# Patient Record
Sex: MALE | Race: BLACK OR AFRICAN AMERICAN | Employment: OTHER | URBAN - METROPOLITAN AREA
[De-identification: names, ages, dates, MRNs, and addresses within clinical notes are randomized per-mention and may not be internally consistent; named-entity substitution may affect disease eponyms.]

---

## 2017-01-01 ENCOUNTER — OFFICE VISIT (OUTPATIENT)
Dept: CARDIOLOGY | Facility: CLINIC | Age: 77
End: 2017-01-01
Attending: INTERNAL MEDICINE
Payer: COMMERCIAL

## 2017-01-01 ENCOUNTER — PRE VISIT (OUTPATIENT)
Dept: CARDIOLOGY | Facility: CLINIC | Age: 77
End: 2017-01-01

## 2017-01-01 ENCOUNTER — TRANSFERRED RECORDS (OUTPATIENT)
Dept: HEALTH INFORMATION MANAGEMENT | Facility: CLINIC | Age: 77
End: 2017-01-01

## 2017-01-01 ENCOUNTER — TELEPHONE (OUTPATIENT)
Dept: FAMILY MEDICINE | Facility: CLINIC | Age: 77
End: 2017-01-01

## 2017-01-01 ENCOUNTER — TELEPHONE (OUTPATIENT)
Dept: INTERNAL MEDICINE | Facility: CLINIC | Age: 77
End: 2017-01-01

## 2017-01-01 ENCOUNTER — OFFICE VISIT (OUTPATIENT)
Dept: NEPHROLOGY | Facility: CLINIC | Age: 77
End: 2017-01-01
Attending: INTERNAL MEDICINE
Payer: COMMERCIAL

## 2017-01-01 ENCOUNTER — OFFICE VISIT (OUTPATIENT)
Dept: PODIATRY | Facility: CLINIC | Age: 77
End: 2017-01-01
Payer: COMMERCIAL

## 2017-01-01 ENCOUNTER — CARE COORDINATION (OUTPATIENT)
Dept: CARDIOLOGY | Facility: CLINIC | Age: 77
End: 2017-01-01

## 2017-01-01 ENCOUNTER — OFFICE VISIT (OUTPATIENT)
Dept: UROLOGY | Facility: CLINIC | Age: 77
End: 2017-01-01

## 2017-01-01 ENCOUNTER — OFFICE VISIT (OUTPATIENT)
Dept: FAMILY MEDICINE | Facility: CLINIC | Age: 77
End: 2017-01-01

## 2017-01-01 ENCOUNTER — RADIANT APPOINTMENT (OUTPATIENT)
Dept: GENERAL RADIOLOGY | Facility: CLINIC | Age: 77
End: 2017-01-01
Attending: PODIATRIST
Payer: COMMERCIAL

## 2017-01-01 ENCOUNTER — HOSPITAL ENCOUNTER (OUTPATIENT)
Dept: MRI IMAGING | Facility: CLINIC | Age: 77
Discharge: HOME | End: 2017-08-23
Attending: FAMILY MEDICINE | Admitting: FAMILY MEDICINE
Payer: COMMERCIAL

## 2017-01-01 ENCOUNTER — PRE VISIT (OUTPATIENT)
Dept: UROLOGY | Facility: CLINIC | Age: 77
End: 2017-01-01

## 2017-01-01 ENCOUNTER — MEDICAL CORRESPONDENCE (OUTPATIENT)
Dept: HEALTH INFORMATION MANAGEMENT | Facility: CLINIC | Age: 77
End: 2017-01-01

## 2017-01-01 ENCOUNTER — TELEPHONE (OUTPATIENT)
Dept: OTHER | Facility: CLINIC | Age: 77
End: 2017-01-01

## 2017-01-01 ENCOUNTER — TELEPHONE (OUTPATIENT)
Dept: PODIATRY | Facility: CLINIC | Age: 77
End: 2017-01-01

## 2017-01-01 VITALS
DIASTOLIC BLOOD PRESSURE: 70 MMHG | HEART RATE: 58 BPM | SYSTOLIC BLOOD PRESSURE: 103 MMHG | RESPIRATION RATE: 20 BRPM | OXYGEN SATURATION: 99 %

## 2017-01-01 VITALS
BODY MASS INDEX: 25.25 KG/M2 | SYSTOLIC BLOOD PRESSURE: 98 MMHG | DIASTOLIC BLOOD PRESSURE: 66 MMHG | TEMPERATURE: 97.8 F | WEIGHT: 171 LBS | OXYGEN SATURATION: 99 % | RESPIRATION RATE: 18 BRPM | HEART RATE: 91 BPM

## 2017-01-01 VITALS
OXYGEN SATURATION: 98 % | WEIGHT: 167 LBS | BODY MASS INDEX: 24.66 KG/M2 | HEART RATE: 83 BPM | DIASTOLIC BLOOD PRESSURE: 72 MMHG | RESPIRATION RATE: 18 BRPM | SYSTOLIC BLOOD PRESSURE: 104 MMHG

## 2017-01-01 VITALS — HEART RATE: 102 BPM | RESPIRATION RATE: 16 BRPM | SYSTOLIC BLOOD PRESSURE: 102 MMHG | DIASTOLIC BLOOD PRESSURE: 70 MMHG

## 2017-01-01 VITALS — DIASTOLIC BLOOD PRESSURE: 68 MMHG | SYSTOLIC BLOOD PRESSURE: 100 MMHG | HEART RATE: 82 BPM | OXYGEN SATURATION: 98 %

## 2017-01-01 VITALS — OXYGEN SATURATION: 98 % | SYSTOLIC BLOOD PRESSURE: 104 MMHG | DIASTOLIC BLOOD PRESSURE: 74 MMHG | HEART RATE: 83 BPM

## 2017-01-01 VITALS — HEART RATE: 72 BPM | OXYGEN SATURATION: 99 % | SYSTOLIC BLOOD PRESSURE: 89 MMHG | DIASTOLIC BLOOD PRESSURE: 55 MMHG

## 2017-01-01 VITALS
WEIGHT: 163 LBS | HEIGHT: 69 IN | DIASTOLIC BLOOD PRESSURE: 59 MMHG | HEART RATE: 73 BPM | BODY MASS INDEX: 24.14 KG/M2 | SYSTOLIC BLOOD PRESSURE: 95 MMHG

## 2017-01-01 VITALS
BODY MASS INDEX: 24.14 KG/M2 | TEMPERATURE: 98.2 F | WEIGHT: 163 LBS | RESPIRATION RATE: 18 BRPM | SYSTOLIC BLOOD PRESSURE: 81 MMHG | HEIGHT: 69 IN | DIASTOLIC BLOOD PRESSURE: 61 MMHG | HEART RATE: 73 BPM

## 2017-01-01 VITALS — HEART RATE: 71 BPM | DIASTOLIC BLOOD PRESSURE: 74 MMHG | SYSTOLIC BLOOD PRESSURE: 109 MMHG | OXYGEN SATURATION: 99 %

## 2017-01-01 VITALS
DIASTOLIC BLOOD PRESSURE: 62 MMHG | HEIGHT: 69 IN | OXYGEN SATURATION: 98 % | HEART RATE: 58 BPM | WEIGHT: 160 LBS | BODY MASS INDEX: 23.7 KG/M2 | SYSTOLIC BLOOD PRESSURE: 94 MMHG

## 2017-01-01 VITALS
SYSTOLIC BLOOD PRESSURE: 93 MMHG | HEART RATE: 86 BPM | OXYGEN SATURATION: 100 % | RESPIRATION RATE: 16 BRPM | DIASTOLIC BLOOD PRESSURE: 63 MMHG | WEIGHT: 171 LBS | BODY MASS INDEX: 25.25 KG/M2 | TEMPERATURE: 97.5 F

## 2017-01-01 VITALS — HEART RATE: 86 BPM | DIASTOLIC BLOOD PRESSURE: 74 MMHG | SYSTOLIC BLOOD PRESSURE: 103 MMHG | RESPIRATION RATE: 16 BRPM

## 2017-01-01 DIAGNOSIS — I43 AMYLOID HEART DISEASE (H): ICD-10-CM

## 2017-01-01 DIAGNOSIS — G60.9 IDIOPATHIC PERIPHERAL NEUROPATHY: ICD-10-CM

## 2017-01-01 DIAGNOSIS — I89.0 LYMPHEDEMA: Primary | ICD-10-CM

## 2017-01-01 DIAGNOSIS — Z79.01 LONG TERM CURRENT USE OF ANTICOAGULANT THERAPY: ICD-10-CM

## 2017-01-01 DIAGNOSIS — M79.672 LEFT FOOT PAIN: ICD-10-CM

## 2017-01-01 DIAGNOSIS — B35.1 DERMATOPHYTOSIS OF NAIL: ICD-10-CM

## 2017-01-01 DIAGNOSIS — E85.4 CARDIAC AMYLOIDOSIS (H): Primary | ICD-10-CM

## 2017-01-01 DIAGNOSIS — I50.32 CHRONIC DIASTOLIC CONGESTIVE HEART FAILURE (H): ICD-10-CM

## 2017-01-01 DIAGNOSIS — I50.9 CHF (CONGESTIVE HEART FAILURE) (H): Primary | ICD-10-CM

## 2017-01-01 DIAGNOSIS — I43 CARDIAC AMYLOIDOSIS (H): ICD-10-CM

## 2017-01-01 DIAGNOSIS — I48.0 PAROXYSMAL ATRIAL FIBRILLATION (H): ICD-10-CM

## 2017-01-01 DIAGNOSIS — R60.0 LOWER EXTREMITY EDEMA: ICD-10-CM

## 2017-01-01 DIAGNOSIS — M76.72 PERONEAL TENDINITIS, LEFT: ICD-10-CM

## 2017-01-01 DIAGNOSIS — N18.30 CKD (CHRONIC KIDNEY DISEASE) STAGE 3, GFR 30-59 ML/MIN (H): Primary | ICD-10-CM

## 2017-01-01 DIAGNOSIS — N18.30 CKD (CHRONIC KIDNEY DISEASE) STAGE 3, GFR 30-59 ML/MIN (H): ICD-10-CM

## 2017-01-01 DIAGNOSIS — K59.09 OTHER CONSTIPATION: ICD-10-CM

## 2017-01-01 DIAGNOSIS — E79.0 ELEVATED BLOOD URIC ACID LEVEL: ICD-10-CM

## 2017-01-01 DIAGNOSIS — N28.9 LESION OF RIGHT NATIVE KIDNEY: Primary | ICD-10-CM

## 2017-01-01 DIAGNOSIS — M79.671 FOOT PAIN, BILATERAL: ICD-10-CM

## 2017-01-01 DIAGNOSIS — I66.01: ICD-10-CM

## 2017-01-01 DIAGNOSIS — E85.4 CARDIAC AMYLOIDOSIS (H): ICD-10-CM

## 2017-01-01 DIAGNOSIS — M79.672 FOOT PAIN, BILATERAL: ICD-10-CM

## 2017-01-01 DIAGNOSIS — I43 CARDIAC AMYLOIDOSIS (H): Primary | ICD-10-CM

## 2017-01-01 DIAGNOSIS — N18.30 CHRONIC KIDNEY DISEASE, STAGE III (MODERATE) (H): ICD-10-CM

## 2017-01-01 DIAGNOSIS — Z98.890 STATUS POST LUMBAR SURGERY: ICD-10-CM

## 2017-01-01 DIAGNOSIS — K59.01 SLOW TRANSIT CONSTIPATION: ICD-10-CM

## 2017-01-01 DIAGNOSIS — E85.89 OTHER AMYLOIDOSIS (H): ICD-10-CM

## 2017-01-01 DIAGNOSIS — I50.42 CHRONIC COMBINED SYSTOLIC AND DIASTOLIC CONGESTIVE HEART FAILURE (H): ICD-10-CM

## 2017-01-01 DIAGNOSIS — M79.2 PERIPHERAL NEUROPATHIC PAIN: ICD-10-CM

## 2017-01-01 DIAGNOSIS — R94.4 ABNORMAL RENAL FUNCTION TEST: ICD-10-CM

## 2017-01-01 DIAGNOSIS — M79.672 LEFT FOOT PAIN: Primary | ICD-10-CM

## 2017-01-01 DIAGNOSIS — M10.9 ACUTE GOUTY ARTHRITIS: ICD-10-CM

## 2017-01-01 DIAGNOSIS — C64.1 RENAL MALIGNANT NEOPLASM, RIGHT (H): ICD-10-CM

## 2017-01-01 DIAGNOSIS — I50.40 COMBINED SYSTOLIC AND DIASTOLIC CONGESTIVE HEART FAILURE, UNSPECIFIED CONGESTIVE HEART FAILURE CHRONICITY: Primary | ICD-10-CM

## 2017-01-01 DIAGNOSIS — E78.49 OTHER HYPERLIPIDEMIA: ICD-10-CM

## 2017-01-01 DIAGNOSIS — I87.2 VENOUS (PERIPHERAL) INSUFFICIENCY: ICD-10-CM

## 2017-01-01 DIAGNOSIS — F32.1 MODERATE SINGLE CURRENT EPISODE OF MAJOR DEPRESSIVE DISORDER (H): ICD-10-CM

## 2017-01-01 DIAGNOSIS — I63.311 CEREBROVASCULAR ACCIDENT (CVA) DUE TO THROMBOSIS OF RIGHT MIDDLE CEREBRAL ARTERY (H): ICD-10-CM

## 2017-01-01 DIAGNOSIS — R60.0 BILATERAL EDEMA OF LOWER EXTREMITY: Primary | ICD-10-CM

## 2017-01-01 DIAGNOSIS — N28.89 RIGHT RENAL MASS: Primary | ICD-10-CM

## 2017-01-01 DIAGNOSIS — I63.9 CEREBROVASCULAR ACCIDENT (H): Primary | ICD-10-CM

## 2017-01-01 DIAGNOSIS — R60.1 GENERALIZED EDEMA: ICD-10-CM

## 2017-01-01 DIAGNOSIS — F32.0 MAJOR DEPRESSIVE DISORDER, SINGLE EPISODE, MILD (H): Primary | ICD-10-CM

## 2017-01-01 DIAGNOSIS — L60.0 INGROWING NAIL: Primary | ICD-10-CM

## 2017-01-01 DIAGNOSIS — R60.0 BILATERAL EDEMA OF LOWER EXTREMITY: ICD-10-CM

## 2017-01-01 DIAGNOSIS — M1A.2790 CHRONIC DRUG-INDUCED GOUT INVOLVING TOE WITHOUT TOPHUS, UNSPECIFIED LATERALITY: ICD-10-CM

## 2017-01-01 DIAGNOSIS — F32.1 MODERATE SINGLE CURRENT EPISODE OF MAJOR DEPRESSIVE DISORDER (H): Primary | ICD-10-CM

## 2017-01-01 DIAGNOSIS — C64.1 MALIGNANT NEOPLASM OF KIDNEY EXCLUDING RENAL PELVIS, RIGHT (H): ICD-10-CM

## 2017-01-01 DIAGNOSIS — L60.8 NAIL DEFORMITY: ICD-10-CM

## 2017-01-01 DIAGNOSIS — Z23 NEED FOR PROPHYLACTIC VACCINATION AND INOCULATION AGAINST INFLUENZA: ICD-10-CM

## 2017-01-01 DIAGNOSIS — B35.1 ONYCHOMYCOSIS: ICD-10-CM

## 2017-01-01 DIAGNOSIS — Z86.73 HISTORY OF STROKE: ICD-10-CM

## 2017-01-01 DIAGNOSIS — E85.4 AMYLOID HEART DISEASE (H): ICD-10-CM

## 2017-01-01 DIAGNOSIS — I89.0 LYMPHEDEMA, NOT ELSEWHERE CLASSIFIED: Primary | ICD-10-CM

## 2017-01-01 DIAGNOSIS — R55 VASOVAGAL SYNCOPE: ICD-10-CM

## 2017-01-01 DIAGNOSIS — B35.1 DERMATOPHYTOSIS OF NAIL: Primary | ICD-10-CM

## 2017-01-01 DIAGNOSIS — I63.311 CEREBRAL INFARCTION DUE TO THROMBOSIS OF RIGHT MIDDLE CEREBRAL ARTERY (H): ICD-10-CM

## 2017-01-01 DIAGNOSIS — R73.9 HYPERGLYCEMIA: ICD-10-CM

## 2017-01-01 DIAGNOSIS — I48.91 ATRIAL FIBRILLATION (H): ICD-10-CM

## 2017-01-01 DIAGNOSIS — R59.1 LYMPHADENOPATHY: Primary | ICD-10-CM

## 2017-01-01 DIAGNOSIS — R53.1 WEAKNESS: ICD-10-CM

## 2017-01-01 DIAGNOSIS — I21.4 MYOCARDIAL INFARCTION, NONTRANSMURAL (H): ICD-10-CM

## 2017-01-01 DIAGNOSIS — R60.0 PERIPHERAL EDEMA: Primary | ICD-10-CM

## 2017-01-01 DIAGNOSIS — M67.88 PERONEAL TENDINOSIS, LEFT: ICD-10-CM

## 2017-01-01 DIAGNOSIS — B35.3 TINEA PEDIS OF LEFT FOOT: ICD-10-CM

## 2017-01-01 DIAGNOSIS — I63.311 CEREBROVASCULAR ACCIDENT (CVA) DUE TO THROMBOSIS OF RIGHT MIDDLE CEREBRAL ARTERY (H): Primary | ICD-10-CM

## 2017-01-01 LAB
ALBUMIN MFR UR ELPH: 33.9 %
ALBUMIN SERPL ELPH-MCNC: 3.9 G/DL (ref 3.7–5.1)
ALBUMIN SERPL-MCNC: 3.4 G/DL (ref 3.4–5)
ALBUMIN UR-MCNC: NEGATIVE MG/DL
ALPHA1 GLOB MFR UR ELPH: 9.5 %
ALPHA1 GLOB SERPL ELPH-MCNC: 0.4 G/DL (ref 0.2–0.4)
ALPHA2 GLOB MFR UR ELPH: 11.2 %
ALPHA2 GLOB SERPL ELPH-MCNC: 0.6 G/DL (ref 0.5–0.9)
ANION GAP SERPL CALCULATED.3IONS-SCNC: 10 MMOL/L (ref 3–14)
ANION GAP SERPL CALCULATED.3IONS-SCNC: 7 MMOL/L (ref 3–14)
ANION GAP SERPL CALCULATED.3IONS-SCNC: 8 MMOL/L (ref 3–14)
APPEARANCE UR: CLEAR
B-GLOBULIN MFR UR ELPH: 16.5 %
B-GLOBULIN SERPL ELPH-MCNC: 0.7 G/DL (ref 0.6–1)
BILIRUB UR QL STRIP: NEGATIVE
BUN SERPL-MCNC: 50 MG/DL (ref 7–30)
BUN SERPL-MCNC: 51 MG/DL (ref 7–30)
BUN SERPL-MCNC: 53 MG/DL (ref 7–30)
CALCIUM SERPL-MCNC: 8.9 MG/DL (ref 8.5–10.1)
CALCIUM SERPL-MCNC: 9.2 MG/DL (ref 8.5–10.1)
CALCIUM SERPL-MCNC: 9.3 MG/DL (ref 8.5–10.1)
CHLORIDE SERPL-SCNC: 100 MMOL/L (ref 94–109)
CHLORIDE SERPL-SCNC: 97 MMOL/L (ref 94–109)
CHLORIDE SERPL-SCNC: 98 MMOL/L (ref 94–109)
CO2 SERPL-SCNC: 27 MMOL/L (ref 20–32)
CO2 SERPL-SCNC: 29 MMOL/L (ref 20–32)
CO2 SERPL-SCNC: 29 MMOL/L (ref 20–32)
COLOR UR AUTO: YELLOW
COPATH REPORT: NORMAL
CREAT BLD-MCNC: 2.3 MG/DL (ref 0.66–1.25)
CREAT SERPL-MCNC: 2.14 MG/DL (ref 0.66–1.25)
CREAT SERPL-MCNC: 2.19 MG/DL (ref 0.66–1.25)
CREAT SERPL-MCNC: 2.45 MG/DL (ref 0.66–1.25)
CREAT UR-MCNC: 37 MG/DL
EJECTION FRACTION: 23
ERYTHROCYTE [DISTWIDTH] IN BLOOD BY AUTOMATED COUNT: 14.7 % (ref 10–15)
GAMMA GLOB MFR UR ELPH: 28.9 %
GAMMA GLOB SERPL ELPH-MCNC: 1.3 G/DL (ref 0.7–1.6)
GFR SERPL CREATININE-BSD FRML MDRD: 26 ML/MIN/1.7M2
GFR SERPL CREATININE-BSD FRML MDRD: 28 ML/MIN/1.7M2
GFR SERPL CREATININE-BSD FRML MDRD: 30 ML/MIN/1.7M2
GFR SERPL CREATININE-BSD FRML MDRD: 30 ML/MIN/1.7M2
GLUCOSE SERPL-MCNC: 114 MG/DL (ref 70–99)
GLUCOSE SERPL-MCNC: 120 MG/DL (ref 70–99)
GLUCOSE SERPL-MCNC: 87 MG/DL (ref 70–99)
GLUCOSE UR STRIP-MCNC: NEGATIVE MG/DL
HBA1C MFR BLD: 6.4 % (ref 4.3–6)
HCT VFR BLD AUTO: 38.8 % (ref 40–53)
HGB BLD-MCNC: 13.1 G/DL (ref 13.3–17.7)
HGB UR QL STRIP: ABNORMAL
HYALINE CASTS #/AREA URNS LPF: 1 /LPF (ref 0–2)
KAPPA LC UR-MCNC: 4.63 MG/DL (ref 0.33–1.94)
KAPPA LC/LAMBDA SER: 1.58 {RATIO} (ref 0.26–1.65)
KETONES UR STRIP-MCNC: NEGATIVE MG/DL
LAMBDA LC SERPL-MCNC: 2.93 MG/DL (ref 0.57–2.63)
LEUKOCYTE ESTERASE UR QL STRIP: NEGATIVE
M PROTEIN MFR UR ELPH: 0 %
M PROTEIN SERPL ELPH-MCNC: 0 G/DL
MCH RBC QN AUTO: 31.8 PG (ref 26.5–33)
MCHC RBC AUTO-ENTMCNC: 33.8 G/DL (ref 31.5–36.5)
MCV RBC AUTO: 94 FL (ref 78–100)
MICROALBUMIN UR-MCNC: 76 MG/L
MICROALBUMIN/CREAT UR: 205.14 MG/G CR (ref 0–17)
NITRATE UR QL: NEGATIVE
PH UR STRIP: 6 PH (ref 5–7)
PHOSPHATE SERPL-MCNC: 3.6 MG/DL (ref 2.5–4.5)
PLATELET # BLD AUTO: 100 10E9/L (ref 150–450)
POTASSIUM SERPL-SCNC: 3.9 MMOL/L (ref 3.4–5.3)
POTASSIUM SERPL-SCNC: 3.9 MMOL/L (ref 3.4–5.3)
POTASSIUM SERPL-SCNC: 4.3 MMOL/L (ref 3.4–5.3)
PROT PATTERN SERPL ELPH-IMP: NORMAL
PROT PATTERN UR ELPH-IMP: ABNORMAL
PROT UR-MCNC: 0.28 G/L
PROT/CREAT 24H UR: 0.76 G/G CR (ref 0–0.2)
RBC # BLD AUTO: 4.12 10E12/L (ref 4.4–5.9)
RBC #/AREA URNS AUTO: 1 /HPF (ref 0–2)
SODIUM SERPL-SCNC: 135 MMOL/L (ref 133–144)
SODIUM SERPL-SCNC: 135 MMOL/L (ref 133–144)
SODIUM SERPL-SCNC: 137 MMOL/L (ref 133–144)
SOURCE: ABNORMAL
SP GR UR STRIP: 1.01 (ref 1–1.03)
SQUAMOUS #/AREA URNS AUTO: <1 /HPF (ref 0–1)
TSH SERPL DL<=0.005 MIU/L-ACNC: 1.95 MU/L (ref 0.4–4)
URATE SERPL-MCNC: 10.1 MG/DL (ref 3.5–7.2)
UROBILINOGEN UR STRIP-MCNC: 2 MG/DL (ref 0–2)
WBC # BLD AUTO: 3.9 10E9/L (ref 4–11)
WBC #/AREA URNS AUTO: <1 /HPF (ref 0–2)

## 2017-01-01 PROCEDURE — 36415 COLL VENOUS BLD VENIPUNCTURE: CPT | Performed by: INTERNAL MEDICINE

## 2017-01-01 PROCEDURE — 82565 ASSAY OF CREATININE: CPT | Mod: 91

## 2017-01-01 PROCEDURE — 99212 OFFICE O/P EST SF 10 MIN: CPT | Performed by: PODIATRIST

## 2017-01-01 PROCEDURE — 99204 OFFICE O/P NEW MOD 45 MIN: CPT | Mod: ZP | Performed by: INTERNAL MEDICINE

## 2017-01-01 PROCEDURE — 85027 COMPLETE CBC AUTOMATED: CPT | Performed by: INTERNAL MEDICINE

## 2017-01-01 PROCEDURE — 99202 OFFICE O/P NEW SF 15 MIN: CPT | Mod: 25 | Performed by: PODIATRIST

## 2017-01-01 PROCEDURE — 11721 DEBRIDE NAIL 6 OR MORE: CPT | Performed by: PODIATRIST

## 2017-01-01 PROCEDURE — 99213 OFFICE O/P EST LOW 20 MIN: CPT | Performed by: PODIATRIST

## 2017-01-01 PROCEDURE — 25000128 H RX IP 250 OP 636: Performed by: FAMILY MEDICINE

## 2017-01-01 PROCEDURE — 80069 RENAL FUNCTION PANEL: CPT | Performed by: INTERNAL MEDICINE

## 2017-01-01 PROCEDURE — 84156 ASSAY OF PROTEIN URINE: CPT | Performed by: INTERNAL MEDICINE

## 2017-01-01 PROCEDURE — 74183 MRI ABD W/O CNTR FLWD CNTR: CPT

## 2017-01-01 PROCEDURE — A9585 GADOBUTROL INJECTION: HCPCS | Performed by: FAMILY MEDICINE

## 2017-01-01 PROCEDURE — 73630 X-RAY EXAM OF FOOT: CPT | Mod: LT | Performed by: RADIOLOGY

## 2017-01-01 PROCEDURE — 27210995 ZZH RX 272: Performed by: FAMILY MEDICINE

## 2017-01-01 PROCEDURE — 99213 OFFICE O/P EST LOW 20 MIN: CPT | Mod: ZF

## 2017-01-01 PROCEDURE — 73610 X-RAY EXAM OF ANKLE: CPT | Mod: LT | Performed by: RADIOLOGY

## 2017-01-01 PROCEDURE — 80048 BASIC METABOLIC PNL TOTAL CA: CPT | Performed by: INTERNAL MEDICINE

## 2017-01-01 PROCEDURE — 82043 UR ALBUMIN QUANTITATIVE: CPT | Performed by: INTERNAL MEDICINE

## 2017-01-01 PROCEDURE — 81001 URINALYSIS AUTO W/SCOPE: CPT | Performed by: INTERNAL MEDICINE

## 2017-01-01 RX ORDER — GADOBUTROL 604.72 MG/ML
8 INJECTION INTRAVENOUS ONCE
Status: COMPLETED | OUTPATIENT
Start: 2017-01-01 | End: 2017-01-01

## 2017-01-01 RX ORDER — POTASSIUM CHLORIDE 1500 MG/1
20 TABLET, EXTENDED RELEASE ORAL DAILY
Qty: 90 TABLET | Refills: 1 | Status: SHIPPED | OUTPATIENT
Start: 2017-01-01

## 2017-01-01 RX ORDER — POLYETHYLENE GLYCOL 3350 17 G/17G
1 POWDER, FOR SOLUTION ORAL DAILY PRN
Qty: 119 G | Refills: 3 | Status: SHIPPED | OUTPATIENT
Start: 2017-01-01 | End: 2018-01-01

## 2017-01-01 RX ORDER — ACYCLOVIR 200 MG/1
30 CAPSULE ORAL ONCE
Status: COMPLETED | OUTPATIENT
Start: 2017-01-01 | End: 2017-01-01

## 2017-01-01 RX ORDER — FEBUXOSTAT 40 MG/1
40 TABLET, FILM COATED ORAL DAILY
Qty: 90 TABLET | Refills: 3 | Status: SHIPPED | OUTPATIENT
Start: 2017-01-01 | End: 2017-01-01

## 2017-01-01 RX ORDER — BUMETANIDE 1 MG/1
TABLET ORAL
COMMUNITY
Start: 2017-01-01

## 2017-01-01 RX ORDER — ATORVASTATIN CALCIUM 80 MG/1
80 TABLET, FILM COATED ORAL DAILY
Qty: 90 TABLET | Refills: 3 | Status: SHIPPED | OUTPATIENT
Start: 2017-01-01

## 2017-01-01 RX ORDER — SOTALOL HYDROCHLORIDE 80 MG/1
TABLET ORAL
Qty: 45 TABLET | Refills: 0 | Status: SHIPPED | OUTPATIENT
Start: 2017-01-01 | End: 2017-01-01

## 2017-01-01 RX ORDER — ESCITALOPRAM OXALATE 5 MG/1
5 TABLET ORAL DAILY
Qty: 90 TABLET | Refills: 3 | Status: SHIPPED | OUTPATIENT
Start: 2017-01-01 | End: 2018-01-01

## 2017-01-01 RX ORDER — ESCITALOPRAM OXALATE 10 MG/1
5 TABLET ORAL DAILY
Qty: 30 TABLET | Refills: 1 | Status: SHIPPED | OUTPATIENT
Start: 2017-01-01 | End: 2017-01-01

## 2017-01-01 RX ORDER — BUMETANIDE 1 MG/1
1 TABLET ORAL 2 TIMES DAILY
Qty: 180 TABLET | Refills: 1 | Status: SHIPPED | OUTPATIENT
Start: 2017-01-01 | End: 2017-01-01

## 2017-01-01 RX ORDER — LIDOCAINE HYDROCHLORIDE 30 MG/G
CREAM TOPICAL
Qty: 85 G | Refills: 3 | Status: SHIPPED | OUTPATIENT
Start: 2017-01-01 | End: 2018-01-01

## 2017-01-01 RX ORDER — ESCITALOPRAM OXALATE 10 MG/1
10 TABLET ORAL DAILY
Qty: 90 TABLET | Refills: 3 | Status: SHIPPED | OUTPATIENT
Start: 2017-01-01 | End: 2017-01-01 | Stop reason: DRUGHIGH

## 2017-01-01 RX ORDER — BUMETANIDE 1 MG/1
TABLET ORAL
COMMUNITY
Start: 2017-01-01 | End: 2017-01-01

## 2017-01-01 RX ORDER — CLOTRIMAZOLE 1 %
CREAM (GRAM) TOPICAL 2 TIMES DAILY
Qty: 60 G | Refills: 1 | Status: SHIPPED | OUTPATIENT
Start: 2017-01-01 | End: 2018-01-01

## 2017-01-01 RX ORDER — DIAZEPAM 2 MG
TABLET ORAL
Qty: 1 TABLET | Refills: 0 | Status: SHIPPED | OUTPATIENT
Start: 2017-01-01 | End: 2017-01-01

## 2017-01-01 RX ORDER — BUMETANIDE 1 MG/1
TABLET ORAL
Qty: 180 TABLET | Refills: 1 | Status: SHIPPED | OUTPATIENT
Start: 2017-01-01 | End: 2018-01-01

## 2017-01-01 RX ORDER — WARFARIN SODIUM 5 MG/1
5-7.5 TABLET ORAL DAILY
Qty: 110 TABLET | Refills: 3 | Status: SHIPPED | OUTPATIENT
Start: 2017-01-01 | End: 2018-01-01

## 2017-01-01 RX ADMIN — GADOBUTROL 8 ML: 604.72 INJECTION INTRAVENOUS at 09:27

## 2017-01-01 RX ADMIN — SODIUM CHLORIDE 30 ML: 9 INJECTION, SOLUTION INTRAMUSCULAR; INTRAVENOUS; SUBCUTANEOUS at 09:27

## 2017-01-01 ASSESSMENT — PAIN SCALES - GENERAL
PAINLEVEL: NO PAIN (0)
PAINLEVEL: SEVERE PAIN (6)
PAINLEVEL: MODERATE PAIN (5)
PAINLEVEL: MILD PAIN (3)
PAINLEVEL: MODERATE PAIN (4)
PAINLEVEL: MILD PAIN (3)
PAINLEVEL: NO PAIN (0)
PAINLEVEL: MILD PAIN (3)
PAINLEVEL: SEVERE PAIN (7)

## 2017-01-01 ASSESSMENT — PATIENT HEALTH QUESTIONNAIRE - PHQ9
SUM OF ALL RESPONSES TO PHQ QUESTIONS 1-9: 6
SUM OF ALL RESPONSES TO PHQ QUESTIONS 1-9: 4
SUM OF ALL RESPONSES TO PHQ QUESTIONS 1-9: 5

## 2017-02-17 ENCOUNTER — CARE COORDINATION (OUTPATIENT)
Dept: CARDIOLOGY | Facility: CLINIC | Age: 77
End: 2017-02-17

## 2017-02-17 NOTE — PROGRESS NOTES
Received call on triage line from Frannie at [facility]. She is asking if Dr. Wise will take over coumadin management for this patient, as the patient has not been seen by a cardiologist at [facility] since September 2015 and he has seen Dr. Wise since then. He is on warfarin for AF. It appears Dr. Wise has been refilling his warfarin although he is being managed through [facility].       He was recently hospitalized at [facility] for a stroke, but has no follow up with their cardiology and none here either (he was asked to follow up ~9/2016).    Will route to Dr. Wise's RNCC to follow up next week with Frannie (ph. [phone]) and transition to [facility] coumadin clinic if appropriate.

## 2017-03-01 ENCOUNTER — CARE COORDINATION (OUTPATIENT)
Dept: CARDIOLOGY | Facility: CLINIC | Age: 77
End: 2017-03-01

## 2017-03-01 NOTE — PROGRESS NOTES
Rec'd following message from triage:     [facility] calling about this pt. They are asking that our INR clinic follow Josr and that he have his INR's drawn with them. I know that are INR clinic doesn't like to do this cause they get no revenue from this kind of a deal.   Now. Josr doesn't seem to follow up with any cardiologist. He saw Frandy last in March 2016, no show in September.   Do you even think we should take this on?      No phone number obtained from [facility] to call back.   Pt see's Dr Wise every 1-2 years. Last visit was approx 1 year ago.     Our INR clinic will not follow pts AC unless labs are drawn through a [facility] clinic.     Pt has been set up with our coumadin clinic multiple times and he tends to re-establish at [facility] due to location and conv.     I left  for pt and send a my chart message - asking pt how he would like to proceed.

## 2017-03-22 ENCOUNTER — MEDICAL CORRESPONDENCE (OUTPATIENT)
Dept: HEALTH INFORMATION MANAGEMENT | Facility: CLINIC | Age: 77
End: 2017-03-22

## 2017-03-23 ENCOUNTER — TELEPHONE (OUTPATIENT)
Dept: INTERNAL MEDICINE | Facility: CLINIC | Age: 77
End: 2017-03-23

## 2017-03-23 NOTE — TELEPHONE ENCOUNTER
PT 2x week for 8 week the 1x week for 1 week for strength, balance, transfer, gait. Olu therapy for left foot as needed. Pain management and fall preventation.  Verbal ok given and documented.  Liliana Beverly RN 12:11 PM on 3/23/2017.

## 2017-03-23 NOTE — TELEPHONE ENCOUNTER
Skilled nurse visit 2x week for 2 weeks  Then 1x week for 2 weeks.  For CVA monitoring and anticoagulation education and monitoring, medication education, fall preventation, hydration and  nutrition management.  Verbal orders and Documentation.  Liliana Beverly RN 8:58 AM on 3/23/2017.

## 2017-04-04 DIAGNOSIS — E85.4 AMYLOID HEART DISEASE (H): ICD-10-CM

## 2017-04-04 DIAGNOSIS — I43 AMYLOID HEART DISEASE (H): ICD-10-CM

## 2017-04-04 DIAGNOSIS — I48.91 ATRIAL FIBRILLATION (H): ICD-10-CM

## 2017-04-04 RX ORDER — SOTALOL HYDROCHLORIDE 80 MG/1
TABLET ORAL
Qty: 45 TABLET | Refills: 0 | Status: SHIPPED | OUTPATIENT
Start: 2017-04-04 | End: 2017-01-01

## 2017-04-11 DIAGNOSIS — E78.5 HYPERLIPIDEMIA: ICD-10-CM

## 2017-04-11 DIAGNOSIS — I48.0 PAROXYSMAL ATRIAL FIBRILLATION (H): Primary | ICD-10-CM

## 2017-04-11 RX ORDER — POTASSIUM CHLORIDE 1500 MG/1
20 TABLET, EXTENDED RELEASE ORAL DAILY
Qty: 90 TABLET | Refills: 1 | Status: SHIPPED | OUTPATIENT
Start: 2017-04-11 | End: 2017-01-01

## 2017-04-11 RX ORDER — ATORVASTATIN CALCIUM 80 MG/1
80 TABLET, FILM COATED ORAL DAILY
Qty: 90 TABLET | Refills: 1 | Status: SHIPPED | OUTPATIENT
Start: 2017-04-11 | End: 2017-01-01

## 2017-04-11 NOTE — TELEPHONE ENCOUNTER
"Spoke with pt, stated that he had a small stroke recently and some of the medications are changed.  Pt said he is taking Potassium 20 meq 1 daily and Atorvastatin 80 mg at bedtime, requesting refill on both medications.  Rx refilled, pt notified.  Tatum Bender RN  ------------------------------        From: <[email or web address]>  Date: Mon, Apr 10, 2017 at 7:19 PM  Subject: Medical Report  To: \"Dr. Crispin Cruz\" <[email or web address]>      Hello. I am sorry to report that I experienced a mild stroke on February 12. I received medical treatment at [facility]. I received transitional care through Dr Yvonne Das at the [place]. DR DAS PRESCRIBED POTASSIUM CL AND ATORVASTATIN AS PART OF MY MEDICAL PROGRAM. THE POTASSIUM WAS PRESCRIBED BY HER TO SUPPLEMENT Lasix while she was treating me. The other ATORVASTATIN was something she prescribed to supplement the existing drugs.    Please help. My subscriptions are with [employer] at 101 and 7. We want to renew them.    Sent from my iPhone    Josr Landers II     [employer]  [address] [place] [id number]  Phone      [phone]  Fax.          [phone]  Cell.          [phone]  "

## 2017-04-21 ENCOUNTER — TELEPHONE (OUTPATIENT)
Dept: INTERNAL MEDICINE | Facility: CLINIC | Age: 77
End: 2017-04-21

## 2017-04-21 NOTE — TELEPHONE ENCOUNTER
----- Message from Devi Suh sent at 4/20/2017 12:54 PM CDT -----  Regarding: Home care orders  Maryjane from [facility] [phone] calling OT orders to continue to treat 2 x times a week for 4 weeks to increase activity tolerance and Adls  -----------------  Above home care orders authorized, message given to Maryjane.  Tatum Bender RN

## 2017-04-24 ENCOUNTER — OFFICE VISIT (OUTPATIENT)
Dept: FAMILY MEDICINE | Facility: CLINIC | Age: 77
End: 2017-04-24

## 2017-04-24 VITALS — RESPIRATION RATE: 20 BRPM | HEART RATE: 68 BPM | DIASTOLIC BLOOD PRESSURE: 64 MMHG | SYSTOLIC BLOOD PRESSURE: 95 MMHG

## 2017-04-24 DIAGNOSIS — M79.671 RIGHT FOOT PAIN: ICD-10-CM

## 2017-04-24 DIAGNOSIS — I87.2 VENOUS (PERIPHERAL) INSUFFICIENCY: ICD-10-CM

## 2017-04-24 DIAGNOSIS — E85.4 CARDIAC AMYLOIDOSIS (H): Primary | ICD-10-CM

## 2017-04-24 DIAGNOSIS — I95.2 HYPOTENSION DUE TO DRUGS: ICD-10-CM

## 2017-04-24 DIAGNOSIS — M10.071 ACUTE IDIOPATHIC GOUT INVOLVING TOE OF RIGHT FOOT: ICD-10-CM

## 2017-04-24 DIAGNOSIS — I43 CARDIAC AMYLOIDOSIS (H): Primary | ICD-10-CM

## 2017-04-24 DIAGNOSIS — Z79.01 LONG TERM CURRENT USE OF ANTICOAGULANT THERAPY: ICD-10-CM

## 2017-04-24 DIAGNOSIS — E78.49 OTHER HYPERLIPIDEMIA: ICD-10-CM

## 2017-04-24 DIAGNOSIS — M1A.2790 CHRONIC DRUG-INDUCED GOUT INVOLVING TOE WITHOUT TOPHUS, UNSPECIFIED LATERALITY: ICD-10-CM

## 2017-04-24 DIAGNOSIS — I43 CARDIAC AMYLOIDOSIS (H): ICD-10-CM

## 2017-04-24 DIAGNOSIS — R60.1 GENERALIZED EDEMA: ICD-10-CM

## 2017-04-24 DIAGNOSIS — I25.10 CORONARY ARTERIOSCLEROSIS IN NATIVE ARTERY: ICD-10-CM

## 2017-04-24 DIAGNOSIS — G60.9 IDIOPATHIC PERIPHERAL NEUROPATHY: ICD-10-CM

## 2017-04-24 DIAGNOSIS — I50.32 CHRONIC DIASTOLIC CONGESTIVE HEART FAILURE (H): ICD-10-CM

## 2017-04-24 DIAGNOSIS — E85.4 CARDIAC AMYLOIDOSIS (H): ICD-10-CM

## 2017-04-24 DIAGNOSIS — I95.9 HYPOTENSION, UNSPECIFIED HYPOTENSION TYPE: ICD-10-CM

## 2017-04-24 DIAGNOSIS — I63.311 CEREBROVASCULAR ACCIDENT (CVA) DUE TO THROMBOSIS OF RIGHT MIDDLE CEREBRAL ARTERY (H): ICD-10-CM

## 2017-04-24 DIAGNOSIS — I48.20 CHRONIC ATRIAL FIBRILLATION (H): ICD-10-CM

## 2017-04-24 DIAGNOSIS — M10.071 ACUTE IDIOPATHIC GOUT OF RIGHT FOOT: ICD-10-CM

## 2017-04-24 DIAGNOSIS — R06.09 DYSPNEA ON EXERTION: ICD-10-CM

## 2017-04-24 DIAGNOSIS — R60.0 BILATERAL EDEMA OF LOWER EXTREMITY: ICD-10-CM

## 2017-04-24 DIAGNOSIS — I48.0 PAROXYSMAL ATRIAL FIBRILLATION (H): ICD-10-CM

## 2017-04-24 DIAGNOSIS — N18.30 CKD (CHRONIC KIDNEY DISEASE) STAGE 3, GFR 30-59 ML/MIN (H): ICD-10-CM

## 2017-04-24 DIAGNOSIS — I48.19 PERSISTENT ATRIAL FIBRILLATION (H): Primary | ICD-10-CM

## 2017-04-24 DIAGNOSIS — M79.2 PERIPHERAL NEUROPATHIC PAIN: ICD-10-CM

## 2017-04-24 PROBLEM — I47.29 NONSUSTAINED VENTRICULAR TACHYCARDIA (H): Status: ACTIVE | Noted: 2017-02-13

## 2017-04-24 PROBLEM — R53.1 WEAKNESS: Status: ACTIVE | Noted: 2017-04-17

## 2017-04-24 PROBLEM — I63.9 CEREBROVASCULAR ACCIDENT (CVA) (H): Status: ACTIVE | Noted: 2017-02-12

## 2017-04-24 PROBLEM — M79.673 PAIN OF FOOT: Status: ACTIVE | Noted: 2017-03-10

## 2017-04-24 RX ORDER — FUROSEMIDE 20 MG
40 TABLET ORAL DAILY
Qty: 1 TABLET | Refills: 0 | COMMUNITY
Start: 2017-04-24 | End: 2017-01-01

## 2017-04-24 ASSESSMENT — ANXIETY QUESTIONNAIRES
6. BECOMING EASILY ANNOYED OR IRRITABLE: NOT AT ALL
3. WORRYING TOO MUCH ABOUT DIFFERENT THINGS: NOT AT ALL
1. FEELING NERVOUS, ANXIOUS, OR ON EDGE: SEVERAL DAYS
2. NOT BEING ABLE TO STOP OR CONTROL WORRYING: SEVERAL DAYS
GAD7 TOTAL SCORE: 3
7. FEELING AFRAID AS IF SOMETHING AWFUL MIGHT HAPPEN: NOT AT ALL
5. BEING SO RESTLESS THAT IT IS HARD TO SIT STILL: NOT AT ALL

## 2017-04-24 ASSESSMENT — PAIN SCALES - GENERAL: PAINLEVEL: MILD PAIN (2)

## 2017-04-24 ASSESSMENT — PATIENT HEALTH QUESTIONNAIRE - PHQ9: 5. POOR APPETITE OR OVEREATING: SEVERAL DAYS

## 2017-04-24 NOTE — PATIENT INSTRUCTIONS
Primary Care Center Phone Number [phone]  Primary Care Center Medication Refill Request Information:  * Please contact your pharmacy regarding ANY request for medication refills.  ** [facility] Pres[facility] Fax = [phone]  * Please allow 3 business days for routine medication refills.  * Please allow 5 business days for controlled substance medication refills.     [facility] Test Result notification information:  *You will be notified with in 7-10 days of your appointment day regarding the results of your test.  If you are on MyChart you will be notified as soon as the provider has reviewed the results and signed off on them.    Please schedule the following appointments:  Cardiology [phone] ([facility]

## 2017-04-24 NOTE — NURSING NOTE
Chief Complaint   Patient presents with     Hospital F/U     pt is here for a hospital follow up        Lashawn Bautista CMA at 7:53 AM on 4/24/2017

## 2017-04-24 NOTE — PROGRESS NOTES
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 He normally sees Dr. Cruz but it has been difficult to get in to see him.  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    He indicates that in February, 02/12/17, he was hospitalized at [facility] because his wife heard a noise at 2:00 in the morning, a garbled sound, he hit his hand on his chest and could not move the left side of his body; 911 was called and he was taken to the emergency room where they learned that he had a thrombus in the right middle cerebral artery, acute stroke.  Interventions assisted with removal of the thrombus he has left-sided residual.    He has chronic atrial fibrillation, rate controlled with sotalol, but his blood pressure remains on the lower side and there is some question of if he had taken a higher dose of sotalol prior to the hospital event.  He remains on sotalol now at 40 mg twice daily with a slightly low heart rate as well as blood pressure.  He does not feel symptomatic and indeed his orthostatic blood pressures are stable today, please see below.  We are wondering if this is the best dose for him or if an adjust of the medication is in order.    He does follow with Dr. Levin at [facility] as well as he  had stenting of his coronary arteries per his report in 06/2015, discontinued Plavix in 12/2016.    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  He has chronic venous  "insufficiency and is bothered by significant swelling in bilateral legs, left greater than right.  He does have incompetent veins.  He wears compression stockings but needs new ones.    He also has hyperlipidemia and was started on 80 mg of atorvastatin most recently in February because of the embolic stroke for risk factor modification.  He indicates that he sees many specialists but would like to come back to see his primary to try to put all of the puzzles together.      REVIEW OF SYSTEMS:  At this time his wife notes that he seems to breathe \"funny\" at night and he also is having some dyspnea with exertion.  They currently have him on Lasix  60 mg Monday, Wednesday, Friday and 40 mg Tuesday, Thursday, Saturday, Sunday.    He also is on Topamax to assist with peripheral neuropathy.  He does not really think this has helped at all and the dose is 25 mg, he takes 2 at bedtime.    He has gout in right great toe at times.  He follows with Dr. Arnold at [facility] who monitors his INR and they said his goal INR should be 2-3.5.  Most recently he has had a high INR.  I discussed most likely it would be good to have his INR closer to around 2.5-3 if at all possible but goal per cardiology.    He has a history of chronic kidney disease, coronary artery disease involving the native coronary artery disease and had an acute CVA in 02/12/2017 due to occlusion of the distal right M1 segment of middle cerebral artery, status post recanalization with Penumbra ACE 68 device.       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     Pain in limb     Status post lumbar surgery     Adjustment disorder     Idiopathic peripheral neuropathy     SOB (shortness of breath)     CHF (congestive heart failure) (H)     Depression     Malignant neoplasm of kidney excluding renal pelvis " (H)     Cerebrovascular accident (CVA) (H)     Other amyloidosis (H)     Anemia     Atrial flutter (H)     Cardiomyopathy (H)     Chronic kidney disease, stage III (moderate)     Coronary arteriosclerosis in native artery     Weakness     Hypotension     Pain of foot     Long term current use of anticoagulant therapy     Peripheral neuropathic pain (H)     Myocardial infarction, nontransmural (H)     Nonsustained ventricular tachycardia (H)     Past Medical History:   Diagnosis Date     Atrial fibrillation (H) 6/25/2012     Atrial fibrillation: ablation Dr Jonathan Arvizu/[place] 6/25/2012     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      to his wife Nu ( Vikash), two children Deandra 1969 (Grandchildren Trinh 2003, Nish 2007) and Son [id number] YISEL Dockeryan III   SURGICAL HISTORY:  Includes back surgery, knee surgery and shoulder surgery.  He has a history of cardiomegaly as mentioned.    Family Hx not reviewed due to time limitation.  SOCIAL HISTORY:  He lives with his wife.  Please see social history.  He has 2 children, a son and daughter, and 2  grandchildren, a grandson and granddaughter.      OBJECTIVE: BP 95/64  Pulse 68  Resp 20  GENERAL:  Examination reveals a delightful gentleman who is very intelligent.  His speech is clear.  He has decreased movement on the left side of his body, but is able to move.  He is sitting in a wheelchair.   HEENT:  Mucous membranes are moist.  Posterior pharynx nonerythematous.   NECK:  Supple.  Thyroid is normal.   HEART:  S1, S2, with irregular rhythm.  Rate is controlled.   VITAL SIGNS:  Orthostatics are 90/60 lying, 100/66 sitting with a pulse of 67, standing 100/67 with a pulse of 67.   ABDOMEN:  Not examined as he was sitting in a chair.   EXTREMITIES:  He does have compression stockings, left leg has slightly more swelling than the right, both have venous incompetence.   PSYCHOLOGICAL:  Interactive, very intelligent, wanting to understand his health condition.      Information reviewed from [facility]:    Patient is a 76 y.o. male with a history of warfarin anticoagulation secondary to history of atrial fibrillation and embolic stroke, CKD, CAD, amyloid heart disease and baseline mild hypotension with systolic pressures between 90 and low 100s who is admitted from the ED for generalized weakness and hypotension. Patient was hospitalized here in February for acute CVA with thrombectomy. INR goal was increased from 2-3, to 2.5-3.5 but no other new medications were added. He continues to undergo physical and occupational therapy but has generally been doing well. During the day on 4/16 he was feeling weaker than normal, also had some fever and possible cough so came into the ED for evaluation at his family's insistence. Here systolic pressures were in the high 80s and low 90 range. However, he had significant orthostasis and an episode where his systolic blood pressure dropped to 62 after trying to walk to the bathroom. He is thus admitted for further evaluation. Temperature at home was apparently as high as  100.8. Denies nasal congestion, sore throat, shortness of breath, chest pain or change in stools. Of note, he is somewhat resistant to the idea of being admitted and is irritated by it. Wife is more concerned and seems to think he is minimizing symptoms.     Importantly, there is some question with regards to his dosing of sotalol. He has seen Dr. Levin here, but mainly sees a Cardiologist at the [facility]. They report that he was on sotalol 40 mg daily for some time that that during his TCU stay recently, it was increased to 40 mg b.i.d. Both admission medication and discharge medication appears to me to have 40 mg b.i.d. Recent cardiology note actually says 80 mg b.i.d. He took his morning dose late on Sunday, not until after 1:00, and took his evening dose early.      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 Critical findings called to PETER A BAGGENSTOS on 02/12/2017 at 4:42 AM.    Result Narrative   INDICATION: stroke     TECHNIQUE: Time-of-flight MRA of the [place] of [place].     COMPARISON: None.      FINDINGS:     Distal internal carotid arteries: Patent and nonstenotic.     Anterior cerebral arteries: Patent and nonstenotic.     Middle cerebral arteries: Occlusion of the mid right M1 segment with nonvisualization of the remainder of the right middle cerebral  artery. The left middle cerebral artery is patent and nonstenotic. Posterior cerebral arteries: Patent and nonstenotic.     Vertebrobasilar system: Patent and nonstenotic.          Above reviewed from [facility].    Results for ERIKA ROSE (MRN [id number]) as of 4/25/2017 21:13   Ref. Range 4/25/2017 13:44 4/25/2017 13:48   Sodium Latest Ref Range: 133 - 144 mmol/L 140    Potassium Latest Ref Range: 3.4 - 5.3 mmol/L 4.0    Chloride Latest Ref Range: 94 - 109 mmol/L 108    Carbon Dioxide Latest Ref Range: 20 - 32 mmol/L 25    Urea Nitrogen Latest Ref Range: 7 - 30 mg/dL 33 (H)    Creatinine Latest Ref Range: 0.66 - 1.25 mg/dL 1.97 (H)    GFR Estimate Latest Ref Range: >60 mL/min/1.7m2 33 (L)    GFR Estimate If Black Latest Ref Range: >60 mL/min/1.7m2 40 (L)    Calcium Latest Ref Range: 8.5 - 10.1 mg/dL 9.1    Anion Gap Latest Ref Range: 3 - 14 mmol/L 7    Albumin Latest Ref Range: 3.4 - 5.0 g/dL 3.6    Protein Total Latest Ref Range: 6.8 - 8.8 g/dL 6.9    Bilirubin Total Latest Ref Range: 0.2 - 1.3 mg/dL 2.0 (H)    Alkaline Phosphatase Latest Ref Range: 40 - 150 U/L 71    ALT Latest Ref Range: 0 - 70 U/L 34    AST Latest Ref Range: 0 - 45 U/L 23    Cholesterol Latest Ref Range: <200 mg/dL 111    HDL Cholesterol Latest Ref Range: >39 mg/dL 53    LDL Cholesterol Calculated Latest Ref Range: <100 mg/dL 36    Lipase Latest Ref Range: 73 - 393 U/L 108    Non HDL Cholesterol Latest Ref Range: <130 mg/dL 58    N-Terminal Pro Bnp Latest Ref Range: 0 - 125 pg/mL 6854 (H)    Triglycerides Latest Ref Range: <150 mg/dL 110    TSH Latest Ref Range: 0.40 - 4.00 mU/L 1.45    Uric Acid Latest Ref Range: 3.5 - 7.2 mg/dL 4.3    Glucose Latest Ref Range: 70 - 99 mg/dL 112 (H)    WBC Latest Ref Range: 4.0 - 11.0 10e9/L 4.5    Hemoglobin Latest Ref Range: 13.3 - 17.7 g/dL 13.3    Hematocrit Latest Ref Range: 40.0 - 53.0 % 40.9    Platelet Count Latest Ref Range: 150 - 450 10e9/L 116 (L)    RBC Count Latest Ref Range: 4.4 - 5.9  10e12/L 4.24 (L)    MCV Latest Ref Range: 78 - 100 fl 97    MCH Latest Ref Range: 26.5 - 33.0 pg 31.4    MCHC Latest Ref Range: 31.5 - 36.5 g/dL 32.5    RDW Latest Ref Range: 10.0 - 15.0 % 14.9    Diff Method Unknown Automated Method    % Neutrophils Latest Units: % 61.9    % Lymphocytes Latest Units: % 28.8    % Monocytes Latest Units: % 7.5    % Eosinophils Latest Units: % 0.7    % Basophils Latest Units: % 1.1    % Immature Granulocytes Latest Units: % 0.0    Nucleated RBCs Latest Ref Range: 0 /100 0    Absolute Neutrophil Latest Ref Range: 1.6 - 8.3 10e9/L 2.8    Absolute Lymphocytes Latest Ref Range: 0.8 - 5.3 10e9/L 1.3    Absolute Monocytes Latest Ref Range: 0.0 - 1.3 10e9/L 0.3    Absolute Eosinophils Latest Ref Range: 0.0 - 0.7 10e9/L 0.0    Absolute Basophils Latest Ref Range: 0.0 - 0.2 10e9/L 0.1    Abs Immature Granulocytes Latest Ref Range: 0 - 0.4 10e9/L 0.0    Absolute Nucleated RBC Unknown 0.0    Urobilinogen mg/dL Latest Ref Range: 0.0 - 2.0 mg/dL  2.0       ASSESSMENT AND PLAN:   1.  Professor Josr Landers, a very intelligent gentleman with impressive career, who presents with his wife Nu of 51 years to follow up for primary care.  He had acute cerebrovascular accident 02/12/2017 due to occlusion of distal right M1 segment of middle cerebral artery, status post recanalization Penumbra ACE 68 device on 02/12.  He has coronary artery disease including  coronary artery disease with  Stenting 6/2015.   2.He is concerned is that he has chronic bilateral swelling in both legs which I suspect is venous incompetence.  I [facility].  I do not think excessive doses of Lasix will help with this.  I provided him with knee-high compression stockings that are 30 mmHg.  They have a donning device at home.   3.  Hyperlipidemia, on 80 mg atorvastatin and tolerating, needs lipids.   4.  CVA as mentioned, goal INR to be addressed by Dr. Arnold or Dr. Wise.  I would recommend keeping  it closer to 2.5-3 to help decrease the risk of bleeding complications, but not below 2.5 due to embolic stroke.  He needs to follow up with Dr. Wise for assessment of his current dose of sotalol, which is 40 mg twice daily and I am wondering if he could benefit from a decreased dose because of his blood pressure.  I have recommended lowering his dose of Lasix to 40 mg daily with an additional 20 mg at supper to assist if he is short of breath or having more edema.   5. Advanced directive provided. Code status full per patient wishes.  Josr was seen today for hospital f/u.    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    Bilateral edema of lower extremity    Idiopathic peripheral neuropathy    Chronic atrial fibrillation (H)  -     CARDIOLOGY EVAL ADULT REFERRAL    Venous (peripheral) insufficiency  -     order for DME; Equipment being ordered: DME compression stockings 30 mm hg  -     LYMPHEDEMA THERAPY REFERRAL    Dyspnea on exertion  -     Comprehensive metabolic panel; Future  -     CBC with platelets differential; Future  -     TSH with free T4 reflex; Future  -     UA with Micro reflex to Culture; Future  -     N terminal pro BNP; Future  -     Lipid panel reflex to direct LDL; Future  -     Lipase; Future  -     CARDIOLOGY EVAL ADULT REFERRAL    Hypotension due to drugs  -     Comprehensive metabolic panel; Future  -     N terminal pro BNP; Future  -     CARDIOLOGY EVAL ADULT REFERRAL    Acute idiopathic gout involving toe of right foot  -     Uric acid; Future    Generalized edema    Paroxysmal atrial fibrillation (H)    Other hyperlipidemia    Chronic diastolic congestive heart failure (H)    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  They voiced understanding and agreement with the above.      Sixty minutes was spent, of  which 50 was counseling and coordination of cares.     Ángel Oates MD

## 2017-04-24 NOTE — MR AVS SNAPSHOT
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  ** [facility] Prescription Fax = [phone]  * Please allow 3 business days for routine medication refills.  * Please allow 5 business days for controlled substance medication refills.     Primary Care Center Test Result notification information:  *You will be notified with in 7-10 days of your appointment day regarding the results of your test.  If you are on MyChart you will be notified as soon as the provider has reviewed the results and signed off on them.    Please schedule the following appointments:  Cardiology [phone] [place] [place]                  Follow-ups after your visit        Additional Services     CARDIOLOGY EVAL ADULT REFERRAL       Your provider has referred you to:  [facility]: [facility] [phone]   [email or web address]    Please be aware that coverage of these services is subject to the terms and limitations of your health insurance plan.  Call member services at your health plan with any benefit or coverage questions.      Type of Referral:   Cardiology Follow Up    Timeframe requested:  Less than 1 week    Please bring the following to your appointment:  >>   Any x-rays, CTs or MRIs which have been performed.  Contact the facility where they were done to arrange for  prior to your scheduled appointment.    >>   List of current medications  >>   This referral request   >>   Any documents/labs given to you for this referral            LYMPHEDEMA THERAPY REFERRAL       This is for a referral to lymphedema PT and/or OT who will evaluate and treat as necessary.                  Follow-up notes from your care team     Write patient with results Return in about 1 month (around 5/24/2017).      Your next 10 appointments already scheduled     Apr 24, 2017 10:00 AM CDT   LAB with  LAB   [facility] ([facility])    [address] [place] [address]  [address] [facility] [address] [phone]           Patient must bring picture ID.  Patient should be prepared to give a urine specimen  Please do not eat 10-12 hours before your appointment if you are coming in fasting for labs on lipids, cholesterol, or glucose (sugar).  Pregnant women should follow their Care Team instructions. Water with medications is okay. Do not drink coffee or other fluids.   If you have concerns about taking  your medications, please ask at office or if scheduling via [employer], send a message by clicking on Secure Messaging, Message Your Care Team.            Jun 05, 2017  2:50 PM CDT   (Arrive by 2:35 PM)   Return Visit with Ángel Oates MD   [facility] ([facility])    [address] [place] [address]  [address] [facility] [address] [phone]              Future tests that were ordered for you today     Open Future Orders        Priority Expected Expires Ordered    Uric acid Routine 4/24/2017 4/24/2018 4/24/2017    Comprehensive metabolic panel Routine 4/24/2017 4/24/2018 4/24/2017    CBC with platelets  differential Routine 4/24/2017 5/8/2017 4/24/2017    TSH with free T4 reflex Routine 4/24/2017 5/8/2017 4/24/2017    UA with Micro reflex to Culture Routine 4/24/2017 4/24/2018 4/24/2017    N terminal pro BNP Routine 4/24/2017 4/24/2018 4/24/2017    Lipid panel reflex to direct LDL Routine 4/24/2017 5/8/2017 4/24/2017    Lipase Routine 4/24/2017 5/8/2017 4/24/2017            Who to contact     Please call your clinic at [phone] to:    Ask questions about your health    Make or cancel appointments    Discuss your medicines    Learn about your test results    Speak to your doctor   If you have compliments or concerns about an experience at your clinic, or if you wish to file a complaint, please contact [facility] Patient Relations at [phone] or email us at [email or web address]         Additional Information About Your Visit        [employer] Information     [employer] gives you secure access to your electronic health record. If you see a primary care provider, you can also send messages to your care team and make appointments. If you have questions, please call your primary care clinic.  If you do not have a primary care provider, please call [phone] and they will assist you.      [employer] is an electronic gateway that provides easy, online access to your medical records. With [employer], you can request a clinic appointment, read your test results, renew a prescription or communicate with your care team.     To access your existing account, please contact your [facility] or call [phone] for assistance.        Care EveryWhere ID     This is your Care EveryWhere ID. This could be used by other organizations to access your [place] medical records  [id number]        Your Vitals Were     Pulse Respirations                68 20           Blood Pressure from Last 3 Encounters:   04/24/17 95/64   06/29/16 96/74   06/07/16 119/82    Weight from  Last 3 Encounters:   06/29/16 76.3 kg (168 lb 4.8 oz)   06/07/16 78.7 kg (173 lb 8 oz)   05/04/16 78.9 kg (174 lb)              We Performed the Following     CARDIOLOGY EVAL ADULT REFERRAL     LYMPHEDEMA THERAPY REFERRAL          Today's Medication Changes          These changes are accurate as of: 4/24/17  9:34 AM.  If you have any questions, ask your nurse or doctor.               Start taking these medicines.        Dose/Directions    order for DME   Used for:  Venous (peripheral) insufficiency   Started by:  Ángel Oates MD        Equipment being ordered: DME compression stockings 30 mm hg   Quantity:  5 each   Refills:  3         These medicines have changed or have updated prescriptions.        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   Used for:  Atrial fibrillation (H), Amyloid heart disease (H)        TAKE 1/2 TABLET BY MOUTH DAILY   Quantity:  45 tablet   Refills:  0       topiramate 25 MG tablet   Commonly known as:  TOPAMAX   This may have changed:    - how much to take  - when to take this  - additional instructions   Used for:  Idiopathic peripheral neuropathy, Cardiac amyloidosis (H)        Dose:   mg   Take 3-4 tablets ( mg) by mouth 2 times daily Maximum dose is 100 mg BID.   Quantity:  360 tablet   Refills:  3       warfarin 5 MG tablet   Commonly known as:  COUMADIN   This may have changed:    - how much to take  -  additional instructions   Used for:  Paroxysmal atrial fibrillation (H)        Take 5mg on Sun,Tue,Fri, and 7.5mg on Mon,Wed,Thu,Sat, or as directed by the Anticoagulation Clinic.   Quantity:  120 tablet   Refills:  0            Where to get your medicines      Some of these will need a paper prescription and others can be bought over the counter.  Ask your nurse if you have questions.     Bring a paper prescription for each of these medications     order for DME                Primary Care Provider Office Phone # Fax #    Gregorio Rober Cruz [phone]        [facility] [address]  [facility] [address]        Thank you!     Thank you for choosing [facility]  for your care. Our goal is always to provide you with excellent care. Hearing back from our patients is one way we can continue to improve our services. Please take a few minutes to complete the written survey that you may receive in the mail after your visit with us. Thank you!             Your Updated Medication List - Protect others around you: Learn how to safely use, store and throw away your medicines at www.disposemymeds.org.          This list is accurate as of: 4/24/17  9:34 AM.  Always use your most recent med list.                   Brand Name Dispense Instructions for use    atorvastatin 80 MG tablet    LIPITOR    90 tablet    Take 1 tablet (80 mg) by mouth daily       diclofenac 1 % Gel topical gel    VOLTAREN    100 g    Apply to right great toe.       febuxostat 40 MG Tabs tablet    ULORIC    90 tablet    Take 1 tablet (40 mg) by mouth daily       furosemide 20 MG tablet    LASIX    270 tablet    Take 3 tablets (60 mg) by mouth daily       order for DME     5 each    Equipment being ordered: DME compression stockings 30 mm hg       other medical supplies     2 packet    JENIFER stocking bilateral lower extremities       Potassium Chloride ER 20 MEQ Tbcr     90 tablet    Take 1 tablet (20 mEq) by  mouth daily       sotalol 80 MG tablet    BETAPACE    45 tablet    TAKE 1/2 TABLET BY MOUTH DAILY       topiramate 25 MG tablet    TOPAMAX    360 tablet    Take 3-4 tablets ( mg) by mouth 2 times daily Maximum dose is 100 mg BID.       warfarin 5 MG tablet    COUMADIN    120 tablet    Take 5mg on Sun,Tue,Fri, and 7.5mg on Mon,Wed,Thu,Sat, or as directed by the Anticoagulation Clinic.

## 2017-04-25 DIAGNOSIS — I95.2 HYPOTENSION DUE TO DRUGS: ICD-10-CM

## 2017-04-25 DIAGNOSIS — M10.071 ACUTE IDIOPATHIC GOUT INVOLVING TOE OF RIGHT FOOT: ICD-10-CM

## 2017-04-25 DIAGNOSIS — R06.09 DYSPNEA ON EXERTION: ICD-10-CM

## 2017-04-25 PROBLEM — N18.30 CKD (CHRONIC KIDNEY DISEASE) STAGE 3, GFR 30-59 ML/MIN (H): Status: ACTIVE | Noted: 2017-04-25

## 2017-04-25 LAB
ALBUMIN SERPL-MCNC: 3.6 G/DL (ref 3.4–5)
ALP SERPL-CCNC: 71 U/L (ref 40–150)
ALT SERPL W P-5'-P-CCNC: 34 U/L (ref 0–70)
ANION GAP SERPL CALCULATED.3IONS-SCNC: 7 MMOL/L (ref 3–14)
AST SERPL W P-5'-P-CCNC: 23 U/L (ref 0–45)
BASOPHILS # BLD AUTO: 0.1 10E9/L (ref 0–0.2)
BASOPHILS NFR BLD AUTO: 1.1 %
BILIRUB SERPL-MCNC: 2 MG/DL (ref 0.2–1.3)
BUN SERPL-MCNC: 33 MG/DL (ref 7–30)
CALCIUM SERPL-MCNC: 9.1 MG/DL (ref 8.5–10.1)
CHLORIDE SERPL-SCNC: 108 MMOL/L (ref 94–109)
CHOLEST SERPL-MCNC: 111 MG/DL
CO2 SERPL-SCNC: 25 MMOL/L (ref 20–32)
CREAT SERPL-MCNC: 1.97 MG/DL (ref 0.66–1.25)
DIFFERENTIAL METHOD BLD: ABNORMAL
EOSINOPHIL # BLD AUTO: 0 10E9/L (ref 0–0.7)
EOSINOPHIL NFR BLD AUTO: 0.7 %
ERYTHROCYTE [DISTWIDTH] IN BLOOD BY AUTOMATED COUNT: 14.9 % (ref 10–15)
GFR SERPL CREATININE-BSD FRML MDRD: 33 ML/MIN/1.7M2
GLUCOSE SERPL-MCNC: 112 MG/DL (ref 70–99)
HCT VFR BLD AUTO: 40.9 % (ref 40–53)
HDLC SERPL-MCNC: 53 MG/DL
HGB BLD-MCNC: 13.3 G/DL (ref 13.3–17.7)
IMM GRANULOCYTES # BLD: 0 10E9/L (ref 0–0.4)
IMM GRANULOCYTES NFR BLD: 0 %
LDLC SERPL CALC-MCNC: 36 MG/DL
LIPASE SERPL-CCNC: 108 U/L (ref 73–393)
LYMPHOCYTES # BLD AUTO: 1.3 10E9/L (ref 0.8–5.3)
LYMPHOCYTES NFR BLD AUTO: 28.8 %
MCH RBC QN AUTO: 31.4 PG (ref 26.5–33)
MCHC RBC AUTO-ENTMCNC: 32.5 G/DL (ref 31.5–36.5)
MCV RBC AUTO: 97 FL (ref 78–100)
MONOCYTES # BLD AUTO: 0.3 10E9/L (ref 0–1.3)
MONOCYTES NFR BLD AUTO: 7.5 %
NEUTROPHILS # BLD AUTO: 2.8 10E9/L (ref 1.6–8.3)
NEUTROPHILS NFR BLD AUTO: 61.9 %
NONHDLC SERPL-MCNC: 58 MG/DL
NRBC # BLD AUTO: 0 10*3/UL
NRBC BLD AUTO-RTO: 0 /100
NT-PROBNP SERPL-MCNC: 6854 PG/ML (ref 0–125)
PLATELET # BLD AUTO: 116 10E9/L (ref 150–450)
POTASSIUM SERPL-SCNC: 4 MMOL/L (ref 3.4–5.3)
PROT SERPL-MCNC: 6.9 G/DL (ref 6.8–8.8)
RBC # BLD AUTO: 4.24 10E12/L (ref 4.4–5.9)
SODIUM SERPL-SCNC: 140 MMOL/L (ref 133–144)
TRIGL SERPL-MCNC: 110 MG/DL
TSH SERPL DL<=0.005 MIU/L-ACNC: 1.45 MU/L (ref 0.4–4)
URATE SERPL-MCNC: 4.3 MG/DL (ref 3.5–7.2)
UROBILINOGEN UR STRIP-MCNC: 2 MG/DL (ref 0–2)
WBC # BLD AUTO: 4.5 10E9/L (ref 4–11)

## 2017-04-25 ASSESSMENT — ANXIETY QUESTIONNAIRES: GAD7 TOTAL SCORE: 3

## 2017-04-25 ASSESSMENT — PATIENT HEALTH QUESTIONNAIRE - PHQ9: SUM OF ALL RESPONSES TO PHQ QUESTIONS 1-9: 1

## 2017-04-25 NOTE — PROGRESS NOTES
IMPRESSION:  2015    1. No renal or ureteral calculi and no hydronephrosis.    2. 2.7 cm lesion in the lateral right mid kidney which has thick peripheral calcification and indeterminant heterogeneous internal density measurements. Findings raise concern for solid renal mass. Recommend nonemergent contrast enhanced renal MRI for   further evaluation.    3. 1.0 cm hyperdense lesion at the lower pole the left kidney may represent a proteinaceous or hemorrhagic cyst although should be more definitively characterized on contrast enhanced renal MRI as well.     Result Narrative   [facility]  [address] [place] [address]  [phone]    ERIKA ROSE   [id number]    Cardiovascular Catheterization Comprehensive Report    : 1940  Age: 74 years  Gender: Male  Study date: 2015  Test time: 13:42 - 14:25  Fluoro time: 5.1 min    Diagnoses: 410.70 - SUBENDO INFARCT, UNSPEC    Diagnostic Cardiologist:  BINA FUCHS MD  Circulator:  Gerda Talley RN  Scrub:  Leona John RN  Monitor:  Chiara Wagoner CVT  X-ray Tech:  River Gibson RT  Ordering Physician:  Brea Moran M.D.  Interventional Cardiologist:  BINA FUCHS MD    History and indications  INDICATIONS   --  pain, ABN EKG and troponin elevation    Procedures  PROCEDURES PERFORMED:    --  Right coronary angiography.  --  Left coronary angiography.  --  Ultrasound.  --  Cor. Stent, Single Vessel.  --  Intervention on mid LAD: drug-eluting stent.  NARRATIVE: The risks and alternatives of the procedures and  conscious sedation were explained to the patient and  informed consent was obtained. The patient was brought to  the cath lab and placed on the table. The planned puncture  sites were prepped and draped in the usual sterile fashion.    --  Right radial artery access.    --  Right coronary artery angiography. The vessel was  selectively cannulated and angiography was performed.    --  Left coronary artery  angiography. The vessel was  selectively cannulated and angiography was performed.    --  Ultrasound.    Coronary angiography    CORONARY CIRCULATION: The coronary circulation is right  dominant.  Left main coronary artery  Left main: Normal.  Left anterior descending artery and branches  LAD: Angiography showed mild atherosclerosis. Mid LAD: There  was a tubular 90 % stenosis. This is a likely culprit for  the patient's clinical presentation.  Left circumflex artery and branches  Circumflex: Angiography showed mild atherosclerosis.  Right coronary artery and branches  RCA: Angiography showed mild atherosclerosis.    Interventions    LESION INTERVENTION  A drug-eluting stent was performed on the lesion in the mid  LAD. Mid LAD- after PTCA and stenting 90% to 0%, JOSEFINA 3   flow.    --  Vessel setup was performed. A CLS3.5 guiding catheter  was used to intubate the vessel.    --  Vessel setup was performed. A BMW wire was used to cross  the lesion.    --  Balloon angioplasty was performed, using a 3.0 x 15 mm  Emerge MR balloon, with 2 inflations and a maximum  inflation pressure of 8 rayshawn.    --  A 3.5 x 20mm Promus Premier everolimus-eluting stent was  placed across the lesion and deployed at a maximum  inflation pressure of 11 rayshawn.    CARDIAC INTERVENTIONS  Cor. Stent, Single Vessel.    PROCEDURE COMPLETION    TIMING: Test started at 13:42. Test concluded at 14:25.  RADIATION EXPOSURE: Fluoroscopy time: 5.1 min.    CONTRAST GIVEN: Isovue 110 ml.    MEDICATIONS GIVEN: Fentanyl, 50 mcg, IV, at 13:29. Fentanyl,  25 mcg, IV, at 13:42. Fentanyl, 25 mcg, IV, at 14:23.  Nicardipine (Cardene), 200 mcg, IA, at 13:51. Versed (1  mg), 1 mg, IV, at 13:29. Versed (1 mg), 0.5 mg, IV, at  13:42. NTG (INTRA-ARTERIAL), 200 mcg, at 13:51. Heparin  Bolus, 1,000 units, IV, at 13:53. Angiomax Bolus  (Bivalirudin), 11 ml, IV, at 14:05. Angiomax Infusion 1.75  mg/kg/hr, infusion rate of 26.2 ml/hr, IV, at 14:26.  Brilinta, 180 mg, PO, at  14:05. Versed (1 mg), 0.5 mg, IV,  at 14:23.    Study conclusions  IMPRESSIONS: Successful treatment of culprit lesion.    Prepared and signed by    BINA FUCHS MD  Signed 2015 15:01:33    HEMODYNAMIC TABLES    Pressures:  Baseline  Pressures:  - HR: 59  Pressures:  - Rhythm:  Pressures:  -- Aortic Pressure (S/D/M): 78/48/66    Outputs:  Baseline  Outputs:  -- CALCULATIONS[age]  Outputs:  -- CALCULATIONS: Body Surface Area: 1.86  Outputs:  -- CALCULATIONS: Height in cm: 170.00  Outputs:  -- CALCULATIONS: Sex: Male  Outputs:  -- CALCULATIONS: Weight in k.00                   Result Narrative 2015       COMPARISON:  None.    TECHNIQUE:  Images were obtained through the abdomen and pelvis without contrast using a renal stone protocol.    FINDINGS: No renal or ureteral calculi and no hydronephrosis. There is a 2.7 cm lesion in the lateral right mid kidney which has areas of thick peripheral wall calcification and has indeterminant density measurements within (48 Hounsfield units). There   is a 1 cm lesion at the lower pole of the left kidney which has density measurements suspicious for a proteinaceous or hemorrhagic cyst (68 Hounsfield units) on axial image 58. There is a simple appearing cyst adjacent to this on slice 54. There is a   subcentimeter hypodense lesion in the upper pole of the right kidney which is too small to further characterize. There are numerous hypodense lesions in the liver, the larger of these have density measurements compatible with simple cysts, the   subcentimeter lesions are too small to characterize. Limited noncontrast evaluation of the solid abdominal organs is otherwise unremarkable. No lymphadenopathy. No dilated bowel loops. Extensive vascular calcifications. Mild dilatation of the proximal   right common iliac artery measuring 2.2 cm. Moderate stool in the colon. No free fluid or inflammation. Normal appendix in the right lower quadrant. Mild  "cardiomegaly. No acute findings otherwise.     SURGICAL PANEL SURGICAL PATHOLOGY REPORT  LAB:  Phone:    Case#:[id number]             Final Report    DIAGNOSIS  COLON, CECUM, POLYP, POLYPECTOMY:  1. Tubular adenoma  2. No evidence of high grade dysplasia  3. Per the colonoscopy report:     a. Polyp size: Diminutive     b. Resection: Complete     c. Retrieval: Complete    Cc: CLEM Cruz    Attending Pathologist:   PACO Esquivel M.D.    [facility], [email or web address]    CLINICAL INFORMATION  Screening, five year follow up, patient history of polyps    SPECIMEN/GROSS DESCRIPTION  A) SOURCE: Cecal polyp  Labeled \"cecal polyp\" are two pink-tan soft tissues averaging 0.3 cm which  are submitted in toto in one cassette.    Gross dictation by: HIRA:gael 10/28/10    MICROSCOPIC  The microscopic appearance substantiates the diagnosis. Dictation by: SHE;  transcribed by:carlotta 10/29/10    Interpreted at [facility]    COLLECTED: 10/28/10  ACCESSIONED: 10/28/10  SIGNED: 10/29/10 11:21         IMPRESSION: Motion limited exam.  1. Cardiac enlargement. No acute airspace opacity.  2. Stable peripherally calcified right renal lesion. Stable benign renal cysts.  3. Gastroesophageal reflux.   4. Soft tissue anasarca.  5. Additional findings, as above.   Result Narrative   2/6/2016    COMPARISON:  CT of the abdomen and pelvis 06/28/2015   TECHNIQUE:  Noncontrast images were obtained through the chest, abdomen and pelvis.    FINDINGS: Imaging of the chest was obtained during expiration. No acute airspace opacity or pleural effusion. Central airways are clear. Cardiomegaly. Coronary artery atherosclerotic calcifications. No lymphadenopathy in the chest on this unenhanced   study. Soft tissue anasarca. Mild symmetric gynecomastia. Oral contrast seen within the distal esophagus, consistent with gastroesophageal reflux.     Motion artifact limits evaluation of the abdomen. Multiple hypodense liver " lesions are stable, the largest of which are consistent with simple cysts while the smaller lesions are too small to further characterize. Gallbladder, pancreas and spleen are   within normal limits.    Adrenal glands are unremarkable. Stable thick-walled 2.7 x 2.5 cm lesion off the right kidney. Stable cyst off the inferior pole of the left kidney. Stable benign 1 cm hemorrhagic cyst (measuring greater than 70 Hounsfield units) off the lower pole of   the left kidney. No hydronephrosis. Aorta is normal in caliber. Ectasia of the common iliac arteries bilaterally, stable. Atherosclerotic calcifications.    Bladder is unremarkable. Prostate is enlarged. Motion limits evaluation of bowel wall; no definite bowel wall thickening. Appendix is not identified with certainty. No pericecal inflammatory change. Moderate to large amount of stool throughout the colon.    Advanced degenerative change throughout the skeleton. No aggressive bony lesions.     ECHOCARDIOGRAM.  Date: 02/12/2017 Start: 10:01 AM [facility]    CONCLUSIONS  1. Echocardiographic appearance consistent with infiltrative  cardiomyopathy (amyloidosis).  2. Moderate left and mild right atrial enlargement.  3. Left ventricular chamber size is normal. Severe concentric wall  thickening consistent with left ventricular hypertrophy is present.  Global and regional left ventricular function is normal. Left  ventricular ejection fraction is visually estimated at 52%.  4. Normal right ventricle size and grossly normal global function.  Right ventricular wall thickness is increased.  5. Moderate mitral regurgitation.  6. There is mild to moderate aortic sclerosis without evidence of  aortic stenosis.  7. Compared to the previous study done on 2/7/16, there has been no  significant change.      FINDINGS    MITRAL VALVE  Mitral leaflet thickness is increased. Moderate mitral regurgitation.  The effective regurgitant orifice area is 0.20 cm^2.  Moderate primary  mitral regurgitation ERO is 0.2 - 0.4 cm^2 .    AORTIC VALVE  The aortic valve is tricuspid. There is mild to moderate aortic  sclerosis without evidence of aortic stenosis.    TRICUSPID VALVE  Normal tricuspid valve structure and function. Mild (physiologic)  tricuspid regurgitation. Pulmonary artery systolic pressure estimate  is 21 mmHg above RAP.    PULMONIC VALVE  Normal pulmonic valve structure and function. Mild (physiologic)  pulmonic regurgitation.    LEFT ATRIUM  Left atrial volume index is 46.6 mL/m^2. (Moderately abnormal  42-48mL/m^2). Patent foramen ovale was not present despite color flow  Doppler interrogation of the interatrial septum.    LEFT VENTRICLE  Left ventricular chamber size is normal. Severe concentric wall  thickening consistent with left ventricular hypertrophy is present.  Global and regional left ventricular function is normal. Left  ventricular ejection fraction is visually estimated at 52%. Left  ventricular diastolic function is indeterminate.    RIGHT ATRIUM  Mild right atrial enlargement.    RIGHT VENTRICLE  Normal right ventricle size and grossly normal global function. Right  ventricular wall thickness is increased.    PERICARDIAL EFFUSION  There is no pericardial effusion.      MISCELLANEOUS  The visualized segments of the thoracic aorta are normal in diameter.  The ascending aorta is at the upper limits of normal measuring 3.6 cm.    M-MODE/2D MEASUREMENTS & CALCULATIONS   LV Diastolic Dimension: 3.66 cm   LV PW Diastolic: 2.16 cm   Septum Diastolic: 2.59 cm                                               LA Dimension: 4.3 cm                                               Aortic Annulus: 2.6 cm                                               LA Area: 23.3 cm^2   LV Systolic Dimension: 2.74 cm              LA/Aorta: 1.65   LV Volume Diastolic: 49 ml                  Ascending Aorta: 3.6 cm   LV Volume Systolic: 20.6 ml                 LA volume index: 46.6   LV  EDV/LV EDV Index: 49 ml/26 m^2           ml/m^2   LV ESV/LV ESV Index: 20.6 ml/11 m^2EF   Estimated: 52 %   LVOT: 2.1 cm    DOPPLER MEASUREMENTS & CALCULATIONS   MV Peak E-Wave: 0.7 m/s   MV Peak Gradient: 1.98   mmHg                      LVOT Peak Velocity: 0.7 m/sLVOT Mean   MV Mean Gradient: 0 mmHg  Velocity: 0.5 m/s   MV Mean Velocity: 0.2 m/s LVOT Peak Gradient: 2 mmHg   MV Deceleration Time: 190 LVOT Mean Gradient: 1 mmHg   msec   MV Area (continuity):   3.65 cm^2   E' Velocity: 0.04 m/s     TR Velocity:2.3 m/s   MR Velocity: 4 m/s        TR Gradient:21.2 mmHg   MV VTI: 9.16 cm   MV SUKI Volumetric: 0.18   cm^2                      SC ED Velocity: 1.3 m/s   LVOT VTI: 9.65 cm    PROCEDURE    Doppler Quality: Adequate quality pulse, continuous wave, and color  Doppler was performed and interpreted.  2-D Quality: Adequate quality 2-dimensional echo was performed and  interpreted.    Indications: CVA.    Contrast Medium: Not Applicable.  Height: 67 inches Weight: 170 pounds BSA: 1.89 m^2 BMI: 26.63 kg/m^2  Rhythm: SinusBP: 115/86 mmHg     Gender:                             Male  *Patient educated on test, all questions answered by: ANNE-MARIE .    SIGNATURE  ----------------------------------------------------------------------   Electronically signed by Adri Lynn MD(Interpreting   Physician) on 02/12/2017 12:14 PM  Addendum by Heath Osorio MD on 2/12/2017  5:06 AM  Addendum: Patient's prior examination dated 01/20/2012 was reloaded to   PACS for comparison. The occlusion of the mid right M1 segment is new   since the prior examination.             Result Impression   IMPRESSION:  Acute infarct involving the right putamen, right globus pallidus, and right caudate nucleus. Critical findings called to PETER A BAGGENSTOS on 02/12/2017 at 4:42 AM    Result Narrative   INDICATION: stroke      TECHNIQUE:  MRI of the head without contrast    COMPARISON: None.    FINDINGS: Subtle increased diffusion signal within  the right putamen, right globus pallidus, and the right caudate nucleus consistent with acute infarct.                 Result Impression   IMPRESSION: Occlusion of the mid right M1 segment with nonvisualization of the remainder of the right middle cerebral artery. Critical findings called to PETER A BAGGENSTOS on 02/12/2017 at 4:42 AM.    Result Narrative   INDICATION: stroke     TECHNIQUE: Time-of-flight MRA of the [place].     COMPARISON: None.      FINDINGS:     Distal internal carotid arteries: Patent and nonstenotic.     Anterior cerebral arteries: Patent and nonstenotic.     Middle cerebral arteries: Occlusion of the mid right M1 segment with nonvisualization of the remainder of the right middle cerebral artery. The left middle cerebral artery is patent and nonstenotic. Posterior cerebral arteries: Patent and nonstenotic.     Vertebrobasilar system: Patent and nonstenotic     Result Impression 4/16/2017   IMPRESSION: Cardiomegaly without evidence of CHF or other change.   Result Narrative   COMPARISON:  02/06/2016    FINDINGS:  There is stable cardiomegaly without CHF or pleural fluid. No focal lung consolidation or pneumothorax. A coronary artery stent is noted. There is slight wedging of multiple mid and lower thoracic vertebrae, stable.         I reviewed the above through care-everywhere as Review of labs showed mild elevation of bilirubin and Cr 1.9.   Will discuss at follow-up.  I called to clarify he is not currently on Plavix took for over one year stopped  12/2016 after coronary stent 6/2015.    Ángel Oates MD

## 2017-04-26 ENCOUNTER — TELEPHONE (OUTPATIENT)
Dept: FAMILY MEDICINE | Facility: CLINIC | Age: 77
End: 2017-04-26

## 2017-04-26 NOTE — TELEPHONE ENCOUNTER
Results for orders placed or performed in visit on 04/25/17   Comprehensive metabolic panel   Result Value Ref Range    Sodium 140 133 - 144 mmol/L    Potassium 4.0 3.4 - 5.3 mmol/L    Chloride 108 94 - 109 mmol/L    Carbon Dioxide 25 20 - 32 mmol/L    Anion Gap 7 3 - 14 mmol/L    Glucose 112 (H) 70 - 99 mg/dL    Urea Nitrogen 33 (H) 7 - 30 mg/dL    Creatinine 1.97 (H) 0.66 - 1.25 mg/dL    GFR Estimate 33 (L) >60 mL/min/1.7m2    GFR Estimate If Black 40 (L) >60 mL/min/1.7m2    Calcium 9.1 8.5 - 10.1 mg/dL    Bilirubin Total 2.0 (H) 0.2 - 1.3 mg/dL    Albumin 3.6 3.4 - 5.0 g/dL    Protein Total 6.9 6.8 - 8.8 g/dL    Alkaline Phosphatase 71 40 - 150 U/L    ALT 34 0 - 70 U/L    AST 23 0 - 45 U/L   CBC with platelets differential   Result Value Ref Range    WBC 4.5 4.0 - 11.0 10e9/L    RBC Count 4.24 (L) 4.4 - 5.9 10e12/L    Hemoglobin 13.3 13.3 - 17.7 g/dL    Hematocrit 40.9 40.0 - 53.0 %    MCV 97 78 - 100 fl    MCH 31.4 26.5 - 33.0 pg    MCHC 32.5 31.5 - 36.5 g/dL    RDW 14.9 10.0 - 15.0 %    Platelet Count 116 (L) 150 - 450 10e9/L    Diff Method Automated Method     % Neutrophils 61.9 %    % Lymphocytes 28.8 %    % Monocytes 7.5 %    % Eosinophils 0.7 %    % Basophils 1.1 %    % Immature Granulocytes 0.0 %    Nucleated RBCs 0 0 /100    Absolute Neutrophil 2.8 1.6 - 8.3 10e9/L    Absolute Lymphocytes 1.3 0.8 - 5.3 10e9/L    Absolute Monocytes 0.3 0.0 - 1.3 10e9/L    Absolute Eosinophils 0.0 0.0 - 0.7 10e9/L    Absolute Basophils 0.1 0.0 - 0.2 10e9/L    Abs Immature Granulocytes 0.0 0 - 0.4 10e9/L    Absolute Nucleated RBC 0.0    TSH with free T4 reflex   Result Value Ref Range    TSH 1.45 0.40 - 4.00 mU/L   N terminal pro BNP   Result Value Ref Range    N-Terminal Pro Bnp 6854 (H) 0 - 125 pg/mL   Lipid panel reflex to direct LDL   Result Value Ref Range    Cholesterol 111 <200 mg/dL    Triglycerides 110 <150 mg/dL    HDL Cholesterol 53 >39 mg/dL    LDL Cholesterol Calculated 36 <100 mg/dL    Non HDL Cholesterol 58  <130 mg/dL   Lipase   Result Value Ref Range    Lipase 108 73 - 393 U/L   Uric acid   Result Value Ref Range    Uric Acid 4.3 3.5 - 7.2 mg/dL   Urobilinogen   Result Value Ref Range    Urobilinogen mg/dL 2.0 0.0 - 2.0 mg/dL     [phone] (home)   I discussed the above with Ayo  and his wife. He feels better with reduction in lasix to 40 mg daily since Monday and stopping Topamax.  Schedule with cardiologist Dr Ollie JUNE, they will call today.    1. Lipids: Chol, LDL very low at 36. Ask cardiology if able to cut  atorvastatin to 40 mg.  2. CKD stage 3, at his baseline. Ask cardiology if able to reduce Sotalol to 40 once daily, also BNP over 8000 I suspect adjust of this would assist with heart pumping function and edema.  3. Labs were not fasting glucose ok.  4. Mild increase in bili otherwise all liver tests are normal. He stopped Topamax as it was not helping. If able to decrease Atorvastatin this may assist as well.  All questions were addressed and voiced understanding and agreement with the above.  Ángel Oates MD

## 2017-05-01 ENCOUNTER — TELEPHONE (OUTPATIENT)
Dept: INTERNAL MEDICINE | Facility: CLINIC | Age: 77
End: 2017-05-01

## 2017-05-01 DIAGNOSIS — I87.2 VENOUS (PERIPHERAL) INSUFFICIENCY: ICD-10-CM

## 2017-05-01 NOTE — TELEPHONE ENCOUNTER
----- Message from Devi Suh sent at 4/27/2017  1:58 PM CDT -----  Regarding: Prescription for Compression Stocking  Qamar from [facility] is call [phone] regarding a prescription for patient.  Which pressure does it need 20-30 or 30-40 and what height?  Please call or fax new prescription to [phone]

## 2017-05-01 NOTE — TELEPHONE ENCOUNTER
Diagnoses and all orders for this visit:    Venous (peripheral) insufficiency  -     order for DME; Equipment being ordered: DME compression stockings knee high 30 mm hg     5 each, 3 RF  Order placed on your desk.  Best wishes,  Ángel Oates MD

## 2017-05-15 NOTE — TELEPHONE ENCOUNTER
----- Message from Kei Arambula RN sent at 5/12/2017  1:13 PM CDT -----  Regarding: Outpatient referrals  Caller: [facility]    Message: Requesting an outpatient referral for PT and OT for [facility] starting after 05/19/17. Patient will be transitioning from home to outpatient PT and OT - but needs order.       5/15/17 Referrals placed and faxed to [phone]. Radha ZAPATA

## 2017-05-17 NOTE — TELEPHONE ENCOUNTER
[facility]  Last Written Prescription Date:  10/12/16  Last Fill Quantity: 90,   # refills: 1  Last Office Visit : 4/24/17  Future Office visit:  6/5/17  Lab Results   Component Value Date    WBC 4.5 04/25/2017     Lab Results   Component Value Date    RBC 4.24 04/25/2017     Lab Results   Component Value Date    HGB 13.3 04/25/2017     Lab Results   Component Value Date    HCT 40.9 04/25/2017     No components found for: MCT  Lab Results   Component Value Date    MCV 97 04/25/2017     Lab Results   Component Value Date    MCH 31.4 04/25/2017     Lab Results   Component Value Date    MCHC 32.5 04/25/2017     Lab Results   Component Value Date    RDW 14.9 04/25/2017     Lab Results   Component Value Date     04/25/2017     Creatinine   Date Value Ref Range Status   04/25/2017 1.97 (H) 0.66 - 1.25 mg/dL Final   ]  Routing refill request to provider for review/approval because:  Route to MD due to elevated CR

## 2017-05-18 NOTE — TELEPHONE ENCOUNTER
----- Message from Chad Son sent at 5/18/2017 12:13 PM CDT -----  Regarding: Order  Contact: [phone]  Jade from [facility]     Patient is going to be discontinuing home occupational therapy.    Requesting order for outpatient therapy (Physical therapy and speech therapy for cognitive work) at [facility] so he can transition to outpatient therapy     Please fax order to [facility] Fax # [phone]  ------------------  Referral done and faxed to the above fax#.  Tatum Bender RN

## 2017-06-06 NOTE — TELEPHONE ENCOUNTER
Pt states his INRs are being checked through [facility], so we will have his warfarin filled through them.

## 2017-06-14 NOTE — NURSING NOTE
Chief Complaint   Patient presents with     Recheck Medication     pt is here for a medication follow up        Lashawn Bautista CMA at 6:39 PM on 6/14/2017

## 2017-06-14 NOTE — PATIENT INSTRUCTIONS
[facility] Phone Number [phone]  [facility] Medication Refill Request Information:  * Please contact your pharmacy regarding ANY request for medication refills.  ** [facility] Prescription Fax = [phone]  * Please allow 3 business days for routine medication refills.  * Please allow 5 business days for controlled substance medication refills.     [facility] Test Result notification information:  *You will be notified with in 7-10 days of your appointment day regarding the results of your test.  If you are on MyChart you will be notified as soon as the provider has reviewed the results and signed off on them.

## 2017-06-14 NOTE — PROGRESS NOTES
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 He is with his wife. They arrived after his appointment, they elected to  wait for extended period as I had other scheduled patients.He normally sees Dr. Cruz but it has been difficult to get in to see him.  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 They were contemplating cardioversion but were concerned for possible clot in atrium and creatinine increased therefore cardiology did not want to have dye exposure therefore did not do all cardiac interventions.  He is wondering about further recommendations for his edema of legs. He feels the compression stockings are too tight and challenging to wear.  He had generalized edema worse  In lower extremities left leg worse than right. He is on  a very  Low sodium diet and has lost weight. Now on Bumex instead of lasix currently assisting with diureses.He started with lymphedema clinic. He indicates the compression stockings are too tight to wear and lymph edema clinic has been using lighter weight compression to assist at this time. He has a history of venous incompetence. I am wondering if he would benefit from Flexitouch system.       February, 02/12/17, he was hospitalized at [facility] diagnosed with  a thrombus in the right middle cerebral artery, acute stroke.  Interventions assisted with removal of the thrombus he has left-sided residual. This has caused major change in his life.   He has chronic atrial fibrillation, rate controlled with sotalol, but his blood pressure remains on the lower side. He does follow with Dr. Levin at [facility] as well as he  had stenting of his coronary arteries per his report in 06/2015, discontinued Plavix in 12/2016.    He has a history of cardiac amyloid diagnosed at [place].  He has chronic  venous insufficiency and is bothered by significant swelling in bilateral legs, left greater than right.  He does have incompetent veins  Noted on previous study and has normal ABIs.     He also has hyperlipidemia and was started on 80 mg of atorvastatin most recently in February because of the embolic stroke for risk factor modification.  He indicates that he sees many specialists but would like to come back to see his primary to try to put all of the puzzles together.      REVIEW OF SYSTEMS:  He follows with Dr. Arnold at [facility] who monitors his INR and they said his goal INR should be 2-3.5.  He had atrial flutter as reason to consider  cardioversion   He has a history of chronic kidney disease, coronary artery disease involving the native coronary artery disease and had an acute CVA in 02/12/2017 due to occlusion of the distal right M1 segment of middle cerebral artery, status post recanalization with Penumbra ACE 68 device.     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    He lives with his wife.   He has 2 children, a son and daughter, and 2 grandchildren, a grandson and granddaughter.     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[facility] 2009     Pain in limb     Status post lumbar surgery     Adjustment disorder     Idiopathic peripheral neuropathy     SOB (shortness of breath)     CHF (congestive heart failure) (H)      Depression     Malignant neoplasm of kidney excluding renal pelvis (H)     Cerebrovascular accident (CVA) (H)     Other amyloidosis (H)     Anemia     Atrial flutter (H)     Cardiomyopathy (H)     Chronic kidney disease, stage III (moderate)     Coronary arteriosclerosis in native artery     Weakness     Hypotension     Pain of foot     Long term current use of anticoagulant therapy     Peripheral neuropathic pain (H)     Myocardial infarction, nontransmural (H)     Nonsustained ventricular tachycardia (H)     CKD (chronic kidney disease) stage 3, GFR 30-59 ml/min     Past Medical History:   Diagnosis Date     Atrial fibrillation (H) 6/25/2012     Atrial fibrillation: ablation Dr Jonathan Arvizu/[place] 6/25/2012     Cardiac amyloid: ATTR per cardiac biopsy [facility] 2009 6/27/2012     Coronary artery disease      Other and unspecified hyperlipidemia 6/27/2012     Tubular adenoma of colon 2010     Venous insufficiency      Social History     Social History     Marital status:      Spouse name: N/A     Number of children: N/A     Years of education: N/A     Occupational History     Not on file.     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 2003 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      to his wife Nu ( Vikash), two children Deandra 1969 (Grandchildren Trinh 2003, Nish 2007) and Son [id number] YISEL Landers III   SURGICAL HISTORY:  Includes back surgery, knee surgery and shoulder surgery.  He has a history of cardiomegaly as mentioned.    Family Hx not reviewed due to time  limitation.    /70  Pulse 58  Resp 20  SpO2 99%  Weight 158  GENERAL:  Examination reveals a delightful gentleman who is very intelligent.  His speech is clear.  He has decreased movement on the left side of his body, but is able to move.  He is sitting in a wheelchair.   HEENT:  Mucous membranes are moist.  Posterior pharynx nonerythematous.   NECK:  Supple.  Thyroid is normal.   HEART:  S1, S2, with irregular rhythm.  Rate is controlled.    ABDOMEN:  Not examined as he was sitting in a chair.   EXTREMITIES:  He does have compression stockings but not wearing today, left leg has  more swelling than the right, both have venous incompetence. Nouocerations  PSYCHOLOGICAL:  Interactive, very intelligent, wanting to understand his health condition. Very patient with the extended time waiting today and understanding of situation.       ASSESSMENT AND PLAN:   1.  Professor Josr Landers, a very intelligent, pleasant gentleman with impressive career presents with his wife Nu of 51 years to follow up for primary care. He has history of acute cerebrovascular accident 02/12/2017 due to occlusion of distal right M1 segment of middle cerebral artery, status post recanalization Penumbra ACE 68 device on 02/12.  He has coronary artery disease including  coronary artery disease with  Stenting 6/2015, chronic rate controlled trial fibrillation, chronic venous insufficiency and edema.  He was recently hospitalized at [facility] for Atrial fibrillation/ Atrial flutter but no cardioversion due to concern for possible atrial clot. He had symptoms of heart failure and is diuresing  nicely with switch to Bumex off Lasix. He is following with cardiology at [facility] currently as this is where he was hospitalized.    2. He is concerned about chronic bilateral swelling in both legs which I suspect is venous incompetence in addition to heart failure.  I referred him to the Lymphedema Clinic.  Now on Bumex instead of lasix  currently assisting with diureses.He started with lymphedema clinic. He indicates the compression stockings are too tight to wear and lymph edema clinic has been using lighter weight compression to assist at this time. He has a history of venous incompetence. I am wondering if he would benefit from Flexitouch system.   I previously provided him with knee-high compression stockings that are 30 mmHg.  They have a donning device at home. They are currently too tight.  I also would like him to see vascular medicine to optimize venous health of lower extremities for further recommendations if any.  3.  Hyperlipidemia, on 80 mg atorvastatin and tolerating.  4.  CVA as mentioned, goal INR to be addressed by Dr. Arnold.  5. Advanced directive provided previously. Code status full per patient wishes.  6. Chronic Kidney disease  Stage 3 GFR 37 baseline   Josr was seen today for recheck medication.    Diagnoses and all orders for this visit:    Bilateral edema of lower extremity  -     US Lower Extremity Venous Duplex Bilateral; Future  -     VASCULAR MEDICINE REFERRAL ([facility])    Other amyloidosis (H)    Chronic kidney disease, stage III (moderate)    Long term current use of anticoagulant therapy    Peripheral neuropathic pain (H)    Myocardial infarction, nontransmural (H)    History of stroke    Status post lumbar surgery    Paroxysmal atrial fibrillation (H)      All questions were addressed.  They voiced understanding and agreement with the above.      Ángel Oates MD

## 2017-06-14 NOTE — MR AVS SNAPSHOT
"              After Visit Summary   6/14/2017    Josr Landers    MRN: [id number]           Patient Information     Date Of Birth          1940        Visit Information        Provider Department      6/14/2017 4:00 PM Ángel Oates MD [facility]        Today's Diagnoses     Bilateral edema of lower extremity    -  1    Other amyloidosis (H)        Chronic kidney disease, stage III (moderate)        Long term current use of anticoagulant therapy        Peripheral neuropathic pain (H)        Myocardial infarction, nontransmural (H)        History of stroke        Status post lumbar surgery        Paroxysmal atrial fibrillation (H)          Care Instructions    Primary Care Center Phone Number [phone]  Primary Care Center Medication Refill Request Information:  * Please contact your pharmacy regarding ANY request for medication refills.  ** [facility] Prescription Fax = [phone]  * Please allow 3 business days for routine medication refills.  * Please allow 5 business days for controlled substance medication refills.     Primary Care Center Test Result notification information:  *You will be notified with in 7-10 days of your appointment day regarding the results of your test.  If you are on MyChart you will be notified as soon as the provider has reviewed the results and signed off on them.                  Follow-ups after your visit        Additional Services     VASCULAR MEDICINE REFERRAL ([facility])       PAD without symptoms - order lipid panel and \"US ADAMS Doppler no exercise\" ([id number])  PAD with claudication - order \"US ADAMS Doppler with exercise\" ([id number]), \"US aorta/ivc/iliac duplex complete\" ([id number]) and \"US lower extremity arterial duplex bilateral\" ([id number])  PAD with critical limb ischemia - resting ADAMS and toe-brachial index (refer to vascular medicine for triage of testing)  Varicose veins/edema - \"US lower extremity venous duplex bilateral\" ([id number])  [Number to call to " schedule: [phone]. Vascular medicine providers are: Flakito Flores Godishala]                  Follow-up notes from your care team     Discussed this visit Return in about 1 month (around 7/14/2017).      Your next 10 appointments already scheduled     Jun 19, 2017  2:00 PM CDT   US LOWER EXTREMITY VENOUS DUPLEX BILATERAL with UCUSV2   [facility] ([facility] [place])    [address] [address] [phone]           Please bring a list of your medicines (including vitamins, minerals and over-the-counter drugs). Also, tell your doctor about any allergies you may have. Wear comfortable clothes and leave your valuables at home.  You do not need to do anything special to prepare for your exam.  Please call the Imaging Department at your exam site with any questions.              Future tests that were ordered for you today     Open Future Orders        Priority Expected Expires Ordered    US Lower Extremity Venous Duplex Bilateral Routine  6/14/2018 6/14/2017            Who to contact     Please call your clinic at [phone] to:    Ask questions about your health    Make or cancel appointments    Discuss your medicines    Learn about your test results    Speak to your doctor   If you have compliments or concerns about an experience at your clinic, or if you wish to file a complaint, please contact [facility] Patient Relations at [phone] or email us at [email or web address]         Additional Information About Your Visit        MyChart Information     [employer] gives you secure access to your electronic health record. If you see a primary care provider, you can also send messages to your care team and make appointments. If you have questions, please call your primary care clinic.  If you do not have a primary care provider, please call [phone] and they will assist you.      [employer] is an electronic  gateway that provides easy, online access to your medical records. With [employer], you can request a clinic appointment, read your test results, renew a prescription or communicate with your care team.     To access your existing account, please contact your [facility] or call [phone] for assistance.        Care EveryWhere ID     This is your Care EveryWhere ID. This could be used by other organizations to access your [place] medical records  [id number]        Your Vitals Were     Pulse Respirations Pulse Oximetry             58 20 99%          Blood Pressure from Last 3 Encounters:   06/14/17 103/70   04/24/17 95/64   06/29/16 96/74    Weight from Last 3 Encounters:   06/29/16 76.3 kg (168 lb 4.8 oz)   06/07/16 78.7 kg (173 lb 8 oz)   05/04/16 78.9 kg (174 lb)              We Performed the Following     VASCULAR MEDICINE REFERRAL ([facility])          Today's Medication Changes          These changes are accurate as of: 6/14/17  7:39 PM.  If you have any questions, ask your nurse or doctor.               These medicines have changed or have updated prescriptions.        Dose/Directions    sotalol 80 MG tablet   Commonly known as:  BETAPACE   This may have changed:  See the new instructions.   Used for:  Atrial fibrillation (H), Amyloid heart disease (H)        TAKE 1/2 TABLET BY MOUTH DAILY   Quantity:  45 tablet   Refills:  0                Primary Care Provider Office Phone # Fax #    Gregorio Rober Cruz [phone]        [facility] [address]  [facility] [address]        Thank you!     Thank you for choosing [facility]  for your care. Our goal is always to provide you with excellent care. Hearing back from our patients is one way we can continue to improve our services. Please take a few minutes to complete the written survey that you may receive in the mail after your visit with us. Thank you!             Your Updated Medication  List - Protect others around you: Learn how to safely use, store and throw away your medicines at www.disposemymeds.org.          This list is accurate as of: 6/14/17  7:39 PM.  Always use your most recent med list.                   Brand Name Dispense Instructions for use    atorvastatin 80 MG tablet    LIPITOR    90 tablet    Take 1 tablet (80 mg) by mouth daily       bumetanide 1 MG tablet    BUMEX     If his weight is 159 lbs or less take Bumex 1 mg am/ 0.5 mg pm. If it is >159 take Bumex 1 mg BID.       diclofenac 1 % Gel topical gel    VOLTAREN    100 g    Apply to right great toe.       febuxostat 40 MG Tabs tablet    ULORIC    90 tablet    Take 1 tablet (40 mg) by mouth daily       order for DME     5 each    Equipment being ordered: DME compression stockings knee high 30 mm hg       other medical supplies     2 packet    JENIFER stocking bilateral lower extremities       Potassium Chloride ER 20 MEQ Tbcr     90 tablet    Take 1 tablet (20 mEq) by mouth daily       sotalol 80 MG tablet    BETAPACE    45 tablet    TAKE 1/2 TABLET BY MOUTH DAILY       warfarin 5 MG tablet    COUMADIN    110 tablet    Take 1-1.5 tablets (5-7.5 mg) by mouth daily Adjust per INR clinic

## 2017-06-22 NOTE — TELEPHONE ENCOUNTER
Bilateral LE venous competency duplex : 6/19/17  1. Right leg: No superficial or deep venous thrombus. No incompetent  perforators or varicosities. The great saphenous vein is incompetent  below the knee.     2. Left leg: No superficial or deep venous thrombus. No incompetent  perforators or varicosities. The left common femoral vein is  incompetent. The remainder of the deep and superficial veins of the  left lower extremity are competent.     3. Moderate to severe subcutaneous edema left greater than right.

## 2017-06-23 NOTE — MR AVS SNAPSHOT
After Visit Summary   6/23/2017    Josr Landers    MRN: [id number]           Patient Information     Date Of Birth          1940        Visit Information        Provider Department      6/23/2017 3:30 PM Erich Fraga MD [facility]        Today's Diagnoses     Lymphedema    -  1    Venous (peripheral) insufficiency        CKD (chronic kidney disease) stage 3, GFR 30-59 ml/min          Care Instructions    You were seen today in the Cardiovascular Clinic at the [facility].      Cardiology Providers you saw during your visit:  Dr. Fraga    Diagnosis:  Bilateral lower extremity edema    Results:  None today    Recommendations:   We will work on getting Pneumatic Compression Device ordered from .       Labs today before you leave.       Will order imaging depending on lab results.  I will be in touch once we have lab results.    Follow-up:  3 months with Dr. Fraga      For emergencies call 911.    For any scheduling needs, please call [phone]. Option 1 then option 3    Thank you for your visit today!     Please call if you have any questions or concerns.  Moe Waters RN              Follow-ups after your visit        Follow-up notes from your care team     See patient instructions section of the AVS Return in about 3 months (around 9/23/2017) for Dr. Fraga, lymphedema, venous insufficiency.      Your next 10 appointments already scheduled     Jun 23, 2017  5:00 PM CDT   Lab with  LAB   [facility] ([facility])    [address] [place] [id number] [phone]              Future tests that were ordered for you today     Open Future Orders        Priority Expected Expires Ordered    Basic metabolic panel Routine 6/23/2017 6/23/2017 6/23/2017            Who to contact     If you have questions or need follow up information about today's clinic visit or your schedule please contact [facility] directly at [phone].  Normal or non-critical lab and imaging results will be communicated to you by [employer]Serve[employer], letter or phone within 4 business days after the clinic has received the results. If you do not hear from us within 7 days, please contact the clinic through [employer]Serve[employer] or phone. If you have a critical or abnormal lab result, we will notify you by phone as soon as possible.  Submit refill requests through [employer] or call your pharmacy and they will forward the refill request to us. Please allow 3 business days for your refill to be completed.          Additional Information About Your Visit        [employer] Information     [employer] gives you secure access to your electronic health record. If you see a primary care provider, you can also send messages to your care team and make appointments. If you have questions, please call your primary care clinic.  If you do not have a primary care provider, please call [phone] and they will assist you.        Care EveryWhere ID     This is your Care EveryWhere ID. This could be used by other organizations to access your [place] medical records  [id number]        Your Vitals Were     Pulse Height Pulse Oximetry BMI (Body Mass Index)          58 1.753 m (5' 9\") 98% 23.63 kg/m2         Blood Pressure from Last 3 Encounters:   06/23/17 94/62   06/14/17 103/70   04/24/17 95/64    Weight from Last 3 Encounters:   06/23/17 72.6 kg (160 lb)   06/29/16 76.3 kg (168 lb 4.8 oz)   06/07/16 78.7 kg (173 lb 8 oz)                 Today's Medication Changes          These changes are accurate as of: 6/23/17  4:56 PM.  If you have any questions, ask your nurse or doctor.               These medicines have changed or have updated prescriptions.        Dose/Directions    sotalol 80 MG tablet   Commonly known as:  BETAPACE   This may have changed:  See the new instructions.   Used for:  Atrial fibrillation (H), Amyloid heart disease (H)        TAKE 1/2 TABLET BY MOUTH DAILY "   Quantity:  45 tablet   Refills:  0                Primary Care Provider Office Phone # Fax #    Gregorio Rober Cruz [phone]        [facility] [address] [facility] [address]  [facility] [address]        Equal Access to Services     NATALIE GOMEZ : Hadii aad ku haddionisioo Soomaali, waaxda luqadaha, qaybta kaalmada adeegyada, mikey hatchn hardeep guillen laBrannonlyndon hale. So [facility] [phone].    ATENCIÓN: Si habla español, tiene a mayorga disposición servicios gratuitos de asistencia lingüística. Llame al [phone].    We comply with applicable federal civil rights laws and [place] laws. We do not discriminate on the basis of race, color, national origin, age, disability sex, sexual orientation or gender identity.            Thank you!     Thank you for choosing [facility]  for your care. Our goal is always to provide you with excellent care. Hearing back from our patients is one way we can continue to improve our services. Please take a few minutes to complete the written survey that you may receive in the mail after your visit with us. Thank you!             Your Updated Medication List - Protect others around you: Learn how to safely use, store and throw away your medicines at www.disposemymeds.org.          This list is accurate as of: 6/23/17  4:56 PM.  Always use your most recent med list.                   Brand Name Dispense Instructions for use Diagnosis    atorvastatin 80 MG tablet    LIPITOR    90 tablet    Take 1 tablet (80 mg) by mouth daily    Paroxysmal atrial fibrillation (H), Hyperlipidemia       bumetanide 1 MG tablet    BUMEX     If his weight is 159 lbs or less take Bumex 1 mg am/ 0.5 mg pm. If it is >159 take Bumex 1 mg BID.        diclofenac 1 % Gel topical gel    VOLTAREN    100 g    Apply to right great toe.    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, unspecified laterality, Paroxysmal atrial fibrillation (H), Bilateral edema of lower extremity, Cardiac amyloidosis (H), Other hyperlipidemia, Chronic diastolic congestive heart failure (H), Generalized edema       order for DME     5 each    Equipment being ordered: DME compression stockings knee high 30 mm hg    Venous (peripheral) insufficiency       other medical supplies     2 packet    JENIFER stocking bilateral lower extremities    Edema of left lower extremity, Chronic diastolic congestive heart failure (H), Idiopathic peripheral neuropathy, Cardiac amyloidosis (H), Paroxysmal atrial fibrillation (H), 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

## 2017-06-23 NOTE — NURSING NOTE
Vascular Testing: Patient given instructions regarding advanced imaging.  Will be determined by creatinine.  CT abdomen pelvis w or w/o contrast. Discussed purpose, preparation, procedure and when to expect results reported back to the patient. Patient demonstrated understanding of this information and agreed to call with further questions or concerns.  Med Reconcile: Reviewed and verified all current medications with the patient. The updated medication list was printed and given to the patient.  New Medication: Pneumatic compression pump.  Patient was educated regarding newly prescribed medication, including discussion of  the indication, administration, side effects, and when to report to MD or RN. Patient demonstrated understanding of this information and agreed to call with further questions or concerns.  Return Appointment: 3 months with Dr. Fraga.  Patient given instructions regarding scheduling next clinic visit. Patient demonstrated understanding of this information and agreed to call with further questions or concerns.  Patient stated he understood all health information given and agreed to call with further questions or concerns.

## 2017-06-23 NOTE — PATIENT INSTRUCTIONS
You were seen today in the Cardiovascular Clinic at the [facility].      Cardiology Providers you saw during your visit:  Dr. Fraga    Diagnosis:  Bilateral lower extremity edema    Results:  None today    Recommendations:   We will work on getting Pneumatic Compression Device ordered from .       Labs today before you leave.       Will order imaging depending on lab results.  I will be in touch once we have lab results.    Follow-up:  3 months with Dr. Fraga      For emergencies call 249.    For any scheduling needs, please call [phone]. Option 1 then option 3    Thank you for your visit today!     Please call if you have any questions or concerns.  Moe Waters RN

## 2017-06-23 NOTE — LETTER
6/23/2017      RE: Josr Landers  [id number]  [facility] [address]       Dear Colleague,    Thank you for the opportunity to participate in the care of your patient, Josr Landers, at the [facility]. Please see a copy of my visit note below.    HPI: Josr Landers is a 76 year old year old patient who receives primary care from Gregorio Cruz.  The patient is seen in consultation at the request of Dr. Cruz to assess for possible venous insufficiency and / or lymphedema.    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 If it is >159 take Bumex 1 mg BID.       warfarin (COUMADIN) 5 MG tablet Take 1-1.5 tablets (5-7.5 mg) by mouth daily Adjust per INR clinic 110 tablet 3     febuxostat (ULORIC) 40 MG TABS tablet Take 1 tablet (40 mg) by mouth daily 90 tablet 3     order for DME Equipment being ordered: DME compression stockings knee high 30 mm hg 5 each 3     Potassium Chloride ER 20 MEQ TBCR Take 1 tablet (20 mEq) by mouth daily 90 tablet 1     atorvastatin (LIPITOR) 80 MG tablet Take 1 tablet (80 mg) by mouth daily 90 tablet 1     sotalol (BETAPACE) 80 MG tablet TAKE 1/2 TABLET BY MOUTH DAILY (Patient taking differently: TAKE 1/2 TABLET twice daily) 45 tablet 0     other medical supplies JENIFER stocking bilateral lower extremities 2 packet 3     diclofenac (VOLTAREN) 1 % GEL Apply to right great toe. 100 g 6       PAST  SURGICAL HISTORY:  Past Surgical History:   Procedure Laterality Date     BACK SURGERY       H PENUMBRA SEPARATOR 3D       KNEE SURGERY       OH PATIENT HAS A CORONARY ARTERY STENT       SHOULDER SURGERY         ALLERGIES     Allergies   Allergen Reactions     Gabapentin Other (See Comments)     Altered mental status     Lyrica      Severe swelling of body and fluid around heart.        FAMILY HISTORY  No family history on file.    VASCULAR FAMILY HISTORY  1st order relative with atherosclerotic PAD: No  1st order relative with AAA: No    SOCIAL HISTORY  Social History     Social History     Marital status:      Spouse name: N/A     Number of children: N/A     Years of education: N/A     Occupational History     Not on file.     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      to his wife Nu ( Piano), two children Deandra 1969 (Grandchildren Trinh 2003, Nish 2007) and Son [id number] E Anshul III       ROS:   Constitutional: No fever, chills, or sweats. No weight gain/loss   ENT: No visual disturbance, ear ache, epistaxis, sore throat  Allergies/Immunologic: Negative  Respiratory: No cough, hemoptysia  Cardiovascular: As per HPI  GI: No nausea, vomiting, hematemesis, melena, or hematochezia  : No urinary frequency, dysuria, or hematuria  Integument: Negative  Psychiatric: Negative  Neuro: Negative  Endocrinology: Negative   Musculoskeletal: Negative  Vascular: No walking impairment, claudication, ischemic rest pain or nonhealing  "wounds    EXAM:  BP 98/61 (BP Location: Right arm, Patient Position: Chair, Cuff Size: Adult Regular)  Pulse 60  Ht 1.753 m (5' 9\")  Wt 72.6 kg (160 lb)  SpO2 98%  BMI 23.63 kg/m2  In general, the patient is a pleasant male in no apparent distress.    HEENT: NC/AT.  PERRLA.  EOMI.  Sclerae white, not injected.  Nares clear.  Pharynx without erythema or exudate.  Dentition intact.    Neck: No adenopathy.  No thyromegaly. Carotids +2/2 bilaterally without bruits.  No jugular venous distension.   Heart: RRR. Normal S1, S2 splits physiologically. No murmur, rub, click, or gallop. The PMI is in the 5th ICS in the midclavicular line. There is no heave.    Lungs: CTA.  No ronchi, wheezes, rales.  No dullness to percussion.   Abdomen: Soft, nontender, nondistended. No organomegaly. No AAA.  No bruits.   Extremities: No clubbing, cyanosis.  No wounds.  CEAP C3 varcisoities, positive Stemmer sign bilaterally left > right  Vascular: No bruits are noted.    Labs:  LIPID RESULTS:  Lab Results   Component Value Date    CHOL 111 04/25/2017    HDL 53 04/25/2017    LDL 36 04/25/2017    TRIG 110 04/25/2017    CHOLHDLRATIO 3.1 07/02/2014    NHDL 58 04/25/2017       LIVER ENZYME RESULTS:  Lab Results   Component Value Date    AST 23 04/25/2017    ALT 34 04/25/2017       CBC RESULTS:  Lab Results   Component Value Date    WBC 4.5 04/25/2017    RBC 4.24 (L) 04/25/2017    HGB 13.3 04/25/2017    HCT 40.9 04/25/2017    MCV 97 04/25/2017    MCH 31.4 04/25/2017    MCHC 32.5 04/25/2017    RDW 14.9 04/25/2017     (L) 04/25/2017       BMP RESULTS:  Lab Results   Component Value Date     04/25/2017    POTASSIUM 4.0 04/25/2017    CHLORIDE 108 04/25/2017    CO2 25 04/25/2017    ANIONGAP 7 04/25/2017     (H) 04/25/2017    BUN 33 (H) 04/25/2017    CR 1.97 (H) 04/25/2017    GFRESTIMATED 33 (L) 04/25/2017    GFRESTBLACK 40 (L) 04/25/2017    RIGOBERTO 9.1 04/25/2017        A1C RESULTS:  No results found for: " A1C    Procedures:        Study Result   US VENOUS COMPENTENCY BILATERAL 6/19/2017 4:06 PM     Comparison study: None available     Clinical History: Bilateral lower extremity edema. Evaluate for valve  competent. Additional history includes right renal cell carcinoma  status post cryoablation.     Technique: B-mode (grayscale) and Duplex Doppler ultrasound of the  lower extremity veins (superficial and deep), including incompetency  reflux time and compression for thrombus. Additional Duplex doppler  assessment of the PTA and KATHRYN at the ankles. Superficial incompetency  exam performed upright, non-weight bearing with distal compression  using rapid inflation/deflation cuffs.     Ordering provider: Ángel Oates MD      Venous Competency Diagnostic Criteria Adopted 11/29/11.     Venous competency criteria defining abnormal reflux duration:  Femoral - popliteal reflux > 1.0 sec.  Deep femoral,deep calf veins, and superficial vein reflux > 0.5 sec.   vein reflux > 0.35 sec.     Supporting document: J Vasc Surg 2003; 38:793-8. Definition of reflux  in lower extremity veins.     Findings:      Right ankle:   Posterior Tibial artery waveform: tri/biphasic  Anterior tibial artery waveform: tri/biphasic     Left ankle:  Posterior Tibial artery waveform: tri/biphasic  Anterior tibial artery waveform: tri/biphasic     Right Lower Extremity:      Duplex Evaluation for Deep Vein Thrombus (which includes CFV, FV,  popliteal vein, and posterior tibial veins): No deep venous thrombus  in the right lower extremity..     Common femoral vein: Competent     Deep femoral vein: Competent     Femoral vein:      proximal thigh: Competent      mid thigh: Competent      distal thigh: Competent     Popliteal vein: Competent     Posterior tibial veins at the ankle: Competent     Left lower extremity:     Duplex Evaluation for Deep Vein Thrombus (which includes CFV, FV,  popliteal vein, and posterior tibial veins): No deep venous  thrombus  in the left lower extremity..      Common femoral vein: Incompetent     Deep femoral vein: Competent     Femoral vein:      proximal thigh: Competent      mid thigh: Competent      distal thigh: Competent     Popliteal vein: Competent     Posterior tibial veins at the ankle: Competent     Superficial Venous Incompetency Study:        Right Lower Extremity:      Duplex Evaluation for Superficial Vein Thrombus (which includes GSV,  SSV, AASV, and PASV): No superficial thrombus in the right lower  extremity..      Anterior accessory saphenous (AASV): Competent     Posterior accessory saphenous (PASV): Competent     Great saphenous vein (GSV)      sapheno-femoral junction: Competent, 5.5 mm      prox thigh: Competent, 2.9 mm      mid thigh: Competent      dist thigh: Competent       knee: Competent, 2.8 mm      prox calf: Incompetent, 2.9 mm      mid calf: Incompetent, 2.9 mm      ankle: Incompetent     Vein of Giacomini (V of G):      distal thigh: Competent      mid thigh: Competent      prox thigh: Not identified     Small saphenous vein:      sapheno-popliteal junction: Competent      prox calf: Competent      mid calf: Competent     No varicosities identified.     No incompetent perforators identified.     Left lower extremity:     Duplex Evaluation for Superficial Vein Thrombus (which includes GSV,  SSV, AASV, and PASV): No superficial thrombus identified in the left  lower extremity..     Anterior accessory saphenous (AASV): Competent     Posterior accessory saphenous (PASV): Competent     Great saphenous vein (GSV)      sapheno-femoral junction: Competent, 4.3 mm      prox thigh:  Competent , 2.6 mm      mid thigh: Competent      dist thigh: Competent       knee:   Competent, 1.5 mm      prox calf: Competent      mid calf: Competent      ankle: Competent     Vein of Giacomini (V of G):      distal thigh: Competent      mid thigh: Competent      prox thigh: Not identified     Small saphenous vein:       "sapheno-popliteal junction: Competent, 6.2 mm.      prox calf: Competent      mid calf: Competent     No varicosities identified.     No incompetent perforators identified.         Impression:  Exam performed in reverse Trendelenburg position.     1. Right leg: No superficial or deep venous thrombus. No incompetent  perforators or varicosities. The great saphenous vein is incompetent  below the knee.     2. Left leg: No superficial or deep venous thrombus. No incompetent  perforators or varicosities. The left common femoral vein is  incompetent. The remainder of the deep and superficial veins of the  left lower extremity are competent.     3. Moderate to severe subcutaneous edema left greater than right.      [Consider Follow Up: Incidentally, during chart review it was  discovered that follow-up imaging was recommended on the MRI from  7/20/2015 and has yet to be obtained. Consider follow-up MRI or CT of  the previously treated renal mass.]     This report will be copied to the [facility] to ensure a  provider acknowledges the finding.      Reference: \"Duplex Ultrasound in the Diagnosis of Lower-Extremity Deep  Venous Thrombosis\"- Luma Garcia MD, S; Jatin Watts MD  (Circulation. 2014;129:917-921. http://circ.ahajournals.org )     I have personally reviewed the examination and initial interpretation  and I agree with the findings.     RONALDO MOJICA       Assessment and Plan:     (I89.0) Lymphedema  (primary encounter diagnosis)  Comment: the patient has evidence of both lymphedema as well as venous insufficiency on exam, he has failed to tolerate compressive 20-30 mm HG compression stockings even with a donning device as he has difficulty getting them on and has difficulty tolerating the degree of compression.   Plan: FlexiTouch pump system, RTC three months, add MLD if not responding to FlexiTouch; If fails compression hosiery, FlexiTouch, and MLD, consider EVLA of incompetent left FV " to asist at least with treatment of that problem    (I87.2) Venous (peripheral) insufficiency  Comment: See above  Plan: given h/o renal cell carcinoma S/P cryoablation, also obtian abdominal imaging to ascertain presence or absence of pelvic lymphoadenopathy contributing to leg outflow difficulties    (N18.3) CKD (chronic kidney disease) stage 3, GFR 30-59 ml/min  Comment: consider MR abdomen vs. CT with contrast vs. CT without contrast based upon Cr  Plan: Basic metabolic panel          Greater than one half the fortyfive minutes total spent on the pt's visit were spent providing education and counselling to the patient regarding the above matters.     Erich Fraga MD

## 2017-06-23 NOTE — PROGRESS NOTES
HPI: Josr Landers is a 76 year old year old patient who receives primary care from Gregorio Cruz.  The patient is seen in consultation at the request of Dr. Cruz to assess for possible venous insufficiency and / or lymphedema.    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 If it is >159 take Bumex 1 mg BID.       warfarin (COUMADIN) 5 MG tablet Take 1-1.5 tablets (5-7.5 mg) by mouth daily Adjust per INR clinic 110 tablet 3     febuxostat (ULORIC) 40 MG TABS tablet Take 1 tablet (40 mg) by mouth daily 90 tablet 3     order for DME Equipment being ordered: DME compression stockings knee high 30 mm hg 5 each 3     Potassium Chloride ER 20 MEQ TBCR Take 1 tablet (20 mEq) by mouth daily 90 tablet 1     atorvastatin (LIPITOR) 80 MG tablet Take 1 tablet (80 mg) by mouth daily 90 tablet 1     sotalol (BETAPACE) 80 MG tablet TAKE 1/2 TABLET BY MOUTH DAILY (Patient taking differently: TAKE 1/2 TABLET twice daily) 45 tablet 0     other medical supplies JENIFER stocking bilateral lower extremities 2 packet 3     diclofenac (VOLTAREN) 1 % GEL Apply to right great toe. 100 g 6       PAST SURGICAL HISTORY:  Past Surgical History:   Procedure Laterality Date     BACK SURGERY       H PENUMBRA SEPARATOR 3D       KNEE SURGERY       WA PATIENT HAS A CORONARY ARTERY STENT       SHOULDER SURGERY         ALLERGIES     Allergies   Allergen Reactions     Gabapentin Other (See Comments)     Altered mental status      "Lyrica      Severe swelling of body and fluid around heart.        FAMILY HISTORY  No family history on file.    VASCULAR FAMILY HISTORY  1st order relative with atherosclerotic PAD: No  1st order relative with AAA: No    SOCIAL HISTORY  Social History     Social History     Marital status:      Spouse name: N/A     Number of children: N/A     Years of education: N/A     Occupational History     Not on file.     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      to his wife Nu ( Vikash), two children Deandra Solo (Grandchildren Trinh 2003, Nish 2007) and Son [id number] E Anshul III       ROS:   Constitutional: No fever, chills, or sweats. No weight gain/loss   ENT: No visual disturbance, ear ache, epistaxis, sore throat  Allergies/Immunologic: Negative  Respiratory: No cough, hemoptysia  Cardiovascular: As per HPI  GI: No nausea, vomiting, hematemesis, melena, or hematochezia  : No urinary frequency, dysuria, or hematuria  Integument: Negative  Psychiatric: Negative  Neuro: Negative  Endocrinology: Negative   Musculoskeletal: Negative  Vascular: No walking impairment, claudication, ischemic rest pain or nonhealing wounds    EXAM:  BP 98/61 (BP Location: Right arm, Patient Position: Chair, Cuff Size: Adult Regular)  Pulse 60  Ht 1.753 m (5' 9\")  Wt 72.6 kg (160 lb)  SpO2 98%  BMI 23.63 kg/m2  In general, the patient is a pleasant male in no apparent distress.    HEENT: NC/AT.  PERRLA.  EOMI.  Sclerae white, not injected.  Nares " clear.  Pharynx without erythema or exudate.  Dentition intact.    Neck: No adenopathy.  No thyromegaly. Carotids +2/2 bilaterally without bruits.  No jugular venous distension.   Heart: RRR. Normal S1, S2 splits physiologically. No murmur, rub, click, or gallop. The PMI is in the 5th ICS in the midclavicular line. There is no heave.    Lungs: CTA.  No ronchi, wheezes, rales.  No dullness to percussion.   Abdomen: Soft, nontender, nondistended. No organomegaly. No AAA.  No bruits.   Extremities: No clubbing, cyanosis.  No wounds.  CEAP C3 varcisoities, positive Stemmer sign bilaterally left > right  Vascular: No bruits are noted.    Labs:  LIPID RESULTS:  Lab Results   Component Value Date    CHOL 111 04/25/2017    HDL 53 04/25/2017    LDL 36 04/25/2017    TRIG 110 04/25/2017    CHOLHDLRATIO 3.1 07/02/2014    NHDL 58 04/25/2017       LIVER ENZYME RESULTS:  Lab Results   Component Value Date    AST 23 04/25/2017    ALT 34 04/25/2017       CBC RESULTS:  Lab Results   Component Value Date    WBC 4.5 04/25/2017    RBC 4.24 (L) 04/25/2017    HGB 13.3 04/25/2017    HCT 40.9 04/25/2017    MCV 97 04/25/2017    MCH 31.4 04/25/2017    MCHC 32.5 04/25/2017    RDW 14.9 04/25/2017     (L) 04/25/2017       BMP RESULTS:  Lab Results   Component Value Date     04/25/2017    POTASSIUM 4.0 04/25/2017    CHLORIDE 108 04/25/2017    CO2 25 04/25/2017    ANIONGAP 7 04/25/2017     (H) 04/25/2017    BUN 33 (H) 04/25/2017    CR 1.97 (H) 04/25/2017    GFRESTIMATED 33 (L) 04/25/2017    GFRESTBLACK 40 (L) 04/25/2017    RIGOBERTO 9.1 04/25/2017        A1C RESULTS:  No results found for: A1C    Procedures:        Study Result   US VENOUS COMPENTENCY BILATERAL 6/19/2017 4:06 PM     Comparison study: None available     Clinical History: Bilateral lower extremity edema. Evaluate for valve  competent. Additional history includes right renal cell carcinoma  status post cryoablation.     Technique: B-mode (grayscale) and Duplex Doppler  ultrasound of the  lower extremity veins (superficial and deep), including incompetency  reflux time and compression for thrombus. Additional Duplex doppler  assessment of the PTA and KATHRYN at the ankles. Superficial incompetency  exam performed upright, non-weight bearing with distal compression  using rapid inflation/deflation cuffs.     Ordering provider: Ángel Oates MD      Venous Competency Diagnostic Criteria Adopted 11/29/11.     Venous competency criteria defining abnormal reflux duration:  Femoral - popliteal reflux > 1.0 sec.  Deep femoral,deep calf veins, and superficial vein reflux > 0.5 sec.   vein reflux > 0.35 sec.     Supporting document: J Vasc Surg 2003; 38:793-8. Definition of reflux  in lower extremity veins.     Findings:      Right ankle:   Posterior Tibial artery waveform: tri/biphasic  Anterior tibial artery waveform: tri/biphasic     Left ankle:  Posterior Tibial artery waveform: tri/biphasic  Anterior tibial artery waveform: tri/biphasic     Right Lower Extremity:      Duplex Evaluation for Deep Vein Thrombus (which includes CFV, FV,  popliteal vein, and posterior tibial veins): No deep venous thrombus  in the right lower extremity..     Common femoral vein: Competent     Deep femoral vein: Competent     Femoral vein:      proximal thigh: Competent      mid thigh: Competent      distal thigh: Competent     Popliteal vein: Competent     Posterior tibial veins at the ankle: Competent     Left lower extremity:     Duplex Evaluation for Deep Vein Thrombus (which includes CFV, FV,  popliteal vein, and posterior tibial veins): No deep venous thrombus  in the left lower extremity..      Common femoral vein: Incompetent     Deep femoral vein: Competent     Femoral vein:      proximal thigh: Competent      mid thigh: Competent      distal thigh: Competent     Popliteal vein: Competent     Posterior tibial veins at the ankle: Competent     Superficial Venous Incompetency Study:         Right Lower Extremity:      Duplex Evaluation for Superficial Vein Thrombus (which includes GSV,  SSV, AASV, and PASV): No superficial thrombus in the right lower  extremity..      Anterior accessory saphenous (AASV): Competent     Posterior accessory saphenous (PASV): Competent     Great saphenous vein (GSV)      sapheno-femoral junction: Competent, 5.5 mm      prox thigh: Competent, 2.9 mm      mid thigh: Competent      dist thigh: Competent       knee: Competent, 2.8 mm      prox calf: Incompetent, 2.9 mm      mid calf: Incompetent, 2.9 mm      ankle: Incompetent     Vein of Giacomini (V of G):      distal thigh: Competent      mid thigh: Competent      prox thigh: Not identified     Small saphenous vein:      sapheno-popliteal junction: Competent      prox calf: Competent      mid calf: Competent     No varicosities identified.     No incompetent perforators identified.     Left lower extremity:     Duplex Evaluation for Superficial Vein Thrombus (which includes GSV,  SSV, AASV, and PASV): No superficial thrombus identified in the left  lower extremity..     Anterior accessory saphenous (AASV): Competent     Posterior accessory saphenous (PASV): Competent     Great saphenous vein (GSV)      sapheno-femoral junction: Competent, 4.3 mm      prox thigh:  Competent , 2.6 mm      mid thigh: Competent      dist thigh: Competent       knee:   Competent, 1.5 mm      prox calf: Competent      mid calf: Competent      ankle: Competent     Vein of Giacomini (V of G):      distal thigh: Competent      mid thigh: Competent      prox thigh: Not identified     Small saphenous vein:      sapheno-popliteal junction: Competent, 6.2 mm.      prox calf: Competent      mid calf: Competent     No varicosities identified.     No incompetent perforators identified.         Impression:  Exam performed in reverse Trendelenburg position.     1. Right leg: No superficial or deep venous thrombus. No incompetent  perforators or  "varicosities. The great saphenous vein is incompetent  below the knee.     2. Left leg: No superficial or deep venous thrombus. No incompetent  perforators or varicosities. The left common femoral vein is  incompetent. The remainder of the deep and superficial veins of the  left lower extremity are competent.     3. Moderate to severe subcutaneous edema left greater than right.      [Consider Follow Up: Incidentally, during chart review it was  discovered that follow-up imaging was recommended on the MRI from  7/20/2015 and has yet to be obtained. Consider follow-up MRI or CT of  the previously treated renal mass.]     This report will be copied to the [facility] to ensure a  provider acknowledges the finding.      Reference: \"Duplex Ultrasound in the Diagnosis of Lower-Extremity Deep  Venous Thrombosis\"- Luma Garcia MD, S; Jatin Watts MD  (Circulation. 2014;129:917-921. http://circ.ahajournals.org )     I have personally reviewed the examination and initial interpretation  and I agree with the findings.     RONALDO MOJICA       Assessment and Plan:     (I89.0) Lymphedema  (primary encounter diagnosis)  Comment: the patient has evidence of both lymphedema as well as venous insufficiency on exam, he has failed to tolerate compressive 20-30 mm HG compression stockings even with a donning device as he has difficulty getting them on and has difficulty tolerating the degree of compression.   Plan: FlexiTouch pump system, RTC three months, add MLD if not responding to FlexiTouch; If fails compression hosiery, FlexiTouch, and MLD, consider EVLA of incompetent left FV to asist at least with treatment of that problem    (I87.2) Venous (peripheral) insufficiency  Comment: See above  Plan: given h/o renal cell carcinoma S/P cryoablation, also obtian abdominal imaging to ascertain presence or absence of pelvic lymphoadenopathy contributing to leg outflow difficulties    (N18.3) CKD (chronic kidney " disease) stage 3, GFR 30-59 ml/min  Comment: consider MR abdomen vs. CT with contrast vs. CT without contrast based upon Cr  Plan: Basic metabolic panel              Greater than one half the fortyfive minutes total spent on the pt's visit were spent providing education and counselling to the patient regarding the above matters.

## 2017-06-23 NOTE — NURSING NOTE
Chief Complaint   Patient presents with     New Patient     77 y/o male for initial evaluation of new onset bilateral lower extremity edema     Vitals were taken and medications were reconciled.     MICHAEL Gutiérrez  3:56 PM

## 2017-07-06 NOTE — TELEPHONE ENCOUNTER
Please order MRI abdomen with Gd contrast unless renal function prohibits, in which case it should be done without Gd. To further characterize renal mass.

## 2017-07-06 NOTE — TELEPHONE ENCOUNTER
MRI ordered, sent for co sign.  Routed to covering Nurse for Dr. Gutiérrez at [facility] to schedule appointment with patient.  Edwige Mueller, RN, BSN

## 2017-07-06 NOTE — TELEPHONE ENCOUNTER
Anum from [facility] called for Dr. Fraga to review an incidental finding on CT of abdomen pelvis    Finding is as follows:   Indeterminate 2.4 x 2.2 cm heterogeneously hypoattenuated partially  exophytic right upper pole renal lesion with discontinuous peripheral  calcification. Recommend for dedicated renal MR for further  Characterization.    They need a notation of the finding in the chart and that ordering provider is aware of the finding.  Routed to Dr. Flakito Mueller, RN, BSN

## 2017-07-17 NOTE — PROGRESS NOTES
Patient's wife, Nu, called to inform that patient's legs continue to swell and patient has had weight gain of 8 lbs in the last 2 weeks.  Patient follows with outside cardiologist for amyloid.  It has been noted that the patient did have some heart failure symptoms in the past with this.  I have asked them to follow up with their cardiologist to monitor this.  As we don't want something cardiac to be missed as we treat patient's venous insufficiency and lymphedema.  I will follow up with [facility] medical about the pneumatic pumps that were ordered for the patient on 6/27/17.  I have not received notification of approval by the patient's insurance.  We will have them delivered as soon as possible.  These will be important for the treatment of the swelling related to the venous insufficiency and lymphedema.  Patient has participated in lymphedema therapy at [facility] in the past.  He will re enroll their for Manual Lymphatic Drainage as recommended by Dr. Fraga at his last visit.  Discussed with patient's wife that we can make a referral to the Lymphedema Therapy group with in [place] if they would like to try a different group.  Patient's wife will notify me if they would like to do this.  I will continue to follow up on the pneumatic pumps and keep them informed of progress.  Nu states understanding and agrees to call with any questions or concerns.

## 2017-07-17 NOTE — PROGRESS NOTES
Patient's wife called to state that Josr has not received the pump his legs yet and that his legs are becoming quite edematous.  His weight has also increased from 159 to 167lb.  Message routed to Dr. Fraga's RN Moe for call back to patient's wife Nu.    Lakisha Murry, RN, BSN  Cardiology Care Coordinator  [facility]  [email or web address]  [phone]

## 2017-08-21 NOTE — MR AVS SNAPSHOT
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  ** [facility] Prescription Fax = [phone]  * Please allow 3 business days for routine medication refills.  * Please allow 5 business days for controlled substance medication refills.     Primary Care Center Test Result notification information:  *You will be notified with in 7-10 days of your appointment day regarding the results of your test.  If you are on MyChart you will be notified as soon as the provider has reviewed the results and signed off on them.        Josr was seen today for hospital f/u.    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    CKD (chronic kidney disease) stage 3, GFR 30-59 ml/min  -     NEPHROLOGY ADULT REFERRAL  -     UROLOGY ADULT REFERRAL Dr Sapp on Wednesday 8/23 at 12:05 arrive 15 minute earlier    Abnormal renal function test  -      UROLOGY ADULT REFERRAL    Renal malignant neoplasm, right (H)  Comments:  [place]   Orders:  -     MRI Abdomen w/o & w contrast; Future [place] on Wednesday at 7:30 am arrive nothing by mouth prior and may take valium when at the hospital   -     UROLOGY ADULT REFERRAL  -     Cytology non gyn; Future  -     diazepam (VALIUM) 2 MG tablet; Take a dose 30 minute prior to MRI on Wednesday    Moderate single current episode of major depressive disorder (H)  -     sertraline (ZOLOFT) 50 MG tablet; Take one half tablet daily at bedtime for 4 nights then one daily              Follow-ups after your visit        Additional Services     NEPHROLOGY ADULT REFERRAL       Your provider has referred you to: [facility]: [facility] (Nephrology) [facility] - [place] [phone]   [email or web address] if possible    Please be aware that coverage of these services is subject to the terms and limitations of your health insurance plan.  Call member services at your health plan with any benefit or coverage questions.      Reason for referral:  Single eGFR in past 12 months <30 ml/min.   Please bring the following to your appointment:    >>   Any x-rays, CTs or MRIs which have been performed.  Contact the facility where they were done to arrange for  prior to your scheduled appointment.   >>   List of current medications   >>   This referral request   >>   Any documents/labs given to you for this referral            UROLOGY ADULT REFERRAL       Your provider has referred you to: [facility]: [place] for Prostate and Urologic Cancers - [place] [phone]   [email or web address]    Please be aware that coverage of these services is subject to the terms and limitations of your health insurance plan.  Call member services at your health plan with any benefit or coverage questions.      Please bring the following with you to your  appointment:    (1) Any X-Rays, CTs or MRIs which have been performed.  Contact the facility where they were done to arrange for  prior to your scheduled appointment.    (2) List of current medications  (3) This referral request   (4) Any documents/labs given to you for this referral                  Follow-up notes from your care team     Write patient with results Return in about 3 weeks (around 9/11/2017).      Your next 10 appointments already scheduled     Aug 21, 2017  1:45 PM CDT   LAB with  LAB   [facility] ([facility])    [address]  1st Floor  [facility] [address] [phone]           Patient must bring picture ID. Patient should be prepared to give a urine specimen  Please do not eat 10-12 hours before your appointment if you are coming in fasting for labs on lipids, cholesterol, or glucose (sugar). Pregnant women should follow their Care Team instructions. Water with medications is okay. Do not drink coffee or other fluids. If you have concerns about taking  your medications, please ask at office or if scheduling via [employer], send a message by clicking on Secure Messaging, Message Your Care Team.            Aug 23, 2017  8:00 AM CDT   MR ABDOMEN W/O & W CONTRAST with SHMRP2   [facility] ([facility])    [address] [place] [address] [phone]           Take your medicines as usual, unless your doctor tells you not to. Bring a list of your current medicines to your exam (including vitamins, minerals and over-the-counter drugs). Also bring the results of similar scans you may have had.    The day before your exam, drink extra fluids at least six 8-ounce glasses (unless your doctor tells you to restrict your fluids).   Have a blood test (creatinine test) within 30 days of your exam. Go to your clinic or Diagnostic Imaging Department for this test.   Do not eat or drink for 6 hours prior to exam.  The MRI  machine uses a strong magnet. Please wear clothes without metal (snaps, zippers). A sweatsuit works well, or we may give you a hospital gown.  Please remove any body piercings and hair extensions before you arrive. You will also remove watches, jewelry, hairpins, wallets, dentures, partial dental plates and hearing aids. You may wear contact lenses, and you may be able to wear your rings. We have a safe place to keep your personal items, but it is safer to leave them at home.   **IMPORTANT** THE INSTRUCTIONS BELOW ARE ONLY FOR THOSE PATIENTS WHO HAVE BEEN TOLD THEY WILL RECEIVE SEDATION OR GENERAL ANESTHESIA DURING THEIR MRI PROCEDURE:  IF YOU WILL RECEIVE SEDATION (take medicine to help you relax during your exam):   You must get the medicine from your doctor before you arrive. Bring the medicine to the exam. Do not take it at home.   Arrive one hour early. Bring someone who can take you home after the test. Your medicine will make you sleepy. After the exam, you may not drive, take a bus or take a taxi by yourself.   No eating 8 hours before your exam. You may have clear liquids up until 4 hours before your exam. (Clear liquids include water, clear tea, black coffee and fruit juice without pulp.)  IF YOU WILL RECEIVE ANESTHESIA (be asleep for your exam):   Arrive 1 1/2 hours early. Bring someone who can take you home after the test. You may not drive, take a bus or take a taxi by yourself.   No eating 8 hours before your exam. You may have clear liquids up until 4 hours before your exam. (Clear liquids include water, clear tea, black coffee and fruit juice without pulp.)  If you have any questions, please contact your Imaging Department exam site.            Aug 23, 2017 12:05 PM CDT   (Arrive by 11:50 AM)   Return Visit with Aleisha Sapp MD   [facility] and [facility]state and Urologic C[facility] ([facility])    [address]  4th [facility] [address]    [phone]            Sep 11, 2017  2:20 PM CDT   (Arrive by 2:05 PM)   Return Visit with Ángel Oates MD   [facility] ([facility])    [address] [place] [address]  4th [facility] [address] [phone]            Sep 15, 2017 11:45 AM CDT   (Arrive by 11:30 AM)   Return Visit with Erich Fraga MD   [facility] Heart Care ([facility])    [address] [place] [address]  3rd [facility] [address]   [phone]            Nov 22, 2017 12:30 PM CST   Lab with  LAB   [facility])    [address] [place] [address]  1st [facility] [address]   [phone]            Nov 22, 2017  1:30 PM CST   (Arrive by 1:00 PM)   New Patient Visit with Jonathan Turner MD   [facility] Nephrology ([facility])    [address] [place] [id number] [phone]              Future tests that were ordered for you today     Open Future Orders        Priority Expected Expires Ordered    Cytology non gyn Routine 8/21/2017 8/22/2018 8/21/2017    MRI Abdomen w/o & w contrast Routine 8/21/2017 9/21/2017 8/21/2017            Who to contact     Please call your clinic at [phone] to:    Ask questions about your health    Make or cancel appointments    Discuss your medicines    Learn about your test results    Speak to your doctor   If you have compliments or concerns about an experience at your clinic, or if you wish to file a complaint, please contact [facility] Patient Relations at [phone] or email us at [email or web address]         Additional Information About Your Visit        MyChart Information     MyChart gives you secure access to your electronic health record. If you see a primary care provider, you can also send messages to your care team and make appointments. If you have questions, please call your  primary care clinic.  If you do not have a primary care provider, please call [phone] and they will assist you.      [employer] is an electronic gateway that provides easy, online access to your medical records. With [employer], you can request a clinic appointment, read your test results, renew a prescription or communicate with your care team.     To access your existing account, please contact your [facility] or call [phone] for assistance.        Care EveryWhere ID     This is your Care EveryWhere ID. This could be used by other organizations to access your [place] medical records  [id number]        Your Vitals Were     Pulse Respirations Pulse Oximetry BMI (Body Mass Index)          83 18 98% 24.66 kg/m2         Blood Pressure from Last 3 Encounters:   08/21/17 104/72   06/23/17 94/62   06/14/17 103/70    Weight from Last 3 Encounters:   08/21/17 75.8 kg (167 lb)   06/23/17 72.6 kg (160 lb)   06/29/16 76.3 kg (168 lb 4.8 oz)              We Performed the Following     NEPHROLOGY ADULT REFERRAL     UROLOGY ADULT REFERRAL          Today's Medication Changes          These changes are accurate as of: 8/21/17  1:44 PM.  If you have any questions, ask your nurse or doctor.               Start taking these medicines.        Dose/Directions    diazepam 2 MG tablet   Commonly known as:  VALIUM   Used for:  Renal malignant neoplasm, right (H)   Started by:  Ángel Oates MD        Take a dose 30 minute prior to MRI on Wednesday   Quantity:  1 tablet   Refills:  0       sertraline 50 MG tablet   Commonly known as:  ZOLOFT   Used for:  Moderate single current episode of major depressive disorder (H)   Started by:  Ángel Oates MD        Take one half tablet daily at bedtime for 4 nights then one daily   Quantity:  30 tablet   Refills:  1         These medicines have changed or have updated prescriptions.        Dose/Directions    bumetanide 1 MG tablet   Commonly known  as:  BUMEX   This may have changed:  Another medication with the same name was removed. Continue taking this medication, and follow the directions you see here.   Changed by:  Ángel Oates MD        Take 2mg in am and 1mg in pm   Refills:  0            Where to get your medicines      These medications were sent to [facility] [address] [place] [address] - [address] [place] [address] AT [facility] [address]  [address]  [address] [facility] [address], [facility] [address]     Phone:  [phone]     sertraline 50 MG tablet         Some of these will need a paper prescription and others can be bought over the counter.  Ask your nurse if you have questions.     Bring a paper prescription for each of these medications     diazepam 2 MG tablet                Primary Care Provider Office Phone # Fax #    Gregorio Rober Cruz [phone]       [address] [place] [address]  [facility] [address]        Equal Access to Services     NATALIE GOMEZ : Hadii philip townsend hadasho Soomaali, waaxda luqadaha, qaybta kaalmada adeegyada, waxay yusefin haychikisn hardeep han . So [facility] [phone].    ATENCIÓN: Si habla español, tiene a mayorga disposición servicios gratuitos de asistencia lingüística. Llame al [phone].    We comply with applicable federal civil rights laws and [place] laws. We do not discriminate on the basis of race, color, national origin, age, disability sex, sexual orientation or gender identity.            Thank you!     Thank you for choosing [facility]  for your care. Our goal is always to provide you with excellent care. Hearing back from our patients is one way we can continue to improve our services. Please take a few minutes to complete the written survey that you may receive in the mail after your visit with us. Thank you!             Your Updated Medication List - Protect others around you: Learn how to safely use, store and throw away your medicines at www.disposemymeds.org.           This list is accurate as of: 8/21/17  1:44 PM.  Always use your most recent med list.                   Brand Name Dispense Instructions for use Diagnosis    atorvastatin 80 MG tablet    LIPITOR    90 tablet    Take 1 tablet (80 mg) by mouth daily    Paroxysmal atrial fibrillation (H), Hyperlipidemia       bumetanide 1 MG tablet    BUMEX     Take 2mg in am and 1mg in pm        diazepam 2 MG tablet    VALIUM    1 tablet    Take a dose 30 minute prior to MRI on Wednesday    Renal malignant neoplasm, right (H)       diclofenac 1 % Gel topical gel    VOLTAREN    100 g    Apply to right great toe.    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 Measure and fit.  Compression strength per protocol.    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, Abnormality of gait, At risk for fall due to comorbid condition       Potassium Chloride ER 20 MEQ Tbcr     90 tablet    Take 1 tablet (20 mEq) by mouth daily    Paroxysmal atrial fibrillation (H), Hyperlipidemia       sertraline 50 MG tablet    ZOLOFT    30 tablet    Take one half tablet  daily at bedtime for 4 nights then one daily    Moderate single current episode of major depressive disorder (H)       sotalol 80 MG tablet    BETAPACE    45 tablet    TAKE 1/2 TABLET BY MOUTH DAILY    Atrial fibrillation (H), Amyloid heart disease (H)       warfarin 5 MG tablet    COUMADIN    110 tablet    Take 1-1.5 tablets (5-7.5 mg) by mouth daily Adjust per INR clinic    Paroxysmal atrial fibrillation (H)       * Notice:  This list has 2 medication(s) that are the same as other medications prescribed for you. Read the directions carefully, and ask your doctor or other care provider to review them with you.

## 2017-08-21 NOTE — PROGRESS NOTES
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 He normally sees Dr. Cruz but it has been difficult to get in to see him.  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 They attempted electrical cardioversion of atrial flutter/fibrillation but was not able to restore sinus rhythm and he is continuing in atrial fibrillation. He was started on Bumex 2 mg in the morning and 1 in the evening.  He has significant edema of lower extremities I referred him to vascular to an attempt to get  flextouch but this was not covered by his insurance. CT scan was ordered by Dr Fraga done on 7/1/2017 and results are reviewed today but they were not contacted with the results and I was not copied with the results ( Dr Cruz is his primary MD). There is an indeterminate area on the right kidney. He has a past history of renal mass treated over 10 years ago at [place] was told that he may have renal cancer at that time but that ablation of the area was successful. They are concerned they were not notified of the results.  He does not currently have a urologist or nephrologist. His creatinine has been elevated August 8, 2.23 GFR 32 sodium 135 potassium 4.2 calcium 9.5 glucose 115.  Hemoglobin 13.3 July 27 with platelet count 105,000   He went to a picnic yesterday at [employer] and therefore did not take his dose of Bumex in the morning and also skipped a dose in the evening because he came home late he is up about 2 to 3 pounds today and is frustrated that if he skips a dose of his Bumex that he becomes edematous. I discussed the importance of taking his Bumex as prescribed daily.  February, 02/12/17, he was hospitalized at [facility] diagnosed with  a thrombus in the right middle cerebral artery, acute stroke.  Interventions assisted with removal of the  thrombus he has left-sided residual. This has caused major change in his life.   He has chronic atrial fibrillation, rate controlled with sotalol, but his blood pressure remains on the lower side. He does follow with Dr. Levin at [facility] as well as he  had stenting of his coronary arteries per his report in 06/2015, discontinued Plavix in 12/2016.    He is feeling very depressed with all of the health conditions that do not seem to be responding quickly.  He has a history of cardiac amyloid diagnosed at [facility].  He has chronic venous insufficiency and is bothered by significant swelling in bilateral legs, left greater than right.  He does have incompetent veins  Noted on previous study and has normal ABIs.     He also has hyperlipidemia and was started on 80 mg of atorvastatin most recently in February because of the embolic stroke for risk factor modification.      REVIEW OF SYSTEMS:  He follows with Dr. Arnold at [facility] who monitors his INR and they said his goal INR should be 2-3.5.  He had atrial flutter as reason to consider  Cardioversion. Depressed mood after stroke.   He has a history of chronic kidney disease, coronary artery disease involving the native coronary artery disease and had an acute CVA in 02/12/2017 due to occlusion of the distal right M1 segment of middle cerebral artery, status post recanalization with Penumbra ACE 68 device.     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    He lives with his wife.   He has 2 children, a son and daughter, and 2  grandchildren, a grandson and granddaughter.     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     Pain in limb     Status post lumbar surgery     Adjustment disorder     Idiopathic peripheral neuropathy     SOB (shortness of breath)     CHF (congestive heart failure) (H)     Depression     Malignant neoplasm of kidney excluding renal pelvis (H)     Cerebrovascular accident (CVA) (H)     Other amyloidosis (H)     Anemia     Atrial flutter (H)     Cardiomyopathy (H)     Chronic kidney disease, stage III (moderate)     Coronary arteriosclerosis in native artery     Weakness     Hypotension     Pain of foot     Long term current use of anticoagulant therapy     Peripheral neuropathic pain     Myocardial infarction, nontransmural (H)     Nonsustained ventricular tachycardia (H)     CKD (chronic kidney disease) stage 3, GFR 30-59 ml/min     Past Medical History:   Diagnosis Date     Atrial fibrillation (H) 6/25/2012     Atrial fibrillation: ablation Dr Jonathan Arvizu/[facility] 6/25/2012     Cardiac amyloid: ATTR per cardiac biopsy [facility] 2009 6/27/2012     Coronary artery disease      Other and unspecified hyperlipidemia 6/27/2012     Tubular adenoma of colon 2010     Venous insufficiency      Social History     Social History     Marital status:      Spouse name: N/A     Number of children: N/A     Years of education: N/A     Occupational History     Not on file.     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      to his wife Nu ( Vikash), two children Deandra Solo (Grandchildren Trinh 2003, Nish 2007) and Son Joe Landers III   SURGICAL HISTORY:  Includes back surgery, knee surgery and shoulder surgery.  He has a history of cardiomegaly as mentioned.    Family Hx not reviewed due to time limitation.    /72  Pulse 83  Resp 18  Wt 75.8 kg (167 lb)  SpO2 98%  BMI 24.66 kg/m2  Weight 158  GENERAL:  Examination reveals a delightful gentleman who is very intelligent.  His speech is clear.  He has decreased movement on the left side of his body, but is able to move.  He is sitting in a wheelchair.   HEENT:  Mucous membranes are moist.  Posterior pharynx nonerythematous.   NECK:  Supple.  Thyroid is normal.   HEART:  S1, S2, with irregular rhythm.  Rate is controlled.    ABDOMEN:  Examined  in a chair. No significant distension. Non tender.  EXTREMITIES:  He does have compression stockings but legs are wrapped,  left leg has more swelling than the right, both have venous incompetence.  PSYCHOLOGICAL:  Interactive, very intelligent, wanting to understand his health condition.  Depressed mood and indicates frustration with his multiple health issues.  PHQ-9 SCORE 5/4/2016 4/24/2017 8/21/2017   Total Score - - -   Total Score 0 1 6     [facility] him. I noted the CT scan  and discussed with Josr Landers and his wife today.  EXAMINATION: CT ABDOMEN PELVIS W/O CONTRAST, 7/1/2017 12:11 PM     TECHNIQUE:  Helical CT images from the lung bases through the  symphysis pubis were obtained without IV contrast.      COMPARISON: MR abdomen 7/20/2015     HISTORY: f/u kidney mass previously images at [place], Chronic kidney  disease, stage 3 (moderate), Generalized enlarged lymph nodes,  Localized edema     FINDINGS:     Abdomen  and pelvis:      2.4 x 2.2 cm heterogeneously hyperattenuated partially exophytic right  upper pole renal lesion with discontinuous peripheral calcification.  There is a 1.5 cm left lower renal pole fluid attenuated lesion,  likely simple renal cyst. 2 mm nonobstructive right renal calculus  (series 3, image 98). There is subtle 1 cm hypoattenuated right  inferior pole renal lesion, likely simple renal cyst.     Redemonstration of multiple hypoattenuated hepatic lesions, better  visualized on prior abdominal MR, consistent with hepatic cysts.  Noncontrast study of spleen, adrenals, pancreas are unremarkable. No  bowel obstruction. Atherosclerotic calcification of abdominal aorta  and iliac vessels. No free air or fluid in the abdomen pelvis.  Prostatic gland is enlarged.     Lung bases: No pleural effusion or lower lung suspicious lung nodules.  Partially visualized calcified 20 arteries.     Bones and soft tissues: Severe degenerative changes of the lumbar  spine with low-grade retrolisthesis of L1 on L2, L2 on L3. No acute or  suspicious osseous lesion.         IMPRESSION:   1. Indeterminate 2.4 x 2.2 cm heterogeneously hypoattenuated partially  exophytic right upper pole renal lesion with discontinuous peripheral  calcification. Recommend for dedicated renal MR for further  characterization.  2. 1 mm nonobstructing right renal calculus.  3. Multiple hypoattenuating hepatic lesions, stable since 7/20/2015,  statistically benign.  4. Prostatomegaly.        [Consider Follow Up: Right renal lesion]     This report will be copied to the [facility] to ensure a  provider acknowledges the finding.      I have personally reviewed the examination and initial interpretation  and I agree with the findings.     MD Heriberto DUNCAN Jay S, MD     7/28/2017  2:16 PM  PROCEDURE REPORT  ELECTIVE EXTERNAL ELECTRICAL CARDIOVERSION  DATE OF PROCEDURE: 7/28/2017    CONCLUSIONS:  1.  Electrical cardioversion  of atrial flutter/tachycardia did   briefly restore sinus rhythm, but sinus rhythm was not maintained   for more than a minute despite multiple cardioversions.  2.  Brief sinus rhythm heart rates in the 70s following   cardioversion before AF recurred.    Comment: Cardioversion today was successful in restoring, but not   maintaining sinus rhythm.  Atrial tachycardia/flutter was   promptly recurrent.    INDICATION FOR PROCEDURE: Persistent atypical atrial   flutter/tachycardia     PROCEDURE PERFORMED:  Elective external electrical cardioversion    PERFORMED BY: Diogenes Louis MD    DESCRIPTION OF PROCEDURE:  The patient was assessed by me immediately prior to the procedure   and found to be an appropriate candidate for deep sedation.    Deep sedation was provided with intravenous propofol.  When the   patient was asleep, synchronized electrical cardioversion was   done via anterior-posterior patches.    Four biphasic shocks, one of 50 Joules, two of 200 Joules, and   one of 360 Joules were administered.    Each time sinus rhythm was restored for a 10 to 60 seconds, but   then atrial flutter/tachycardia recurred.     Following brief restoration of sinus rhythm the heart rates were   in the 70s.    Given the lack of persistence of sinus rhythm, the decision was   made to accept AF for now.  There were no complications.          ASSESSMENT AND PLAN:   1.  Professor Josr Landers, a very intelligent, pleasant gentleman with impressive career presents with his wife Nu of 51 years to follow up for primary care after recent attempt at cardiac ablation at [facility].. He has history of acute cerebrovascular accident 02/12/2017 due to occlusion of distal right M1 segment of middle cerebral artery, status post recanalization Penumbra ACE 68 device on 02/12.  He has coronary artery disease including  coronary artery disease with  Stenting 6/2015, chronic rate controlled atrial fibrillation, chronic venous insufficiency and  edema.  He had symptoms of heart failure and is diuresing with switch to Bumex off Lasix. He is following with cardiology at [facility] currently as this is where he was hospitalized. I recommended not skipping any doses of Bumex. He does not have a  Nephrologist. We tried to arrange and first available is not until 11/22. I will contact nephrology.  2. CT scan from 7/1/17 results were not communicated to the patient via ordering team, Dr Fraga. There is an indeterminate are on the right kidney ( previous renal mass treated at [facility]) that requires further assessment with MRI. This will be arranged on Wednesday ( first available ) at [facility] with Urology follow-up with Dr Sapp on 8/23/17. He indicates need for valium for MRI scanner.  3. Chronic bilateral swelling in both legs which I suspect is venous incompetence in addition to heart failure.  I referred him to the [facility] but he is not following with Lymphedema clinic at this time. Flexitouch was not covered by his insurance,appeal pending. He will continue to elevate and wrap. He limits fluids to 2 liters daily and follows low sodium diet.  4.  Hyperlipidemia, on 80 mg atorvastatin and tolerating.  5.  CVA as mentioned, goal INR to be addressed by Dr. Arnold.  6. Advanced directive provided previously. Code status full per patient wishes.  7. Chronic Kidney disease  Stage 3 GFR 32   8. Depression: Will start low dose sertraline, reviewed side effects.     Josr was seen today for hospital f/u.    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    CKD (chronic kidney disease) stage 3, GFR 30-59 ml/min  -     NEPHROLOGY ADULT REFERRAL  -     UROLOGY ADULT REFERRAL    Abnormal renal function test  -     UROLOGY ADULT REFERRAL    Renal malignant neoplasm, right  (H)  Comments:  [place]   Orders:  -     MRI Abdomen w/o & w contrast; Future  -     UROLOGY ADULT REFERRAL  -     Cytology non gyn; Future  -     diazepam (VALIUM) 2 MG tablet; Take a dose 30 minute prior to MRI on Wednesday  -     Cytology non gyn    Moderate single current episode of major depressive disorder (H)  -     sertraline (ZOLOFT) 50 MG tablet; Take one half tablet daily at bedtime for 4 nights then one daily    Close follow-up with me in 3 weeks.  All questions were addressed.  They voiced understanding and agreement with the above.      Ángel Oates MD

## 2017-08-21 NOTE — NURSING NOTE
Chief Complaint   Patient presents with     Hospital F/U     pt is here for a hospital follow up        Lashawn Bautista CMA at 11:30 AM on 8/21/2017

## 2017-08-21 NOTE — PATIENT INSTRUCTIONS
Primary Care Center Phone Number [phone]  Primary Care Center Medication Refill Request Information:  * Please contact your pharmacy regarding ANY request for medication refills.  ** [facility] Prescription Fax = [phone]  * Please allow 3 business days for routine medication refills.  * Please allow 5 business days for controlled substance medication refills.     Primary Care Center Test Result notification information:  *You will be notified with in 7-10 days of your appointment day regarding the results of your test.  If you are on MyChart you will be notified as soon as the provider has reviewed the results and signed off on them.        Josr was seen today for hospital f/u.    Diagnoses and all orders for this visit:    Combined systolic and diastolic congestive heart failure, unspecified congestive heart failure chronicity (H)    Lower extremity edema  Continue elevating your legs  Cardiac amyloid: ATTR per cardiac biopsy [facility] 2009    CKD (chronic kidney disease) stage 3, GFR 30-59 ml/min  -     NEPHROLOGY ADULT REFERRAL  -     UROLOGY ADULT REFERRAL Dr Sapp on Wednesday 8/23 at 12:05 arrive 15 minute earlier    Abnormal renal function test  -     UROLOGY ADULT REFERRAL    Renal malignant neoplasm, right (H)  Comments:  [place]   Orders:  -     MRI Abdomen w/o & w contrast; Future [place] on Wednesday at 7:30 am arrive nothing by mouth prior and may take valium when at the hospital   -     UROLOGY ADULT REFERRAL  -     Cytology non gyn; Future  -     diazepam (VALIUM) 2 MG tablet; Take a dose 30 minute prior to MRI on Wednesday    Moderate single current episode of major depressive disorder (H)  -     sertraline (ZOLOFT) 50 MG tablet; Take one half tablet daily at bedtime for 4 nights then one daily

## 2017-08-22 NOTE — TELEPHONE ENCOUNTER
Patient with a history of RCC coming in to review imaging. Chart reviewed and MRI is scheduled. No need for a call.

## 2017-08-22 NOTE — TELEPHONE ENCOUNTER
Spoke with patient regarding getting medical records from [facility] regarding his treatment for renal carcinoma several years ago. Patient reports that this was 10-12 years ago, and he did not think there was much to get in terms of records. He states that he had ablation of a small lesion, and then he was just observed, and no further treatment was ever needed.     Dr. Oates was thinking about trying to coordinate care with his main cardiologist at [facility], and setting up appointment with nephrology at [facility] to keep that care in one system. Patient would prefer to see nephrology at [facility], and wondering about getting appointment sooner (currently scheduled for 11/22/17).

## 2017-08-23 NOTE — NURSING NOTE
"Chief Complaint   Patient presents with     Consult     Ckd consult       Initial BP (!) 81/61  Pulse 73  Temp 98.2  F (36.8  C) (Oral)  Resp 18  Ht 1.753 m (5' 9\")  Wt 73.9 kg (163 lb)  BMI 24.07 kg/m2 Estimated body mass index is 24.07 kg/(m^2) as calculated from the following:    Height as of this encounter: 1.753 m (5' 9\").    Weight as of this encounter: 73.9 kg (163 lb).  Medication Reconciliation: complete   LOKESH GIRON CMA    "

## 2017-08-23 NOTE — LETTER
8/23/2017      RE: Josr Landers  [address] [place] [place] [address]       Nephrology consult  Reason for consult: CKD  Referring provider: Lashon    75yo aaM with a PMHx of CKD, CHF (amyloid), stroke (6mo ago), and R renal mass here for evaluation of this CKD. He had an acute decline in renal function in Feb 2013 around the time of a hospitalization for volume overload and possible HF exacerbation. At that time, his GFR declined to ~50 and has steadily declined to 35-40 over the last 4 years.He was taking NSAIDs until June 2015 and has chronic L foot pain. He also reports GARCIA and L leg swelling but denies nausea, vomiting, fevers, chills, joint pain, rash, CP, SOB. Comprehensive ROS negative.    PMHx  CKD - with LOIS in Feb 2013  CAD s/p stent  R RCC s/p ablation in 2005  HF - cardiac amyloid  Afib  Gout  Chronic L foot pain  No DM, hepatitis, Joshua    Soc Hx  Currently running a company  Former  [facility] [employer]  Lives with wife  2 kids  2 grandkids  Denies tob - quit 40yrs ago    Fam Hx - no CKD    All - gabapentin/lyrica  Medications reviewed, of note  bumex 2mg AM, 1mg PM  Sotalol 40mg daily  Coumadin  KCl 20mEq daily  Atorvastatin 80mg daily  febuxostat 40mg daily  No NSAIDs    Exam   92/61   93/61   81/61   P73  Gen - nad  Head/neck - NC/AT, neck supple, op clear, no bruit  Eyes - perrl  Ears - nl external exam  Nose - nl external exam  Heme - no LAD  CV - rrr, no rub, +S4  Resp - cta bilat  Abd - BS+, NT/ND, no bruit  Ext - 2-3+ edema  Skin - no rash  MS - no joint inflammation  Neuro - strength 5/5 throughout  Psych - pleasant, appropriate      Labs   2005 2013 2014 2015 2016 2017 7/8 2/14 2/20 7/20 6/7 6/23 8/23  Cr 1.2 1.3 1.6 1.9 2 2.2 2.1 (eGFR 37)    8/23/17 - UACR 205   UPCR 0.76   UA neg alb, small blood, 1RBC    4/25/17 - uric acid 4.3    7/20/15 MRI - mass R kidney, multiple simple cysts  8/23/17 MRI - complex upper pole R renal lesion, multiple simple cysts    A/Rec:  77yo aaM with CKD  CKD - stage 3b with proteinuria. Possible etiologies include cardiorenal syndrome with LOIS in Feb 2013 during decompensated HF with steady decline since; amyloid given known cardiac amyloid; renovascular disease given history of CAD and stroke; and NSAID associated as he was taking NSAIDs up until June 2015 (Cr somewhat stable 2015 until now). At this point, will monitor and evaluate with SPEP, UPEP, kappa/lambda ratio. At this point, unlikely that a biopsy would be revealing given slow decline, minimal to no hematuria and low level or proteinuria.   - continue to avoid NSAIDs    Electrolytes - acceptable    Acid-base - bicarb nl    Renal mass - follow up with urology (recommended follow up MRI in ~March 2018)    RTC in 4 months for renal panel, CBC, UPEP, SPEP, kappa/lambda ratio, ultrasound      Jonathan Turner MD

## 2017-08-23 NOTE — LETTER
"8/23/2017       RE: Josr Landers  [address]  [facility] [address]     Dear Colleague,    Thank you for referring your patient, Josr Landers, to the [facility] UROLOGY AND [facility] FOR PROSTATE AND UROLOGIC CANCERS at [facility]. Please see a copy of my visit note below.    Urology Clinic Note      Date: 8/23/2017  Time: 3:44 PM  Patient: Josr Landers  MRN: [id number]    HPI/Subjective: Josr Landers is a 76 year old male with hx of right sided RCC s/p ablation at [facility] approximately 7 years ago. In 2015 he had an incidentally discovered left renal mas. At that time based on the size and character of the mass he was advised to observe the mass however he had a desire to pursue treatment of this mass. He was advised to follow up with Dr. Goetz however per records this follow up appears to not have been completed. Today he presents to review surveillance MRI. He presently denies flank pain, hematuria, involuntary weight loss or fatigue.     Objective:  BP 95/59  Pulse 73  Ht 1.753 m (5' 9\")  Wt 73.9 kg (163 lb)  BMI 24.07 kg/m2  Gen: In NAD, conversant.  Resp: Breathing non-labored  CV: Extremities warm  Abd: Soft, non-distended, non-tender.    Assessment & Plan: Josr Landers is a 76 year old male with history of right renal mass s/p cryoablation and incidentally discovered left renal mass much smaller in size. There is little concern of malignancy in regards to left renal mass and right renal mass has remained stable      - Follow up in 6-9 months with MRI      This patient was seen & discussed with Dr. Sapp.    Gideon Doherty MD  Urology Resident    Patient seen and examined with the resident.  Visit time 15 minutes and >50% spent in counseling.  I agree with the resident's note and plan of care.       CC  Patient Care Team:  Gregorio Cruz MD as PCP - General (Internal Medicine)  Vick Wise MD as Referring Physician " (Cardiology)  Nancy Henderson RN as Nurse Coordinator  Heath Rudd MD as MD (Family Practice)  Jonathan Turner MD as MD (Nephrology)  ESTABLISHED PATIENT          Again, thank you for allowing me to participate in the care of your patient.      Sincerely,    Aleihsa Sapp MD

## 2017-08-23 NOTE — PROGRESS NOTES
"Urology Clinic Note      Date: 8/23/2017  Time: 3:44 PM  Patient: Josr Landers  MRN: [id number]    HPI/Subjective: Josr Landers is a 76 year old male with hx of right sided RCC s/p ablation at [facility] approximately 7 years ago. In 2015 he had an incidentally discovered left renal mas. At that time based on the size and character of the mass he was advised to observe the mass however he had a desire to pursue treatment of this mass. He was advised to follow up with Dr. Goetz however per records this follow up appears to not have been completed. Today he presents to review surveillance MRI. He presently denies flank pain, hematuria, involuntary weight loss or fatigue.     Objective:  BP 95/59  Pulse 73  Ht 1.753 m (5' 9\")  Wt 73.9 kg (163 lb)  BMI 24.07 kg/m2  Gen: In NAD, conversant.  Resp: Breathing non-labored  CV: Extremities warm  Abd: Soft, non-distended, non-tender.    Assessment & Plan: Josr Landers is a 76 year old male with history of right renal mass s/p cryoablation and incidentally discovered left renal mass much smaller in size. There is little concern of malignancy in regards to left renal mass and right renal mass has remained stable      - Follow up in 6-9 months with MRI      This patient was seen & discussed with Dr. Sapp.    Gideon Doherty MD  Urology Resident    Patient seen and examined with the resident.  Visit time 15 minutes and >50% spent in counseling.  I agree with the resident's note and plan of care.       Aleisha Sapp MD  Urology Staff      CC  Patient Care Team:  Gregorio Cruz MD as PCP - General (Internal Medicine)  Vick Wise MD as Referring Physician (Cardiology)  Aleisha Sapp MD as MD (Urology)  Nancy Henderson RN as Nurse Coordinator  Heath Rudd MD as MD (Family Practice)  Jonathan Turner MD as MD (Nephrology)  ESTABLISHED PATIENT      "

## 2017-08-23 NOTE — MR AVS SNAPSHOT
After Visit Summary   8/23/2017    Josr Landers    MRN: [id number]           Patient Information     Date Of Birth          1940        Visit Information        Provider Department      8/23/2017 2:30 PM Jonathan Turner MD 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      Your next 10 appointments already scheduled     Sep 11, 2017  2:20 PM CDT   (Arrive by 2:05 PM)   Return Visit with Ángel Oates MD   [facility] ([facility])    [address] [place] [address]   [phone]            Sep 15, 2017 11:45 AM CDT   (Arrive by 11:30 AM)   Return Visit with Erich Fraga MD   [facility] ([facility])    [address] [place] [address]   [phone]            Dec 20, 2017  1:30 PM CST   Lab with  LAB   [facility] ([facility])    [address] [place] [address]   [phone]            Dec 20, 2017  2:30 PM CST   (Arrive by 2:00 PM)   Return Visit with Jonathan Turner MD   [facility] ([facility])    [address] [place] [address]   [phone]            Feb 21, 2018  9:15 AM CST   (Arrive by 9:00 AM)   MR ABDOMEN & PELVIS W/O & W CONTRAST with [id number]   [facility] ([facility])    [address] [place] [id number] [phone]           Take your medicines as usual, unless your doctor tells you not to. Bring a list of your current  medicines to your exam (including vitamins, minerals and over-the-counter drugs). Also bring the results of similar scans you may have had.    The day before your exam, drink extra fluids at least six 8-ounce glasses (unless your doctor tells you to restrict your fluids).   Have a blood test (creatinine test) within 30 days of your exam. Go to your clinic or Diagnostic Imaging Department for this test.   Do not eat or drink for 6 hours prior to exam.  The MRI machine uses a strong magnet. Please wear clothes without metal (snaps, zippers). A sweatsuit works well, or we may give you a hospital gown.  Please remove any body piercings and hair extensions before you arrive. You will also remove watches, jewelry, hairpins, wallets, dentures, partial dental plates and hearing aids. You may wear contact lenses, and you may be able to wear your rings. We have a safe place to keep your personal items, but it is safer to leave them at home.   **IMPORTANT** THE INSTRUCTIONS BELOW ARE ONLY FOR THOSE PATIENTS WHO HAVE BEEN TOLD THEY WILL RECEIVE SEDATION OR GENERAL ANESTHESIA DURING THEIR MRI PROCEDURE:  IF YOU WILL RECEIVE SEDATION (take medicine to help you relax during your exam):   You must get the medicine from your doctor before you arrive. Bring the medicine to the exam. Do not take it at home.   Arrive one hour early. Bring someone who can take you home after the test. Your medicine will make you sleepy. After the exam, you may not drive, take a bus or take a taxi by yourself.   No eating 8 hours before your exam. You may have clear liquids up until 4 hours before your exam. (Clear liquids include water, clear tea, black coffee and fruit juice without pulp.)  IF YOU WILL RECEIVE ANESTHESIA (be asleep for your exam):   Arrive 1 1/2 hours early. Bring someone who can take you home after the test. You may not drive, take a bus or take a taxi by yourself.   No eating 8 hours before your exam. You may have clear liquids up  "until 4 hours before your exam. (Clear liquids include water, clear tea, black coffee and fruit juice without pulp.)  If you have any questions, please contact your Imaging Department exam site.            Feb 21, 2018 11:40 AM CST   (Arrive by 11:25 AM)   Return Visit with Aleisha Sapp MD   [facility] Urology and [facility] for Prostate and Urologic Cancers ([facility] [place])    [address]  [address] [facility] [address] [phone]              Who to contact     If you have questions or need follow up information about today's clinic visit or your schedule please contact [facility] NEPHROLOGY directly at [phone].  Normal or non-critical lab and imaging results will be communicated to you by MyChart, letter or phone within 4 business days after the clinic has received the results. If you do not hear from us within 7 days, please contact the clinic through [employer] or phone. If you have a critical or abnormal lab result, we will notify you by phone as soon as possible.  Submit refill requests through [employer] or call your pharmacy and they will forward the refill request to us. Please allow 3 business days for your refill to be completed.          Additional Information About Your Visit        [employer] Information     [employer] gives you secure access to your electronic health record. If you see a primary care provider, you can also send messages to your care team and make appointments. If you have questions, please call your primary care clinic.  If you do not have a primary care provider, please call [phone] and they will assist you.        Care EveryWhere ID     This is your Care EveryWhere ID. This could be used by other organizations to access your [place] medical records  [id number]        Your Vitals Were     Pulse Temperature Respirations Height BMI (Body Mass Index)       73 98.2  F (36.8  C) (Oral) 18 1.753 m (5' 9\") 24.07 kg/m2        Blood Pressure from Last 3 " Encounters:   08/23/17 (!) 81/61   08/23/17 95/59   08/21/17 104/72    Weight from Last 3 Encounters:   08/23/17 73.9 kg (163 lb)   08/23/17 73.9 kg (163 lb)   08/21/17 75.8 kg (167 lb)               Primary Care Provider Office Phone # Fax #    Gregorio Rober Cruz [phone]       [address] [facility] [place] [facility] [address]        Equal Access to Services     NATALIE GOMEZ : Hadii aad ku hadasho Soomaali, waaxda luqadaha, qaybta kaalmada adeegyada, waxay idiin hayaan adekye han . So [facility] [phone].    ATENCIÓN: Si habla español, tiene a mayorga disposición servicios gratuitos de asistencia lingüística. Llame al [phone].    We comply with applicable federal civil rights laws and [place] laws. We do not discriminate on the basis of race, color, national origin, age, disability sex, sexual orientation or gender identity.            Thank you!     Thank you for choosing [facility] NEPHROLOGY  for your care. Our goal is always to provide you with excellent care. Hearing back from our patients is one way we can continue to improve our services. Please take a few minutes to complete the written survey that you may receive in the mail after your visit with us. Thank you!             Your Updated Medication List - Protect others around you: Learn how to safely use, store and throw away your medicines at www.disposemymeds.org.          This list is accurate as of: 8/23/17 11:59 PM.  Always use your most recent med list.                   Brand Name Dispense Instructions for use Diagnosis    atorvastatin 80 MG tablet    LIPITOR    90 tablet    Take 1 tablet (80 mg) by mouth daily    Paroxysmal atrial fibrillation (H), Hyperlipidemia       bumetanide 1 MG tablet    BUMEX     Take 2mg in am and 1mg in pm        diazepam 2 MG tablet    VALIUM    1 tablet    Take a dose 30 minute prior to MRI on Wednesday    Renal malignant neoplasm, right (H)       diclofenac 1 % Gel topical gel    VOLTAREN    100 g     Apply to right great toe.    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 Measure and fit.  Compression strength per protocol.    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, Abnormality of gait, At risk for fall due to comorbid condition       Potassium Chloride ER 20 MEQ Tbcr     90 tablet    Take 1 tablet (20 mEq) by mouth daily    Paroxysmal atrial fibrillation (H), Hyperlipidemia       sertraline 50 MG tablet    ZOLOFT    30 tablet    Take one half tablet daily at bedtime for 4 nights then one daily    Moderate single current episode of major depressive disorder (H)       sotalol 80 MG tablet    BETAPACE    45 tablet    TAKE 1/2 TABLET BY MOUTH DAILY    Atrial fibrillation (H), Amyloid heart disease (H)       warfarin 5 MG tablet    COUMADIN    110 tablet    Take 1-1.5 tablets (5-7.5 mg) by mouth daily Adjust per INR clinic    Paroxysmal atrial fibrillation (H)       * Notice:  This list has 2 medication(s) that are the same as other medications prescribed for you. Read the directions carefully, and ask your doctor or  other care provider to review them with you.

## 2017-08-23 NOTE — PATIENT INSTRUCTIONS
Return in 6 - 8 months with imaging.    It was a pleasure meeting with you today.  Thank you for allowing me and my team the privilege of caring for you today.  YOU are the reason we are here, and I truly hope we provided you with the excellent service you deserve.  Please let us know if there is anything else we can do for you so that we can be sure you are leaving completely satisfied with your care experience.

## 2017-08-23 NOTE — MR AVS SNAPSHOT
After Visit Summary   8/23/2017    Josr Landers    MRN: [id number]           Patient Information     Date Of Birth          1940        Visit Information        Provider Department      8/23/2017 12:05 PM Aleisha Sapp MD [facility] for Prostate and Urologic Cancers        Today's Diagnoses     Right renal mass    -  1      Care Instructions    Return in 6 - 8 months with imaging.    It was a pleasure meeting with you today.  Thank you for allowing me and my team the privilege of caring for you today.  YOU are the reason we are here, and I truly hope we provided you with the excellent service you deserve.  Please let us know if there is anything else we can do for you so that we can be sure you are leaving completely satisfied with your care experience.                  Follow-ups after your visit        Follow-up notes from your care team     Return in about 6 months (around 2/23/2018).      Your next 10 appointments already scheduled     Sep 11, 2017  2:20 PM CDT   (Arrive by 2:05 PM)   Return Visit with Ángel Oates MD   [facility] ([facility])    [address] [place] [address]  4th [facility] [address]   [phone]            Sep 15, 2017 11:45 AM CDT   (Arrive by 11:30 AM)   Return Visit with Erich Fraga MD   [facility] ([facility])    [address] [place] [address]   [phone]            Dec 20, 2017  1:30 PM CST   Lab with  LAB   [facility] ([facility])    [address] [place] [address]   [phone]            Dec 20, 2017  2:30 PM CST   (Arrive by 2:00 PM)   Return Visit with Jonathan Turner MD   [facility] Neph[facility] ([facility])    [address] [place] [address]   [phone]            Feb 21, 2018  9:15 AM CST    (Arrive by 9:00 AM)   MR ABDOMEN & PELVIS W/O & W CONTRAST with [id number]   [facility] ([facility] [place])    [address] [facility]  1st Floor  [facility] [address] [phone]           Take your medicines as usual, unless your doctor tells you not to. Bring a list of your current medicines to your exam (including vitamins, minerals and over-the-counter drugs). Also bring the results of similar scans you may have had.    The day before your exam, drink extra fluids at least six 8-ounce glasses (unless your doctor tells you to restrict your fluids).   Have a blood test (creatinine test) within 30 days of your exam. Go to your clinic or Diagnostic Imaging Department for this test.   Do not eat or drink for 6 hours prior to exam.  The MRI machine uses a strong magnet. Please wear clothes without metal (snaps, zippers). A sweatsuit works well, or we may give you a hospital gown.  Please remove any body piercings and hair extensions before you arrive. You will also remove watches, jewelry, hairpins, wallets, dentures, partial dental plates and hearing aids. You may wear contact lenses, and you may be able to wear your rings. We have a safe place to keep your personal items, but it is safer to leave them at home.   **IMPORTANT** THE INSTRUCTIONS BELOW ARE ONLY FOR THOSE PATIENTS WHO HAVE BEEN TOLD THEY WILL RECEIVE SEDATION OR GENERAL ANESTHESIA DURING THEIR MRI PROCEDURE:  IF YOU WILL RECEIVE SEDATION (take medicine to help you relax during your exam):   You must get the medicine from your doctor before you arrive. Bring the medicine to the exam. Do not take it at home.   Arrive one hour early. Bring someone who can take you home after the test. Your medicine will make you sleepy. After the exam, you may not drive, take a bus or take a taxi by yourself.   No eating 8 hours before your exam. You may have clear liquids up until 4 hours before your exam. (Clear liquids include  water, clear tea, black coffee and fruit juice without pulp.)  IF YOU WILL RECEIVE ANESTHESIA (be asleep for your exam):   Arrive 1 1/2 hours early. Bring someone who can take you home after the test. You may not drive, take a bus or take a taxi by yourself.   No eating 8 hours before your exam. You may have clear liquids up until 4 hours before your exam. (Clear liquids include water, clear tea, black coffee and fruit juice without pulp.)  If you have any questions, please contact your Imaging Department exam site.            Feb 21, 2018 11:40 AM CST   (Arrive by 11:25 AM)   Return Visit with Aleisha Sapp MD   [facility] Urology and [facility] for Prostate and Urologic Cancers ([facility] [place])    [address] [place] [address] [phone]              Future tests that were ordered for you today     Open Future Orders        Priority Expected Expires Ordered    MR Abdomen & Pelvis w/o & w Contrast Routine  8/23/2018 8/23/2017            Who to contact     Please call your clinic at [phone] to:    Ask questions about your health    Make or cancel appointments    Discuss your medicines    Learn about your test results    Speak to your doctor   If you have compliments or concerns about an experience at your clinic, or if you wish to file a complaint, please contact [facility] Patient Relations at [phone] or email us at [email or web address]         Additional Information About Your Visit        [employer] Information     [employer] gives you secure access to your electronic health record. If you see a primary care provider, you can also send messages to your care team and make appointments. If you have questions, please call your primary care clinic.  If you do not have a primary care provider, please call [phone] and they will assist you.      [employer] is an electronic gateway that provides easy, online access to your  "medical records. With [employer], you can request a clinic appointment, read your test results, renew a prescription or communicate with your care team.     To access your existing account, please contact your [facility] or call [phone] for assistance.        Care EveryWhere ID     This is your Care EveryWhere ID. This could be used by other organizations to access your [place] medical records  [id number]        Your Vitals Were     Pulse Height BMI (Body Mass Index)             73 1.753 m (5' 9\") 24.07 kg/m2          Blood Pressure from Last 3 Encounters:   08/23/17 (!) 81/61   08/23/17 95/59   08/21/17 104/72    Weight from Last 3 Encounters:   08/23/17 73.9 kg (163 lb)   08/23/17 73.9 kg (163 lb)   08/21/17 75.8 kg (167 lb)               Primary Care Provider Office Phone # Fax #    Gregorio Rober Cruz [phone]       [address] [place] [address]        Equal Access to Services     [facility]: Hadii aad ku hadasho Soomaali, waaxda luqadaha, qaybta kaalmada adeegyada, mikey han . So [facility] [phone].    ATENCIÓN: Si habla español, tiene a mayorga disposición servicios gratuitos de asistencia lingüística. Llame al [phone].    We comply with applicable federal civil rights laws and [place] laws. We do not discriminate on the basis of race, color, national origin, age, disability sex, sexual orientation or gender identity.            Thank you!     Thank you for choosing [facility] UROLOGY AND [facility] FOR PROSTATE AND UROLOGIC CANCERS  for your care. Our goal is always to provide you with excellent care. Hearing back from our patients is one way we can continue to improve our services. Please take a few minutes to complete the written survey that you may receive in the mail after your visit with us. Thank you!             Your Updated Medication List - Protect others around you: Learn how to safely use, store and " throw away your medicines at www.disposemymeds.org.          This list is accurate as of: 8/23/17  4:16 PM.  Always use your most recent med list.                   Brand Name Dispense Instructions for use Diagnosis    atorvastatin 80 MG tablet    LIPITOR    90 tablet    Take 1 tablet (80 mg) by mouth daily    Paroxysmal atrial fibrillation (H), Hyperlipidemia       bumetanide 1 MG tablet    BUMEX     Take 2mg in am and 1mg in pm        diazepam 2 MG tablet    VALIUM    1 tablet    Take a dose 30 minute prior to MRI on Wednesday    Renal malignant neoplasm, right (H)       diclofenac 1 % Gel topical gel    VOLTAREN    100 g    Apply to right great toe.    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 Measure and fit.  Compression strength per protocol.    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, Abnormality of gait, At risk for fall due to comorbid condition       Potassium Chloride ER 20 MEQ Tbcr     90 tablet    Take 1 tablet (20 mEq) by mouth daily    Paroxysmal atrial fibrillation (H), Hyperlipidemia       sertraline  50 MG tablet    ZOLOFT    30 tablet    Take one half tablet daily at bedtime for 4 nights then one daily    Moderate single current episode of major depressive disorder (H)       sotalol 80 MG tablet    BETAPACE    45 tablet    TAKE 1/2 TABLET BY MOUTH DAILY    Atrial fibrillation (H), Amyloid heart disease (H)       warfarin 5 MG tablet    COUMADIN    110 tablet    Take 1-1.5 tablets (5-7.5 mg) by mouth daily Adjust per INR clinic    Paroxysmal atrial fibrillation (H)       * Notice:  This list has 2 medication(s) that are the same as other medications prescribed for you. Read the directions carefully, and ask your doctor or other care provider to review them with you.

## 2017-08-23 NOTE — NURSING NOTE
Chief Complaint   Patient presents with     RECHECK      RCC coming in to review imaging. Chart reviewed and MRI is scheduled       Umm Harris MA

## 2017-08-24 NOTE — TELEPHONE ENCOUNTER
I was able to discuss with Dr Turner who contacted Josr Landers and he was seen on 8/23/17.  Ángel Oates MD

## 2017-08-25 NOTE — TELEPHONE ENCOUNTER
I discussed with Mr and Mrs Landers via phone follow-up from recent MRI stable since  scar right kidney from previous procedure. I discussed I had contacted vascular medicine department regarding follow-up on not being informed of result from 2017. Dr SPENCER Fraga was covering for MD who  acutely and I will follow-up when he returns from out of office to review notification of results issue. I discussed with his nurse.  They plan to follow-up with me due to access therefore will change PCP so I may be notified of results.   I reviewed insurance is not covering Flexitouch and offered referral for lymphedema clinic but they declined at this time as his legs are better with elevation and wrapping. They also will be seen at [place] in September.  They expressed appreciation for cares this week in follow-up.  Ángel Oates MD    MR ABDOMEN WITHOUT AND WITH CONTRAST 2017 9:25 AM      HISTORY: Indeterminate area right kidney. Malignant neoplasm of right  kidney, except renal pelvis.      COMPARISON: CT abdomen and pelvis without contrast 2017.     TECHNIQUE: Multisequence, multiplanar imaging of the abdomen is  performed without contrast. A total of 8 mL Gadavist is then given  intravenously. Additional dynamic axial T1 fat-sat sequences are  performed.     FINDINGS:      Abdomen/pelvis: Multiple T2 hyperintense, T1 hypointense liver lesions  are noted. Additional T2 hyperintense, T1 hypointense renal lesions  are evident. Findings are most consistent with cysts. A lesion with  peripheral T1 signal hyperintensity and central hypointensity along  with T2 hypointensity throughout suggest a more complex lesion. T1  hyperintensity peripherally could also be related to underlying fat  signal or blood products. Some of this abnormal peripheral signal may  be due to the peripheral calcification seen on prior CT. Adrenal  glands are unremarkable. The pancreas, spleen and gallbladder are  within normal limits.  Respiratory motion artifact is present. A T1  hyperintense rim is noted about the complex upper pole right renal  lesion as described on prior CT. Some of this may be reflective as  artifact due to the peripheral calcification seen in this lesion on  prior CT. T2-weighted images demonstrate significantly low signal  probably related to the calcification. No enlarged lymph nodes.     Following contrast administration, possible enhancing septae and wall  is noted within this renal lesion. This is unchanged on coronal  reformatted images following contrast administration. Central  enhancement is not present. Findings could be due to central necrosis.  The remaining liver and renal lesions show no evidence of enhancement  and are consistent with multiple simple cysts. Abdominal organs  otherwise enhance normally.         IMPRESSION: Complex upper pole right renal lesion with decreased T2  signal possibly due to artifact from the peripheral calcification.  Areas of questionable enhancement versus background T1 hyperintensity  is possible. Short-term interval follow-up MRI recommended in 6 months  to assess for stability.     DEMAR RUBIN MD  MRI Stable since 2015  Results for orders placed or performed in visit on 08/23/17   Renal panel   Result Value Ref Range    Sodium 135 133 - 144 mmol/L    Potassium 3.9 3.4 - 5.3 mmol/L    Chloride 97 94 - 109 mmol/L    Carbon Dioxide 27 20 - 32 mmol/L    Anion Gap 10 3 - 14 mmol/L    Glucose 120 (H) 70 - 99 mg/dL    Urea Nitrogen 53 (H) 7 - 30 mg/dL    Creatinine 2.14 (H) 0.66 - 1.25 mg/dL    GFR Estimate 30 (L) >60 mL/min/1.7m2    GFR Estimate If Black 37 (L) >60 mL/min/1.7m2    Calcium 8.9 8.5 - 10.1 mg/dL    Phosphorus 3.6 2.5 - 4.5 mg/dL    Albumin 3.4 3.4 - 5.0 g/dL   CBC with platelets   Result Value Ref Range    WBC 3.9 (L) 4.0 - 11.0 10e9/L    RBC Count 4.12 (L) 4.4 - 5.9 10e12/L    Hemoglobin 13.1 (L) 13.3 - 17.7 g/dL    Hematocrit 38.8 (L) 40.0 - 53.0 %    MCV 94 78 - 100  fl    MCH 31.8 26.5 - 33.0 pg    MCHC 33.8 31.5 - 36.5 g/dL    RDW 14.7 10.0 - 15.0 %    Platelet Count 100 (L) 150 - 450 10e9/L   Protein  random urine with Creat Ratio   Result Value Ref Range    Protein Random Urine 0.28 g/L    Protein Total Urine g/gr Creatinine 0.76 (H) 0 - 0.2 g/g Cr   Albumin Random Urine Quantitative with Creat Ratio   Result Value Ref Range    Creatinine Urine 37 mg/dL    Albumin Urine mg/L 76 mg/L    Albumin Urine mg/g Cr 205.14 (H) 0 - 17 mg/g Cr   Routine UA with microscopic - No culture (for [facility])   Result Value Ref Range    Color Urine Yellow     Appearance Urine Clear     Glucose Urine Negative NEG^Negative mg/dL    Bilirubin Urine Negative NEG^Negative    Ketones Urine Negative NEG^Negative mg/dL    Specific Gravity Urine 1.006 1.003 - 1.035    Blood Urine Small (A) NEG^Negative    pH Urine 6.0 5.0 - 7.0 pH    Protein Albumin Urine Negative NEG^Negative mg/dL    Urobilinogen mg/dL 2.0 0.0 - 2.0 mg/dL    Nitrite Urine Negative NEG^Negative    Leukocyte Esterase Urine Negative NEG^Negative    Source Midstream Urine     WBC Urine <1 0 - 2 /HPF    RBC Urine 1 0 - 2 /HPF    Squamous Epithelial /HPF Urine <1 0 - 1 /HPF    Hyaline Casts 1 0 - 2 /LPF   Ángel Oates MD

## 2017-08-28 NOTE — PROGRESS NOTES
Spoke with patient and his wife by phone.  Informed them that his pneumatic compression pumps from [facility] were denied upon appeal.  Patient's swelling is better with elevation and they have not pursued lymphedema therapy at [facility] as was recommended, based on their preference, at his last office visit.  Patient will be going to [place] on 9/6-7 for evaluation of multiple medical issues.  At that time they will see vascular medicine there.  Release of Information mailed out to them to return to [facility] to release records to [place].  If patient has any further questions he is encouraged to call or MyChart.

## 2017-08-31 NOTE — PROGRESS NOTES
Nephrology consult  Reason for consult: CKD  Referring provider: Lashon    75yo aaM with a PMHx of CKD, CHF (amyloid), stroke (6mo ago), and R renal mass here for evaluation of this CKD. He had an acute decline in renal function in Feb 2013 around the time of a hospitalization for volume overload and possible HF exacerbation. At that time, his GFR declined to ~50 and has steadily declined to 35-40 over the last 4 years.He was taking NSAIDs until June 2015 and has chronic L foot pain. He also reports GARCIA and L leg swelling but denies nausea, vomiting, fevers, chills, joint pain, rash, CP, SOB. Comprehensive ROS negative.    PMHx  CKD - with LOIS in Feb 2013  CAD s/p stent  R RCC s/p ablation in 2005  HF - cardiac amyloid  Afib  Gout  Chronic L foot pain  No DM, hepatitis, Joshua    Soc Hx  Currently running a company  Former  [employer]  Lives with wife  2 kids  2 grandkids  Denies tob - quit 40yrs ago    Fam Hx - no CKD    All - gabapentin/lyrica  Medications reviewed, of note  bumex 2mg AM, 1mg PM  Sotalol 40mg daily  Coumadin  KCl 20mEq daily  Atorvastatin 80mg daily  febuxostat 40mg daily  No NSAIDs    Exam   92/61   93/61   81/61   P73  Gen - nad  Head/neck - NC/AT, neck supple, op clear, no bruit  Eyes - perrl  Ears - nl external exam  Nose - nl external exam  Heme - no LAD  CV - rrr, no rub, +S4  Resp - cta bilat  Abd - BS+, NT/ND, no bruit  Ext - 2-3+ edema  Skin - no rash  MS - no joint inflammation  Neuro - strength 5/5 throughout  Psych - pleasant, appropriate      Labs   2005 2013 2014 2015 2016 2017 7/8 2/14 2/20 7/20 6/7 6/23 8/23  Cr 1.2 1.3 1.6 1.9 2 2.2 2.1 (eGFR 37)    8/23/17 - UACR 205   UPCR 0.76   UA neg alb, small blood, 1RBC    4/25/17 - uric acid 4.3    7/20/15 MRI - mass R kidney, multiple simple cysts  8/23/17 MRI - complex upper pole R renal lesion, multiple simple cysts    A/Rec: 75yo aaM with CKD  CKD - stage 3b with proteinuria. Possible etiologies include cardiorenal  syndrome with LOIS in Feb 2013 during decompensated HF with steady decline since; amyloid given known cardiac amyloid; renovascular disease given history of CAD and stroke; and NSAID associated as he was taking NSAIDs up until June 2015 (Cr somewhat stable 2015 until now). At this point, will monitor and evaluate with SPEP, UPEP, kappa/lambda ratio. At this point, unlikely that a biopsy would be revealing given slow decline, minimal to no hematuria and low level or proteinuria.   - continue to avoid NSAIDs    Electrolytes - acceptable    Acid-base - bicarb nl    Renal mass - follow up with urology (recommended follow up MRI in ~March 2018)    RTC in 4 months for renal panel, CBC, UPEP, SPEP, kappa/lambda ratio, ultrasound

## 2017-09-11 PROBLEM — B35.3 TINEA PEDIS OF LEFT FOOT: Status: ACTIVE | Noted: 2017-01-01

## 2017-09-11 PROBLEM — B35.1 ONYCHOMYCOSIS: Status: ACTIVE | Noted: 2017-01-01

## 2017-09-11 PROBLEM — L60.8 NAIL DEFORMITY: Status: ACTIVE | Noted: 2017-01-01

## 2017-09-11 NOTE — PATIENT INSTRUCTIONS
Primary Care Center Phone Number [phone]  Primary Care Center Medication Refill Request Information:  * Please contact your pharmacy regarding ANY request for medication refills.  ** [facility] Prescription Fax = [phone]  * Please allow 3 business days for routine medication refills.  * Please allow 5 business days for controlled substance medication refills.     Primary Care Center Test Result notification information:  *You will be notified with in 7-10 days of your appointment day regarding the results of your test.  If you are on MyChart you will be notified as soon as the provider has reviewed the results and signed off on them.        Podiatry [phone] ([facility] 4th floor)

## 2017-09-11 NOTE — PROGRESS NOTES
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 He presents with his wife of 52 years recent anniversary Nu, who assists with his medical cares at home.  He wanted another opinion related to his heart failure as he was previously diagnosed there with amyloid and went to [place] saw Dr. Bryant ( records not available today). They suspect amyloid worsening. During the visit he noted increasing GI upset but forgot to mention he was recently started on Sertraline for depression. Over the weekend the GI bloating and rumbling was worse and after consultation with their pharmacist they determined needed to hold the low dose sertraline. His GI symptoms are now better. He did note some improvement in mood on the sertraline and would like to try another antidepressant. February, 02/12/17, he was hospitalized at [facility] diagnosed with  a thrombus in the right middle cerebral artery, acute stroke.  Interventions assisted with removal of the thrombus he has left-sided residual. This has caused major change in his life contributing to depressed mood. His goal is to try to have improvement in his quality of life.  He also mentions need for podiatry cares edwards to long nails especially on left foot.  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 His diuretics were adjusted and he is feeling better. Bumex 1 mg twice daily currently and following low sodium diet. Edema in legs is better. Renal function was improved.  Last visit we discussed are noted on CT scan and this was evaluated with MRI. This appears stable but will need another follow-up in 6 months for stability.  He has chronic atrial fibrillation, rate controlled with sotalol 40 mg daily currently pressure remains stable.  REVIEW OF SYSTEMS:  He follows with Dr. Arnold at [facility] who monitors his INR. No dyspnea, edema of legs better  with elevation. Depressed mood after stroke.Left foot pain from long nails. He has a history of chronic kidney disease, coronary artery disease involving the native coronary artery disease and had an acute CVA in 02/12/2017 due to occlusion of the distal right M1 segment of middle cerebral artery, status post recanalization with Penumbra ACE 68 device.     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 He lives with his wife.  He has 2 children, a son and daughter, and 2 grandchildren, a grandson and granddaughter.     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     Pain in limb     Status post lumbar surgery     Adjustment disorder     Idiopathic peripheral neuropathy     SOB (shortness of breath)     CHF (congestive heart failure) (H)     Depression     Malignant neoplasm of kidney excluding renal pelvis (H)     Cerebrovascular accident (CVA) (H)     Other amyloidosis (H)     Anemia     Atrial flutter (H)     Cardiomyopathy (H)     Chronic kidney disease, stage III (moderate)     Coronary arteriosclerosis in native artery     Weakness     Hypotension     Pain of foot     Long term current use of anticoagulant therapy     Peripheral neuropathic pain     Myocardial infarction, nontransmural (H)     Nonsustained ventricular tachycardia (H)     CKD (chronic kidney disease) stage 3, GFR 30-59 ml/min     Tinea pedis of left foot     Onychomycosis     Nail deformity     Past Medical History:   Diagnosis Date     Atrial fibrillation (H) 6/25/2012     Atrial fibrillation: ablation Dr Jonathan Arvizu/[place] 6/25/2012     Cardiac amyloid: ATTR per cardiac biopsy [facility] 2009 6/27/2012     Coronary artery disease      Other and unspecified hyperlipidemia 6/27/2012     Tubular adenoma of colon 2010     Venous insufficiency      Social History      Social History     Marital status:      Spouse name: N/A     Number of children: N/A     Years of education: N/A     Occupational History     Not on file.     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      to his wife Nu ( Piano), two children Deandra 1969 (Grandchildren Trinh 2003, Nish 2007) and Son [id number] YISEL Landers III     /74  Pulse 86  Resp 16  Weight 158  GENERAL:  Examination reveals a delightful gentleman who is very intelligent.  His speech is clear.  He has decreased movement on the left side of his body, but is able to move.  He is sitting in a wheelchair.   HEENT:  Mucous membranes are moist.  He appears adequately hydrated today.  NECK:  Supple.  Thyroid is normal.   HEART:  S1, S2, with irregular rhythm.  Rate is controlled.    ABDOMEN:  Examined  in a chair. No significant distension. Non tender.  EXTREMITIES: Less leg edema,  left leg has more swelling than the right, both have venous incompetence. Left foot swelling and hypertrophy of nails, mild intertriginous moisture suggesting fungal infection. No open sores.  PSYCHOLOGICAL:  Interactive, very intelligent, wanting to understand his health condition.  Depressed mood and indicates frustration with his multiple health issues.  PHQ-9 SCORE 5/4/2016 4/24/2017 8/21/2017   Total Score - - -   Total Score 0 1 6   I reviewed records from recent [facility] visit.  COMPARISON:  06/05/2017 XRAY left foot    FINDINGS:  Patchy demineralization of  bones is again noted. There is marked pes planus. Mild degenerative changes in the midfoot. Ankle mortise appears intact. There is moderate soft tissue swelling of the midfoot and forefoot.  Positive for first tarsal /metatarsal osteoarthritis change and severe pes   planus without fracture.   ASSESSMENT AND PLAN:     Professor Josr Landers, a very intelligent, pleasant gentleman with impressive career presents with his wife Nu of 52 years to follow up for primary care after  Visit to [place] confirming concerns of amyloid and hospitalization at [facility] for dehydration with adjust of Bumex to 1 mg twice daily with improvement in his Blood pressure. He has less edema of legs.  He is following with cardiology at [facility] currently as this is where he was hospitalized.     Depression: Last visit low dose sertraline was started but he had GI side effects and stopped on Friday last dose. His GI side effects have improved but he noted improved mood on the medication therefore we spent most of the visit discussing options for management. I reviewed  May start low dose Lexapro 10 mg one half tab ( 5 mg dose) next Monday after making sure his cardiologist is OK with dose as there is a minor interaction with Sotalol in increased dose of sotalol. I also reviewed rare possible low sodium.      Josr was seen today for recheck medication.    Diagnoses and all orders for this visit:    Moderate single current episode of major depressive disorder (H)  -     escitalopram (LEXAPRO) 10 MG tablet; Take 0.5 tablets (5 mg) by mouth daily if side effects may stop.    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  Ángel Oates MD

## 2017-09-11 NOTE — MR AVS SNAPSHOT
After Visit Summary   9/11/2017    Josr Landers    MRN: [id number]           Patient Information     Date Of Birth          1940        Visit Information        Provider Department      9/11/2017 2:20 PM Ángel Oates MD [facility]        Today's Diagnoses     Left foot pain    -  1    Moderate single current episode of major depressive disorder (H)          Care Instructions    Primary Care Center Phone Number [phone]  Primary Care Center Medication Refill Request Information:  * Please contact your pharmacy regarding ANY request for medication refills.  ** [facility] Pres[facility] Fax = [phone]  * Please allow 3 business days for routine medication refills.  * Please allow 5 business days for controlled substance medication refills.     Primary Care Center Test Result notification information:  *You will be notified with in 7-10 days of your appointment day regarding the results of your test.  If you are on MyChart you will be notified as soon as the provider has reviewed the results and signed off on them.        Podiatry [phone] ([facility] 4th floor)            Follow-ups after your visit        Additional Services     PODIATRY/FOOT & ANKLE SURGERY REFERRAL       Your provider has referred you to: SIMEONP: Jesús [facility] Clinic: [address] [place] [address] Patient Scheduling Line: [phone] option 1 (scheduling and directions)    Please be aware that coverage of these services is subject to the terms and limitations of your health insurance plan.  Call member services at your health plan with any benefit or coverage questions.      Please bring the following to your appointment:  >>   Any x-rays, CTs or MRIs which have been performed.  Contact the facility where they were done to arrange for  prior to your scheduled appointment.    >>   List of current medications   >>   This referral request   >>   Any documents/labs given to you for this referral                   Follow-up notes from your care team     Discussed this visit Return in about 4 weeks (around 10/9/2017).      Your next 10 appointments already scheduled     Sep 12, 2017  7:30 AM CDT   New Visit with Gideon Grimm DPM   [facility]    [address] [place] [address]   [phone]            Sep 15, 2017 11:45 AM CDT   (Arrive by 11:30 AM)   Return Visit with Erich Fraga MD   [facility] ([facility])    [address] [place] [address]  3rd [facility] [address]   [phone]            Oct 09, 2017 12:50 PM CDT   (Arrive by 12:35 PM)   Return Visit with Ángel Oates MD   [facility] ([facility])    [address] [place] [address]  4th [facility] [address]   [phone]            Dec 20, 2017  1:30 PM CST   Lab with  LAB   [facility] Lab ([facility])    [address] [place] [address]   [phone]            Dec 20, 2017  2:30 PM CST   (Arrive by 2:00 PM)   Return Visit with Jonathan Turner MD   [facility] Nephrology ([facility])    [address] [place] [address]   [phone]            Feb 21, 2018  9:15 AM CST   (Arrive by 9:00 AM)   MR ABDOMEN & PELVIS W/O & W CONTRAST with ENGE0D5   [facility] ([facility])    [address] [place] [id number] [phone]           Take your medicines as usual, unless your doctor tells you not to. Bring a list of your current medicines to your exam (including vitamins, minerals and over-the-counter drugs). Also bring the results of similar scans you may have had.    The day before your exam, drink extra fluids at least six 8-ounce glasses (unless your doctor tells you to restrict your fluids).   Have a blood test (creatinine  test) within 30 days of your exam. Go to your clinic or Diagnostic Imaging Department for this test.   Do not eat or drink for 6 hours prior to exam.  The MRI machine uses a strong magnet. Please wear clothes without metal (snaps, zippers). A sweatsuit works well, or we may give you a hospital gown.  Please remove any body piercings and hair extensions before you arrive. You will also remove watches, jewelry, hairpins, wallets, dentures, partial dental plates and hearing aids. You may wear contact lenses, and you may be able to wear your rings. We have a safe place to keep your personal items, but it is safer to leave them at home.   **IMPORTANT** THE INSTRUCTIONS BELOW ARE ONLY FOR THOSE PATIENTS WHO HAVE BEEN TOLD THEY WILL RECEIVE SEDATION OR GENERAL ANESTHESIA DURING THEIR MRI PROCEDURE:  IF YOU WILL RECEIVE SEDATION (take medicine to help you relax during your exam):   You must get the medicine from your doctor before you arrive. Bring the medicine to the exam. Do not take it at home.   Arrive one hour early. Bring someone who can take you home after the test. Your medicine will make you sleepy. After the exam, you may not drive, take a bus or take a taxi by yourself.   No eating 8 hours before your exam. You may have clear liquids up until 4 hours before your exam. (Clear liquids include water, clear tea, black coffee and fruit juice without pulp.)  IF YOU WILL RECEIVE ANESTHESIA (be asleep for your exam):   Arrive 1 1/2 hours early. Bring someone who can take you home after the test. You may not drive, take a bus or take a taxi by yourself.   No eating 8 hours before your exam. You may have clear liquids up until 4 hours before your exam. (Clear liquids include water, clear tea, black coffee and fruit juice without pulp.)  If you have any questions, please contact your Imaging Department exam site.            Feb 21, 2018 11:40 AM CST   (Arrive by 11:25 AM)   Return Visit with MD ZORAN Fitzgerald  [facility] ([facility])    [address]  4th [facility] [address] [phone]              Who to contact     Please call your clinic at [phone] to:    Ask questions about your health    Make or cancel appointments    Discuss your medicines    Learn about your test results    Speak to your doctor   If you have compliments or concerns about an experience at your clinic, or if you wish to file a complaint, please contact [facility] Patient Relations at [phone] or email us at [email or web address]         Additional Information About Your Visit        Editas Medicine[employer]Streak Information     Odin[employer] gives you secure access to your electronic health record. If you see a primary care provider, you can also send messages to your care team and make appointments. If you have questions, please call your primary care clinic.  If you do not have a primary care provider, please call [phone] and they will assist you.      [employer]din[employer] is an electronic gateway that provides easy, online access to your medical records. With [employer], you can request a clinic appointment, read your test results, renew a prescription or communicate with your care team.     To access your existing account, please contact your [facility] or call [phone] for assistance.        Care EveryWhere ID     This is your Care EveryWhere ID. This could be used by other organizations to access your [facility] medical records  [id number]        Your Vitals Were     Pulse Respirations                86 16           Blood Pressure from Last 3 Encounters:   09/11/17 103/74   08/23/17 (!) 81/61   08/23/17 95/59    Weight from Last 3 Encounters:   08/23/17 73.9 kg (163 lb)   08/23/17 73.9 kg (163 lb)   08/21/17 75.8 kg (167 lb)              We Performed the Following     PODIATRY/FOOT & ANKLE SURGERY REFERRAL           Today's Medication Changes          These changes are accurate as of: 9/11/17  4:26 PM.  If you have any questions, ask your nurse or doctor.               Start taking these medicines.        Dose/Directions    escitalopram 10 MG tablet   Commonly known as:  LEXAPRO   Used for:  Moderate single current episode of major depressive disorder (H)   Started by:  Ángel Oates MD        Dose:  5 mg   Start taking on:  9/18/2017   Take 0.5 tablets (5 mg) by mouth daily   Quantity:  30 tablet   Refills:  1            Where to get your medicines      These medications were sent to [facility] [address] [place] [address] - [facility] [facility] [address] [facility] [address] [facility]  [address] [facility] [address], [facility] [address]     Phone:  [phone]     escitalopram 10 MG tablet                Primary Care Provider Office Phone # Fax #    Ángel Oates [phone]       [address] [facility] [address]        Equal Access to Services     MANUELA GOMEZ AH: Hadii aad ku hadasho Soomaali, waaxda luqadaha, qaybta kaalmada adeegyada, waxay idiin hayaan hardeep han . So [facility] [phone].    ATENCIÓN: Si habla español, tiene a mayorga disposición servicios gratuitos de asistencia lingüística. [facility] [phone].    We comply with applicable federal civil rights laws and [place] laws. We do not discriminate on the basis of race, color, national origin, age, disability sex, sexual orientation or gender identity.            Thank you!     Thank you for choosing [facility]  for your care. Our goal is always to provide you with excellent care. Hearing back from our patients is one way we can continue to improve our services. Please take a few minutes to complete the written survey that you may receive in the mail after your visit with us. Thank you!             Your Updated Medication List - Protect others around you: Learn how to safely use, store and throw away your  medicines at www.disposemymeds.org.          This list is accurate as of: 9/11/17  4:26 PM.  Always use your most recent med list.                   Brand Name Dispense Instructions for use Diagnosis    atorvastatin 80 MG tablet    LIPITOR    90 tablet    Take 1 tablet (80 mg) by mouth daily    Paroxysmal atrial fibrillation (H), Hyperlipidemia       bumetanide 1 MG tablet    BUMEX     Take mg in am and 1mg in pm        diclofenac 1 % Gel topical gel    VOLTAREN    100 g    Apply to right great toe.    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 Measure and fit.  Compression strength per protocol.    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, Abnormality of gait, At risk for fall due to comorbid condition       Potassium Chloride ER 20 MEQ Tbcr     90 tablet    Take 1 tablet (20 mEq) by mouth daily    Paroxysmal atrial  fibrillation (H), Hyperlipidemia       sotalol 80 MG tablet    BETAPACE    45 tablet    TAKE 1/2 TABLET BY MOUTH DAILY    Atrial fibrillation (H), Amyloid heart disease (H)       warfarin 5 MG tablet    COUMADIN    110 tablet    Take 1-1.5 tablets (5-7.5 mg) by mouth daily Adjust per INR clinic    Paroxysmal atrial fibrillation (H)       * Notice:  This list has 2 medication(s) that are the same as other medications prescribed for you. Read the directions carefully, and ask your doctor or other care provider to review them with you.

## 2017-09-11 NOTE — NURSING NOTE
Chief Complaint   Patient presents with     Recheck Medication     pt is here for a medication follwo up       Lashawn Bautista CMA at 2:50 PM on 9/11/2017

## 2017-09-12 NOTE — PROGRESS NOTES
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     Pain in limb     Status post lumbar surgery     Adjustment disorder     Idiopathic peripheral neuropathy     SOB (shortness of breath)     CHF (congestive heart failure) (H)     Depression     Malignant neoplasm of kidney excluding renal pelvis (H)     Cerebrovascular accident (CVA) (H)     Other amyloidosis (H)     Anemia     Atrial flutter (H)     Cardiomyopathy (H)     Chronic kidney disease, stage III (moderate)     Coronary arteriosclerosis in native artery     Weakness     Hypotension     Pain of foot     Long term current use of anticoagulant therapy     Peripheral neuropathic pain     Myocardial infarction, nontransmural (H)     Nonsustained ventricular tachycardia (H)     CKD (chronic kidney disease) stage 3, GFR 30-59 ml/min     Tinea pedis of left foot     Onychomycosis     Nail deformity     Past Surgical History:   Procedure Laterality Date     BACK SURGERY       H PENUMBRA SEPARATOR 3D       KNEE SURGERY       SD PATIENT HAS A CORONARY ARTERY STENT       SHOULDER SURGERY       Social History     Social History     Marital status:      Spouse name: N/A     Number of children: N/A     Years of education: N/A     Occupational History     Not on file.     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      to his wife Nu ( Vikash), two children Deandra 1969 (Grandchildren Trinh 2003, Nish 2007) and Son [id number] E Anshul III     No family history on file.  Lab Results   Component Value Date    WBC 3.9 08/23/2017     Lab Results   Component Value Date    RBC 4.12 08/23/2017     Lab Results   Component Value Date    HGB 13.1 08/23/2017     Lab Results   Component Value Date    HCT 38.8 08/23/2017     No components found for: MCT  Lab Results   Component Value Date    MCV 94 08/23/2017     Lab Results   Component Value Date    MCH 31.8 08/23/2017     Lab Results   Component Value Date    MCHC 33.8 08/23/2017     Lab Results   Component Value Date    RDW 14.7 08/23/2017     Lab Results   Component Value Date     08/23/2017     Last Basic Metabolic Panel:  Lab Results   Component Value Date     08/23/2017      Lab Results   Component Value Date    POTASSIUM 3.9 08/23/2017     Lab Results   Component Value Date    CHLORIDE 97 08/23/2017     Lab Results   Component Value Date    RIGOBERTO 8.9 08/23/2017     Lab Results   Component Value Date    CO2 27 08/23/2017     Lab Results   Component Value Date    BUN 53 08/23/2017     Lab Results   Component Value Date    CR 2.14 08/23/2017     Lab Results   Component Value Date     08/23/2017     Uric Acid   Date Value Ref Range Status   04/25/2017 4.3 3.5 - 7.2 mg/dL Final   ]  SUBJECTIVE FINDINGS:  A 76-year-old male presents from Ángel Oates MD, for left foot pain.  He relates about a year or so ago he had a fracture.  He just came down on his foot and cracked.  He was put in a boot across the middle of the foot and it  still hurts.  He is not sure of any specific relieving or aggravating factors.  He relates he did have a stroke.  He has some weakness on the left side.  Also, his toenails are painful and are digging in.  He needs help trimming those.  He does have a history of peripheral neuropathy and kidney disease.  Previous notes and imaging reviewed as noted in electronic medical record.      OBJECTIVE FINDINGS:  DP and PT are 1/4 bilaterally.  He has peripheral edema bilaterally.  He relates he does wear compression socks.  There is no erythema, no drainage, no odor, no calor bilaterally.  He has a flat foot type bilaterally.  He has pain on palpation of the peroneal tendon on the left in the dorsal lateral foot and anterolateral ankle area.  He does have pain at end range of motion of eversion and dorsiflexion of the foot in these areas secondary to impingement.  There are no gross tendon voids.  He has decreased muscle strength on the left versus the right.  He has dystrophic, thickened, incurvated, brittle nails with subungual debris, discoloration and dystrophy 1 through 5 bilaterally, differing degrees.        ASSESSMENT AND PLAN:  Left foot pain, peroneal tendinopathy, left, onychomycosis. All the nails bilaterally were debrided upon consent.  Removable taping and strapping left foot done upon consent and use discussed with him.  Prescription for lidocaine given and use discussed with him.  X-rays ordered and use discussed with him.  He did have a previous MRI, CT scan with some joint space narrowing noted and osteoarthritic changes.  Diagnosis and treatment options discussed with him.  He will return to clinic in 1 week.     X-rays reviewed.  He has decreased calcaneal inclination, increased talar inclination, plantar calcaneal spurring, mid-foot talonavicular cuneiform and cuneiform metatarsal joint space narrowing and subchondral sclerosis and spurring, dorsally contracted digits, decreased first metatarsal  inclination noted.

## 2017-09-12 NOTE — PATIENT INSTRUCTIONS
Thanks for coming today.  Ortho/Sports Medicine Clinic  [address] [place] [address]    To schedule future appointments in Ortho Clinic, you may call [phone].    To schedule ordered imaging by your Provider: Call [facility] at [phone]    Cognea available online at:   [email or web address]    Please call if any further questions or concerns [phone] and ask for the Orthopedic Department. Clinic hours 8 am to 5 pm.    Return to clinic if symptoms worsen.

## 2017-09-12 NOTE — MR AVS SNAPSHOT
After Visit Summary   9/12/2017    Josr Landers    MRN: [id number]           Patient Information     Date Of Birth          1940        Visit Information        Provider Department      9/12/2017 7:30 AM Gideon Grimm DPM [facility]        Today's Diagnoses     Left foot pain    -  1    Peroneal tendinitis, left        Dermatophytosis of nail        Chronic kidney disease, stage III (moderate)        Long term current use of anticoagulant therapy        Idiopathic peripheral neuropathy          Care Instructions    Thanks for coming today.  Ortho/Sports Medicine Clinic  [address] [place] [address]    To schedule future appointments in Ortho Clinic, you may call [phone].    To schedule ordered imaging by your Provider: Call [facility] at [phone]    DVS [employer] available online at:   [employer]    Please call if any further questions or concerns [phone] and ask for the Orthopedic Department. Clinic hours 8 am to 5 pm.    Return to clinic if symptoms worsen.            Follow-ups after your visit        Your next 10 appointments already scheduled     Sep 19, 2017 12:30 PM CDT   Return Visit with Gideon Grimm DPM   [facility] ([facility])    [address] [place] [id number] [phone]            Oct 09, 2017 12:50 PM CDT   (Arrive by 12:35 PM)   Return Visit with Ángel Oates MD   [facility] ([facility])    [address] [place] [id number] [phone]            Dec 20, 2017  1:30 PM CST   Lab with  LAB   [facility] Lab ([facility])    [address] [place] [id number] [phone]            Dec 20, 2017  2:30 PM CST   (Arrive by 2:00 PM)   Return Visit with Jonathan Turner MD   [facility] Nephrology ([facility])     [address] [facility]  [address] [facility] [address]   [phone]            Feb 21, 2018  9:15 AM CST   (Arrive by 9:00 AM)   MR ABDOMEN & PELVIS W/O & W CONTRAST with [id number]   [facility] ([facility])    [address] [address]   [phone]           Take your medicines as usual, unless your doctor tells you not to. Bring a list of your current medicines to your exam (including vitamins, minerals and over-the-counter drugs). Also bring the results of similar scans you may have had.    The day before your exam, drink extra fluids at least six 8-ounce glasses (unless your doctor tells you to restrict your fluids).   Have a blood test (creatinine test) within 30 days of your exam. Go to your clinic or Diagnostic Imaging Department for this test.   Do not eat or drink for 6 hours prior to exam.  The MRI machine uses a strong magnet. Please wear clothes without metal (snaps, zippers). A sweatsuit works well, or we may give you a hospital gown.  Please remove any body piercings and hair extensions before you arrive. You will also remove watches, jewelry, hairpins, wallets, dentures, partial dental plates and hearing aids. You may wear contact lenses, and you may be able to wear your rings. We have a safe place to keep your personal items, but it is safer to leave them at home.   **IMPORTANT** THE INSTRUCTIONS BELOW ARE ONLY FOR THOSE PATIENTS WHO HAVE BEEN TOLD THEY WILL RECEIVE SEDATION OR GENERAL ANESTHESIA DURING THEIR MRI PROCEDURE:  IF YOU WILL RECEIVE SEDATION (take medicine to help you relax during your exam):   You must get the medicine from your doctor before you arrive. Bring the medicine to the exam. Do not take it at home.   Arrive one hour early. Bring someone who can take you home after the test. Your medicine will make you sleepy. After the exam, you may not drive, take a bus or take a taxi by yourself.   No eating 8  hours before your exam. You may have clear liquids up until 4 hours before your exam. (Clear liquids include water, clear tea, black coffee and fruit juice without pulp.)  IF YOU WILL RECEIVE ANESTHESIA (be asleep for your exam):   Arrive 1 1/2 hours early. Bring someone who can take you home after the test. You may not drive, take a bus or take a taxi by yourself.   No eating 8 hours before your exam. You may have clear liquids up until 4 hours before your exam. (Clear liquids include water, clear tea, black coffee and fruit juice without pulp.)  If you have any questions, please contact your Imaging Department exam site.            Feb 21, 2018 11:40 AM CST   (Arrive by 11:25 AM)   Return Visit with Aleisha Sapp MD   [facility] ([facility])    [address] [address] [phone]              Who to contact     If you have questions or need follow up information about today's clinic visit or your schedule please contact [facility] directly at [phone].  Normal or non-critical lab and imaging results will be communicated to you by MyChart, letter or phone within 4 business days after the clinic has received the results. If you do not hear from us within 7 days, please contact the clinic through [employer] or phone. If you have a critical or abnormal lab result, we will notify you by phone as soon as possible.  Submit refill requests through [employer] or call your pharmacy and they will forward the refill request to us. Please allow 3 business days for your refill to be completed.          Additional Information About Your Visit        [employer] Information     [employer] gives you secure access to your electronic health record. If you see a primary care provider, you can also send messages to your care team and make appointments. If you have questions, please call your primary care  clinic.  If you do not have a primary care provider, please call [phone] and they will assist you.      ReadWave is an electronic gateway that provides easy, online access to your medical records. With Read[employer], you can request a clinic appointment, read your test results, renew a prescription or communicate with your care team.     To access your existing account, please contact your [facility] or call [phone] for assistance.        Care EveryWhere ID     This is your Care EveryWhere ID. This could be used by other organizations to access your [place] medical records  [id number]        Your Vitals Were     Pulse Pulse Oximetry                71 99%           Blood Pressure from Last 3 Encounters:   09/12/17 109/74   09/11/17 103/74   08/23/17 (!) 81/61    Weight from Last 3 Encounters:   08/23/17 73.9 kg (163 lb)   08/23/17 73.9 kg (163 lb)   08/21/17 75.8 kg (167 lb)              We Performed the Following     DEBRIDEMENT OF NAILS, 6 OR MORE     STRAPPING ANKLE/FOOT          Today's Medication Changes          These changes are accurate as of: 9/12/17 11:59 PM.  If you have any questions, ask your nurse or doctor.               Start taking these medicines.        Dose/Directions    lidocaine HCl 3 % cream   Used for:  Left foot pain, Peroneal tendinitis, left   Started by:  Gideon Grimm DPM        To painful areas on left foot twice daily as needed.   Quantity:  85 g   Refills:  3            Where to get your medicines      These medications were sent to [facility] [address] [place] [address] AT [facility]  [address]  [address] [place] [id number]     Phone:  [phone]     lidocaine HCl 3 % cream                Primary Care Provider Office Phone # Fax #    Ángel Oates [phone]       [address] [facility] [address]        Equal Access to Services     NATALIE GOMEZ AH: En hurtado  Soheenaali, waaxda luqadaha, qaybta kaalmada jaclyn, mikey tangjulien geoffrey. So [facility] [phone].    ATENCIÓN: Si lvainia rose, tiene a mayorga disposición servicios gratuitos de asistencia lingüística. Rya al [phone].    We comply with applicable federal civil rights laws and [place] laws. We do not discriminate on the basis of race, color, national origin, age, disability sex, sexual orientation or gender identity.            Thank you!     Thank you for choosing [facility]  for your care. Our goal is always to provide you with excellent care. Hearing back from our patients is one way we can continue to improve our services. Please take a few minutes to complete the written survey that you may receive in the mail after your visit with us. Thank you!             Your Updated Medication List - Protect others around you: Learn how to safely use, store and throw away your medicines at www.disposemymeds.org.          This list is accurate as of: 9/12/17 11:59 PM.  Always use your most recent med list.                   Brand Name Dispense Instructions for use Diagnosis    atorvastatin 80 MG tablet    LIPITOR    90 tablet    Take 1 tablet (80 mg) by mouth daily    Paroxysmal atrial fibrillation (H), Hyperlipidemia       bumetanide 1 MG tablet    BUMEX     Take mg in am and 1mg in pm        clotrimazole 1 % cream    LOTRIMIN    60 g    Apply topically 2 times daily In between toes    Tinea pedis of left foot       diclofenac 1 % Gel topical gel    VOLTAREN    100 g    Apply to right great toe.    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    Left foot pain, Peroneal tendinitis, left       * order for DME     5 each    Equipment being ordered: DME compression stockings knee high 30 mm hg    Venous (peripheral) insufficiency       * order for DME     1 Units    Sequential pneumatic compression pumps 2-3 times per day as needed for lymphedema. Measure and fit.  Compression strength per protocol.    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       * Notice:  This list has 2 medication(s) that are the same as other medications prescribed for you. Read the directions carefully, and ask your doctor or other care provider to review them with you.

## 2017-09-12 NOTE — NURSING NOTE
"Josr Landers's goals for this visit include: Left foot pain consult  He requests these members of his care team be copied on today's visit information: yes    PCP: Ángel Oates    Referring Provider:  Ángel Oates MD  [address] [place] [address]    Chief Complaint   Patient presents with     Consult     Musculoskeletal Problem     Left foot pain x6 months       Initial /74  Pulse 71  SpO2 99% Estimated body mass index is 24.07 kg/(m^2) as calculated from the following:    Height as of 8/23/17: 1.753 m (5' 9\").    Weight as of 8/23/17: 73.9 kg (163 lb).  Medication Reconciliation: complete    "

## 2017-09-27 NOTE — PROGRESS NOTES
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     Pain in limb     Status post lumbar surgery     Adjustment disorder     Idiopathic peripheral neuropathy     SOB (shortness of breath)     CHF (congestive heart failure) (H)     Depression     Malignant neoplasm of kidney excluding renal pelvis (H)     Cerebrovascular accident (CVA) (H)     Other amyloidosis (H)     Anemia     Atrial flutter (H)     Cardiomyopathy (H)     Chronic kidney disease, stage III (moderate)     Coronary arteriosclerosis in native artery     Weakness     Hypotension     Pain of foot     Long term current use of anticoagulant therapy     Peripheral neuropathic pain     Myocardial infarction, nontransmural (H)     Nonsustained ventricular tachycardia (H)     CKD (chronic kidney disease) stage 3, GFR 30-59 ml/min     Tinea pedis of left foot     Onychomycosis     Nail deformity     Past Surgical History:   Procedure Laterality Date     BACK SURGERY       H PENUMBRA SEPARATOR 3D       KNEE SURGERY       VA PATIENT HAS A CORONARY ARTERY STENT       SHOULDER SURGERY       Social History     Social History     Marital status:      Spouse name: N/A     Number of children: N/A     Years of education: N/A     Occupational History     Not on file.     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      to his wife Nu ( Vikash), two children Deandra Solo (Grandchildren Trinh 2003, Nish 2007) and Son Joe Landers III     No family history on file.  SUBJECTIVE:  A 76-year-old male returns to clinic for left foot pain, peroneal tendinopathy.  He relates it is doing better with the taping and strapping.  He also relates he has peripheral edema.  He has had difficulty with compression socks.  He has used Tubigrip but they were light.  He has used the Ace wraps before.  It sounds like he has had some therapy.  He relates they looked at Flexitouch pumps, but they were not covered by insurance.  Otherwise, he relates he is doing well.         OBJECTIVE:  Skin is dry and intact on the left.  He has peripheral edema bilaterally.        ASSESSMENT/PLAN:  Left foot pain, peroneal tendinopathy on the left.  Diagnosis and treatment options discussed with the patient.  Removable taping and strapping done on the left foot and use discussed with him.  The patient is casted for custom foot orthotics.  He was given the phone number and address for the orthotics and prosthetics lab to pick those up.  Previous notes and imaging reviewed and is in the EMR.  I gave him a referral to physical therapy for the peripheral edema and the peroneal tendinopathy.  Tubigrip dispensed and discussed with him.  He will return to clinic and see me again in about 2 months for nail care.  He does have osteoarthritic changes and flat foot present as well.

## 2017-09-27 NOTE — MR AVS SNAPSHOT
After Visit Summary   9/27/2017    Josr Landers    MRN: [id number]           Patient Information     Date Of Birth          1940        Visit Information        Provider Department      9/27/2017 1:30 PM Gideon Grimm DPM [facility]        Today's Diagnoses     Peripheral edema    -  1    Peroneal tendinosis, left          Care Instructions    Thanks for coming today.  Ortho/Sports Medicine Clinic  [address] [place] [address]    To schedule future appointments in Ortho Clinic, you may call [phone].    To schedule ordered imaging by your Provider: Call [facility] at [phone]    [employer] available online at:   [email or web address]    Please call if any further questions or concerns [phone] and ask for the Orthopedic Department. Clinic hours 8 am to 5 pm.    Return to clinic if symptoms worsen.            Follow-ups after your visit        Additional Services     ORTHOTICS REFERRAL       *This referral order prints off in the [facility]  (Orthotics & Prosthetics) Central Scheduling Office**    The [facility] Scheduling Staff will contact the patient to schedule appointments.     Central Scheduling Contact Information: [phone] ([place])    Custom foot orthotics.      Please be aware that coverage of these services is subject to the terms and limitations of your health insurance plan.  Call member services at your health plan with any benefit or coverage questions.      Please bring the following to your appointment:    >>   Any x-rays, CTs or MRIs which have been performed.  Contact the facility where they were done to arrange for  prior to your scheduled appointment.    >>   List of current medications   >>   This referral request   >>   Any documents/labs given to you for this referral            PHYSICAL THERAPY REFERRAL (Internal)       Physical Therapy Referral please evaluate and  manage for peripheral edema manual therapies and for peroneal tendinopathy.                  Follow-up notes from your care team     Return in about 2 months (around 11/27/2017).      Your next 10 appointments already scheduled     Oct 09, 2017 12:50 PM CDT   (Arrive by 12:35 PM)   Return Visit with Ángel Oates MD   [facility] ([facility])    [address] [place] [id number]   [phone]            Nov 28, 2017  1:00 PM CST   [facility] Gideon [facility]imm DPM   [facility]    [address] [place] [id number] [phone]            Dec 20, 2017  1:30 PM CST   Lab with  LAB   [facility] ([facility])    [address] [place] [address]   [phone]            Dec 20, 2017  2:30 PM CST   (Arrive by 2:00 PM)   Return Visit with Jonathan Turner MD   [facility] ([facility])    [address] [place] [address]   [phone]            Feb 21, 2018  9:15 AM CST   (Arrive by 9:00 AM)   MR ABDOMEN & PELVIS W/O & W CONTRAST with [id number]   [facility] ([facility])    [address] [place] [address]   [phone]           Take your medicines as usual, unless your doctor tells you not to. Bring a list of your current medicines to your exam (including vitamins, minerals and over-the-counter drugs). Also bring the results of similar scans you may have had.    The day before your exam, drink extra fluids at least six 8-ounce glasses (unless your doctor tells you to restrict your fluids).   Have a blood test (creatinine test) within 30 days of your exam. Go to your clinic or Diagnostic Imaging Department for this test.   Do not eat or drink for 6 hours prior to exam.  The MRI machine uses a strong  magnet. Please wear clothes without metal (snaps, zippers). A sweatsuit works well, or we may give you a hospital gown.  Please remove any body piercings and hair extensions before you arrive. You will also remove watches, jewelry, hairpins, wallets, dentures, partial dental plates and hearing aids. You may wear contact lenses, and you may be able to wear your rings. We have a safe place to keep your personal items, but it is safer to leave them at home.   **IMPORTANT** THE INSTRUCTIONS BELOW ARE ONLY FOR THOSE PATIENTS WHO HAVE BEEN TOLD THEY WILL RECEIVE SEDATION OR GENERAL ANESTHESIA DURING THEIR MRI PROCEDURE:  IF YOU WILL RECEIVE SEDATION (take medicine to help you relax during your exam):   You must get the medicine from your doctor before you arrive. Bring the medicine to the exam. Do not take it at home.   Arrive one hour early. Bring someone who can take you home after the test. Your medicine will make you sleepy. After the exam, you may not drive, take a bus or take a taxi by yourself.   No eating 8 hours before your exam. You may have clear liquids up until 4 hours before your exam. (Clear liquids include water, clear tea, black coffee and fruit juice without pulp.)  IF YOU WILL RECEIVE ANESTHESIA (be asleep for your exam):   Arrive 1 1/2 hours early. Bring someone who can take you home after the test. You may not drive, take a bus or take a taxi by yourself.   No eating 8 hours before your exam. You may have clear liquids up until 4 hours before your exam. (Clear liquids include water, clear tea, black coffee and fruit juice without pulp.)  If you have any questions, please contact your Imaging Department exam site.            Feb 21, 2018 11:40 AM CST   (Arrive by 11:25 AM)   Return Visit with Aleisha Sapp MD   [facility] ([facility])    [address] [address] [phone]               Who to contact     If you have questions or need follow up information about today's clinic visit or your schedule please contact [facility] directly at [phone].  Normal or non-critical lab and imaging results will be communicated to you by [employer], letter or phone within 4 business days after the clinic has received the results. If you do not hear from us within 7 days, please contact the clinic through [employer] or phone. If you have a critical or abnormal lab result, we will notify you by phone as soon as possible.  Submit refill requests through [employer] or call your pharmacy and they will forward the refill request to us. Please allow 3 business days for your refill to be completed.          Additional Information About Your Visit        [employer] Information     [employer] gives you secure access to your electronic health record. If you see a primary care provider, you can also send messages to your care team and make appointments. If you have questions, please call your primary care clinic.  If you do not have a primary care provider, please call [phone] and they will assist you.      [employer] is an electronic gateway that provides easy, online access to your medical records. With [employer], you can request a clinic appointment, read your test results, renew a prescription or communicate with your care team.     To access your existing account, please contact your [facility] or call [phone] for assistance.        Care EveryWhere ID     This is your Care EveryWhere ID. This could be used by other organizations to access your [facility] medical records  [id number]        Your Vitals Were     Pulse Pulse Oximetry                72 99%           Blood Pressure from Last 3 Encounters:   09/27/17 (!) 89/55   09/12/17 109/74   09/11/17 103/74    Weight from Last 3 Encounters:   08/23/17 73.9 kg (163 lb)   08/23/17 73.9 kg (163 lb)   08/21/17 75.8 kg (167 lb)               We Performed the Following     ORTHOTICS REFERRAL     PHYSICAL THERAPY REFERRAL (Internal)     STRAPPING ANKLE/FOOT        Primary Care Provider Office Phone # Fax #    Ángel Oates [phone]       [address] [facility] [address]        Equal Access to Services     OLUMANUELA PATRICIA : Hadii aad ku hadasho Soomaali, waaxda luqadaha, qaybta kaalmada adeegyada, waxay yusefin holdenn adekye guillen laalysonjulien . So [facility] [phone].    ATENCIÓN: Si habla español, tiene a mayorga disposición servicios gratuitos de asistencia lingüística. Llame al [phone].    We comply with applicable federal civil rights laws and [place] laws. We do not discriminate on the basis of race, color, national origin, age, disability sex, sexual orientation or gender identity.            Thank you!     Thank you for choosing [facility]  for your care. Our goal is always to provide you with excellent care. Hearing back from our patients is one way we can continue to improve our services. Please take a few minutes to complete the written survey that you may receive in the mail after your visit with us. Thank you!             Your Updated Medication List - Protect others around you: Learn how to safely use, store and throw away your medicines at www.disposemymeds.org.          This list is accurate as of: 9/27/17  2:50 PM.  Always use your most recent med list.                   Brand Name Dispense Instructions for use Diagnosis    atorvastatin 80 MG tablet    LIPITOR    90 tablet    Take 1 tablet (80 mg) by mouth daily    Paroxysmal atrial fibrillation (H), Hyperlipidemia       bumetanide 1 MG tablet    BUMEX     Take mg in am and 1mg in pm        clotrimazole 1 % cream    LOTRIMIN    60 g    Apply topically 2 times daily In between toes    Tinea pedis of left foot       diclofenac 1 % Gel topical gel    VOLTAREN    100 g    Apply to right great toe.    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    Left foot pain, Peroneal tendinitis, left       * order for DME     5 each    Equipment being ordered: DME compression stockings knee high 30 mm hg    Venous (peripheral) insufficiency       * order for DME     1 Units    Sequential pneumatic compression pumps 2-3 times per day as needed for lymphedema. Measure and fit.  Compression strength per protocol.    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       * Notice:  This list has 2 medication(s) that are the same as other medications prescribed for you.  Read the directions carefully, and ask your doctor or other care provider to review them with you.

## 2017-09-27 NOTE — NURSING NOTE
"Josr Landers's goals for this visit include: Foot pain recheck   He requests these members of his care team be copied on today's visit information: yes    PCP: Ángel Oates    Referring Provider:  No referring provider defined for this encounter.    Chief Complaint   Patient presents with     RECHECK     Recheck foot pain       Initial BP (!) 89/55  Pulse 72  SpO2 99% Estimated body mass index is 24.07 kg/(m^2) as calculated from the following:    Height as of 8/23/17: 1.753 m (5' 9\").    Weight as of 8/23/17: 73.9 kg (163 lb).  Medication Reconciliation: complete    "

## 2017-09-27 NOTE — PATIENT INSTRUCTIONS
Thanks for coming today.  Ortho/Sports Medicine Clinic  [address] [place] [address]    To schedule future appointments in Ortho Clinic, you may call [phone].    To schedule ordered imaging by your Provider: Call [facility] at [phone]    [employer] available online at:   [email or web address]    Please call if any further questions or concerns [phone] and ask for the Orthopedic Department. Clinic hours 8 am to 5 pm.    Return to clinic if symptoms worsen.

## 2017-10-09 NOTE — MR AVS SNAPSHOT
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  ** [facility] Prescription Fax = [phone]  * Please allow 3 business days for routine medication refills.  * Please allow 5 business days for controlled substance medication refills.     Primary Care Center Test Result notification information:  *You will be notified with in 7-10 days of your appointment day regarding the results of your test.  If you are on MyChart you will be notified as soon as the provider has reviewed the results and signed off on them.                  Follow-ups after your visit        Follow-up notes from your care team     Write patient with results Return in about 1 month (around 11/9/2017).      Your next 10 appointments already scheduled     Nov 13, 2017 11:30 AM CST   LAB with  LAB    Health Lab ([facility])    [address] [place] [id number] [phone]           Patient must bring picture ID. Patient should be prepared to give a urine specimen  Please do not eat 10-12 hours before your appointment if you are  coming in fasting for labs on lipids, cholesterol, or glucose (sugar). Pregnant women should follow their Care Team instructions. Water with medications is okay. Do not drink coffee or other fluids. If you have concerns about taking  your medications, please ask at office or if scheduling via [employer], send a message by clicking on Secure Messaging, Message Your Care Team.            Nov 13, 2017 12:30 PM CST   (Arrive by 12:15 PM)   Return Visit with Ángel Oates MD   [facility] ([facility])    [address] [place] [address]  4th [facility] [id number] [phone]            Nov 28, 2017  1:00 PM CST   Return Visit with Gideon Grimm DPM   [facility] ([facility])    [address] [place] [id number] [phone]            Dec 20, 2017  1:30 PM CST   Lab with  LAB   [facility] Lab ([facility])    [address] [place] [id number] [phone]            Dec 20, 2017  2:30 PM CST   (Arrive by 2:00 PM)   Return Visit with Jonathan Turner MD   [facility] Nephrology ([facility])    [address] [place] [id number] [phone]            Feb 21, 2018  9:15 AM CST   (Arrive by 9:00 AM)   MR ABDOMEN & PELVIS W/O & W CONTRAST with [id number]   [facility] [place] [facility] ([facility])    [address] [place] [id number] [phone]           Take your medicines as usual, unless your doctor tells you not to. Bring a list of your current medicines to your exam (including vitamins, minerals and over-the-counter drugs). Also bring the results of similar scans you may have had.    The day before your exam, drink extra fluids at least six 8-ounce glasses (unless your doctor tells you to restrict your fluids).   Have a blood test (creatinine test) within 30 days of your  exam. Go to your clinic or Diagnostic Imaging Department for this test.   Do not eat or drink for 6 hours prior to exam.  The MRI machine uses a strong magnet. Please wear clothes without metal (snaps, zippers). A sweatsuit works well, or we may give you a hospital gown.  Please remove any body piercings and hair extensions before you arrive. You will also remove watches, jewelry, hairpins, wallets, dentures, partial dental plates and hearing aids. You may wear contact lenses, and you may be able to wear your rings. We have a safe place to keep your personal items, but it is safer to leave them at home.   **IMPORTANT** THE INSTRUCTIONS BELOW ARE ONLY FOR THOSE PATIENTS WHO HAVE BEEN TOLD THEY WILL RECEIVE SEDATION OR GENERAL ANESTHESIA DURING THEIR MRI PROCEDURE:  IF YOU WILL RECEIVE SEDATION (take medicine to help you relax during your exam):   You must get the medicine from your doctor before you arrive. Bring the medicine to the exam. Do not take it at home.   Arrive one hour early. Bring someone who can take you home after the test. Your medicine will make you sleepy. After the exam, you may not drive, take a bus or take a taxi by yourself.   No eating 8 hours before your exam. You may have clear liquids up until 4 hours before your exam. (Clear liquids include water, clear tea, black coffee and fruit juice without pulp.)  IF YOU WILL RECEIVE ANESTHESIA (be asleep for your exam):   Arrive 1 1/2 hours early. Bring someone who can take you home after the test. You may not drive, take a bus or take a taxi by yourself.   No eating 8 hours before your exam. You may have clear liquids up until 4 hours before your exam. (Clear liquids include water, clear tea, black coffee and fruit juice without pulp.)  If you have any questions, please contact your Imaging Department exam site.            Feb 21, 2018 11:40 AM CST   (Arrive by 11:25 AM)   Return Visit with Aleisha Sapp MD   [facility] ([facility] [place])    [address]  4th Floor  [facility] [address] [phone]              Future tests that were ordered for you today     Open Future Orders        Priority Expected Expires Ordered    TSH with free T4 reflex Routine 11/1/2017 10/9/2018 10/9/2017    Basic metabolic panel Routine 11/1/2017 10/9/2018 10/9/2017    Uric acid Routine 11/1/2017 10/9/2018 10/9/2017            Who to contact     Please call your clinic at [phone] to:    Ask questions about your health    Make or cancel appointments    Discuss your medicines    Learn about your test results    Speak to your doctor   If you have compliments or concerns about an experience at your clinic, or if you wish to file a complaint, please contact [facility] Patient Relations at [phone] or email us at [email or web address]         Additional Information About Your Visit        [employer] Information     [employer] gives you secure access to your electronic health record. If you see a primary care provider, you can also send messages to your care team and make appointments. If you have questions, please call your primary care clinic.  If you do not have a primary care provider, please call [phone] and they will assist you.      [employer] is an electronic gateway that provides easy, online access to your medical records. With [employer], you can request a clinic appointment, read your test results, renew a prescription or communicate with your care team.     To access your existing account, please contact your [facility] or call [phone] for assistance.        Care EveryWhere ID     This is your Care EveryWhere ID. This could be used by other organizations to access your [facility] medical records  [id number]        Your Vitals Were     Pulse Respirations                102 16           Blood Pressure from Last 3  Encounters:   10/09/17 102/70   09/27/17 (!) 89/55   09/12/17 109/74    Weight from Last 3 Encounters:   08/23/17 73.9 kg (163 lb)   08/23/17 73.9 kg (163 lb)   08/21/17 75.8 kg (167 lb)              We Performed the Following     FLU VACCINE, INCREASED ANTIGEN, PRESV FREE, AGE 65+ [80589]          Today's Medication Changes          These changes are accurate as of: 10/9/17  2:02 PM.  If you have any questions, ask your nurse or doctor.               Start taking these medicines.        Dose/Directions    polyethylene glycol powder   Commonly known as:  MIRALAX   Used for:  Other constipation   Started by:  Ángel Oates MD        Dose:  1 capful   Take 17 g (1 capful) by mouth daily as needed for constipation   Quantity:  119 g   Refills:  3         These medicines have changed or have updated prescriptions.        Dose/Directions    escitalopram 10 MG tablet   Commonly known as:  LEXAPRO   This may have changed:  how much to take   Used for:  Moderate single current episode of major depressive disorder (H)   Changed by:  Ángel Oates MD        Dose:  10 mg   Take 1 tablet (10 mg) by mouth daily   Quantity:  90 tablet   Refills:  3            Where to get your medicines      [facility] [address] [place] [address] AT [facility] OF  & [address] [address]     Phone:  [phone]     atorvastatin 80 MG tablet    escitalopram 10 MG tablet    polyethylene glycol powder    Potassium Chloride ER 20 MEQ cr                Primary Care Provider Office Phone # Fax #    Ángel Oates [phone]       [address] [facility] [address]        Equal Access to Services     MANUELA GOMEZ AH: En Valdivia, mikie flores, chandana anaya, mikey hale. So [facility] [phone].    ATENCIÓN: Si habla español, tiene a mayorga disposición servicios  melyssa de asistencia lingüística. Ray cedillo [phone].    We comply with applicable federal civil rights laws and [place] laws. We do not discriminate on the basis of race, color, national origin, age, disability, sex, sexual orientation, or gender identity.            Thank you!     Thank you for choosing [facility]  for your care. Our goal is always to provide you with excellent care. Hearing back from our patients is one way we can continue to improve our services. Please take a few minutes to complete the written survey that you may receive in the mail after your visit with us. Thank you!             Your Updated Medication List - Protect others around you: Learn how to safely use, store and throw away your medicines at www.disposemymeds.org.          This list is accurate as of: 10/9/17  2:02 PM.  Always use your most recent med list.                   Brand Name Dispense Instructions for use Diagnosis    atorvastatin 80 MG tablet    LIPITOR    90 tablet    Take 1 tablet (80 mg) by mouth daily    Paroxysmal atrial fibrillation (H), Other hyperlipidemia       bumetanide 1 MG tablet    BUMEX     Take mg in am and 1mg in pm        clotrimazole 1 % cream    LOTRIMIN    60 g    Apply topically 2 times daily In between toes    Tinea pedis of left foot       diclofenac 1 % Gel topical gel    VOLTAREN    100 g    Apply to right great toe.    Pain of toe of right foot       escitalopram 10 MG tablet    LEXAPRO    90 tablet    Take 1 tablet (10 mg) by mouth daily    Moderate single current episode of major depressive disorder (H)       lidocaine HCl 3 % cream     85 g    To painful areas on left foot twice daily as needed.    Left foot pain, Peroneal tendinitis, left       * order for DME     5 each    Equipment being ordered: DME compression stockings knee high 30 mm hg    Venous (peripheral) insufficiency       * order for DME     1 Units    Sequential pneumatic compression pumps 2-3 times per  day as needed for lymphedema. Measure and fit.  Compression strength per protocol.    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, Abnormality of gait, At risk for fall due to comorbid condition       polyethylene glycol powder    MIRALAX    119 g    Take 17 g (1 capful) by mouth daily as needed for constipation    Other constipation       Potassium Chloride ER 20 MEQ Tbcr     90 tablet    Take 1 tablet (20 mEq) by mouth daily    Paroxysmal atrial fibrillation (H), Chronic diastolic congestive heart failure (H)       sotalol 80 MG tablet    BETAPACE    45 tablet    TAKE 1/2 TABLET BY MOUTH DAILY    Atrial fibrillation (H), Amyloid heart disease (H)       warfarin 5 MG tablet    COUMADIN    110 tablet    Take 1-1.5 tablets (5-7.5 mg) by mouth daily Adjust per INR clinic    Paroxysmal atrial fibrillation (H)       * Notice:  This list has 2 medication(s) that are the same as other medications prescribed for you. Read the directions carefully, and ask your doctor or other care provider to review them with you.

## 2017-10-09 NOTE — PROGRESS NOTES
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 He presents with his wife of 52 years recent anniversary Nu, who assists with his medical cares at home.  He wanted another opinion related to his heart failure as he was previously diagnosed there with amyloid and went to [place] saw Dr. Bryant ( records not available today). He is off sotolol per cardiology recommendation. He is tolerating this well and feels better. He denies palpitations.   His wife noticed an event one evening when he was trying to get his legs into bed where she was concerned he might of had a seizure. She noted a change of his breathing and then he did not seem to be aware of his surroundings. In the morning they found that he had stool incontinence. As she was discussing this today he became very upset with his wife for mentioning this and felt as though she was being critical of him although she was indicating her worry related to his health condition. He only has these events when getting into bed about the same time every day as he is lifting his heavy legs into the bed. He attributes this to difficulty breathing with exertion upon getting into bed because of the edema he has in his lower extremities. He then equilibrates to supine position. He does have to get up 2-3 times at night to urinate as he continues on Bumex. He denies palpitations.  February, 02/12/17, he was hospitalized at [facility] diagnosed with  a thrombus in the right middle cerebral artery, acute stroke.  Interventions assisted with removal of the thrombus he has left-sided residual. This has caused major change in his life contributing to depressed mood. His goal is to try to have improvement in his quality of life.He did not have assessment for seizure during that hospitalization and he denies seizure activity during the day and does not feel that the above concerns mentioned by his wife for seizure  related. He is not interested in further assessment he saw neurology at [facility] Dr. Monterroso on September 22 without any new recommendations. They did not discuss possible seizure. 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  Depression he is not certain if the low dose of Lexapro 5 mg has assisted with his mood but his pH Q9 is slightly improved. He still is trying to accept his new normal after his stroke. He is frustrated as he is not able to do his work to full capacity and understands the need to ask other members of his family and coworkers to take on more responsibility which as been hard for him as well.  Diarrhea has resolved off of sertraline. He is tending toward more constipation and is wondering what he should do about this. His wife has been giving him Senokot several times per week.    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 His diuretics were adjusted and he is feeling better. Bumex 1 mg twice daily currently and following low sodium diet. Edema in legs is better. Renal function was improved.  He has chronic atrial fibrillation, rate slightly increased off sotalol 40 mg daily currently pressure remains stable.  He has left foot pain with degenerative process in his foot and is seeing podiatry getting inserts. He has a HX of gout years ago presenting as swelling in the great toe has been on Uloric with resolution of toe pain, last uric acid 4.3.in April 2017. No further gout.   They need refills of several medications.  REVIEW OF SYSTEMS:  He follows with Dr. Arnold at [facility] who monitors his INR. No dyspnea other than getting into bed, edema of legs better with elevation. Depressed mood after stroke.  He has a history of chronic kidney disease, coronary artery disease involving the native coronary artery disease and had an acute CVA in  02/12/2017 due to occlusion of the distal right M1 segment of middle cerebral artery, status post recanalization with Penumbra ACE 68 device.     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 He lives with his wife.  He has 2 children, a son and daughter, and 2 grandchildren, a grandson and granddaughter.     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     Pain in limb     Status post lumbar surgery     Adjustment disorder     Idiopathic peripheral neuropathy     SOB (shortness of breath)     CHF (congestive heart failure) (H)     Depression     Malignant neoplasm of kidney excluding renal pelvis (H)     Cerebrovascular accident (CVA) (H)     Other amyloidosis     Anemia     Atrial flutter (H)     Cardiomyopathy (H)     Chronic kidney disease, stage III (moderate)     Coronary arteriosclerosis in native artery     Weakness     Hypotension     Pain of foot     Long term current use of anticoagulant therapy     Peripheral neuropathic pain     Myocardial infarction, nontransmural (H)     Nonsustained ventricular tachycardia (H)     CKD (chronic kidney disease) stage 3, GFR 30-59 ml/min     Tinea pedis of left foot     Onychomycosis     Nail deformity     Past Medical History:   Diagnosis Date     Atrial fibrillation (H) 6/25/2012     Atrial fibrillation: ablation Dr Jonathan Arvizu/Stehekin 6/25/2012     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      to his wife Nu ( Piano), two children Deandra Solo (Grandchildren Trinh 2003, Nish 2007) and Son [id number] E Anshul III     /70  Pulse 102  Resp 16  Weight 158  GENERAL:  Examination reveals a delightful gentleman who is very intelligent.  His speech is clear.  He has decreased movement on the left side of his body, but is able to move.  He is sitting in a wheelchair. His mood was initially happy and smiling and then he became frustrated with his wife. He was able to settle his mood immediately and they appear to have a loving relationship. His wife is very detail oriented, attuned to his medications,caring, worried for her 's well being.  HEENT:  Mucous membranes are moist.  He appears adequately hydrated today.  NECK:  Supple.  Thyroid is normal.   HEART:  S1, S2, with irregular rhythm.  Rate is controlled but slightly higher today than baseline.  LUNGS: Clear no rales    ABDOMEN:  Examined  in a chair. No significant distension. Non tender.  EXTREMITIES: Less leg edema,  left ankle has more swelling than the right, both have venous incompetence. Left foot swelling and hypertrophy of nails, mild intertriginous moisture suggesting fungal infection. No open sores.  PSYCHOLOGICAL:  Interactive, very intelligent, wanting to understand his health condition.  Depressed mood and indicates frustration with his multiple health issues.  PHQ-9 SCORE 4/24/2017 8/21/2017 10/9/2017   Total Score - - -   Total Score 1 6 4  "    ASSESSMENT AND PLAN:     Professor Josr Landers, a very intelligent, pleasant gentleman presents with his wife Nu of 52 years to follow up for primary care after visit to [place] confirming concerns of amyloid. He had Sotolol discontinued, tolerating without palpitations and feels better.  His wife is concerned about nightly episodes of \"seizure\" and one episode of stool incontinence. I discussed order EEG, he declined and I reviewed call 911 if seizure activity. Reviewed Seizures are possible after CVA. He is adamant this is related to work of getting into bed at night. I reviewed recent report from his neurologist 9/22  record found by staff after his visit.  His depression is slightly better therefore will increase dose of Lexapro to 10 mg daily with close follow-up in one month.  He was on Uloric for gout prevention. I reviewed micromedex with them an CV/Stroke risk is a concern therefore will stop and check uric acid level in one month.  CHF: Bumex  1 mg twice daily with improvement in his Blood pressure. He has less edema of legs.  Josr was seen today for recheck medication. Medication refills.    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    Moderate single current episode of major depressive disorder (H)  -     escitalopram (LEXAPRO) 10 MG tablet; Take 1 tablet (10 mg) by mouth daily    Thrombosis of middle cerebral artery, right  Left hemiparesis stable  Paroxysmal atrial fibrillation (H)  -     Potassium Chloride ER 20 MEQ TBCR; Take 1 tablet (20 mEq) by mouth daily  -     atorvastatin (LIPITOR) 80 MG tablet; Take 1 tablet (80 mg) by mouth daily    Chronic diastolic congestive heart failure (H)  -     Potassium Chloride ER 20 MEQ TBCR; Take 1 tablet (20 mEq) by mouth daily  -     Basic metabolic panel; Future    Need for prophylactic vaccination and inoculation against influenza  -     FLU VACCINE, INCREASED ANTIGEN, PRESV FREE, AGE 65+ " [37622]    Lower extremity edema  Stable  Other hyperlipidemia  -     atorvastatin (LIPITOR) 80 MG tablet; Take 1 tablet (80 mg) by mouth daily    Acute gouty arthritis  -     Uric acid; Future in November prior to visit  Discontinue Uloric  Other constipation  -     polyethylene glycol (MIRALAX) powder; Take 17 g (1 capful) by mouth daily as needed for constipation  -     TSH with free T4 reflex; Future  Previous slight glucose elevation, check A1C with next labs      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  Ángel Oates MD

## 2017-10-09 NOTE — PATIENT INSTRUCTIONS
[facility] Phone Number [phone]  [facility] Medication Refill Request Information:  * Please contact your pharmacy regarding ANY request for medication refills.  ** [facility] Prescription Fax = [phone]  * Please allow 3 business days for routine medication refills.  * Please allow 5 business days for controlled substance medication refills.     [facility] Test Result notification information:  *You will be notified with in 7-10 days of your appointment day regarding the results of your test.  If you are on MyChart you will be notified as soon as the provider has reviewed the results and signed off on them.

## 2017-10-09 NOTE — NURSING NOTE
"Injectable Influenza Immunization Documentation    1.  Has the patient received the information for the injectable influenza vaccine? YES     2. Is the patient 6 months of age or older? YES     3. Does the patient have any of the following contraindications?         Severe allergy to eggs? No     Severe allergic reaction to previous influenza vaccines? No   Severe allergy to latex? No       History of Guillain-Elloree syndrome? No     Currently have a temperature greater than 100.4F? No          4.  Severely egg allergic patients should have flu vaccine eligibility assessed by an MD, RN, or pharmacist, and those who received flu vaccine should be observed for 15 min by an MD, RN, Pharmacist, Medical Technician, or member of clinic staff.\": NO    5. Latex-allergic patients should be given latex-free influenza vaccine NO. Please reference the Vaccine latex table to determine if your clinic s product is latex-containing.     Vaccination given by: Lashawn Bautista CMA at 1:52 PM on 10/9/2017        "

## 2017-10-09 NOTE — NURSING NOTE
Chief Complaint   Patient presents with     Recheck Medication     pt is here for a medication follow up        Lashawn Bautista CMA at 12:46 PM on 10/9/2017

## 2017-11-01 NOTE — TELEPHONE ENCOUNTER
[facility] Call Center    Phone Message    Name of Caller: Nu    Phone Number: Home number on file [phone] (home)    Best time to return call: Any    May a detailed message be left on voicemail: yes    Relation to patient: Self    Reason for Call: Other: Patient has an infected toe and Nu wants him to be seen asap. The next available appt is 11/17. Please advise.      Action Taken: Message routed to:  Adult Clinics: Podiatry p [phone]

## 2017-11-02 NOTE — TELEPHONE ENCOUNTER
Called and talked with [facility], We have a hold spot tomorrow at 8:30am, they would like to take it.  Appointment made.  Janay Huang RN

## 2017-11-03 NOTE — PATIENT INSTRUCTIONS
Thanks for coming today.  [facility]  [address] [place] [address]    To schedule future appointments in Ortho Clinic, you may call [phone].    To schedule ordered imaging by your provider:   Call [facility] [phone]    To schedule an injection ordered by your provider:  Call [facility] Injection scheduling line: [phone]  [email or web address] available online at:  [email or web address]    Please call if any further questions or concerns ([phone]).  Clinic hours 8 am to 5 pm.    Return to clinic (call) if symptoms worsen or fail to improve.

## 2017-11-03 NOTE — NURSING NOTE
"Josr Landers's goals for this visit include: Recheck left foot, possible toe infection  He requests these members of his care team be copied on today's visit information: yes    PCP: Ángel Oates    Referring Provider:  No referring provider defined for this encounter.    Chief Complaint   Patient presents with     RECHECK     Left 3rd toe, possible infection x 2 days        Initial /74  Pulse 83  SpO2 98% Estimated body mass index is 24.07 kg/(m^2) as calculated from the following:    Height as of 8/23/17: 1.753 m (5' 9\").    Weight as of 8/23/17: 73.9 kg (163 lb).  Medication Reconciliation: complete    "

## 2017-11-03 NOTE — PROGRESS NOTES
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     Pain in limb     Status post lumbar surgery     Adjustment disorder     Idiopathic peripheral neuropathy     SOB (shortness of breath)     CHF (congestive heart failure) (H)     Depression     Malignant neoplasm of kidney excluding renal pelvis (H)     Cerebrovascular accident (CVA) (H)     Other amyloidosis     Anemia     Atrial flutter (H)     Cardiomyopathy (H)     Chronic kidney disease, stage III (moderate)     Coronary arteriosclerosis in native artery     Weakness     Hypotension     Pain of foot     Long term current use of anticoagulant therapy     Peripheral neuropathic pain     Myocardial infarction, nontransmural (H)     Nonsustained ventricular tachycardia (H)     CKD (chronic kidney disease) stage 3, GFR 30-59 ml/min     Tinea pedis of left foot     Onychomycosis     Nail deformity     Past Surgical History:   Procedure Laterality Date     BACK SURGERY       H PENUMBRA SEPARATOR 3D       KNEE SURGERY       [facility] PATIENT HAS A CORONARY ARTERY STENT       SHOULDER SURGERY       Social History     Social History     Marital status:      Spouse name: N/A     Number of children: N/A     Years of education: N/A     Occupational History     Not on file.     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      to his wife Nu ( Vikash), two children Deandra Solo (Grandchildren Trinh 2003, Nish 2007) and Son Joe Landers III     No family history on file.  SUBJECTIVE FINDINGS: A 77-year-old male who returns to clinic for left 3rd toe.  The last 2-3 days it has been hurting and concerned about infection.  He relates he has been wearing his compression socks.  He got his shoes with inserts.  Those feel good.  He presents in his wheelchair.      OBJECTIVE FINDINGS: DP and PT are 1/4 bilaterally.  He has decreased hair growth bilaterally.  He has peripheral edema bilaterally.  He has dystrophic, thickened, brittle nails with subungual debris, dystrophy, discoloration and thickening 1 through 5 bilaterally to differing degrees.  He has pressure changes with some minimal edema in the left 3rd nail borders.  There is pain on palpation of the left 3rd toenail.      ASSESSMENT AND PLAN: Early ingrown toenail, left 3rd toe, onychomycosis bilaterally, causing pain left 3rd toe.  Diagnosis and treatment options are discussed with the patient.  All the nails were debrided or reduced bilaterally upon consent; 6+ nails were debrided.  The left 3rd and 4th toes bled a bit upon debridement.  Local wound care done and use discussed with him.  He is otherwise doing well.  He will return to clinic and see me as scheduled.

## 2017-11-07 NOTE — TELEPHONE ENCOUNTER
----- Message from Mikel Shaffer RN sent at 11/7/2017 12:45 PM CST -----  Pt's wife calling, states that pt had diarrhea accident this morning. Just one time diarrhea, but he has several trip to the bathroom at night due to feeling like bowl movement. Wife thinks pt is getting weaker, he had one time vomiting last week aafter he took Lexapro, so she has been giving him the half dose of lexapro. She also wants to discuss about leg edema and wrapped leg from [facility].  Wife is wondering he could wait till the appt next week.  She is just frustrated.    November 7, 2017 2:47 PM  LM at Nu's home and cell numbers requesting call back to discuss.   Antonella Parada RN, Care Coordinator    November 8, 2017 10:59 AM  Received another message from patient's wife. Left another message for her to return call.   Antonella Parada RN, Care Coordinator    November 9, 2017 9:13 AM  Spoke with Nu. They are fine with waiting for patient's appointment with Dr. Oates on 11/13/17 to further discuss issues. Patient scheduled for lab work before appointment, and orders are in EPIC.   Antonella Parada RN, Care Coordinator

## 2017-11-13 NOTE — PATIENT INSTRUCTIONS
[facility] Medication Refill Request Information:  * Please contact your pharmacy regarding ANY request for medication refills.  ** [facility] Prescription Fax = [phone]  * Please allow 3 business days for routine medication refills.  * Please allow 5 business days for controlled substance medication refills.     [facility] Test Result notification information:  *You will be notified with in 7-10 days of your appointment day regarding the results of your test.  If you are on MyChart you will be notified as soon as the provider has reviewed the results and signed off on them.    [facility] [phone]               Gout   What is gout?   Gout is a disease usually caused by having too much uric acid in your body. Too much uric acid may not cause symptoms for years, but after a time it usually causes painful joint inflammation (arthritis). The most common site of inflammation is the joint between the foot and the big toe. Later attacks often affect other joints of the foot and leg. Less often, the arms and hands are affected.   In addition to the arthritis, gout causes the formation of tophi. Tophi are lumpy deposits of uric acid crystals just under the skin. Common places for tophi to develop are in the outer edge of the ear, on or near the elbow, over the fingers and toes, and around the Achilles tendon in the ankle.   Gout can also cause kidney stones made of uric acid.   Most people who have gout are middle-aged men, but it can occur at any age. Only 5 to 10% of cases of gout occur in women, most often after menopause.   How does it occur?   Gout usually occurs because too much uric acid is in your joints. Uric acid comes from the breakdown of substances called purines. Purines are found in all of your body's tissues. They are also in many foods. Normally, uric acid dissolves in the blood and passes through the kidneys and out of the body in urine. If the levels of uric acid build up in  the blood, sharp uric acid crystals may form in the joints. The crystals cause pain and swelling. You may have too much uric acid in your joints when your kidney does not get rid of enough uric acid or when your body makes too much uric acid.   Most cases of gout are caused by poor elimination of uric acid by the kidneys, but it can be hard to know why this is happening. The specific problem with the kidney is usually never found.   Some people inherit a tendency to make too much uric acid. Others may make too much uric acid because they have a disease such as cancer or certain types of red blood cell disorders. A diet high in alcoholic drinks and purine-rich foods (such as seafood and meat, especially red meat and organ meats) can also cause your body to make too much uric acid.   Uric acid levels in men start to go up after puberty. Women's uric acid levels usually do not go up until after menopause. For this reason women are protected from gout until several years after menopause. The uric acid levels have to be high for many years before gout develops. Men with gout usually have their first attack when they are middle-aged.   Certain conditions, such as dehydration, can cause excess levels of uric acid. Diuretic medicine (also called water pills), which is often used to treat high blood pressure, can increase levels of uric acid. Other medicines can also affect the level of uric acid in the blood. It is important to make sure your healthcare provider knows all the medicines you are using, both prescription and nonprescription.   People who have recently had a serious illness or surgery have an increased chance of having an attack of gout. Some people have gouty arthritis even though they have normal uric acid levels.   What are the symptoms?   Some people have high uric acid blood levels for years and never have any symptoms. Only 10 to 20% of people with high levels develop symptoms in their joints, such as:      sudden, severe pain, especially of just one joint at a time     redness     swelling.   The sudden attacks are sometimes related to physical illness, trauma, or excessive alcohol use. The symptoms may last for days to weeks. The arthritis usually occurs before tophi or kidney stones develop.   The tophi do not cause any symptoms unless they open and drain. They are often not painful. Depending on their location, they may limit the movement of joints.   The symptoms of uric acid stones are like those of other kidney stones. They can cause severe abdominal pain and sometimes nausea, vomiting, fever, or blood in the urine.   How is it diagnosed?   Your healthcare provider will suspect that you have gout if:     Your first toe joint is inflamed.     You have a blood test that shows a high level of uric acid in your blood.     You are developing tophi.     You start taking the drug colchicine and your symptoms of arthritis improve. (Colchicine, an anti-inflammatory drug, is effective only in gouty-type arthritis.)   To confirm the diagnosis, your provider may take a sample of fluid from the affected joint or joints and send it to the lab for tests. If you have uric acid crystals in the fluid, you have gout.   How is it treated?   Usually, if you have high uric acid levels but no symptoms, you will not need treatment. In special cases (for example, if you have a strong family history of gouty arthritis or kidney stones), you may be treated for gout even though you do not have any symptoms.   If you have symptoms of gout, the goals of treatment are:     Stop the pain of gouty arthritis or kidney stones.     Try to prevent the recurrence of these problems by controlling the uric acid levels.     Prevent serious complications such as kidney damage.     Treatment of the arthritis first involves the use of anti-inflammatory medicines, such as:     corticosteroid drugs, such as prednisone     colchicine.   Aspirin is not  usually recommended because it may keep the urine from taking the uric acid out of the body.    If the gouty arthritis becomes a frequent problem, allopurinol and probenecid may also be prescribed to prevent damaging deposits of uric acid in the joints.   How long will the effects last?   The sooner treatment is started, the sooner the symptoms stop, which may be within 24 to 48 hours. If gout is not treated, it could last a few days to several weeks. A second attack may occur, but usually not for 6 months to 2 years. In other cases another attack may not occur until many years later, or never.   How can I help prevent gout?   There is no sure way to prevent gout. However, you can take these steps to lessen the chance that you will have high uric acid levels:     Eat a diet low in purines. Purine-containing foods that you should avoid include meat--especially red meat and organ meats (such as sweetbreads, liver, and kidney)--shrimp, anchovies, sardines, and dried legumes (beans). Asparagus.    Do not overindulge in alcohol. Do not drink more than 2 ounces a day.     Drink lots of fluids.           ASK kidney MD if you should be on allopurinol for prevention.

## 2017-11-13 NOTE — PROGRESS NOTES
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 He presents with his wife of 52 years recent anniversary Nu, who assists with his medical cares at home.    He wanted another opinion related to his heart failure as he was previously diagnosed at [place] with amyloid. He saw Dr. Alberto ( records are available today). He is off sotolol per cardiology recommendation. He is tolerating this well and feels better. He denies palpitations.  Constipation: Miralax and senokot are working after taking but he had constipation and had to strain causing a subconjunctival hemorrage.  Subconjunctival hemorrhage  Left eye after straining with constipation. He saw opthalmology no vision loss concerns.  Up several times at night due to urinary frequency from Bumex but using a urinal.  Cramps at night. Recent labs are great with normal potassium.  He is off Uloric ( due to possible concerns for increased risk of stroke) and recent uric acid is elevated to 10.2. He is following a low purine diet. His renal function is impaired with creatinine 2.45 GFR 26. He has not had another gout attack recently.   Depression: he feels the low dose of Lexapro 5 mg has assisted with his mood. He was not able to tolerate a higher dose due to stomach upset. They are currently splitting the tablet and wonder if he could have a 5 mg tab. He is frustrated as he is not able to do his work to full capacity and understands the need to ask other members of his family and coworkers to take on more responsibility which as been hard for him as well.    PMH:  February, 02/12/17, he was hospitalized at [facility] diagnosed with  a thrombus in the right middle cerebral artery, acute stroke.  Hospitalized for heart failure and dehydration 8/30-31 due to hypotension and weakness. His diuretics were adjusted and he is feeling better. Bumex 1 mg twice daily currently with an additional dose if weight  greater than 165 and following low sodium diet. Edema in legs is better and receiving  Lymphedema therapy through [facility] with wrapping. His weight went up a few pounds after the wrapping but leg edema was much better. He is using compression stockings.  He has chronic atrial fibrillation, rate controlled off sotalol 40 mg daily currently pressure remains stable.  He has left foot pain with degenerative process in his foot and is saw podiatry has inserts and new shoes.   REVIEW OF SYSTEMS:  He follows with Dr. Arnold at [facility] who monitors his INR. No dyspnea other than getting into bed, edema of legs better with elevation, compression and wrapping. Slight cough non productive.   Depressed mood after stroke.  He has a history of chronic kidney disease, coronary artery disease involving the native coronary artery disease and had an acute CVA in 02/12/2017 due to occlusion of the distal right M1 segment of middle cerebral artery, status post recanalization with Penumbra ACE 68 device.     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 He lives with his wife.  He has 2 children, a son and daughter, and 2 grandchildren, a grandson and granddaughter.     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     Pain in limb     Status post lumbar surgery     Adjustment disorder     Idiopathic peripheral neuropathy     SOB (shortness of breath)     CHF (congestive heart failure) (H)     Depression     Malignant neoplasm of kidney excluding renal pelvis (H)     Cerebrovascular accident (CVA) (H)     Other amyloidosis     Anemia     Atrial flutter (H)     Cardiomyopathy (H)     Chronic kidney disease, stage III (moderate)     Coronary arteriosclerosis in native artery     Weakness     Hypotension     Pain of foot     Long term current  use of anticoagulant therapy     Peripheral neuropathic pain     Myocardial infarction, nontransmural (H)     Nonsustained ventricular tachycardia (H)     CKD (chronic kidney disease) stage 3, GFR 30-59 ml/min     Tinea pedis of left foot     Onychomycosis     Nail deformity     Past Medical History:   Diagnosis Date     Atrial fibrillation (H) 6/25/2012     Atrial fibrillation: ablation Dr Jonathan Arvizu/[place] 6/25/2012     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      to his wife Nu ( Piano), two children Deandra 1969 (Grandchildren Trinh 2003, Nish 2007) and Son [id number] E Anshul III     BP 98/66 (BP Location: Right arm, Patient Position: Chair, Cuff Size: Adult Regular)  Pulse 91  Temp 97.8  F (36.6  C) (Oral)  Resp 18  Wt 77.6 kg (171 lb)  SpO2 99%  BMI 25.25 kg/m2  Weight with his clothing and shoes.  GENERAL:  Examination reveals a delightful gentleman who is very intelligent.  His speech is clear.  He has decreased movement on the left side of his body, but is able to move.  He is sitting in a wheelchair. His mood interactive,  intelligent and smiling.  His wife is very detail oriented, attuned to his medications,caring, worried for her 's well being.They have a good relationship  HEENT:  Mucous membranes are moist.  He appears adequately hydrated today.  NECK:  Supple.  Thyroid is normal.   HEART:  S1, S2, with slightly irregular rhythm.  Rate is controlled.  LUNGS: Clear no rales    ABDOMEN:  Examined  in a chair. No significant distension. Non tender.  EXTREMITIES: Less leg edema,  left ankle has more swelling than the right, both have venous incompetence. Wearing compression stockings  PSYCHOLOGICAL:  Interactive, very intelligent, wanting to understand his health condition.  Depressed mood and indicates frustration with his multiple health issues.  PHQ-9 SCORE 8/21/2017 10/9/2017 11/13/2017   Total Score - - -   Total Score 6 4 5     ASSESSMENT AND PLAN:     Professor Josr Landers, a very intelligent, pleasant gentleman presents with his wife Nu of 52 years to follow up for primary care after visit to [place] confirming concerns of amyloid. He had Sotolol discontinued, tolerating without palpitations and feels better.  His depression is slightly better  But was not able to tolerate increased dose of Lexapro 10 mg but able to tolerate 5 mg daily daily.  He was on Uloric for gout prevention. I reviewed micromedex with them an CV/Stroke risk is a concern therefore we stopped and check uric acid level now high at 10. We reviewed options to start Allopurinol but there may be renal failure and rash concerns or treat gout with prednisone for episodes. They decided less medication is better and will contact me if gout. He is following with a low purine diet and hydrating but no more than 64 oz daily.  CHF: Bumex  1 mg twice daily with improvement in his Blood pressure. He has less edema of legs with wrapping and lymphedema therapy.  Josr was seen today for recheck medication.    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  Ángel Oates MD

## 2017-11-13 NOTE — NURSING NOTE
Chief Complaint   Patient presents with     Recheck Medication     Here to follow up on Lázaro Clark CMA (SARAH) at 12:31 PM on 11/13/2017

## 2017-11-13 NOTE — MR AVS SNAPSHOT
After Visit Summary   11/13/2017    Josr Landers    MRN: [id number]           Patient Information     Date Of Birth          1940        Visit Information        Provider Department      11/13/2017 12:30 PM Ángel Oates MD [facility]        Today's Diagnoses     Major depressive disorder, single episode, mild (H)    -  1      Care Instructions    Primary Care Center Medication Refill Request Information:  * Please contact your pharmacy regarding ANY request for medication refills.  ** [facility] Prescription Fax = [phone]  * Please allow 3 business days for routine medication refills.  * Please allow 5 business days for controlled substance medication refills.     Primary Care Center Test Result notification information:  *You will be notified with in 7-10 days of your appointment day regarding the results of your test.  If you are on MyChart you will be notified as soon as the provider has reviewed the results and signed off on them.    Primary Care Center [phone]               Gout   What is gout?   Gout is a disease usually caused by having too much uric acid in your body. Too much uric acid may not cause symptoms for years, but after a time it usually causes painful joint inflammation (arthritis). The most common site of inflammation is the joint between the foot and the big toe. Later attacks often affect other joints of the foot and leg. Less often, the arms and hands are affected.   In addition to the arthritis, gout causes the formation of tophi. Tophi are lumpy deposits of uric acid crystals just under the skin. Common places for tophi to develop are in the outer edge of the ear, on or near the elbow, over the fingers and toes, and around the Achilles tendon in the ankle.   Gout can also cause kidney stones made of uric acid.   Most people who have gout are middle-aged men, but it can occur at any age. Only 5 to 10% of cases of gout occur in women, most  often after menopause.   How does it occur?   Gout usually occurs because too much uric acid is in your joints. Uric acid comes from the breakdown of substances called purines. Purines are found in all of your body's tissues. They are also in many foods. Normally, uric acid dissolves in the blood and passes through the kidneys and out of the body in urine. If the levels of uric acid build up in the blood, sharp uric acid crystals may form in the joints. The crystals cause pain and swelling. You may have too much uric acid in your joints when your kidney does not get rid of enough uric acid or when your body makes too much uric acid.   Most cases of gout are caused by poor elimination of uric acid by the kidneys, but it can be hard to know why this is happening. The specific problem with the kidney is usually never found.   Some people inherit a tendency to make too much uric acid. Others may make too much uric acid because they have a disease such as cancer or certain types of red blood cell disorders. A diet high in alcoholic drinks and purine-rich foods (such as seafood and meat, especially red meat and organ meats) can also cause your body to make too much uric acid.   Uric acid levels in men start to go up after puberty. Women's uric acid levels usually do not go up until after menopause. For this reason women are protected from gout until several years after menopause. The uric acid levels have to be high for many years before gout develops. Men with gout usually have their first attack when they are middle-aged.   Certain conditions, such as dehydration, can cause excess levels of uric acid. Diuretic medicine (also called water pills), which is often used to treat high blood pressure, can increase levels of uric acid. Other medicines can also affect the level of uric acid in the blood. It is important to make sure your healthcare provider knows all the medicines you are using, both prescription and  nonprescription.   People who have recently had a serious illness or surgery have an increased chance of having an attack of gout. Some people have gouty arthritis even though they have normal uric acid levels.   What are the symptoms?   Some people have high uric acid blood levels for years and never have any symptoms. Only 10 to 20% of people with high levels develop symptoms in their joints, such as:     sudden, severe pain, especially of just one joint at a time     redness     swelling.   The sudden attacks are sometimes related to physical illness, trauma, or excessive alcohol use. The symptoms may last for days to weeks. The arthritis usually occurs before tophi or kidney stones develop.   The tophi do not cause any symptoms unless they open and drain. They are often not painful. Depending on their location, they may limit the movement of joints.   The symptoms of uric acid stones are like those of other kidney stones. They can cause severe abdominal pain and sometimes nausea, vomiting, fever, or blood in the urine.   How is it diagnosed?   Your healthcare provider will suspect that you have gout if:     Your first toe joint is inflamed.     You have a blood test that shows a high level of uric acid in your blood.     You are developing tophi.     You start taking the drug colchicine and your symptoms of arthritis improve. (Colchicine, an anti-inflammatory drug, is effective only in gouty-type arthritis.)   To confirm the diagnosis, your provider may take a sample of fluid from the affected joint or joints and send it to the lab for tests. If you have uric acid crystals in the fluid, you have gout.   How is it treated?   Usually, if you have high uric acid levels but no symptoms, you will not need treatment. In special cases (for example, if you have a strong family history of gouty arthritis or kidney stones), you may be treated for gout even though you do not have any symptoms.   If you have symptoms of  gout, the goals of treatment are:     Stop the pain of gouty arthritis or kidney stones.     Try to prevent the recurrence of these problems by controlling the uric acid levels.     Prevent serious complications such as kidney damage.     Treatment of the arthritis first involves the use of anti-inflammatory medicines, such as:     corticosteroid drugs, such as prednisone     colchicine.   Aspirin is not usually recommended because it may keep the urine from taking the uric acid out of the body.    If the gouty arthritis becomes a frequent problem, allopurinol and probenecid may also be prescribed to prevent damaging deposits of uric acid in the joints.   How long will the effects last?   The sooner treatment is started, the sooner the symptoms stop, which may be within 24 to 48 hours. If gout is not treated, it could last a few days to several weeks. A second attack may occur, but usually not for 6 months to 2 years. In other cases another attack may not occur until many years later, or never.   How can I help prevent gout?   There is no sure way to prevent gout. However, you can take these steps to lessen the chance that you will have high uric acid levels:     Eat a diet low in purines. Purine-containing foods that you should avoid include meat--especially red meat and organ meats (such as sweetbreads, liver, and kidney)--shrimp, anchovies, sardines, and dried legumes (beans). Asparagus.    Do not overindulge in alcohol. Do not drink more than 2 ounces a day.     Drink lots of fluids.           ASK kidney MD if you should be on allopurinol for prevention.              Follow-ups after your visit        Follow-up notes from your care team     Write patient with results Return in about 7 weeks (around 1/3/2018).      Your next 10 appointments already scheduled     Nov 28, 2017  1:00 PM CST   [facility] Gideon Grimm DPM   [facility]    [address] [place] [address]  N  [facility] [address]   [phone]            Dec 20, 2017  1:30 PM CST   Lab with  LAB   [facility] ([facility])    [address] [place] [address]  [address] [facility] [address]   [phone]            Dec 20, 2017  2:30 PM CST   (Arrive by 2:00 PM)   Return Visit with Jonathan Turner MD   [facility] ([facility])    [address] [place] [address]  [address] [facility] [address]   [phone]            Jan 08, 2018  2:30 PM CST   (Arrive by 2:15 PM)   Return Visit with Ángel Oates MD   [facility] ([facility])    [address] [place] [address]  [address] [facility] [address]   [phone]            Feb 21, 2018  9:15 AM CST   (Arrive by 9:00 AM)   MR ABDOMEN & PELVIS W/O & W CONTRAST with [id number]   [facility] ([facility])    [address] [place] [address]   [phone]           Take your medicines as usual, unless your doctor tells you not to. Bring a list of your current medicines to your exam (including vitamins, minerals and over-the-counter drugs). Also bring the results of similar scans you may have had.    The day before your exam, drink extra fluids at least six 8-ounce glasses (unless your doctor tells you to restrict your fluids).   Have a blood test (creatinine test) within 30 days of your exam. Go to your clinic or Diagnostic Imaging Department for this test.   Do not eat or drink for 6 hours prior to exam.  The MRI machine uses a strong magnet. Please wear clothes without metal (snaps, zippers). A sweatsuit works well, or we may give you a hospital gown.  Please remove any body piercings and hair extensions before you arrive. You will also remove watches, jewelry, hairpins, wallets, dentures, partial dental plates and hearing aids. You may wear contact lenses, and you may be able to wear your rings. We have a safe  place to keep your personal items, but it is safer to leave them at home.   **IMPORTANT** THE INSTRUCTIONS BELOW ARE ONLY FOR THOSE PATIENTS WHO HAVE BEEN TOLD THEY WILL RECEIVE SEDATION OR GENERAL ANESTHESIA DURING THEIR MRI PROCEDURE:  IF YOU WILL RECEIVE SEDATION (take medicine to help you relax during your exam):   You must get the medicine from your doctor before you arrive. Bring the medicine to the exam. Do not take it at home.   Arrive one hour early. Bring someone who can take you home after the test. Your medicine will make you sleepy. After the exam, you may not drive, take a bus or take a taxi by yourself.   No eating 8 hours before your exam. You may have clear liquids up until 4 hours before your exam. (Clear liquids include water, clear tea, black coffee and fruit juice without pulp.)  IF YOU WILL RECEIVE ANESTHESIA (be asleep for your exam):   Arrive 1 1/2 hours early. Bring someone who can take you home after the test. You may not drive, take a bus or take a taxi by yourself.   No eating 8 hours before your exam. You may have clear liquids up until 4 hours before your exam. (Clear liquids include water, clear tea, black coffee and fruit juice without pulp.)  If you have any questions, please contact your Imaging Department exam site.            Feb 21, 2018 11:40 AM CST   (Arrive by 11:25 AM)   Return Visit with Aleisha Sapp MD   [facility] ([facility])    [address] [address] [phone]              Who to contact     Please call your clinic at [phone] to:    Ask questions about your health    Make or cancel appointments    Discuss your medicines    Learn about your test results    Speak to your doctor   If you have compliments or concerns about an experience at your clinic, or if you wish to file a complaint, please contact [facility] Patient  Relations at [phone] or email us at [email or web address]         Additional Information About Your Visit        [employer] Information     Trot gives you secure access to your electronic health record. If you see a primary care provider, you can also send messages to your care team and make appointments. If you have questions, please call your primary care clinic.  If you do not have a primary care provider, please call [phone] and they will assist you.      Trot is an electronic gateway that provides easy, online access to your medical records. With Trot, you can request a clinic appointment, read your test results, renew a prescription or communicate with your care team.     To access your existing account, please contact your [facility] or call [phone] for assistance.        Care EveryWhere ID     This is your Care EveryWhere ID. This could be used by other organizations to access your [facility] medical records  [id number]        Your Vitals Were     Pulse Temperature Respirations Pulse Oximetry BMI (Body Mass Index)       91 97.8  F (36.6  C) (Oral) 18 99% 25.25 kg/m2        Blood Pressure from Last 3 Encounters:   11/13/17 98/66   11/03/17 104/74   10/09/17 102/70    Weight from Last 3 Encounters:   11/13/17 77.6 kg (171 lb)   08/23/17 73.9 kg (163 lb)   08/23/17 73.9 kg (163 lb)              Today, you had the following     No orders found for display         Today's Medication Changes          These changes are accurate as of: 11/13/17  1:28 PM.  If you have any questions, ask your nurse or doctor.               These medicines have changed or have updated prescriptions.        Dose/Directions    escitalopram 5 MG tablet   Commonly known as:  LEXAPRO   This may have changed:    - medication strength  - how much to take   Used for:  Major depressive disorder, single episode, mild (H)   Changed by:  Ángel Oates MD        Dose:  5 mg    Take 1 tablet (5 mg) by mouth daily   Quantity:  90 tablet   Refills:  3         Stop taking these medicines if you haven't already. Please contact your care team if you have questions.     sotalol 80 MG tablet   Commonly known as:  BETAPACE   Stopped by:  Ángel Oates MD                Where to get your medicines      These medications were sent to [facility] [address] [place] [address] AT [facility] OF  [address]  [address] [address], [facility] [address]     Phone:  [phone]     escitalopram 5 MG tablet                Primary Care Provider Office Phone # Fax #    Ángel Oates [phone]       [address] [facility] [address]        Equal Access to Services     NATALIE GOMEZ AH: Hadii philip townsend hadasho Soomaali, waaxda luqadaha, qaybta kaalmada adeegyada, mikey han . So [facility] [phone].    ATENCIÓN: Si habla español, tiene a mayorga disposición servicios gratuitos de asistencia lingüística. [facility] [phone].    We comply with applicable federal civil rights laws and [place] laws. We do not discriminate on the basis of race, color, national origin, age, disability, sex, sexual orientation, or gender identity.            Thank you!     Thank you for choosing [facility]  for your care. Our goal is always to provide you with excellent care. Hearing back from our patients is one way we can continue to improve our services. Please take a few minutes to complete the written survey that you may receive in the mail after your visit with us. Thank you!             Your Updated Medication List - Protect others around you: Learn how to safely use, store and throw away your medicines at www.disposemymeds.org.          This list is accurate as of: 11/13/17  1:28 PM.  Always use your most recent med list.                   Brand Name Dispense Instructions for use Diagnosis    atorvastatin 80 MG tablet     LIPITOR    90 tablet    Take 1 tablet (80 mg) by mouth daily    Paroxysmal atrial fibrillation (H), Other hyperlipidemia       * bumetanide 1 MG tablet    BUMEX     Take mg in am and 1mg in pm        * bumetanide 1 MG tablet    BUMEX    180 tablet    Take 1 tablet (1 mg) by mouth 2 times daily Take 2 tabs in the afternoon if daily weight > 165 lbs, for total dose of 3mg on those days.    CHF (congestive heart failure) (H)       clotrimazole 1 % cream    LOTRIMIN    60 g    Apply topically 2 times daily In between toes    Tinea pedis of left foot       diclofenac 1 % Gel topical gel    VOLTAREN    100 g    Apply to right great toe.    Pain of toe of right foot       escitalopram 5 MG tablet    LEXAPRO    90 tablet    Take 1 tablet (5 mg) by mouth daily    Major depressive disorder, single episode, mild (H)       lidocaine HCl 3 % cream     85 g    To painful areas on left foot twice daily as needed.    Left foot pain, Peroneal tendinitis, left       * order for DME     5 each    Equipment being ordered: DME compression stockings knee high 30 mm hg    Venous (peripheral) insufficiency       * order for DME     1 Units    Sequential pneumatic compression pumps 2-3 times per day as needed for lymphedema. Measure and fit.  Compression strength per protocol.    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, Abnormality of gait, At risk for fall due to comorbid condition       polyethylene glycol powder    MIRALAX    119 g    Take 17 g (1 capful) by mouth daily as needed for constipation    Other constipation       Potassium Chloride ER 20 MEQ Tbcr     90 tablet    Take 1 tablet (20 mEq) by mouth daily    Paroxysmal atrial  fibrillation (H), Chronic diastolic congestive heart failure (H)       warfarin 5 MG tablet    COUMADIN    110 tablet    Take 1-1.5 tablets (5-7.5 mg) by mouth daily Adjust per INR clinic    Paroxysmal atrial fibrillation (H)       * Notice:  This list has 4 medication(s) that are the same as other medications prescribed for you. Read the directions carefully, and ask your doctor or other care provider to review them with you.

## 2017-11-14 PROBLEM — I48.0 PAROXYSMAL ATRIAL FIBRILLATION (H): Status: ACTIVE | Noted: 2017-01-01

## 2017-11-14 PROBLEM — E79.0 ELEVATED BLOOD URIC ACID LEVEL: Status: ACTIVE | Noted: 2017-01-01

## 2017-11-28 NOTE — PATIENT INSTRUCTIONS
Thanks for coming today.  [facility]  [address] [place] [address]    To schedule future appointments in Ortho Clinic, you may call [phone].    To schedule ordered imaging by your provider:   Call Central Imaging [phone]    To schedule an injection ordered by your provider:  Call Central Imaging Injection scheduling line: [phone]  [facility] available online at:  [email or web address]    Please call if any further questions or concerns ([phone]).  Clinic hours 8 am to 5 pm.    Return to clinic (call) if symptoms worsen or fail to improve.

## 2017-11-28 NOTE — NURSING NOTE
"Josr Landers's goals for this visit include: Recheck left foot  He requests these members of his care team be copied on today's visit information: yes    PCP: Ángel Oates    Referring Provider:  Referred Self, MD  No address on file    Chief Complaint   Patient presents with     RECHECK     Recheck dermatophytosis       Initial /68  Pulse 82  SpO2 98% Estimated body mass index is 25.25 kg/(m^2) as calculated from the following:    Height as of 8/23/17: 1.753 m (5' 9\").    Weight as of 11/13/17: 77.6 kg (171 lb).  Medication Reconciliation: complete    "

## 2017-11-28 NOTE — MR AVS SNAPSHOT
After Visit Summary   2017    Josr Landers    MRN: [id number]           Patient Information     Date Of Birth          1940        Visit Information        Provider Department      2017 1:00 PM Gideon Grimm DPM [facility]        Today's Diagnoses     Dermatophytosis of nail    -  1    Idiopathic peripheral neuropathy        Chronic kidney disease, stage III (moderate)        Long term current use of anticoagulant therapy        Foot pain, bilateral          Care Instructions    Thanks for coming today.  [facility]  [address] [place] [address]    To schedule future appointments in Ortho Clinic, you may call [phone].    To schedule ordered imaging by your provider:   Call Central Imaging [phone]    To schedule an injection ordered by your provider:  Call Central Imaging Injection scheduling line: [phone]  [facility] [employer] available online at:  [email or web address]    Please call if any further questions or concerns ([phone]).  Clinic hours 8 am to 5 pm.    Return to clinic (call) if symptoms worsen or fail to improve.            Follow-ups after your visit        Follow-up notes from your care team     Return in about 3 months (around 2018).      Your next 10 appointments already scheduled     Dec 04, 2017  2:00 PM CST   (Arrive by 1:45 PM)   Return Visit with Ángel Oates MD   [facility] ([facility])    [address] [place] [address]  [address] [facility] [id number] [phone]            Dec 20, 2017  1:30 PM CST   Lab with  LAB   [facility] Lab ([facility])    [address] [place] [id number] [phone]            Dec 20, 2017  2:30 PM CST   (Arrive by 2:00 PM)   Return Visit with Jonathan Turner MD   [facility] Nephrology ([facility])    [address] [place] [address]  Floor  [facility] [address]   [phone]            Jan 08, 2018  2:30 PM CST   (Arrive by 2:15 PM)   Return Visit with Ángel Oates MD   [facility] ([facility])    [address]  4th Floor  [facility] [address]   [phone]            Jan 30, 2018  1:30 PM CST   Return Visit with Gideon Grimm DPM   [facility] ([facility])    [address] [place] [address]   [phone]            Feb 21, 2018  9:15 AM CST   (Arrive by 9:00 AM)   MR ABDOMEN & PELVIS W/O & W CONTRAST with [id number]   [facility] MRI ([facility])    [address] [facility]  1st [facility] [address]   [phone]           Take your medicines as usual, unless your doctor tells you not to. Bring a list of your current medicines to your exam (including vitamins, minerals and over-the-counter drugs). Also bring the results of similar scans you may have had.    The day before your exam, drink extra fluids at least six 8-ounce glasses (unless your doctor tells you to restrict your fluids).   Have a blood test (creatinine test) within 30 days of your exam. Go to your clinic or Diagnostic Imaging Department for this test.   Do not eat or drink for 6 hours prior to exam.  The MRI machine uses a strong magnet. Please wear clothes without metal (snaps, zippers). A sweatsuit works well, or we may give you a hospital gown.  Please remove any body piercings and hair extensions before you arrive. You will also remove watches, jewelry, hairpins, wallets, dentures, partial dental plates and hearing aids. You may wear contact lenses, and you may be able to wear your rings. We have a safe place to keep your personal items, but it is safer to leave them at home.   **IMPORTANT** THE INSTRUCTIONS BELOW ARE ONLY FOR THOSE PATIENTS WHO HAVE BEEN TOLD THEY WILL RECEIVE SEDATION OR GENERAL ANESTHESIA DURING  THEIR MRI PROCEDURE:  IF YOU WILL RECEIVE SEDATION (take medicine to help you relax during your exam):   You must get the medicine from your doctor before you arrive. Bring the medicine to the exam. Do not take it at home.   Arrive one hour early. Bring someone who can take you home after the test. Your medicine will make you sleepy. After the exam, you may not drive, take a bus or take a taxi by yourself.   No eating 8 hours before your exam. You may have clear liquids up until 4 hours before your exam. (Clear liquids include water, clear tea, black coffee and fruit juice without pulp.)  IF YOU WILL RECEIVE ANESTHESIA (be asleep for your exam):   Arrive 1 1/2 hours early. Bring someone who can take you home after the test. You may not drive, take a bus or take a taxi by yourself.   No eating 8 hours before your exam. You may have clear liquids up until 4 hours before your exam. (Clear liquids include water, clear tea, black coffee and fruit juice without pulp.)  If you have any questions, please contact your Imaging Department exam site.            Feb 21, 2018 11:40 AM CST   (Arrive by 11:25 AM)   Return Visit with Aleisha Sapp MD   [facility] ([facility])    [address] [place] [id number] [phone]              Who to contact     If you have questions or need follow up information about today's clinic visit or your schedule please contact [facility] directly at [phone].  Normal or non-critical lab and imaging results will be communicated to you by MyChart, letter or phone within 4 business days after the clinic has received the results. If you do not hear from us within 7 days, please contact the clinic through MyChart or phone. If you have a critical or abnormal lab result, we will notify you by phone as soon as possible.  Submit refill requests through [email or web address] or call your  pharmacy and they will forward the refill request to us. Please allow 3 business days for your refill to be completed.          Additional Information About Your Visit        [facility] Information     [facility] gives you secure access to your electronic health record. If you see a primary care provider, you can also send messages to your care team and make appointments. If you have questions, please call your primary care clinic.  If you do not have a primary care provider, please call [phone] and they will assist you.      [facility] is an electronic gateway that provides easy, online access to your medical records. With [facility], you can request a clinic appointment, read your test results, renew a prescription or communicate with your care team.     To access your existing account, please contact your [facility] or call [phone] for assistance.        Care EveryWhere ID     This is your Care EveryWhere ID. This could be used by other organizations to access your [place] medical records  [id number]        Your Vitals Were     Pulse Pulse Oximetry                82 98%           Blood Pressure from Last 3 Encounters:   11/28/17 100/68   11/13/17 98/66   11/03/17 104/74    Weight from Last 3 Encounters:   11/13/17 77.6 kg (171 lb)   08/23/17 73.9 kg (163 lb)   08/23/17 73.9 kg (163 lb)              We Performed the Following     DEBRIDEMENT OF NAILS, 6 OR MORE        Primary Care Provider Office Phone # Fax #    Ángel Oates [phone]       [address] [facility] [address]        Equal Access to Services     NATALIE GOMEZ : Hadii aad ku hadasho Soomaali, waaxda luqadaha, qaybta kaalmada adeegyada, mikey han . So [facility] [phone].    ATENCIÓN: Si habla español, tiene a mayorga disposición servicios gratuitos de asistencia lingüística. Llame al [phone].    We comply with applicable federal civil rights laws and [place]  laws. We do not discriminate on the basis of race, color, national origin, age, disability, sex, sexual orientation, or gender identity.            Thank you!     Thank you for choosing [facility]  for your care. Our goal is always to provide you with excellent care. Hearing back from our patients is one way we can continue to improve our services. Please take a few minutes to complete the written survey that you may receive in the mail after your visit with us. Thank you!             Your Updated Medication List - Protect others around you: Learn how to safely use, store and throw away your medicines at www.disposemymeds.org.          This list is accurate as of: 11/28/17  3:27 PM.  Always use your most recent med list.                   Brand Name Dispense Instructions for use Diagnosis    atorvastatin 80 MG tablet    LIPITOR    90 tablet    Take 1 tablet (80 mg) by mouth daily    Paroxysmal atrial fibrillation (H), Other hyperlipidemia       * bumetanide 1 MG tablet    BUMEX     Take mg in am and 1mg in pm        * bumetanide 1 MG tablet    BUMEX    180 tablet    Take 1 tablet (1 mg) by mouth 2 times daily Take 2 tabs in the afternoon if daily weight > 165 lbs, for total dose of 3mg on those days.    CHF (congestive heart failure) (H)       clotrimazole 1 % cream    LOTRIMIN    60 g    Apply topically 2 times daily In between toes    Tinea pedis of left foot       diclofenac 1 % Gel topical gel    VOLTAREN    100 g    Apply to right great toe.    Pain of toe of right foot       escitalopram 5 MG tablet    LEXAPRO    90 tablet    Take 1 tablet (5 mg) by mouth daily    Major depressive disorder, single episode, mild (H)       lidocaine HCl 3 % cream     85 g    To painful areas on left foot twice daily as needed.    Left foot pain, Peroneal tendinitis, left       * order for DME     5 each    Equipment being ordered: DME compression stockings knee high 30 mm hg    Venous (peripheral) insufficiency        * order for DME     1 Units    Sequential pneumatic compression pumps 2-3 times per day as needed for lymphedema. Measure and fit.  Compression strength per protocol.    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, Abnormality of gait, At risk for fall due to comorbid condition       polyethylene glycol powder    MIRALAX    119 g    Take 17 g (1 capful) by mouth daily as needed for constipation    Other constipation       Potassium Chloride ER 20 MEQ Tbcr     90 tablet    Take 1 tablet (20 mEq) by mouth daily    Paroxysmal atrial fibrillation (H), Chronic diastolic congestive heart failure (H)       warfarin 5 MG tablet    COUMADIN    110 tablet    Take 1-1.5 tablets (5-7.5 mg) by mouth daily Adjust per INR clinic    Paroxysmal atrial fibrillation (H)       * Notice:  This list has 4 medication(s) that are the same as other medications prescribed for you. Read the directions carefully, and ask your doctor or other care provider to review them with you.

## 2017-11-28 NOTE — PROGRESS NOTES
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     Pain in limb     Status post lumbar surgery     Adjustment disorder     Idiopathic peripheral neuropathy     SOB (shortness of breath)     CHF (congestive heart failure) (H)     Depression     Malignant neoplasm of kidney excluding renal pelvis (H)     Cerebrovascular accident (CVA) (H)     Other amyloidosis     Anemia     Atrial flutter (H)     Cardiomyopathy (H)     Chronic kidney disease, stage III (moderate)     Coronary arteriosclerosis in native artery     Weakness     Hypotension     Pain of foot     Long term current use of anticoagulant therapy     Peripheral neuropathic pain     Myocardial infarction, nontransmural (H)     Nonsustained ventricular tachycardia (H)     CKD (chronic kidney disease) stage 3, GFR 30-59 ml/min     Tinea pedis of left foot     Onychomycosis     Nail deformity     Elevated blood uric acid level     Paroxysmal atrial fibrillation (H)     Past Surgical History:   Procedure Laterality Date     BACK SURGERY       H PENUMBRA SEPARATOR 3D       KNEE SURGERY       AR PATIENT HAS A CORONARY ARTERY STENT       SHOULDER SURGERY       Social History     Social History     Marital status:      Spouse name: N/A     Number of children: N/A     Years of education: N/A     Occupational History     Not on file.     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      to his wife Nu ( Vikash), two children Deandra 1969 (Grandchildren Trinh 2003, Nish 2007) and Son [id number] E Anshul III     No family history on file.  Lab Results   Component Value Date    A1C 6.4 11/13/2017      Uric Acid   Date Value Ref Range Status   11/13/2017 10.1 (H) 3.5 - 7.2 mg/dL Final   ]  Last Basic Metabolic Panel:  Lab Results   Component Value Date     11/13/2017      Lab Results   Component Value Date    POTASSIUM 3.9 11/13/2017     Lab Results   Component Value Date    CHLORIDE 98 11/13/2017     Lab Results   Component Value Date    RIGOBERTO 9.2 11/13/2017     Lab Results   Component Value Date    CO2 29 11/13/2017     Lab Results   Component Value Date    BUN 50 11/13/2017     Lab Results   Component Value Date    CR 2.45 11/13/2017     Lab Results   Component Value Date     11/13/2017     SUBJECTIVE:  A 77-year-old male returns to clinic for onychomycosis.  He relates he had seen the [facility].  They wrapped his legs and the swelling went down, but now it has come back since he has not been wrapping it.  Relates his orthotics feel well.  He exercises with them.  His feet hurt a little bit.  Otherwise, he is doing well.  He needs his toenails cut and that will feel better.       OBJECTIVE:  DP and PT 1/4 bilaterally.  He has decreased hair growth bilaterally.  He has peripheral edema bilaterally.  He has dystrophic, incurvated, thickened nails with subungual debris, dystrophy, discoloration and thickening 1-5 bilaterally to differing degrees.  There is no erythema, no  drainage, no odor, no calor bilaterally.       ASSESSMENT/PLAN:  Onychomycosis bilaterally.  Diagnosis and treatment options discussed with him.  His foot pain is doing better.  He relates his orthotics are comfortable and they fit well, so exercises and gets a little bit of pain in the feet still.  We will give this more time.  He will continue those.  I advised him on muscle stretching.  He will return to clinic and see me in about 2-3 months.

## 2017-12-01 NOTE — TELEPHONE ENCOUNTER
Was he recently hospitalized? Please get records for appointment on Monday.  Best wishes,  Ángel Oates MD

## 2017-12-01 NOTE — TELEPHONE ENCOUNTER
Regarding: Insurance form  Contact: [phone]  uN, pts wife, called requesting a call back from Dr Thornton nurse to discuss some insurance paperwork she needs filled out in order to get help in her home. Please call back Nu at [phone]. Thanks    December 1, 2017 11:08 AM  Spoke with Nu. She reports that she is requesting some home services, as it has become exhausting being the care provider for patient. She said that she has contacted her insurance company, and they will pay for someone to come into the home for up to 35 hours per week to provide assistance, but they will need documentation from patient's PCP. Patient has appointment scheduled to see Dr. Oates on Mon, 12/4, and will discuss it with her then.   Antonella Parada RN, Care Coordinator

## 2017-12-04 NOTE — MR AVS SNAPSHOT
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  ** [facility] Prescription Fax = [phone]  * Please allow 3 business days for routine medication refills.  * Please allow 5 business days for controlled substance medication refills.     Primary Care Center Test Result notification information:  *You will be notified with in 7-10 days of your appointment day regarding the results of your test.  If you are on MyChart you will be notified as soon as the provider has reviewed the results and signed off on them.    Primary Care Center [phone]       [facility] [phone]          Follow-ups after your visit        Additional Services     HOME CARE NURSING REFERRAL       **Order classes of: [place] [facility], [facility] and [place] [facility]care will route to the Home Care and Hospice Referral Pool.  Home Care or Hospice will then contact the patient to schedule their appointment.**    If you do not hear from Home Care and Hospice, or you would like to call to schedule, please call the referring place of service that your provider has listed  below.  ______________________________________________________________________    Your provider has referred you to: [facility]: [facility] [phone]   [email or web address]    Extended Emergency Contact Information  Primary Emergency Contact: Nu Landers  Address: [address] [place] [address] [facility]  Home Phone: [phone]  Mobile Phone: [phone]  Relation: Spouse    Patient Anticipated Discharge Date: December 4, 2017   , RN,  HHA to begin 24 - 48 hours after discharge. [facility]  PLEASE EVALUATE AND TREAT (Evaluation timeline is 24 - 48 hrs. Please call if there is need for a variance to this timeline).    REASON FOR REFERRAL: Assessment & Treatment:  RN    ADDITIONAL SERVICES NEEDED: HHA and Private Duty: nursing servicies     OTHER PERTINENT INFORMATION: Patient was last seen by provider on December 4, 2017 for follow-up of hospital cares [facility] 11/21    Current Outpatient Prescriptions:  escitalopram (LEXAPRO) 5 MG tablet, Take 1 tablet (5 mg) by mouth daily, Disp: 90 tablet, Rfl: 3  bumetanide (BUMEX) 1 MG tablet, Take 1 tablet (1 mg) by mouth 2 times daily Take 2 tabs in the afternoon if daily weight > 165 lbs, for total dose of 3mg on those days., Disp: 180 tablet, Rfl: 1  Potassium Chloride ER 20 MEQ TBCR, Take 1 tablet (20 mEq) by mouth daily, Disp: 90 tablet, Rfl: 1  atorvastatin (LIPITOR) 80 MG tablet, Take 1 tablet (80 mg) by mouth daily, Disp: 90 tablet, Rfl: 3  polyethylene glycol (MIRALAX) powder, Take 17 g (1 capful) by mouth daily as needed for constipation, Disp: 119 g, Rfl: 3  lidocaine HCl 3 % cream, To painful areas on left foot twice daily as needed., Disp: 85 g, Rfl: 3  bumetanide (BUMEX) 1 MG tablet, Take 0.5 mg in am if under weight 165 and 1mg in pm, Disp: , Rfl:   order for DME, Sequential pneumatic compression pumps 2-3 times per day as needed for lymphedema. Measure and  fit.  Compression strength per protocol., Disp: 1 Units, Rfl: 0  order for DME, Equipment being ordered: DME compression stockings knee high 30 mm hg, Disp: 5 each, Rfl: 3  other medical supplies, JENIFER stocking bilateral lower extremities, Disp: 2 packet, Rfl: 3  clotrimazole (LOTRIMIN) 1 % cream, Apply topically 2 times daily In between toes (Patient not taking: Reported on 12/4/2017), Disp: 60 g, Rfl: 1  warfarin (COUMADIN) 5 MG tablet, Take 1-1.5 tablets (5-7.5 mg) by mouth daily Adjust per INR clinic, Disp: 110 tablet, Rfl: 3  diclofenac (VOLTAREN) 1 % GEL, Apply to right great toe. 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     Pain in limb     Status post lumbar surgery     Adjustment disorder     Idiopathic peripheral neuropathy     SOB (shortness of breath)     CHF (congestive heart failure) (H)     Depression     Malignant neoplasm of kidney excluding renal pelvis (H)     Cerebrovascular accident (CVA) (H)     Other amyloidosis     Anemia     Atrial flutter (H)     Cardiomyopathy (H)     Chronic kidney disease, stage III (moderate)     Coronary arteriosclerosis in native artery     Weakness     Hypotension     Pain of foot     Long term current use of anticoagulant therapy     Peripheral neuropathic pain     Myocardial infarction, nontransmural (H)     Nonsustained ventricular tachycardia (H)     CKD (chronic kidney disease) stage 3, GFR 30-59 ml/min     Tinea pedis of left foot     Onychomycosis     Nail deformity     Elevated blood uric acid level     Paroxysmal atrial fibrillation (H)      Documentation of Face to Face and Certification for Home Health Services    I certify that patient, Josr Landers is under my care and that I, or a Nurse Practitioner or Physician's Assistant working  with me, had a face-to-face encounter that meets the physician face-to-face encounter requirements with this patient on: 12/4/2017.    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     Pain in limb     Status post lumbar surgery     Adjustment disorder     Idiopathic peripheral neuropathy     SOB (shortness of breath)     CHF (congestive heart failure) (H)     Depression     Malignant neoplasm of kidney excluding renal pelvis (H)     Cerebrovascular accident (CVA) (H)     Other amyloidosis     Anemia     Atrial flutter (H)     Cardiomyopathy (H)     Chronic kidney disease, stage III (moderate)     Coronary arteriosclerosis in native artery     Weakness     Hypotension     Pain of foot     Long term current use of anticoagulant therapy     Peripheral neuropathic pain     Myocardial infarction, nontransmural (H)     Nonsustained ventricular tachycardia (H)     CKD (chronic kidney disease) stage 3, GFR 30-59 ml/min     Tinea pedis of left foot     Onychomycosis     Nail deformity     Elevated blood uric acid level     Paroxysmal atrial fibrillation (H)  .    I certify that, based on my findings, the following services are medically necessary Home Health Services: Nursing and Health Aid,     My clinical findings support the need for the above services because: Nurse is needed: To provide assessment and oversight required in the home to assure adherence to the medical plan due to: fragility. and To provide caregiver training to assist with: fragility and recurrent hospitalizations due to syncope and heart failure    Further, I certify that my clinical findings support that this patient is homebound (i.e. absences from  home require considerable and taxing effort and are for medical reasons or Muslim services or infrequently or of short duration when for other reasons) because: Leaving home is medically contraindicated for the following reason(s): Dyspnea on exertion that makes it so they cannot leave their home for needed services without clinical deterioration.. and Requires assistance of another person or specialized equipment to access medical services because patient: Is unable to exit home safely on own due to: Chronic heart failure due to amyloid, Syncope, previous stroke and weakness    Based on the above findings, I certify that this patient is confined to the home and needs intermittent skilled nursing care, physical therapy and/or speech therapy.  The patient is under my care, and I have initiated the establishment of the plan of care.  This patient will be followed by a physician who will periodically review the plan of care.    Physician/Provider to provide follow up care: Ángel Oates    Responsible [address] certified Physician at time of discharge:Ángel Oates MD    Please be aware that coverage of these services is subject to the terms and limitations of your health insurance plan.  Call member services at your health plan with any benefit or coverage questions.                  Follow-up notes from your care team     Discussed this visit Return in about 1 month (around 1/4/2018).      Your next 10 appointments already scheduled     Dec 20, 2017  1:30 PM CST   Lab with  LAB   [facility] Lab ([facility])    [address] [place] [id number] [phone]            Dec 20, 2017  2:30 PM CST   (Arrive by 2:00 PM)   Return Visit with Jonathan Turner MD   [facility] Nephrology ([facility])    [address] [place] [address]   [phone]            Jan 08, 2018 12:00 PM CST   (Arrive by 11:45 AM)    Return Visit with Ángel Oates MD   [facility] ([facility] [place])    [address]  [employer] [address]   [phone]            Jan 08, 2018  2:30 PM CST   (Arrive by 2:15 PM)   Return Visit with Ángel Oates MD   [facility] ([facility] [place])    [address] [place] [address]  [employer] [address]   [phone]            Jan 30, 2018  1:30 PM CST   Return Visit with Gideon Grimm DPM   [facility] ([facility])    [address] [place] [id number] [phone]            Feb 21, 2018  9:15 AM CST   (Arrive by 9:00 AM)   MR ABDOMEN & PELVIS W/O & W CONTRAST with [id number]   [facility] MRI ([facility])    [address] [place] [address]   [phone]           Take your medicines as usual, unless your doctor tells you not to. Bring a list of your current medicines to your exam (including vitamins, minerals and over-the-counter drugs). Also bring the results of similar scans you may have had.    The day before your exam, drink extra fluids at least six 8-ounce glasses (unless your doctor tells you to restrict your fluids).   Have a blood test (creatinine test) within 30 days of your exam. Go to your clinic or Diagnostic Imaging Department for this test.   Do not eat or drink for 6 hours prior to exam.  The MRI machine uses a strong magnet. Please wear clothes without metal (snaps, zippers). A sweatsuit works well, or we may give you a hospital gown.  Please remove any body piercings and hair extensions before you arrive. You will also remove watches, jewelry, hairpins, wallets, dentures, partial dental plates and hearing aids. You may wear contact lenses, and you may be able to wear your rings. We have a safe place to keep your personal items, but it is safer to leave them at home.    **IMPORTANT** THE INSTRUCTIONS BELOW ARE ONLY FOR THOSE PATIENTS WHO HAVE BEEN TOLD THEY WILL RECEIVE SEDATION OR GENERAL ANESTHESIA DURING THEIR MRI PROCEDURE:  IF YOU WILL RECEIVE SEDATION (take medicine to help you relax during your exam):   You must get the medicine from your doctor before you arrive. Bring the medicine to the exam. Do not take it at home.   Arrive one hour early. Bring someone who can take you home after the test. Your medicine will make you sleepy. After the exam, you may not drive, take a bus or take a taxi by yourself.   No eating 8 hours before your exam. You may have clear liquids up until 4 hours before your exam. (Clear liquids include water, clear tea, black coffee and fruit juice without pulp.)  IF YOU WILL RECEIVE ANESTHESIA (be asleep for your exam):   Arrive 1 1/2 hours early. Bring someone who can take you home after the test. You may not drive, take a bus or take a taxi by yourself.   No eating 8 hours before your exam. You may have clear liquids up until 4 hours before your exam. (Clear liquids include water, clear tea, black coffee and fruit juice without pulp.)  If you have any questions, please contact your Imaging Department exam site.            Feb 21, 2018 11:40 AM CST   (Arrive by 11:25 AM)   Return Visit with Aleisha Sapp MD   [facility] ([facility])    [address] [place] [address] [phone]              Who to contact     Please call your clinic at [phone] to:    Ask questions about your health    Make or cancel appointments    Discuss your medicines    Learn about your test results    Speak to your doctor   If you have compliments or concerns about an experience at your clinic, or if you wish to file a complaint, please contact [facility] Patient Relations at [phone] or email us at [email or web address]          Additional Information About Your Visit        [employer] Information     [employer] gives you secure access to your electronic health record. If you see a primary care provider, you can also send messages to your care team and make appointments. If you have questions, please call your primary care clinic.  If you do not have a primary care provider, please call [phone] and they will assist you.      [employer] is an electronic gateway that provides easy, online access to your medical records. With [employer], you can request a clinic appointment, read your test results, renew a prescription or communicate with your care team.     To access your existing account, please contact your [facility] or call [phone] for assistance.        Care EveryWhere ID     This is your Care EveryWhere ID. This could be used by other organizations to access your [facility] medical records  [id number]        Your Vitals Were     Pulse Temperature Respirations Pulse Oximetry BMI (Body Mass Index)       86 97.5  F (36.4  C) (Oral) 16 100% 25.25 kg/m2        Blood Pressure from Last 3 Encounters:   12/04/17 93/63   11/28/17 100/68   11/13/17 98/66    Weight from Last 3 Encounters:   12/04/17 77.6 kg (171 lb)   11/13/17 77.6 kg (171 lb)   08/23/17 73.9 kg (163 lb)              We Performed the Following     HOME CARE NURSING REFERRAL          Today's Medication Changes          These changes are accurate as of: 12/4/17  3:19 PM.  If you have any questions, ask your nurse or doctor.               These medicines have changed or have updated prescriptions.        Dose/Directions    * bumetanide 1 MG tablet   Commonly known as:  BUMEX   This may have changed:  Another medication with the same name was changed. Make sure you understand how and when to take each.   Changed by:  Ángel Oates MD        Take mg in am and 1mg in pm   Refills:  0       * bumetanide 1 MG tablet   Commonly known as:  BUMEX   This  may have changed:    - how much to take  - how to take this  - when to take this  - additional instructions   Used for:  Cardiac amyloidosis (H), Chronic combined systolic and diastolic congestive heart failure (H)   Changed by:  Ángel Oates MD        1 mg in am then 0.5 mg at supper but add additional 1 mg if weight over 165   Quantity:  180 tablet   Refills:  1       * order for DME   This may have changed:  Another medication with the same name was added. Make sure you understand how and when to take each.   Used for:  Venous (peripheral) insufficiency   Changed by:  Antonella Parada RN        Equipment being ordered: DME compression stockings knee high 30 mm hg   Quantity:  5 each   Refills:  3       * order for DME   This may have changed:  Another medication with the same name was added. Make sure you understand how and when to take each.   Used for:  Lymphedema   Changed by:  Erich Fraga MD        Sequential pneumatic compression pumps 2-3 times per day as needed for lymphedema. Measure and fit.  Compression strength per protocol.   Quantity:  1 Units   Refills:  0       * order for DME   This may have changed:  You were already taking a medication with the same name, and this prescription was added. Make sure you understand how and when to take each.   Used for:  Weakness, Slow transit constipation   Changed by:  Ángel Oates MD        Equipment being ordered: Bedside commode   Quantity:  1 Device   Refills:  0       * Notice:  This list has 5 medication(s) that are the same as other medications prescribed for you. Read the directions carefully, and ask your doctor or other care provider to review them with you.         Where to get your medicines      These medications were sent to [facility] [place] [address] AT [facility] [address]  [address] [place] [address]     Phone:  [phone]     bumetanide 1 MG tablet          Some of these will need a paper prescription and others can be bought over the counter.  Ask your nurse if you have questions.     Bring a paper prescription for each of these medications     order for DME                Primary Care Provider Office Phone # Fax #    Ángel Oates [phone] [address] [facility] [address]        Equal Access to Services     : Hadii aad ku hadasho Soomaali, waaxda luqadaha, qaybta kaalmada adeegyada, waxay idiin hayaan adeeg kharash la'aan . So [facility] [phone].    ATENCIÓN: Si habla español, tiene a mayorga disposición servicios gratuitos de asistencia lingüística. Ray al [phone].    We comply with applicable federal civil rights laws and [place] laws. We do not discriminate on the basis of race, color, national origin, age, disability, sex, sexual orientation, or gender identity.            Thank you!     Thank you for choosing [facility]  for your care. Our goal is always to provide you with excellent care. Hearing back from our patients is one way we can continue to improve our services. Please take a few minutes to complete the written survey that you may receive in the mail after your visit with us. Thank you!             Your Updated Medication List - Protect others around you: Learn how to safely use, store and throw away your medicines at www.disposemymeds.org.          This list is accurate as of: 12/4/17  3:19 PM.  Always use your most recent med list.                   Brand Name Dispense Instructions for use Diagnosis    atorvastatin 80 MG tablet    LIPITOR    90 tablet    Take 1 tablet (80 mg) by mouth daily    Paroxysmal atrial fibrillation (H), Other hyperlipidemia       * bumetanide 1 MG tablet    BUMEX     Take mg in am and 1mg in pm        * bumetanide 1 MG tablet    BUMEX    180 tablet    1 mg in am then 0.5 mg at supper but add additional 1 mg if weight over 165    Cardiac amyloidosis  (H), Chronic combined systolic and diastolic congestive heart failure (H)       clotrimazole 1 % cream    LOTRIMIN    60 g    Apply topically 2 times daily In between toes    Tinea pedis of left foot       diclofenac 1 % Gel topical gel    VOLTAREN    100 g    Apply to right great toe.    Pain of toe of right foot       escitalopram 5 MG tablet    LEXAPRO    90 tablet    Take 1 tablet (5 mg) by mouth daily    Major depressive disorder, single episode, mild (H)       lidocaine HCl 3 % cream     85 g    To painful areas on left foot twice daily as needed.    Left foot pain, Peroneal tendinitis, left       * order for DME     5 each    Equipment being ordered: DME compression stockings knee high 30 mm hg    Venous (peripheral) insufficiency       * order for DME     1 Units    Sequential pneumatic compression pumps 2-3 times per day as needed for lymphedema. Measure and fit.  Compression strength per protocol.    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, Abnormality of gait, At risk for fall due to comorbid condition       polyethylene glycol powder    MIRALAX    119 g    Take 17 g (1 capful) by mouth daily as needed for constipation    Other constipation       Potassium Chloride ER 20 MEQ Tbcr     90 tablet    Take 1 tablet (20 mEq) by mouth daily    Paroxysmal atrial fibrillation (H), Chronic diastolic congestive heart failure (H)       warfarin 5 MG tablet    COUMADIN    110 tablet    Take 1-1.5 tablets (5-7.5 mg) by mouth daily Adjust per INR clinic    Paroxysmal atrial fibrillation (H)       * Notice:   This list has 5 medication(s) that are the same as other medications prescribed for you. Read the directions carefully, and ask your doctor or other care provider to review them with you.

## 2017-12-04 NOTE — NURSING NOTE
Chief Complaint   Patient presents with     Hospital F/U     Patient is here for hospital follow up from [facility] ([facility]); Heart disease     Naresh Clark CMA ([facility]) at 2:22 PM on 12/4/2017

## 2017-12-04 NOTE — PATIENT INSTRUCTIONS
Primary Care Center Medication Refill Request Information:  * Please contact your pharmacy regarding ANY request for medication refills.  ** [facility] Prescription Fax = [phone]  * Please allow 3 business days for routine medication refills.  * Please allow 5 business days for controlled substance medication refills.     Primary Care Center Test Result notification information:  *You will be notified with in 7-10 days of your appointment day regarding the results of your test.  If you are on MyChart you will be notified as soon as the provider has reviewed the results and signed off on them.    Primary Care Center [phone]       [facility] [phone]

## 2017-12-04 NOTE — PROGRESS NOTES
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 He presents with his wife of 52 years  Nu, who assists with his medical cares at home.  Hospitalized for syncope 11/21 after getting up and fainted. See below for  Excerpt of details  Assessment:   1. Syncope. Suspect this was due to low blood pressure with slight degree of dehydration as he is sensitive to even small degree of hypovolemic or hypervolemia. Has ambulated today without event. Plan to reduce Bumex to 1 mg in the morning and 0.5 mg in the afternoon with additional 1 mg for weight at or over 165 pounds. Understands that may require increasing dose again if oral intake increases.  2. Troponin elevation. Not due to ACS.   3. 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 Not starting Toprol after discussion with his cardiologist at [place] due to low cardiac output.   4. CKD stage IV.   5. Atrial arrhythmias. Rate controlled.   6. History of CVA. Anticoagulated with coumadin.    I spoke with his cardiologist at [place], Dr. Chris Alberto, this morning. At last evaluation at [facility], LVEF was declined to 25% with low cardiac output. He attempted discussions with Dr. Landers regarding progression of amyloid heart disease and poor prognosis. He recommends not starting beta-blocker therapy given his severely reduced cardiac output. This was discussed with Dr. Landers and his wife. He was not willing to have discussion regarding prognosis.     Plan:  --Okay to discharge today. Would discharge on Bumex to 1 mg in the morning and 0.5 mg in the afternoon with additional 1 mg for weight at or over 165 pounds.  --No beta-blocker for systolic dysfunction due to low cardiac output.  --Follow-up with Dr. Alberto at [facility].    Morena Phillips MD, MD  Department of Cardiology    He was able to return home but shortly after had a visit to the ER for pain un upper  left abdomen determined to be constipation related requiring an enema.  He is now taking miralax daily if needed with at times explosive loose stool then several days of no stool when he does not receive Miralax.  Left eye has almost healed from subconjunctival hemorrhage from straining to have bowel movement.  They are very fatigued from being up at night with trips to the bathroom.  Up several times at night due to urinary frequency from Bumex as well but using a urinal, has to go to the bathroom to have a stool.   Recent labs are stable with normal potassium and creatinine 2.6  He is off Uloric ( due to possible concerns for increased risk of stroke) and recent uric acid is elevated to 10.2. He is following a low purine diet.  He has not had another gout attack recently.   Depression: he feels the low dose of Lexapro 5 mg has assisted with his mood.  He continues with therapy through [facility] including lymphedema treatment. He is homebound due to severe chronic heart failure from Amyloid, dyspnea, stroke with left sided weakness.  PMH:  February, 02/12/17, he was hospitalized at [facility] diagnosed with  a thrombus in the right middle cerebral artery, acute stroke.  He has chronic atrial fibrillation, rate controlled off sotalol 40 mg daily currently pressure remains stable.  He has left foot pain with degenerative process in his foot and is saw podiatry has inserts and new shoes assisting the pain.  REVIEW OF SYSTEMS:  He follows with Dr. Arnold at [facility] who monitors his INR. No dyspnea other than getting into bed, edema of legs better with elevation, compression and wrapping.  Depressed mood after stroke.  He has a history of chronic kidney disease, coronary artery disease involving the native coronary artery disease and had an acute CVA in 02/12/2017 due to occlusion of the distal right M1 segment of middle cerebral artery, status post recanalization with Penumbra ACE 68 device.     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 He lives with his wife.  He has 2 children, a son and daughter, and 2 grandchildren, a grandson and granddaughter.     Patient Active Problem List   Diagnosis     Atrial fibrillation: ablation Dr Jonathan Arvizu/[place]     Lower extremity edema     Hyperlipidemia     Cardiac amyloid: ATTR per cardiac biopsy [facility] 2009     Pain in limb     Status post lumbar surgery     Adjustment disorder     Idiopathic peripheral neuropathy     SOB (shortness of breath)     CHF (congestive heart failure) (H)     Depression     Malignant neoplasm of kidney excluding renal pelvis (H)     Cerebrovascular accident (CVA) (H)     Other amyloidosis     Anemia     Atrial flutter (H)     Cardiomyopathy (H)     Chronic kidney disease, stage III (moderate)     Coronary arteriosclerosis in native artery     Weakness     Hypotension     Pain of foot     Long term current use of anticoagulant therapy     Peripheral neuropathic pain     Myocardial infarction, nontransmural (H)     Nonsustained ventricular tachycardia (H)     CKD (chronic kidney disease) stage 3, GFR 30-59 ml/min     Tinea pedis of left foot     Onychomycosis     Nail deformity     Elevated blood uric acid level     Paroxysmal atrial fibrillation (H)     Past Medical History:   Diagnosis Date     Atrial fibrillation (H) 6/25/2012     Atrial fibrillation: ablation Dr Jonathan Arvizu/[place] 6/25/2012     Cardiac amyloid: ATTR per cardiac biopsy [facility] 2009 6/27/2012     Coronary artery disease      Other and unspecified hyperlipidemia 6/27/2012     Tubular adenoma of colon 2010     Venous insufficiency      Social History     Social History     Marital status:      Spouse name: N/A     Number of children: N/A     Years of education: N/A     Occupational History     Not on file.     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      to his wife Nu ( Vikash), two children Deandra 1969 (Grandchildren Trinh 2003, Nish 2007) and Son Joe Landers III     BP 93/63 (BP Location: Right arm, Patient Position: Chair, Cuff Size: Adult Regular)  Pulse 86  Temp 97.5  F (36.4  C) (Oral)  Resp 16  Wt 77.6 kg (171 lb)  SpO2 100%  BMI 25.25 kg/m2  Weight with his clothing and shoes.  GENERAL:  Examination reveals a delightful gentleman who is very intelligent.  Both he and his wife appear very fatigued today. His speech is clear.  He has decreased movement on the left side of his body, but is able to move.  He is sitting in a wheelchair. His mood interactive, intelligent and smiling.  His wife is very detail oriented, attuned to his medications,caring, worried for her 's well being.They have a good relationship  HEENT:  Mucous membranes are moist.  He appears adequately hydrated today.  NECK:  Supple.  Thyroid is normal.   HEART:  S1, S2, with slightly irregular rhythm.  Rate is controlled.  LUNGS: Clear no rales    ABDOMEN:  Examined  in a chair. No significant distension. Non tender.  EXTREMITIES: Less leg edema,  left ankle has more swelling than the right, both have venous incompetence. Wearing compression stockings  PSYCHOLOGICAL:  Interactive, very intelligent, wanting to understand his health condition.  Depressed mood and indicates frustration with his multiple health issues.  PHQ-9 SCORE 8/21/2017 10/9/2017 11/13/2017   Total Score - - -   Total Score 6 4 5     ASSESSMENT AND PLAN:     Professor Josr Landers, a very  intelligent, pleasant gentleman presents with his wife Nu to follow up for primary care after hospitalization at [facility] for syncope was dehydrated. Dose of  Bumex adjusted to 1 mg in am and 0.5 mg at supper but 1.5 mg if weight over 165. He has advancing heart failure related to amyloid. He had Sotolol discontinued, tolerating without palpitations and feels better.  His depression is slightly better  Lexapro 5 mg daily daily.  Stroke, thrombus of right middle cerebral artery with eft sided weakness: therapy at [facility]. He would benefit from home care to decrease frequent ER visits, assist with medication adherence and caregiver fatigue. Need assessment of other needs in the home. He would benefit from [facility]. Home care orders placed.   Constipation: We discussed his heart condition impacts all bodily functions.Take daily Miralax hol only if three loose stools in one day. Bedside commode.  He was on Uloric for gout prevention but we discontinued due to CV/Stroke risk concern no recent gout.  He is following with a low purine diet and hydrating but no more than 64 oz daily.  He has less edema of legs with wrapping and lymphedema therapy.  Josr was seen today for hospital f/u.    Diagnoses and all orders for this visit:    Cardiac amyloid: ATTR per cardiac biopsy [facility] 2009  -     HOME CARE NURSING REFERRAL  -     bumetanide (BUMEX) 1 MG tablet; 1 mg in am then 0.5 mg at supper but add additional 1 mg if weight over 165    Cerebrovascular accident (CVA) due to thrombosis of right middle cerebral artery (H)  -     HOME CARE NURSING REFERRAL    Chronic combined systolic and diastolic congestive heart failure (H)  -     HOME CARE NURSING REFERRAL  -     bumetanide (BUMEX) 1 MG tablet; 1 mg in am then 0.5 mg at supper but add additional 1 mg if weight over 165    Weakness  -     HOME CARE NURSING REFERRAL  -     order for DME; Equipment being ordered: Bedside commode    Slow transit constipation  -      HOME CARE NURSING REFERRAL  -     order for DME; Equipment being ordered: Bedside commode    Lower extremity edema  -     HOME CARE NURSING REFERRAL    Idiopathic peripheral neuropathy  -     HOME CARE NURSING REFERRAL    Chronic kidney disease, stage III (moderate)  -     HOME CARE NURSING REFERRAL    Vasovagal syncope  -     HOME CARE NURSING REFERRAL      Follow-up in  January  All questions were addressed and voiced understanding and agreement with the above.  Ángel Oates MD

## 2017-12-11 NOTE — TELEPHONE ENCOUNTER
Regarding: FW: Nursing Orders Needed - Dr Oates  Contact: [phone]  Brynn from  Home Care called for nursing orders: Skilled nursing, 1 day 1x. The pt was not admitted to home care, pt and wife prefer to go to outpt PT at [facility].  Please call Brynn at [phone].    December 11, 2017 12:35 PM   for Brynn approving order. Will update Dr. Oates.   Antonella Parada RN, Care Coordinator

## 2018-01-01 ENCOUNTER — APPOINTMENT (OUTPATIENT)
Dept: GENERAL RADIOLOGY | Facility: CLINIC | Age: 78
DRG: 871 | End: 2018-01-01
Attending: PSYCHIATRY & NEUROLOGY
Payer: COMMERCIAL

## 2018-01-01 ENCOUNTER — NURSING HOME VISIT (OUTPATIENT)
Dept: GERIATRICS | Facility: CLINIC | Age: 78
End: 2018-01-01
Payer: COMMERCIAL

## 2018-01-01 ENCOUNTER — TRANSFERRED RECORDS (OUTPATIENT)
Dept: HEALTH INFORMATION MANAGEMENT | Facility: CLINIC | Age: 78
End: 2018-01-01

## 2018-01-01 ENCOUNTER — APPOINTMENT (OUTPATIENT)
Dept: OCCUPATIONAL THERAPY | Facility: CLINIC | Age: 78
DRG: 871 | End: 2018-01-01
Attending: INTERNAL MEDICINE
Payer: COMMERCIAL

## 2018-01-01 ENCOUNTER — APPOINTMENT (OUTPATIENT)
Dept: PHYSICAL THERAPY | Facility: CLINIC | Age: 78
DRG: 871 | End: 2018-01-01
Attending: HOSPITALIST
Payer: COMMERCIAL

## 2018-01-01 ENCOUNTER — APPOINTMENT (OUTPATIENT)
Dept: CARDIOLOGY | Facility: CLINIC | Age: 78
DRG: 871 | End: 2018-01-01
Attending: PSYCHIATRY & NEUROLOGY
Payer: COMMERCIAL

## 2018-01-01 ENCOUNTER — APPOINTMENT (OUTPATIENT)
Dept: CT IMAGING | Facility: CLINIC | Age: 78
DRG: 871 | End: 2018-01-01
Attending: EMERGENCY MEDICINE
Payer: COMMERCIAL

## 2018-01-01 ENCOUNTER — PRE VISIT (OUTPATIENT)
Dept: UROLOGY | Facility: CLINIC | Age: 78
End: 2018-01-01

## 2018-01-01 ENCOUNTER — APPOINTMENT (OUTPATIENT)
Dept: OCCUPATIONAL THERAPY | Facility: CLINIC | Age: 78
DRG: 871 | End: 2018-01-01
Attending: PSYCHIATRY & NEUROLOGY
Payer: COMMERCIAL

## 2018-01-01 ENCOUNTER — APPOINTMENT (OUTPATIENT)
Dept: PHYSICAL THERAPY | Facility: CLINIC | Age: 78
DRG: 871 | End: 2018-01-01
Attending: PSYCHIATRY & NEUROLOGY
Payer: COMMERCIAL

## 2018-01-01 ENCOUNTER — APPOINTMENT (OUTPATIENT)
Dept: OCCUPATIONAL THERAPY | Facility: CLINIC | Age: 78
DRG: 871 | End: 2018-01-01
Attending: HOSPITALIST
Payer: COMMERCIAL

## 2018-01-01 ENCOUNTER — OFFICE VISIT (OUTPATIENT)
Dept: PODIATRY | Facility: CLINIC | Age: 78
End: 2018-01-01
Payer: COMMERCIAL

## 2018-01-01 ENCOUNTER — ALLIED HEALTH/NURSE VISIT (OUTPATIENT)
Dept: NEUROLOGY | Facility: CLINIC | Age: 78
DRG: 871 | End: 2018-01-01
Attending: PSYCHIATRY & NEUROLOGY
Payer: COMMERCIAL

## 2018-01-01 ENCOUNTER — RADIANT APPOINTMENT (OUTPATIENT)
Dept: ULTRASOUND IMAGING | Facility: CLINIC | Age: 78
End: 2018-01-01
Attending: INTERNAL MEDICINE
Payer: COMMERCIAL

## 2018-01-01 ENCOUNTER — APPOINTMENT (OUTPATIENT)
Dept: GENERAL RADIOLOGY | Facility: CLINIC | Age: 78
DRG: 871 | End: 2018-01-01
Attending: INTERNAL MEDICINE
Payer: COMMERCIAL

## 2018-01-01 ENCOUNTER — TELEPHONE (OUTPATIENT)
Dept: INTERNAL MEDICINE | Facility: CLINIC | Age: 78
End: 2018-01-01

## 2018-01-01 ENCOUNTER — APPOINTMENT (OUTPATIENT)
Dept: OCCUPATIONAL THERAPY | Facility: CLINIC | Age: 78
DRG: 871 | End: 2018-01-01
Attending: PHYSICIAN ASSISTANT
Payer: COMMERCIAL

## 2018-01-01 ENCOUNTER — RADIANT APPOINTMENT (OUTPATIENT)
Dept: MRI IMAGING | Facility: CLINIC | Age: 78
End: 2018-01-01
Attending: UROLOGY
Payer: COMMERCIAL

## 2018-01-01 ENCOUNTER — APPOINTMENT (OUTPATIENT)
Dept: INTERVENTIONAL RADIOLOGY/VASCULAR | Facility: CLINIC | Age: 78
DRG: 871 | End: 2018-01-01
Attending: RADIOLOGY PRACTITIONER ASSISTANT
Payer: COMMERCIAL

## 2018-01-01 ENCOUNTER — OFFICE VISIT (OUTPATIENT)
Dept: FAMILY MEDICINE | Facility: CLINIC | Age: 78
End: 2018-01-01
Payer: COMMERCIAL

## 2018-01-01 ENCOUNTER — TELEPHONE (OUTPATIENT)
Dept: GERIATRICS | Facility: CLINIC | Age: 78
End: 2018-01-01

## 2018-01-01 ENCOUNTER — HOSPITAL ENCOUNTER (INPATIENT)
Facility: CLINIC | Age: 78
LOS: 49 days | DRG: 871 | End: 2018-05-15
Attending: EMERGENCY MEDICINE | Admitting: INTERNAL MEDICINE
Payer: COMMERCIAL

## 2018-01-01 ENCOUNTER — APPOINTMENT (OUTPATIENT)
Dept: GENERAL RADIOLOGY | Facility: CLINIC | Age: 78
DRG: 871 | End: 2018-01-01
Attending: PHYSICIAN ASSISTANT
Payer: COMMERCIAL

## 2018-01-01 ENCOUNTER — APPOINTMENT (OUTPATIENT)
Dept: ULTRASOUND IMAGING | Facility: CLINIC | Age: 78
DRG: 871 | End: 2018-01-01
Attending: HOSPITALIST
Payer: COMMERCIAL

## 2018-01-01 ENCOUNTER — TELEPHONE (OUTPATIENT)
Dept: NEPHROLOGY | Facility: CLINIC | Age: 78
End: 2018-01-01

## 2018-01-01 ENCOUNTER — APPOINTMENT (OUTPATIENT)
Dept: PHYSICAL THERAPY | Facility: CLINIC | Age: 78
DRG: 871 | End: 2018-01-01
Attending: PHYSICIAN ASSISTANT
Payer: COMMERCIAL

## 2018-01-01 ENCOUNTER — OFFICE VISIT (OUTPATIENT)
Dept: NEPHROLOGY | Facility: CLINIC | Age: 78
End: 2018-01-01
Attending: INTERNAL MEDICINE
Payer: COMMERCIAL

## 2018-01-01 ENCOUNTER — APPOINTMENT (OUTPATIENT)
Dept: INTERVENTIONAL RADIOLOGY/VASCULAR | Facility: CLINIC | Age: 78
DRG: 871 | End: 2018-01-01
Attending: PHYSICIAN ASSISTANT
Payer: COMMERCIAL

## 2018-01-01 VITALS
SYSTOLIC BLOOD PRESSURE: 99 MMHG | BODY MASS INDEX: 24.37 KG/M2 | HEART RATE: 79 BPM | TEMPERATURE: 97.4 F | OXYGEN SATURATION: 99 % | RESPIRATION RATE: 16 BRPM | WEIGHT: 165 LBS | DIASTOLIC BLOOD PRESSURE: 67 MMHG

## 2018-01-01 VITALS
HEIGHT: 69 IN | BODY MASS INDEX: 24.84 KG/M2 | OXYGEN SATURATION: 99 % | SYSTOLIC BLOOD PRESSURE: 124 MMHG | WEIGHT: 167.7 LBS | DIASTOLIC BLOOD PRESSURE: 80 MMHG | TEMPERATURE: 97.8 F | HEART RATE: 78 BPM

## 2018-01-01 VITALS
WEIGHT: 167.8 LBS | OXYGEN SATURATION: 95 % | DIASTOLIC BLOOD PRESSURE: 74 MMHG | BODY MASS INDEX: 24.78 KG/M2 | HEART RATE: 80 BPM | TEMPERATURE: 97 F | SYSTOLIC BLOOD PRESSURE: 111 MMHG | RESPIRATION RATE: 18 BRPM

## 2018-01-01 VITALS — DIASTOLIC BLOOD PRESSURE: 61 MMHG | HEART RATE: 78 BPM | OXYGEN SATURATION: 98 % | SYSTOLIC BLOOD PRESSURE: 92 MMHG

## 2018-01-01 VITALS
WEIGHT: 141.31 LBS | HEART RATE: 79 BPM | SYSTOLIC BLOOD PRESSURE: 97 MMHG | BODY MASS INDEX: 20.87 KG/M2 | RESPIRATION RATE: 16 BRPM | DIASTOLIC BLOOD PRESSURE: 65 MMHG | TEMPERATURE: 97.8 F | OXYGEN SATURATION: 100 %

## 2018-01-01 VITALS
RESPIRATION RATE: 20 BRPM | TEMPERATURE: 96.8 F | HEART RATE: 74 BPM | DIASTOLIC BLOOD PRESSURE: 74 MMHG | OXYGEN SATURATION: 98 % | BODY MASS INDEX: 25.1 KG/M2 | WEIGHT: 170 LBS | SYSTOLIC BLOOD PRESSURE: 109 MMHG

## 2018-01-01 DIAGNOSIS — I43 CARDIAC AMYLOIDOSIS (H): Primary | ICD-10-CM

## 2018-01-01 DIAGNOSIS — I48.0 PAROXYSMAL ATRIAL FIBRILLATION (H): ICD-10-CM

## 2018-01-01 DIAGNOSIS — M79.2 PERIPHERAL NEUROPATHIC PAIN: ICD-10-CM

## 2018-01-01 DIAGNOSIS — N18.30 CKD (CHRONIC KIDNEY DISEASE) STAGE 3, GFR 30-59 ML/MIN (H): ICD-10-CM

## 2018-01-01 DIAGNOSIS — N18.30 CHRONIC KIDNEY DISEASE, STAGE III (MODERATE) (H): ICD-10-CM

## 2018-01-01 DIAGNOSIS — M79.672 LEFT FOOT PAIN: ICD-10-CM

## 2018-01-01 DIAGNOSIS — N18.4 CKD (CHRONIC KIDNEY DISEASE) STAGE 4, GFR 15-29 ML/MIN (H): ICD-10-CM

## 2018-01-01 DIAGNOSIS — M79.671 PAIN IN BOTH FEET: ICD-10-CM

## 2018-01-01 DIAGNOSIS — N18.30 CKD (CHRONIC KIDNEY DISEASE) STAGE 3, GFR 30-59 ML/MIN (H): Primary | ICD-10-CM

## 2018-01-01 DIAGNOSIS — L60.2 ONYCHAUXIS: ICD-10-CM

## 2018-01-01 DIAGNOSIS — C64.1 MALIGNANT NEOPLASM OF KIDNEY EXCLUDING RENAL PELVIS, RIGHT (H): Primary | ICD-10-CM

## 2018-01-01 DIAGNOSIS — N28.89 RIGHT RENAL MASS: ICD-10-CM

## 2018-01-01 DIAGNOSIS — I63.311 CEREBROVASCULAR ACCIDENT (CVA) DUE TO THROMBOSIS OF RIGHT MIDDLE CEREBRAL ARTERY (H): Primary | ICD-10-CM

## 2018-01-01 DIAGNOSIS — I43 CARDIAC AMYLOIDOSIS (H): ICD-10-CM

## 2018-01-01 DIAGNOSIS — R56.9 SEIZURE-LIKE ACTIVITY (H): Primary | ICD-10-CM

## 2018-01-01 DIAGNOSIS — E85.4 CARDIAC AMYLOIDOSIS (H): ICD-10-CM

## 2018-01-01 DIAGNOSIS — E85.4 CARDIAC AMYLOIDOSIS (H): Primary | ICD-10-CM

## 2018-01-01 DIAGNOSIS — N28.89 RIGHT RENAL MASS: Primary | ICD-10-CM

## 2018-01-01 DIAGNOSIS — R53.1 WEAKNESS: ICD-10-CM

## 2018-01-01 DIAGNOSIS — I89.0 LYMPHEDEMA: ICD-10-CM

## 2018-01-01 DIAGNOSIS — I50.40 COMBINED SYSTOLIC AND DIASTOLIC CONGESTIVE HEART FAILURE, UNSPECIFIED CONGESTIVE HEART FAILURE CHRONICITY: ICD-10-CM

## 2018-01-01 DIAGNOSIS — R41.0 DISORIENTATION: Primary | ICD-10-CM

## 2018-01-01 DIAGNOSIS — R60.0 LOWER EXTREMITY EDEMA: ICD-10-CM

## 2018-01-01 DIAGNOSIS — K59.01 SLOW TRANSIT CONSTIPATION: ICD-10-CM

## 2018-01-01 DIAGNOSIS — I50.43 ACUTE ON CHRONIC COMBINED SYSTOLIC AND DIASTOLIC CONGESTIVE HEART FAILURE (H): ICD-10-CM

## 2018-01-01 DIAGNOSIS — N17.9 ACUTE KIDNEY INJURY (H): ICD-10-CM

## 2018-01-01 DIAGNOSIS — R53.81 PHYSICAL DECONDITIONING: ICD-10-CM

## 2018-01-01 DIAGNOSIS — F32.0 MILD SINGLE CURRENT EPISODE OF MAJOR DEPRESSIVE DISORDER (H): ICD-10-CM

## 2018-01-01 DIAGNOSIS — Z79.01 LONG TERM CURRENT USE OF ANTICOAGULANT THERAPY: ICD-10-CM

## 2018-01-01 DIAGNOSIS — E79.0 ELEVATED BLOOD URIC ACID LEVEL: ICD-10-CM

## 2018-01-01 DIAGNOSIS — R60.0 PERIPHERAL EDEMA: Primary | ICD-10-CM

## 2018-01-01 DIAGNOSIS — G60.9 IDIOPATHIC PERIPHERAL NEUROPATHY: ICD-10-CM

## 2018-01-01 DIAGNOSIS — M79.672 PAIN IN BOTH FEET: ICD-10-CM

## 2018-01-01 DIAGNOSIS — R41.82 ALTERED MENTAL STATUS, UNSPECIFIED ALTERED MENTAL STATUS TYPE: Primary | ICD-10-CM

## 2018-01-01 DIAGNOSIS — R41.82 ALTERED MENTAL STATUS: Primary | ICD-10-CM

## 2018-01-01 DIAGNOSIS — I45.10 RBBB: ICD-10-CM

## 2018-01-01 LAB
ABO + RH BLD: NORMAL
ABO + RH BLD: NORMAL
ALBUMIN SERPL ELPH-MCNC: 4 G/DL (ref 3.7–5.1)
ALBUMIN SERPL-MCNC: 2.5 G/DL (ref 3.4–5)
ALBUMIN SERPL-MCNC: 2.6 G/DL (ref 3.4–5)
ALBUMIN SERPL-MCNC: 2.7 G/DL (ref 3.4–5)
ALBUMIN SERPL-MCNC: 2.7 G/DL (ref 3.4–5)
ALBUMIN SERPL-MCNC: 3 G/DL (ref 3.4–5)
ALBUMIN SERPL-MCNC: 3.1 G/DL (ref 3.4–5)
ALBUMIN SERPL-MCNC: 3.1 G/DL (ref 3.4–5)
ALBUMIN SERPL-MCNC: 3.4 G/DL (ref 3.4–5)
ALBUMIN SERPL-MCNC: 3.5 G/DL (ref 3.4–5)
ALBUMIN UR-MCNC: 100 MG/DL
ALBUMIN UR-MCNC: 30 MG/DL
ALBUMIN UR-MCNC: 30 MG/DL
ALP SERPL-CCNC: 100 U/L (ref 40–150)
ALP SERPL-CCNC: 101 U/L (ref 40–150)
ALP SERPL-CCNC: 106 U/L (ref 40–150)
ALP SERPL-CCNC: 106 U/L (ref 40–150)
ALP SERPL-CCNC: 107 U/L (ref 40–150)
ALP SERPL-CCNC: 110 U/L (ref 40–150)
ALP SERPL-CCNC: 114 U/L (ref 40–150)
ALP SERPL-CCNC: 97 U/L (ref 40–150)
ALP SERPL-CCNC: 99 U/L (ref 40–150)
ALPHA1 GLOB SERPL ELPH-MCNC: 0.3 G/DL (ref 0.2–0.4)
ALPHA2 GLOB SERPL ELPH-MCNC: 0.6 G/DL (ref 0.5–0.9)
ALT SERPL W P-5'-P-CCNC: 48 U/L (ref 0–70)
ALT SERPL W P-5'-P-CCNC: 49 U/L (ref 0–70)
ALT SERPL W P-5'-P-CCNC: 49 U/L (ref 0–70)
ALT SERPL W P-5'-P-CCNC: 50 U/L (ref 0–70)
ALT SERPL W P-5'-P-CCNC: 51 U/L (ref 0–70)
ALT SERPL W P-5'-P-CCNC: 52 U/L (ref 0–70)
ALT SERPL W P-5'-P-CCNC: 56 U/L (ref 0–70)
ALT SERPL W P-5'-P-CCNC: 59 U/L (ref 0–70)
ALT SERPL W P-5'-P-CCNC: 60 U/L (ref 0–70)
AMMONIA PLAS-SCNC: <10 UMOL/L (ref 10–50)
ANION GAP SERPL CALCULATED.3IONS-SCNC: 10 MMOL/L (ref 3–14)
ANION GAP SERPL CALCULATED.3IONS-SCNC: 11 MMOL/L (ref 3–14)
ANION GAP SERPL CALCULATED.3IONS-SCNC: 12 MMOL/L (ref 3–14)
ANION GAP SERPL CALCULATED.3IONS-SCNC: 13 MMOL/L (ref 3–14)
ANION GAP SERPL CALCULATED.3IONS-SCNC: 13 MMOL/L (ref 3–14)
ANION GAP SERPL CALCULATED.3IONS-SCNC: 14 MMOL/L (ref 3–14)
ANION GAP SERPL CALCULATED.3IONS-SCNC: 15 MMOL/L (ref 3–14)
ANION GAP SERPL CALCULATED.3IONS-SCNC: 16 MMOL/L (ref 3–14)
ANION GAP SERPL CALCULATED.3IONS-SCNC: 5 MMOL/L (ref 3–14)
ANION GAP SERPL CALCULATED.3IONS-SCNC: 7 MMOL/L (ref 3–14)
ANION GAP SERPL CALCULATED.3IONS-SCNC: 7 MMOL/L (ref 3–14)
ANION GAP SERPL CALCULATED.3IONS-SCNC: 8 MMOL/L (ref 3–14)
ANION GAP SERPL CALCULATED.3IONS-SCNC: 8 MMOL/L (ref 3–14)
ANION GAP SERPL CALCULATED.3IONS-SCNC: 9 MMOL/L (ref 3–14)
APPEARANCE CSF: CLEAR
APPEARANCE UR: ABNORMAL
AST SERPL W P-5'-P-CCNC: 45 U/L (ref 0–45)
AST SERPL W P-5'-P-CCNC: 50 U/L (ref 0–45)
AST SERPL W P-5'-P-CCNC: 54 U/L (ref 0–45)
AST SERPL W P-5'-P-CCNC: 56 U/L (ref 0–45)
AST SERPL W P-5'-P-CCNC: 62 U/L (ref 0–45)
AST SERPL W P-5'-P-CCNC: 76 U/L (ref 0–45)
AST SERPL W P-5'-P-CCNC: 85 U/L (ref 0–45)
AST SERPL W P-5'-P-CCNC: 94 U/L (ref 0–45)
AST SERPL W P-5'-P-CCNC: ABNORMAL U/L (ref 0–45)
B-GLOBULIN SERPL ELPH-MCNC: 0.7 G/DL (ref 0.6–1)
BACTERIA #/AREA URNS HPF: ABNORMAL /HPF
BACTERIA #/AREA URNS HPF: ABNORMAL /HPF
BACTERIA SPEC CULT: ABNORMAL
BACTERIA SPEC CULT: NO GROWTH
BACTERIA SPEC CULT: NORMAL
BASE DEFICIT BLDV-SCNC: NORMAL MMOL/L
BASE EXCESS BLDV CALC-SCNC: 2 MMOL/L
BASE EXCESS BLDV CALC-SCNC: 6.5 MMOL/L
BASE EXCESS BLDV CALC-SCNC: 7.2 MMOL/L
BASE EXCESS BLDV CALC-SCNC: 8 MMOL/L
BASE EXCESS BLDV CALC-SCNC: NORMAL MMOL/L
BASOPHILS # BLD AUTO: 0 10E9/L (ref 0–0.2)
BASOPHILS # BLD AUTO: 0 10E9/L (ref 0–0.2)
BASOPHILS NFR BLD AUTO: 0.2 %
BASOPHILS NFR BLD AUTO: 0.3 %
BILIRUB DIRECT SERPL-MCNC: 1.2 MG/DL (ref 0–0.2)
BILIRUB SERPL-MCNC: 1.8 MG/DL (ref 0.2–1.3)
BILIRUB SERPL-MCNC: 1.9 MG/DL (ref 0.2–1.3)
BILIRUB SERPL-MCNC: 2.1 MG/DL (ref 0.2–1.3)
BILIRUB SERPL-MCNC: 2.3 MG/DL (ref 0.2–1.3)
BILIRUB SERPL-MCNC: 2.4 MG/DL (ref 0.2–1.3)
BILIRUB SERPL-MCNC: 2.6 MG/DL (ref 0.2–1.3)
BILIRUB SERPL-MCNC: 2.6 MG/DL (ref 0.2–1.3)
BILIRUB UR QL STRIP: NEGATIVE
BLD PROD TYP BPU: NORMAL
BLD UNIT ID BPU: 0
BLOOD PRODUCT CODE: NORMAL
BPU ID: NORMAL
BUN SERPL-MCNC: 102 MG/DL (ref 7–30)
BUN SERPL-MCNC: 104 MG/DL (ref 7–30)
BUN SERPL-MCNC: 114 MG/DL (ref 7–30)
BUN SERPL-MCNC: 116 MG/DL (ref 7–30)
BUN SERPL-MCNC: 120 MG/DL (ref 7–30)
BUN SERPL-MCNC: 124 MG/DL (ref 7–30)
BUN SERPL-MCNC: 130 MG/DL (ref 7–30)
BUN SERPL-MCNC: 131 MG/DL (ref 7–30)
BUN SERPL-MCNC: 134 MG/DL (ref 7–30)
BUN SERPL-MCNC: 137 MG/DL (ref 7–30)
BUN SERPL-MCNC: 39 MG/DL (ref 7–30)
BUN SERPL-MCNC: 48 MG/DL (ref 7–30)
BUN SERPL-MCNC: 51 MG/DL (ref 7–30)
BUN SERPL-MCNC: 51 MG/DL (ref 7–30)
BUN SERPL-MCNC: 52 MG/DL (ref 7–30)
BUN SERPL-MCNC: 53 MG/DL (ref 7–30)
BUN SERPL-MCNC: 53 MG/DL (ref 7–30)
BUN SERPL-MCNC: 54 MG/DL (ref 7–30)
BUN SERPL-MCNC: 55 MG/DL (ref 7–30)
BUN SERPL-MCNC: 56 MG/DL (ref 7–30)
BUN SERPL-MCNC: 60 MG/DL (ref 7–30)
BUN SERPL-MCNC: 66 MG/DL (ref 7–30)
BUN SERPL-MCNC: 67 MG/DL (ref 7–30)
BUN SERPL-MCNC: 68 MG/DL (ref 7–30)
BUN SERPL-MCNC: 68 MG/DL (ref 7–30)
BUN SERPL-MCNC: 69 MG/DL (ref 7–30)
BUN SERPL-MCNC: 70 MG/DL (ref 7–30)
BUN SERPL-MCNC: 75 MG/DL (ref 7–30)
BUN SERPL-MCNC: 75 MG/DL (ref 7–30)
BUN SERPL-MCNC: 77 MG/DL (ref 9–26)
BUN SERPL-MCNC: 79 MG/DL (ref 7–30)
BUN SERPL-MCNC: 79 MG/DL (ref 9–26)
BUN SERPL-MCNC: 79 MG/DL (ref 9–26)
BUN SERPL-MCNC: 82 MG/DL (ref 7–30)
BUN SERPL-MCNC: 83 MG/DL (ref 7–30)
BUN SERPL-MCNC: 87 MG/DL (ref 9–26)
BUN SERPL-MCNC: 88 MG/DL (ref 7–30)
BUN SERPL-MCNC: 88 MG/DL (ref 7–30)
BUN SERPL-MCNC: 97 MG/DL (ref 7–30)
BUN SERPL-MCNC: 98 MG/DL (ref 7–30)
BUN SERPL-MCNC: 99 MG/DL (ref 7–30)
C DIFF TOX B STL QL: NEGATIVE
CALCIUM SERPL-MCNC: 7.9 MG/DL (ref 8.5–10.1)
CALCIUM SERPL-MCNC: 8.3 MG/DL (ref 8.5–10.1)
CALCIUM SERPL-MCNC: 8.3 MG/DL (ref 8.5–10.1)
CALCIUM SERPL-MCNC: 8.4 MG/DL (ref 8.5–10.1)
CALCIUM SERPL-MCNC: 8.5 MG/DL (ref 8.5–10.1)
CALCIUM SERPL-MCNC: 8.6 MG/DL (ref 8.5–10.1)
CALCIUM SERPL-MCNC: 8.6 MG/DL (ref 8.5–10.1)
CALCIUM SERPL-MCNC: 8.7 MG/DL (ref 8.5–10.1)
CALCIUM SERPL-MCNC: 8.8 MG/DL (ref 8.5–10.1)
CALCIUM SERPL-MCNC: 8.8 MG/DL (ref 8.5–10.1)
CALCIUM SERPL-MCNC: 8.9 MG/DL (ref 8.5–10.1)
CALCIUM SERPL-MCNC: 9 MG/DL (ref 8.5–10.1)
CALCIUM SERPL-MCNC: 9 MG/DL (ref 8.5–10.1)
CALCIUM SERPL-MCNC: 9.1 MG/DL (ref 8.5–10.1)
CALCIUM SERPL-MCNC: 9.2 MG/DL (ref 8.5–10.1)
CALCIUM SERPL-MCNC: 9.3 MG/DL (ref 8.4–10.2)
CALCIUM SERPL-MCNC: 9.3 MG/DL (ref 8.4–10.2)
CALCIUM SERPL-MCNC: 9.3 MG/DL (ref 8.5–10.1)
CALCIUM SERPL-MCNC: 9.5 MG/DL (ref 8.5–10.1)
CALCIUM SERPL-MCNC: 9.5 MG/DL (ref 8.5–10.1)
CALCIUM SERPL-MCNC: 9.6 MG/DL (ref 8.4–10.2)
CALCIUM SERPL-MCNC: 9.7 MG/DL (ref 8.4–10.2)
CHLORIDE SERPL-SCNC: 100 MMOL/L (ref 94–109)
CHLORIDE SERPL-SCNC: 101 MMOL/L (ref 94–109)
CHLORIDE SERPL-SCNC: 102 MMOL/L (ref 94–109)
CHLORIDE SERPL-SCNC: 104 MMOL/L (ref 94–109)
CHLORIDE SERPL-SCNC: 87 MMOL/L (ref 94–109)
CHLORIDE SERPL-SCNC: 87 MMOL/L (ref 94–109)
CHLORIDE SERPL-SCNC: 88 MMOL/L (ref 94–109)
CHLORIDE SERPL-SCNC: 89 MMOL/L (ref 94–109)
CHLORIDE SERPL-SCNC: 90 MMOL/L (ref 94–109)
CHLORIDE SERPL-SCNC: 90 MMOL/L (ref 94–109)
CHLORIDE SERPL-SCNC: 91 MMOL/L (ref 94–109)
CHLORIDE SERPL-SCNC: 92 MMOL/L (ref 94–109)
CHLORIDE SERPL-SCNC: 93 MMOL/L (ref 94–109)
CHLORIDE SERPL-SCNC: 96 MMOL/L (ref 94–109)
CHLORIDE SERPL-SCNC: 97 MMOL/L (ref 94–109)
CHLORIDE SERPL-SCNC: 97 MMOL/L (ref 94–109)
CHLORIDE SERPL-SCNC: 98 MMOL/L (ref 94–109)
CHLORIDE SERPL-SCNC: 99 MMOL/L (ref 94–109)
CHLORIDE SERPLBLD-SCNC: 100 MMOL/L (ref 98–109)
CHLORIDE SERPLBLD-SCNC: 100 MMOL/L (ref 98–109)
CHLORIDE SERPLBLD-SCNC: 97 MMOL/L (ref 98–109)
CHLORIDE SERPLBLD-SCNC: 98 MMOL/L (ref 98–109)
CO2 BLDCOV-SCNC: 29 MMOL/L (ref 21–28)
CO2 SERPL-SCNC: 20 MMOL/L (ref 20–32)
CO2 SERPL-SCNC: 20 MMOL/L (ref 20–32)
CO2 SERPL-SCNC: 21 MMOL/L (ref 20–32)
CO2 SERPL-SCNC: 22 MMOL/L (ref 20–32)
CO2 SERPL-SCNC: 22 MMOL/L (ref 22–31)
CO2 SERPL-SCNC: 23 MMOL/L (ref 20–32)
CO2 SERPL-SCNC: 24 MMOL/L (ref 20–32)
CO2 SERPL-SCNC: 25 MMOL/L (ref 20–32)
CO2 SERPL-SCNC: 26 MMOL/L (ref 20–32)
CO2 SERPL-SCNC: 26 MMOL/L (ref 22–31)
CO2 SERPL-SCNC: 26 MMOL/L (ref 22–31)
CO2 SERPL-SCNC: 27 MMOL/L (ref 20–32)
CO2 SERPL-SCNC: 28 MMOL/L (ref 20–32)
CO2 SERPL-SCNC: 28 MMOL/L (ref 20–32)
CO2 SERPL-SCNC: 29 MMOL/L (ref 20–32)
CO2 SERPL-SCNC: 29 MMOL/L (ref 22–31)
CO2 SERPL-SCNC: 30 MMOL/L (ref 20–32)
CO2 SERPL-SCNC: 31 MMOL/L (ref 20–32)
CO2 SERPL-SCNC: 32 MMOL/L (ref 20–32)
CO2 SERPL-SCNC: 33 MMOL/L (ref 20–32)
CO2 SERPL-SCNC: 33 MMOL/L (ref 20–32)
COLOR CSF: COLORLESS
COLOR UR AUTO: ABNORMAL
COLOR UR AUTO: ABNORMAL
COLOR UR AUTO: YELLOW
CREAT BLD-MCNC: 2.6 MG/DL (ref 0.66–1.25)
CREAT SERPL-MCNC: 1.59 MG/DL (ref 0.66–1.25)
CREAT SERPL-MCNC: 1.81 MG/DL (ref 0.66–1.25)
CREAT SERPL-MCNC: 2.25 MG/DL (ref 0.66–1.25)
CREAT SERPL-MCNC: 2.25 MG/DL (ref 0.66–1.25)
CREAT SERPL-MCNC: 2.29 MG/DL (ref 0.73–1.18)
CREAT SERPL-MCNC: 2.34 MG/DL (ref 0.66–1.25)
CREAT SERPL-MCNC: 2.35 MG/DL (ref 0.66–1.25)
CREAT SERPL-MCNC: 2.35 MG/DL (ref 0.66–1.25)
CREAT SERPL-MCNC: 2.41 MG/DL (ref 0.66–1.25)
CREAT SERPL-MCNC: 2.42 MG/DL (ref 0.66–1.25)
CREAT SERPL-MCNC: 2.43 MG/DL (ref 0.73–1.18)
CREAT SERPL-MCNC: 2.46 MG/DL (ref 0.66–1.25)
CREAT SERPL-MCNC: 2.56 MG/DL (ref 0.73–1.18)
CREAT SERPL-MCNC: 2.64 MG/DL (ref 0.66–1.25)
CREAT SERPL-MCNC: 2.67 MG/DL (ref 0.66–1.25)
CREAT SERPL-MCNC: 2.68 MG/DL (ref 0.66–1.25)
CREAT SERPL-MCNC: 2.73 MG/DL (ref 0.66–1.25)
CREAT SERPL-MCNC: 2.76 MG/DL (ref 0.66–1.25)
CREAT SERPL-MCNC: 2.77 MG/DL (ref 0.66–1.25)
CREAT SERPL-MCNC: 2.93 MG/DL (ref 0.66–1.25)
CREAT SERPL-MCNC: 2.93 MG/DL (ref 0.66–1.25)
CREAT SERPL-MCNC: 2.94 MG/DL (ref 0.66–1.25)
CREAT SERPL-MCNC: 2.94 MG/DL (ref 0.66–1.25)
CREAT SERPL-MCNC: 3.07 MG/DL (ref 0.66–1.25)
CREAT SERPL-MCNC: 3.22 MG/DL (ref 0.66–1.25)
CREAT SERPL-MCNC: 3.23 MG/DL (ref 0.66–1.25)
CREAT SERPL-MCNC: 3.27 MG/DL (ref 0.66–1.25)
CREAT SERPL-MCNC: 3.39 MG/DL (ref 0.73–1.18)
CREAT SERPL-MCNC: 3.42 MG/DL (ref 0.66–1.25)
CREAT SERPL-MCNC: 3.56 MG/DL (ref 0.66–1.25)
CREAT SERPL-MCNC: 3.58 MG/DL (ref 0.66–1.25)
CREAT SERPL-MCNC: 3.67 MG/DL (ref 0.66–1.25)
CREAT SERPL-MCNC: 3.82 MG/DL (ref 0.66–1.25)
CREAT SERPL-MCNC: 4.25 MG/DL (ref 0.66–1.25)
CREAT SERPL-MCNC: 4.41 MG/DL (ref 0.66–1.25)
CREAT SERPL-MCNC: 4.56 MG/DL (ref 0.66–1.25)
CREAT SERPL-MCNC: 4.62 MG/DL (ref 0.66–1.25)
CREAT SERPL-MCNC: 4.99 MG/DL (ref 0.66–1.25)
CREAT SERPL-MCNC: 5.12 MG/DL (ref 0.66–1.25)
CREAT SERPL-MCNC: 5.2 MG/DL (ref 0.66–1.25)
CREAT SERPL-MCNC: 5.49 MG/DL (ref 0.66–1.25)
CREAT SERPL-MCNC: 5.78 MG/DL (ref 0.66–1.25)
CREAT SERPL-MCNC: 5.79 MG/DL (ref 0.66–1.25)
CREAT SERPL-MCNC: ABNORMAL MG/DL (ref 0.66–1.25)
CREAT UR-MCNC: 109 MG/DL
CREAT UR-MCNC: 65 MG/DL
CRP SERPL-MCNC: 54 MG/L (ref 0–8)
DIFFERENTIAL METHOD BLD: ABNORMAL
DIFFERENTIAL METHOD BLD: ABNORMAL
EOSINOPHIL # BLD AUTO: 0 10E9/L (ref 0–0.7)
EOSINOPHIL # BLD AUTO: 0 10E9/L (ref 0–0.7)
EOSINOPHIL NFR BLD AUTO: 0.3 %
EOSINOPHIL NFR BLD AUTO: 0.7 %
ERYTHROCYTE [DISTWIDTH] IN BLOOD BY AUTOMATED COUNT: 15.6 % (ref 10–15)
ERYTHROCYTE [DISTWIDTH] IN BLOOD BY AUTOMATED COUNT: 15.7 % (ref 10–15)
ERYTHROCYTE [DISTWIDTH] IN BLOOD BY AUTOMATED COUNT: 16 % (ref 10–15)
ERYTHROCYTE [DISTWIDTH] IN BLOOD BY AUTOMATED COUNT: 16 % (ref 10–15)
ERYTHROCYTE [DISTWIDTH] IN BLOOD BY AUTOMATED COUNT: 16.1 % (ref 10–15)
ERYTHROCYTE [DISTWIDTH] IN BLOOD BY AUTOMATED COUNT: 16.2 % (ref 10–15)
ERYTHROCYTE [DISTWIDTH] IN BLOOD BY AUTOMATED COUNT: 16.3 % (ref 10–15)
ERYTHROCYTE [DISTWIDTH] IN BLOOD BY AUTOMATED COUNT: 16.4 % (ref 10–15)
ERYTHROCYTE [DISTWIDTH] IN BLOOD BY AUTOMATED COUNT: 16.5 % (ref 10–15)
ERYTHROCYTE [DISTWIDTH] IN BLOOD BY AUTOMATED COUNT: 16.5 % (ref 10–15)
ERYTHROCYTE [DISTWIDTH] IN BLOOD BY AUTOMATED COUNT: 16.6 % (ref 10–15)
ERYTHROCYTE [DISTWIDTH] IN BLOOD BY AUTOMATED COUNT: 16.7 % (ref 10–15)
ERYTHROCYTE [DISTWIDTH] IN BLOOD BY AUTOMATED COUNT: 16.8 % (ref 10–15)
ERYTHROCYTE [DISTWIDTH] IN BLOOD BY AUTOMATED COUNT: 16.8 % (ref 10–15)
ERYTHROCYTE [SEDIMENTATION RATE] IN BLOOD BY WESTERGREN METHOD: 24 MM/H (ref 0–20)
FRACT EXCRET NA UR+SERPL-RTO: 0.6 %
GAMMA GLOB SERPL ELPH-MCNC: 1.3 G/DL (ref 0.7–1.6)
GFR SERPL CREATININE-BSD FRML MDRD: 10 ML/MIN/1.7M2
GFR SERPL CREATININE-BSD FRML MDRD: 11 ML/MIN/1.7M2
GFR SERPL CREATININE-BSD FRML MDRD: 12 ML/MIN/1.7M2
GFR SERPL CREATININE-BSD FRML MDRD: 13 ML/MIN/1.7M2
GFR SERPL CREATININE-BSD FRML MDRD: 13 ML/MIN/1.7M2
GFR SERPL CREATININE-BSD FRML MDRD: 14 ML/MIN/1.7M2
GFR SERPL CREATININE-BSD FRML MDRD: 15 ML/MIN/1.7M2
GFR SERPL CREATININE-BSD FRML MDRD: 16 ML/MIN/1.7M2
GFR SERPL CREATININE-BSD FRML MDRD: 17 ML/MIN/1.73M2
GFR SERPL CREATININE-BSD FRML MDRD: 17 ML/MIN/1.7M2
GFR SERPL CREATININE-BSD FRML MDRD: 17 ML/MIN/1.7M2
GFR SERPL CREATININE-BSD FRML MDRD: 18 ML/MIN/1.7M2
GFR SERPL CREATININE-BSD FRML MDRD: 19 ML/MIN/1.7M2
GFR SERPL CREATININE-BSD FRML MDRD: 20 ML/MIN/1.7M2
GFR SERPL CREATININE-BSD FRML MDRD: 21 ML/MIN/1.7M2
GFR SERPL CREATININE-BSD FRML MDRD: 22 ML/MIN/1.7M2
GFR SERPL CREATININE-BSD FRML MDRD: 23 ML/MIN/1.7M2
GFR SERPL CREATININE-BSD FRML MDRD: 24 ML/MIN/1.73M2
GFR SERPL CREATININE-BSD FRML MDRD: 24 ML/MIN/1.7M2
GFR SERPL CREATININE-BSD FRML MDRD: 24 ML/MIN/1.7M2
GFR SERPL CREATININE-BSD FRML MDRD: 25 ML/MIN/1.73M2
GFR SERPL CREATININE-BSD FRML MDRD: 26 ML/MIN/1.7M2
GFR SERPL CREATININE-BSD FRML MDRD: 27 ML/MIN/1.73M2
GFR SERPL CREATININE-BSD FRML MDRD: 27 ML/MIN/1.7M2
GFR SERPL CREATININE-BSD FRML MDRD: 29 ML/MIN/1.7M2
GFR SERPL CREATININE-BSD FRML MDRD: 29 ML/MIN/1.7M2
GFR SERPL CREATININE-BSD FRML MDRD: 37 ML/MIN/1.7M2
GFR SERPL CREATININE-BSD FRML MDRD: 43 ML/MIN/1.7M2
GFR SERPL CREATININE-BSD FRML MDRD: ABNORMAL ML/MIN/1.7M2
GLUCOSE BLDC GLUCOMTR-MCNC: 122 MG/DL (ref 70–99)
GLUCOSE BLDC GLUCOMTR-MCNC: 144 MG/DL (ref 70–99)
GLUCOSE BLDC GLUCOMTR-MCNC: 76 MG/DL (ref 70–99)
GLUCOSE BLDC GLUCOMTR-MCNC: 81 MG/DL (ref 70–99)
GLUCOSE BLDC GLUCOMTR-MCNC: 83 MG/DL (ref 70–99)
GLUCOSE BLDC GLUCOMTR-MCNC: 87 MG/DL (ref 70–99)
GLUCOSE BLDC GLUCOMTR-MCNC: 93 MG/DL (ref 70–99)
GLUCOSE CSF-MCNC: 54 MG/DL (ref 40–70)
GLUCOSE SERPL-MCNC: 103 MG/DL (ref 70–100)
GLUCOSE SERPL-MCNC: 106 MG/DL (ref 70–99)
GLUCOSE SERPL-MCNC: 109 MG/DL (ref 70–99)
GLUCOSE SERPL-MCNC: 111 MG/DL (ref 70–99)
GLUCOSE SERPL-MCNC: 117 MG/DL (ref 70–99)
GLUCOSE SERPL-MCNC: 124 MG/DL (ref 70–99)
GLUCOSE SERPL-MCNC: 139 MG/DL (ref 70–99)
GLUCOSE SERPL-MCNC: 142 MG/DL (ref 70–99)
GLUCOSE SERPL-MCNC: 67 MG/DL (ref 70–99)
GLUCOSE SERPL-MCNC: 70 MG/DL (ref 70–99)
GLUCOSE SERPL-MCNC: 70 MG/DL (ref 70–99)
GLUCOSE SERPL-MCNC: 76 MG/DL (ref 70–99)
GLUCOSE SERPL-MCNC: 77 MG/DL (ref 70–99)
GLUCOSE SERPL-MCNC: 82 MG/DL (ref 70–99)
GLUCOSE SERPL-MCNC: 82 MG/DL (ref 70–99)
GLUCOSE SERPL-MCNC: 83 MG/DL (ref 70–99)
GLUCOSE SERPL-MCNC: 83 MG/DL (ref 70–99)
GLUCOSE SERPL-MCNC: 84 MG/DL (ref 70–100)
GLUCOSE SERPL-MCNC: 84 MG/DL (ref 70–99)
GLUCOSE SERPL-MCNC: 85 MG/DL (ref 70–99)
GLUCOSE SERPL-MCNC: 86 MG/DL (ref 70–99)
GLUCOSE SERPL-MCNC: 86 MG/DL (ref 70–99)
GLUCOSE SERPL-MCNC: 87 MG/DL (ref 70–99)
GLUCOSE SERPL-MCNC: 87 MG/DL (ref 70–99)
GLUCOSE SERPL-MCNC: 88 MG/DL (ref 70–99)
GLUCOSE SERPL-MCNC: 89 MG/DL (ref 70–99)
GLUCOSE SERPL-MCNC: 91 MG/DL (ref 70–100)
GLUCOSE SERPL-MCNC: 91 MG/DL (ref 70–99)
GLUCOSE SERPL-MCNC: 93 MG/DL (ref 70–99)
GLUCOSE SERPL-MCNC: 95 MG/DL (ref 70–99)
GLUCOSE SERPL-MCNC: 96 MG/DL (ref 70–99)
GLUCOSE SERPL-MCNC: 98 MG/DL (ref 70–99)
GLUCOSE SERPL-MCNC: 99 MG/DL (ref 70–100)
GLUCOSE SERPL-MCNC: 99 MG/DL (ref 70–99)
GLUCOSE SERPL-MCNC: 99 MG/DL (ref 70–99)
GLUCOSE UR STRIP-MCNC: NEGATIVE MG/DL
GRAM STN SPEC: ABNORMAL
GRAM STN SPEC: NORMAL
HBV SURFACE AB SERPL IA-ACNC: 0.89 M[IU]/ML
HBV SURFACE AG SERPL QL IA: NONREACTIVE
HCO3 BLDV-SCNC: 26 MMOL/L (ref 21–28)
HCO3 BLDV-SCNC: 31 MMOL/L (ref 21–28)
HCO3 BLDV-SCNC: 33 MMOL/L (ref 21–28)
HCO3 BLDV-SCNC: 33 MMOL/L (ref 21–28)
HCO3 BLDV-SCNC: NORMAL MMOL/L (ref 21–28)
HCT VFR BLD AUTO: 29.3 % (ref 40–53)
HCT VFR BLD AUTO: 32.3 % (ref 40–53)
HCT VFR BLD AUTO: 32.8 % (ref 40–53)
HCT VFR BLD AUTO: 33.7 % (ref 40–53)
HCT VFR BLD AUTO: 34 % (ref 40–53)
HCT VFR BLD AUTO: 34 % (ref 40–53)
HCT VFR BLD AUTO: 35.1 % (ref 40–53)
HCT VFR BLD AUTO: 38.2 % (ref 40–53)
HCT VFR BLD AUTO: 39.3 % (ref 40–53)
HCT VFR BLD AUTO: 39.6 % (ref 40–53)
HCT VFR BLD AUTO: 39.8 % (ref 40–53)
HCT VFR BLD AUTO: 40.3 % (ref 40–53)
HCT VFR BLD AUTO: 40.9 % (ref 40–53)
HCT VFR BLD AUTO: 41.3 % (ref 40–53)
HCT VFR BLD AUTO: 42.8 % (ref 40–53)
HCT VFR BLD AUTO: 44.7 % (ref 40–53)
HEMOGLOBIN: 13.2 G/DL (ref 13.4–17.5)
HEMOGLOBIN: 13.3 G/DL (ref 13.4–17.5)
HGB BLD-MCNC: 10.7 G/DL (ref 13.3–17.7)
HGB BLD-MCNC: 10.8 G/DL (ref 13.3–17.7)
HGB BLD-MCNC: 10.9 G/DL (ref 13.3–17.7)
HGB BLD-MCNC: 11.3 G/DL (ref 13.3–17.7)
HGB BLD-MCNC: 11.4 G/DL (ref 13.3–17.7)
HGB BLD-MCNC: 11.6 G/DL (ref 13.3–17.7)
HGB BLD-MCNC: 12.9 G/DL (ref 13.3–17.7)
HGB BLD-MCNC: 13 G/DL (ref 13.3–17.7)
HGB BLD-MCNC: 13.4 G/DL (ref 13.3–17.7)
HGB BLD-MCNC: 13.4 G/DL (ref 13.3–17.7)
HGB BLD-MCNC: 13.6 G/DL (ref 13.3–17.7)
HGB BLD-MCNC: 13.7 G/DL (ref 13.3–17.7)
HGB BLD-MCNC: 14 G/DL (ref 13.3–17.7)
HGB BLD-MCNC: 14.7 G/DL (ref 13.3–17.7)
HGB BLD-MCNC: 15.1 G/DL (ref 13.3–17.7)
HGB BLD-MCNC: 9.9 G/DL (ref 13.3–17.7)
HGB UR QL STRIP: ABNORMAL
HGB UR QL STRIP: NEGATIVE
HYALINE CASTS #/AREA URNS LPF: 15 /LPF (ref 0–2)
HYALINE CASTS #/AREA URNS LPF: 4 /LPF (ref 0–2)
IMM GRANULOCYTES # BLD: 0 10E9/L (ref 0–0.4)
IMM GRANULOCYTES # BLD: 0 10E9/L (ref 0–0.4)
IMM GRANULOCYTES NFR BLD: 0.2 %
IMM GRANULOCYTES NFR BLD: 0.3 %
INR PPP: 1.41 (ref 0.86–1.14)
INR PPP: 1.43 (ref 0.86–1.14)
INR PPP: 1.59 (ref 0.86–1.14)
INR PPP: 1.63 (ref 0.86–1.14)
INR PPP: 1.66 (ref 0.86–1.14)
INR PPP: 1.75 (ref 0.86–1.14)
INR PPP: 1.92 (ref 0.86–1.14)
INR PPP: 2.16 (ref 0.86–1.14)
INR PPP: 2.67 (ref 0.86–1.14)
INR PPP: 3.88 (ref 0.86–1.14)
INR PPP: 4.22 (ref 0.86–1.14)
INTERPRETATION ECG - MUSE: NORMAL
KAPPA LC UR-MCNC: 4.92 MG/DL (ref 0.33–1.94)
KAPPA LC/LAMBDA SER: 1.77 {RATIO} (ref 0.26–1.65)
KETONES UR STRIP-MCNC: NEGATIVE MG/DL
LACTATE BLD-SCNC: 1.1 MMOL/L (ref 0.4–1.9)
LACTATE BLD-SCNC: 1.1 MMOL/L (ref 0.4–1.9)
LACTATE BLD-SCNC: 1.3 MMOL/L (ref 0.4–1.9)
LACTATE BLD-SCNC: 1.5 MMOL/L (ref 0.7–2)
LACTATE BLD-SCNC: 2 MMOL/L (ref 0.7–2)
LACTATE BLD-SCNC: 2.3 MMOL/L (ref 0.7–2.1)
LACTATE BLD-SCNC: 3 MMOL/L (ref 0.7–2)
LACTATE BLD-SCNC: 3.6 MMOL/L (ref 0.4–1.9)
LAMBDA LC SERPL-MCNC: 2.78 MG/DL (ref 0.57–2.63)
LEUKOCYTE ESTERASE UR QL STRIP: ABNORMAL
LIPASE SERPL-CCNC: 120 U/L (ref 73–393)
LMWH PPP CHRO-ACNC: 0.12 IU/ML
LMWH PPP CHRO-ACNC: 0.29 IU/ML
LMWH PPP CHRO-ACNC: 0.29 IU/ML
LMWH PPP CHRO-ACNC: 0.3 IU/ML
LMWH PPP CHRO-ACNC: 0.43 IU/ML
LMWH PPP CHRO-ACNC: 0.97 IU/ML
LYMPHOCYTES # BLD AUTO: 0.7 10E9/L (ref 0.8–5.3)
LYMPHOCYTES # BLD AUTO: 1 10E9/L (ref 0.8–5.3)
LYMPHOCYTES NFR BLD AUTO: 12.1 %
LYMPHOCYTES NFR BLD AUTO: 12.5 %
Lab: ABNORMAL
Lab: NORMAL
M PROTEIN SERPL ELPH-MCNC: 0 G/DL
MAGNESIUM SERPL-MCNC: 2 MG/DL (ref 1.6–2.3)
MAGNESIUM SERPL-MCNC: 2 MG/DL (ref 1.6–2.3)
MAGNESIUM SERPL-MCNC: 2.2 MG/DL (ref 1.6–2.3)
MAGNESIUM SERPL-MCNC: 2.5 MG/DL (ref 1.6–2.3)
MAGNESIUM SERPL-MCNC: 2.6 MG/DL (ref 1.6–2.3)
MAGNESIUM SERPL-MCNC: 2.6 MG/DL (ref 1.6–2.3)
MAGNESIUM SERPL-MCNC: 2.9 MG/DL (ref 1.6–2.3)
MAGNESIUM SERPL-MCNC: 3 MG/DL (ref 1.6–2.3)
MCH RBC QN AUTO: 32.2 PG (ref 26.5–33)
MCH RBC QN AUTO: 32.2 PG (ref 26.5–33)
MCH RBC QN AUTO: 32.3 PG (ref 26.5–33)
MCH RBC QN AUTO: 32.4 PG (ref 26.5–33)
MCH RBC QN AUTO: 32.5 PG (ref 26.5–33)
MCH RBC QN AUTO: 32.5 PG (ref 26.5–33)
MCH RBC QN AUTO: 32.6 PG (ref 26.5–33)
MCH RBC QN AUTO: 32.7 PG (ref 26.5–33)
MCH RBC QN AUTO: 32.9 PG (ref 26.5–33)
MCH RBC QN AUTO: 32.9 PG (ref 26.5–33)
MCH RBC QN AUTO: 33 PG (ref 26.5–33)
MCH RBC QN AUTO: 33.6 PG (ref 26.5–33)
MCHC RBC AUTO-ENTMCNC: 32.2 G/DL (ref 31.5–36.5)
MCHC RBC AUTO-ENTMCNC: 32.3 G/DL (ref 31.5–36.5)
MCHC RBC AUTO-ENTMCNC: 32.9 G/DL (ref 31.5–36.5)
MCHC RBC AUTO-ENTMCNC: 32.9 G/DL (ref 31.5–36.5)
MCHC RBC AUTO-ENTMCNC: 33.1 G/DL (ref 31.5–36.5)
MCHC RBC AUTO-ENTMCNC: 33.1 G/DL (ref 31.5–36.5)
MCHC RBC AUTO-ENTMCNC: 33.5 G/DL (ref 31.5–36.5)
MCHC RBC AUTO-ENTMCNC: 33.7 G/DL (ref 31.5–36.5)
MCHC RBC AUTO-ENTMCNC: 33.8 G/DL (ref 31.5–36.5)
MCHC RBC AUTO-ENTMCNC: 34 G/DL (ref 31.5–36.5)
MCHC RBC AUTO-ENTMCNC: 34.1 G/DL (ref 31.5–36.5)
MCHC RBC AUTO-ENTMCNC: 34.2 G/DL (ref 31.5–36.5)
MCHC RBC AUTO-ENTMCNC: 34.3 G/DL (ref 31.5–36.5)
MCV RBC AUTO: 100 FL (ref 78–100)
MCV RBC AUTO: 100 FL (ref 78–100)
MCV RBC AUTO: 96 FL (ref 78–100)
MCV RBC AUTO: 97 FL (ref 78–100)
MCV RBC AUTO: 98 FL (ref 78–100)
MCV RBC AUTO: 99 FL (ref 78–100)
METHYLMALONATE SERPL-SCNC: 0.53 UMOL/L (ref 0–0.4)
MONOCYTES # BLD AUTO: 0.3 10E9/L (ref 0–1.3)
MONOCYTES # BLD AUTO: 0.4 10E9/L (ref 0–1.3)
MONOCYTES NFR BLD AUTO: 4.4 %
MONOCYTES NFR BLD AUTO: 4.7 %
MUCOUS THREADS #/AREA URNS LPF: PRESENT /LPF
MUCOUS THREADS #/AREA URNS LPF: PRESENT /LPF
NEUTROPHILS # BLD AUTO: 4.9 10E9/L (ref 1.6–8.3)
NEUTROPHILS # BLD AUTO: 6.5 10E9/L (ref 1.6–8.3)
NEUTROPHILS NFR BLD AUTO: 81.9 %
NEUTROPHILS NFR BLD AUTO: 82.4 %
NITRATE UR QL: NEGATIVE
NRBC # BLD AUTO: 0 10*3/UL
NRBC # BLD AUTO: 0 10*3/UL
NRBC BLD AUTO-RTO: 0 /100
NRBC BLD AUTO-RTO: 0 /100
NUM BPU REQUESTED: 1
NUM BPU REQUESTED: 2
O2/TOTAL GAS SETTING VFR VENT: 21 %
O2/TOTAL GAS SETTING VFR VENT: NORMAL %
OXYHGB MFR BLDV: 45 %
OXYHGB MFR BLDV: 47 %
OXYHGB MFR BLDV: 49 %
OXYHGB MFR BLDV: 66 %
OXYHGB MFR BLDV: NORMAL %
PCO2 BLDV: 39 MM HG (ref 40–50)
PCO2 BLDV: 41 MM HG (ref 40–50)
PCO2 BLDV: 46 MM HG (ref 40–50)
PCO2 BLDV: 46 MM HG (ref 40–50)
PCO2 BLDV: 48 MM HG (ref 40–50)
PCO2 BLDV: NORMAL MM HG (ref 40–50)
PH BLDV: 7.41 PH (ref 7.32–7.43)
PH BLDV: 7.44 PH (ref 7.32–7.43)
PH BLDV: 7.44 PH (ref 7.32–7.43)
PH BLDV: 7.46 PH (ref 7.32–7.43)
PH BLDV: 7.48 PH (ref 7.32–7.43)
PH BLDV: NORMAL PH (ref 7.32–7.43)
PH UR STRIP: 5.5 PH (ref 5–7)
PH UR STRIP: 6 PH (ref 5–7)
PH UR STRIP: 6.5 PH (ref 5–7)
PHOSPHATE SERPL-MCNC: 3.8 MG/DL (ref 2.5–4.5)
PHOSPHATE SERPL-MCNC: 4.4 MG/DL (ref 2.5–4.5)
PLATELET # BLD AUTO: 100 10E9/L (ref 150–450)
PLATELET # BLD AUTO: 112 10E9/L (ref 150–450)
PLATELET # BLD AUTO: 119 10E9/L (ref 150–450)
PLATELET # BLD AUTO: 126 10E9/L (ref 150–450)
PLATELET # BLD AUTO: 135 10E9/L (ref 150–450)
PLATELET # BLD AUTO: 150 10E9/L (ref 150–450)
PLATELET # BLD AUTO: 152 10E9/L (ref 150–450)
PLATELET # BLD AUTO: 76 10E9/L (ref 150–450)
PLATELET # BLD AUTO: 79 10E9/L (ref 150–450)
PLATELET # BLD AUTO: 81 10E9/L (ref 150–450)
PLATELET # BLD AUTO: 82 10E9/L (ref 150–450)
PLATELET # BLD AUTO: 85 10E9/L (ref 150–450)
PLATELET # BLD AUTO: 85 10E9/L (ref 150–450)
PLATELET # BLD AUTO: 86 10E9/L (ref 150–450)
PLATELET # BLD AUTO: 89 10E9/L (ref 150–450)
PLATELET # BLD AUTO: 90 10E9/L (ref 150–450)
PLATELET # BLD AUTO: 91 10E9/L (ref 150–450)
PLATELET # BLD AUTO: 92 10E9/L (ref 150–450)
PLATELET # BLD AUTO: 92 10E9/L (ref 150–450)
PLATELET # BLD AUTO: 95 10E9/L (ref 150–450)
PLATELET # BLD EST: ABNORMAL 10*3/UL
PO2 BLDV: 19 MM HG (ref 25–47)
PO2 BLDV: 28 MM HG (ref 25–47)
PO2 BLDV: 28 MM HG (ref 25–47)
PO2 BLDV: 29 MM HG (ref 25–47)
PO2 BLDV: 37 MM HG (ref 25–47)
PO2 BLDV: NORMAL MM HG (ref 25–47)
POTASSIUM SERPL-SCNC: 2.9 MMOL/L (ref 3.4–5.3)
POTASSIUM SERPL-SCNC: 3.3 MMOL/L (ref 3.4–5.3)
POTASSIUM SERPL-SCNC: 3.5 MMOL/L (ref 3.4–5.3)
POTASSIUM SERPL-SCNC: 3.5 MMOL/L (ref 3.4–5.3)
POTASSIUM SERPL-SCNC: 3.6 MMOL/L (ref 3.4–5.3)
POTASSIUM SERPL-SCNC: 3.7 MMOL/L (ref 3.4–5.3)
POTASSIUM SERPL-SCNC: 3.8 MMOL/L (ref 3.4–5.3)
POTASSIUM SERPL-SCNC: 3.8 MMOL/L (ref 3.4–5.3)
POTASSIUM SERPL-SCNC: 3.9 MMOL/L (ref 3.4–5.3)
POTASSIUM SERPL-SCNC: 4 MMOL/L (ref 3.4–5.3)
POTASSIUM SERPL-SCNC: 4.1 MMOL/L (ref 3.4–5.3)
POTASSIUM SERPL-SCNC: 4.1 MMOL/L (ref 3.5–5.2)
POTASSIUM SERPL-SCNC: 4.1 MMOL/L (ref 3.5–5.2)
POTASSIUM SERPL-SCNC: 4.2 MMOL/L (ref 3.4–5.3)
POTASSIUM SERPL-SCNC: 4.3 MMOL/L (ref 3.4–5.3)
POTASSIUM SERPL-SCNC: 4.3 MMOL/L (ref 3.5–5.2)
POTASSIUM SERPL-SCNC: 4.4 MMOL/L (ref 3.4–5.3)
POTASSIUM SERPL-SCNC: 4.4 MMOL/L (ref 3.4–5.3)
POTASSIUM SERPL-SCNC: 4.4 MMOL/L (ref 3.5–5.2)
POTASSIUM SERPL-SCNC: 4.5 MMOL/L (ref 3.4–5.3)
POTASSIUM SERPL-SCNC: 4.6 MMOL/L (ref 3.4–5.3)
POTASSIUM SERPL-SCNC: 4.6 MMOL/L (ref 3.4–5.3)
POTASSIUM SERPL-SCNC: 4.8 MMOL/L (ref 3.4–5.3)
POTASSIUM SERPL-SCNC: 4.9 MMOL/L (ref 3.4–5.3)
POTASSIUM SERPL-SCNC: 5 MMOL/L (ref 3.4–5.3)
POTASSIUM SERPL-SCNC: 5 MMOL/L (ref 3.4–5.3)
POTASSIUM SERPL-SCNC: 5.1 MMOL/L (ref 3.4–5.3)
POTASSIUM SERPL-SCNC: 5.2 MMOL/L (ref 3.4–5.3)
POTASSIUM SERPL-SCNC: 5.2 MMOL/L (ref 3.4–5.3)
POTASSIUM SERPL-SCNC: 5.5 MMOL/L (ref 3.4–5.3)
POTASSIUM SERPL-SCNC: 5.6 MMOL/L (ref 3.4–5.3)
POTASSIUM SERPL-SCNC: 5.8 MMOL/L (ref 3.4–5.3)
POTASSIUM SERPL-SCNC: 6.1 MMOL/L (ref 3.4–5.3)
POTASSIUM SERPL-SCNC: ABNORMAL MMOL/L (ref 3.4–5.3)
POTASSIUM SERPL-SCNC: ABNORMAL MMOL/L (ref 3.4–5.3)
PROCALCITONIN SERPL-MCNC: 1.31 NG/ML
PROCALCITONIN SERPL-MCNC: 2.7 NG/ML
PROCALCITONIN SERPL-MCNC: 4.66 NG/ML
PROCALCITONIN SERPL-MCNC: 4.9 NG/ML
PROT CSF-MCNC: 49 MG/DL (ref 15–60)
PROT PATTERN SERPL ELPH-IMP: NORMAL
PROT PATTERN UR ELPH-IMP: NORMAL
PROT SERPL-MCNC: 6 G/DL (ref 6.8–8.8)
PROT SERPL-MCNC: 6.2 G/DL (ref 6.8–8.8)
PROT SERPL-MCNC: 6.3 G/DL (ref 6.8–8.8)
PROT SERPL-MCNC: 6.3 G/DL (ref 6.8–8.8)
PROT SERPL-MCNC: 6.4 G/DL (ref 6.8–8.8)
PROT SERPL-MCNC: 6.5 G/DL (ref 6.8–8.8)
PROT SERPL-MCNC: 6.6 G/DL (ref 6.8–8.8)
PROT SERPL-MCNC: 6.8 G/DL (ref 6.8–8.8)
PROT SERPL-MCNC: 7.8 G/DL (ref 6.8–8.8)
PROT UR-MCNC: 2.89 G/L
PROT/CREAT 24H UR: 2.65 G/G CR (ref 0–0.2)
RADIOLOGIST FLAGS: ABNORMAL
RADIOLOGIST FLAGS: ABNORMAL
RBC # BLD AUTO: 3 10E12/L (ref 4.4–5.9)
RBC # BLD AUTO: 3.3 10E12/L (ref 4.4–5.9)
RBC # BLD AUTO: 3.32 10E12/L (ref 4.4–5.9)
RBC # BLD AUTO: 3.38 10E12/L (ref 4.4–5.9)
RBC # BLD AUTO: 3.5 10E12/L (ref 4.4–5.9)
RBC # BLD AUTO: 3.52 10E12/L (ref 4.4–5.9)
RBC # BLD AUTO: 3.52 10E12/L (ref 4.4–5.9)
RBC # BLD AUTO: 3.97 10E12/L (ref 4.4–5.9)
RBC # BLD AUTO: 3.99 10E12/L (ref 4.4–5.9)
RBC # BLD AUTO: 4.11 10E12/L (ref 4.4–5.9)
RBC # BLD AUTO: 4.11 10E12/L (ref 4.4–5.9)
RBC # BLD AUTO: 4.16 10E12/L (ref 4.4–5.9)
RBC # BLD AUTO: 4.18 10E12/L (ref 4.4–5.9)
RBC # BLD AUTO: 4.25 10E12/L (ref 4.4–5.9)
RBC # BLD AUTO: 4.45 10E12/L (ref 4.4–5.9)
RBC # BLD AUTO: 4.5 10E12/L (ref 4.4–5.9)
RBC # CSF MANUAL: 1306 /UL (ref 0–2)
RBC #/AREA URNS AUTO: 181 /HPF (ref 0–2)
RBC #/AREA URNS AUTO: 4 /HPF (ref 0–2)
RBC #/AREA URNS AUTO: >182 /HPF (ref 0–2)
RBC #/AREA URNS AUTO: >182 /HPF (ref 0–2)
SAO2 % BLDV FROM PO2: 29 %
SODIUM SERPL-SCNC: 128 MMOL/L (ref 133–144)
SODIUM SERPL-SCNC: 129 MMOL/L (ref 133–144)
SODIUM SERPL-SCNC: 129 MMOL/L (ref 133–144)
SODIUM SERPL-SCNC: 130 MMOL/L (ref 133–144)
SODIUM SERPL-SCNC: 130 MMOL/L (ref 133–144)
SODIUM SERPL-SCNC: 131 MMOL/L (ref 133–144)
SODIUM SERPL-SCNC: 131 MMOL/L (ref 133–144)
SODIUM SERPL-SCNC: 132 MMOL/L (ref 133–144)
SODIUM SERPL-SCNC: 133 MMOL/L (ref 133–144)
SODIUM SERPL-SCNC: 134 MMOL/L (ref 133–144)
SODIUM SERPL-SCNC: 134 MMOL/L (ref 136–145)
SODIUM SERPL-SCNC: 135 MMOL/L (ref 133–144)
SODIUM SERPL-SCNC: 136 MMOL/L (ref 133–144)
SODIUM SERPL-SCNC: 136 MMOL/L (ref 136–145)
SODIUM SERPL-SCNC: 137 MMOL/L (ref 133–144)
SODIUM SERPL-SCNC: 137 MMOL/L (ref 133–144)
SODIUM SERPL-SCNC: 137 MMOL/L (ref 136–145)
SODIUM SERPL-SCNC: 138 MMOL/L (ref 133–144)
SODIUM SERPL-SCNC: 138 MMOL/L (ref 136–145)
SODIUM SERPL-SCNC: 139 MMOL/L (ref 133–144)
SODIUM SERPL-SCNC: 141 MMOL/L (ref 133–144)
SODIUM UR-SCNC: 20 MMOL/L
SODIUM UR-SCNC: 9 MMOL/L
SOURCE: ABNORMAL
SP GR UR STRIP: 1.01 (ref 1–1.03)
SP GR UR STRIP: 1.02 (ref 1–1.03)
SP GR UR STRIP: >1.05 (ref 1–1.03)
SPECIMEN EXP DATE BLD: NORMAL
SPECIMEN SOURCE: ABNORMAL
SPECIMEN SOURCE: NORMAL
SQUAMOUS #/AREA URNS AUTO: <1 /HPF (ref 0–1)
TRANSFUSION STATUS PATIENT QL: NORMAL
TROPONIN I SERPL-MCNC: 0.28 UG/L (ref 0–0.04)
TROPONIN I SERPL-MCNC: 0.3 UG/L (ref 0–0.04)
TROPONIN I SERPL-MCNC: 0.31 UG/L (ref 0–0.04)
TROPONIN I SERPL-MCNC: 0.36 UG/L (ref 0–0.04)
TROPONIN I SERPL-MCNC: 0.4 UG/L (ref 0–0.04)
TROPONIN I SERPL-MCNC: 0.41 UG/L (ref 0–0.04)
TSH SERPL DL<=0.005 MIU/L-ACNC: 1.97 MU/L (ref 0.4–4)
TUBE # CSF: 1 #
URATE SERPL-MCNC: 10.6 MG/DL (ref 3.5–7.2)
UROBILINOGEN UR STRIP-MCNC: 2 MG/DL (ref 0–2)
UROBILINOGEN UR STRIP-MCNC: 2 MG/DL (ref 0–2)
UROBILINOGEN UR STRIP-MCNC: 4 MG/DL (ref 0–2)
UROBILINOGEN UR STRIP-MCNC: 8 MG/DL (ref 0–2)
UROBILINOGEN UR STRIP-MCNC: NORMAL MG/DL (ref 0–2)
UUN UR-MCNC: 380 MG/DL
UUN UR-MCNC: 541 MG/DL
UUN/CREAT 24H UR: 3 G/G CR
UUN/CREAT 24H UR: 8 G/G CR
VIT B12 SERPL-MCNC: 1414 PG/ML (ref 193–986)
WBC # BLD AUTO: 3.9 10E9/L (ref 4–11)
WBC # BLD AUTO: 5.3 10E9/L (ref 4–11)
WBC # BLD AUTO: 5.9 10E9/L (ref 4–11)
WBC # BLD AUTO: 6 10E9/L (ref 4–11)
WBC # BLD AUTO: 6.3 10E9/L (ref 4–11)
WBC # BLD AUTO: 6.5 10E9/L (ref 4–11)
WBC # BLD AUTO: 6.6 10E9/L (ref 4–11)
WBC # BLD AUTO: 6.7 10E9/L (ref 4–11)
WBC # BLD AUTO: 6.9 10E9/L (ref 4–11)
WBC # BLD AUTO: 7.2 10E9/L (ref 4–11)
WBC # BLD AUTO: 7.4 10E9/L (ref 4–11)
WBC # BLD AUTO: 7.9 10E9/L (ref 4–11)
WBC # CSF MANUAL: 5 /UL (ref 0–5)
WBC #/AREA URNS AUTO: 2 /HPF (ref 0–5)
WBC #/AREA URNS AUTO: 2478 /HPF (ref 0–5)
WBC #/AREA URNS AUTO: 5 /HPF (ref 0–5)
WBC #/AREA URNS AUTO: >182 /HPF (ref 0–5)
WBC CLUMPS #/AREA URNS HPF: PRESENT /HPF

## 2018-01-01 PROCEDURE — 25000128 H RX IP 250 OP 636: Performed by: STUDENT IN AN ORGANIZED HEALTH CARE EDUCATION/TRAINING PROGRAM

## 2018-01-01 PROCEDURE — 87088 URINE BACTERIA CULTURE: CPT | Performed by: EMERGENCY MEDICINE

## 2018-01-01 PROCEDURE — 25000132 ZZH RX MED GY IP 250 OP 250 PS 637: Performed by: INTERNAL MEDICINE

## 2018-01-01 PROCEDURE — 25000132 ZZH RX MED GY IP 250 OP 250 PS 637

## 2018-01-01 PROCEDURE — 40000133 ZZH STATISTIC OT WARD VISIT

## 2018-01-01 PROCEDURE — P9059 PLASMA, FRZ BETWEEN 8-24HOUR: HCPCS | Performed by: HOSPITALIST

## 2018-01-01 PROCEDURE — 87070 CULTURE OTHR SPECIMN AEROBIC: CPT | Performed by: HOSPITALIST

## 2018-01-01 PROCEDURE — 87040 BLOOD CULTURE FOR BACTERIA: CPT | Performed by: STUDENT IN AN ORGANIZED HEALTH CARE EDUCATION/TRAINING PROGRAM

## 2018-01-01 PROCEDURE — 40000802 ZZH SITE CHECK

## 2018-01-01 PROCEDURE — 85610 PROTHROMBIN TIME: CPT | Performed by: STUDENT IN AN ORGANIZED HEALTH CARE EDUCATION/TRAINING PROGRAM

## 2018-01-01 PROCEDURE — 21400006 ZZH R&B CCU INTERMEDIATE UMMC

## 2018-01-01 PROCEDURE — 99233 SBSQ HOSP IP/OBS HIGH 50: CPT | Mod: GC | Performed by: INTERNAL MEDICINE

## 2018-01-01 PROCEDURE — 40000133 ZZH STATISTIC OT WARD VISIT: Performed by: OCCUPATIONAL THERAPIST

## 2018-01-01 PROCEDURE — 25000128 H RX IP 250 OP 636: Performed by: INTERNAL MEDICINE

## 2018-01-01 PROCEDURE — 80048 BASIC METABOLIC PNL TOTAL CA: CPT

## 2018-01-01 PROCEDURE — 85027 COMPLETE CBC AUTOMATED: CPT | Performed by: STUDENT IN AN ORGANIZED HEALTH CARE EDUCATION/TRAINING PROGRAM

## 2018-01-01 PROCEDURE — 84145 PROCALCITONIN (PCT): CPT | Performed by: HOSPITALIST

## 2018-01-01 PROCEDURE — 85027 COMPLETE CBC AUTOMATED: CPT | Performed by: HOSPITALIST

## 2018-01-01 PROCEDURE — 36415 COLL VENOUS BLD VENIPUNCTURE: CPT | Performed by: INTERNAL MEDICINE

## 2018-01-01 PROCEDURE — 99233 SBSQ HOSP IP/OBS HIGH 50: CPT | Performed by: NURSE PRACTITIONER

## 2018-01-01 PROCEDURE — 93010 ELECTROCARDIOGRAM REPORT: CPT | Performed by: INTERNAL MEDICINE

## 2018-01-01 PROCEDURE — 93005 ELECTROCARDIOGRAM TRACING: CPT

## 2018-01-01 PROCEDURE — 25000125 ZZHC RX 250: Performed by: HOSPITALIST

## 2018-01-01 PROCEDURE — 36415 COLL VENOUS BLD VENIPUNCTURE: CPT

## 2018-01-01 PROCEDURE — 25000132 ZZH RX MED GY IP 250 OP 250 PS 637: Performed by: STUDENT IN AN ORGANIZED HEALTH CARE EDUCATION/TRAINING PROGRAM

## 2018-01-01 PROCEDURE — 25000125 ZZHC RX 250: Performed by: STUDENT IN AN ORGANIZED HEALTH CARE EDUCATION/TRAINING PROGRAM

## 2018-01-01 PROCEDURE — 25000128 H RX IP 250 OP 636: Performed by: HOSPITALIST

## 2018-01-01 PROCEDURE — 97530 THERAPEUTIC ACTIVITIES: CPT | Mod: GO | Performed by: OCCUPATIONAL THERAPIST

## 2018-01-01 PROCEDURE — 40000193 ZZH STATISTIC PT WARD VISIT: Performed by: PHYSICAL THERAPIST

## 2018-01-01 PROCEDURE — G0463 HOSPITAL OUTPT CLINIC VISIT: HCPCS

## 2018-01-01 PROCEDURE — 85027 COMPLETE CBC AUTOMATED: CPT

## 2018-01-01 PROCEDURE — 27210208 ZZH KIT VALVED DOUBLE LUMEN

## 2018-01-01 PROCEDURE — 36415 COLL VENOUS BLD VENIPUNCTURE: CPT | Performed by: FAMILY MEDICINE

## 2018-01-01 PROCEDURE — 25000128 H RX IP 250 OP 636

## 2018-01-01 PROCEDURE — 93010 ELECTROCARDIOGRAM REPORT: CPT | Mod: Z6 | Performed by: EMERGENCY MEDICINE

## 2018-01-01 PROCEDURE — 80048 BASIC METABOLIC PNL TOTAL CA: CPT | Performed by: STUDENT IN AN ORGANIZED HEALTH CARE EDUCATION/TRAINING PROGRAM

## 2018-01-01 PROCEDURE — 25000125 ZZHC RX 250: Performed by: RADIOLOGY

## 2018-01-01 PROCEDURE — 36415 COLL VENOUS BLD VENIPUNCTURE: CPT | Performed by: STUDENT IN AN ORGANIZED HEALTH CARE EDUCATION/TRAINING PROGRAM

## 2018-01-01 PROCEDURE — 85520 HEPARIN ASSAY: CPT | Performed by: HOSPITALIST

## 2018-01-01 PROCEDURE — 40000901 ZZH STATISTIC WOC PT EDUCATION, 0-15 MIN

## 2018-01-01 PROCEDURE — 97530 THERAPEUTIC ACTIVITIES: CPT | Mod: GO

## 2018-01-01 PROCEDURE — 25000132 ZZH RX MED GY IP 250 OP 250 PS 637: Performed by: HOSPITALIST

## 2018-01-01 PROCEDURE — 83735 ASSAY OF MAGNESIUM: CPT | Performed by: INTERNAL MEDICINE

## 2018-01-01 PROCEDURE — 27210982 ZZH KIT RT HC TOTES DISP CR7

## 2018-01-01 PROCEDURE — 81003 URINALYSIS AUTO W/O SCOPE: CPT

## 2018-01-01 PROCEDURE — 85520 HEPARIN ASSAY: CPT

## 2018-01-01 PROCEDURE — 99232 SBSQ HOSP IP/OBS MODERATE 35: CPT | Mod: GC | Performed by: INTERNAL MEDICINE

## 2018-01-01 PROCEDURE — 97535 SELF CARE MNGMENT TRAINING: CPT | Mod: GO | Performed by: OCCUPATIONAL THERAPIST

## 2018-01-01 PROCEDURE — 80053 COMPREHEN METABOLIC PANEL: CPT | Performed by: HOSPITALIST

## 2018-01-01 PROCEDURE — 93451 RIGHT HEART CATH: CPT

## 2018-01-01 PROCEDURE — 97530 THERAPEUTIC ACTIVITIES: CPT | Mod: GP | Performed by: PHYSICAL THERAPIST

## 2018-01-01 PROCEDURE — 86140 C-REACTIVE PROTEIN: CPT | Performed by: EMERGENCY MEDICINE

## 2018-01-01 PROCEDURE — 97535 SELF CARE MNGMENT TRAINING: CPT | Mod: GO

## 2018-01-01 PROCEDURE — 71045 X-RAY EXAM CHEST 1 VIEW: CPT

## 2018-01-01 PROCEDURE — 87340 HEPATITIS B SURFACE AG IA: CPT | Performed by: INTERNAL MEDICINE

## 2018-01-01 PROCEDURE — 71250 CT THORAX DX C-: CPT

## 2018-01-01 PROCEDURE — 40000275 ZZH STATISTIC RCP TIME EA 10 MIN

## 2018-01-01 PROCEDURE — 84132 ASSAY OF SERUM POTASSIUM: CPT | Performed by: HOSPITALIST

## 2018-01-01 PROCEDURE — 80048 BASIC METABOLIC PNL TOTAL CA: CPT | Performed by: INTERNAL MEDICINE

## 2018-01-01 PROCEDURE — 83605 ASSAY OF LACTIC ACID: CPT

## 2018-01-01 PROCEDURE — C1750 CATH, HEMODIALYSIS,LONG-TERM: HCPCS

## 2018-01-01 PROCEDURE — 84132 ASSAY OF SERUM POTASSIUM: CPT | Performed by: INTERNAL MEDICINE

## 2018-01-01 PROCEDURE — 87088 URINE BACTERIA CULTURE: CPT

## 2018-01-01 PROCEDURE — 87493 C DIFF AMPLIFIED PROBE: CPT | Performed by: INTERNAL MEDICINE

## 2018-01-01 PROCEDURE — 93306 TTE W/DOPPLER COMPLETE: CPT | Mod: 26 | Performed by: INTERNAL MEDICINE

## 2018-01-01 PROCEDURE — 82565 ASSAY OF CREATININE: CPT

## 2018-01-01 PROCEDURE — 99356 ZZC PROLONGED SERV,INPATIENT,1ST HR: CPT | Mod: GC | Performed by: INTERNAL MEDICINE

## 2018-01-01 PROCEDURE — 4A023N6 MEASUREMENT OF CARDIAC SAMPLING AND PRESSURE, RIGHT HEART, PERCUTANEOUS APPROACH: ICD-10-PCS | Performed by: INTERNAL MEDICINE

## 2018-01-01 PROCEDURE — 36415 COLL VENOUS BLD VENIPUNCTURE: CPT | Performed by: HOSPITALIST

## 2018-01-01 PROCEDURE — 97535 SELF CARE MNGMENT TRAINING: CPT | Mod: GP

## 2018-01-01 PROCEDURE — 80069 RENAL FUNCTION PANEL: CPT | Performed by: FAMILY MEDICINE

## 2018-01-01 PROCEDURE — 99232 SBSQ HOSP IP/OBS MODERATE 35: CPT | Performed by: NURSE PRACTITIONER

## 2018-01-01 PROCEDURE — 40000193 ZZH STATISTIC PT WARD VISIT

## 2018-01-01 PROCEDURE — 97110 THERAPEUTIC EXERCISES: CPT | Mod: GO | Performed by: OCCUPATIONAL THERAPIST

## 2018-01-01 PROCEDURE — 80048 BASIC METABOLIC PNL TOTAL CA: CPT | Performed by: HOSPITALIST

## 2018-01-01 PROCEDURE — 25000128 H RX IP 250 OP 636: Performed by: RADIOLOGY

## 2018-01-01 PROCEDURE — 36569 INSJ PICC 5 YR+ W/O IMAGING: CPT

## 2018-01-01 PROCEDURE — 82248 BILIRUBIN DIRECT: CPT | Performed by: HOSPITALIST

## 2018-01-01 PROCEDURE — 009U3ZX DRAINAGE OF SPINAL CANAL, PERCUTANEOUS APPROACH, DIAGNOSTIC: ICD-10-PCS | Performed by: RADIOLOGY

## 2018-01-01 PROCEDURE — 84156 ASSAY OF PROTEIN URINE: CPT | Performed by: EMERGENCY MEDICINE

## 2018-01-01 PROCEDURE — 85049 AUTOMATED PLATELET COUNT: CPT | Performed by: INTERNAL MEDICINE

## 2018-01-01 PROCEDURE — 27210908 ZZH NEEDLE CR4

## 2018-01-01 PROCEDURE — 83605 ASSAY OF LACTIC ACID: CPT | Performed by: INTERNAL MEDICINE

## 2018-01-01 PROCEDURE — 82947 ASSAY GLUCOSE BLOOD QUANT: CPT

## 2018-01-01 PROCEDURE — 02H633Z INSERTION OF INFUSION DEVICE INTO RIGHT ATRIUM, PERCUTANEOUS APPROACH: ICD-10-PCS | Performed by: RADIOLOGY

## 2018-01-01 PROCEDURE — 74181 MRI ABDOMEN W/O CONTRAST: CPT | Performed by: RADIOLOGY

## 2018-01-01 PROCEDURE — 99223 1ST HOSP IP/OBS HIGH 75: CPT | Mod: 25 | Performed by: INTERNAL MEDICINE

## 2018-01-01 PROCEDURE — 99305 1ST NF CARE MODERATE MDM 35: CPT | Performed by: INTERNAL MEDICINE

## 2018-01-01 PROCEDURE — 40000986 XR CHEST PORT 1 VW

## 2018-01-01 PROCEDURE — 87186 SC STD MICRODIL/AGAR DIL: CPT | Performed by: EMERGENCY MEDICINE

## 2018-01-01 PROCEDURE — 40000558 ZZH STATISTIC CVC DRESSING CHANGE

## 2018-01-01 PROCEDURE — 93971 EXTREMITY STUDY: CPT | Mod: LT

## 2018-01-01 PROCEDURE — 97116 GAIT TRAINING THERAPY: CPT | Mod: GP

## 2018-01-01 PROCEDURE — 86901 BLOOD TYPING SEROLOGIC RH(D): CPT | Performed by: INTERNAL MEDICINE

## 2018-01-01 PROCEDURE — 5A1D70Z PERFORMANCE OF URINARY FILTRATION, INTERMITTENT, LESS THAN 6 HOURS PER DAY: ICD-10-PCS | Performed by: EMERGENCY MEDICINE

## 2018-01-01 PROCEDURE — 83605 ASSAY OF LACTIC ACID: CPT | Performed by: HOSPITALIST

## 2018-01-01 PROCEDURE — 83735 ASSAY OF MAGNESIUM: CPT | Performed by: STUDENT IN AN ORGANIZED HEALTH CARE EDUCATION/TRAINING PROGRAM

## 2018-01-01 PROCEDURE — 84540 ASSAY OF URINE/UREA-N: CPT | Performed by: EMERGENCY MEDICINE

## 2018-01-01 PROCEDURE — 97535 SELF CARE MNGMENT TRAINING: CPT | Mod: GP | Performed by: PHYSICAL THERAPIST

## 2018-01-01 PROCEDURE — 27211181 ZZH BALLOON TIP PRESSURE CR5

## 2018-01-01 PROCEDURE — 85520 HEPARIN ASSAY: CPT | Performed by: INTERNAL MEDICINE

## 2018-01-01 PROCEDURE — 85027 COMPLETE CBC AUTOMATED: CPT | Performed by: INTERNAL MEDICINE

## 2018-01-01 PROCEDURE — 84145 PROCALCITONIN (PCT): CPT | Performed by: INTERNAL MEDICINE

## 2018-01-01 PROCEDURE — 85610 PROTHROMBIN TIME: CPT | Performed by: HOSPITALIST

## 2018-01-01 PROCEDURE — 84484 ASSAY OF TROPONIN QUANT: CPT | Performed by: STUDENT IN AN ORGANIZED HEALTH CARE EDUCATION/TRAINING PROGRAM

## 2018-01-01 PROCEDURE — 40000344 ZZHCL STATISTIC THAWING COMPONENT: Performed by: HOSPITALIST

## 2018-01-01 PROCEDURE — 99238 HOSP IP/OBS DSCHRG MGMT 30/<: CPT | Mod: GC | Performed by: INTERNAL MEDICINE

## 2018-01-01 PROCEDURE — 85652 RBC SED RATE AUTOMATED: CPT | Performed by: EMERGENCY MEDICINE

## 2018-01-01 PROCEDURE — 25000128 H RX IP 250 OP 636: Performed by: PHYSICIAN ASSISTANT

## 2018-01-01 PROCEDURE — 82248 BILIRUBIN DIRECT: CPT | Performed by: STUDENT IN AN ORGANIZED HEALTH CARE EDUCATION/TRAINING PROGRAM

## 2018-01-01 PROCEDURE — 87340 HEPATITIS B SURFACE AG IA: CPT | Performed by: RADIOLOGY PRACTITIONER ASSISTANT

## 2018-01-01 PROCEDURE — 87186 SC STD MICRODIL/AGAR DIL: CPT

## 2018-01-01 PROCEDURE — 90937 HEMODIALYSIS REPEATED EVAL: CPT

## 2018-01-01 PROCEDURE — 85610 PROTHROMBIN TIME: CPT | Performed by: INTERNAL MEDICINE

## 2018-01-01 PROCEDURE — 84443 ASSAY THYROID STIM HORMONE: CPT | Performed by: HOSPITALIST

## 2018-01-01 PROCEDURE — 25000125 ZZHC RX 250: Performed by: INTERNAL MEDICINE

## 2018-01-01 PROCEDURE — 82435 ASSAY OF BLOOD CHLORIDE: CPT

## 2018-01-01 PROCEDURE — 40000141 ZZH STATISTIC PERIPHERAL IV START W/O US GUIDANCE

## 2018-01-01 PROCEDURE — 74018 RADEX ABDOMEN 1 VIEW: CPT

## 2018-01-01 PROCEDURE — 27210732 ZZH ACCESSORY CR1

## 2018-01-01 PROCEDURE — 87040 BLOOD CULTURE FOR BACTERIA: CPT | Performed by: INTERNAL MEDICINE

## 2018-01-01 PROCEDURE — 99285 EMERGENCY DEPT VISIT HI MDM: CPT | Mod: 25 | Performed by: EMERGENCY MEDICINE

## 2018-01-01 PROCEDURE — 95951 ZZHC EEG VIDEO < 12 HR: CPT | Mod: 52,ZF

## 2018-01-01 PROCEDURE — 87205 SMEAR GRAM STAIN: CPT | Performed by: HOSPITALIST

## 2018-01-01 PROCEDURE — 00000146 ZZHCL STATISTIC GLUCOSE BY METER IP

## 2018-01-01 PROCEDURE — 71046 X-RAY EXAM CHEST 2 VIEWS: CPT

## 2018-01-01 PROCEDURE — 27210787 ZZH MANIFOLD CR2

## 2018-01-01 PROCEDURE — 99291 CRITICAL CARE FIRST HOUR: CPT | Mod: GC | Performed by: INTERNAL MEDICINE

## 2018-01-01 PROCEDURE — 84295 ASSAY OF SERUM SODIUM: CPT

## 2018-01-01 PROCEDURE — 82805 BLOOD GASES W/O2 SATURATION: CPT | Performed by: INTERNAL MEDICINE

## 2018-01-01 PROCEDURE — 40000344 ZZHCL STATISTIC THAWING COMPONENT: Performed by: STUDENT IN AN ORGANIZED HEALTH CARE EDUCATION/TRAINING PROGRAM

## 2018-01-01 PROCEDURE — 84484 ASSAY OF TROPONIN QUANT: CPT | Performed by: EMERGENCY MEDICINE

## 2018-01-01 PROCEDURE — 84166 PROTEIN E-PHORESIS/URINE/CSF: CPT | Performed by: INTERNAL MEDICINE

## 2018-01-01 PROCEDURE — P9059 PLASMA, FRZ BETWEEN 8-24HOUR: HCPCS | Performed by: STUDENT IN AN ORGANIZED HEALTH CARE EDUCATION/TRAINING PROGRAM

## 2018-01-01 PROCEDURE — 80053 COMPREHEN METABOLIC PANEL: CPT | Performed by: STUDENT IN AN ORGANIZED HEALTH CARE EDUCATION/TRAINING PROGRAM

## 2018-01-01 PROCEDURE — 83735 ASSAY OF MAGNESIUM: CPT | Performed by: HOSPITALIST

## 2018-01-01 PROCEDURE — 99221 1ST HOSP IP/OBS SF/LOW 40: CPT | Performed by: PSYCHIATRY & NEUROLOGY

## 2018-01-01 PROCEDURE — 85610 PROTHROMBIN TIME: CPT

## 2018-01-01 PROCEDURE — 36592 COLLECT BLOOD FROM PICC: CPT | Performed by: HOSPITALIST

## 2018-01-01 PROCEDURE — 86900 BLOOD TYPING SEROLOGIC ABO: CPT | Performed by: INTERNAL MEDICINE

## 2018-01-01 PROCEDURE — 82805 BLOOD GASES W/O2 SATURATION: CPT

## 2018-01-01 PROCEDURE — 27210738 ZZH ACCESSORY CR2

## 2018-01-01 PROCEDURE — 25000125 ZZHC RX 250

## 2018-01-01 PROCEDURE — 82040 ASSAY OF SERUM ALBUMIN: CPT

## 2018-01-01 PROCEDURE — 80053 COMPREHEN METABOLIC PANEL: CPT | Performed by: EMERGENCY MEDICINE

## 2018-01-01 PROCEDURE — 81001 URINALYSIS AUTO W/SCOPE: CPT

## 2018-01-01 PROCEDURE — 99310 SBSQ NF CARE HIGH MDM 45: CPT | Performed by: NURSE PRACTITIONER

## 2018-01-01 PROCEDURE — 99233 SBSQ HOSP IP/OBS HIGH 50: CPT | Performed by: PSYCHIATRY & NEUROLOGY

## 2018-01-01 PROCEDURE — 99213 OFFICE O/P EST LOW 20 MIN: CPT | Performed by: PODIATRIST

## 2018-01-01 PROCEDURE — 70450 CT HEAD/BRAIN W/O DYE: CPT

## 2018-01-01 PROCEDURE — 83921 ORGANIC ACID SINGLE QUANT: CPT | Performed by: HOSPITALIST

## 2018-01-01 PROCEDURE — 85610 PROTHROMBIN TIME: CPT | Performed by: RADIOLOGY PRACTITIONER ASSISTANT

## 2018-01-01 PROCEDURE — 84520 ASSAY OF UREA NITROGEN: CPT

## 2018-01-01 PROCEDURE — 36558 INSERT TUNNELED CV CATH: CPT | Mod: LT

## 2018-01-01 PROCEDURE — 36589 REMOVAL TUNNELED CV CATH: CPT | Mod: RT

## 2018-01-01 PROCEDURE — 82945 GLUCOSE OTHER FLUID: CPT | Performed by: HOSPITALIST

## 2018-01-01 PROCEDURE — 81001 URINALYSIS AUTO W/SCOPE: CPT | Performed by: HOSPITALIST

## 2018-01-01 PROCEDURE — 80053 COMPREHEN METABOLIC PANEL: CPT | Performed by: INTERNAL MEDICINE

## 2018-01-01 PROCEDURE — 97110 THERAPEUTIC EXERCISES: CPT | Mod: GP | Performed by: PHYSICAL THERAPIST

## 2018-01-01 PROCEDURE — 99222 1ST HOSP IP/OBS MODERATE 55: CPT | Performed by: NURSE PRACTITIONER

## 2018-01-01 PROCEDURE — 83735 ASSAY OF MAGNESIUM: CPT

## 2018-01-01 PROCEDURE — 84300 ASSAY OF URINE SODIUM: CPT

## 2018-01-01 PROCEDURE — 85027 COMPLETE CBC AUTOMATED: CPT | Performed by: FAMILY MEDICINE

## 2018-01-01 PROCEDURE — 85025 COMPLETE CBC W/AUTO DIFF WBC: CPT | Performed by: HOSPITALIST

## 2018-01-01 PROCEDURE — 84165 PROTEIN E-PHORESIS SERUM: CPT | Performed by: FAMILY MEDICINE

## 2018-01-01 PROCEDURE — 85027 COMPLETE CBC AUTOMATED: CPT | Performed by: RADIOLOGY PRACTITIONER ASSISTANT

## 2018-01-01 PROCEDURE — 02PAX3Z REMOVAL OF INFUSION DEVICE FROM HEART, EXTERNAL APPROACH: ICD-10-PCS | Performed by: PHYSICIAN ASSISTANT

## 2018-01-01 PROCEDURE — 85025 COMPLETE CBC W/AUTO DIFF WBC: CPT | Performed by: EMERGENCY MEDICINE

## 2018-01-01 PROCEDURE — 95951 ZZHC EEG VIDEO EACH 24 HR: CPT | Mod: ZF

## 2018-01-01 PROCEDURE — 87086 URINE CULTURE/COLONY COUNT: CPT | Performed by: EMERGENCY MEDICINE

## 2018-01-01 PROCEDURE — 97530 THERAPEUTIC ACTIVITIES: CPT | Mod: GP

## 2018-01-01 PROCEDURE — 84132 ASSAY OF SERUM POTASSIUM: CPT | Performed by: STUDENT IN AN ORGANIZED HEALTH CARE EDUCATION/TRAINING PROGRAM

## 2018-01-01 PROCEDURE — 82140 ASSAY OF AMMONIA: CPT | Performed by: EMERGENCY MEDICINE

## 2018-01-01 PROCEDURE — 86706 HEP B SURFACE ANTIBODY: CPT | Performed by: RADIOLOGY PRACTITIONER ASSISTANT

## 2018-01-01 PROCEDURE — 97165 OT EVAL LOW COMPLEX 30 MIN: CPT | Mod: GO

## 2018-01-01 PROCEDURE — 84157 ASSAY OF PROTEIN OTHER: CPT | Performed by: HOSPITALIST

## 2018-01-01 PROCEDURE — 83605 ASSAY OF LACTIC ACID: CPT | Performed by: STUDENT IN AN ORGANIZED HEALTH CARE EDUCATION/TRAINING PROGRAM

## 2018-01-01 PROCEDURE — 85610 PROTHROMBIN TIME: CPT | Performed by: EMERGENCY MEDICINE

## 2018-01-01 PROCEDURE — 81001 URINALYSIS AUTO W/SCOPE: CPT | Performed by: INTERNAL MEDICINE

## 2018-01-01 PROCEDURE — C1769 GUIDE WIRE: HCPCS

## 2018-01-01 PROCEDURE — 82805 BLOOD GASES W/O2 SATURATION: CPT | Performed by: STUDENT IN AN ORGANIZED HEALTH CARE EDUCATION/TRAINING PROGRAM

## 2018-01-01 PROCEDURE — 00000402 ZZHCL STATISTIC TOTAL PROTEIN: Performed by: FAMILY MEDICINE

## 2018-01-01 PROCEDURE — 99153 MOD SED SAME PHYS/QHP EA: CPT

## 2018-01-01 PROCEDURE — 84300 ASSAY OF URINE SODIUM: CPT | Performed by: EMERGENCY MEDICINE

## 2018-01-01 PROCEDURE — C1751 CATH, INF, PER/CENT/MIDLINE: HCPCS

## 2018-01-01 PROCEDURE — 83690 ASSAY OF LIPASE: CPT | Performed by: STUDENT IN AN ORGANIZED HEALTH CARE EDUCATION/TRAINING PROGRAM

## 2018-01-01 PROCEDURE — 77003 FLUOROGUIDE FOR SPINE INJECT: CPT

## 2018-01-01 PROCEDURE — G0463 HOSPITAL OUTPT CLINIC VISIT: HCPCS | Mod: ZF

## 2018-01-01 PROCEDURE — 83883 ASSAY NEPHELOMETRY NOT SPEC: CPT | Performed by: FAMILY MEDICINE

## 2018-01-01 PROCEDURE — 87070 CULTURE OTHR SPECIMN AEROBIC: CPT

## 2018-01-01 PROCEDURE — 87086 URINE CULTURE/COLONY COUNT: CPT

## 2018-01-01 PROCEDURE — 97163 PT EVAL HIGH COMPLEX 45 MIN: CPT | Mod: GP | Performed by: PHYSICAL THERAPIST

## 2018-01-01 PROCEDURE — G0463 HOSPITAL OUTPT CLINIC VISIT: HCPCS | Mod: 25

## 2018-01-01 PROCEDURE — 81001 URINALYSIS AUTO W/SCOPE: CPT | Performed by: EMERGENCY MEDICINE

## 2018-01-01 PROCEDURE — 82374 ASSAY BLOOD CARBON DIOXIDE: CPT

## 2018-01-01 PROCEDURE — 84484 ASSAY OF TROPONIN QUANT: CPT | Performed by: HOSPITALIST

## 2018-01-01 PROCEDURE — 99232 SBSQ HOSP IP/OBS MODERATE 35: CPT | Performed by: PSYCHIATRY & NEUROLOGY

## 2018-01-01 PROCEDURE — 82310 ASSAY OF CALCIUM: CPT

## 2018-01-01 PROCEDURE — 84100 ASSAY OF PHOSPHORUS: CPT | Performed by: STUDENT IN AN ORGANIZED HEALTH CARE EDUCATION/TRAINING PROGRAM

## 2018-01-01 PROCEDURE — 87205 SMEAR GRAM STAIN: CPT

## 2018-01-01 PROCEDURE — 93451 RIGHT HEART CATH: CPT | Mod: 26 | Performed by: INTERNAL MEDICINE

## 2018-01-01 PROCEDURE — 99152 MOD SED SAME PHYS/QHP 5/>YRS: CPT

## 2018-01-01 PROCEDURE — 89050 BODY FLUID CELL COUNT: CPT | Performed by: HOSPITALIST

## 2018-01-01 PROCEDURE — 27210995 ZZH RX 272: Performed by: RADIOLOGY

## 2018-01-01 PROCEDURE — 36592 COLLECT BLOOD FROM PICC: CPT

## 2018-01-01 PROCEDURE — 27210904 ZZH KIT CR6

## 2018-01-01 PROCEDURE — 99233 SBSQ HOSP IP/OBS HIGH 50: CPT | Mod: 25 | Performed by: INTERNAL MEDICINE

## 2018-01-01 PROCEDURE — 82565 ASSAY OF CREATININE: CPT | Performed by: INTERNAL MEDICINE

## 2018-01-01 PROCEDURE — 82607 VITAMIN B-12: CPT | Performed by: HOSPITALIST

## 2018-01-01 PROCEDURE — 82803 BLOOD GASES ANY COMBINATION: CPT

## 2018-01-01 PROCEDURE — 84540 ASSAY OF URINE/UREA-N: CPT

## 2018-01-01 PROCEDURE — 87086 URINE CULTURE/COLONY COUNT: CPT | Performed by: INTERNAL MEDICINE

## 2018-01-01 PROCEDURE — 99285 EMERGENCY DEPT VISIT HI MDM: CPT | Mod: 25

## 2018-01-01 PROCEDURE — 93306 TTE W/DOPPLER COMPLETE: CPT

## 2018-01-01 RX ORDER — LIDOCAINE 40 MG/G
CREAM TOPICAL
Status: COMPLETED | OUTPATIENT
Start: 2018-01-01 | End: 2018-01-01

## 2018-01-01 RX ORDER — HEPARIN SODIUM,PORCINE 10 UNIT/ML
2-5 VIAL (ML) INTRAVENOUS
Status: COMPLETED | OUTPATIENT
Start: 2018-01-01 | End: 2018-01-01

## 2018-01-01 RX ORDER — BUMETANIDE 2 MG/1
2 TABLET ORAL
Status: DISCONTINUED | OUTPATIENT
Start: 2018-01-01 | End: 2018-01-01

## 2018-01-01 RX ORDER — WARFARIN SODIUM 2 MG/1
2 TABLET ORAL
Status: DISCONTINUED | OUTPATIENT
Start: 2018-01-01 | End: 2018-01-01

## 2018-01-01 RX ORDER — SODIUM CHLORIDE 9 MG/ML
INJECTION, SOLUTION INTRAVENOUS CONTINUOUS
Status: DISCONTINUED | OUTPATIENT
Start: 2018-01-01 | End: 2018-01-01

## 2018-01-01 RX ORDER — HYDROMORPHONE HYDROCHLORIDE 1 MG/ML
0.2 INJECTION, SOLUTION INTRAMUSCULAR; INTRAVENOUS; SUBCUTANEOUS ONCE
Status: COMPLETED | OUTPATIENT
Start: 2018-01-01 | End: 2018-01-01

## 2018-01-01 RX ORDER — LANOLIN ALCOHOL/MO/W.PET/CERES
3 CREAM (GRAM) TOPICAL ONCE
Status: DISCONTINUED | OUTPATIENT
Start: 2018-01-01 | End: 2018-01-01

## 2018-01-01 RX ORDER — METOLAZONE 2.5 MG/1
5 TABLET ORAL ONCE
Status: COMPLETED | OUTPATIENT
Start: 2018-01-01 | End: 2018-01-01

## 2018-01-01 RX ORDER — WARFARIN SODIUM 1 MG/1
1 TABLET ORAL
Status: COMPLETED | OUTPATIENT
Start: 2018-01-01 | End: 2018-01-01

## 2018-01-01 RX ORDER — BISACODYL 10 MG
10 SUPPOSITORY, RECTAL RECTAL DAILY PRN
Status: DISCONTINUED | OUTPATIENT
Start: 2018-01-01 | End: 2018-01-01 | Stop reason: HOSPADM

## 2018-01-01 RX ORDER — SODIUM CHLORIDE 9 MG/ML
INJECTION, SOLUTION INTRAVENOUS CONTINUOUS
Status: ACTIVE | OUTPATIENT
Start: 2018-01-01 | End: 2018-01-01

## 2018-01-01 RX ORDER — BUMETANIDE 0.25 MG/ML
4 INJECTION INTRAMUSCULAR; INTRAVENOUS ONCE
Status: COMPLETED | OUTPATIENT
Start: 2018-01-01 | End: 2018-01-01

## 2018-01-01 RX ORDER — NYSTATIN 100000 U/G
CREAM TOPICAL 2 TIMES DAILY
Status: DISCONTINUED | OUTPATIENT
Start: 2018-01-01 | End: 2018-01-01 | Stop reason: HOSPADM

## 2018-01-01 RX ORDER — CEFTRIAXONE 1 G/1
1 INJECTION, POWDER, FOR SOLUTION INTRAMUSCULAR; INTRAVENOUS EVERY 24 HOURS
Status: COMPLETED | OUTPATIENT
Start: 2018-01-01 | End: 2018-01-01

## 2018-01-01 RX ORDER — TAMSULOSIN HYDROCHLORIDE 0.4 MG/1
0.4 CAPSULE ORAL DAILY
Status: DISCONTINUED | OUTPATIENT
Start: 2018-01-01 | End: 2018-01-01

## 2018-01-01 RX ORDER — LORAZEPAM 2 MG/ML
0.5 INJECTION INTRAMUSCULAR EVERY 6 HOURS PRN
Status: DISCONTINUED | OUTPATIENT
Start: 2018-01-01 | End: 2018-01-01

## 2018-01-01 RX ORDER — OXYCODONE HYDROCHLORIDE 5 MG/1
5 TABLET ORAL ONCE
Status: COMPLETED | OUTPATIENT
Start: 2018-01-01 | End: 2018-01-01

## 2018-01-01 RX ORDER — ATROPINE SULFATE 10 MG/ML
1-2 SOLUTION/ DROPS OPHTHALMIC
Status: DISCONTINUED | OUTPATIENT
Start: 2018-01-01 | End: 2018-01-01 | Stop reason: HOSPADM

## 2018-01-01 RX ORDER — BUMETANIDE 0.25 MG/ML
2 INJECTION INTRAMUSCULAR; INTRAVENOUS 2 TIMES DAILY
Status: DISCONTINUED | OUTPATIENT
Start: 2018-01-01 | End: 2018-01-01

## 2018-01-01 RX ORDER — LIDOCAINE HYDROCHLORIDE 10 MG/ML
30 INJECTION, SOLUTION EPIDURAL; INFILTRATION; INTRACAUDAL; PERINEURAL ONCE
Status: COMPLETED | OUTPATIENT
Start: 2018-01-01 | End: 2018-01-01

## 2018-01-01 RX ORDER — POLYETHYLENE GLYCOL 3350 17 G/17G
17 POWDER, FOR SOLUTION ORAL DAILY PRN
Status: DISCONTINUED | OUTPATIENT
Start: 2018-01-01 | End: 2018-01-01 | Stop reason: HOSPADM

## 2018-01-01 RX ORDER — NALOXONE HYDROCHLORIDE 0.4 MG/ML
.1-.4 INJECTION, SOLUTION INTRAMUSCULAR; INTRAVENOUS; SUBCUTANEOUS
Status: DISCONTINUED | OUTPATIENT
Start: 2018-01-01 | End: 2018-01-01 | Stop reason: CLARIF

## 2018-01-01 RX ORDER — HEPARIN SODIUM 5000 [USP'U]/.5ML
5000 INJECTION, SOLUTION INTRAVENOUS; SUBCUTANEOUS EVERY 12 HOURS
Status: DISCONTINUED | OUTPATIENT
Start: 2018-01-01 | End: 2018-01-01 | Stop reason: HOSPADM

## 2018-01-01 RX ORDER — LANOLIN ALCOHOL/MO/W.PET/CERES
6 CREAM (GRAM) TOPICAL AT BEDTIME
Status: DISCONTINUED | OUTPATIENT
Start: 2018-01-01 | End: 2018-01-01

## 2018-01-01 RX ORDER — LIDOCAINE 40 MG/G
CREAM TOPICAL
Status: DISCONTINUED | OUTPATIENT
Start: 2018-01-01 | End: 2018-01-01

## 2018-01-01 RX ORDER — HALOPERIDOL 5 MG/ML
INJECTION INTRAMUSCULAR
Status: DISCONTINUED
Start: 2018-01-01 | End: 2018-01-01 | Stop reason: WASHOUT

## 2018-01-01 RX ORDER — POTASSIUM CHLORIDE 750 MG/1
20 TABLET, EXTENDED RELEASE ORAL ONCE
Status: COMPLETED | OUTPATIENT
Start: 2018-01-01 | End: 2018-01-01

## 2018-01-01 RX ORDER — LORAZEPAM 2 MG/ML
1-2 INJECTION INTRAMUSCULAR EVERY 10 MIN PRN
Status: DISCONTINUED | OUTPATIENT
Start: 2018-01-01 | End: 2018-01-01 | Stop reason: HOSPADM

## 2018-01-01 RX ORDER — ATORVASTATIN CALCIUM 80 MG/1
80 TABLET, FILM COATED ORAL DAILY
Status: DISCONTINUED | OUTPATIENT
Start: 2018-01-01 | End: 2018-01-01

## 2018-01-01 RX ORDER — BUMETANIDE 0.25 MG/ML
4 INJECTION INTRAMUSCULAR; INTRAVENOUS EVERY 12 HOURS
Status: DISCONTINUED | OUTPATIENT
Start: 2018-01-01 | End: 2018-01-01

## 2018-01-01 RX ORDER — FLUMAZENIL 0.1 MG/ML
0.2 INJECTION, SOLUTION INTRAVENOUS
Status: DISCONTINUED | OUTPATIENT
Start: 2018-01-01 | End: 2018-01-01 | Stop reason: CLARIF

## 2018-01-01 RX ORDER — AMMONIUM LACTATE 12 G/100G
LOTION TOPICAL
Status: DISCONTINUED | OUTPATIENT
Start: 2018-01-01 | End: 2018-01-01 | Stop reason: HOSPADM

## 2018-01-01 RX ORDER — SIMETHICONE 80 MG
80 TABLET,CHEWABLE ORAL EVERY 6 HOURS PRN
Status: DISCONTINUED | OUTPATIENT
Start: 2018-01-01 | End: 2018-01-01

## 2018-01-01 RX ORDER — ONDANSETRON 2 MG/ML
4 INJECTION INTRAMUSCULAR; INTRAVENOUS ONCE
Status: DISCONTINUED | OUTPATIENT
Start: 2018-01-01 | End: 2018-01-01

## 2018-01-01 RX ORDER — LORAZEPAM 2 MG/ML
1-2 INJECTION INTRAMUSCULAR EVERY 10 MIN PRN
Status: DISCONTINUED | OUTPATIENT
Start: 2018-01-01 | End: 2018-01-01

## 2018-01-01 RX ORDER — LIDOCAINE 40 MG/G
CREAM TOPICAL
Status: CANCELLED | OUTPATIENT
Start: 2018-01-01

## 2018-01-01 RX ORDER — ACETAMINOPHEN 500 MG
650 TABLET ORAL EVERY 4 HOURS PRN
COMMUNITY

## 2018-01-01 RX ORDER — PIPERACILLIN SODIUM, TAZOBACTAM SODIUM 2; .25 G/10ML; G/10ML
2.25 INJECTION, POWDER, LYOPHILIZED, FOR SOLUTION INTRAVENOUS EVERY 8 HOURS SCHEDULED
Status: DISCONTINUED | OUTPATIENT
Start: 2018-01-01 | End: 2018-01-01

## 2018-01-01 RX ORDER — ASPIRIN 81 MG/1
324 TABLET, CHEWABLE ORAL ONCE
Status: COMPLETED | OUTPATIENT
Start: 2018-01-01 | End: 2018-01-01

## 2018-01-01 RX ORDER — POTASSIUM CHLORIDE 750 MG/1
20 TABLET, EXTENDED RELEASE ORAL DAILY
Status: DISCONTINUED | OUTPATIENT
Start: 2018-01-01 | End: 2018-01-01

## 2018-01-01 RX ORDER — SIMETHICONE 40MG/0.6ML
40 SUSPENSION, DROPS(FINAL DOSAGE FORM)(ML) ORAL EVERY 6 HOURS PRN
Status: DISCONTINUED | OUTPATIENT
Start: 2018-01-01 | End: 2018-01-01 | Stop reason: HOSPADM

## 2018-01-01 RX ORDER — HEPARIN SODIUM 1000 [USP'U]/ML
3 INJECTION, SOLUTION INTRAVENOUS; SUBCUTANEOUS ONCE
Status: DISCONTINUED | OUTPATIENT
Start: 2018-01-01 | End: 2018-01-01

## 2018-01-01 RX ORDER — ACETAMINOPHEN 650 MG/1
650 SUPPOSITORY RECTAL EVERY 4 HOURS PRN
Status: DISCONTINUED | OUTPATIENT
Start: 2018-01-01 | End: 2018-01-01

## 2018-01-01 RX ORDER — ACETAMINOPHEN 325 MG/1
650 TABLET ORAL EVERY 4 HOURS PRN
Status: DISCONTINUED | OUTPATIENT
Start: 2018-01-01 | End: 2018-01-01

## 2018-01-01 RX ORDER — METOLAZONE 2.5 MG/1
2.5 TABLET ORAL ONCE
Status: DISCONTINUED | OUTPATIENT
Start: 2018-01-01 | End: 2018-01-01

## 2018-01-01 RX ORDER — FINASTERIDE 5 MG/1
5 TABLET, FILM COATED ORAL DAILY
Status: DISCONTINUED | OUTPATIENT
Start: 2018-01-01 | End: 2018-01-01

## 2018-01-01 RX ORDER — POTASSIUM CHLORIDE 1500 MG/1
20 TABLET, EXTENDED RELEASE ORAL DAILY
Status: DISCONTINUED | OUTPATIENT
Start: 2018-01-01 | End: 2018-01-01

## 2018-01-01 RX ORDER — HALOPERIDOL 5 MG/ML
2 INJECTION INTRAMUSCULAR EVERY 6 HOURS PRN
Status: DISCONTINUED | OUTPATIENT
Start: 2018-01-01 | End: 2018-01-01

## 2018-01-01 RX ORDER — BUMETANIDE 0.25 MG/ML
1 INJECTION INTRAMUSCULAR; INTRAVENOUS ONCE
Status: COMPLETED | OUTPATIENT
Start: 2018-01-01 | End: 2018-01-01

## 2018-01-01 RX ORDER — SODIUM CHLORIDE 9 MG/ML
INJECTION, SOLUTION INTRAVENOUS CONTINUOUS
Status: CANCELLED | OUTPATIENT
Start: 2018-01-01

## 2018-01-01 RX ORDER — POLYETHYLENE GLYCOL 3350 17 G/17G
17 POWDER, FOR SOLUTION ORAL DAILY PRN
Status: DISCONTINUED | OUTPATIENT
Start: 2018-01-01 | End: 2018-01-01

## 2018-01-01 RX ORDER — LURASIDONE HYDROCHLORIDE 20 MG/1
20 TABLET, FILM COATED ORAL
Status: DISCONTINUED | OUTPATIENT
Start: 2018-01-01 | End: 2018-01-01

## 2018-01-01 RX ORDER — BENZOCAINE/MENTHOL 6 MG-10 MG
LOZENGE MUCOUS MEMBRANE 2 TIMES DAILY
Status: DISCONTINUED | OUTPATIENT
Start: 2018-01-01 | End: 2018-01-01 | Stop reason: HOSPADM

## 2018-01-01 RX ORDER — HEPARIN SODIUM,PORCINE 10 UNIT/ML
5-10 VIAL (ML) INTRAVENOUS EVERY 24 HOURS
Status: DISCONTINUED | OUTPATIENT
Start: 2018-01-01 | End: 2018-01-01

## 2018-01-01 RX ORDER — LIDOCAINE HYDROCHLORIDE 10 MG/ML
4 INJECTION, SOLUTION EPIDURAL; INFILTRATION; INTRACAUDAL; PERINEURAL ONCE
Status: DISCONTINUED | OUTPATIENT
Start: 2018-01-01 | End: 2018-01-01 | Stop reason: CLARIF

## 2018-01-01 RX ORDER — CEFAZOLIN SODIUM 1 G
500 VIAL (EA) INJECTION
Status: COMPLETED | OUTPATIENT
Start: 2018-01-01 | End: 2018-01-01

## 2018-01-01 RX ORDER — AMOXICILLIN 250 MG
2 CAPSULE ORAL 2 TIMES DAILY PRN
Status: DISCONTINUED | OUTPATIENT
Start: 2018-01-01 | End: 2018-01-01

## 2018-01-01 RX ORDER — POTASSIUM CHLORIDE 1.5 G/1.58G
20-40 POWDER, FOR SOLUTION ORAL
Status: DISCONTINUED | OUTPATIENT
Start: 2018-01-01 | End: 2018-01-01

## 2018-01-01 RX ORDER — POTASSIUM CL/LIDO/0.9 % NACL 10MEQ/0.1L
10 INTRAVENOUS SOLUTION, PIGGYBACK (ML) INTRAVENOUS
Status: DISCONTINUED | OUTPATIENT
Start: 2018-01-01 | End: 2018-01-01

## 2018-01-01 RX ORDER — AMPICILLIN AND SULBACTAM 1; .5 G/1; G/1
1.5 INJECTION, POWDER, FOR SOLUTION INTRAMUSCULAR; INTRAVENOUS EVERY 12 HOURS
Status: COMPLETED | OUTPATIENT
Start: 2018-01-01 | End: 2018-01-01

## 2018-01-01 RX ORDER — THIAMINE HYDROCHLORIDE 100 MG/ML
100 INJECTION, SOLUTION INTRAMUSCULAR; INTRAVENOUS DAILY
Status: DISCONTINUED | OUTPATIENT
Start: 2018-01-01 | End: 2018-01-01 | Stop reason: HOSPADM

## 2018-01-01 RX ORDER — CALCIUM CARBONATE 500(1250)
1250 TABLET ORAL 2 TIMES DAILY WITH MEALS
Status: DISPENSED | OUTPATIENT
Start: 2018-01-01 | End: 2018-01-01

## 2018-01-01 RX ORDER — POTASSIUM CHLORIDE 29.8 MG/ML
20 INJECTION INTRAVENOUS
Status: DISCONTINUED | OUTPATIENT
Start: 2018-01-01 | End: 2018-01-01

## 2018-01-01 RX ORDER — FUROSEMIDE 10 MG/ML
80 INJECTION INTRAMUSCULAR; INTRAVENOUS ONCE
Status: COMPLETED | OUTPATIENT
Start: 2018-01-01 | End: 2018-01-01

## 2018-01-01 RX ORDER — BUMETANIDE 0.25 MG/ML
5 INJECTION INTRAMUSCULAR; INTRAVENOUS ONCE
Status: COMPLETED | OUTPATIENT
Start: 2018-01-01 | End: 2018-01-01

## 2018-01-01 RX ORDER — LIDOCAINE 40 MG/G
CREAM TOPICAL EVERY 8 HOURS PRN
Status: DISCONTINUED | OUTPATIENT
Start: 2018-01-01 | End: 2018-01-01 | Stop reason: HOSPADM

## 2018-01-01 RX ORDER — OLANZAPINE 5 MG/1
5 TABLET ORAL AT BEDTIME
Status: DISCONTINUED | OUTPATIENT
Start: 2018-01-01 | End: 2018-01-01

## 2018-01-01 RX ORDER — OLANZAPINE 2.5 MG/1
2.5 TABLET, FILM COATED ORAL AT BEDTIME
Status: DISCONTINUED | OUTPATIENT
Start: 2018-01-01 | End: 2018-01-01

## 2018-01-01 RX ORDER — BISACODYL 5 MG
5 TABLET, DELAYED RELEASE (ENTERIC COATED) ORAL DAILY PRN
Status: DISCONTINUED | OUTPATIENT
Start: 2018-01-01 | End: 2018-01-01 | Stop reason: HOSPADM

## 2018-01-01 RX ORDER — FENTANYL CITRATE 50 UG/ML
25-50 INJECTION, SOLUTION INTRAMUSCULAR; INTRAVENOUS EVERY 5 MIN PRN
Status: DISCONTINUED | OUTPATIENT
Start: 2018-01-01 | End: 2018-01-01

## 2018-01-01 RX ORDER — LANOLIN ALCOHOL/MO/W.PET/CERES
3 CREAM (GRAM) TOPICAL ONCE
Status: COMPLETED | OUTPATIENT
Start: 2018-01-01 | End: 2018-01-01

## 2018-01-01 RX ORDER — ACETAMINOPHEN 325 MG/1
650 TABLET ORAL EVERY 6 HOURS PRN
Status: DISCONTINUED | OUTPATIENT
Start: 2018-01-01 | End: 2018-01-01

## 2018-01-01 RX ORDER — NITROGLYCERIN 0.4 MG/1
0.4 TABLET SUBLINGUAL EVERY 5 MIN PRN
Status: DISCONTINUED | OUTPATIENT
Start: 2018-01-01 | End: 2018-01-01

## 2018-01-01 RX ORDER — SENNOSIDES 8.6 MG
2 TABLET ORAL DAILY PRN
Status: DISCONTINUED | OUTPATIENT
Start: 2018-01-01 | End: 2018-01-01 | Stop reason: HOSPADM

## 2018-01-01 RX ORDER — ONDANSETRON 2 MG/ML
4 INJECTION INTRAMUSCULAR; INTRAVENOUS EVERY 6 HOURS PRN
Status: DISCONTINUED | OUTPATIENT
Start: 2018-01-01 | End: 2018-01-01

## 2018-01-01 RX ORDER — HEPARIN SODIUM 1000 [USP'U]/ML
3 INJECTION, SOLUTION INTRAVENOUS; SUBCUTANEOUS ONCE
Status: DISCONTINUED | OUTPATIENT
Start: 2018-01-01 | End: 2018-01-01 | Stop reason: CLARIF

## 2018-01-01 RX ORDER — HEPARIN SODIUM,PORCINE 10 UNIT/ML
5-10 VIAL (ML) INTRAVENOUS
Status: DISCONTINUED | OUTPATIENT
Start: 2018-01-01 | End: 2018-01-01

## 2018-01-01 RX ORDER — POTASSIUM CHLORIDE 750 MG/1
20-40 TABLET, EXTENDED RELEASE ORAL
Status: DISCONTINUED | OUTPATIENT
Start: 2018-01-01 | End: 2018-01-01

## 2018-01-01 RX ORDER — LIDOCAINE 50 MG/G
OINTMENT TOPICAL EVERY 4 HOURS PRN
Status: DISCONTINUED | OUTPATIENT
Start: 2018-01-01 | End: 2018-01-01 | Stop reason: HOSPADM

## 2018-01-01 RX ORDER — HEPARIN SODIUM 10000 [USP'U]/100ML
0-3500 INJECTION, SOLUTION INTRAVENOUS CONTINUOUS
Status: DISCONTINUED | OUTPATIENT
Start: 2018-01-01 | End: 2018-01-01

## 2018-01-01 RX ORDER — POTASSIUM CHLORIDE 20MEQ/15ML
20 LIQUID (ML) ORAL ONCE
Status: COMPLETED | OUTPATIENT
Start: 2018-01-01 | End: 2018-01-01

## 2018-01-01 RX ORDER — HEPARIN SODIUM,PORCINE 10 UNIT/ML
2-5 VIAL (ML) INTRAVENOUS
Status: DISCONTINUED | OUTPATIENT
Start: 2018-01-01 | End: 2018-01-01 | Stop reason: HOSPADM

## 2018-01-01 RX ORDER — HYDROMORPHONE HCL/0.9% NACL/PF 0.2MG/0.2
0.2 SYRINGE (ML) INTRAVENOUS EVERY 4 HOURS PRN
Status: DISCONTINUED | OUTPATIENT
Start: 2018-01-01 | End: 2018-01-01

## 2018-01-01 RX ORDER — POTASSIUM CHLORIDE 750 MG/1
20 TABLET, EXTENDED RELEASE ORAL 3 TIMES DAILY
Status: DISCONTINUED | OUTPATIENT
Start: 2018-01-01 | End: 2018-01-01

## 2018-01-01 RX ORDER — BISACODYL 5 MG
10 TABLET, DELAYED RELEASE (ENTERIC COATED) ORAL DAILY PRN
Status: DISCONTINUED | OUTPATIENT
Start: 2018-01-01 | End: 2018-01-01 | Stop reason: HOSPADM

## 2018-01-01 RX ORDER — SENNOSIDES 8.6 MG
2 TABLET ORAL DAILY PRN
COMMUNITY

## 2018-01-01 RX ORDER — FUROSEMIDE 10 MG/ML
80 INJECTION INTRAMUSCULAR; INTRAVENOUS
Status: DISCONTINUED | OUTPATIENT
Start: 2018-01-01 | End: 2018-01-01

## 2018-01-01 RX ORDER — DOCUSATE SODIUM 100 MG/1
100 CAPSULE, LIQUID FILLED ORAL 2 TIMES DAILY
Status: DISCONTINUED | OUTPATIENT
Start: 2018-01-01 | End: 2018-01-01

## 2018-01-01 RX ORDER — DOBUTAMINE HYDROCHLORIDE 200 MG/100ML
5 INJECTION INTRAVENOUS CONTINUOUS
Status: DISCONTINUED | OUTPATIENT
Start: 2018-01-01 | End: 2018-01-01

## 2018-01-01 RX ORDER — DEXTROSE MONOHYDRATE 25 G/50ML
INJECTION, SOLUTION INTRAVENOUS
Status: DISPENSED
Start: 2018-01-01 | End: 2018-01-01

## 2018-01-01 RX ORDER — NALOXONE HYDROCHLORIDE 0.4 MG/ML
.1-.4 INJECTION, SOLUTION INTRAMUSCULAR; INTRAVENOUS; SUBCUTANEOUS
Status: DISCONTINUED | OUTPATIENT
Start: 2018-01-01 | End: 2018-01-01 | Stop reason: HOSPADM

## 2018-01-01 RX ORDER — AMOXICILLIN 250 MG
1 CAPSULE ORAL 2 TIMES DAILY PRN
Status: DISCONTINUED | OUTPATIENT
Start: 2018-01-01 | End: 2018-01-01

## 2018-01-01 RX ORDER — BUMETANIDE 0.25 MG/ML
2 INJECTION INTRAMUSCULAR; INTRAVENOUS ONCE
Status: COMPLETED | OUTPATIENT
Start: 2018-01-01 | End: 2018-01-01

## 2018-01-01 RX ORDER — SENNOSIDES 8.6 MG
2 TABLET ORAL DAILY
Status: DISCONTINUED | OUTPATIENT
Start: 2018-01-01 | End: 2018-01-01

## 2018-01-01 RX ORDER — BISACODYL 10 MG
10 SUPPOSITORY, RECTAL RECTAL ONCE
Status: DISCONTINUED | OUTPATIENT
Start: 2018-01-01 | End: 2018-01-01

## 2018-01-01 RX ORDER — POTASSIUM CHLORIDE 7.45 MG/ML
10 INJECTION INTRAVENOUS
Status: DISCONTINUED | OUTPATIENT
Start: 2018-01-01 | End: 2018-01-01

## 2018-01-01 RX ORDER — PHYTONADIONE 1 MG/.5ML
5 INJECTION, EMULSION INTRAMUSCULAR; INTRAVENOUS; SUBCUTANEOUS ONCE
Status: DISCONTINUED | OUTPATIENT
Start: 2018-01-01 | End: 2018-01-01 | Stop reason: CLARIF

## 2018-01-01 RX ORDER — POLYETHYLENE GLYCOL 3350 17 G/17G
17 POWDER, FOR SOLUTION ORAL DAILY
Status: DISCONTINUED | OUTPATIENT
Start: 2018-01-01 | End: 2018-01-01

## 2018-01-01 RX ORDER — FENTANYL CITRATE 50 UG/ML
25-50 INJECTION, SOLUTION INTRAMUSCULAR; INTRAVENOUS EVERY 5 MIN PRN
Status: DISCONTINUED | OUTPATIENT
Start: 2018-01-01 | End: 2018-01-01 | Stop reason: CLARIF

## 2018-01-01 RX ORDER — BISACODYL 5 MG
15 TABLET, DELAYED RELEASE (ENTERIC COATED) ORAL DAILY PRN
Status: DISCONTINUED | OUTPATIENT
Start: 2018-01-01 | End: 2018-01-01 | Stop reason: HOSPADM

## 2018-01-01 RX ORDER — DIAZEPAM 10 MG
10 TABLET ORAL EVERY 6 HOURS PRN
Qty: 1 TABLET | Refills: 0
Start: 2018-01-01 | End: 2018-01-01

## 2018-01-01 RX ORDER — AMPICILLIN AND SULBACTAM 1; .5 G/1; G/1
1.5 INJECTION, POWDER, FOR SOLUTION INTRAMUSCULAR; INTRAVENOUS EVERY 12 HOURS
Status: DISCONTINUED | OUTPATIENT
Start: 2018-01-01 | End: 2018-01-01

## 2018-01-01 RX ORDER — LANOLIN ALCOHOL/MO/W.PET/CERES
3 CREAM (GRAM) TOPICAL
Status: DISCONTINUED | OUTPATIENT
Start: 2018-01-01 | End: 2018-01-01

## 2018-01-01 RX ORDER — LURASIDONE HYDROCHLORIDE 40 MG/1
40 TABLET, FILM COATED ORAL
Status: DISCONTINUED | OUTPATIENT
Start: 2018-01-01 | End: 2018-01-01

## 2018-01-01 RX ORDER — SENNOSIDES 8.6 MG
2 TABLET ORAL DAILY PRN
Status: DISCONTINUED | OUTPATIENT
Start: 2018-01-01 | End: 2018-01-01

## 2018-01-01 RX ORDER — HYDROMORPHONE HYDROCHLORIDE 1 MG/ML
.3-.5 INJECTION, SOLUTION INTRAMUSCULAR; INTRAVENOUS; SUBCUTANEOUS EVERY 10 MIN PRN
Status: DISCONTINUED | OUTPATIENT
Start: 2018-01-01 | End: 2018-01-01 | Stop reason: HOSPADM

## 2018-01-01 RX ORDER — LANOLIN ALCOHOL/MO/W.PET/CERES
3 CREAM (GRAM) TOPICAL AT BEDTIME
Status: DISCONTINUED | OUTPATIENT
Start: 2018-01-01 | End: 2018-01-01

## 2018-01-01 RX ORDER — SIMETHICONE 40MG/0.6ML
40 SUSPENSION, DROPS(FINAL DOSAGE FORM)(ML) ORAL EVERY 6 HOURS PRN
Status: DISCONTINUED | OUTPATIENT
Start: 2018-01-01 | End: 2018-01-01

## 2018-01-01 RX ORDER — BUMETANIDE 1 MG/1
1 TABLET ORAL
Status: DISCONTINUED | OUTPATIENT
Start: 2018-01-01 | End: 2018-01-01

## 2018-01-01 RX ORDER — FUROSEMIDE 10 MG/ML
80 INJECTION INTRAMUSCULAR; INTRAVENOUS 3 TIMES DAILY
Status: DISCONTINUED | OUTPATIENT
Start: 2018-01-01 | End: 2018-01-01

## 2018-01-01 RX ORDER — ASPIRIN 81 MG/1
81 TABLET, CHEWABLE ORAL DAILY
Status: DISCONTINUED | OUTPATIENT
Start: 2018-01-01 | End: 2018-01-01

## 2018-01-01 RX ORDER — WARFARIN SODIUM 1 MG/1
2 TABLET ORAL
Status: COMPLETED | OUTPATIENT
Start: 2018-01-01 | End: 2018-01-01

## 2018-01-01 RX ORDER — GLYCOPYRROLATE 0.2 MG/ML
.1-.2 INJECTION, SOLUTION INTRAMUSCULAR; INTRAVENOUS EVERY 4 HOURS PRN
Status: DISCONTINUED | OUTPATIENT
Start: 2018-01-01 | End: 2018-01-01 | Stop reason: HOSPADM

## 2018-01-01 RX ORDER — FLUMAZENIL 0.1 MG/ML
0.2 INJECTION, SOLUTION INTRAVENOUS
Status: DISCONTINUED | OUTPATIENT
Start: 2018-01-01 | End: 2018-01-01

## 2018-01-01 RX ORDER — MULTIPLE VITAMINS W/ MINERALS TAB 9MG-400MCG
1 TAB ORAL DAILY
Status: DISCONTINUED | OUTPATIENT
Start: 2018-01-01 | End: 2018-01-01 | Stop reason: ALTCHOICE

## 2018-01-01 RX ORDER — FUROSEMIDE 10 MG/ML
40 INJECTION INTRAMUSCULAR; INTRAVENOUS ONCE
Status: COMPLETED | OUTPATIENT
Start: 2018-01-01 | End: 2018-01-01

## 2018-01-01 RX ORDER — HYDRALAZINE HYDROCHLORIDE 10 MG/1
10 TABLET, FILM COATED ORAL EVERY 8 HOURS
Status: DISCONTINUED | OUTPATIENT
Start: 2018-01-01 | End: 2018-01-01

## 2018-01-01 RX ORDER — WARFARIN SODIUM 2.5 MG/1
2.5 TABLET ORAL ONCE
Status: COMPLETED | OUTPATIENT
Start: 2018-01-01 | End: 2018-01-01

## 2018-01-01 RX ORDER — HYDROMORPHONE HYDROCHLORIDE 1 MG/ML
.3-.5 INJECTION, SOLUTION INTRAMUSCULAR; INTRAVENOUS; SUBCUTANEOUS EVERY 30 MIN PRN
Status: DISCONTINUED | OUTPATIENT
Start: 2018-01-01 | End: 2018-01-01 | Stop reason: HOSPADM

## 2018-01-01 RX ORDER — CEFTRIAXONE 1 G/1
1 INJECTION, POWDER, FOR SOLUTION INTRAMUSCULAR; INTRAVENOUS EVERY 24 HOURS
Status: DISCONTINUED | OUTPATIENT
Start: 2018-01-01 | End: 2018-01-01

## 2018-01-01 RX ADMIN — MULTIPLE VITAMINS W/ MINERALS TAB 1 TABLET: TAB at 09:42

## 2018-01-01 RX ADMIN — CEFTRIAXONE 1 G: 1 INJECTION, POWDER, FOR SOLUTION INTRAMUSCULAR; INTRAVENOUS at 01:09

## 2018-01-01 RX ADMIN — Medication 6 MG: at 21:19

## 2018-01-01 RX ADMIN — POTASSIUM CHLORIDE 40 MEQ: 1.5 POWDER, FOR SOLUTION ORAL at 15:49

## 2018-01-01 RX ADMIN — BUMETANIDE 2 MG: 2 TABLET ORAL at 09:37

## 2018-01-01 RX ADMIN — THIAMINE HYDROCHLORIDE 100 MG: 100 INJECTION, SOLUTION INTRAMUSCULAR; INTRAVENOUS at 08:19

## 2018-01-01 RX ADMIN — WARFARIN SODIUM 1 MG: 1 TABLET ORAL at 20:52

## 2018-01-01 RX ADMIN — HEPARIN, PORCINE (PF) 10 UNIT/ML INTRAVENOUS SYRINGE 5 ML: at 04:33

## 2018-01-01 RX ADMIN — DOBUTAMINE IN DEXTROSE 5 MCG/KG/MIN: 200 INJECTION, SOLUTION INTRAVENOUS at 11:30

## 2018-01-01 RX ADMIN — ACETAMINOPHEN 650 MG: 325 TABLET, FILM COATED ORAL at 11:11

## 2018-01-01 RX ADMIN — COCOA BUTTER, PHENYLEPHRINE HYDROCHLORIDE 1 SUPPOSITORY: 2211; 6.25 SUPPOSITORY RECTAL at 06:58

## 2018-01-01 RX ADMIN — AMPICILLIN SODIUM AND SULBACTAM SODIUM 1.5 G: 1; .5 INJECTION, POWDER, FOR SOLUTION INTRAMUSCULAR; INTRAVENOUS at 21:18

## 2018-01-01 RX ADMIN — HEPARIN SODIUM 5000 UNITS: 5000 INJECTION, SOLUTION INTRAVENOUS; SUBCUTANEOUS at 08:36

## 2018-01-01 RX ADMIN — POLYETHYLENE GLYCOL 3350 17 G: 17 POWDER, FOR SOLUTION ORAL at 07:57

## 2018-01-01 RX ADMIN — Medication 6 MG: at 20:44

## 2018-01-01 RX ADMIN — ASPIRIN 81 MG CHEWABLE TABLET 81 MG: 81 TABLET CHEWABLE at 20:28

## 2018-01-01 RX ADMIN — Medication 12.5 MG: at 18:41

## 2018-01-01 RX ADMIN — ACETAMINOPHEN 650 MG: 650 SUPPOSITORY RECTAL at 11:46

## 2018-01-01 RX ADMIN — LURASIDONE HYDROCHLORIDE 20 MG: 20 TABLET, FILM COATED ORAL at 20:27

## 2018-01-01 RX ADMIN — BUMETANIDE 2 MG: 0.25 INJECTION INTRAMUSCULAR; INTRAVENOUS at 15:03

## 2018-01-01 RX ADMIN — DOBUTAMINE IN DEXTROSE 5 MCG/KG/MIN: 200 INJECTION, SOLUTION INTRAVENOUS at 16:38

## 2018-01-01 RX ADMIN — DOBUTAMINE IN DEXTROSE 5 MCG/KG/MIN: 200 INJECTION, SOLUTION INTRAVENOUS at 20:53

## 2018-01-01 RX ADMIN — ASPIRIN 81 MG CHEWABLE TABLET 81 MG: 81 TABLET CHEWABLE at 07:58

## 2018-01-01 RX ADMIN — MELATONIN TAB 3 MG 3 MG: 3 TAB at 02:51

## 2018-01-01 RX ADMIN — ONDANSETRON 4 MG: 2 INJECTION INTRAMUSCULAR; INTRAVENOUS at 09:27

## 2018-01-01 RX ADMIN — ATORVASTATIN CALCIUM 80 MG: 80 TABLET, FILM COATED ORAL at 20:44

## 2018-01-01 RX ADMIN — DOBUTAMINE IN DEXTROSE 5 MCG/KG/MIN: 200 INJECTION, SOLUTION INTRAVENOUS at 10:08

## 2018-01-01 RX ADMIN — BUMETANIDE 2 MG: 2 TABLET ORAL at 08:42

## 2018-01-01 RX ADMIN — ACETAMINOPHEN 650 MG: 325 TABLET, FILM COATED ORAL at 01:55

## 2018-01-01 RX ADMIN — COCOA BUTTER, PHENYLEPHRINE HYDROCHLORIDE 1 SUPPOSITORY: 2211; 6.25 SUPPOSITORY RECTAL at 14:07

## 2018-01-01 RX ADMIN — Medication 1 MG: at 01:36

## 2018-01-01 RX ADMIN — PIPERACILLIN AND TAZOBACTAM 2.25 G: 2; .25 INJECTION, POWDER, LYOPHILIZED, FOR SOLUTION INTRAVENOUS; PARENTERAL at 21:30

## 2018-01-01 RX ADMIN — ACETAMINOPHEN 650 MG: 325 TABLET, FILM COATED ORAL at 02:51

## 2018-01-01 RX ADMIN — POLYETHYLENE GLYCOL 3350 17 G: 17 POWDER, FOR SOLUTION ORAL at 09:09

## 2018-01-01 RX ADMIN — MULTIPLE VITAMINS W/ MINERALS TAB 1 TABLET: TAB at 08:43

## 2018-01-01 RX ADMIN — CALCIUM 1250 MG: 500 TABLET ORAL at 09:26

## 2018-01-01 RX ADMIN — ALTEPLASE 2 MG: 2.2 INJECTION, POWDER, LYOPHILIZED, FOR SOLUTION INTRAVENOUS at 09:42

## 2018-01-01 RX ADMIN — AMPICILLIN SODIUM AND SULBACTAM SODIUM 1.5 G: 1; .5 INJECTION, POWDER, FOR SOLUTION INTRAMUSCULAR; INTRAVENOUS at 20:21

## 2018-01-01 RX ADMIN — POTASSIUM CHLORIDE 20 MEQ: 750 TABLET, EXTENDED RELEASE ORAL at 17:42

## 2018-01-01 RX ADMIN — LURASIDONE HYDROCHLORIDE 20 MG: 20 TABLET, FILM COATED ORAL at 20:44

## 2018-01-01 RX ADMIN — APIXABAN 2.5 MG: 2.5 TABLET, FILM COATED ORAL at 20:52

## 2018-01-01 RX ADMIN — POTASSIUM CHLORIDE 40 MEQ: 750 TABLET, EXTENDED RELEASE ORAL at 13:43

## 2018-01-01 RX ADMIN — APIXABAN 2.5 MG: 2.5 TABLET, FILM COATED ORAL at 20:35

## 2018-01-01 RX ADMIN — ATORVASTATIN CALCIUM 80 MG: 80 TABLET, FILM COATED ORAL at 20:11

## 2018-01-01 RX ADMIN — OLANZAPINE 2.5 MG: 2.5 TABLET, FILM COATED ORAL at 21:39

## 2018-01-01 RX ADMIN — OLANZAPINE 5 MG: 5 TABLET, FILM COATED ORAL at 21:00

## 2018-01-01 RX ADMIN — Medication 6 MG: at 20:54

## 2018-01-01 RX ADMIN — STANDARDIZED SENNA CONCENTRATE 2 TABLET: 8.6 TABLET ORAL at 08:14

## 2018-01-01 RX ADMIN — POTASSIUM CHLORIDE 20 MEQ: 29.8 INJECTION, SOLUTION INTRAVENOUS at 17:30

## 2018-01-01 RX ADMIN — ACETAMINOPHEN 650 MG: 325 TABLET, FILM COATED ORAL at 01:47

## 2018-01-01 RX ADMIN — LIDOCAINE HYDROCHLORIDE 30 ML: 10 INJECTION, SOLUTION EPIDURAL; INFILTRATION; INTRACAUDAL; PERINEURAL at 16:30

## 2018-01-01 RX ADMIN — AMPICILLIN SODIUM AND SULBACTAM SODIUM 1.5 G: 1; .5 INJECTION, POWDER, FOR SOLUTION INTRAMUSCULAR; INTRAVENOUS at 09:13

## 2018-01-01 RX ADMIN — SODIUM CHLORIDE, PRESERVATIVE FREE 5 ML: 5 INJECTION INTRAVENOUS at 09:05

## 2018-01-01 RX ADMIN — ACETAMINOPHEN 650 MG: 325 TABLET, FILM COATED ORAL at 21:16

## 2018-01-01 RX ADMIN — HYDROCORTISONE: 1 CREAM TOPICAL at 09:33

## 2018-01-01 RX ADMIN — HYDROCORTISONE: 1 CREAM TOPICAL at 08:46

## 2018-01-01 RX ADMIN — BISACODYL 15 MG: 5 TABLET, COATED ORAL at 16:43

## 2018-01-01 RX ADMIN — POTASSIUM CHLORIDE 20 MEQ: 750 TABLET, EXTENDED RELEASE ORAL at 09:28

## 2018-01-01 RX ADMIN — SIMETHICONE 40 MG: 20 SUSPENSION/ DROPS ORAL at 12:14

## 2018-01-01 RX ADMIN — PSYLLIUM HUSK 1 PACKET: 3.4 POWDER ORAL at 08:20

## 2018-01-01 RX ADMIN — AMPICILLIN SODIUM AND SULBACTAM SODIUM 1.5 G: 1; .5 INJECTION, POWDER, FOR SOLUTION INTRAMUSCULAR; INTRAVENOUS at 20:31

## 2018-01-01 RX ADMIN — ACETAMINOPHEN 650 MG: 325 TABLET, FILM COATED ORAL at 09:04

## 2018-01-01 RX ADMIN — ASPIRIN 81 MG CHEWABLE TABLET 81 MG: 81 TABLET CHEWABLE at 08:49

## 2018-01-01 RX ADMIN — MELATONIN TAB 3 MG 3 MG: 3 TAB at 20:21

## 2018-01-01 RX ADMIN — ALTEPLASE 2 MG: 2.2 INJECTION, POWDER, LYOPHILIZED, FOR SOLUTION INTRAVENOUS at 17:46

## 2018-01-01 RX ADMIN — SODIUM CHLORIDE 300 ML: 9 INJECTION, SOLUTION INTRAVENOUS at 15:22

## 2018-01-01 RX ADMIN — NYSTATIN: 100000 CREAM TOPICAL at 18:22

## 2018-01-01 RX ADMIN — BISACODYL 5 MG: 5 TABLET, COATED ORAL at 19:41

## 2018-01-01 RX ADMIN — SIMETHICONE 40 MG: 20 SUSPENSION/ DROPS ORAL at 12:40

## 2018-01-01 RX ADMIN — THIAMINE HYDROCHLORIDE 100 MG: 100 INJECTION, SOLUTION INTRAMUSCULAR; INTRAVENOUS at 22:21

## 2018-01-01 RX ADMIN — SODIUM CHLORIDE, PRESERVATIVE FREE 5 ML: 5 INJECTION INTRAVENOUS at 06:05

## 2018-01-01 RX ADMIN — Medication 6 MG: at 20:57

## 2018-01-01 RX ADMIN — PSYLLIUM HUSK 1 PACKET: 3.4 POWDER ORAL at 09:06

## 2018-01-01 RX ADMIN — HYDROCORTISONE: 1 CREAM TOPICAL at 08:33

## 2018-01-01 RX ADMIN — CEPHALEXIN 500 MG: 250 CAPSULE ORAL at 20:34

## 2018-01-01 RX ADMIN — MULTIPLE VITAMINS W/ MINERALS TAB 1 TABLET: TAB at 08:48

## 2018-01-01 RX ADMIN — Medication 6 MG: at 20:37

## 2018-01-01 RX ADMIN — THIAMINE HCL (VITAMIN B1) 50 MG TABLET 50 MG: at 07:38

## 2018-01-01 RX ADMIN — ACETAMINOPHEN 650 MG: 325 TABLET, FILM COATED ORAL at 00:03

## 2018-01-01 RX ADMIN — COCOA BUTTER, PHENYLEPHRINE HYDROCHLORIDE 1 SUPPOSITORY: 2211; 6.25 SUPPOSITORY RECTAL at 17:12

## 2018-01-01 RX ADMIN — DOBUTAMINE IN DEXTROSE 5 MCG/KG/MIN: 200 INJECTION, SOLUTION INTRAVENOUS at 06:51

## 2018-01-01 RX ADMIN — Medication: at 08:18

## 2018-01-01 RX ADMIN — LIDOCAINE: 50 OINTMENT TOPICAL at 08:50

## 2018-01-01 RX ADMIN — PHYTONADIONE 2 MG: 10 INJECTION, EMULSION INTRAMUSCULAR; INTRAVENOUS; SUBCUTANEOUS at 09:01

## 2018-01-01 RX ADMIN — MELATONIN TAB 3 MG 3 MG: 3 TAB at 20:24

## 2018-01-01 RX ADMIN — MULTIPLE VITAMINS W/ MINERALS TAB 1 TABLET: TAB at 08:25

## 2018-01-01 RX ADMIN — Medication 6 MG: at 20:11

## 2018-01-01 RX ADMIN — OLANZAPINE 2.5 MG: 2.5 TABLET, FILM COATED ORAL at 23:04

## 2018-01-01 RX ADMIN — BISACODYL 10 MG: 5 TABLET, COATED ORAL at 08:49

## 2018-01-01 RX ADMIN — ACETAMINOPHEN 650 MG: 325 TABLET, FILM COATED ORAL at 13:12

## 2018-01-01 RX ADMIN — MELATONIN TAB 3 MG 3 MG: 3 TAB at 22:09

## 2018-01-01 RX ADMIN — STANDARDIZED SENNA CONCENTRATE 2 TABLET: 8.6 TABLET ORAL at 08:49

## 2018-01-01 RX ADMIN — ATORVASTATIN CALCIUM 80 MG: 80 TABLET, FILM COATED ORAL at 21:16

## 2018-01-01 RX ADMIN — LIDOCAINE: 50 OINTMENT TOPICAL at 09:35

## 2018-01-01 RX ADMIN — HYDROCORTISONE: 1 CREAM TOPICAL at 10:11

## 2018-01-01 RX ADMIN — ATORVASTATIN CALCIUM 80 MG: 80 TABLET, FILM COATED ORAL at 19:37

## 2018-01-01 RX ADMIN — LURASIDONE HYDROCHLORIDE 40 MG: 40 TABLET, FILM COATED ORAL at 22:15

## 2018-01-01 RX ADMIN — Medication 0.52 G: at 08:26

## 2018-01-01 RX ADMIN — Medication 0.52 G: at 08:32

## 2018-01-01 RX ADMIN — Medication 6 MG: at 20:09

## 2018-01-01 RX ADMIN — TAMSULOSIN HYDROCHLORIDE 0.4 MG: 0.4 CAPSULE ORAL at 18:40

## 2018-01-01 RX ADMIN — ACETAMINOPHEN 650 MG: 325 TABLET, FILM COATED ORAL at 20:48

## 2018-01-01 RX ADMIN — Medication 6 MG: at 20:52

## 2018-01-01 RX ADMIN — SIMETHICONE CHEW TAB 80 MG 80 MG: 80 TABLET ORAL at 13:59

## 2018-01-01 RX ADMIN — ATORVASTATIN CALCIUM 80 MG: 80 TABLET, FILM COATED ORAL at 20:35

## 2018-01-01 RX ADMIN — SODIUM CHLORIDE 250 ML: 9 INJECTION, SOLUTION INTRAVENOUS at 20:27

## 2018-01-01 RX ADMIN — CEPHALEXIN 500 MG: 250 CAPSULE ORAL at 20:28

## 2018-01-01 RX ADMIN — DOBUTAMINE IN DEXTROSE 5 MCG/KG/MIN: 200 INJECTION, SOLUTION INTRAVENOUS at 08:09

## 2018-01-01 RX ADMIN — STANDARDIZED SENNA CONCENTRATE 2 TABLET: 8.6 TABLET ORAL at 08:43

## 2018-01-01 RX ADMIN — SODIUM CHLORIDE, PRESERVATIVE FREE 5 ML: 5 INJECTION INTRAVENOUS at 08:24

## 2018-01-01 RX ADMIN — ACETAMINOPHEN 650 MG: 325 TABLET, FILM COATED ORAL at 18:15

## 2018-01-01 RX ADMIN — HEPARIN SODIUM 5000 UNITS: 5000 INJECTION, SOLUTION INTRAVENOUS; SUBCUTANEOUS at 08:23

## 2018-01-01 RX ADMIN — ACETAMINOPHEN 650 MG: 325 TABLET, FILM COATED ORAL at 10:22

## 2018-01-01 RX ADMIN — PSYLLIUM HUSK 1 PACKET: 3.4 POWDER ORAL at 09:04

## 2018-01-01 RX ADMIN — APIXABAN 2.5 MG: 2.5 TABLET, FILM COATED ORAL at 09:32

## 2018-01-01 RX ADMIN — ATORVASTATIN CALCIUM 80 MG: 80 TABLET, FILM COATED ORAL at 20:03

## 2018-01-01 RX ADMIN — HYDROCORTISONE: 1 CREAM TOPICAL at 08:47

## 2018-01-01 RX ADMIN — APIXABAN 2.5 MG: 2.5 TABLET, FILM COATED ORAL at 08:41

## 2018-01-01 RX ADMIN — HEPARIN SODIUM 5000 UNITS: 5000 INJECTION, SOLUTION INTRAVENOUS; SUBCUTANEOUS at 08:58

## 2018-01-01 RX ADMIN — SODIUM CHLORIDE: 9 INJECTION, SOLUTION INTRAVENOUS at 21:42

## 2018-01-01 RX ADMIN — LIDOCAINE HYDROCHLORIDE 10 ML: 20 JELLY TOPICAL at 11:07

## 2018-01-01 RX ADMIN — PEDIATRIC MULTIPLE VITAMIN W/ MINERALS & C CHEW TAB 60 MG 1 TABLET: CHEW TAB at 08:36

## 2018-01-01 RX ADMIN — APIXABAN 2.5 MG: 2.5 TABLET, FILM COATED ORAL at 19:39

## 2018-01-01 RX ADMIN — NYSTATIN: 100000 CREAM TOPICAL at 09:52

## 2018-01-01 RX ADMIN — MULTIPLE VITAMINS W/ MINERALS TAB 1 TABLET: TAB at 08:41

## 2018-01-01 RX ADMIN — APIXABAN 2.5 MG: 2.5 TABLET, FILM COATED ORAL at 20:33

## 2018-01-01 RX ADMIN — ACETAMINOPHEN 650 MG: 325 TABLET, FILM COATED ORAL at 03:24

## 2018-01-01 RX ADMIN — TAMSULOSIN HYDROCHLORIDE 0.4 MG: 0.4 CAPSULE ORAL at 18:30

## 2018-01-01 RX ADMIN — DOBUTAMINE IN DEXTROSE 5 MCG/KG/MIN: 200 INJECTION, SOLUTION INTRAVENOUS at 13:27

## 2018-01-01 RX ADMIN — Medication 6 MG: at 20:20

## 2018-01-01 RX ADMIN — Medication 6 MG: at 20:10

## 2018-01-01 RX ADMIN — LORAZEPAM 0.5 MG: 2 INJECTION INTRAMUSCULAR; INTRAVENOUS at 02:49

## 2018-01-01 RX ADMIN — Medication 6 MG: at 20:28

## 2018-01-01 RX ADMIN — THIAMINE HCL (VITAMIN B1) 50 MG TABLET 50 MG: at 09:27

## 2018-01-01 RX ADMIN — OLANZAPINE 2.5 MG: 2.5 TABLET, FILM COATED ORAL at 20:20

## 2018-01-01 RX ADMIN — DOBUTAMINE IN DEXTROSE 5 MCG/KG/MIN: 200 INJECTION, SOLUTION INTRAVENOUS at 17:33

## 2018-01-01 RX ADMIN — SODIUM CHLORIDE: 9 INJECTION, SOLUTION INTRAVENOUS at 13:39

## 2018-01-01 RX ADMIN — Medication 0.52 G: at 09:04

## 2018-01-01 RX ADMIN — COCOA BUTTER, PHENYLEPHRINE HYDROCHLORIDE 1 SUPPOSITORY: 2211; 6.25 SUPPOSITORY RECTAL at 20:50

## 2018-01-01 RX ADMIN — TAMSULOSIN HYDROCHLORIDE 0.4 MG: 0.4 CAPSULE ORAL at 18:35

## 2018-01-01 RX ADMIN — APIXABAN 2.5 MG: 2.5 TABLET, FILM COATED ORAL at 21:26

## 2018-01-01 RX ADMIN — NYSTATIN: 100000 CREAM TOPICAL at 20:18

## 2018-01-01 RX ADMIN — STANDARDIZED SENNA CONCENTRATE 2 TABLET: 8.6 TABLET ORAL at 08:32

## 2018-01-01 RX ADMIN — AMPICILLIN SODIUM AND SULBACTAM SODIUM 1.5 G: 1; .5 INJECTION, POWDER, FOR SOLUTION INTRAMUSCULAR; INTRAVENOUS at 10:37

## 2018-01-01 RX ADMIN — ASPIRIN 81 MG CHEWABLE TABLET 81 MG: 81 TABLET CHEWABLE at 09:37

## 2018-01-01 RX ADMIN — NYSTATIN: 100000 CREAM TOPICAL at 21:12

## 2018-01-01 RX ADMIN — STANDARDIZED SENNA CONCENTRATE 2 TABLET: 8.6 TABLET ORAL at 09:03

## 2018-01-01 RX ADMIN — PSYLLIUM HUSK 1 PACKET: 3.4 POWDER ORAL at 09:53

## 2018-01-01 RX ADMIN — THIAMINE HCL (VITAMIN B1) 50 MG TABLET 50 MG: at 09:09

## 2018-01-01 RX ADMIN — THIAMINE HYDROCHLORIDE 100 MG: 100 INJECTION, SOLUTION INTRAMUSCULAR; INTRAVENOUS at 08:36

## 2018-01-01 RX ADMIN — MULTIPLE VITAMINS W/ MINERALS TAB 1 TABLET: TAB at 09:00

## 2018-01-01 RX ADMIN — ACETAMINOPHEN 650 MG: 325 TABLET, FILM COATED ORAL at 05:51

## 2018-01-01 RX ADMIN — LIDOCAINE: 50 OINTMENT TOPICAL at 16:05

## 2018-01-01 RX ADMIN — FINASTERIDE 5 MG: 5 TABLET, FILM COATED ORAL at 08:50

## 2018-01-01 RX ADMIN — APIXABAN 2.5 MG: 2.5 TABLET, FILM COATED ORAL at 20:07

## 2018-01-01 RX ADMIN — ACETAMINOPHEN 650 MG: 325 TABLET, FILM COATED ORAL at 20:21

## 2018-01-01 RX ADMIN — DOBUTAMINE IN DEXTROSE 5 MCG/KG/MIN: 200 INJECTION, SOLUTION INTRAVENOUS at 18:42

## 2018-01-01 RX ADMIN — THIAMINE HYDROCHLORIDE 100 MG: 100 INJECTION, SOLUTION INTRAMUSCULAR; INTRAVENOUS at 09:05

## 2018-01-01 RX ADMIN — STANDARDIZED SENNA CONCENTRATE 2 TABLET: 8.6 TABLET ORAL at 07:38

## 2018-01-01 RX ADMIN — SODIUM CHLORIDE, PRESERVATIVE FREE 5 ML: 5 INJECTION INTRAVENOUS at 08:27

## 2018-01-01 RX ADMIN — OXYCODONE HYDROCHLORIDE 5 MG: 5 TABLET ORAL at 04:49

## 2018-01-01 RX ADMIN — POLYETHYLENE GLYCOL 3350 17 G: 17 POWDER, FOR SOLUTION ORAL at 08:19

## 2018-01-01 RX ADMIN — MULTIPLE VITAMINS W/ MINERALS TAB 1 TABLET: TAB at 08:40

## 2018-01-01 RX ADMIN — CALCIUM 1250 MG: 500 TABLET ORAL at 07:58

## 2018-01-01 RX ADMIN — DOCUSATE SODIUM 286 ML: 50 LIQUID ORAL at 20:01

## 2018-01-01 RX ADMIN — BUMETANIDE 2 MG: 2 TABLET ORAL at 16:43

## 2018-01-01 RX ADMIN — DOBUTAMINE IN DEXTROSE 5 MCG/KG/MIN: 200 INJECTION, SOLUTION INTRAVENOUS at 03:32

## 2018-01-01 RX ADMIN — PEDIATRIC MULTIPLE VITAMIN W/ MINERALS & C CHEW TAB 60 MG 1 TABLET: CHEW TAB at 09:06

## 2018-01-01 RX ADMIN — APIXABAN 2.5 MG: 2.5 TABLET, FILM COATED ORAL at 22:15

## 2018-01-01 RX ADMIN — MULTIPLE VITAMINS W/ MINERALS TAB 1 TABLET: TAB at 09:26

## 2018-01-01 RX ADMIN — LIDOCAINE: 40 CREAM TOPICAL at 22:14

## 2018-01-01 RX ADMIN — ASPIRIN 81 MG CHEWABLE TABLET 81 MG: 81 TABLET CHEWABLE at 08:29

## 2018-01-01 RX ADMIN — ATORVASTATIN CALCIUM 80 MG: 80 TABLET, FILM COATED ORAL at 20:41

## 2018-01-01 RX ADMIN — SIMETHICONE 40 MG: 20 SUSPENSION/ DROPS ORAL at 12:39

## 2018-01-01 RX ADMIN — HYDROMORPHONE HYDROCHLORIDE 0.3 MG: 1 INJECTION, SOLUTION INTRAMUSCULAR; INTRAVENOUS; SUBCUTANEOUS at 22:03

## 2018-01-01 RX ADMIN — DOBUTAMINE IN DEXTROSE 5 MCG/KG/MIN: 200 INJECTION, SOLUTION INTRAVENOUS at 13:51

## 2018-01-01 RX ADMIN — SODIUM CHLORIDE 250 ML: 9 INJECTION, SOLUTION INTRAVENOUS at 15:22

## 2018-01-01 RX ADMIN — DOBUTAMINE IN DEXTROSE 5 MCG/KG/MIN: 200 INJECTION, SOLUTION INTRAVENOUS at 10:04

## 2018-01-01 RX ADMIN — POTASSIUM CHLORIDE 20 MEQ: 20 SOLUTION ORAL at 17:19

## 2018-01-01 RX ADMIN — ACETAMINOPHEN 650 MG: 325 TABLET, FILM COATED ORAL at 00:00

## 2018-01-01 RX ADMIN — Medication 0.52 G: at 09:09

## 2018-01-01 RX ADMIN — Medication: at 08:50

## 2018-01-01 RX ADMIN — ASPIRIN 81 MG CHEWABLE TABLET 81 MG: 81 TABLET CHEWABLE at 09:27

## 2018-01-01 RX ADMIN — SIMETHICONE 40 MG: 20 SUSPENSION/ DROPS ORAL at 12:21

## 2018-01-01 RX ADMIN — HEPARIN SODIUM 5000 UNITS: 5000 INJECTION, SOLUTION INTRAVENOUS; SUBCUTANEOUS at 20:19

## 2018-01-01 RX ADMIN — MELATONIN TAB 3 MG 3 MG: 3 TAB at 00:33

## 2018-01-01 RX ADMIN — HYDROCORTISONE: 1 CREAM TOPICAL at 08:28

## 2018-01-01 RX ADMIN — STANDARDIZED SENNA CONCENTRATE 2 TABLET: 8.6 TABLET ORAL at 08:54

## 2018-01-01 RX ADMIN — FUROSEMIDE 80 MG: 10 INJECTION, SOLUTION INTRAVENOUS at 21:07

## 2018-01-01 RX ADMIN — SIMETHICONE CHEW TAB 80 MG 80 MG: 80 TABLET ORAL at 14:53

## 2018-01-01 RX ADMIN — APIXABAN 2.5 MG: 2.5 TABLET, FILM COATED ORAL at 19:40

## 2018-01-01 RX ADMIN — BUMETANIDE 2 MG: 2 TABLET ORAL at 16:11

## 2018-01-01 RX ADMIN — STANDARDIZED SENNA CONCENTRATE 2 TABLET: 8.6 TABLET ORAL at 08:59

## 2018-01-01 RX ADMIN — Medication 12.5 MG: at 10:26

## 2018-01-01 RX ADMIN — Medication 1 MG: at 02:16

## 2018-01-01 RX ADMIN — POLYETHYLENE GLYCOL 3350 17 G: 17 POWDER, FOR SOLUTION ORAL at 08:17

## 2018-01-01 RX ADMIN — ACETAMINOPHEN 650 MG: 325 TABLET, FILM COATED ORAL at 22:52

## 2018-01-01 RX ADMIN — SIMETHICONE CHEW TAB 80 MG 80 MG: 80 TABLET ORAL at 20:44

## 2018-01-01 RX ADMIN — NYSTATIN: 100000 CREAM TOPICAL at 20:14

## 2018-01-01 RX ADMIN — NYSTATIN: 100000 CREAM TOPICAL at 09:07

## 2018-01-01 RX ADMIN — ACETAMINOPHEN 650 MG: 325 TABLET, FILM COATED ORAL at 04:23

## 2018-01-01 RX ADMIN — ACETAMINOPHEN 650 MG: 325 TABLET, FILM COATED ORAL at 06:05

## 2018-01-01 RX ADMIN — BUMETANIDE 2 MG: 2 TABLET ORAL at 18:10

## 2018-01-01 RX ADMIN — BUMETANIDE 4 MG: 0.25 INJECTION INTRAMUSCULAR; INTRAVENOUS at 20:46

## 2018-01-01 RX ADMIN — NYSTATIN: 100000 CREAM TOPICAL at 11:20

## 2018-01-01 RX ADMIN — SIMETHICONE 40 MG: 20 SUSPENSION/ DROPS ORAL at 13:54

## 2018-01-01 RX ADMIN — ACETAMINOPHEN 650 MG: 325 TABLET, FILM COATED ORAL at 10:01

## 2018-01-01 RX ADMIN — HEPARIN SODIUM 5000 UNITS: 5000 INJECTION, SOLUTION INTRAVENOUS; SUBCUTANEOUS at 09:03

## 2018-01-01 RX ADMIN — Medication 6 MG: at 20:07

## 2018-01-01 RX ADMIN — HYDROCORTISONE: 1 CREAM TOPICAL at 19:50

## 2018-01-01 RX ADMIN — MULTIPLE VITAMINS W/ MINERALS TAB 1 TABLET: TAB at 10:34

## 2018-01-01 RX ADMIN — SODIUM CHLORIDE, PRESERVATIVE FREE 5 ML: 5 INJECTION INTRAVENOUS at 08:31

## 2018-01-01 RX ADMIN — ACETAMINOPHEN 650 MG: 325 TABLET, FILM COATED ORAL at 04:50

## 2018-01-01 RX ADMIN — SODIUM CHLORIDE 300 ML: 9 INJECTION, SOLUTION INTRAVENOUS at 17:52

## 2018-01-01 RX ADMIN — MULTIPLE VITAMINS W/ MINERALS TAB 1 TABLET: TAB at 08:44

## 2018-01-01 RX ADMIN — HEPARIN SODIUM 5000 UNITS: 5000 INJECTION, SOLUTION INTRAVENOUS; SUBCUTANEOUS at 20:14

## 2018-01-01 RX ADMIN — STANDARDIZED SENNA CONCENTRATE 2 TABLET: 8.6 TABLET ORAL at 09:09

## 2018-01-01 RX ADMIN — ACETAMINOPHEN 650 MG: 325 TABLET, FILM COATED ORAL at 05:56

## 2018-01-01 RX ADMIN — MULTIPLE VITAMINS W/ MINERALS TAB 1 TABLET: TAB at 07:37

## 2018-01-01 RX ADMIN — COCOA BUTTER, PHENYLEPHRINE HYDROCHLORIDE 1 SUPPOSITORY: 2211; 6.25 SUPPOSITORY RECTAL at 11:46

## 2018-01-01 RX ADMIN — DOBUTAMINE IN DEXTROSE 5 MCG/KG/MIN: 200 INJECTION, SOLUTION INTRAVENOUS at 23:22

## 2018-01-01 RX ADMIN — DOBUTAMINE IN DEXTROSE 5 MCG/KG/MIN: 200 INJECTION, SOLUTION INTRAVENOUS at 11:18

## 2018-01-01 RX ADMIN — ALTEPLASE 2 MG: 2.2 INJECTION, POWDER, LYOPHILIZED, FOR SOLUTION INTRAVENOUS at 15:28

## 2018-01-01 RX ADMIN — POTASSIUM CHLORIDE 20 MEQ: 750 TABLET, EXTENDED RELEASE ORAL at 09:27

## 2018-01-01 RX ADMIN — DOBUTAMINE IN DEXTROSE 5 MCG/KG/MIN: 200 INJECTION, SOLUTION INTRAVENOUS at 18:53

## 2018-01-01 RX ADMIN — DOBUTAMINE IN DEXTROSE 5 MCG/KG/MIN: 200 INJECTION, SOLUTION INTRAVENOUS at 21:42

## 2018-01-01 RX ADMIN — CEPHALEXIN 500 MG: 250 CAPSULE ORAL at 08:43

## 2018-01-01 RX ADMIN — DOBUTAMINE IN DEXTROSE 5 MCG/KG/MIN: 200 INJECTION, SOLUTION INTRAVENOUS at 05:47

## 2018-01-01 RX ADMIN — ATORVASTATIN CALCIUM 80 MG: 80 TABLET, FILM COATED ORAL at 20:08

## 2018-01-01 RX ADMIN — AMPICILLIN SODIUM AND SULBACTAM SODIUM 1.5 G: 1; .5 INJECTION, POWDER, FOR SOLUTION INTRAMUSCULAR; INTRAVENOUS at 09:01

## 2018-01-01 RX ADMIN — HYDROCORTISONE: 1 CREAM TOPICAL at 09:06

## 2018-01-01 RX ADMIN — FINASTERIDE 5 MG: 5 TABLET, FILM COATED ORAL at 09:00

## 2018-01-01 RX ADMIN — CEFTRIAXONE 1 G: 1 INJECTION, POWDER, FOR SOLUTION INTRAMUSCULAR; INTRAVENOUS at 01:41

## 2018-01-01 RX ADMIN — THIAMINE HYDROCHLORIDE 100 MG: 100 INJECTION, SOLUTION INTRAMUSCULAR; INTRAVENOUS at 08:12

## 2018-01-01 RX ADMIN — STANDARDIZED SENNA CONCENTRATE 2 TABLET: 8.6 TABLET ORAL at 08:42

## 2018-01-01 RX ADMIN — ACETAMINOPHEN 650 MG: 325 TABLET, FILM COATED ORAL at 22:09

## 2018-01-01 RX ADMIN — MULTIPLE VITAMINS W/ MINERALS TAB 1 TABLET: TAB at 17:51

## 2018-01-01 RX ADMIN — HYDROMORPHONE HYDROCHLORIDE 0.5 MG: 1 INJECTION, SOLUTION INTRAMUSCULAR; INTRAVENOUS; SUBCUTANEOUS at 23:12

## 2018-01-01 RX ADMIN — HEPARIN SODIUM 800 UNITS/HR: 10000 INJECTION, SOLUTION INTRAVENOUS at 02:42

## 2018-01-01 RX ADMIN — HYDROCORTISONE: 1 CREAM TOPICAL at 20:29

## 2018-01-01 RX ADMIN — ACETAMINOPHEN 650 MG: 650 SUPPOSITORY RECTAL at 20:49

## 2018-01-01 RX ADMIN — POTASSIUM CHLORIDE 20 MEQ: 29.8 INJECTION, SOLUTION INTRAVENOUS at 19:44

## 2018-01-01 RX ADMIN — ACETAMINOPHEN 650 MG: 325 TABLET, FILM COATED ORAL at 09:18

## 2018-01-01 RX ADMIN — ATORVASTATIN CALCIUM 80 MG: 80 TABLET, FILM COATED ORAL at 20:33

## 2018-01-01 RX ADMIN — CEPHALEXIN 500 MG: 250 CAPSULE ORAL at 08:50

## 2018-01-01 RX ADMIN — PEDIATRIC MULTIPLE VITAMIN W/ MINERALS & C CHEW TAB 60 MG 1 TABLET: CHEW TAB at 08:14

## 2018-01-01 RX ADMIN — BISACODYL 10 MG: 5 TABLET, COATED ORAL at 20:11

## 2018-01-01 RX ADMIN — ATORVASTATIN CALCIUM 80 MG: 80 TABLET, FILM COATED ORAL at 09:27

## 2018-01-01 RX ADMIN — ATORVASTATIN CALCIUM 80 MG: 80 TABLET, FILM COATED ORAL at 20:31

## 2018-01-01 RX ADMIN — WARFARIN SODIUM 2.5 MG: 2.5 TABLET ORAL at 01:01

## 2018-01-01 RX ADMIN — Medication 0.52 G: at 12:32

## 2018-01-01 RX ADMIN — COCOA BUTTER, PHENYLEPHRINE HYDROCHLORIDE 1 SUPPOSITORY: 2211; 6.25 SUPPOSITORY RECTAL at 13:47

## 2018-01-01 RX ADMIN — ACETAMINOPHEN 650 MG: 325 TABLET, FILM COATED ORAL at 12:27

## 2018-01-01 RX ADMIN — ALTEPLASE 2 MG: 2.2 INJECTION, POWDER, LYOPHILIZED, FOR SOLUTION INTRAVENOUS at 10:51

## 2018-01-01 RX ADMIN — DOBUTAMINE IN DEXTROSE 5 MCG/KG/MIN: 200 INJECTION, SOLUTION INTRAVENOUS at 10:30

## 2018-01-01 RX ADMIN — ACETAMINOPHEN 650 MG: 325 TABLET, FILM COATED ORAL at 20:28

## 2018-01-01 RX ADMIN — ATORVASTATIN CALCIUM 80 MG: 80 TABLET, FILM COATED ORAL at 09:12

## 2018-01-01 RX ADMIN — BUMETANIDE 2 MG: 2 TABLET ORAL at 09:09

## 2018-01-01 RX ADMIN — ACETAMINOPHEN 650 MG: 325 TABLET, FILM COATED ORAL at 00:16

## 2018-01-01 RX ADMIN — SIMETHICONE 40 MG: 20 SUSPENSION/ DROPS ORAL at 09:15

## 2018-01-01 RX ADMIN — THIAMINE HCL (VITAMIN B1) 50 MG TABLET 50 MG: at 08:18

## 2018-01-01 RX ADMIN — SODIUM CHLORIDE, PRESERVATIVE FREE 5 ML: 5 INJECTION INTRAVENOUS at 08:46

## 2018-01-01 RX ADMIN — ASPIRIN 81 MG CHEWABLE TABLET 81 MG: 81 TABLET CHEWABLE at 09:32

## 2018-01-01 RX ADMIN — OLANZAPINE 2.5 MG: 2.5 TABLET, FILM COATED ORAL at 21:18

## 2018-01-01 RX ADMIN — SODIUM PHOSPHATE 1 ENEMA: 7; 19 ENEMA RECTAL at 16:44

## 2018-01-01 RX ADMIN — HEPARIN SODIUM 5000 UNITS: 5000 INJECTION, SOLUTION INTRAVENOUS; SUBCUTANEOUS at 09:32

## 2018-01-01 RX ADMIN — CEFTRIAXONE 1 G: 1 INJECTION, POWDER, FOR SOLUTION INTRAMUSCULAR; INTRAVENOUS at 18:28

## 2018-01-01 RX ADMIN — ACETAMINOPHEN 650 MG: 325 TABLET, FILM COATED ORAL at 12:05

## 2018-01-01 RX ADMIN — Medication: at 10:20

## 2018-01-01 RX ADMIN — LIDOCAINE HYDROCHLORIDE 20 ML: 10 INJECTION, SOLUTION EPIDURAL; INFILTRATION; INTRACAUDAL; PERINEURAL at 15:55

## 2018-01-01 RX ADMIN — HYDROMORPHONE HYDROCHLORIDE 0.2 MG: 1 INJECTION, SOLUTION INTRAMUSCULAR; INTRAVENOUS; SUBCUTANEOUS at 11:23

## 2018-01-01 RX ADMIN — SODIUM CHLORIDE, PRESERVATIVE FREE 5 ML: 5 INJECTION INTRAVENOUS at 08:44

## 2018-01-01 RX ADMIN — DOCUSATE SODIUM 5 ML: 283 LIQUID RECTAL at 09:33

## 2018-01-01 RX ADMIN — BUMETANIDE 4 MG: 0.25 INJECTION INTRAMUSCULAR; INTRAVENOUS at 12:20

## 2018-01-01 RX ADMIN — TAMSULOSIN HYDROCHLORIDE 0.4 MG: 0.4 CAPSULE ORAL at 20:31

## 2018-01-01 RX ADMIN — MELATONIN TAB 3 MG 3 MG: 3 TAB at 21:16

## 2018-01-01 RX ADMIN — STANDARDIZED SENNA CONCENTRATE 2 TABLET: 8.6 TABLET ORAL at 09:42

## 2018-01-01 RX ADMIN — AMPICILLIN SODIUM AND SULBACTAM SODIUM 1.5 G: 1; .5 INJECTION, POWDER, FOR SOLUTION INTRAMUSCULAR; INTRAVENOUS at 09:15

## 2018-01-01 RX ADMIN — ACETAMINOPHEN 650 MG: 325 TABLET, FILM COATED ORAL at 12:39

## 2018-01-01 RX ADMIN — DOBUTAMINE IN DEXTROSE 5 MCG/KG/MIN: 200 INJECTION, SOLUTION INTRAVENOUS at 15:24

## 2018-01-01 RX ADMIN — DOBUTAMINE IN DEXTROSE 5 MCG/KG/MIN: 200 INJECTION, SOLUTION INTRAVENOUS at 00:20

## 2018-01-01 RX ADMIN — Medication: at 18:21

## 2018-01-01 RX ADMIN — ACETAMINOPHEN 650 MG: 325 TABLET, FILM COATED ORAL at 07:50

## 2018-01-01 RX ADMIN — MELATONIN TAB 3 MG 3 MG: 3 TAB at 19:06

## 2018-01-01 RX ADMIN — SIMETHICONE 40 MG: 20 SUSPENSION/ DROPS ORAL at 13:01

## 2018-01-01 RX ADMIN — ACETAMINOPHEN 650 MG: 325 TABLET, FILM COATED ORAL at 01:59

## 2018-01-01 RX ADMIN — SODIUM CHLORIDE, PRESERVATIVE FREE 5 ML: 5 INJECTION INTRAVENOUS at 05:30

## 2018-01-01 RX ADMIN — HEPARIN SODIUM 5000 UNITS: 5000 INJECTION, SOLUTION INTRAVENOUS; SUBCUTANEOUS at 20:11

## 2018-01-01 RX ADMIN — DOBUTAMINE IN DEXTROSE 5 MCG/KG/MIN: 200 INJECTION, SOLUTION INTRAVENOUS at 00:40

## 2018-01-01 RX ADMIN — BUMETANIDE 1 MG: 0.25 INJECTION INTRAMUSCULAR; INTRAVENOUS at 00:10

## 2018-01-01 RX ADMIN — PIPERACILLIN AND TAZOBACTAM 2.25 G: 2; .25 INJECTION, POWDER, LYOPHILIZED, FOR SOLUTION INTRAVENOUS; PARENTERAL at 15:56

## 2018-01-01 RX ADMIN — THIAMINE HYDROCHLORIDE 100 MG: 100 INJECTION, SOLUTION INTRAMUSCULAR; INTRAVENOUS at 08:59

## 2018-01-01 RX ADMIN — STANDARDIZED SENNA CONCENTRATE 2 TABLET: 8.6 TABLET ORAL at 09:10

## 2018-01-01 RX ADMIN — THIAMINE HYDROCHLORIDE 100 MG: 100 INJECTION, SOLUTION INTRAMUSCULAR; INTRAVENOUS at 08:14

## 2018-01-01 RX ADMIN — HYDROCORTISONE: 1 CREAM TOPICAL at 08:49

## 2018-01-01 RX ADMIN — ONDANSETRON 4 MG: 2 INJECTION INTRAMUSCULAR; INTRAVENOUS at 05:57

## 2018-01-01 RX ADMIN — DOBUTAMINE IN DEXTROSE 5 MCG/KG/MIN: 200 INJECTION, SOLUTION INTRAVENOUS at 09:50

## 2018-01-01 RX ADMIN — HYDROCORTISONE: 1 CREAM TOPICAL at 08:40

## 2018-01-01 RX ADMIN — HYDROCORTISONE: 1 CREAM TOPICAL at 08:50

## 2018-01-01 RX ADMIN — ASPIRIN 81 MG CHEWABLE TABLET 81 MG: 81 TABLET CHEWABLE at 08:17

## 2018-01-01 RX ADMIN — ACETAMINOPHEN 650 MG: 325 TABLET, FILM COATED ORAL at 16:43

## 2018-01-01 RX ADMIN — POLYETHYLENE GLYCOL 3350 17 G: 17 POWDER, FOR SOLUTION ORAL at 09:26

## 2018-01-01 RX ADMIN — OLANZAPINE 2.5 MG: 2.5 TABLET, FILM COATED ORAL at 23:07

## 2018-01-01 RX ADMIN — STANDARDIZED SENNA CONCENTRATE 2 TABLET: 8.6 TABLET ORAL at 09:27

## 2018-01-01 RX ADMIN — MULTIPLE VITAMINS W/ MINERALS TAB 1 TABLET: TAB at 08:32

## 2018-01-01 RX ADMIN — THIAMINE HYDROCHLORIDE 100 MG: 100 INJECTION, SOLUTION INTRAMUSCULAR; INTRAVENOUS at 08:43

## 2018-01-01 RX ADMIN — APIXABAN 2.5 MG: 2.5 TABLET, FILM COATED ORAL at 20:56

## 2018-01-01 RX ADMIN — AMPICILLIN SODIUM AND SULBACTAM SODIUM 1.5 G: 1; .5 INJECTION, POWDER, FOR SOLUTION INTRAMUSCULAR; INTRAVENOUS at 21:32

## 2018-01-01 RX ADMIN — THIAMINE HYDROCHLORIDE 100 MG: 100 INJECTION, SOLUTION INTRAMUSCULAR; INTRAVENOUS at 08:31

## 2018-01-01 RX ADMIN — DOBUTAMINE IN DEXTROSE 5 MCG/KG/MIN: 200 INJECTION, SOLUTION INTRAVENOUS at 20:27

## 2018-01-01 RX ADMIN — HYDROCORTISONE: 1 CREAM TOPICAL at 20:15

## 2018-01-01 RX ADMIN — THIAMINE HCL (VITAMIN B1) 50 MG TABLET 50 MG: at 08:40

## 2018-01-01 RX ADMIN — PEDIATRIC MULTIPLE VITAMIN W/ MINERALS & C CHEW TAB 60 MG 1 TABLET: CHEW TAB at 09:52

## 2018-01-01 RX ADMIN — HYDROCORTISONE: 1 CREAM TOPICAL at 08:36

## 2018-01-01 RX ADMIN — Medication: at 15:22

## 2018-01-01 RX ADMIN — ACETAMINOPHEN 650 MG: 325 TABLET, FILM COATED ORAL at 23:58

## 2018-01-01 RX ADMIN — POLYETHYLENE GLYCOL 3350 17 G: 17 POWDER, FOR SOLUTION ORAL at 08:48

## 2018-01-01 RX ADMIN — HEPARIN SODIUM 5000 UNITS: 5000 INJECTION, SOLUTION INTRAVENOUS; SUBCUTANEOUS at 20:54

## 2018-01-01 RX ADMIN — FUROSEMIDE 80 MG: 10 INJECTION, SOLUTION INTRAVENOUS at 16:43

## 2018-01-01 RX ADMIN — SIMETHICONE 40 MG: 20 SUSPENSION/ DROPS ORAL at 20:28

## 2018-01-01 RX ADMIN — LIDOCAINE: 50 OINTMENT TOPICAL at 01:37

## 2018-01-01 RX ADMIN — BUMETANIDE 2 MG: 2 TABLET ORAL at 08:41

## 2018-01-01 RX ADMIN — ACETAMINOPHEN 650 MG: 325 TABLET, FILM COATED ORAL at 16:24

## 2018-01-01 RX ADMIN — LIDOCAINE: 50 OINTMENT TOPICAL at 10:51

## 2018-01-01 RX ADMIN — PEDIATRIC MULTIPLE VITAMIN W/ MINERALS & C CHEW TAB 60 MG 1 TABLET: CHEW TAB at 07:51

## 2018-01-01 RX ADMIN — POLYETHYLENE GLYCOL 3350 17 G: 17 POWDER, FOR SOLUTION ORAL at 07:36

## 2018-01-01 RX ADMIN — THIAMINE HYDROCHLORIDE 100 MG: 100 INJECTION, SOLUTION INTRAMUSCULAR; INTRAVENOUS at 08:42

## 2018-01-01 RX ADMIN — SIMETHICONE 40 MG: 20 SUSPENSION/ DROPS ORAL at 13:30

## 2018-01-01 RX ADMIN — STANDARDIZED SENNA CONCENTRATE 2 TABLET: 8.6 TABLET ORAL at 08:41

## 2018-01-01 RX ADMIN — Medication 0.52 G: at 10:35

## 2018-01-01 RX ADMIN — APIXABAN 2.5 MG: 2.5 TABLET, FILM COATED ORAL at 21:16

## 2018-01-01 RX ADMIN — DOBUTAMINE IN DEXTROSE 5 MCG/KG/MIN: 200 INJECTION, SOLUTION INTRAVENOUS at 02:54

## 2018-01-01 RX ADMIN — PEDIATRIC MULTIPLE VITAMIN W/ MINERALS & C CHEW TAB 60 MG 1 TABLET: CHEW TAB at 09:03

## 2018-01-01 RX ADMIN — CEPHALEXIN 500 MG: 250 CAPSULE ORAL at 08:59

## 2018-01-01 RX ADMIN — THIAMINE HYDROCHLORIDE 100 MG: 100 INJECTION, SOLUTION INTRAMUSCULAR; INTRAVENOUS at 08:15

## 2018-01-01 RX ADMIN — OLANZAPINE 2.5 MG: 2.5 TABLET, FILM COATED ORAL at 00:00

## 2018-01-01 RX ADMIN — DOBUTAMINE IN DEXTROSE 5 MCG/KG/MIN: 200 INJECTION, SOLUTION INTRAVENOUS at 00:43

## 2018-01-01 RX ADMIN — ASPIRIN 81 MG CHEWABLE TABLET 81 MG: 81 TABLET CHEWABLE at 09:00

## 2018-01-01 RX ADMIN — SODIUM CHLORIDE 250 ML: 9 INJECTION, SOLUTION INTRAVENOUS at 17:53

## 2018-01-01 RX ADMIN — PEDIATRIC MULTIPLE VITAMIN W/ MINERALS & C CHEW TAB 60 MG 1 TABLET: CHEW TAB at 08:20

## 2018-01-01 RX ADMIN — HYDROCORTISONE: 1 CREAM TOPICAL at 09:23

## 2018-01-01 RX ADMIN — DOBUTAMINE IN DEXTROSE 5 MCG/KG/MIN: 200 INJECTION, SOLUTION INTRAVENOUS at 15:11

## 2018-01-01 RX ADMIN — Medication 1 MG: at 00:03

## 2018-01-01 RX ADMIN — Medication 0.52 G: at 08:52

## 2018-01-01 RX ADMIN — APIXABAN 2.5 MG: 2.5 TABLET, FILM COATED ORAL at 08:44

## 2018-01-01 RX ADMIN — HYDROCORTISONE: 1 CREAM TOPICAL at 17:12

## 2018-01-01 RX ADMIN — THIAMINE HYDROCHLORIDE 100 MG: 100 INJECTION, SOLUTION INTRAMUSCULAR; INTRAVENOUS at 09:04

## 2018-01-01 RX ADMIN — PEDIATRIC MULTIPLE VITAMIN W/ MINERALS & C CHEW TAB 60 MG 1 TABLET: CHEW TAB at 08:15

## 2018-01-01 RX ADMIN — ACETAMINOPHEN 650 MG: 325 TABLET, FILM COATED ORAL at 17:19

## 2018-01-01 RX ADMIN — LIDOCAINE: 40 CREAM TOPICAL at 17:21

## 2018-01-01 RX ADMIN — SODIUM CHLORIDE: 9 INJECTION, SOLUTION INTRAVENOUS at 02:10

## 2018-01-01 RX ADMIN — Medication 0.52 G: at 08:43

## 2018-01-01 RX ADMIN — DOBUTAMINE IN DEXTROSE 5 MCG/KG/MIN: 200 INJECTION, SOLUTION INTRAVENOUS at 01:49

## 2018-01-01 RX ADMIN — HEPARIN SODIUM 950 UNITS/HR: 10000 INJECTION, SOLUTION INTRAVENOUS at 15:07

## 2018-01-01 RX ADMIN — SIMETHICONE CHEW TAB 80 MG 80 MG: 80 TABLET ORAL at 19:17

## 2018-01-01 RX ADMIN — ACETAMINOPHEN 650 MG: 325 TABLET, FILM COATED ORAL at 06:02

## 2018-01-01 RX ADMIN — ATORVASTATIN CALCIUM 80 MG: 80 TABLET, FILM COATED ORAL at 20:21

## 2018-01-01 RX ADMIN — MULTIPLE VITAMINS W/ MINERALS TAB 1 TABLET: TAB at 08:19

## 2018-01-01 RX ADMIN — DOBUTAMINE IN DEXTROSE 5 MCG/KG/MIN: 200 INJECTION, SOLUTION INTRAVENOUS at 22:41

## 2018-01-01 RX ADMIN — HEPARIN SODIUM 5000 UNITS: 5000 INJECTION, SOLUTION INTRAVENOUS; SUBCUTANEOUS at 08:15

## 2018-01-01 RX ADMIN — ATORVASTATIN CALCIUM 80 MG: 80 TABLET, FILM COATED ORAL at 20:52

## 2018-01-01 RX ADMIN — NYSTATIN: 100000 CREAM TOPICAL at 20:33

## 2018-01-01 RX ADMIN — MELATONIN TAB 3 MG 3 MG: 3 TAB at 21:39

## 2018-01-01 RX ADMIN — STANDARDIZED SENNA CONCENTRATE 2 TABLET: 8.6 TABLET ORAL at 07:51

## 2018-01-01 RX ADMIN — PIPERACILLIN AND TAZOBACTAM 2.25 G: 2; .25 INJECTION, POWDER, LYOPHILIZED, FOR SOLUTION INTRAVENOUS; PARENTERAL at 03:00

## 2018-01-01 RX ADMIN — LURASIDONE HYDROCHLORIDE 40 MG: 40 TABLET, FILM COATED ORAL at 20:52

## 2018-01-01 RX ADMIN — ATORVASTATIN CALCIUM 80 MG: 80 TABLET, FILM COATED ORAL at 19:23

## 2018-01-01 RX ADMIN — NYSTATIN: 100000 CREAM TOPICAL at 08:33

## 2018-01-01 RX ADMIN — CEFTRIAXONE 1 G: 1 INJECTION, POWDER, FOR SOLUTION INTRAMUSCULAR; INTRAVENOUS at 18:34

## 2018-01-01 RX ADMIN — ACETAMINOPHEN 650 MG: 325 TABLET, FILM COATED ORAL at 12:42

## 2018-01-01 RX ADMIN — Medication 6 MG: at 00:25

## 2018-01-01 RX ADMIN — STANDARDIZED SENNA CONCENTRATE 2 TABLET: 8.6 TABLET ORAL at 09:32

## 2018-01-01 RX ADMIN — BUMETANIDE 4 MG: 0.25 INJECTION INTRAMUSCULAR; INTRAVENOUS at 21:30

## 2018-01-01 RX ADMIN — STANDARDIZED SENNA CONCENTRATE 2 TABLET: 8.6 TABLET ORAL at 10:35

## 2018-01-01 RX ADMIN — DOBUTAMINE IN DEXTROSE 5 MCG/KG/MIN: 200 INJECTION, SOLUTION INTRAVENOUS at 05:23

## 2018-01-01 RX ADMIN — ACETAMINOPHEN 650 MG: 325 TABLET, FILM COATED ORAL at 21:59

## 2018-01-01 RX ADMIN — HYDROCORTISONE: 1 CREAM TOPICAL at 11:20

## 2018-01-01 RX ADMIN — HYDROCORTISONE: 1 CREAM TOPICAL at 10:44

## 2018-01-01 RX ADMIN — BUMETANIDE 2 MG: 2 TABLET ORAL at 09:54

## 2018-01-01 RX ADMIN — HYDROCORTISONE: 1 CREAM TOPICAL at 09:31

## 2018-01-01 RX ADMIN — ASPIRIN 81 MG CHEWABLE TABLET 81 MG: 81 TABLET CHEWABLE at 09:09

## 2018-01-01 RX ADMIN — LIDOCAINE HYDROCHLORIDE 10 ML: 20 JELLY TOPICAL at 17:09

## 2018-01-01 RX ADMIN — POLYETHYLENE GLYCOL 3350 17 G: 17 POWDER, FOR SOLUTION ORAL at 18:17

## 2018-01-01 RX ADMIN — APIXABAN 2.5 MG: 2.5 TABLET, FILM COATED ORAL at 09:27

## 2018-01-01 RX ADMIN — MIDAZOLAM 0.5 MG: 1 INJECTION INTRAMUSCULAR; INTRAVENOUS at 15:54

## 2018-01-01 RX ADMIN — ALTEPLASE 2 MG: 2.2 INJECTION, POWDER, LYOPHILIZED, FOR SOLUTION INTRAVENOUS at 14:45

## 2018-01-01 RX ADMIN — ASPIRIN 81 MG CHEWABLE TABLET 81 MG: 81 TABLET CHEWABLE at 17:20

## 2018-01-01 RX ADMIN — FUROSEMIDE 80 MG: 10 INJECTION, SOLUTION INTRAVENOUS at 09:28

## 2018-01-01 RX ADMIN — STANDARDIZED SENNA CONCENTRATE 2 TABLET: 8.6 TABLET ORAL at 08:50

## 2018-01-01 RX ADMIN — ATORVASTATIN CALCIUM 80 MG: 80 TABLET, FILM COATED ORAL at 19:05

## 2018-01-01 RX ADMIN — POLYETHYLENE GLYCOL 3350 17 G: 17 POWDER, FOR SOLUTION ORAL at 08:20

## 2018-01-01 RX ADMIN — ASPIRIN 81 MG CHEWABLE TABLET 81 MG: 81 TABLET CHEWABLE at 09:07

## 2018-01-01 RX ADMIN — Medication 6 MG: at 21:26

## 2018-01-01 RX ADMIN — SIMETHICONE 40 MG: 20 SUSPENSION/ DROPS ORAL at 12:12

## 2018-01-01 RX ADMIN — APIXABAN 2.5 MG: 2.5 TABLET, FILM COATED ORAL at 20:41

## 2018-01-01 RX ADMIN — Medication: at 09:35

## 2018-01-01 RX ADMIN — BUMETANIDE 2 MG: 2 TABLET ORAL at 08:29

## 2018-01-01 RX ADMIN — POLYETHYLENE GLYCOL 3350 17 G: 17 POWDER, FOR SOLUTION ORAL at 08:26

## 2018-01-01 RX ADMIN — APIXABAN 2.5 MG: 2.5 TABLET, FILM COATED ORAL at 20:03

## 2018-01-01 RX ADMIN — HYDROCORTISONE: 1 CREAM TOPICAL at 20:07

## 2018-01-01 RX ADMIN — THIAMINE HYDROCHLORIDE 100 MG: 100 INJECTION, SOLUTION INTRAMUSCULAR; INTRAVENOUS at 09:11

## 2018-01-01 RX ADMIN — ASPIRIN 81 MG CHEWABLE TABLET 81 MG: 81 TABLET CHEWABLE at 10:01

## 2018-01-01 RX ADMIN — APIXABAN 2.5 MG: 2.5 TABLET, FILM COATED ORAL at 09:37

## 2018-01-01 RX ADMIN — ATORVASTATIN CALCIUM 80 MG: 80 TABLET, FILM COATED ORAL at 19:50

## 2018-01-01 RX ADMIN — PSYLLIUM HUSK 1 PACKET: 3.4 POWDER ORAL at 07:51

## 2018-01-01 RX ADMIN — AMPICILLIN SODIUM AND SULBACTAM SODIUM 1.5 G: 1; .5 INJECTION, POWDER, FOR SOLUTION INTRAMUSCULAR; INTRAVENOUS at 20:41

## 2018-01-01 RX ADMIN — LURASIDONE HYDROCHLORIDE 20 MG: 20 TABLET, FILM COATED ORAL at 20:52

## 2018-01-01 RX ADMIN — LIDOCAINE HYDROCHLORIDE 10 ML: 20 JELLY TOPICAL at 20:48

## 2018-01-01 RX ADMIN — MULTIPLE VITAMINS W/ MINERALS TAB 1 TABLET: TAB at 09:32

## 2018-01-01 RX ADMIN — METOLAZONE 5 MG: 2.5 TABLET ORAL at 09:38

## 2018-01-01 RX ADMIN — Medication: at 01:36

## 2018-01-01 RX ADMIN — ACETAMINOPHEN 650 MG: 325 TABLET, FILM COATED ORAL at 09:14

## 2018-01-01 RX ADMIN — OLANZAPINE 2.5 MG: 2.5 TABLET, FILM COATED ORAL at 20:07

## 2018-01-01 RX ADMIN — HYDROCORTISONE: 1 CREAM TOPICAL at 20:14

## 2018-01-01 RX ADMIN — CALCIUM 1250 MG: 500 TABLET ORAL at 17:35

## 2018-01-01 RX ADMIN — APIXABAN 2.5 MG: 2.5 TABLET, FILM COATED ORAL at 08:18

## 2018-01-01 RX ADMIN — ALTEPLASE 2 MG: 2.2 INJECTION, POWDER, LYOPHILIZED, FOR SOLUTION INTRAVENOUS at 18:44

## 2018-01-01 RX ADMIN — LIDOCAINE HYDROCHLORIDE 10 ML: 20 JELLY TOPICAL at 16:03

## 2018-01-01 RX ADMIN — THIAMINE HYDROCHLORIDE 100 MG: 100 INJECTION, SOLUTION INTRAMUSCULAR; INTRAVENOUS at 09:23

## 2018-01-01 RX ADMIN — CEFTRIAXONE 1 G: 1 INJECTION, POWDER, FOR SOLUTION INTRAMUSCULAR; INTRAVENOUS at 21:08

## 2018-01-01 RX ADMIN — MELATONIN TAB 3 MG 3 MG: 3 TAB at 00:00

## 2018-01-01 RX ADMIN — BUMETANIDE 1 MG: 1 TABLET ORAL at 16:41

## 2018-01-01 RX ADMIN — APIXABAN 2.5 MG: 2.5 TABLET, FILM COATED ORAL at 09:09

## 2018-01-01 RX ADMIN — THIAMINE HYDROCHLORIDE 100 MG: 100 INJECTION, SOLUTION INTRAMUSCULAR; INTRAVENOUS at 09:52

## 2018-01-01 RX ADMIN — LURASIDONE HYDROCHLORIDE 40 MG: 40 TABLET, FILM COATED ORAL at 19:49

## 2018-01-01 RX ADMIN — HYDROCORTISONE: 1 CREAM TOPICAL at 13:59

## 2018-01-01 RX ADMIN — HYDROCORTISONE: 1 CREAM TOPICAL at 20:18

## 2018-01-01 RX ADMIN — BUMETANIDE 2 MG: 2 TABLET ORAL at 08:48

## 2018-01-01 RX ADMIN — Medication 0.52 G: at 08:50

## 2018-01-01 RX ADMIN — LIDOCAINE: 50 OINTMENT TOPICAL at 12:15

## 2018-01-01 RX ADMIN — ASPIRIN 81 MG CHEWABLE TABLET 81 MG: 81 TABLET CHEWABLE at 07:37

## 2018-01-01 RX ADMIN — STANDARDIZED SENNA CONCENTRATE 2 TABLET: 8.6 TABLET ORAL at 08:19

## 2018-01-01 RX ADMIN — APIXABAN 2.5 MG: 2.5 TABLET, FILM COATED ORAL at 08:29

## 2018-01-01 RX ADMIN — Medication 6 MG: at 21:32

## 2018-01-01 RX ADMIN — DOBUTAMINE IN DEXTROSE 5 MCG/KG/MIN: 200 INJECTION, SOLUTION INTRAVENOUS at 01:25

## 2018-01-01 RX ADMIN — BUMETANIDE 4 MG: 0.25 INJECTION INTRAMUSCULAR; INTRAVENOUS at 23:30

## 2018-01-01 RX ADMIN — MELATONIN TAB 3 MG 3 MG: 3 TAB at 23:07

## 2018-01-01 RX ADMIN — ACETAMINOPHEN 650 MG: 325 TABLET, FILM COATED ORAL at 20:19

## 2018-01-01 RX ADMIN — STANDARDIZED SENNA CONCENTRATE 2 TABLET: 8.6 TABLET ORAL at 09:06

## 2018-01-01 RX ADMIN — THIAMINE HYDROCHLORIDE 100 MG: 100 INJECTION, SOLUTION INTRAMUSCULAR; INTRAVENOUS at 08:33

## 2018-01-01 RX ADMIN — POLYETHYLENE GLYCOL 3350 17 G: 17 POWDER, FOR SOLUTION ORAL at 08:50

## 2018-01-01 RX ADMIN — ASPIRIN 81 MG CHEWABLE TABLET 81 MG: 81 TABLET CHEWABLE at 08:41

## 2018-01-01 RX ADMIN — THIAMINE HCL (VITAMIN B1) 50 MG TABLET 50 MG: at 17:51

## 2018-01-01 RX ADMIN — DOBUTAMINE IN DEXTROSE 2.5 MCG/KG/MIN: 200 INJECTION, SOLUTION INTRAVENOUS at 21:30

## 2018-01-01 RX ADMIN — HYDROCORTISONE: 1 CREAM TOPICAL at 20:16

## 2018-01-01 RX ADMIN — ACETAMINOPHEN 650 MG: 325 TABLET, FILM COATED ORAL at 21:42

## 2018-01-01 RX ADMIN — ASPIRIN 81 MG CHEWABLE TABLET 81 MG: 81 TABLET CHEWABLE at 07:43

## 2018-01-01 RX ADMIN — DOBUTAMINE IN DEXTROSE 5 MCG/KG/MIN: 200 INJECTION, SOLUTION INTRAVENOUS at 01:24

## 2018-01-01 RX ADMIN — BUMETANIDE 2 MG: 2 TABLET ORAL at 15:50

## 2018-01-01 RX ADMIN — HYDROCORTISONE: 1 CREAM TOPICAL at 20:28

## 2018-01-01 RX ADMIN — DOBUTAMINE IN DEXTROSE 5 MCG/KG/MIN: 200 INJECTION, SOLUTION INTRAVENOUS at 03:42

## 2018-01-01 RX ADMIN — APIXABAN 2.5 MG: 2.5 TABLET, FILM COATED ORAL at 09:43

## 2018-01-01 RX ADMIN — Medication 6 MG: at 21:12

## 2018-01-01 RX ADMIN — MELATONIN TAB 3 MG 3 MG: 3 TAB at 21:54

## 2018-01-01 RX ADMIN — HYDROCORTISONE: 1 CREAM TOPICAL at 20:49

## 2018-01-01 RX ADMIN — BUMETANIDE 2 MG: 2 TABLET ORAL at 15:12

## 2018-01-01 RX ADMIN — SODIUM CHLORIDE, PRESERVATIVE FREE 5 ML: 5 INJECTION INTRAVENOUS at 09:20

## 2018-01-01 RX ADMIN — POLYETHYLENE GLYCOL 3350 17 G: 17 POWDER, FOR SOLUTION ORAL at 08:41

## 2018-01-01 RX ADMIN — POLYETHYLENE GLYCOL 3350 17 G: 17 POWDER, FOR SOLUTION ORAL at 10:33

## 2018-01-01 RX ADMIN — OLANZAPINE 2.5 MG: 2.5 TABLET, FILM COATED ORAL at 20:33

## 2018-01-01 RX ADMIN — MELATONIN TAB 3 MG 3 MG: 3 TAB at 01:40

## 2018-01-01 RX ADMIN — FINASTERIDE 5 MG: 5 TABLET, FILM COATED ORAL at 08:43

## 2018-01-01 RX ADMIN — PSYLLIUM HUSK 1 PACKET: 3.4 POWDER ORAL at 12:43

## 2018-01-01 RX ADMIN — POLYETHYLENE GLYCOL 3350 17 G: 17 POWDER, FOR SOLUTION ORAL at 08:32

## 2018-01-01 RX ADMIN — SIMETHICONE 40 MG: 20 SUSPENSION/ DROPS ORAL at 13:02

## 2018-01-01 RX ADMIN — FUROSEMIDE 40 MG: 10 INJECTION, SOLUTION INTRAVENOUS at 17:52

## 2018-01-01 RX ADMIN — HEPARIN SODIUM 5000 UNITS: 5000 INJECTION, SOLUTION INTRAVENOUS; SUBCUTANEOUS at 08:19

## 2018-01-01 RX ADMIN — DOBUTAMINE IN DEXTROSE 5 MCG/KG/MIN: 200 INJECTION, SOLUTION INTRAVENOUS at 14:14

## 2018-01-01 RX ADMIN — Medication 6 MG: at 21:25

## 2018-01-01 RX ADMIN — DOBUTAMINE IN DEXTROSE 5 MCG/KG/MIN: 200 INJECTION, SOLUTION INTRAVENOUS at 11:49

## 2018-01-01 RX ADMIN — MELATONIN TAB 3 MG 3 MG: 3 TAB at 20:48

## 2018-01-01 RX ADMIN — POLYETHYLENE GLYCOL 3350 17 G: 17 POWDER, FOR SOLUTION ORAL at 09:33

## 2018-01-01 RX ADMIN — LURASIDONE HYDROCHLORIDE 20 MG: 20 TABLET, FILM COATED ORAL at 20:08

## 2018-01-01 RX ADMIN — ACETAMINOPHEN 650 MG: 325 TABLET, FILM COATED ORAL at 19:23

## 2018-01-01 RX ADMIN — ACETAMINOPHEN 650 MG: 325 TABLET, FILM COATED ORAL at 16:05

## 2018-01-01 RX ADMIN — SODIUM CHLORIDE 500 ML: 9 INJECTION, SOLUTION INTRAVENOUS at 14:24

## 2018-01-01 RX ADMIN — HEPARIN SODIUM 5000 UNITS: 5000 INJECTION, SOLUTION INTRAVENOUS; SUBCUTANEOUS at 09:52

## 2018-01-01 RX ADMIN — DOCUSATE SODIUM 5 ML: 283 LIQUID RECTAL at 22:02

## 2018-01-01 RX ADMIN — DOBUTAMINE IN DEXTROSE 5 MCG/KG/MIN: 200 INJECTION, SOLUTION INTRAVENOUS at 01:51

## 2018-01-01 RX ADMIN — AMPICILLIN SODIUM AND SULBACTAM SODIUM 1.5 G: 1; .5 INJECTION, POWDER, FOR SOLUTION INTRAMUSCULAR; INTRAVENOUS at 19:08

## 2018-01-01 RX ADMIN — TAMSULOSIN HYDROCHLORIDE 0.4 MG: 0.4 CAPSULE ORAL at 20:52

## 2018-01-01 RX ADMIN — THIAMINE HYDROCHLORIDE 100 MG: 100 INJECTION, SOLUTION INTRAMUSCULAR; INTRAVENOUS at 08:46

## 2018-01-01 RX ADMIN — APIXABAN 2.5 MG: 2.5 TABLET, FILM COATED ORAL at 19:49

## 2018-01-01 RX ADMIN — POLYETHYLENE GLYCOL 3350 17 G: 17 POWDER, FOR SOLUTION ORAL at 08:55

## 2018-01-01 RX ADMIN — ACETAMINOPHEN 650 MG: 325 TABLET, FILM COATED ORAL at 01:41

## 2018-01-01 RX ADMIN — THIAMINE HCL (VITAMIN B1) 50 MG TABLET 50 MG: at 08:43

## 2018-01-01 RX ADMIN — THIAMINE HCL (VITAMIN B1) 50 MG TABLET 50 MG: at 09:31

## 2018-01-01 RX ADMIN — APIXABAN 2.5 MG: 2.5 TABLET, FILM COATED ORAL at 08:48

## 2018-01-01 RX ADMIN — ACETAMINOPHEN 650 MG: 325 TABLET, FILM COATED ORAL at 12:28

## 2018-01-01 RX ADMIN — PIPERACILLIN AND TAZOBACTAM 2.25 G: 2; .25 INJECTION, POWDER, LYOPHILIZED, FOR SOLUTION INTRAVENOUS; PARENTERAL at 23:29

## 2018-01-01 RX ADMIN — DOBUTAMINE IN DEXTROSE 5 MCG/KG/MIN: 200 INJECTION, SOLUTION INTRAVENOUS at 08:33

## 2018-01-01 RX ADMIN — DOBUTAMINE IN DEXTROSE 5 MCG/KG/MIN: 200 INJECTION, SOLUTION INTRAVENOUS at 20:59

## 2018-01-01 RX ADMIN — SENNOSIDES 2 TABLET: 8.6 TABLET, FILM COATED ORAL at 18:08

## 2018-01-01 RX ADMIN — SODIUM CHLORIDE 300 ML: 9 INJECTION, SOLUTION INTRAVENOUS at 15:34

## 2018-01-01 RX ADMIN — DOCUSATE SODIUM 5 ML: 283 LIQUID RECTAL at 09:43

## 2018-01-01 RX ADMIN — FUROSEMIDE 80 MG: 10 INJECTION, SOLUTION INTRAVENOUS at 09:52

## 2018-01-01 RX ADMIN — HEPARIN SODIUM 5000 UNITS: 5000 INJECTION, SOLUTION INTRAVENOUS; SUBCUTANEOUS at 20:32

## 2018-01-01 RX ADMIN — POLYETHYLENE GLYCOL 3350 17 G: 17 POWDER, FOR SOLUTION ORAL at 08:45

## 2018-01-01 RX ADMIN — ASPIRIN 81 MG CHEWABLE TABLET 81 MG: 81 TABLET CHEWABLE at 08:48

## 2018-01-01 RX ADMIN — Medication 6 MG: at 21:18

## 2018-01-01 RX ADMIN — LURASIDONE HYDROCHLORIDE 20 MG: 20 TABLET, FILM COATED ORAL at 21:26

## 2018-01-01 RX ADMIN — HEPARIN SODIUM 5000 UNITS: 5000 INJECTION, SOLUTION INTRAVENOUS; SUBCUTANEOUS at 20:20

## 2018-01-01 RX ADMIN — THIAMINE HCL (VITAMIN B1) 50 MG TABLET 50 MG: at 08:42

## 2018-01-01 RX ADMIN — SODIUM CHLORIDE, PRESERVATIVE FREE 5 ML: 5 INJECTION INTRAVENOUS at 09:40

## 2018-01-01 RX ADMIN — WATER 500 MG: 1 INJECTION INTRAMUSCULAR; INTRAVENOUS; SUBCUTANEOUS at 14:21

## 2018-01-01 RX ADMIN — PEDIATRIC MULTIPLE VITAMIN W/ MINERALS & C CHEW TAB 60 MG 1 TABLET: CHEW TAB at 12:43

## 2018-01-01 RX ADMIN — ATORVASTATIN CALCIUM 80 MG: 80 TABLET, FILM COATED ORAL at 19:40

## 2018-01-01 RX ADMIN — ACETAMINOPHEN 650 MG: 325 TABLET, FILM COATED ORAL at 05:08

## 2018-01-01 RX ADMIN — PIPERACILLIN AND TAZOBACTAM 2.25 G: 2; .25 INJECTION, POWDER, LYOPHILIZED, FOR SOLUTION INTRAVENOUS; PARENTERAL at 06:14

## 2018-01-01 RX ADMIN — DOBUTAMINE IN DEXTROSE 5 MCG/KG/MIN: 200 INJECTION, SOLUTION INTRAVENOUS at 03:21

## 2018-01-01 RX ADMIN — DOBUTAMINE IN DEXTROSE 5 MCG/KG/MIN: 200 INJECTION, SOLUTION INTRAVENOUS at 11:24

## 2018-01-01 RX ADMIN — ATORVASTATIN CALCIUM 80 MG: 80 TABLET, FILM COATED ORAL at 21:26

## 2018-01-01 RX ADMIN — STANDARDIZED SENNA CONCENTRATE 2 TABLET: 8.6 TABLET ORAL at 08:17

## 2018-01-01 RX ADMIN — HYDROCORTISONE: 1 CREAM TOPICAL at 21:19

## 2018-01-01 RX ADMIN — LIDOCAINE: 50 OINTMENT TOPICAL at 02:17

## 2018-01-01 RX ADMIN — SIMETHICONE CHEW TAB 80 MG 80 MG: 80 TABLET ORAL at 01:12

## 2018-01-01 RX ADMIN — THIAMINE HCL (VITAMIN B1) 50 MG TABLET 50 MG: at 08:48

## 2018-01-01 RX ADMIN — Medication 12.5 MG: at 02:28

## 2018-01-01 RX ADMIN — POTASSIUM CHLORIDE 20 MEQ: 750 TABLET, EXTENDED RELEASE ORAL at 15:39

## 2018-01-01 RX ADMIN — ASPIRIN 81 MG CHEWABLE TABLET 81 MG: 81 TABLET CHEWABLE at 08:43

## 2018-01-01 RX ADMIN — HYDROCORTISONE: 1 CREAM TOPICAL at 20:51

## 2018-01-01 RX ADMIN — AMPICILLIN SODIUM AND SULBACTAM SODIUM 1.5 G: 1; .5 INJECTION, POWDER, FOR SOLUTION INTRAMUSCULAR; INTRAVENOUS at 10:39

## 2018-01-01 RX ADMIN — HYDROCORTISONE: 1 CREAM TOPICAL at 21:23

## 2018-01-01 RX ADMIN — POLYETHYLENE GLYCOL 3350 17 G: 17 POWDER, FOR SOLUTION ORAL at 08:59

## 2018-01-01 RX ADMIN — COCOA BUTTER, PHENYLEPHRINE HYDROCHLORIDE 1 SUPPOSITORY: 2211; 6.25 SUPPOSITORY RECTAL at 12:15

## 2018-01-01 RX ADMIN — APIXABAN 2.5 MG: 2.5 TABLET, FILM COATED ORAL at 09:07

## 2018-01-01 RX ADMIN — PEDIATRIC MULTIPLE VITAMIN W/ MINERALS & C CHEW TAB 60 MG 1 TABLET: CHEW TAB at 08:58

## 2018-01-01 RX ADMIN — ASPIRIN 81 MG CHEWABLE TABLET 81 MG: 81 TABLET CHEWABLE at 19:37

## 2018-01-01 RX ADMIN — MULTIPLE VITAMINS W/ MINERALS TAB 1 TABLET: TAB at 09:09

## 2018-01-01 RX ADMIN — HYDROCORTISONE: 1 CREAM TOPICAL at 14:27

## 2018-01-01 RX ADMIN — SODIUM CHLORIDE, PRESERVATIVE FREE 5 ML: 5 INJECTION INTRAVENOUS at 08:57

## 2018-01-01 RX ADMIN — DOBUTAMINE IN DEXTROSE 5 MCG/KG/MIN: 200 INJECTION, SOLUTION INTRAVENOUS at 15:25

## 2018-01-01 RX ADMIN — Medication 5000 UNITS: at 15:57

## 2018-01-01 RX ADMIN — HYDROCORTISONE: 1 CREAM TOPICAL at 18:08

## 2018-01-01 RX ADMIN — LIDOCAINE: 50 OINTMENT TOPICAL at 08:18

## 2018-01-01 RX ADMIN — APIXABAN 2.5 MG: 2.5 TABLET, FILM COATED ORAL at 08:50

## 2018-01-01 RX ADMIN — HYDROCORTISONE: 1 CREAM TOPICAL at 09:15

## 2018-01-01 RX ADMIN — PSYLLIUM HUSK 1 PACKET: 3.4 POWDER ORAL at 08:14

## 2018-01-01 RX ADMIN — OLANZAPINE 2.5 MG: 2.5 TABLET, FILM COATED ORAL at 21:54

## 2018-01-01 RX ADMIN — THIAMINE HCL (VITAMIN B1) 50 MG TABLET 50 MG: at 09:00

## 2018-01-01 RX ADMIN — MELATONIN TAB 3 MG 3 MG: 3 TAB at 20:23

## 2018-01-01 RX ADMIN — Medication 6 MG: at 20:00

## 2018-01-01 RX ADMIN — LIDOCAINE: 50 OINTMENT TOPICAL at 08:59

## 2018-01-01 RX ADMIN — BUMETANIDE 2 MG: 2 TABLET ORAL at 15:23

## 2018-01-01 RX ADMIN — PHYTONADIONE 5 MG: 10 INJECTION, EMULSION INTRAMUSCULAR; INTRAVENOUS; SUBCUTANEOUS at 20:49

## 2018-01-01 RX ADMIN — LIDOCAINE HYDROCHLORIDE 10 ML: 20 JELLY TOPICAL at 22:26

## 2018-01-01 RX ADMIN — APIXABAN 2.5 MG: 2.5 TABLET, FILM COATED ORAL at 20:27

## 2018-01-01 RX ADMIN — BUMETANIDE 2 MG: 2 TABLET ORAL at 18:03

## 2018-01-01 RX ADMIN — SIMETHICONE 40 MG: 20 SUSPENSION/ DROPS ORAL at 13:58

## 2018-01-01 RX ADMIN — OLANZAPINE 2.5 MG: 2.5 TABLET, FILM COATED ORAL at 02:52

## 2018-01-01 RX ADMIN — OLANZAPINE 2.5 MG: 2.5 TABLET, FILM COATED ORAL at 20:24

## 2018-01-01 RX ADMIN — FENTANYL CITRATE 25 MCG: 50 INJECTION INTRAMUSCULAR; INTRAVENOUS at 15:55

## 2018-01-01 RX ADMIN — HYDROCORTISONE: 1 CREAM TOPICAL at 20:53

## 2018-01-01 RX ADMIN — ASPIRIN 81 MG CHEWABLE TABLET 81 MG: 81 TABLET CHEWABLE at 09:14

## 2018-01-01 RX ADMIN — BUMETANIDE 5 MG: 0.25 INJECTION INTRAMUSCULAR; INTRAVENOUS at 18:09

## 2018-01-01 RX ADMIN — LIDOCAINE HYDROCHLORIDE 3 ML: 10 INJECTION, SOLUTION EPIDURAL; INFILTRATION; INTRACAUDAL; PERINEURAL at 18:53

## 2018-01-01 RX ADMIN — THIAMINE HYDROCHLORIDE 100 MG: 100 INJECTION, SOLUTION INTRAMUSCULAR; INTRAVENOUS at 08:24

## 2018-01-01 RX ADMIN — ATORVASTATIN CALCIUM 80 MG: 80 TABLET, FILM COATED ORAL at 19:39

## 2018-01-01 RX ADMIN — HEPARIN SODIUM 2400 UNITS: 1000 INJECTION, SOLUTION INTRAVENOUS; SUBCUTANEOUS at 15:55

## 2018-01-01 RX ADMIN — ACETAMINOPHEN 650 MG: 325 TABLET, FILM COATED ORAL at 12:00

## 2018-01-01 RX ADMIN — AMPICILLIN SODIUM AND SULBACTAM SODIUM 1.5 G: 1; .5 INJECTION, POWDER, FOR SOLUTION INTRAMUSCULAR; INTRAVENOUS at 09:36

## 2018-01-01 RX ADMIN — WARFARIN SODIUM 2 MG: 1 TABLET ORAL at 19:05

## 2018-01-01 RX ADMIN — DOCUSATE SODIUM 5 ML: 283 LIQUID RECTAL at 10:14

## 2018-01-01 RX ADMIN — SODIUM CHLORIDE 250 ML: 9 INJECTION, SOLUTION INTRAVENOUS at 15:34

## 2018-01-01 RX ADMIN — ATORVASTATIN CALCIUM 80 MG: 80 TABLET, FILM COATED ORAL at 20:06

## 2018-01-01 RX ADMIN — HEPARIN SODIUM 5000 UNITS: 5000 INJECTION, SOLUTION INTRAVENOUS; SUBCUTANEOUS at 20:09

## 2018-01-01 RX ADMIN — ASPIRIN 81 MG CHEWABLE TABLET 81 MG: 81 TABLET CHEWABLE at 08:19

## 2018-01-01 RX ADMIN — ACETAMINOPHEN 650 MG: 325 TABLET, FILM COATED ORAL at 01:11

## 2018-01-01 RX ADMIN — NYSTATIN: 100000 CREAM TOPICAL at 09:23

## 2018-01-01 RX ADMIN — ASPIRIN 81 MG CHEWABLE TABLET 81 MG: 81 TABLET CHEWABLE at 09:42

## 2018-01-01 RX ADMIN — AMPICILLIN SODIUM AND SULBACTAM SODIUM 1.5 G: 1; .5 INJECTION, POWDER, FOR SOLUTION INTRAMUSCULAR; INTRAVENOUS at 20:03

## 2018-01-01 RX ADMIN — MELATONIN TAB 3 MG 3 MG: 3 TAB at 23:04

## 2018-01-01 RX ADMIN — BUMETANIDE 2 MG: 2 TABLET ORAL at 20:09

## 2018-01-01 RX ADMIN — THIAMINE HCL (VITAMIN B1) 50 MG TABLET 50 MG: at 09:41

## 2018-01-01 RX ADMIN — AMPICILLIN SODIUM AND SULBACTAM SODIUM 1.5 G: 1; .5 INJECTION, POWDER, FOR SOLUTION INTRAMUSCULAR; INTRAVENOUS at 07:43

## 2018-01-01 RX ADMIN — THIAMINE HYDROCHLORIDE 100 MG: 100 INJECTION, SOLUTION INTRAMUSCULAR; INTRAVENOUS at 07:51

## 2018-01-01 RX ADMIN — APIXABAN 2.5 MG: 2.5 TABLET, FILM COATED ORAL at 20:44

## 2018-01-01 RX ADMIN — HEPARIN SODIUM 5000 UNITS: 5000 INJECTION, SOLUTION INTRAVENOUS; SUBCUTANEOUS at 19:52

## 2018-01-01 RX ADMIN — ACETAMINOPHEN 650 MG: 325 TABLET, FILM COATED ORAL at 02:12

## 2018-01-01 RX ADMIN — APIXABAN 2.5 MG: 2.5 TABLET, FILM COATED ORAL at 09:14

## 2018-01-01 RX ADMIN — ACETAMINOPHEN 650 MG: 325 TABLET, FILM COATED ORAL at 01:03

## 2018-01-01 RX ADMIN — ASPIRIN 81 MG CHEWABLE TABLET 324 MG: 81 TABLET CHEWABLE at 01:01

## 2018-01-01 RX ADMIN — MULTIPLE VITAMINS W/ MINERALS TAB 1 TABLET: TAB at 08:53

## 2018-01-01 RX ADMIN — BUMETANIDE 2 MG: 2 TABLET ORAL at 07:38

## 2018-01-01 RX ADMIN — APIXABAN 2.5 MG: 2.5 TABLET, FILM COATED ORAL at 09:00

## 2018-01-01 RX ADMIN — NYSTATIN: 100000 CREAM TOPICAL at 08:21

## 2018-01-01 RX ADMIN — ACETAMINOPHEN 650 MG: 325 TABLET, FILM COATED ORAL at 20:58

## 2018-01-01 RX ADMIN — MELATONIN TAB 3 MG 3 MG: 3 TAB at 20:06

## 2018-01-01 RX ADMIN — THIAMINE HCL (VITAMIN B1) 50 MG TABLET 50 MG: at 08:49

## 2018-01-01 RX ADMIN — POLYETHYLENE GLYCOL 3350 17 G: 17 POWDER, FOR SOLUTION ORAL at 09:29

## 2018-01-01 RX ADMIN — APIXABAN 2.5 MG: 2.5 TABLET, FILM COATED ORAL at 07:38

## 2018-01-01 RX ADMIN — POTASSIUM CHLORIDE 20 MEQ: 1.5 POWDER, FOR SOLUTION ORAL at 21:57

## 2018-01-01 RX ADMIN — ALTEPLASE 2 MG: 2.2 INJECTION, POWDER, LYOPHILIZED, FOR SOLUTION INTRAVENOUS at 07:00

## 2018-01-01 RX ADMIN — HEPARIN SODIUM 5000 UNITS: 5000 INJECTION, SOLUTION INTRAVENOUS; SUBCUTANEOUS at 07:51

## 2018-01-01 RX ADMIN — ATORVASTATIN CALCIUM 80 MG: 80 TABLET, FILM COATED ORAL at 20:56

## 2018-01-01 RX ADMIN — Medication 6 MG: at 20:48

## 2018-01-01 RX ADMIN — Medication: at 11:00

## 2018-01-01 RX ADMIN — HYDROCORTISONE: 1 CREAM TOPICAL at 09:43

## 2018-01-01 RX ADMIN — HEPARIN SODIUM 5000 UNITS: 5000 INJECTION, SOLUTION INTRAVENOUS; SUBCUTANEOUS at 09:05

## 2018-01-01 RX ADMIN — APIXABAN 2.5 MG: 2.5 TABLET, FILM COATED ORAL at 20:34

## 2018-01-01 RX ADMIN — STANDARDIZED SENNA CONCENTRATE 2 TABLET: 8.6 TABLET ORAL at 07:57

## 2018-01-01 RX ADMIN — ONDANSETRON 4 MG: 2 INJECTION INTRAMUSCULAR; INTRAVENOUS at 10:55

## 2018-01-01 RX ADMIN — HEPARIN SODIUM 5000 UNITS: 5000 INJECTION, SOLUTION INTRAVENOUS; SUBCUTANEOUS at 21:12

## 2018-01-01 RX ADMIN — MELATONIN TAB 3 MG 3 MG: 3 TAB at 20:33

## 2018-01-01 RX ADMIN — APIXABAN 2.5 MG: 2.5 TABLET, FILM COATED ORAL at 20:06

## 2018-01-01 RX ADMIN — DOBUTAMINE IN DEXTROSE 5 MCG/KG/MIN: 200 INJECTION, SOLUTION INTRAVENOUS at 10:54

## 2018-01-01 RX ADMIN — ACETAMINOPHEN 650 MG: 325 TABLET, FILM COATED ORAL at 11:29

## 2018-01-01 RX ADMIN — HEPARIN SODIUM 2400 UNITS: 1000 INJECTION, SOLUTION INTRAVENOUS; SUBCUTANEOUS at 15:56

## 2018-01-01 RX ADMIN — HEPARIN SODIUM 5000 UNITS: 5000 INJECTION, SOLUTION INTRAVENOUS; SUBCUTANEOUS at 20:10

## 2018-01-01 RX ADMIN — TAMSULOSIN HYDROCHLORIDE 0.4 MG: 0.4 CAPSULE ORAL at 18:37

## 2018-01-01 RX ADMIN — BUMETANIDE 4 MG: 0.25 INJECTION INTRAMUSCULAR; INTRAVENOUS at 09:20

## 2018-01-01 RX ADMIN — Medication 1 MG: at 23:58

## 2018-01-01 RX ADMIN — THIAMINE HYDROCHLORIDE 100 MG: 100 INJECTION, SOLUTION INTRAMUSCULAR; INTRAVENOUS at 08:56

## 2018-01-01 RX ADMIN — NYSTATIN: 100000 CREAM TOPICAL at 20:15

## 2018-01-01 RX ADMIN — POLYETHYLENE GLYCOL 3350 17 G: 17 POWDER, FOR SOLUTION ORAL at 09:43

## 2018-01-01 RX ADMIN — HYDROCORTISONE: 1 CREAM TOPICAL at 08:51

## 2018-01-01 RX ADMIN — STANDARDIZED SENNA CONCENTRATE 2 TABLET: 8.6 TABLET ORAL at 08:25

## 2018-01-01 ASSESSMENT — PAIN SCALES - GENERAL
PAINLEVEL: NO PAIN (0)
PAINLEVEL: MODERATE PAIN (5)
PAINLEVEL: MODERATE PAIN (4)

## 2018-01-01 ASSESSMENT — ENCOUNTER SYMPTOMS
VOICE CHANGE: 0
LIGHT-HEADEDNESS: 0
SLEEP DISTURBANCE: 1
COUGH: 1
TROUBLE SWALLOWING: 0
ADENOPATHY: 0
CONSTIPATION: 0
NUMBNESS: 0
DIZZINESS: 0
NECK PAIN: 0
VOMITING: 1
PALPITATIONS: 0
DYSPHORIC MOOD: 0
FEVER: 0
DYSURIA: 0
CONFUSION: 1
HEADACHES: 0
BRUISES/BLEEDS EASILY: 0
BACK PAIN: 1
CHILLS: 0
SHORTNESS OF BREATH: 0
NAUSEA: 0
FREQUENCY: 0
DIARRHEA: 0
ABDOMINAL DISTENTION: 1
SORE THROAT: 0
EYE PAIN: 0
WEAKNESS: 1
ABDOMINAL PAIN: 1
HEMATURIA: 0
DIAPHORESIS: 0
POLYDIPSIA: 0
NERVOUS/ANXIOUS: 0
COLOR CHANGE: 0
BLOOD IN STOOL: 0

## 2018-01-01 ASSESSMENT — PAIN DESCRIPTION - DESCRIPTORS
DESCRIPTORS: SORE
DESCRIPTORS: PATIENT UNABLE TO DESCRIBE
DESCRIPTORS: ACHING
DESCRIPTORS: ACHING
DESCRIPTORS: PATIENT UNABLE TO DESCRIBE
DESCRIPTORS: ACHING
DESCRIPTORS: DISCOMFORT
DESCRIPTORS: THROBBING
DESCRIPTORS: CRAMPING
DESCRIPTORS: CRAMPING
DESCRIPTORS: ACHING
DESCRIPTORS: CRAMPING
DESCRIPTORS: DISCOMFORT
DESCRIPTORS: CONSTANT
DESCRIPTORS: CRAMPING
DESCRIPTORS: ACHING
DESCRIPTORS: PATIENT UNABLE TO DESCRIBE
DESCRIPTORS: ACHING
DESCRIPTORS: ACHING
DESCRIPTORS: PATIENT UNABLE TO DESCRIBE
DESCRIPTORS: CRAMPING
DESCRIPTORS: CRAMPING
DESCRIPTORS: BURNING;SORE
DESCRIPTORS: ACHING
DESCRIPTORS: CRAMPING
DESCRIPTORS: ACHING
DESCRIPTORS: BURNING;SORE
DESCRIPTORS: DULL;DISCOMFORT
DESCRIPTORS: CRAMPING
DESCRIPTORS: ACHING

## 2018-01-01 ASSESSMENT — PATIENT HEALTH QUESTIONNAIRE - PHQ9: SUM OF ALL RESPONSES TO PHQ QUESTIONS 1-9: 4

## 2018-01-08 PROBLEM — K59.01 SLOW TRANSIT CONSTIPATION: Status: ACTIVE | Noted: 2018-01-01

## 2018-01-08 NOTE — PROGRESS NOTES
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  He presents with his wife of 52 years  Nu, who assists with his medical cares at home.  Bowel movement not regular. Miralax daily.   Pain in left foot from previous break. He is taking acetaminophen at bedtime occasionally.  He is following a low purine low-sodium diet his wife assists with his nutrition and his weight has been very stable.   No additional hospitalizations since 11/21 Syncope. He went out to dinner with  His family for [place].  Left eye has  healed from subconjunctival hemorrhage from straining to have bowel movement.  He has an appointment 1/17 with renal.  Up several times at night due to urinary frequency from Bumex as well but using a urinal, has to go to the bathroom to have a stool.  He is off Uloric ( due to possible concerns for increased risk of stroke) and recent uric acid is elevated to 10.2. He is following a low purine diet.  He has not had another gout attack recently.   Depression: he feels the low dose of Lexapro 5 mg has assisted with his mood.  He continues with therapy through [facility] once weekly. He is homebound due to severe chronic heart failure from Amyloid, dyspnea, stroke with left sided weakness but was able to get out to dinner for [place].  PMH:  February, 02/12/17, he was hospitalized at [facility] diagnosed with  a thrombus in the right middle cerebral artery, acute stroke.  He has chronic atrial fibrillation, rate controlled off sotalol 40 mg daily currently pressure remains stable.  He has left foot pain with degenerative process in his foot and is saw podiatry has inserts and new shoes assisting the pain.    REVIEW OF SYSTEMS:  He follows with Dr. Arnold at [facility] who monitors his INR. No dyspnea other than getting into bed, edema of legs better with elevation, compression and wrapping.  Depressed mood after stroke.  He has a history of chronic kidney  disease, coronary artery disease involving the native coronary artery disease and had an acute CVA in 02/12/2017 due to occlusion of the distal right M1 segment of middle cerebral artery, status post recanalization with Penumbra ACE 68 device.   He is at risk of falls due to stroke and deconditioning. He has been working with therapists. He uses wheelchair and assistive devices.  His home has stairs. He would benefit from a stair chair lift as he has difficulty climbing stairs, required in his current living situation.   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 He lives with his wife.  He has 2 children, a son and daughter, and 2 grandchildren, a grandson and granddaughter.     Patient Active Problem List   Diagnosis     Atrial fibrillation: ablation Dr Jonathan Arvizu/[place]     Lower extremity edema     Hyperlipidemia     Cardiac amyloid: ATTR per cardiac biopsy [facility] 2009     Pain in limb     Status post lumbar surgery     Adjustment disorder     Idiopathic peripheral neuropathy     SOB (shortness of breath)     CHF (congestive heart failure) (H)     Depression     Malignant neoplasm of kidney excluding renal pelvis (H)     Cerebrovascular accident (CVA) (H)     Other amyloidosis     Anemia     Atrial flutter (H)     Cardiomyopathy (H)     Chronic kidney disease, stage III (moderate)     Coronary arteriosclerosis in native artery     Weakness     Hypotension     Pain of foot     Long term current use of anticoagulant therapy     Peripheral neuropathic pain     Myocardial infarction, nontransmural (H)     Nonsustained ventricular tachycardia (H)     CKD (chronic kidney disease) stage 3, GFR 30-59 ml/min     Tinea pedis of left foot     Onychomycosis     Nail deformity     Elevated blood uric acid level     Paroxysmal atrial fibrillation (H)     Past Medical History:   Diagnosis Date     Atrial fibrillation (H) 6/25/2012     Atrial fibrillation: ablation Dr Jonathan Arvizu/[place]  6/25/2012     Cardiac amyloid: ATTR per cardiac biopsy [facility] 2009 6/27/2012     Coronary artery disease      Other and unspecified hyperlipidemia 6/27/2012     Tubular adenoma of colon 2010     Venous insufficiency      Social History     Social History     Marital status:      Spouse name: N/A     Number of children: N/A     Years of education: N/A     Occupational History     Not on file.     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      to his wife Nu ( Piano), two children Deandra Solo (Grandchildren Trinh 2003, Nish 2007) and Son [id number] YISEL Landers III     BP 99/67 (BP Location: Right arm, Patient Position: Chair, Cuff Size: Adult Regular)  Pulse 79  Temp 97.4  F (36.3  C) (Oral)  Resp 16  Wt 74.8 kg (165 lb)  SpO2 99%  BMI 24.37 kg/m2  Weight with his clothing and shoes.  GENERAL:  Examination reveals a delightful gentleman who is very intelligent.  Both he and his wife appear rested today.  His speech is clear.  He has decreased movement on the left side of his body, but is able to move.  He is sitting in a wheelchair. His mood interactive, intelligent and smiling.  His wife is very detail oriented, attuned to his medications,caring, worried for her 's well being.They have a good relationship  HEENT:  Mucous membranes are moist.  He appears adequately hydrated today.  NECK:  Supple.  Thyroid is normal.   HEART:  S1, S2, with slightly irregular rhythm.  Rate is controlled.  LUNGS: Clear no rales     ABDOMEN:  Examined  in a chair. No significant distension. Non tender.  EXTREMITIES: Less leg edema,  left ankle has more swelling than the right, both have venous incompetence. Wearing compression stockings  PSYCHOLOGICAL:  Interactive, very intelligent, wanting to understand his health condition.  Depressed mood improved  PHQ-9 SCORE 10/9/2017 11/13/2017 1/8/2018   Total Score - - -   Total Score 4 5 4     ASSESSMENT AND PLAN:     Professor Josr Landers, a very intelligent, pleasant gentleman presents with his wife Nu to follow up for primary care.  He has not had further hospitalizations since November and maintains on  Bumex adjusted to 1 mg in am and 0.5 mg at supper but 1.5 mg if weight over 165. He has advancing heart failure related to amyloid. He had Sotolol discontinued, tolerating without palpitations and feels better.  His depression is slightly better  Lexapro 5 mg daily daily.  Stroke, thrombus of right middle cerebral artery with eft sided weakness: therapy at [facility]. Constipation: We discussed his heart condition impacts all bodily functions.Take daily Miralax after supper but do not take if loose stools.  If he has not had a bowel movement by the following day he may take a dose of MiraLAX in the morning and a daily dose at supper.  He was on Uloric for gout prevention but we discontinued due to CV/Stroke risk concern no recent gout.  He is following with a low purine diet and hydrating but no more than 64 oz daily.  He has follow-up with nephrology will ask if uric acid is elevated if a low dose of allopurinol may be started for prevention of gout.  The pain he is currently having in his foot is related to previous fracture therefore he will be using acetaminophen 1000 mg at bedtime and may take 1000 mg in the morning if needed may take an additional dose midday for a maximum of 3000 mg daily.  He is doing better at this time and would like to follow-up in 3 months.  He has less  edema of legs with wrapping and lymphedema therapy.  Josr was seen today for ankle/foot left.    Diagnoses and all orders for this visit:    Cardiac amyloid: ATTR per cardiac biopsy [facility] 2009  Constipation  See above start daily Miralax after supper  Combined systolic and diastolic congestive heart failure, unspecified congestive heart failure chronicity (H)  Stable currently  Left foot pain due to previous fracture and osteoarthritis  He will take acetaminophen as noted above  -     Uric acid; Future    Lower extremity edema  Wrapping and compression  Mild single current episode of major depressive disorder (H)  Stable on low-dose Lexapro  Chronic kidney disease, stage III (moderate)  Has appointment with Dr. Turner 1/17, labs prior  Elevated blood uric acid level  Currently no signs of gout  Long term current use of anticoagulant therapy  Monitoring INR through Dr. Davies [facility]   He has a history of chronic kidney disease, coronary artery disease involving the native coronary artery disease and had an acute CVA in 02/12/2017 due to occlusion of the distal right M1 segment of middle cerebral artery, status post recanalization with Penumbra ACE 68 device.   He is at risk of falls due to stroke and deconditioning. He has been working with therapists. He uses wheelchair and assistive devices.  His home has stairs. He would benefit from a stair chair lift as he has difficulty climbing stairs, required in his current living situation.    3 month follow-up  All questions were addressed and voiced understanding and agreement with the above.  Ángel Oates MD

## 2018-01-08 NOTE — NURSING NOTE
Chief Complaint   Patient presents with     Ankle/Foot left     Here for pain on left foot     Naresh Clark CMA (AAMA) at 2:57 PM on 1/8/2018

## 2018-01-08 NOTE — MR AVS SNAPSHOT
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  ** [facility] Prescription Fax = [phone]  * Please allow 3 business days for routine medication refills.  * Please allow 5 business days for controlled substance medication refills.     Primary Care Center Test Result notification information:  *You will be notified with in 7-10 days of your appointment day regarding the results of your test.  If you are on MyChart you will be notified as soon as the provider has reviewed the results and signed off on them.    Primary Care Center [phone]             Follow-ups after your visit        Follow-up notes from your care team     Discussed this visit Return in about 3 months (around 4/8/2018).      Your next 10 appointments already scheduled     Jan 17, 2018 11:00 AM CST   Lab with  LAB   [facility] Lab ([facility])    [address]  1st Floor  [facility] [address] [phone]            Jan 17, 2018 12:00 PM CST   (Arrive by 11:30 AM)   Return Visit with Jonathan Turner MD   [facility] Nephrology ([facility] [place])    [address]  [address]  [place] [id number] [phone]            Jan 30, 2018  1:30 PM CST   Return Visit with Gideon Grimm DPM   [facility] ([facility])    [address] [place] [id number] [phone]            Feb 21, 2018  9:15 AM CST   (Arrive by 9:00 AM)   MR RUBIN  & PELVIS W/O & W CONTRAST with [id number]   [facility] ([facility] [place])    [address]  1st Floor  [place] [facility] [address] [phone]           Take your medicines as usual, unless your doctor tells you not to. Bring a list of your current medicines to your exam (including vitamins, minerals and over-the-counter drugs). Also bring the results of similar scans you may have had.    The day before your exam, drink extra fluids at least six 8-ounce glasses (unless your doctor tells you to restrict your fluids).   Have a blood test (creatinine test) within 30 days of your exam. Go to your clinic or Diagnostic Imaging Department for this test.   Do not eat or drink for 6 hours prior to exam.  The MRI machine uses a strong magnet. Please wear clothes without metal (snaps, zippers). A sweatsuit works well, or we may give you a hospital gown.  Please remove any body piercings and hair extensions before you arrive. You will also remove watches, jewelry, hairpins, wallets, dentures, partial dental plates and hearing aids. You may wear contact lenses, and you may be able to wear your rings. We have a safe place to keep your personal items, but it is safer to leave them at home.   **IMPORTANT** THE INSTRUCTIONS BELOW ARE ONLY FOR THOSE PATIENTS WHO HAVE BEEN TOLD THEY WILL RECEIVE SEDATION OR GENERAL ANESTHESIA DURING THEIR MRI PROCEDURE:  IF YOU WILL RECEIVE SEDATION (take medicine to help you relax during your exam):   You must get the medicine from your doctor before you arrive. Bring the medicine to the exam. Do not take it at home.   Arrive one hour early. Bring someone who can take you home after the test. Your medicine will make you sleepy. After the exam, you may not drive, take a bus or take a taxi by yourself.   No eating 8 hours before your exam. You may have clear liquids up until 4 hours before your exam. (Clear liquids include water, clear tea, black coffee and  fruit juice without pulp.)  IF YOU WILL RECEIVE ANESTHESIA (be asleep for your exam):   Arrive 1 1/2 hours early. Bring someone who can take you home after the test. You may not drive, take a bus or take a taxi by yourself.   No eating 8 hours before your exam. You may have clear liquids up until 4 hours before your exam. (Clear liquids include water, clear tea, black coffee and fruit juice without pulp.)  If you have any questions, please contact your Imaging Department exam site.            Feb 21, 2018 11:40 AM CST   (Arrive by 11:25 AM)   Return Visit with Aleisha Sapp MD   [facility] ([facility])    [address] [place] [address] [phone]            Apr 09, 2018  2:00 PM CDT   (Arrive by 1:45 PM)   Return Visit with Ángel Oates MD   [facility] ([facility])    [address] [place] [address] [phone]              Future tests that were ordered for you today     Open Future Orders        Priority Expected Expires Ordered    Uric acid Routine 1/17/2018 1/8/2019 1/8/2018            Who to contact     Please call your clinic at [phone] to:    Ask questions about your health    Make or cancel appointments    Discuss your medicines    Learn about your test results    Speak to your doctor   If you have compliments or concerns about an experience at your clinic, or if you wish to file a complaint, please contact [facility] Patient Relations at [phone] or email us at [email or web address] [email or web address]         Additional Information About Your Visit        MyChart Information     QPDhart gives you secure access to your electronic health record. If you see a primary care provider, you can also send messages to your care team and make appointments. If you have questions, please call your primary  care clinic.  If you do not have a primary care provider, please call [phone] and they will assist you.      Boom[employer]fm is an electronic gateway that provides easy, online access to your medical records. With Bo[employer], you can request a clinic appointment, read your test results, renew a prescription or communicate with your care team.     To access your existing account, please contact your [facility] or call [phone] for assistance.        Care EveryWhere ID     This is your Care EveryWhere ID. This could be used by other organizations to access your [place] medical records  [id number]        Your Vitals Were     Pulse Temperature Respirations Pulse Oximetry BMI (Body Mass Index)       79 97.4  F (36.3  C) (Oral) 16 99% 24.37 kg/m2        Blood Pressure from Last 3 Encounters:   01/08/18 99/67   12/04/17 93/63   11/28/17 100/68    Weight from Last 3 Encounters:   01/08/18 74.8 kg (165 lb)   12/04/17 77.6 kg (171 lb)   11/13/17 77.6 kg (171 lb)               Primary Care Provider Office Phone # Fax #    Ángel Oates [phone]       [address] [facility] [address]        Equal Access to Services     NATALIE GOMEZ : Hadii philip ku hadasho Soomaali, waaxda luqadaha, qaybta kaalmada adeegyada, waxay idiin haychikisn hardeep hale. So [facility] [phone].    ATENCIÓN: Si habla español, tiene a mayorga disposición servicios gratuitos de asistencia lingüística. Llsanchez al [phone].    We comply with applicable federal civil rights laws and [place] laws. We do not discriminate on the basis of race, color, national origin, age, disability, sex, sexual orientation, or gender identity.            Thank you!     Thank you for choosing [facility]  for your care. Our goal is always to provide you with excellent care. Hearing back from our patients is one way we can continue to improve our services. Please take a few minutes to complete the  written survey that you may receive in the mail after your visit with us. Thank you!             Your Updated Medication List - Protect others around you: Learn how to safely use, store and throw away your medicines at www.disposemymeds.org.          This list is accurate as of: 1/8/18  4:16 PM.  Always use your most recent med list.                   Brand Name Dispense Instructions for use Diagnosis    acetaminophen 500 MG tablet    TYLENOL     Take 1,000 mg by mouth 3 times daily as needed for mild pain or pain        atorvastatin 80 MG tablet    LIPITOR    90 tablet    Take 1 tablet (80 mg) by mouth daily    Paroxysmal atrial fibrillation (H), Other hyperlipidemia       * bumetanide 1 MG tablet    BUMEX     Take mg in am and 1mg in pm        * bumetanide 1 MG tablet    BUMEX    180 tablet    1 mg in am then 0.5 mg at supper but add additional 1 mg if weight over 165    Cardiac amyloidosis (H), Chronic combined systolic and diastolic congestive heart failure (H)       clotrimazole 1 % cream    LOTRIMIN    60 g    Apply topically 2 times daily In between toes    Tinea pedis of left foot       diclofenac 1 % Gel topical gel    VOLTAREN    100 g    Apply to right great toe.    Pain of toe of right foot       escitalopram 5 MG tablet    LEXAPRO    90 tablet    Take 1 tablet (5 mg) by mouth daily    Major depressive disorder, single episode, mild (H)       lidocaine HCl 3 % cream     85 g    To painful areas on left foot twice daily as needed.    Left foot pain, Peroneal tendinitis, left       * order for DME     5 each    Equipment being ordered: DME compression stockings knee high 30 mm hg    Venous (peripheral) insufficiency       * order for DME     1 Units    Sequential pneumatic compression pumps 2-3 times per day as needed for lymphedema. Measure and fit.  Compression strength per protocol.    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, Abnormality of gait, At risk for fall due to comorbid condition       polyethylene glycol powder    MIRALAX    119 g    Take 17 g (1 capful) by mouth daily as needed for constipation    Other constipation       Potassium Chloride ER 20 MEQ Tbcr     90 tablet    Take 1 tablet (20 mEq) by mouth daily    Paroxysmal atrial fibrillation (H), Chronic diastolic congestive heart failure (H)       warfarin 5 MG tablet    COUMADIN    110 tablet    Take 1-1.5 tablets (5-7.5 mg) by mouth daily Adjust per INR clinic    Paroxysmal atrial fibrillation (H)       * Notice:  This list has 5 medication(s) that are the same as other medications prescribed for you. Read the directions carefully, and ask your doctor or other care provider to review them with you.

## 2018-01-08 NOTE — PATIENT INSTRUCTIONS
[facility] Medication Refill Request Information:  * Please contact your pharmacy regarding ANY request for medication refills.  ** [facility] Prescription Fax = [phone]  * Please allow 3 business days for routine medication refills.  * Please allow 5 business days for controlled substance medication refills.     [facility] Test Result notification information:  *You will be notified with in 7-10 days of your appointment day regarding the results of your test.  If you are on MyChart you will be notified as soon as the provider has reviewed the results and signed off on them.    [facility] [phone]

## 2018-01-12 NOTE — TELEPHONE ENCOUNTER
Call from patient's wife, Nu. She is concerned about patient's weight. States that this morning his weight was 170 lbs, and he is supposed to be under 165 lbs for his congestive heart failure. At appointment in [facility] on 1/8/18, weight was 165 lbs, but Nu said that his home weight that day was 168 lbs, so he is up 2 lbs in the past 4 days. Patient has increase in edema of lower extremities, and has been fatigued. No shortness of breath noted. Rufino has been giving patient Bumex 1mg in the morning, and 2mg in the afternoon. Would like to know how to proceed.     January 12, 2018 3:03 PM  Spoke with Dr. Oates. She encouraged patient to use compression stockings and elevate lower extremities. Patient is not having chest pain or shortness of breath. If those develop, he would need to go to the ER for evaluation. Patient has not seen his cardiologist at [facility] recently, and she does recommend that he get that appointment set up. Request to have Nu call early next week with an update.     Spoke with Nu and reviewed above. She verbalized understanding.   Antonella Parada RN, Care Coordinator

## 2018-01-24 NOTE — TELEPHONE ENCOUNTER
Pt with a history of RCC coming in for follow up. Chart reviewed and MRI is scheduled. No need for a call.

## 2018-01-30 NOTE — PROGRESS NOTES
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     Pain in limb     Status post lumbar surgery     Adjustment disorder     Idiopathic peripheral neuropathy     SOB (shortness of breath)     CHF (congestive heart failure) (H)     Depression     Malignant neoplasm of kidney excluding renal pelvis (H)     Cerebrovascular accident (CVA) (H)     Other amyloidosis     Anemia     Atrial flutter (H)     Cardiomyopathy (H)     Chronic kidney disease, stage III (moderate)     Coronary arteriosclerosis in native artery     Weakness     Hypotension     Pain of foot     Long term current use of anticoagulant therapy     Peripheral neuropathic pain     Myocardial infarction, nontransmural (H)     Nonsustained ventricular tachycardia (H)     CKD (chronic kidney disease) stage 3, GFR 30-59 ml/min     Tinea pedis of left foot     Onychomycosis     Nail deformity     Elevated blood uric acid level     Paroxysmal atrial fibrillation (H)     Slow transit constipation     Past Surgical History:   Procedure Laterality Date     BACK SURGERY       H PENUMBRA SEPARATOR 3D       KNEE SURGERY       ND PATIENT HAS A CORONARY ARTERY STENT       SHOULDER SURGERY       Social History     Social History     Marital status:      Spouse name: N/A     Number of children: N/A     Years of education: N/A     Occupational History     Not on file.     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      to his wife Nu ( Vikash), two children Deandra Solo (Grandchildren Trinh 2003, Nish 2007) and Son [id number] YISEL Landers III     No family history on file.  SUBJECTIVE FINDINGS: A 77-year-old male returns to clinic for onychomycosis.  He relates that he had seen lymphedema and he is getting lymphedema and swelling again.  They have over-the-counter compression socks.  They got some prescription ones but they cannot get them on.  They are too tight.  Otherwise, he is doing well.      OBJECTIVE FINDINGS: DP and PT are 1/4.  He has peripheral edema.  He has decreased hair growth bilaterally.  He has incurvated nails with some thickening, dystrophy and discoloration to differing degrees 1 through 5 bilaterally.  He has contracted digits bilaterally.      ASSESSMENT AND PLAN: Onychauxis with onychomycosis bilaterally.  He has got peripheral edema with lymphedema.  Diagnosis and treatment discussed with him.  He has idiopathic peripheral neuropathy as well and some chronic kidney disease and long-term use of anticoagulants.  All the nails were reduced bilaterally upon consent.  I gave him a referral back to Lymphedema Clinic.  He will call with the prescription for his compression socks.  We will see if we can get him some lighter compression ones.  He is using some over-the-counter ones now and they are not controlling his edema.  He relates the Lymphedema Clinic worked previously, but it is hard to get there and the swelling came back when he was done with it.

## 2018-01-30 NOTE — NURSING NOTE
"Josr Landers's goals for this visit include: Rechecked for toe nail trim  He requests these members of his care team be copied on today's visit information: Yes    PCP: Ángel Oates    Referring Provider:  Referred Self, MD  No address on file    Chief Complaint   Patient presents with     RECHECK     Toe nail trim       Initial BP 92/61  Pulse 78  SpO2 98% Estimated body mass index is 24.37 kg/(m^2) as calculated from the following:    Height as of 8/23/17: 1.753 m (5' 9\").    Weight as of 1/8/18: 74.8 kg (165 lb).  Medication Reconciliation: complete  "

## 2018-01-30 NOTE — MR AVS SNAPSHOT
After Visit Summary   1/30/2018    Josr Landers    MRN: [id number]           Patient Information     Date Of Birth          1940        Visit Information        Provider Department      1/30/2018 1:30 PM Gideon Grimm DPM [facility]        Today's Diagnoses     Peripheral edema    -  1    Lymphedema        Idiopathic peripheral neuropathy        Onychauxis           Follow-ups after your visit        Additional Services     LYMPHEDEMA THERAPY REFERRAL       *This order will print in the [facility] Central Scheduling Office*    [facility] provides Edema evaluation and treatment across the [place] system.     Call one number to schedule at any [facility] location   [phone].    Treatment: PT or OT Evaluation & Treatment  Special Instructions: None  PT/OT Treatment Diagnosis: Lymphedema    Please be aware that coverage of these services is subject to the terms and limitations of your health insurance plan.  Call member services at your health plan with any benefit or coverage questions.      **Note to Provider** To refer patients to therapy outside of the location list, change the order class to External Referral in the order composer.                  Your next 10 appointments already scheduled     Jan 31, 2018 11:40 AM CST   (Arrive by 11:25 AM)   Return Visit with Aleisha Sapp MD   [facility] Prostate and Urologic Cancers ([facility])    [address] [place] [address]  [facility] [address] [phone]            Feb 07, 2018 10:30 AM CST   Lab with  LAB   [facility] Lab ([facility])    [address] [place] [address]  [facility] [id number] [phone]            Feb 07, 2018 10:45 AM CST   US RENAL COMPLETE with [address] [place] [address] Imaging Center US ([facility])    [address] [place] [address]  Se  1st Floor  [facility] [address]   [phone]           Please bring a list of your medicines (including vitamins, minerals and over-the-counter drugs). Also, tell your doctor about any allergies you may have. Wear comfortable clothes and leave your valuables at home.  You do not need to do anything special to prepare for your exam.  Please call the Imaging Department at your exam site with any questions.            Feb 07, 2018 11:30 AM CST   (Arrive by 11:00 AM)   Return Visit with Jonathan Turner MD   [facility] Nephrology ([facility])    [address] [place] [address]  [address]  [facility] [address]   [phone]            Apr 09, 2018  2:00 PM CDT   (Arrive by 1:45 PM)   Return Visit with Ángel Oates MD   [facility] ([facility])    [address] [place] [address]  4th Floor  [facility] [address] [phone]              Who to contact     If you have questions or need follow up information about today's clinic visit or your schedule please contact [facility] directly at [phone].  Normal or non-critical lab and imaging results will be communicated to you by MyChart, letter or phone within 4 business days after the clinic has received the results. If you do not hear from us within 7 days, please contact the clinic through [facility] or phone. If you have a critical or abnormal lab result, we will notify you by phone as soon as possible.  Submit refill requests through LoungeUp or call your pharmacy and they will forward the refill request to us. Please allow 3 business days for your refill to be completed.          Additional Information About Your Visit        [facility]Flow Information     LoungeUp gives you secure access to your electronic health record. If you see a primary care provider, you can also send messages to your care team and make appointments. If you have questions, please call your primary care clinic.   If you do not have a primary care provider, please call [phone] and they will assist you.      LearnUp is an electronic gateway that provides easy, online access to your medical records. With LearnUp, you can request a clinic appointment, read your test results, renew a prescription or communicate with your care team.     To access your existing account, please contact your [facility] or call [phone] for assistance.        Care EveryWhere ID     This is your Care EveryWhere ID. This could be used by other organizations to access your [place] medical records  [id number]        Your Vitals Were     Pulse Pulse Oximetry                78 98%           Blood Pressure from Last 3 Encounters:   01/30/18 92/61   01/08/18 99/67   12/04/17 93/63    Weight from Last 3 Encounters:   01/08/18 74.8 kg (165 lb)   12/04/17 77.6 kg (171 lb)   11/13/17 77.6 kg (171 lb)              We Performed the Following     Creatinine POCT     LYMPHEDEMA THERAPY REFERRAL     TRIM NONDYSTRPHIC NAIL(S)          Today's Medication Changes          These changes are accurate as of 1/30/18  3:34 PM.  If you have any questions, ask your nurse or doctor.               Start taking these medicines.        Dose/Directions    diazepam 10 MG tablet   Commonly known as:  VALIUM   Used for:  Right renal mass   Started by:  Aleisha Sapp MD        Dose:  10 mg   Take 1 tablet (10 mg) by mouth every 6 hours as needed for anxiety or sleep Take 30-60 minutes before procedure.  Do not operate a vehicle after taking this medication.   Quantity:  1 tablet   Refills:  0            Where to get your medicines      Some of these will need a paper prescription and others can be bought over the counter.  Ask your nurse if you have questions.     You don't need a prescription for these medications     diazepam 10 MG tablet                Primary Care Provider Office Phone # Fax #    Ángel Oates [phone]  [phone]       [address] [facility] [address]        Equal Access to Services     OLUMANUELA PATRICIA : Hadii philip townsend haddionisioryder Soluis, waaxda luqadaha, qaybta kaalmada jaclyn, mikey yusefin hayaajulien troncosokye peguerogeoff hale. So [facility] [phone].    ATENCIÓN: Si habla español, tiene a mayorga disposición servicios gratuitos de asistencia lingüística. Carmeloame al [phone].    We comply with applicable federal civil rights laws and [place] laws. We do not discriminate on the basis of race, color, national origin, age, disability, sex, sexual orientation, or gender identity.            Thank you!     Thank you for choosing [facility]  for your care. Our goal is always to provide you with excellent care. Hearing back from our patients is one way we can continue to improve our services. Please take a few minutes to complete the written survey that you may receive in the mail after your visit with us. Thank you!             Your Updated Medication List - Protect others around you: Learn how to safely use, store and throw away your medicines at www.disposemymeds.org.          This list is accurate as of 1/30/18  3:34 PM.  Always use your most recent med list.                   Brand Name Dispense Instructions for use Diagnosis    acetaminophen 500 MG tablet    TYLENOL     Take 1,000 mg by mouth 3 times daily as needed for mild pain or pain        atorvastatin 80 MG tablet    LIPITOR    90 tablet    Take 1 tablet (80 mg) by mouth daily    Paroxysmal atrial fibrillation (H), Other hyperlipidemia       * bumetanide 1 MG tablet    BUMEX     Take mg in am and 1mg in pm        * bumetanide 1 MG tablet    BUMEX    180 tablet    1 mg in am then 0.5 mg at supper but add additional 1 mg if weight over 165    Cardiac amyloidosis (H), Chronic combined systolic and diastolic congestive heart failure (H)       clotrimazole 1 % cream    LOTRIMIN    60 g    Apply topically 2 times daily In between toes    Tinea pedis of  left foot       diazepam 10 MG tablet    VALIUM    1 tablet    Take 1 tablet (10 mg) by mouth every 6 hours as needed for anxiety or sleep Take 30-60 minutes before procedure.  Do not operate a vehicle after taking this medication.    Right renal mass       diclofenac 1 % Gel topical gel    VOLTAREN    100 g    Apply to right great toe.    Pain of toe of right foot       escitalopram 5 MG tablet    LEXAPRO    90 tablet    Take 1 tablet (5 mg) by mouth daily    Major depressive disorder, single episode, mild (H)       lidocaine HCl 3 % cream     85 g    To painful areas on left foot twice daily as needed.    Left foot pain, Peroneal tendinitis, left       * order for DME     5 each    Equipment being ordered: DME compression stockings knee high 30 mm hg    Venous (peripheral) insufficiency       * order for DME     1 Units    Sequential pneumatic compression pumps 2-3 times per day as needed for lymphedema. Measure and fit.  Compression strength per protocol.    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, Abnormality of gait, At risk for fall due to comorbid condition       polyethylene glycol powder    MIRALAX    119 g    Take 17 g (1 capful) by mouth daily as needed for constipation    Other constipation       Potassium Chloride ER 20 MEQ Tbcr     90 tablet    Take 1 tablet (20 mEq) by mouth daily    Paroxysmal atrial fibrillation (H), Chronic diastolic congestive heart failure (H)       warfarin 5 MG tablet    COUMADIN    110 tablet    Take 1-1.5 tablets (5-7.5 mg) by mouth daily Adjust per INR clinic     Paroxysmal atrial fibrillation (H)       * Notice:  This list has 5 medication(s) that are the same as other medications prescribed for you. Read the directions carefully, and ask your doctor or other care provider to review them with you.

## 2018-01-30 NOTE — LETTER
1/30/2018         RE: Josr Landers  [id number]  [facility] [address]        Dear Colleague,    Thank you for referring your patient, Josr Landers, to the [facility]. Please see a copy of my visit note below.    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     Pain in limb     Status post lumbar surgery     Adjustment disorder     Idiopathic peripheral neuropathy     SOB (shortness of breath)     CHF (congestive heart failure) (H)     Depression     Malignant neoplasm of kidney excluding renal pelvis (H)     Cerebrovascular accident (CVA) (H)     Other amyloidosis     Anemia     Atrial flutter (H)     Cardiomyopathy (H)     Chronic kidney disease, stage III (moderate)     Coronary arteriosclerosis in native artery     Weakness     Hypotension     Pain of foot     Long term current use of anticoagulant therapy     Peripheral neuropathic pain     Myocardial infarction, nontransmural (H)     Nonsustained ventricular tachycardia (H)     CKD (chronic kidney disease) stage 3, GFR 30-59 ml/min     Tinea pedis of left foot     Onychomycosis     Nail deformity     Elevated blood uric acid level     Paroxysmal atrial fibrillation (H)     Slow transit constipation     Past Surgical History:   Procedure Laterality Date     BACK SURGERY       H PENUMBRA SEPARATOR 3D       KNEE SURGERY       [place] PATIENT HAS A CORONARY ARTERY STENT       SHOULDER SURGERY       Social History     Social  History     Marital status:      Spouse name: N/A     Number of children: N/A     Years of education: N/A     Occupational History     Not on file.     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      to his wife Nu ( Vikash), two children Deandra 1969 (Grandchildren Trinh 2003, Nish 2007) and Son [id number] YISEL Landers III     No family history on file.  SUBJECTIVE FINDINGS: A 77-year-old male returns to clinic for onychomycosis.  He relates that he had seen lymphedema and he is getting lymphedema and swelling again.  They have over-the-counter compression socks.  They got some prescription ones but they cannot get them on.  They are too tight.  Otherwise, he is doing well.      OBJECTIVE FINDINGS: DP and PT are 1/4.  He has peripheral edema.  He has decreased hair growth bilaterally.  He has incurvated nails with some thickening, dystrophy and discoloration to differing degrees 1 through 5 bilaterally.  He has contracted digits bilaterally.      ASSESSMENT AND PLAN: Onychauxis with onychomycosis bilaterally.  He has got peripheral edema with lymphedema.  Diagnosis and treatment discussed with him.  He has idiopathic peripheral neuropathy as well and some chronic kidney disease and long-term use of anticoagulants.  All the nails were reduced bilaterally upon consent.  I gave him a referral back to Lymphedema Clinic.  He will call with the prescription for his compression socks.  We will see if we can get him some lighter compression  ones.  He is using some over-the-counter ones now and they are not controlling his edema.  He relates the Lymphedema Clinic worked previously, but it is hard to get there and the swelling came back when he was done with it.         Again, thank you for allowing me to participate in the care of your patient.        Sincerely,        Gideon Grimm DPM

## 2018-02-02 NOTE — TELEPHONE ENCOUNTER
"Regarding: [place] // Patient's wife requests [place] Stair Lift  Patient's wife requests an \"[place] Stair Lift\", through insurance, as patient had a stroke and is unable to go up 13 stairs without assistance.  Please follow up with patient's wife Nu at [phone]. Ok to leave message.        February 2, 2018 10:25 AM  Spoke with Nu. She said that patient has fallen twice in the past week, and is unable safely climb the stairs in his house. She would like to get an stairlift for him to use. Requesting prescription for this.   Antonella Parada RN, Care Coordinator    "

## 2018-02-03 NOTE — TELEPHONE ENCOUNTER
He has a history of chronic kidney disease, coronary artery disease involving the native coronary artery disease and had an acute CVA in 02/12/2017 due to occlusion of the distal right M1 segment of middle cerebral artery, status post recanalization with Penumbra ACE 68 device.   He is at risk of falls due to stroke and deconditioning. He has been working with therapists. He uses wheelchair and assistive devices.  His home has stairs. He would benefit from an [place] stair chair lift as he has difficulty climbing stairs, required in his current living situation.  Ángel Otaes MD

## 2018-02-07 NOTE — NURSING NOTE
"Chief Complaint   Patient presents with     RECHECK     Ckd stage 3 follow up       Initial /80  Pulse 78  Temp 97.8  F (36.6  C) (Oral)  Ht 1.753 m (5' 9\")  Wt 76.1 kg (167 lb 11.2 oz)  SpO2 99%  BMI 24.76 kg/m2 Estimated body mass index is 24.76 kg/(m^2) as calculated from the following:    Height as of this encounter: 1.753 m (5' 9\").    Weight as of this encounter: 76.1 kg (167 lb 11.2 oz).  Medication Reconciliation: complete   LOKESH GIRON CMA      "

## 2018-02-07 NOTE — MR AVS SNAPSHOT
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      Your next 10 appointments already scheduled     Feb 13, 2018  2:30 PM CST   Lymphedema Evaluation with Erin Romo OT   [facility] ([facility])    [address]  [address]  [facility] [address]   [phone]            Apr 09, 2018  2:00 PM CDT   (Arrive by 1:45 PM)   Return Visit with Ángel Oates MD   [facility] ([facility]    [address]  4th Floor  [facility] [id number] [phone]            Aug 01, 2018 11:00 AM CDT   Lab with  LAB   [facility] ([facility])    [address] [place] [address]  1st Floor  [facility] [id number] [phone]            Aug 01, 2018 12:00 PM CDT   (Arrive by 11:30 AM)   Return Visit with Jonathan Turner MD   [facility] ([facility])    [address] [place] [address]  [address]  [facility] [id number] [phone]              Who to contact     If you have questions or need follow up information about today's clinic visit or your schedule please contact [facility] NEPHROLOG[facility] directly at [phone].  Normal or non-critical lab and imaging results will be communicated to  you by MyChart, letter or phone within 4 business days after the clinic has received the results. If you do not hear from us within 7 days, please contact the clinic through [employer] or phone. If you have a critical or abnormal lab result, we will notify you by phone as soon as possible.  Submit refill requests through [employer] or call your pharmacy and they will forward the refill request to us. Please allow 3 business days for your refill to be completed.          Additional Information About Your Visit        [employer] Information     [employer] gives you secure access to your electronic health record. If you see a primary care provider, you can also send messages to your care team and make appointments. If you have questions, please call your primary care clinic.  If you do not have a primary care provider, please call [phone] and they will assist you.        Care EveryWhere ID     This is your Care EveryWhere ID. This could be used by other organizations to access your [facility] medical records  [id number]         Blood Pressure from Last 3 Encounters:   01/30/18 92/61   01/08/18 99/67   12/04/17 93/63    Weight from Last 3 Encounters:   01/08/18 74.8 kg (165 lb)   12/04/17 77.6 kg (171 lb)   11/13/17 77.6 kg (171 lb)              Today, you had the following     No orders found for display       Primary Care Provider Office Phone # Fax #    Ángel Oates [phone] [address] [address]        Equal Access to Services     MANUELA GOMEZ : Hadii aad ku hadasho Soomaali, waaxda luqadaha, qaybta kaalmada hardeepyada, mikey han . So [facility] [phone].    ATENCIÓN: Si habla español, tiene a mayorga disposición servicios gratuitos de asistencia lingüística. Llame al [phone].    We comply with applicable federal civil rights laws and [place] laws. We do not discriminate on the basis of race, color, national origin, age, disability, sex, sexual  orientation, or gender identity.            Thank you!     Thank you for choosing [facility] NE[facility]OLOG[facility]  for your care. Our goal is always to provide you with excellent care. Hearing back from our patients is one way we can continue to improve our services. Please take a few minutes to complete the written survey that you may receive in the mail after your visit with us. Thank you!             Your Updated Medication List - Protect others around you: Learn how to safely use, store and throw away your medicines at www.disposemymeds.org.          This list is accurate as of 2/7/18  1:06 PM.  Always use your most recent med list.                   Brand Name Dispense Instructions for use Diagnosis    acetaminophen 500 MG tablet    TYLENOL     Take 1,000 mg by mouth 3 times daily as needed for mild pain or pain        atorvastatin 80 MG tablet    LIPITOR    90 tablet    Take 1 tablet (80 mg) by mouth daily    Paroxysmal atrial fibrillation (H), Other hyperlipidemia       * bumetanide 1 MG tablet    BUMEX     Take mg in am and 1mg in pm        * bumetanide 1 MG tablet    BUMEX    180 tablet    1 mg in am then 0.5 mg at supper but add additional 1 mg if weight over 165    Cardiac amyloidosis (H), Chronic combined systolic and diastolic congestive heart failure (H)       clotrimazole 1 % cream    LOTRIMIN    60 g    Apply topically 2 times daily In between toes    Tinea pedis of left foot       diazepam 10 MG tablet    VALIUM    1 tablet    Take 1 tablet (10 mg) by mouth every 6 hours as needed for anxiety or sleep Take 30-60 minutes before procedure.  Do not operate a vehicle after taking this medication.    Right renal mass       diclofenac 1 % Gel topical gel    VOLTAREN    100 g    Apply to right great toe.    Pain of toe of right foot       escitalopram 5 MG tablet    LEXAPRO    90 tablet    Take 1 tablet (5 mg) by mouth daily    Major depressive disorder, single episode, mild (H)       lidocaine HCl 3 % cream     85 g     To painful areas on left foot twice daily as needed.    Left foot pain, Peroneal tendinitis, left       * order for DME     5 each    Equipment being ordered: DME compression stockings knee high 30 mm hg    Venous (peripheral) insufficiency       * order for DME     1 Units    Sequential pneumatic compression pumps 2-3 times per day as needed for lymphedema. Measure and fit.  Compression strength per protocol.    Lymphedema       * order for DME     1 Device    Equipment being ordered: Bedside commode    Weakness, Slow transit constipation       * order for DME     1 each    Equipment being ordered: Stair Lift  Length of need: Lifetime Chronic kidney disease, coronary artery disease, acute CVA  02/12/2017 distal right M1 segment of middle cerebral artery.At risk of falls due to stroke and deconditioning. He is working with therapists, uses wheelchair and assistive devices.  His home has stairs, would benefit  from [place] stair chair lift as he has difficulty climbing stairs required in home.    Cerebrovascular accident (CVA) due to thrombosis of right middle cerebral artery (H), Peripheral neuropathic pain, Pain in both feet, Cardiac amyloidosis (H), Combined systolic and diastolic congestive heart failure, unspecified congestive heart failure chronicity (H), Chronic kidney disease, stage III (moderate), Weakness, Long term current use of anticoagulant therapy       other medical supplies     2 packet    JENIFER stocking bilateral lower extremities    Edema of left lower extremity, Chronic diastolic congestive heart failure (H), Idiopathic peripheral neuropathy, Cardiac amyloidosis (H), Paroxysmal atrial fibrillation (H), 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, Abnormality of gait, At risk for fall due to comorbid condition       polyethylene glycol powder    MIRALAX    119 g    Take 17 g (1 capful) by mouth daily as needed for constipation    Other constipation       Potassium Chloride ER  20 MEQ Tbcr     90 tablet    Take 1 tablet (20 mEq) by mouth daily    Paroxysmal atrial fibrillation (H), Chronic diastolic congestive heart failure (H)       warfarin 5 MG tablet    COUMADIN    110 tablet    Take 1-1.5 tablets (5-7.5 mg) by mouth daily Adjust per INR clinic    Paroxysmal atrial fibrillation (H)       * Notice:  This list has 6 medication(s) that are the same as other medications prescribed for you. Read the directions carefully, and ask your doctor or other care provider to review them with you.

## 2018-02-07 NOTE — PATIENT INSTRUCTIONS
Change bumex to 2mg (2 pills) in AM and 1mg (1 pill) in afternoon  If swelling persists, ok to increase bumex up to 4mg (4 pills) in AM and 4mg (4 pills) in afternoon but do so slowly

## 2018-02-07 NOTE — LETTER
2/7/2018      RE: Josr Landers  [address] [place] [address]       Nephrology follow up    78yo aaM here for follow up of CKD. No hospitalizations or significant illness since last visit in Aug 2017. He has persistent L foot pain and occasional difficulty getting up stairs but is otherwise well. He denies CP, nausea, vomiting, dysuria, hematuria, GARCIA. 4pt ROS negative.    PMHx  CKD - with LOIS in Feb 2013  CAD s/p stent  R RCC s/p ablation in 2005  HF - cardiac amyloid  Afib  Gout  Chronic L foot pain  No DM, hepatitis, Joshua    Soc Hx  Former  [facility] [employer]    All - gabapentin/lyrica  Medications reviewed, of note  bumex 1mg AM, 2mg PM  Coumadin  KCl 20mEq daily  Atorvastatin 80mg daily  No NSAIDs    Exam   133/80   123/74   124/80   (~130/78)  Gen - nad  CV - rrr, no rub, +S4  Resp - cta bilat  Ext - 2-3+ edema  Neuro - strength 5/5 throughout  Psych - pleasant, appropriate      Labs   2005 2013 2014 2015 2016 2017 2018 7/8 2/14 2/20 7/20 6/7 6/23 8/23 2/7  Cr 1.2 1.3 1.6 1.9 2 2.2 2.1  2.5 (eGFR 31)    11/13/18 - nl SPEP, UPEP, kappa/lambda ratio  8/23/17 - UACR 205   UPCR 0.76   UA neg alb, small blood, 1RBC  4/25/17 - uric acid 4.3    7/20/15 MRI - mass R kidney, multiple simple cysts  8/23/17 MRI - complex upper pole R renal lesion, multiple simple cysts  Jan 2018 MRI - complex R mass, stable in size; hemorrhagic/proteinaceous cyst on L    A/Rec: 78yo aaM with CKD  CKD - stage 3b with proteinuria. Possible etiologies include cardiorenal syndrome with LOIS in Feb 2013 during decompensated HF with steady decline since; amyloid given known cardiac amyloid; renovascular disease given history of CAD and stroke; and NSAID associated as he was taking NSAIDs up until June 2015. At this point, will monitor. Unlikely that a biopsy would be revealing given slow decline, minimal to no hematuria and low level or proteinuria.   - continue to avoid NSAIDs    Electrolytes -  acceptable    Acid-base - bicarb nl    Renal mass - follow up with urology     Volume/BP - overloaded, BP acceptable. Instructed patient and wife that he can stepwise increase his bumex to 4mg BID if swelling/edema persist. Communicated that they should do it 1mg/day at a time and go back to prior dose if lightheadedness or low BP develop.    RTC in 6 months for renal panel, CBC, BP check    Jonathan Turner MD

## 2018-02-07 NOTE — PROGRESS NOTES
Nephrology follow up    76yo aaM here for follow up of CKD. No hospitalizations or significant illness since last visit in Aug 2017. He has persistent L foot pain and occasional difficulty getting up stairs but is otherwise well. He denies CP, nausea, vomiting, dysuria, hematuria, GARCIA. 4pt ROS negative.    PMHx  CKD - with LOIS in Feb 2013  CAD s/p stent  R RCC s/p ablation in 2005  HF - cardiac amyloid  Afib  Gout  Chronic L foot pain  No DM, hepatitis, Joshua    Soc Hx  Former  [facility] [employer]    All - gabapentin/lyrica  Medications reviewed, of note  bumex 1mg AM, 2mg PM  Coumadin  KCl 20mEq daily  Atorvastatin 80mg daily  No NSAIDs    Exam   133/80   123/74   124/80   (~130/78)  Gen - nad  CV - rrr, no rub, +S4  Resp - cta bilat  Ext - 2-3+ edema  Neuro - strength 5/5 throughout  Psych - pleasant, appropriate      Labs   2005 2013 2014 2015 2016 2017 2018 7/8 2/14 2/20 7/20 6/7 6/23 8/23 2/7  Cr 1.2 1.3 1.6 1.9 2 2.2 2.1  2.5 (eGFR 31)    11/13/18 - nl SPEP, UPEP, kappa/lambda ratio  8/23/17 - UACR 205   UPCR 0.76   UA neg alb, small blood, 1RBC  4/25/17 - uric acid 4.3    7/20/15 MRI - mass R kidney, multiple simple cysts  8/23/17 MRI - complex upper pole R renal lesion, multiple simple cysts  Jan 2018 MRI - complex R mass, stable in size; hemorrhagic/proteinaceous cyst on L    A/Rec: 76yo aaM with CKD  CKD - stage 3b with proteinuria. Possible etiologies include cardiorenal syndrome with LOIS in Feb 2013 during decompensated HF with steady decline since; amyloid given known cardiac amyloid; renovascular disease given history of CAD and stroke; and NSAID associated as he was taking NSAIDs up until June 2015. At this point, will monitor. Unlikely that a biopsy would be revealing given slow decline, minimal to no hematuria and low level or proteinuria.   - continue to avoid NSAIDs    Electrolytes - acceptable    Acid-base - bicarb nl    Renal mass - follow up with urology     Volume/BP - overloaded,  BP acceptable. Instructed patient and wife that he can stepwise increase his bumex to 4mg BID if swelling/edema persist. Communicated that they should do it 1mg/day at a time and go back to prior dose if lightheadedness or low BP develop.    RTC in 6 months for renal panel, CBC, BP check

## 2018-02-16 NOTE — TELEPHONE ENCOUNTER
Regarding: Dr Oates/[place] stair chair lift   Contact: [phone]  Per call from PT wife is requesting a call back for updates on requests of [place] stair chair lift for the PT.    February 16, 2018 1:14 PM  Spoke with patient's wife. Will fax prescription for stair lift and Dr. Oates's office note from 1/8/18 to [facility].   Antonella Parada RN, Care Coordinator

## 2018-02-27 NOTE — TELEPHONE ENCOUNTER
[phone] (home)   I reviewed increase Lexapro to 10 mg daily by taking two 5 mg tabs at one time and contact me next Monday with how he is doing. If stomach side effects will lower dose back to 5 mg daily.   Ángel Oates MD

## 2018-02-27 NOTE — TELEPHONE ENCOUNTER
Regarding: Medication questions  Contact: [phone]  Pt's wife called. She is requesting a call back to discuss new medication. They are requesting a call back at [phone].    February 27, 2018 2:54 PM  Spoke with patient and his wife, Nu. He is experiencing increased symptoms of depression. Currently taking Lexapro 5mg daily. Had been on 10mg daily previously, but had some stomach issues with that, and decreased dose to the 5mg dose. They are wondering about either increasing dose again, or if he should try a different medication.   Antonella Parada RN, Care Coordinator

## 2018-03-08 NOTE — TELEPHONE ENCOUNTER
Call from patient's wife, Nu. She has noted increased left leg weakness over the past 2 days. She states that the leg is weak at baseline due to a previous stroke that he had, but seems increased for the past 2 days. He did have a cortisone shot in the left foot 2 days ago, but said she noticed the increased weakness that morning, prior to the injection. There is no other unusual weakness noted; no speech problems; no other signs concerning for stroke.     Per Nu, patient was discharged from Physical Therapy about 1 month ago, and he hasn't been walking very much since then, which may be contributing. In addition, he is more short of breath with activity.     Recommended that patient be seen at [facility] for evaluation. Dr. Oates doesn't have any appointments available in the next 2 weeks, so recommended appointment with any provider in [facility]. Call transferred to scheduling.

## 2018-03-13 NOTE — PROGRESS NOTES
"[place] [facility]  PRIMARY CARE PROVIDER AND CLINIC:  Ángel Oates [address] [facility] / [facility] [address]  Chief Complaint   Patient presents with     Hospital F/U       HPI:    Josr Landers is a 77 year old  (1940), PMH of amyloid, CAD, chronic combined systolic and diastolic heart failure, CKD stage 4, acute CVA 2/2017, presented with generalized weakness, confusion, increased anasarca. admitted to the [facility] from [facility].  Hospital stay 3/9/18 through 3/12/18.   Amyloid/CHF/CKD stage 4: Echo with EF of 30%, severe concentric wall thickening LVH, severe mitral regurgitation, moderate tricuspid regurg, followed by nephrology at [facility], gradually worsening renal fx previous baseline creat 2.45, creat on admission was 3.12: continued PTA bumex no increase to preserve kidney function, palliative care consult recommended but patient and family not ready wife stating \"there must be something more that can be done\" Nephrology consulted, no dialysis at this time.   Encephalopathy: patient cleared had a recent increase in lexapro, lexapro held  PAF: on AC coumadin, INR 2.6, rate controlled,  Admitted to this facility for  rehab, medical management and nursing care.  HPI information obtained from: facility chart records, facility staff, patient report and [facility] chart review.  Current issues are:   On exam today patient is resting in bed, irritable, does not like to be interviewed, daughter is at bedside. Patient states pain in left foot is ongoing, does not feel he had much relief from injection to foot last week, Denies CP, SOB, palpitations, denies GARCIA, SOB during the night, N/V/D or constipation, states he had a BM yesterday, states he slept well last night and appetite is good, no other concerns at this time.   Daughter followed me out into hallway to speak with me about her dad, states he is more fatigued and tired today than he was yesterday, did discuss " end stage cardiac disease, CKD, progression of disease, vitals and labs will be followed here in TCU, will continue to monitor.      Last 3 BPs: 111/74, 100/67, 112/71 mmHg  HR Ranges: 75-80 bpm    CODE STATUS/ADVANCE DIRECTIVES DISCUSSION:   CPR/Full code   Patient's living condition: lives with spouse    ALLERGIES:Gabapentin and Lyrica  PAST MEDICAL HISTORY:  has a past medical history of Atrial fibrillation (H) (6/25/2012); Atrial fibrillation: ablation Dr Jonathan Arvizu/[place] (6/25/2012); Cardiac amyloid: ATTR per cardiac biopsy [facility] 2009 (6/27/2012); Coronary artery disease; Other and unspecified hyperlipidemia (6/27/2012); Tubular adenoma of colon (2010); and Venous insufficiency.  PAST SURGICAL HISTORY:  has a past surgical history that includes back surgery; knee surgery; shoulder surgery; pr patient has a coronary artery stent; and h penumbra separator 3d.  FAMILY HISTORY: family history is not on file.  SOCIAL HISTORY:  reports that he quit smoking about 41 years ago. He has never used smokeless tobacco. He reports that he does not drink alcohol or use illicit drugs.    Post Discharge Medication Reconciliation Status: discharge medications reconciled, continue medications without change.  Current Outpatient Prescriptions   Medication Sig Dispense Refill     Warfarin Therapy Reminder 1 each continuous prn       HYALURONATE SODIUM 0.1 % LOTN Externally apply topically as needed       order for DME Equipment being ordered: Stair Lift   Length of need: Lifetime  Chronic kidney disease, coronary artery disease, acute CVA  02/12/2017 distal right M1 segment of middle cerebral artery.At risk of falls due to stroke and deconditioning. He is working with therapists, uses wheelchair and assistive devices.  His home has stairs, would benefit  from [place] stair chair lift as he has difficulty climbing stairs required in home. 1 each 0     acetaminophen (TYLENOL) 500 MG tablet Take 650 mg by mouth every 4 hours as  needed for mild pain or pain        order for DME Equipment being ordered: Bedside commode 1 Device 0     Potassium Chloride ER 20 MEQ TBCR Take 1 tablet (20 mEq) by mouth daily 90 tablet 1     atorvastatin (LIPITOR) 80 MG tablet Take 1 tablet (80 mg) by mouth daily 90 tablet 3     lidocaine HCl 3 % cream To painful areas on left foot twice daily as needed. 85 g 3     bumetanide (BUMEX) 1 MG tablet Take mg in am and 1mg in pm       order for DME Sequential pneumatic compression pumps 2-3 times per day as needed for lymphedema. Measure and fit.  Compression strength per protocol. 1 Units 0     order for DME Equipment being ordered: DME compression stockings knee high 30 mm hg 5 each 3     other medical supplies JENIFER stocking bilateral lower extremities 2 packet 3     [DISCONTINUED] bumetanide (BUMEX) 1 MG tablet 1 mg in am then 0.5 mg at supper but add additional 1 mg if weight over 165 (Patient not taking: Reported on 3/13/2018) 180 tablet 1       ROS:  10 point ROS of systems including Constitutional, Eyes, Respiratory, Cardiovascular, Gastroenterology, Genitourinary, Integumentary, Muscularskeletal, Psychiatric were all negative except for pertinent positives noted in my HPI.    Exam:  /74  Pulse 80  Temp 97  F (36.1  C)  Resp 18  Wt 167 lb 12.8 oz (76.1 kg)  SpO2 95%  BMI 24.78 kg/m2  GENERAL APPEARANCE:  Alert, in no distress  ENT:  Mouth and posterior oropharynx normal, moist mucous membranes, [place]  EYES:  EOM, conjunctivae, lids, pupils and irises normal, PERRL  RESP:  respiratory effort and palpation of chest normal, lungs clear to auscultation , no respiratory distress  CV:  Palpation and auscultation of heart done , regular rate and rhythm, no murmur, rub, or gallop, peripheral edema 2+ in LE bilaterally L>R  ABDOMEN:  normal bowel sounds, soft, nontender, no hepatosplenomegaly or other masses  M/S:   Examination of:   right upper extremity, left upper extremity, right lower extremity and left  lower extremity  Inspection, ROM, stability and muscle strength normal and generalized weakness noted  SKIN:  Inspection of skin and subcutaneous tissue baseline  NEURO:   Cranial nerves 2-12 are normal tested and grossly at patient's baseline, speech WNL  PSYCH:  irritable    Lab/Diagnostic data:  Complete Blood Count-W/Diff (03/10/2018 5:42 AM)  Complete Blood Count-W/Diff (03/10/2018 5:42 AM)   Component Value Ref Range   White Blood Cell Count 4.4 3.8 - 11.0 k/cmm   Red Blood Cell Count 3.93 (L) 4.20 - 5.90 m/cmm   Hemoglobin 13.0 (L) 13.4 - 17.5 g/dL   Hematocrit 37.3 (L) 39.0 - 51.0 %   Mean Corpuscular Volume 94.9 80.0 - 100.0 fL   RDW 15.2 (H) 11.0 - 15.0 %   Platelet Count 91 (L) 140 - 450 k/cmm         Basic Metabolic Panel (03/12/2018 6:10 AM)  Basic Metabolic Panel (03/12/2018 6:10 AM)   Component Value Ref Range   Creatinine Serum 2.80 (H) 0.73 - 1.18 mg/dL   Lab Glucose 113 (H)  Comment:   The stated glucose range is for the fasting state.  Non-fasting glucose range is  mg/dL     70 - 100 mg/dL   CO2 31 22 - 31 mmol/L   Chloride 97 (L) 98 - 109 mmol/L   Potassium 4.0 3.5 - 5.2 mmol/L   Sodium 137 136 - 145 mmol/L   Blood Urea Nitrogen 83 (H) 9 - 26 mg/dL   Calcium 9.4 8.4 - 10.2 mg/dL       Liver Function Studies -   Recent Labs   Lab Test  02/07/18   1203  08/23/17   1209  04/25/17   1344  06/07/16   1138   PROTTOTAL   --    --   6.9  6.7*   ALBUMIN  3.5  3.4  3.6  3.5   BILITOTAL   --    --   2.0*  1.0   ALKPHOS   --    --   71  52   AST   --    --   23  16   ALT   --    --   34  21       TSH   Date Value Ref Range Status   11/13/2017 1.95 0.40 - 4.00 mU/L Final   04/25/2017 1.45 0.40 - 4.00 mU/L Final       Lab Results   Component Value Date    A1C 6.4 11/13/2017       ASSESSMENT/PLAN:  Cardiac amyloid: ATTR per cardiac biopsy [facility] 2009  Acute on chronic combined systolic and diastolic congestive heart failure (H)  End stage/ongoing: daily weights, vitals daily, BMP Twice weekly, bumex  1mg BID, KCL 20meq QD, lipitor 80mg qhs    Paroxysmal atrial fibrillation (H)  Ongoing: vitals daily and prn, BMP follow, continue coumadin AC INR goal of 2-3    CKD (chronic kidney disease) stage 4, GFR 15-29 ml/min (H)  Ongoing: followed by nephrology, creat 2.45-3, BMP twice weekly    Physical deconditioning  Acute/ongoing: PT and OT for strengthening    Left foot pain  Ongoing: tylenol 650mg schedule TID and continue q 6 hours prn       Orders:  BMP twice weekly  Hgb weekly  Schedule tylenol 650mg TID and continue 650mg q 6 hours prn for pain,   Daily weights        Electronically signed by:  Tonya Lynn Haase, APRN CNP

## 2018-03-18 NOTE — PROGRESS NOTES
[place] [facility]  PRIMARY CARE PROVIDER AND CLINIC:  Ángel Oates [address] [facility] [address]      Pt was seen by Dr Milligan on 3/17/18 for an initial TCU visit    HPI:      Pt is a 77 year old  (1940), PMH of amyloid, CAD, combined systolic and diastolic heart failure, CKD stage 4, acute CVA 2/2017 who was hospitalized at [facility] 3/9/18-3/12/18 for the management of CHF    Pt presented with generalized weakness, confusion, increased edema   Echo with EF of 30%, severe concentric wall thickening LVH, severe mitral regurgitation, moderate tricuspid regurgitation  Pt was maintained on PTA bumex. Confusion felt to be related to lexapro, which was subsequently d/dat.  He is followed by nephrology at [facility],    Pt states he feels fatigued, denies any other specific physical concerns  He is dyspneic with minimal activity, which is baseline  He notes B LE edema is at baseline  He denies chest pain, cough or abd pain  Appetite has been good    CODE STATUS/ADVANCE DIRECTIVES DISCUSSION:   CPR/Full code   Patient's living condition: lives with spouse    ALLERGIES:Gabapentin and Lyrica  PAST MEDICAL HISTORY:  has a past medical history of Atrial fibrillation (H) (6/25/2012); Atrial fibrillation: ablation Dr Jonathan Arvizu/[place] (6/25/2012); Cardiac amyloid: ATTR per cardiac biopsy [facility] 2009 (6/27/2012); Coronary artery disease; Other and unspecified hyperlipidemia (6/27/2012); Tubular adenoma of colon (2010); and Venous insufficiency.  PAST SURGICAL HISTORY:  has a past surgical history that includes back surgery; knee surgery; shoulder surgery; pr patient has a coronary artery stent; and h penumbra separator 3d.  FAMILY HISTORY: family history is not on file.  SOCIAL HISTORY:  reports that he quit smoking about 41 years ago. He has never used smokeless tobacco. He reports that he does not drink alcohol or use illicit drugs.          Post Discharge Medication Reconciliation  Status:   .  Current Outpatient Prescriptions   Medication Sig Dispense Refill     Warfarin Therapy Reminder 1 each continuous prn       HYALURONATE SODIUM 0.1 % LOTN Externally apply topically as needed       order for DME Equipment being ordered: Stair Lift   Length of need: Lifetime  Chronic kidney disease, coronary artery disease, acute CVA  02/12/2017 distal right M1 segment of middle cerebral artery.At risk of falls due to stroke and deconditioning. He is working with therapists, uses wheelchair and assistive devices.  His home has stairs, would benefit  from [place] stair chair lift as he has difficulty climbing stairs required in home. 1 each 0     acetaminophen (TYLENOL) 500 MG tablet Take 650 mg by mouth every 4 hours as needed for mild pain or pain        order for DME Equipment being ordered: Bedside commode 1 Device 0     Potassium Chloride ER 20 MEQ TBCR Take 1 tablet (20 mEq) by mouth daily 90 tablet 1     atorvastatin (LIPITOR) 80 MG tablet Take 1 tablet (80 mg) by mouth daily 90 tablet 3     lidocaine HCl 3 % cream To painful areas on left foot twice daily as needed. 85 g 3     bumetanide (BUMEX) 1 MG tablet Take mg in am and 1mg in pm       order for DME Sequential pneumatic compression pumps 2-3 times per day as needed for lymphedema. Measure and fit.  Compression strength per protocol. 1 Units 0     order for DME Equipment being ordered: DME compression stockings knee high 30 mm hg 5 each 3     other medical supplies JENIFER stocking bilateral lower extremities 2 packet 3       ROS:  10 point ROS neg except as noted above.    Exam:    GENERAL APPEARANCE:  Alert, in no distress, lying on back in bed, RR 12, unlabored  ENT:  Mouth and posterior oropharynx normal, moist mucous membranes  EYES:  EOM, conjunctivae, lids, pupils and irises normal, PERRL  RESP:  Lungs clear  CV:  RRR no M  ABDOMEN: soft, non-tender, non-distended  M/S:   1 + LE edema B, no calf tenderness  SKIN:  Inspection of skin and  subcutaneous tissue baseline  NEURO:   Cranial nerves 2-12 are normal tested and grossly at patient's baseline, speech WNL  Gait was not assessed  PSYCH:  Pt is alert, fully oriented, pleasant this am    Lab/Diagnostic data:  Complete Blood Count-W/Diff (03/10/2018 5:42 AM)  Complete Blood Count-W/Diff (03/10/2018 5:42 AM)   Component Value Ref Range   White Blood Cell Count 4.4 3.8 - 11.0 k/cmm   Red Blood Cell Count 3.93 (L) 4.20 - 5.90 m/cmm   Hemoglobin 13.0 (L) 13.4 - 17.5 g/dL   Hematocrit 37.3 (L) 39.0 - 51.0 %   Mean Corpuscular Volume 94.9 80.0 - 100.0 fL   RDW 15.2 (H) 11.0 - 15.0 %   Platelet Count 91 (L) 140 - 450 k/cmm         Basic Metabolic Panel (03/12/2018 6:10 AM)  Basic Metabolic Panel (03/12/2018 6:10 AM)   Component Value Ref Range   Creatinine Serum 2.80 (H) 0.73 - 1.18 mg/dL   Lab Glucose 113 (H)  Comment:   The stated glucose range is for the fasting state.  Non-fasting glucose range is  mg/dL     70 - 100 mg/dL   CO2 31 22 - 31 mmol/L   Chloride 97 (L) 98 - 109 mmol/L   Potassium 4.0 3.5 - 5.2 mmol/L   Sodium 137 136 - 145 mmol/L   Blood Urea Nitrogen 83 (H) 9 - 26 mg/dL   Calcium 9.4 8.4 - 10.2 mg/dL       Liver Function Studies -   Recent Labs   Lab Test  02/07/18   1203  08/23/17   1209  04/25/17   1344  06/07/16   1138   PROTTOTAL   --    --   6.9  6.7*   ALBUMIN  3.5  3.4  3.6  3.5   BILITOTAL   --    --   2.0*  1.0   ALKPHOS   --    --   71  52   AST   --    --   23  16   ALT   --    --   34  21       TSH   Date Value Ref Range Status   11/13/2017 1.95 0.40 - 4.00 mU/L Final   04/25/2017 1.45 0.40 - 4.00 mU/L Final       Lab Results   Component Value Date    A1C 6.4 11/13/2017       ASSESSMENT/PLAN:    Cardiac amyloid:  Acute on chronic combined systolic and diastolic congestive heart failure (H)  Fatigue likely secondary to CHF with very poor cardiac output  Plan  daily weights, vitals daily, BMP Twice weekly, bumex 1mg BID, KCL 20meq QD, lipitor 80mg qhs    Confusion at time of  hospitalization  History of CVA  [place] secondary to lexapro, improved off lexapro  Plan monitor mental and functional status, continue therapies    Paroxysmal atrial fibrillation (H)  Current rhythm is regular  Plan continue current medications, including chronic warfarin      CKD (chronic kidney disease) stage 4, GFR 15-29 ml/min (H)  Ongoing: followed by nephrology, creat 2.45-3  Monitor BMP      Pierre Milligan MD

## 2018-03-20 NOTE — PROGRESS NOTES
"[place] [facility]    Chief Complaint   Patient presents with     RECHECK       HPI:    Josr Landers is a 77 year old  (1940), who is being seen today for an episodic care visit at [facility].  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 Did explain to wife about cardiac disease combined with CKD and she put her hand up and said \"I already know about all of that\"    CKD see labs  Left foot pain: ongoing, chronic foot pain, no improvement, taking tylenol  Last 3 BPs: 109/74, 110/64, 102/64 mmHg  Atrial Fib: HR Ranges: 73-78 bpm denies CP, palpitations  Admission Weight: 167.8 lbs  Current Weight: 170 lbs    ALLERGIES: Gabapentin and Lyrica  Past Medical, Surgical, Family and Social History reviewed and updated in [facility].    Current Outpatient Prescriptions   Medication Sig Dispense Refill     TRAZODONE HCL PO Give 25 mg by mouth at bedtime for sleep AND Give 25 mg by mouth as needed for may repeat if initial dose not effective until 03/27/2018 23:59       MELATONIN PO Take 3 mg by mouth nightly as needed       Acetaminophen (TYLENOL PO) Take 650 mg by mouth 3 times daily       EMOLLIENT EX Externally apply topically 3 times daily as needed       Warfarin Therapy Reminder 1 each continuous prn       order for DME Equipment being ordered: Stair Lift   Length of need: Lifetime  Chronic kidney disease, coronary artery disease, acute CVA  02/12/2017 distal right M1 segment of middle cerebral artery.At risk of falls due to stroke and deconditioning. He is working with therapists, uses " wheelchair and assistive devices.  His home has stairs, would benefit  from [place] stair chair lift as he has difficulty climbing stairs required in home. 1 each 0     acetaminophen (TYLENOL) 500 MG tablet Take 650 mg by mouth every 6 hours as needed for mild pain or pain        order for DME Equipment being ordered: Bedside commode 1 Device 0     Potassium Chloride ER 20 MEQ TBCR Take 1 tablet (20 mEq) by mouth daily 90 tablet 1     atorvastatin (LIPITOR) 80 MG tablet Take 1 tablet (80 mg) by mouth daily 90 tablet 3     bumetanide (BUMEX) 1 MG tablet Take mg in am and 1mg in pm       order for DME Sequential pneumatic compression pumps 2-3 times per day as needed for lymphedema. Measure and fit.  Compression strength per protocol. 1 Units 0     order for DME Equipment being ordered: DME compression stockings knee high 30 mm hg 5 each 3     other medical supplies JENIFER stocking bilateral lower extremities 2 packet 3     Medications reviewed:  Medications reconciled to facility chart and changes were made to reflect current medications as identified as above med list. Below are the changes that were made:   Medications stopped since last EPIC medication reconciliation:   Medications Discontinued During This Encounter   Medication Reason     HYALURONATE SODIUM 0.1 % LOTN Therapy completed     lidocaine HCl 3 % cream Therapy completed       Medications started since last [facility] medication reconciliation:  Orders Placed This Encounter   Medications     TRAZODONE HCL PO     Sig: Give 25 mg by mouth at bedtime for sleep AND Give 25 mg by mouth as needed for may repeat if initial dose not effective until 03/27/2018 23:59     MELATONIN PO     Sig: Take 3 mg by mouth nightly as needed     Acetaminophen (TYLENOL PO)     Sig: Take 650 mg by mouth 3 times daily     EMOLLIENT EX     Sig: Externally apply topically 3 times daily as needed       REVIEW OF SYSTEMS:  10 point ROS of systems including Constitutional, Eyes, Respiratory,  Cardiovascular, Gastroenterology, Genitourinary, Integumentary, Muscularskeletal, Psychiatric were all negative except for pertinent positives noted in my HPI.    Physical Exam:  /74  Pulse 74  Temp 96.8  F (36  C)  Resp 20  Wt 170 lb (77.1 kg)  SpO2 98%  BMI 25.1 kg/m2  GENERAL APPEARANCE:  Alert, in no distress  ENT:  Mouth and posterior oropharynx normal, moist mucous membranes, [place]  EYES:  EOM, conjunctivae, lids, pupils and irises normal, PERRL  RESP:  respiratory effort and palpation of chest normal, lungs clear to auscultation , no respiratory distress  CV:  Palpation and auscultation of heart done , regular rate and rhythm, no murmur, rub, or gallop, peripheral edema 2+ in LE bilaterally L>R  ABDOMEN:  normal bowel sounds, soft, nontender, no hepatosplenomegaly or other masses  M/S:   Examination of:   right upper extremity, left upper extremity, right lower extremity and left lower extremity  Inspection, ROM, stability and muscle strength normal and generalized weakness noted  SKIN:  Inspection of skin and subcutaneous tissue baseline  NEURO:   Cranial nerves 2-12 are normal tested and grossly at patient's baseline, speech WNL  PSYCH:  affect wnl, calm, cooperative    Recent Labs:   CBC RESULTS:  Recent Labs   Lab Test 03/19/18 02/07/18   1203  08/23/17   1209   WBC   --   3.9*  3.9*   RBC   --   4.18*  4.12*   HGB  13.3*  13.6  13.1*   HCT   --   41.3  38.8*   MCV   --   99  94   MCH   --   32.5  31.8   MCHC   --   32.9  33.8   RDW   --   16.0*  14.7   PLT   --   79*  100*       Last Basic Metabolic Panel:  Recent Labs   Lab Test 03/15/18  02/07/18   1203   NA  134*  136   POTASSIUM  4.1  4.2   CHLORIDE  97*  101   RIGOBERTO  9.3  9.3   CO2  29  29   BUN  79*  53*   CR  2.43*  2.46*   GLC  99  70       Liver Function Studies -   Recent Labs   Lab Test  02/07/18   1203  08/23/17   1209  04/25/17   1344  06/07/16   1138   PROTTOTAL   --    --   6.9  6.7*   ALBUMIN  3.5  3.4  3.6  3.5   BILITOTAL   --     --   2.0*  1.0   ALKPHOS   --    --   71  52   AST   --    --   23  16   ALT   --    --   34  21       TSH   Date Value Ref Range Status   11/13/2017 1.95 0.40 - 4.00 mU/L Final   04/25/2017 1.45 0.40 - 4.00 mU/L Final       Lab Results   Component Value Date    A1C 6.4 11/13/2017       INRS:  3/19: 3.0  3/16: 2.8  3/14: 3.2  3/13: 3.2      Assessment/Plan:  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  Acute on chronic combined systolic and diastolic congestive heart failure (H)  End stage/ongoing weight is trending up,  daily weights, vitals daily, BMP Twice weekly, increase bumex to 2mg QAM and 1mg at noon, KCL 20meq QD, lipitor 80mg qhs     Paroxysmal atrial fibrillation (H)  Ongoing: vitals daily and prn, BMP follow, continue coumadin AC INR goal of 2-3     CKD (chronic kidney disease) stage 4, GFR 15-29 ml/min (H)  Ongoing: followed by nephrology, creat 2.45-3, BMP twice weekly, did increase bumex will monitor     Physical deconditioning  Acute/ongoing: PT and OT for strengthening     Left foot pain  Ongoing: tylenol 650mg schedule TID and continue q 6 hours prn         Orders:  Increase bumex to 2mg QAM and 1mg at noon    Total time with patient visit: 40 minutes including discussions about the POC and care coordination with the family, wife. Greater than 50% of total time spent with counseling and coordinating care due to talked to patient and wife about cardiac disease, kidney disease and progression stable at this time but expect decline, talked about fatigue and what is causing fatigue, talked about POC to increase bumex, no other concerns at this time. .      Electronically signed by  Tonya Lynn Haase, APRN CNP

## 2018-03-24 NOTE — TELEPHONE ENCOUNTER
[place] [facility] TELEPHONE ENCOUNTER    Chief Complaint   Patient presents with     INR RESULTS       Josr Landers is a 77 year old  (1940),Nurse called today to report: Today's INR results 5.0, yesterdays INR 5.5 finger stick (4.4 serum); Coumadin has been held.    ASSESSMENT/PLAN    Continue to hold    Recheck INR Monday    ZOEY Kendall CNP

## 2018-03-26 NOTE — TELEPHONE ENCOUNTER
I spoke with patient's wife regarding situation. He is currently off pain medications and off lexapro.   I discussed he had a serious illness stroke and hospitalization last year and his cardiac condition of amyloid may have further cardiac decline and impact cognitive status. Discussed ask the providers if they have checked thyroid function.  I discussed thoughts are with them. Care conference was recommended, they have completed and are working with PT for strengthening but he is still tired and weak.  Best wishes,  Ángel Oates MD

## 2018-03-26 NOTE — TELEPHONE ENCOUNTER
Patient's wife called to discuss elevated creatinine. He is currently at [facility] rehab. Creatinine today jumped to 3.39. Only new medication is melatonin, said he's eating well. Denied itching, nausea, or vomiting. He is not drinking much for fluids, also notes fatigue and ongoing edema. [facility] staff advised patient should go to the hospital, but wife is unsure. Requesting recommendations from Dr. Turner / colleague.    Sera Weiner RN

## 2018-03-26 NOTE — ED NOTES
Bed: IN03  Expected date:   Expected time:   Means of arrival:   Comments:  Josr Landers - Sent in by his Cardiologist [birth year]; followed for cardiac amyloid; can get restrictive .   of [facility].  HTN; DM II.  Decline in GFR; suspected volume depletion by Cardiologist.  Dr. Torres recommended hospitalization with Cards 1 (Dr. Nunez) and said he would call the fellow

## 2018-03-26 NOTE — TELEPHONE ENCOUNTER
Reviewed with Nurys Olguin:    Seems like a possibility, we'd need urine studies, recent BPs and complete med list, any contrast studies?  I can see him in clinic in the afternoon like 2:30 or 3 pm if needed or he can see his PCP for initial work-up and IVF as deemed appropriate.     Called wife. She is still nervous, said she spoke with patient's cardiologist who also advised hospital evaluation. Did encourage her to follow recommendations and relayed provider's message above. No further questions at this time.    Sera Weiner RN

## 2018-03-26 NOTE — ED NOTES
Pt presents to ED with c/o abnormal kidney function lab values. Two weeks ago pt behavior changed, was worked up for a stroke and discharged to home care. Pt appears lethargic. oriented x 4

## 2018-03-26 NOTE — TELEPHONE ENCOUNTER
Regarding: PT IN [facility] AND WIFE REQUESTING CALL BACK   Contact: [phone]  Pt's wife Lavern called and needs to speak with Dr. Oates's nurse regarding her  and his care. Per Lavern, Pt is in [facility] and isn't getting better and needs to his discuss medications.  Please give Lavern a call back at [phone] if before 11:00a today. After 11:00a, please call [phone].    March 26, 2018 10:13 AM  Spoke with patient's wife, Nu. Patient was hospitalized at [facility] 3/9-3/12, and has been at [facility] since then. He was having issues with his memory, and with sleeping all the time. She said the issues seemed to start when his Lexapro dose was increased. Additionally, he was prescribed some Tylenol #3 and has a cortisone injection just prior to this happening. The doctors at [facility] felt that once the Lexapro and Tylenol #3 were cleared from his system, his symptoms would improve. However, Nu said that he is not improving, even after a couple weeks off of these medications. He continues to be very fatigued, and sleeps for much of the day. His mind is clear in the morning, but deteriorates as the day goes on. She is very concerned about him. Patient is currently being followed by medical staff at the TCU, but Nu wanted Dr. Oates to be aware. They do have follow-up appointment scheduled in [facility] on 4/9/18.   Antonella Parada RN, Care Coordinator

## 2018-03-27 PROBLEM — N18.9 ACUTE KIDNEY INJURY SUPERIMPOSED ON CHRONIC KIDNEY DISEASE (H): Status: ACTIVE | Noted: 2018-01-01

## 2018-03-27 PROBLEM — R41.82 ALTERED MENTAL STATUS: Status: ACTIVE | Noted: 2018-01-01

## 2018-03-27 PROBLEM — N17.9 ACUTE KIDNEY INJURY SUPERIMPOSED ON CHRONIC KIDNEY DISEASE (H): Status: ACTIVE | Noted: 2018-01-01

## 2018-03-27 NOTE — CONSULTS
"[facility]    General Neurology Consult    Patient Name:  Alisia Landers  MRN:  [id number]    :  1940  Date of Admission:  3/26/2018  Date of Service:  2018  Primary care provider:  Ángel Oates      History of Present Illness:     Mr. Alisia Landers is a very  pleasant 78 y/o male with PMH that is pertinent for HFrEF 2/2 NICM+ICM (last EF 30%), NSTEMI s/p ATIF to LAD in , Cardiac amyloidosis (biopsy proven ) , A. Fib/flutter on warfarin s/p ablation , CKD IV , Right MCA CVA 2017 admitted s/p mechanical thrombectomy who was admitted on 3/26 from TCU for worsening MS. Neurology were consulted to assist with evaluation.      History was obtained from Alisia and his wife, alisia became agitated towards the end of the interview.      Per patient he has been having worsening mental status over the past two months. Patient reports that he doesn't feel it as a big of a deal as people around him think. His main concern was: \"I am not able to answer question as clearly as people around me would like me to, I got to be careful with my answers\". Since it started patient feels that this has been getting worse. He reports that this has been associated with left leg weakness. He has had multiple falls over the past 2 months , denies light headedness or loss of consciousness prior to the fall \"leg is not listening to body\". Patient also reports that he has been having difficulty with urination , he reports that the stream is weaker and has been having polyuria . No dysuria , patient refused to answer if he has sense of incomplete emptying.      Wife reports that alisia has not been him self over the past 2-3 months. He was initially complaining of generalized weakness, fatigue, and increased somnolence. He was seen by his PCP and started on Lexapro for possible depression. This didn't help . Three weeks ago the patient was started on Tylenol 3 , increased " "lexapro dose to 10 mg and was also given a cortisone injection to the 1st, 2nd, and 3rd tarsometatarsal joints for arthritis. The day after patient became progressively more somnolent \"He didn't speak a word\" and more altered \"couldnt remember where the stairs are at\". Wife called 911 and patient was admitted to [facility]. His presentation was felt to be 2/2 lexapro and tylenol 3 (codeine) as well as potential worsening of his HF and kidney function. Patient was discharged a TCU however given non improvement he was advised by his cardiologist to present to the [facility] for further evaluation.      Off note patient Saw Neurology on 3/6 for non responsive spells.  Wife reports that he had about 10 episodes of 1-2 minutes of non responsive spells where alisia goes blank and has a blank gaze and becomes minimally responsive. All episodes occurred when patient was about to get to bed. She has a video off the episodes. These episodes where felt to be cardiac in origin (global brain hypoperfusion 2/2 HFrEF)     ROS: A 10-point ROS is negative unless indicated above.        Allergies:  Allergies   Allergen Reactions     Gabapentin Other (See Comments)     Altered mental status     Lyrica      Severe swelling of body and fluid around heart.        Medications:    Prescriptions Prior to Admission   Medication Sig Dispense Refill Last Dose     WARFARIN SODIUM PO Take 2.5 mg by mouth on MWF, and 5 mg daily ROW for afib.   3/22/2018 at 1700     ACETAMINOPHEN PO Take 650 mg by mouth 3 times daily   3/26/2018 at 1300     sennosides (SENOKOT) 8.6 MG tablet Take 2 tablets by mouth daily as needed for constipation   3/21/2018 at 1404     MELATONIN PO Take 3 mg by mouth nightly as needed   3/25/2018 at 2002     acetaminophen (TYLENOL) 500 MG tablet Take 650 mg by mouth every 4 hours as needed for mild pain or pain    3/26/2018 at 0013     Potassium Chloride ER 20 MEQ TBCR Take 1 tablet (20 mEq) by mouth daily 90 tablet 1 3/26/2018 at AM "     atorvastatin (LIPITOR) 80 MG tablet Take 1 tablet (80 mg) by mouth daily 90 tablet 3 3/25/2018 at PM     bumetanide (BUMEX) 1 MG tablet Take 2 mg by mouth in the morning and 1 mg in the afternoon   3/26/2018 at 1300     EMOLLIENT EX Externally apply topically 3 times daily as needed   Unknown at Unknown time     order for DME Equipment being ordered: Stair Lift   Length of need: Lifetime  Chronic kidney disease, coronary artery disease, acute CVA  02/12/2017 distal right M1 segment of middle cerebral artery.At risk of falls due to stroke and deconditioning. He is working with therapists, uses wheelchair and assistive devices.  His home has stairs, would benefit  from [place] stair chair lift as he has difficulty climbing stairs required in home. 1 each 0 Taking     order for DME Equipment being ordered: Bedside commode 1 Device 0 Taking     order for DME Sequential pneumatic compression pumps 2-3 times per day as needed for lymphedema. Measure and fit.  Compression strength per protocol. 1 Units 0 Taking     order for DME Equipment being ordered: DME compression stockings knee high 30 mm hg 5 each 3 Taking     other medical supplies JENIFER stocking bilateral lower extremities 2 packet 3 Taking       PMH:  Past Medical History:   Diagnosis Date     Atrial fibrillation (H) 6/25/2012     Atrial fibrillation: ablation Dr Jonathan Arvizu/[place] 6/25/2012     Cardiac amyloid: ATTR per cardiac biopsy [facility] 2009 6/27/2012     Coronary artery disease      Other and unspecified hyperlipidemia 6/27/2012     Tubular adenoma of colon 2010     Venous insufficiency      Past Surgical History:   Procedure Laterality Date     BACK SURGERY       H PENUMBRA SEPARATOR 3D       KNEE SURGERY       AR PATIENT HAS A CORONARY ARTERY STENT       SHOULDER SURGERY         Social History:  I have reviewed this patient's social history  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        Family History:    This patient has no significant family history    Physical Examination:   General:  Alert and awake, oriented x2 (Refused to answer date),   HEENT:  NC/AT, no icterus, op pink and moist, no ear or nose drainage - JVP to ear lobe   Cardiac:  RRR, no m/r/g  Chest:  Anterior fields CTAB  Abdomen:  Soft , distended , non-tender , BS+ , no HSM   Extremities:  +2 pitting LE edema to knee   Skin:  No rash or lesion.       Neuro Exam:  Mental status: alert and awake , eyes open > 10 seconds , organized thinking , immediate memory intact. Patient refused   To answer the rest of MOCA components (question , attention , delayed recall, reading , writing)  Cranial nerves: CN II - XII intact  Motor: Strength 5/5 in proximal AE and LE except for shoulder 4/5 bilaterally   Reflexes: 2 in triceps, quad , ankle   Sensory: intact sensation to light touch in LE , decreased proprioception/vibration sensation   Coordination: no dysmetria   Gait: was not assessed   Muscle tone: spacticity present in upper extremity flexors        Investigations:  Data     CMP   Recent Labs  Lab 03/27/18  0156 03/26/18 2006 03/26/18 03/22/18   NA  --  134 138 137   POTASSIUM  --  4.3 4.4 4.1   CHLORIDE  --  99 100 100   CO2  --  24 22 26   ANIONGAP  --  12  --   --    GLC  --  109* 84 103*   BUN  --  83* 87* 79*   CR  --  3.42* 3.39* 2.56*   GFRESTIMATED  --  18* 17* 24*   GFRESTBLACK  --  21* 19* 27*   RIGOBERTO  --  9.5 9.3 9.7   PHOS 4.4  --   --   --    PROTTOTAL  --  7.8  --   --    ALBUMIN  --  3.4  --   --    BILITOTAL  --  2.6*  --   --    ALKPHOS  --  114  --   --    AST  --  62*  --   --    ALT  --  56  --   --         CBC   Recent Labs  Lab 03/26/18 2006 03/26/18   WBC 7.9  --    RBC 4.50  --    HGB 15.1 13.2*   HCT 44.7  --    MCV 99  --    MCH 33.6*  --    MCHC 33.8  --    RDW 16.5*  --    PLT 92*  --        INR, PTT   Recent Labs  Lab 03/27/18  0942 03/26/18 2006   INR 3.88* 2.67*        Arterial Blood Gas No lab results  found in last 7 days.    UA  No lab results found in last 7 days.    Micro   Recent Labs  Lab 03/27/18  0644   SDES Blood Left Arm   CULT No growth after 4 hours          Radiological Data  Data   Recent Results (from the past 48 hour(s))   XR Chest 2 Views    Narrative    Exam: XR CHEST 2 VW, 3/26/2018 7:53 PM    Indication: chf HISTORY;     Comparison: Chest x-ray on March 20, 2013 MRI 1/30/2018    Findings:   AP and lateral views of the chest. Pulmonary vasculature is indistinct  with perihilar and left basilar opacity. Cardiomediastinal silhouette  appears enlarged. No evidence of pneumothorax. Limited visualization  of upper abdomen is unremarkable. Elevated right hemidiaphragm.      Impression    Impression:   1. Cardiomediastinal silhouette is enlarged, similar to prior MRI.  2. Perihilar opacities, likely pulmonary edema.    I have personally reviewed the examination and initial interpretation  and I agree with the findings.    HENOK CLARKE MD   CT Chest Abdomen Pelvis w/o Contrast    Narrative    EXAMINATION: CT CHEST ABDOMEN PELVIS W/O CONTRAST, 3/26/2018 10:30 PM    TECHNIQUE:  Helical CT images from the lung apices through the  symphysis pubis were obtained without contrast.  Coronal reformatted  images were generated at a workstation for further assessment.    COMPARISON: CT 7/1/2017    HISTORY: ? PNA, ? Cause for abdominal pain, distension;     FINDINGS: Significant artifact limiting exam from patient's arms.  Chest:   Mediastinum: Cardiomegaly is 16.8 cm. Dilated main pulmonary artery  3.37 m, consistent with pulmonary hypertension. Vascular  calcifications of the coronary arteries. No mediastinal  lymphadenopathy by size criteria.  Lungs: Nodular consolidations in the posterior lower lobes, aspiration  versus infection. Atelectasis in the posterior lower lobes.    Abdomen and pelvis:   Liver: Hepatomegaly at 18.5 cm. Unchanged numerous simple hepatic  cysts with the largest being a 4.6 cm cyst in  the right hepatic lobe.  No suspicious liver lesions.   Gallbladder: No definite gallstones, though poorly visualized  secondary to artifact.  Spleen: Normal size.  Pancreas: Fatty atrophy.  Adrenal glands: No adrenal nodules.  Kidneys: Unchanged partially calcified 2.7 cm right renal lesion which  has been stable since at least 2015 and previously cryoablated.  Unchanged 1.7 cm left renal cyst.  Bladder / Pelvic organs: Enlarged prostate with thickened bladder  wall.  Bowel: No bowel wall thickening. The appendix is unremarkable. No  evidence of obstruction.  Lymph nodes: No retroperitoneal, mesenteric, or pelvic  lymphadenopathy.  Fluid: Moderate to large ascites.  Vessels: No infrarenal aortic aneurysm.     Bones and soft tissues: No suspicious osseous lesions. Degenerative  changes of the spine. Grade 1 anterolisthesis of the L2 and L3.  Chronic, very mild anterior compression deformity of L2.      Impression    IMPRESSION:     1. Nodular consolidations in the posterior lower lobes, aspiration  versus infection.  2. Moderate to large ascites.  3. Unchanged partially calcified 2.7 cm right renal lesion, stable  since 2015, previously cryoablated.  4. Enlarged prostate with bladder wall thickening.  5. Hepatomegaly.  6. Cardiomegaly.      I have personally reviewed the examination and initial interpretation  and I agree with the findings.    JOSE M PLASENCIA MD   Head CT w/o contrast   Result Value    Radiologist flags Ventriculomegaly (Urgent)    Narrative    CT HEAD W/O CONTRAST 3/26/2018 10:30 PM    Provided History: AMS;     Comparison: MR 6/5/2010.    Technique: Using multidetector thin collimation helical acquisition  technique, axial, coronal and sagittal CT images from the skull base  to the vertex were obtained without intravenous contrast.     Findings:    Ventriculomegaly of the lateral and third ventricles. No obstructing  mass or lesion. No intracranial hemorrhage, mass effect, or midline  shift. Mild  cerebral volume loss. The gray to white matter  differentiation of the cerebral hemispheres is preserved. The basal  cisterns are patent.    The visualized paranasal sinuses are clear. The mastoid air cells are  clear.       Impression    Impression: Ventriculomegaly of the lateral and third ventricles with  no obstructing lesion or mass seen, possibly normal pressure  hydrocephalus.    [Urgent Result: Ventriculomegaly]    Finding was identified on 3/26/2018 11:04 PM.     Dr. Schneider was contacted by Dr. Gilmore at 3/26/2018 11:08 PM and  verbalized understanding of the urgent finding.     I have personally reviewed the examination and initial interpretation  and I agree with the findings.    MERLYN ORDOÑEZ MD          ==========================================================    ASSESSMENT/PLAN:     Mr. Josr Landers is a very  pleasant 78 y/o male with PMH that is pertinent for cardiac amyloidosis (Biopsy proven 2009) , A. Fib/flutter on warfarin s/p ablation 06, NSTEMI s/p ATIF to LAD in 2015, CKD IV , Right MCA CVA 2/2017 admitted s/p mechanical thrombectomy who was admitted on 3/26 from TCU for worsening MS. Neurology were consulted to assist with evaluation.      ## Progressive worsening mental status (Encephalopathy)  ## Progressive Dementia ?   Patient presentation is consistent with progressive dementia however we cant rule out delirium in the hospital setting .   Potential Neurological causes include:  Normal pressure Hydrocephalus : this diagnosis is tempting given history of difficulty walking , progressive dementia , and history of urinary incontinence . Patient does have signs of long tract involvement on exam such as increased spacticity. The presence of ventriculomegaly is suggestive of NPH but fairly non specific and can be noted with advanced aging.   DDx include;  Recurrent CVA / vascular dementia (no apparent focal deficit on exam, c/w specific territorial lesion)   Alzheimer's   Vitamin B12  def/hypothyrodism   Non convulsive status epilepticus (history of non responsive episodes , though atypical for seizure).      Non Neurological causes:  Patient does have evidence of worsening HFrEF (reduced EF 15-20%) and evidence of cardiogenic shock (elevated lactate , worsening kidney function) which could lead to global hypoperfusion and explain the progressive worsening in mental status, and weakness. Positional change may have lead to decreased cerebral flow and non responsive episodes noted by family members.      For now would start work up by obtaining MRI and EEG to evaluate for CVA and potential seizure acitivity. Would also obtain TSH and B12 to r/o reversible causes of dementia. Agree with primary team work up to r/o causes of delirium (infectious work up).      - Please obtain Non contrast MRI  - Please obtain 24 hour video EEG   - Seizure precautions   - Please obtain TSH / Vitamin B12 / MMA   - Agree with Blood/Urine cultures   - Low suspicion for CNS infection at this point   - If the above is negative would pursue large volume LP (30 to 50 ml) with documentation of the patient's gait function before and within 30 to 60 minutes after the procedure (Would obtain OT and PT assessment for the prior). Anticoagulation should be held prior to this , would defer decision to cardiology.   - Will review head imaging from [facility] (called and asked for images to be pushed to our system).       =========================================================    Thank you for this interesting consult , Neurology will continue to follow along .  This patient was seen & discussed with my attending, Dr. Will, who agrees with my assessment and plan.      Marla Champion  PGY2  Pager: [phone]

## 2018-03-27 NOTE — PHARMACY-ANTICOAGULATION SERVICE
Clinical Pharmacy - Warfarin Dosing Consult     Pharmacy has been consulted to manage this patient s warfarin therapy.  Indication: Atrial Fibrillation  Therapy Goal: INR 2.5-3.5  Warfarin Prior to Admission: Yes  Warfarin PTA Regimen: MWF 2.5 mg and 5 mg ROW  Significant drug interactions: ASA  Dose Comments: Doses held 3/24, 3/25 and 3/26 per high INR    INR   Date Value Ref Range Status   03/26/2018 2.67 (H) 0.86 - 1.14 Final   06/29/2016 2.73 (H) 0.86 - 1.14 Final       Recommend warfarin 2.5 mg today.  Pharmacy will monitor Josr Landers daily and order warfarin doses to achieve specified goal.      Please contact pharmacy as soon as possible if the warfarin needs to be held for a procedure or if the warfarin goals change.

## 2018-03-27 NOTE — CONSULTS
Nephrology Initial Consult  March 27, 2018      Josr Landers MRN:[id number] YOB: 1940  Date of Admission:3/26/2018  Primary care provider: Ángel Oates  Requesting physician: Chantel Nunez MD    REASON FOR CONSULT: Elevated creatinine.  Scr 2.43 on 3/15->2.29 on 3/19->2.56 on 3/22-> 3.39 on 3/26.      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     Worsening biventricular heart function noted 2 weeks ago at OSH, EF 30% right atrial pressure >15% now further decreased to EF 15-20% with right atrial pressure of >20%, persistent IVC dilation. CTCAP remarkable for large ascites and nodular opacities in lungs. Has + edema, reported GARCIA/orthopnea, mild productive cough.    DDx:  Cardiorenal syndrome most likely, possible element of compartment syndrome related to ascites, has risk factors for infection and obstructin    RECOMMENDATIONS    Elevated Scr/Hypervolemia:      Diurese to treat CRS.  Recommend IV Bumex 5 mg challenge today; continue IV 4 mg bid if responds.  Would consider dialysis if fails diuresis.    Diagnostic and therapeutic paracentesis of ascites to yield etiology and relieve any compressive/obstructive physiology    Obtain urine studies: sodium, creatinine, urea nitrogen, random protein, UA with micro and culture. Treat infection if present    Place martinez for monitoring of urine output and treatment of obstruction if present    BPs:  Monitor closely with diuresis, goal to avoid systolic <90    Lytes: no current abn, monitor     Anemia:  not present, Hgb technically at end of normal range but elevated from baseline. Monitor    AMS:  Mgmt per primary team    Recommendations were communicated to primary team via phone.  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      Was recently admitted in early March to OSH for weakness/AMS and hypervolemia, required diuresis (oral per report).  Echocardiogram there on March 10, 2018 showed EF 30%, IVC dilation, right atrial pressure 15 mmHg with global right heart dysfunction which reflected worsening biventricular function.  Patient was discharged on 3/12/2018 to SNF on a dose of Bumex 1 mg po bid, Scr 2.4.  Of note, patient had been instructed to increase Bumex dose in stepwise fashion up to 4 mg bid when last seen in clinic by Nephrologist Dr. Turner on 2/7/2018 for hypervolemia, however it is not clear that this occurred.    Per wife's report, AMS is confusion which has worsened despite removal of suspect culprit drugs of Tylenol 3 and Lexapro on 3/9.  He is now having urinary incontinence with possibly decreased urine output and unable to complete any ADLs or ambulate independently.     Scr  increased as follows: 2.43 on 3/15->2.29 on 3/19->2.56 on 3/22-> 3.39 on 3/26.      He is not on BP meds beyond diuretics.  Per report baseline systolic are always in 90s.    Wife is unsure of weights but states they target  "165 lbs and give \"extra\" bumex dose if he is above that.  She reports \"he always has edema\" and has warranted [facility]  He is reportedly \"a little SOB with activities,\" has infrequent phlegmy cough and wakes up at night recently, apparently \"struggling for air.\"    CTCAP this admission shows nodular consolidations in the posterior lower lobes, aspiration versus infection, bilat atelectasis, dilated main pulmonary artery to 3.37 consistent with pulm HTN, cardiomegaly, hepatomegaly with innumerable cysts, moderate to large ascites, stable partially calcified 2.7 cm R renal lesion, enlarged prostate with bladder wall thickening.    Echocardiogram today shows EF further reduced to 15-20%, persistent IVC dilation and right atrial pressure increased to >20%.      Non contrast CT brain shows  Ventriculomegaly of the lateral and third ventricles with  no obstructing lesion or mass seen, possibly normal pressure  Hydrocephalus.    ROS:  As above.  Nausea with vomiting x 2 since yesterday, unknown if contained blood, decreased appetite and po intake, constipation unknown frequency of bowel movements, recent cortisone injection into left dorsal foot for arthritis pain sx believed to be limiting his ambulation.  All other systems negative.    PAST MEDICAL HISTORY:  Reviewed with patient and wife on  03/27/2018     Past Medical History:   Diagnosis Date     Atrial fibrillation (H) 6/25/2012     Atrial fibrillation: ablation Dr Jonathan Arvizu/[place] 6/25/2012     Cardiac amyloid: ATTR per cardiac biopsy [facility] 2009 6/27/2012     Coronary artery disease      Other and unspecified hyperlipidemia 6/27/2012     Tubular adenoma of colon 2010     Venous insufficiency      Past Surgical History:   Procedure Laterality Date     BACK SURGERY       H PENUMBRA SEPARATOR 3D       KNEE SURGERY       MI PATIENT HAS A CORONARY ARTERY STENT       SHOULDER SURGERY        MEDICATIONS:  PTA Meds  Prior to Admission " medications    Medication Sig Last Dose Taking? Auth Provider   WARFARIN SODIUM PO Take 2.5 mg by mouth on MWF, and 5 mg daily ROW for afib. 3/22/2018 at 1700 Yes Unknown, Entered By History   ACETAMINOPHEN PO Take 650 mg by mouth 3 times daily 3/26/2018 at 1300 Yes Unknown, Entered By History   sennosides (SENOKOT) 8.6 MG tablet Take 2 tablets by mouth daily as needed for constipation 3/21/2018 at 1404 Yes Unknown, Entered By History   MELATONIN PO Take 3 mg by mouth nightly as needed 3/25/2018 at 2002 Yes Reported, Patient   acetaminophen (TYLENOL) 500 MG tablet Take 650 mg by mouth every 4 hours as needed for mild pain or pain  3/26/2018 at 0013 Yes Reported, Patient   Potassium Chloride ER 20 MEQ TBCR Take 1 tablet (20 mEq) by mouth daily 3/26/2018 at AM Yes Ángel Oates MD   atorvastatin (LIPITOR) 80 MG tablet Take 1 tablet (80 mg) by mouth daily 3/25/2018 at PM Yes Ángel Oates MD   bumetanide (BUMEX) 1 MG tablet Take 2 mg by mouth in the morning and 1 mg in the afternoon 3/26/2018 at 1300 Yes Reported, Patient   EMOLLIENT EX Externally apply topically 3 times daily as needed Unknown at Unknown time  Reported, Patient   order for DME Equipment being ordered: Stair Lift   Length of need: Lifetime  Chronic kidney disease, coronary artery disease, acute CVA  02/12/2017 distal right M1 segment of middle cerebral artery.At risk of falls due to stroke and deconditioning. He is working with therapists, uses wheelchair and assistive devices.  His home has stairs, would benefit  from [place] stair chair lift as he has difficulty climbing stairs required in home.   Ángel Oates MD   order for DME Equipment being ordered: Bedside commode   Ángel Oates MD   order for DME Sequential pneumatic compression pumps 2-3 times per day as needed for lymphedema. Measure and fit.  Compression strength per protocol.   Erich Fraga MD   order for DME Equipment being ordered: DME compression  stockings knee high 30 mm hg   Ángel Oates MD   other medical supplies JENIFER stocking bilateral lower extremities   Gregorio Cruz MD      Current Meds    atorvastatin  80 mg Oral Daily     sodium chloride (PF)  3 mL Intracatheter Q8H     potassium chloride  20 mEq Oral Daily     ampicillin-sulbactam (UNASYN) IV  1.5 g Intravenous Q12H     warfarin-No DOSE today  1 each Does not apply no dose today (warfarin)     sodium chloride 0.9%  1,000 mL Intravenous Once     Infusion Meds    - MEDICATION INSTRUCTIONS -       Warfarin Therapy Reminder       ALLERGIES:    Allergies   Allergen Reactions     Gabapentin Other (See Comments)     Altered mental status     Lyrica      Severe swelling of body and fluid around heart.      SOCIAL HISTORY:   Social History     Social History     Marital status:      Spouse name: N/A     Number of children: N/A     Years of education: N/A     Occupational History     Not on file.     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      to his wife Nu ( Vikash), two children Deandra Solo (Grandchildren Trinh , Nish ) and Son [id number] YISEL Anshul III     Reviewed with patient  and wife.     FAMILY MEDICAL HISTORY:   He had a sister who had a bleeding disorder and another sister who had asthma.  There was no other significant history of other hereditary diseases.    PHYSICAL EXAM:   Temp  Av.7  F (36.5  C)  Min: 96.7  F (35.9  C)  Max: 98.7  F (37.1  C)      Pulse  Av  Min: 84  Max:  84 Resp  Av  Min: 14  Max: 18  SpO2  Av.8 %  Min: 90 %  Max: 100 %       BP (!) 82/64 (BP Location: Left arm)  Temp 98.7  F (37.1  C) (Oral)  Resp 18  Wt 74.4 kg (164 lb)  SpO2 93%  BMI 24.22 kg/m2   Date 18 0700 - 18 0659   Shift 4373-3993 2443-1198 7545-7825 24 Hour Total   I  N  T  A  K  E   P.O. 240   240    Shift Total  (mL/kg) 240  (3.23)   240  (3.23)   O  U  T  P  U  T   Urine  30  30    Shift Total  (mL/kg)  30  (0.4)  30  (0.4)   Weight (kg) 74.39 74.39 74.39 74.39      Admit Weight: 75.3 kg (166 lb)     GENERAL APPEARANCE: Alert NAD   EYES: No scleral icterus, pupils equal  HENT: NC/AT,  mouth  without ulcers or lesions  Lymphatics: no cervical, clavicular, axillary or femoral lymph nodes appreciated  Pulmonary: lungs clear to auscultation with equal breath sounds bilaterally  CV: regular rhythm, normal rate, no rub   - Edema +2 bilateral pedal edema, edema of sacrum/hips  GI: tense, hypoactive bowel sounds, +hepatomegaly  MS:  no muscle tenderness  : no Fraga or scrotal edema  SKIN: no rash, warm, dry, no cyanosis  NEURO: Oriented x 2. Mentation slow, appropriately responsive to most questions and speech seems normal.  No obvious deficits resting in bed.    LABS:   CMP  Recent Labs  Lab 18  0156 18   NA  --  134 138 137   POTASSIUM  --  4.3 4.4 4.1   CHLORIDE  --  99 100 100   CO2  --  24 22 26   ANIONGAP  --  12  --   --    GLC  --  109* 84 103*   BUN  --  83* 87* 79*   CR  --  3.42* 3.39* 2.56*   GFRESTIMATED  --  18* 17* 24*   GFRESTBLACK  --  21* 19* 27*   RIGOBERTO  --  9.5 9.3 9.7   PHOS 4.4  --   --   --    PROTTOTAL  --  7.8  --   --    ALBUMIN  --  3.4  --   --    BILITOTAL  --  2.6*  --   --    ALKPHOS  --  114  --   --    AST  --  62*  --   --    ALT  --  56  --   --      CBC  Recent Labs  Lab 18   HGB 15.1 13.2*   WBC 7.9  --    RBC 4.50  --    HCT 44.7  --    MCV 99  --    MCH 33.6*  --    MCHC 33.8  --    RDW  16.5*  --    PLT 92*  --      INR  Recent Labs  Lab 03/27/18  0942 03/26/18 2006   INR 3.88* 2.67*     ABGNo lab results found in last 7 days.   URINE STUDIES  Recent Labs   Lab Test  08/23/17   1240   COLOR  Yellow   APPEARANCE  Clear   URINEGLC  Negative   URINEBILI  Negative   URINEKETONE  Negative   SG  1.006   UBLD  Small*   URINEPH  6.0   PROTEIN  Negative   NITRITE  Negative   LEUKEST  Negative   RBCU  1   WBCU  <1     Recent Labs   Lab Test  08/23/17   1240   UTPG  0.76*     PTH  No lab results found.     IRON STUDIES  No lab results found.    IMAGING:  As stated above in the HPI.    Nurys Olguin PA-C

## 2018-03-27 NOTE — PROGRESS NOTES
SPIRITUAL HEALTH SERVICES  SPIRITUAL ASSESSMENT Progress Note  [facility] 6C     Brief visit with pt and son to assess needs, per request for hospital  visit as noted in initial nursing assessment.     Pt preferred visit tomorrow.    PLAN: will check in on pt again on Wednesday.    Josr Bah M.Div. ([place]), [facility]  Staff   Pager [phone]

## 2018-03-27 NOTE — PHARMACY-ADMISSION MEDICATION HISTORY
Admission medication history interview status for the 3/26/2018 admission is complete. See Epic admission navigator for allergy information, pharmacy, prior to admission medications and immunization status.     Medication history interview sources: MAR from [facility], patient, patient's wife    Changes made to PTA medication list (reason)  Added: Warfarin, sennosides  Deleted: None  Changed: Acetaminophen (q6hrs PRN-->q4hrs PRN)    Additional medication history information (including reliability of information, actions taken by pharmacist):  -MAR was up-to-date and accurate. Patient was a poor historian, however, his wife was knowledgeable about his allergies and medications and helps manage them at home. Patient lived at home prior to recent hospitalization at [facility] about 2 weeks ago. Patient was discharged from [facility] to [facility].   -Patient taking warfarin for afib 2.5 mg on MWF and 5 mg ROW. Doses held 3/23, 3/24, and 3/25 due to elevated INR (3/23 INR 5.5, 3/24 INR 5.0). Patient's wife states at home she tries to keep patient's INR between 2.5 and 3. Patient's wife reports no recent changes in diet.   -Patient was taking escitalopram and acetaminophen-codeine 300/30 prior to previous hospitalization. These medications were discontinued at that time.   -TCU attempted to give patient trazodone 25 mg PRN for sleep. One dose was given 3/15, however, patient's family did not want him to take it and it was discontinued.   -Patient received influenza vaccine in October 2017.       Prior to Admission medications    Medication Sig Last Dose Taking? Auth Provider   WARFARIN SODIUM PO Take 2.5 mg by mouth on MWF, and 5 mg daily ROW for afib. 3/22/2018 at 1700 Yes Unknown, Entered By History   ACETAMINOPHEN PO Take 650 mg by mouth 3 times daily 3/26/2018 at 1300 Yes Unknown, Entered By History   sennosides (SENOKOT) 8.6 MG tablet Take 2 tablets by mouth daily as needed for constipation 3/21/2018  at 1404 Yes Unknown, Entered By History   MELATONIN PO Take 3 mg by mouth nightly as needed 3/25/2018 at 2002 Yes Reported, Patient   acetaminophen (TYLENOL) 500 MG tablet Take 650 mg by mouth every 4 hours as needed for mild pain or pain  3/26/2018 at 0013 Yes Reported, Patient   Potassium Chloride ER 20 MEQ TBCR Take 1 tablet (20 mEq) by mouth daily 3/26/2018 at AM Yes Ángel Oates MD   atorvastatin (LIPITOR) 80 MG tablet Take 1 tablet (80 mg) by mouth daily 3/25/2018 at PM Yes Ángel Oates MD   bumetanide (BUMEX) 1 MG tablet Take 2 mg by mouth in the morning and 1 mg in the afternoon 3/26/2018 at 1300 Yes Reported, Patient   EMOLLIENT EX Externally apply topically 3 times daily as needed Unknown at Unknown time  Reported, Patient   order for DME Equipment being ordered: Stair Lift   Length of need: Lifetime  Chronic kidney disease, coronary artery disease, acute CVA  02/12/2017 distal right M1 segment of middle cerebral artery.At risk of falls due to stroke and deconditioning. He is working with therapists, uses wheelchair and assistive devices.  His home has stairs, would benefit  from [place] stair chair lift as he has difficulty climbing stairs required in home.   Ángel Oates MD   order for DME Equipment being ordered: Bedside commode   Ángel Oates MD   order for DME Sequential pneumatic compression pumps 2-3 times per day as needed for lymphedema. Measure and fit.  Compression strength per protocol.   Erich Fraga MD   order for DME Equipment being ordered: DME compression stockings knee high 30 mm hg   Ángel Oates MD   other medical supplies JENIFER stocking bilateral lower extremities   Gregorio Cruz MD     Medication history completed by: Tsering Connell, [id number], [employer] Student

## 2018-03-27 NOTE — PROGRESS NOTES
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      #Altered mental status  #Ventriculomegaly  #Past CVA right MCA  CT scan with enlarged venticules. No mass or CVA. Patient's history concerning for NPH. Not on narcotic meds or mood stabilizers. Another consideration is that mental status worsened by uremia. Does have possible aspiration PNA on CT that we are treating. Low suspicion for SBP.  - Neurology consulted, appreciate recs - MRI brain without contrast, TSH, B12, MMA pending, 24h video EEG monitoring. If negative consider LP for NPH w/u, would need to hold warfarin.  - Urine cx pending  - Blood cx ngtd x2     #Troponemia  Troponin on admission 0.28. Has known hx of CAD. Likely demand in the setting of volume depletion and LOIS on CKD4. No chest pain per wife. EKG with RBBB, but relatively unchanged from about 1 year ago.   - Trend troponin to plateau, monitor     #Cardiac amyloidosis  #HFrEF NYHA II/III C  Patient with LVEF 30% from [facility] 2 wks ago which was virtually unchanged compared to 09/2017. Appears dry on exam. Was diuresed on recent admission from 172lbs to 167lbs, today 164lbs. S/p 500cc bolus at time of admission.  - Hold diuretics, previously on bumex 1mg BID  - TTE with EF 15-20%, RA pressure 15mmHg  - Avoid BB  - Strict I/Os, daily weights     #CAD s/p ATIF to LAD 2015  - Continue home statin  - Not on aspirin or BB, hold ASA d/t LOIS, avoid BB with cardiac amyloid     #Afib/flutter s/p CTI ablation 2006  Remains in afib, but normal HR. Was on sotalol but this was discontinued around 08/2017 per  outside cardiology notes.   - Pharmacy to dose warfarin - hold as supratherapeutic - consider bridging with heparin if need LP for evaluation of NPH as above.     #LOIS on CKD IV  #Lacitic acidosis, resolved  #?Uremia  Could be secondary to amyloid involving kidney and overdiuresis as patient appeared dry on exam. There has been some discussions on need for dialysis.  - Fluid challenge 500cc overnight on admission  - Nephrology consulted   - Trend lactic acid  - UA with microscopy pending, will need straight cath as incontinent.     #Abdominal distension  #Abdominal pain  #Hyperbilirubinemia  #Ascites  Initially concerned for SBO due to past narcotic med use, but CT scan reassuring. Elevated lactic acid in the setting of atrial fibrillation and abdominal pain concerning for bowel ischemia due to thrombus but INR not subtherapeutic and CT without evidence for such. AST mildly elevated along with bilirubin. CT scan with ascites and hepatomegaly. Ammonia levels low.   - Pending CT final results for more description on hepatobiliary structures  - Deferring paracentesis given supratherapeutic INR and more likely etiologies for AMS including neuro w/u detailed above and infection.    FEN: low sodium, low fat  PPX: on warfarin  Code Status: FULL  Dispo: pending work up of AMS, likely at least 2-3 additional days    This patient was seen and discussed with Dr. Nunez who agrees with the assessment and plan.      Tirso Simmons MD  [phone]  Internal Medicine PGY-2      We will appreciate input from neurology and nephrology.    I have seen, interviewed, and examined patient. I have reviewed the laboratory tests, imaging, and other investigations. I have reviewed the management plan with the patient. I discussed with the team and agree with the findings and plan in this resident/fellow/nurse practitioner's note. In addition, changes in the physical examination, assessment and plan have been incorporated into the note by  myself, as to make it a single cohesive document.       Chantel Nunez MD, MS  Cardiology/Cardiac EP Attending Staff      ___________________________________________________________________      Subjective & Interval Hx:    No acute events overnight. Hemodynamically stable and afebrile. Received 500cc IV NS overnight, no breathing difficulties. Having some nausea/emesis and abdominal distention improved with zofran and tylenol. Incontinent of urine. No F/C. Has cough, occasionally productive. No additional complaints.    Last 24 hr care team notes reviewed.   ROS: 4 point ROS including Respiratory, CV, GI and , other than that noted in the HPI, is negative    Physical Exam:  Blood pressure (!) 82/64, temperature 98.7  F (37.1  C), temperature source Oral, resp. rate 18, weight 74.4 kg (164 lb), SpO2 93 %.    Gen: Comfortable, NAD, sitting up in bed, wife at bedside  HEENT: NCAT,anicteric, PERRL  Neck: Supple, no cLAD, JVD 10cm  Pulm: CTAB without wheezes or crackles  CV: Irregularly irregular, normal rate, S1/S2, no m/r/g  ABD: Mildly distended, nontender, normal BS, tympanic to percussion, no organomegaly  EXT: Trace LE edema b/l  Skin: No rashes or skin lesions  NEURO: AOx1 (person only), L-sided facial droop, chronic, symmetrical 4+/5 strength and intact sensation, CNII-XII intact aside from facial droop.  Psych: Appropriate mood and affect, conversatoinal    Lines/Tubes: PIV x1    Labs & Studies of Note:    CBC    Recent Labs  Lab 03/26/18 2006 03/26/18   WBC 7.9  --    RBC 4.50  --    HGB 15.1 13.2*   HCT 44.7  --    MCV 99  --    MCH 33.6*  --    MCHC 33.8  --    RDW 16.5*  --    PLT 92*  --      BMP    Recent Labs  Lab 03/27/18  0156 03/26/18 2006 03/26/18 03/22/18   NA  --  134 138 137   POTASSIUM  --  4.3 4.4 4.1   CHLORIDE  --  99 100 100   CO2  --  24 22 26   ANIONGAP  --  12  --   --    GLC  --  109* 84 103*   BUN  --  83* 87* 79*   CR  --  3.42* 3.39* 2.56*   GFRESTIMATED  --  18* 17* 24*    GFRESTBLACK  --  21* 19* 27*   RIGOBERTO  --  9.5 9.3 9.7   PHOS 4.4  --   --   --       INR    Recent Labs  Lab 03/27/18  0942 03/26/18 2006   INR 3.88* 2.67*     Liver panel    Recent Labs  Lab 03/26/18 2006   PROTTOTAL 7.8   ALBUMIN 3.4   BILITOTAL 2.6*   ALKPHOS 114   AST 62*   ALT 56     Trop   Lab Results   Component Value Date    TROPI 0.362 (HH) 03/27/2018    TROPI 0.314 (HH) 03/27/2018    TROPI 0.282 (HH) 03/26/2018    TROPI 0.094 (H) 02/14/2013    TROPI 0.086 (H) 02/14/2013     BCx ngtd x2  UCx pending    EKG reviewed. afib rate 90s, RBBB  Imaging reviewed.  CT head   Head CT with ventriculomegaly and no obstructive lesions, no acute changes.    CT c/a/p  Nodular posterior b/l lower lobe consolidations c/f infection vs aspiration, mod-large ascites, hepatomegaly and cardiomegaly.    TTE  Interpretation Summary  Small LV cavity size with severely increased maximum wall thickness (2.8cm).  Severely reduced left ventricular function with LVEF 15-20%.  Global peak LV longitudinal strain is abnormal averaged at -6.9%.  Normal RV size with severe RVH and moderately reduced RV function.  Mild to moderate mitral insufficiency is present.  IVC dilation with estimated mean right atrial pressure of 15 mmHg.  No pericardial effusion is present.  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    Medications list for Reference  Current Facility-Administered Medications   Medication     acetaminophen (TYLENOL) tablet 650 mg     atorvastatin (LIPITOR) tablet 80 mg     melatonin tablet 3 mg     lidocaine 1 % 1 mL     lidocaine (LMX4) kit     sodium chloride (PF) 0.9% PF flush 3 mL     sodium chloride (PF) 0.9% PF flush 3 mL     nitroGLYcerin (NITROSTAT) sublingual tablet 0.4 mg     acetaminophen (TYLENOL) Suppository 650 mg     Patient is already receiving anticoagulation with heparin, enoxaparin (LOVENOX), warfarin (COUMADIN)  or other anticoagulant medication     senna-docusate (SENOKOT-S;PERICOLACE)  8.6-50 MG per tablet 1 tablet    Or     senna-docusate (SENOKOT-S;PERICOLACE) 8.6-50 MG per tablet 2 tablet     bisacodyl (DULCOLAX) EC tablet 5 mg    Or     bisacodyl (DULCOLAX) EC tablet 10 mg    Or     bisacodyl (DULCOLAX) EC tablet 15 mg     polyethylene glycol (MIRALAX/GLYCOLAX) Packet 17 g     potassium chloride SA (K-DUR/KLOR-CON M) CR tablet 20 mEq     0.9% sodium chloride BOLUS

## 2018-03-27 NOTE — ED PROVIDER NOTES
"  History     Chief Complaint   Patient presents with     Abnormal Labs     The history is provided by medical records, the spouse and the patient (wife). The history is limited by the condition of the patient.     Josr Landers is a 77 year old male with a history of acute CVA, amyloidosis, chronic combined systolic and diastolic heart failure, CKD stage 4, paroxysmal atrial fibrillation (anticoagulated on warfarin), and NSTEMI who presents for evaluation of abnormal laboratory studies. History is provided by the patient's wife due to patient sleeping (though does wake and provide some of his own history).     Patient's wife reports three weeks ago the patient was \"fairly normal\" and at baseline was able to perform most of his daily activities with clear, oriented mental status. Around that time the patient's Lexapro dosage was increased from 5 to 10 mg. The patient was also seen and evaluated for left foot pain secondary to arthritis at [facility] where he received a cortisone injection and was started on Tylenol #3. The following day (2.5 weeks ago) the patient complained to his wife that he \"felt funny all over\" with generalized weakness at which time his wife reports he also stopped ambulating and became confused. She reports he was admitted at [facility] from 03/09/18-03/12/18 for these symptoms, and per Care Everywhere, was instructed to discontinue both his Lexapro and Tylenol #3 to see if his mental status would clear. Patient's wife feels the patient has improved slightly since that time, however, has not returned to his baseline mental status, and has continued to sleep \"24 hours a day\" and have generalized weakness with inability to ambulate independently. She also reports the patient has had gradually worsening abdominal distention with abdominal pain which is new over the past couple of weeks. She does not believe the patient has had changes in bowel or bladder movements, though does note he has " "had incontinence, both urinary and bowel, since he has been living at the [facility] for the past couple of weeks since he was discharged from [facility]. Additionally, she reports the over the past day the patient has developed a \"rattling\" dry cough, and did have two episodes of vomiting today. She states the patient has had decreased water intake, so is wondering if this is what could have caused his elevated creatinine (3.39), but also reports the patient was hypertensive today which is new compared to his baseline which is in the 90s-low 100s. No fevers, chest pain, numbness, or tingling. Patient did wake momentarily and did complain of abdominal pain as well as lower back pain, denied pain elsewhere, no breathing symptoms. No traumas. He is anticoagulated on Coumadin 5 mg daily. No other symptoms or complaints at this time. Please see ROS for further details.    I have reviewed the Medications, Allergies, Past Medical and Surgical History, and Social History in the [employer] system.  Past Medical History:   Diagnosis Date     Atrial fibrillation (H) 6/25/2012     Atrial fibrillation: ablation Dr Jonathan Arvizu/[place] 6/25/2012     Cardiac amyloid: ATTR per cardiac biopsy [facility] 2009 6/27/2012     Coronary artery disease      Other and unspecified hyperlipidemia 6/27/2012     Tubular adenoma of colon 2010     Venous insufficiency        Past Surgical History:   Procedure Laterality Date     BACK SURGERY       H PENUMBRA SEPARATOR 3D       KNEE SURGERY       HI PATIENT HAS A CORONARY ARTERY STENT       SHOULDER SURGERY         No family history on file.    Social History   Substance Use Topics     Smoking status: Former Smoker     Quit date: 2/1/1977     Smokeless tobacco: Never Used     Alcohol use No       Current Facility-Administered Medications   Medication     0.9% sodium chloride BOLUS     Current Outpatient Prescriptions   Medication     TRAZODONE HCL PO     MELATONIN PO     Acetaminophen (TYLENOL PO)     " EMOLLIENT EX     Warfarin Therapy Reminder     order for DME     acetaminophen (TYLENOL) 500 MG tablet     order for DME     Potassium Chloride ER 20 MEQ TBCR     atorvastatin (LIPITOR) 80 MG tablet     bumetanide (BUMEX) 1 MG tablet     order for DME     order for DME     other medical supplies        Allergies   Allergen Reactions     Gabapentin Other (See Comments)     Altered mental status     Lyrica      Severe swelling of body and fluid around heart.        Review of Systems   Constitutional: Negative for chills, diaphoresis and fever.   HENT: Negative for ear pain, sore throat, tinnitus, trouble swallowing and voice change.    Eyes: Negative for pain and visual disturbance.   Respiratory: Positive for cough (dry). Negative for shortness of breath.    Cardiovascular: Negative for chest pain, palpitations and leg swelling.   Gastrointestinal: Positive for abdominal distention, abdominal pain (diffuse) and vomiting (x2 today). Negative for blood in stool, constipation, diarrhea and nausea.   Endocrine: Negative for polydipsia and polyuria.   Genitourinary: Negative for dysuria, frequency, hematuria and urgency.   Musculoskeletal: Positive for back pain (lower). Negative for neck pain.   Skin: Negative for color change and rash.   Allergic/Immunologic: Negative for immunocompromised state.   Neurological: Positive for weakness (generalized). Negative for dizziness, light-headedness, numbness and headaches.   Hematological: Negative for adenopathy. Does not bruise/bleed easily.   Psychiatric/Behavioral: Positive for confusion and sleep disturbance (increased). Negative for dysphoric mood. The patient is not nervous/anxious.        Physical Exam   BP: (!) 155/98  Heart Rate: 75  Temp: 97.9  F (36.6  C)  Resp: 18  Weight: 75.3 kg (166 lb)  SpO2: 97 %      Physical Exam  CONSTITUTIONAL: Well-developed and well-nourished. Sleeping initially, but wakes easily. Non-toxic appearance. No acute distress.   HENT:   - Head:  Normocephalic and atraumatic.   - Ears: Hearing and external ear grossly normal.   - Nose: Nose normal. No rhinorrhea. No epistaxis.   - Mouth/Throat: MMM  EYES: Conjunctivae and lids are normal. No scleral icterus.   NECK: Normal range of motion and phonation normal. Neck supple.  No tracheal deviation, no stridor. No edema or erythema noted.  CARDIOVASCULAR: Normal rate, regular rhythm and no appreciable abnormal heart sounds.  PULMONARY/CHEST: Normal work of breathing. No accessory muscle usage or stridor. No respiratory distress.  No appreciable abnormal breath sounds.  ABDOMEN: Soft, perhaps mild distension. No rigidity, rebound or guarding.   MUSCULOSKELETAL: Extremities warm and seemingly well perfused. No edema or calf tenderness.  NEUROLOGIC: Awake, alert. Not disoriented.  Normal tone. No seizure activity. GCS 15  SKIN: Skin is warm and dry. No rash noted. No diaphoresis. No pallor.   PSYCHIATRIC: Normal mood and affect. Speech and behavior normal. Thought processes linear. Cognition and memory are normal.     ED Course     ED Course     Procedures       8:26 PM  The patient was seen and examined by Dr. Schneider in Room 19.          EKG Interpretation:      Interpreted by Donna Schneider MD  Time reviewed: 18:33  Symptoms at time of EKG: abnormal labs  Rhythm: atrial fibrillation with premature ventricular or aberrantly conducted complexes  Rate: 90 bpm  Axis: Left Axis Deviation  Ectopy: none  Conduction: right bundle branch block; possible lateral infarct (age undetermined); inferior infarct (noted on/before 2012)  ST Segments/ T Waves: No ST-T wave changes  Comparison to prior on 03/17/16: LAD, RBBB, + infarct morphology grossly unchanged       Lactate greater than 1.9 w/o clear findings for sepsis (afebrile, no leukocytosis, no infectious sources identified on preliminary workup, wife reporting very low PO intake, etc. Would recommended continued workup, however.        Labs Ordered and Resulted from Time  of ED Arrival Up to the Time of Departure from the ED   CBC WITH PLATELETS DIFFERENTIAL - Abnormal; Notable for the following:        Result Value    MCH 33.6 (*)     RDW 16.5 (*)     Platelet Count 92 (*)     All other components within normal limits   COMPREHENSIVE METABOLIC PANEL - Abnormal; Notable for the following:     Glucose 109 (*)     Urea Nitrogen 83 (*)     Creatinine 3.42 (*)     GFR Estimate 18 (*)     GFR Estimate If Black 21 (*)     Bilirubin Total 2.6 (*)     AST 62 (*)     All other components within normal limits   INR - Abnormal; Notable for the following:     INR 2.67 (*)     All other components within normal limits   AMMONIA - Abnormal; Notable for the following:     Ammonia <10 (*)     All other components within normal limits   CRP INFLAMMATION - Abnormal; Notable for the following:     CRP Inflammation 54.0 (*)     All other components within normal limits   ERYTHROCYTE SEDIMENTATION RATE AUTO - Abnormal; Notable for the following:     Sed Rate 24 (*)     All other components within normal limits   TROPONIN I - Abnormal; Notable for the following:     Troponin I ES 0.282 (*)     All other components within normal limits   ISTAT  GASES LACTATE NALLELY POCT - Abnormal; Notable for the following:     PO2 Venous 19 (*)     Bicarbonate Venous 29 (*)     Lactic Acid 2.3 (*)     All other components within normal limits   ROUTINE UA WITH MICROSCOPIC   ISTAT CG4 GASES LACTATE NALLELY NURSING POCT   URINE CULTURE AEROBIC BACTERIAL            Assessments & Plan (with Medical Decision Making)   IMPRESSION: 77-year-old male with PMH notable for cardiac amyloid, chronic kidney disease, previous stroke, constipation, adjustment disorders, CAD, amongst others who was referred in for a near direct admit by the Cardiology team for worsening renal function (creatinine increased from the 2s-3s), though on arrival here in the ED, patient is also found to perhaps slightly confused have , some abdominal distention, with  diffuse discomfort (not peritonitic), with pain radiating to his back, as well as mild cough.  As described further above in the HPI/ROS.  Clinically, he appears nontoxic, he is sleeping, but is arousable.  He is generally oriented though slightly confused when awoken.  No significant cardiopulmonary findings on exam, more of note is somewhat distended abdomen, not clearly peritoneal, but diffusely tender.  No other acute findings currently on exam.    DDX includes but is not limited to, causes of delirium such as medication effect, uremia given his worsening renal disease, occult infection such as from PNA, UTI, or some sort of intra-abdominal process given his abdominal exam, amongst others.    PLAN: Laboratory studies, urine studies, EKG, imaging of the head, chest/abdomen/pelvis (noncontrast CT given acute worsening of renal function).    RESULTS:  See ED Course section above for particular pertinent findings and comments  - Labs: Lactate 2.3, CO2 normal, pH 7.41, Hgb 15.1 (up from previous), no leukocytosis, PLT 92 (up from 79), creatinine 3.42, BUN 83 (was 2.56 on 3/2 2 and 79 on the same day for the BUN), INR 2.67, negative ammonia, CRP 54, ESR 24, troponin 0 0.282.  - Urine: No sample yet provided  - Imaging: Images and written preliminary reports reviewed by myself and revealed:  --- CXR: 1. Cardiomediastinal silhouette is enlarged, similar to prior MRI. 2. Perihilar opacities, likely pulmonary edema.   --- CT Head: Ventriculomegaly  --- CT Chest/Abdomen/Pelvis: No acute findings reported on prelim, final report pending    INTERVENTIONS:   - Cardiology consult    RE-EVALUATION:  See ED Course section above for particular pertinent findings and comments  - Pt continues to do relatively well here in the ED, no acute issues or apparent concerning changes in vitals or clinical appearance.     DISCUSSIONS:  - w/ Cardiology: They have seen and evaluated the patient here in the ED.  They are in agreement with  pausing the plan for the near direct admission to allow us to medically workup to some degree his delirium and and abdominal findings.  They are also in agreement with our CT plan.  If none of this shows an acute process for which she should not be admitted to the Cardiology service, such as a surgical abdominal process, they are willing to take the patient in the nonsurgical type of conditions.  - w/ Patient: I have reviewed the available findings, plan with the patient/his loved one. They expressed understanding and agreement with this plan. All questions answered to the best of our ability at this time.     DISPOSITION/PLANNING:  - IMPRESSION: LOIS, elevated troponin  - DISPOSITION: Admit to Cardiology for further evaluation/management  --- Pending: Finalized imaging reports, urine (no sample yet provided)  --- Other recommendations: Further workup/consults for ventriculomegaly      ______________________________________________________________________________    - I have reviewed the available nursing notes.    New Prescriptions    No medications on file       Final diagnoses:   Acute kidney injury (H)   IZahra, am serving as a trained medical scribe to document services personally performed by Donna Schneider MD, based on the provider's statements to me.   Donna MARTINEZ MD, was physically present and have reviewed and verified the accuracy of this note documented by Zahra Christian.      3/26/2018   [facility], EMERGENCY DEPARTMENT     Donna Schneider MD  03/30/18 [id number]

## 2018-03-27 NOTE — PHARMACY-ANTICOAGULATION SERVICE
Clinical Pharmacy - Warfarin Dosing Consult     Pharmacy has been consulted to manage this patient s warfarin therapy.  Indication: Atrial Fibrillation  Therapy Goal: INR 2-3  Warfarin Prior to Admission: Yes  Warfarin PTA Regimen: MWF 2.5 mg and 5 mg ROW  Significant drug interactions: None  Dose Comments: Doses held 3/23, 3/24, and 3/25 per high INR    INR   Date Value Ref Range Status   03/27/2018 3.88 (H) 0.86 - 1.14 Final   03/26/2018 2.67 (H) 0.86 - 1.14 Final     Recommend holding warfarin dose today.  Pharmacy will monitor Josr Landers daily and order warfarin doses to achieve specified goal.      Please contact pharmacy as soon as possible if the warfarin needs to be held for a procedure or if the warfarin goals change.      Tsering Connell, PD4, [employer] Pharmacy Student

## 2018-03-27 NOTE — H&P
Cardiology History and Physical  Josr Landers MRN: [id number]  Age: 77 year old, : 1940  Primary care provider: Ángel Oates           Chief Complaint:     Abdominal pain, altered mental status, shortness of breath          History of Present Illness:     History obtained from wife as patient with altered mental status.     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     [facility] was [facility] about 2 weeks ago because of generalized weakness. At that time he was receiving tylenol 3 for pain and increased dose of lexapro. He had difficulty walking, being wobbly and frequent episodes of altered mental status. CT from that admission did not show any recurrent CVA but did reveal ventriculomegaly which was new compared to a year earlier. Lexapro and tylenol 3 were discontinued and mental status improved, but did not resolve. Additionally, he was found to be volume overloaded and was diuresed with oral bumex. Admissoin weight was 172lb and discharge 167. There was discussion that due to progressively worsening renal function patient might be a dialysis candidate soon. Palliative care was also consulted during admission but no decisions were made on goals of care. He was discharged to TCU.     Since then, wife states mental status has not improved. Also notices he has urinary incontinence and issues with walking. Reports abdominal distention and decreasing urinary output. Although she states oral intake also inadequate. She otherwise reports no other symptoms.            Past Medical History:     Past Medical History:   Diagnosis Date     Atrial fibrillation (H) 2012     Atrial fibrillation: ablation Dr Jonathan Arvizu/[facility] 2012     Cardiac  amyloid: ATTR per cardiac biopsy [facility] 2009 6/27/2012     Coronary artery disease      Other and unspecified hyperlipidemia 6/27/2012     Tubular adenoma of colon 2010     Venous insufficiency               Past Surgical History:      Past Surgical History:   Procedure Laterality Date     BACK SURGERY       H PENUMBRA SEPARATOR 3D       KNEE SURGERY       LA PATIENT HAS A CORONARY ARTERY STENT       SHOULDER SURGERY                Social History:     Social History   Substance Use Topics     Smoking status: Former Smoker     Quit date: 2/1/1977     Smokeless tobacco: Never Used     Alcohol use No              Family History:      Family history reviewed and updated in EPIC  Father with unspecified heart disease per chart          Allergies:     Allergies   Allergen Reactions     Gabapentin Other (See Comments)     Altered mental status     Lyrica      Severe swelling of body and fluid around heart.               Medications:     Per discharge summary from [facility]  acetaminophen (TYLENOL) 325 MG tablet   Take 2 Tabs by mouth every 4 hours as needed for Other (Mild Pain (pain score 1-4), Temperature Greater Than 37.5 C). 100 Tab   11 02/15/2017   Active   atorvastatin (LIPITOR) 80 MG tablet    Indications: Cerebrovascular Accident Take 1 Tab by mouth every evening. Indications: Cerebrovascular Accident or Stroke 90 Tab   3 02/15/2017   Active   warfarin (COUMADIN) 5 MG tablet    Indications: Thromboembolism Take 0.5-2 Tabs by mouth daily. Goal INR 2.5-3.0 Indications: Blood Vessel Obstruction by a Blood Clot 30 Tab   0 02/15/2017   Active   potassium chloride SA 20 MEQ TBCR    Indications: Hypokalemia Take 1 Tab by mouth daily. Indications: Low Amount of Potassium in the Blood 30 Tab   11 05/23/2017   Active   emollient (CETAPHIL,ELTA,EUCERIN,LUBRIDERM) lotion    Indications: dry skin Apply topically three times a day as needed. Indications: dry skin     03/12/2018   Active   bumetanide (BUMEX) 1 MG  tablet    Indications: Edema Take 1 Tab by mouth two times a day. Indications: Edema 90 Tab   0 03/12/2018   Active                     Physical Exam:     B/P: 97/77, T: 97.9, P: Data Unavailable, R: 18    Wt Readings from Last 4 Encounters:   03/26/18 75.3 kg (166 lb)   03/20/18 77.1 kg (170 lb)   03/13/18 76.1 kg (167 lb 12.8 oz)   02/07/18 76.1 kg (167 lb 11.2 oz)     No intake or output data in the 24 hours ending 03/26/18 2250    Gen: Awake, lethargic, only oriented to person, in pain  HEENT:AT/ NC, PERRL b/l, EOM grossly intact, mucous membranes pink, dry no exudate  PULM/THORAX: Clear to auscultation bilaterally, no rales/rhonchi/wheezes  CV:RRR, S1 and S2 appreciated, no extra heart sounds, murmurs or rub auscultated. JVD difficult to assess as patient not cooperative   ABD: soft, depressible, distended, tender in all quadrants, reduced bowel sounds, no organomegaly  EXT: +2 pitting edemaNo edema, clubbing or cyanosis. No asymmetrical edema or tenderness to palpation in calves bilaterally.  NEURO: difficult to fully assess as patient not following commands, but no focal neurological deficit    Lines  2PIV    Drips  None          Data:     Labs Reviewed on Admission  Pertinent for:  Lab Results   Component Value Date    TROPI 0.282 (HH) 03/26/2018    TROPI 0.094 (H) 02/14/2013    TROPI 0.086 (H) 02/14/2013    TROPI 0.097 (H) 02/13/2013               Most Recent Imaging:     Stress Test:  None on file    Echo: 03/10/18  Left ventricular ejection fraction is visually estimated at 30%.  Very severe concentric wall thickening consistent with left  ventricular hypertrophy is present, consistent with known amyloidosis.  Septum measures 3.3 cm, posterior wall measures 2.9 cm.  Right ventricular wall thickness is severely increased. Mild right  ventricular dilation is present. Global right ventricular systolic  function is severely reduced.  Severe biatrial enlargement.  Severe mitral regurgitation.  Moderate  tricuspid regurgitation.  Estimated right atrial pressure is > 15 mmHg.  Small (<10 mm) circumferential pericardial effusion without tamponade  physiology.  Left pleural effusion is noted.    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     #Altered mental status  #Ventriculomegaly  #Past CVA right MCA  CT scan with enlarged venticules. No mass or CVA. Patient's history concerning for NPH. Not on narcotic meds or mood stabilizers. Another consideration is that mental status worsened by uremia. No evidence of sepsis.   - Neurology consulted for possible NPH or further imaging evaluation  - Urine cultures  - Blood cultures    #Troponemia  Troponin on admission 0.28. Has known hx of CAD. There could be demand component in the setting of volume depletion along with poor renal clearance. No chest pain per wife. EKG with RBBB, but relatively unchanged from about 1 year ago.   - Trend troponin q 4hrs x2    #Cardiac amyloidosis  #HFrEF NYHA II/III C  Patient with LVEF 30% from [facility] two weeks ago which was virtually unchanged compared to 09/2017. Appears dry on exam. Was diuresed on recent admission from 172lbs to 167lbs, today 164lbs.   - Hold diuretics, previously on bumex 1mg BID  - Echo in the AM  - Will evaluate for goal directed therapy  - Strict I/Os  - Daily weight    #CAD s/p ATIF to LAD 2015  - Continue home statin  - Not on aspirin or BB, will address in AM    #Afib/flutter s/p CTI ablation 2006  Remains in a fib, but normal HR. Was on sotalol but this was discontinued around 08/2017 per outside cardiology notes.   - Pharmacy to dose warfarin  - INR    #LOIS on CKD IV  #Lacitic acidemia  #?Uremia  Could be secondary to amyloid  involving kidney and overdiuresis as patient appeared dry on exam. There has been some discussions on need for dialysis.  - Fluid challenge  - Nephrology consulted   - Trend lactic acid  - UA with microscopy    #Abdominal distension  #Abdominal pain  #Hyperbilirubinemia  #Ascites  Initially concerned for SBO due to past narcotic med use, but CT scan reassuring. Elevated lactic acid in the setting of atrial fibrillation and abdominal pain concerning for bowel ischemia due to thrombus but INR therapeutic and CT without evidence for such. Low suspicion for pancreatitis based on lack of CT findings. AST mildly elevated along with bilirubin. CT scan with ascites and hepatomegaly. Ammonia levels low.   - Pending CT final results for more description on hepatobiliary structures  - Deferring paracentesis to above work up for altered mental status    FEN: low sodium, low fat  PPX: on warfarin  Code Status: FULL  Dispo: pending work up of AMS     Patient to be staffed in AM.    Ricardo Juan  PGY-2 Internal Medicine  Cardiology Service  Pager: [phone]              I have seen, interviewed, and examined patient. I have reviewed the laboratory tests, imaging, and other investigations. I have reviewed the management plan with the patient. I discussed with the team and agree with the findings and plan in this resident/fellow/nurse practitioner's note. In addition, changes in the physical examination, assessment and plan have been incorporated into the note by myself, as to make it a single cohesive document.       Chantel Nunez MD, MS  Cardiology/Cardiac EP Attending Staff

## 2018-03-27 NOTE — CONSULTS
"[facility]: [place]    General Neurology Consult    Patient Name:  Alisia Landers  MRN:  [id number]    :  1940  Date of Admission:  3/26/2018  Date of Service:  2018  Primary care provider:  Ángel Oates      History of Present Illness:   Mr. Alisia Landers is a very  pleasant 76 y/o male with PMH that is pertinent for HFrEF 2/2 NICM+ICM (last EF 30%), NSTEMI s/p ATIF to LAD in , Cardiac amyloidosis (biopsy proven ) , A. Fib/flutter on warfarin s/p ablation , CKD IV , Right MCA CVA 2017 admitted s/p mechanical thrombectomy who was admitted on 3/26 from TCU for worsening MS. Neurology were consulted to assist with evaluation.     History was obtained from Alisia and his wife, alisia became agitated towards the end of the interview.     Per patient he has been having worsening mental status over the past two months. Patient reports that he doesn't feel it as a big of a deal as people around him think. His main concern was: \"I am not able to answer question as clearly as people around me would like me to, I got to be careful with my answers\". Since it started patient feels that this has been getting worse. He reports that this has been associated with left leg weakness. He has had multiple falls over the past 2 months , denies light headedness or loss of consciousness prior to the fall \"leg is not listening to body\". Patient also reports that he has been having difficulty with urination , he reports that the stream is weaker and has been having polyuria . No dysuria , patient refused to answer if he has sense of incomplete emptying.     Wife reports that alisia has not been him self over the past 2-3 months. He was initially complaining of generalized weakness, fatigue, and increased somnolence. He was seen by his PCP and started on Lexapro for possible depression. This didn't help . Three weeks ago the patient was started on Tylenol 3 , increased " "lexapro dose to 10 mg and was also given a cortisone injection to the 1st, 2nd, and 3rd tarsometatarsal joints for arthritis. The day after patient became progressively more somnolent \"He didn't speak a word\" and more altered \"couldnt remember where the stairs are at\". Wife called 911 and patient was admitted to [facility]. His presentation was felt to be 2/2 lexapro and tylenol 3 (codeine) as well as potential worsening of his HF and kidney function. Patient was discharged a TCU however given non improvement he was advised by his cardiologist to [facility] to the [facility] for [facility] evaluation.     Off note patient Saw Neurology on 3/6 for non responsive spells.  Wife reports that he had about 10 episodes of 1-2 minutes of non responsive spells where alisia goes blank and has a blank gaze and becomes minimally responsive. All episodes occurred when patient was about to get to bed. She has a video off the episodes. These episodes where felt to be cardiac in origin (global brain hypoperfusion 2/2 HFrEF)    ROS: A 10-point ROS is negative unless indicated above.      Allergies:  Allergies   Allergen Reactions     Gabapentin Other (See Comments)     Altered mental status     Lyrica      Severe swelling of body and fluid around heart.        Medications:    Prescriptions Prior to Admission   Medication Sig Dispense Refill Last Dose     MELATONIN PO Take 3 mg by mouth nightly as needed   Past Week at Unknown time     Acetaminophen (TYLENOL PO) Take 650 mg by mouth 3 times daily   Taking     EMOLLIENT EX Externally apply topically 3 times daily as needed   Taking     order for DME Equipment being ordered: Stair Lift   Length of need: Lifetime  Chronic kidney disease, coronary artery disease, acute CVA  02/12/2017 distal right M1 segment of middle cerebral artery.At risk of falls due to stroke and deconditioning. He is working with therapists, uses wheelchair and assistive devices.  His home has stairs, would benefit  from [place] " stair chair lift as he has difficulty climbing stairs required in home. 1 each 0 Taking     acetaminophen (TYLENOL) 500 MG tablet Take 650 mg by mouth every 6 hours as needed for mild pain or pain    Taking     order for DME Equipment being ordered: Bedside commode 1 Device 0 Taking     Potassium Chloride ER 20 MEQ TBCR Take 1 tablet (20 mEq) by mouth daily 90 tablet 1 Taking     atorvastatin (LIPITOR) 80 MG tablet Take 1 tablet (80 mg) by mouth daily 90 tablet 3 Taking     bumetanide (BUMEX) 1 MG tablet 2 mg in the morning and 1 in the afternoon   Taking     order for DME Sequential pneumatic compression pumps 2-3 times per day as needed for lymphedema. Measure and fit.  Compression strength per protocol. 1 Units 0 Taking     order for DME Equipment being ordered: DME compression stockings knee high 30 mm hg 5 each 3 Taking     other medical supplies JENIFER stocking bilateral lower extremities 2 packet 3 Taking       PMH:  Past Medical History:   Diagnosis Date     Atrial fibrillation (H) 6/25/2012     Atrial fibrillation: ablation Dr Jonathan Arvizu/[facility] 6/25/2012     Cardiac amyloid: ATTR per cardiac biopsy [facility] 2009 6/27/2012     Coronary artery disease      Other and unspecified hyperlipidemia 6/27/2012     Tubular adenoma of colon 2010     Venous insufficiency      Past Surgical History:   Procedure Laterality Date     BACK SURGERY       H PENUMBRA SEPARATOR 3D       KNEE SURGERY       [place] PATIENT HAS A CORONARY ARTERY STENT       SHOULDER SURGERY         Social History:  Social History   Substance Use Topics     Smoking status: Former Smoker     Quit date: 2/1/1977     Smokeless tobacco: Never Used     Alcohol use No     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     Family History:    No family history on file.    Physical Examination:   Vitals:  B/P: 95/70, T: 97.5, P: Data Unavailable, R: 18    General:  Alert and awake, oriented x2 (Refused to  answer date),   HEENT:  NC/AT, no icterus, op pink and moist, no ear or nose drainage - JVP to ear lobe   Cardiac:  RRR, no m/r/g  Chest:  Anterior fields CTAB  Abdomen:  Soft , distended , non-tender , BS+ , no HSM   Extremities:  +2 pitting LE edema to knee   Skin:  No rash or lesion.      Neuro Exam:  Mental status: alert and awake , eyes open > 10 seconds , organized thinking , immediate memory intact. Patient refused   To answer the rest of MOCA components (question , attention , delayed recall, reading , writing)  Cranial nerves: CN II - XII intact  Motor: Strength 5/5 in proximal AE and LE except for shoulder 4/5 bilaterally   Reflexes: 2 in triceps, quad , ankle   Sensory: intact sensation to light touch in LE , decreased proprioception/vibration sensation   Coordination: no dysmetria   Gait: was not assessed   Muscle tone: spacticity present in upper extremity flexors     Investigations:  Data     CMP   Recent Labs  Lab 03/27/18 0156 03/26/18 2006 03/26/18 03/22/18   NA  --  134 138 137   POTASSIUM  --  4.3 4.4 4.1   CHLORIDE  --  99 100 100   CO2  --  24 22 26   ANIONGAP  --  12  --   --    GLC  --  109* 84 103*   BUN  --  83* 87* 79*   CR  --  3.42* 3.39* 2.56*   GFRESTIMATED  --  18* 17* 24*   GFRESTBLACK  --  21* 19* 27*   RIGOBERTO  --  9.5 9.3 9.7   PHOS 4.4  --   --   --    PROTTOTAL  --  7.8  --   --    ALBUMIN  --  3.4  --   --    BILITOTAL  --  2.6*  --   --    ALKPHOS  --  114  --   --    AST  --  62*  --   --    ALT  --  56  --   --         CBC   Recent Labs  Lab 03/26/18 2006 03/26/18   WBC 7.9  --    RBC 4.50  --    HGB 15.1 13.2*   HCT 44.7  --    MCV 99  --    MCH 33.6*  --    MCHC 33.8  --    RDW 16.5*  --    PLT 92*  --        INR, PTT   Recent Labs  Lab 03/26/18 2006   INR 2.67*        Arterial Blood Gas No lab results found in last 7 days.    UA  No lab results found in last 7 days.    Micro   Recent Labs  Lab 03/27/18  0156   SDES Blood Left Hand   CULT No growth after 4 hours            ==========================================================    ASSESSMENT/PLAN:   Mr. Josr Landers is a very  pleasant 76 y/o male with PMH that is pertinent for cardiac amyloidosis (Biopsy proven 2009) , A. Fib/flutter on warfarin s/p ablation 06, NSTEMI s/p ATIF to LAD in 2015, CKD IV , Right MCA CVA 2/2017 admitted s/p mechanical thrombectomy who was admitted on 3/26 from TCU for worsening MS. Neurology were consulted to assist with evaluation.     ## Progressive worsening mental status (Encephalopathy)  ## Progressive Dementia ?   Patient presentation is consistent with progressive dementia however we cant rule out delirium in the hospital setting .   Potential Neurological causes include:  Normal pressure Hydrocephalus : this diagnosis is tempting given history of difficulty walking , progressive dementia , and history of urinary incontinence . Patient does have signs of long tract involvement on exam such as increased spacticity. The presence of ventriculomegaly is suggestive of NPH but fairly non specific and can be noted with advanced aging.   DDx include;  Recurrent CVA / vascular dementia (no apparent focal deficit on exam, c/w specific territorial lesion)   Alzheimer's   Vitamin B12 def/hypothyrodism   Non convulsive status epilepticus (history of non responsive episodes , though atypical for seizure).     Non Neurological causes:  Patient does have evidence of worsening HFrEF (reduced EF 15-20%) and evidence of cardiogenic shock (elevated lactate , worsening kidney function) which could lead to global hypoperfusion and explain the progressive worsening in mental status, and weakness. Positional change may have lead to decreased cerebral flow and non responsive episodes noted by family members.     For now would start work up by obtaining MRI and EEG to evaluate for CVA and potential seizure acitivity. Would also obtain TSH and B12 to r/o reversible causes of dementia. Agree with primary team  work up to r/o causes of delirium (infectious work up).     - Please obtain Non contrast MRI  - Please obtain 24 hour video EEG   - Seizure precautions   - Please obtain TSH / Vitamin B12 / MMA   - Agree with Blood/Urine cultures   - Low suspicion for CNS infection at this point   - If the above is negative would pursue large volume LP (30 to 50 ml) with documentation of the patient's gait function before and within 30 to 60 minutes after the procedure (Would obtain OT and PT assessment for the prior). Anticoagulation should be held prior to this , would defer decision to cardiology.   - Will review head imaging from [facility] (called and asked for images to be pushed to our system).     =========================================================    Thank you for this interesting consult , Neurology will continue to follow along .  This patient was seen & discussed with my attending, Dr. Will, who agrees with my assessment and plan.     Marla Champion  PGY2  Pager: [phone]

## 2018-03-27 NOTE — PROGRESS NOTES
Focus: Admission  Diagnosis: AMS, abdominal pain, SOB  Admitted from: TCU  Via: stretcher   Accompanied by: wife  Belongings: clothing, in room with patient.  Admission paperwork: complete.   Teaching: Call don't fall, use of console, meal times, visiting hours, orientation to unit, when to call for the RN (angina/sob/dizzyness, etc.), and stressed the importance of safety.   Access: PIV   Telemetry: Placed on pt   Ht./Wt.: complete    Disoriented to situation, place, time. VSS on room air. A fib, 90's-90's with occasional PVC. C/o abdominal discomfort, tolerable. 500 cc bolus x1 for elevated lactic acid. Troponin trending up, Cards crosscover notified. Incontinent of urine. Ax2, turned q2h. Slept between cares. Continue with POC. Nephrology and neurology consult in AM.

## 2018-03-28 NOTE — CONSULTS
[facility] ID SERVICE: NEW CONSULTATION  Josr Landers : 1940 Sex: male:   Medical record number [id number] Attending Physician: Chantel Nunez MD  Date of Service: 2018    INFECTIOUS DISEASE SERVICE HISTORY & PHYSICAL   Josr Landers ([id number]) admitted on 3/26/2018  Primary care provider: Ángel Oates      Reason for consult:   Altered mental status with concern for underlying infectious etiology    --------------------------------------------------------------  ASSESSMENT & PLAN :  Assessment  1. Suspected pneumonia - Aspiration vs infectious. No cough, SOB, increased sputum production or new oxygen requirements. CT chest from 3/27 w/ nodular consolidations in posterior lower lobes. Currently being treated for an aspiration pneumonia w/ Unasyn (3/27 till present)  2. Suspected UTI - UCx growing >100,000 colonies of Klebsiella pneumoniae, along with oliguria. Unclear if asymptomatic bacteriuria vs true UTI as patient is altered and unable to provide history. Afebrile w/ no leucocytosis  3. Altered mental status - multiple possible etiologies including NPH, subacute CVA, occult seizures, ongoing infection as above, uremia.   4. Ascites - Seen on CT A/P. Per wife, no prior hx of hepatic pathology.  5. Cardiogenic shock - EF 15-20%: currently being diuresed.  6. Cardiac amyloidosis    Recommendations  - D/C Unasyn  - Transition to IV Zosyn which should provide adequate coverage for potential hospital acquired pathogens that may be causing suspected UTI/aspiration pneumonia/possible SBP. (Patient has been exposed to multiple healthcare environments in the last two weeks) - Pharmacy to dose, given worsening renal function  - Recommend trending procalcitonin Q 2-3 days  - with empiric treatment of UTI and possible aspiration pneumonia, anticipate these etiologies are more likely a consequence of his AMS rather than the root cause; treatment indicated  independently and each may be a contributor     Discussions  Josr Landers is a 77 year old male with a PMHx of biopsy confirmed cardiac amyloidosis, afib/flutter on warfarin s/p CTI ablation 2006, CAD s/p ATIF to LAD in 2015, CKD stage IV and 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 Mr. Landers has multiple reasons to be altered with the relatively new ventriculomegaly suspicious for NPH (Wife notes he has difficulty ambulating and incontinence concurrently in the last few weeks). Other reasons he could have AMS are subacute CVA and uremia (given worsening kidney function). Infectious causes are also on the differential (potentially could have SBP given reported abdominal pain and new ascites, pneumonia given reported productive cough prior to onset of symptoms and CT findings of bilateral posterior lobe nodular consolidations on admission, UTI, given UCx w/ Klebsiella Pneumoniae and less likely meningitis, given history of headaches and AMS).     Overall, it is unclear that the AMS is clearly infectious in origin, however, we will plan to cover potential sources of infection with appropriate antimicrobial therapy for now and follow clinically. We appreciate the evaluation and agree with the reccs from the Neurology service    Patient staffed with Dr. Nur who agrees with the plan    [facility]  Infectious disease service  PGY-2 Internal Medicine  Pager: [phone]    Attestation:  This patient has been seen and evaluated by me, Navi Nur MD.  I discussed this patient with the fellow and/or resident(s) and agree with the findings and plan in this note. I also personally edited this note to reflect my findings. I have reviewed today's vital signs, medications, labs and imaging.     CLEM Nur M.D.  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    Per wife, patient was at his baseline until a few months ago when he was started on Lexapro for depression. This did not appear to have an effect and so the dose was increased. A few weeks later, he was commenced on Tylenol with codeine for foot pain and then acutely developed confusion a few days later. His wife mentions that he also became incontinent and needed increasing support for ambulation.     After a few days of this, his wife took him to [facility] where a CT head showed a new ventriculomegaly which was new compared to a year prior. The patient was diuresed with Bumex, his Lexapro and Tylenol/Codeine were stopped and discharged to a TCU. Of note, during that hospitalization, renal function was progressively worsening and so concern was that patient would soon require dialysis. His wife mentions that even at discharge, he had only minimal improvement in AMS.    At the TCU, per wife, mental status did not improve, his urinary incontinence worsened and he continued to have difficulty ambulating. Abdominal distension and decreasing urinary output were also noted. He was therefore brought to [facility] for further evaluation.    His wife notes that prior to onset of all these symptoms earlier, he did not have any fever but may have had chills, he had a productive cough for a few days before hand, abdominal pain and vomiting, lower back pain and increasing fall.s     She denied any history suggestive of dysphagia or aspiration, travel, pets, sick contacts, chest pain, shortness of breath, diarrhea, wounds or pain/redness in extremities. She mentions that he had also complained of headaches a few days prior to presentation, but  she does not think he had any vision trouble or neck stiffness.    On arrival here, per chart review, patient was altered and unable to provide any history. UA done was noted to have large LE with 2478 WBCs with negative nitrites. W/ reflex UCX growing Klebsiella pneumoniae. The patient was started on Unasyn on 3/27 for suspected aspiration pneumonia after review of CT imaging. Procalcitonin was 4.66 on admission. Blood cultures and sputum cultures have no growth till now. Of note, the CT also showed moderate to large volume ascites which the primary team is holding off on tapping due to supratherapeutic INR    Patient has been afebrile since admission, white count has been normal and alteration in mental status has waxed and waned.      ROS:   10 point ROS neg other than the symptoms noted above in the HPI.    Past Medical History:   Patient Active Problem List   Diagnosis     Atrial fibrillation: ablation Dr Jonathan Arvizu/[place]     Lower extremity edema     Hyperlipidemia     Cardiac amyloid: ATTR per cardiac biopsy [facility] ER 2009     Pain in limb     Status post lumbar surgery     Adjustment disorder     Idiopathic peripheral neuropathy     SOB (shortness of breath)     CHF (congestive heart failure) (H)     Depression     Malignant neoplasm of kidney excluding renal pelvis (H)     Cerebrovascular accident (CVA) (H)     Other amyloidosis     Anemia     Atrial flutter (H)     Cardiomyopathy (H)     Chronic kidney disease, stage III (moderate)     Coronary arteriosclerosis in native artery     Weakness     Hypotension     Pain of foot     Long term current use of anticoagulant therapy     Peripheral neuropathic pain     Myocardial infarction, nontransmural (H)     Nonsustained ventricular tachycardia (H)     CKD (chronic kidney disease) stage 3, GFR 30-59 ml/min     Tinea pedis of left foot     Onychomycosis     Nail deformity     Elevated blood uric acid level     Paroxysmal atrial fibrillation (H)     Slow  transit constipation     Altered mental status     Acute kidney injury superimposed on chronic kidney disease (H)       Past Surgical History:   Past Surgical History:   Procedure Laterality Date     BACK SURGERY       H PENUMBRA SEPARATOR 3D       KNEE SURGERY       NE PATIENT HAS A CORONARY ARTERY STENT       SHOULDER SURGERY         No family history on file.    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      to his wife Nu ( Vikash), two children Deandra Solo (Grandchildren Trinh 2003, Nish 2007) and Son [id number] YISEL Anshul III     Family History: Reviewed and non-contributory to this infectious diseases consultation.    Medications (outpatient):    No current facility-administered medications on file prior to encounter.   Current Outpatient Prescriptions on File Prior to Encounter:  MELATONIN PO Take 3 mg by mouth nightly as needed   acetaminophen (TYLENOL) 500 MG tablet Take 650 mg by mouth every 4 hours as needed for mild pain or pain    Potassium Chloride ER 20 MEQ TBCR Take 1 tablet (20 mEq) by mouth daily   atorvastatin (LIPITOR) 80 MG tablet Take 1 tablet (80 mg) by mouth daily   bumetanide (BUMEX) 1 MG tablet Take 2 mg by mouth in the morning and 1 mg in the afternoon   EMOLLIENT EX Externally apply topically 3 times daily as needed   order for DME Equipment being ordered: Stair Lift Length of  need: LifetimeChronic kidney disease, coronary artery disease, acute CVA  02/12/2017 distal right M1 segment of middle cerebral artery.At risk of falls due to stroke and deconditioning. He is working with therapists, uses wheelchair and assistive devices.  His home has stairs, would benefit  from Rover stair chair lift as he has difficulty climbing stairs required in home.   order for DME Equipment being ordered: Bedside commode   order for DME Sequential pneumatic compression pumps 2-3 times per day as needed for lymphedema. Measure and fit.  Compression strength per protocol.   order for DME Equipment being ordered: DME compression stockings knee high 30 mm hg   other medical supplies JENIFER stocking bilateral lower extremities       Inflammatory Markers    Recent Labs   Lab Test  03/26/18 2006 06/29/16   1237   SED  24*   --    CRP  54.0*  <2.9       Hematology Studies  Recent Labs   Lab Test  03/28/18   0730  03/26/18 2006 03/26/18 03/19/18 02/07/18   1203  08/23/17   1209  04/25/17   1344  06/29/16   1237   WBC  6.0  7.9   --    --   3.9*  3.9*  4.5  5.2   ANEU   --   6.5   --    --    --    --   2.8  3.3   AEOS   --   0.0   --    --    --    --   0.0  0.1   HGB  13.7  15.1  13.2*  13.3*  13.6  13.1*  13.3  14.4   MCV  97  99   --    --   99  94  97  96   PLT  82*  92*   --    --   79*  100*  116*  123*       Immune Globulin Studies  Recent Labs   Lab Test  12/09/10   1331   IGE  32       Metabolic Studies   Recent Labs   Lab Test  03/28/18   0730  03/26/18 2006 03/26/18 03/22/18 03/19/18   NA  133  134  138  137  136   POTASSIUM  4.9  4.3  4.4  4.1  4.3   CHLORIDE  100  99  100  100  98   CO2  20 24 22 26 26   BUN  98*  83*  87*  79*  77*   CR  4.41*  3.42*  3.39*  2.56*  2.29*   GFRESTIMATED  13*  18*  17*  24*  27*       Hepatic Studies  Recent Labs   Lab Test  03/28/18   0730  03/26/18 2006 02/07/18   1203  08/23/17   1209  04/25/17   1344  06/07/16   1138  03/17/16   1206  12/08/15   1355    BILITOTAL  2.4*  2.6*   --    --   2.0*  1.0  1.6*  1.5*   ALKPHOS  100  114   --    --   71  52  58  57   ALBUMIN  3.0*  3.4  3.5  3.4  3.6  3.5  3.7  3.7   AST  54*  62*   --    --   23  16  19  21   ALT  60  56   --    --   34  21  25  36       Thyroid Studies    Recent Labs   Lab Test  03/27/18   1430  11/13/17   1203   TSH  1.97  1.95       Microbiology:  Culture Micro   Date Value Ref Range Status   03/27/2018 >100,000 colonies/mL  Klebsiella pneumoniae   (A)  Preliminary   03/27/2018 Culture in progress  Preliminary   03/27/2018 No growth after 21 hours  Preliminary   03/27/2018 No growth after 1 day  Preliminary       Urine Studies    Recent Labs   Lab Test  03/27/18   1510  08/23/17   1240   LEUKEST  Large*  Negative   WBCU  2478*  <1       Objective:  Physical exam:  BP 91/67  Temp 97.9  F (36.6  C) (Oral)  Resp 14  Wt 74.4 kg (164 lb)  SpO2 99%  BMI 24.22 kg/m2  Wt Readings from Last 2 Encounters:   03/27/18 74.4 kg (164 lb)   03/26/18 75.3 kg (165 lb 14.4 oz)       Intake/Output Summary (Last 24 hours) at 03/28/18 1255  Last data filed at 03/28/18 1027   Gross per 24 hour   Intake              340 ml   Output              280 ml   Net               60 ml       General:  lying comfortably in bed, in no apparent distress but asleep for most of this interview.  HEENT:  PERRLA, EOMI. Sclera is non-icteric and conjunctiva is pink with no erythema. Oral mucosa moist, no oral lesions, good dentition.  Neck:  No JVD.  No cervical/supraclavicular LAD  CV: RRR. S1, S2 with no murmurs, rubs, gallops. Peripheral radial pulse intact.  Resp: No increased work of breathing or use of accessory muscles, breathing comfortably on room air.  Lungs clear to auscultation bilaterally except lower anterior zones which have mild crackles.  Abdomen:  No obvious scars or hernias. Normal active bowel sounds.  Abdomen is soft, non-tender to palpation, rebound, percussion, and non-distended.  :  No suprapubic  tenderness.  MSK:  Upper and lower extremity muscle strength intact 5/5 bilaterally.  No large joint swelling or erythema.  Extremities: 2+ pre-tibial edema to mid-leg bilaterally. No cyanosis or clubbing  Skin:  Warm and dry. No erythema, rashes, ulceration or diaphoresis.  Neuro: CN II-XII grossly intact. Alert and oriented x2    Labs (Past three days):  Results for orders placed or performed during the hospital encounter of 03/26/18 (from the past 24 hour(s))   Procalcitonin   Result Value Ref Range    Procalcitonin 4.66 ng/ml   Troponin I   Result Value Ref Range    Troponin I ES 0.413 (HH) 0.000 - 0.045 ug/L   Vitamin B12   Result Value Ref Range    Vitamin B12 1414 (H) 193 - 986 pg/mL   TSH   Result Value Ref Range    TSH 1.97 0.40 - 4.00 mU/L   UA with Microscopic   Result Value Ref Range    Color Urine Red     Appearance Urine Cloudy     Glucose Urine Negative NEG^Negative mg/dL    Bilirubin Urine Negative NEG^Negative    Ketones Urine Negative NEG^Negative mg/dL    Specific Gravity Urine >1.050 (H) 1.003 - 1.035    Blood Urine Large (A) NEG^Negative    pH Urine 6.0 5.0 - 7.0 pH    Protein Albumin Urine 100 (A) NEG^Negative mg/dL    Urobilinogen mg/dL Normal 0.0 - 2.0 mg/dL    Nitrite Urine Negative NEG^Negative    Leukocyte Esterase Urine Large (A) NEG^Negative    Source Catheterized Urine     WBC Urine 2478 (H) 0 - 5 /HPF    RBC Urine >182 (H) 0 - 2 /HPF    WBC Clumps Present (A) NEG^Negative /HPF    Mucous Urine Present (A) NEG^Negative /LPF   Urine Culture   Result Value Ref Range    Specimen Description Unspecified Urine     Special Requests Specimen received in preservative     Culture Micro >100,000 colonies/mL  Klebsiella pneumoniae   (A)     Culture Micro Culture in progress    Protein  random urine with Creat Ratio   Result Value Ref Range    Protein Random Urine 2.89 g/L    Protein Total Urine g/gr Creatinine 2.65 (H) 0 - 0.2 g/g Cr   Sodium random urine   Result Value Ref Range    Sodium Urine  mmol/L 20 mmol/L   Urea nitrogen random urine with Creat Ratio   Result Value Ref Range    Urea Nitrogen, Ur 380 mg/dL    Urea Nitrogen Urine g/g Cr 3 g/g Cr   Creatinine random urine   Result Value Ref Range    Creatinine Urine Random 109 mg/dL   Troponin I   Result Value Ref Range    Troponin I ES 0.397 (HH) 0.000 - 0.045 ug/L   CBC with platelets   Result Value Ref Range    WBC 6.0 4.0 - 11.0 10e9/L    RBC Count 4.16 (L) 4.4 - 5.9 10e12/L    Hemoglobin 13.7 13.3 - 17.7 g/dL    Hematocrit 40.3 40.0 - 53.0 %    MCV 97 78 - 100 fl    MCH 32.9 26.5 - 33.0 pg    MCHC 34.0 31.5 - 36.5 g/dL    RDW 16.2 (H) 10.0 - 15.0 %    Platelet Count 82 (L) 150 - 450 10e9/L   Comprehensive metabolic panel   Result Value Ref Range    Sodium 133 133 - 144 mmol/L    Potassium 4.9 3.4 - 5.3 mmol/L    Chloride 100 94 - 109 mmol/L    Carbon Dioxide 20 20 - 32 mmol/L    Anion Gap 14 3 - 14 mmol/L    Glucose 85 70 - 99 mg/dL    Urea Nitrogen 98 (H) 7 - 30 mg/dL    Creatinine 4.41 (H) 0.66 - 1.25 mg/dL    GFR Estimate 13 (L) >60 mL/min/1.7m2    GFR Estimate If Black 16 (L) >60 mL/min/1.7m2    Calcium 8.8 8.5 - 10.1 mg/dL    Bilirubin Total 2.4 (H) 0.2 - 1.3 mg/dL    Albumin 3.0 (L) 3.4 - 5.0 g/dL    Protein Total 6.6 (L) 6.8 - 8.8 g/dL    Alkaline Phosphatase 100 40 - 150 U/L    ALT 60 0 - 70 U/L    AST 54 (H) 0 - 45 U/L   INR   Result Value Ref Range    INR 4.22 (H) 0.86 - 1.14   Bilirubin direct   Result Value Ref Range    Bilirubin Direct 1.2 (H) 0.0 - 0.2 mg/dL   Lactic acid whole blood   Result Value Ref Range    Lactic Acid 1.5 0.7 - 2.0 mmol/L   Glucose by meter   Result Value Ref Range    Glucose 81 70 - 99 mg/dL       Urinalysis  Recent Labs   Lab Test  03/27/18   1510   COLOR  Red   APPEARANCE  Cloudy   URINEGLC  Negative   URINEBILI  Negative   URINEKETONE  Negative   SG  >1.050*   UBLD  Large*   URINEPH  6.0   PROTEIN  100*   NITRITE  Negative   LEUKEST  Large*   RBCU  >182*   WBCU  2478*

## 2018-03-28 NOTE — PROGRESS NOTES
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      #LOIS on CKD IV  #Oliguria  #Lacitic acidosis, resolved  #Uremia  Most likely cardiorenal, possible sepsis. There had been discussion re: eventual need for dialysis. S/p 500 cc fluid challenge night of admission.  - Nephrology consulted - 5 iv bumex evening of 3/27 with modest response (200cc), 4 iv bumex bid challenge but may need HD vs CRRT as BP tolerate.    #c/f sepsis  #Aspiration pneumonia  #Dirty UA  - UA with large LE, negative nitrite, 2478 WBCs, >182 RBCs, WBC clumps  - UCx >100k lactose-fermenting GNRs.  - Blood cx 3/27 ngtd x2  - ID consulted re: treatment given c/f sepsis  - On Unasyn 3/27 - present (7d course until 4/3), sputum cx pending  - Trend procal q2-3d, was 4.66 on admission labs - consider broadening to zosyn for asp PNA and UTI.    #Cardiogenic shock  #Cardiac amyloidosis  #HFrEF NYHA II/III C (EF 15-20%)  LVEF 30% at [facility] 2 wks ago which was virtually unchanged compared to 09/2017. Initially appeared dry on exam but elevated R-sided pressures after fluid bolus and oliguria since admission. Was diuresed on recent admission from 172lbs to 167lbs, admit weight 164lbs. S/p 500cc bolus at time of admission. TTE with EF 15-20%, RA pressure 15mmHg this admission.  - Diuresis as above - dialysis vs CRRT as pressures tolerate  - Consider RHC post-dialysis  - Avoid BB  - Strict I/Os, daily weights    #Altered mental status  #Dementia ([facility] 16/30)  #Ventriculomegaly  #h/o right MCA CVA  CT scan with enlarged venticules. No mass or acute CVA.  Patient's history concerning for NPH. Sepsis also possible contributor. Possibly uremia. Low suspicion for SBP. Consider subacute CVA vs seizure vs progressive dementia.  - Neurology consulted, appreciate recs - MRI brain, 24h video EEG monitoring. TSH wnl, B12 above nl, MMA pending, If negative w/u consider LP for NPH w/u, would need to hold vs reverse warfarin.  - Urine cx pending  - PT/OT    #Supratherapeutic INR  Could be related to last dose of warfarin given on evening of admission and elevated 2/2 possibly malnutrition vs liver/kidney injury, less likely medication-related (started on Unasyn).  - Since will likely be going toward dialysis, could reverse for line placement with FFP pre-dialysis.    #Ascites, stable  #Abdominal pain, improving  #Hyperbilirubinemia, stable  CT negative for SBO. No signs of bowel ischemia and INR therapeutic to supratherapeutic. AST mildly elevated along with bilirubin. CT scan with ascites and hepatomegaly. Ammonia levels low.  - Deferring paracentesis given supratherapeutic INR and more likely etiologies for AMS including neuro w/u detailed above and infection.    #Troponemia  Troponin on admission 0.28. Has known hx of CAD. Likely demand in the setting of volume depletion and LOIS on CKD4. No chest pain per wife. EKG with RBBB, but relatively unchanged from about 1 year ago.   - Troponin downtrended evening of HD1     #CAD s/p ATIF to LAD 2015  - Continue home statin  - Not on aspirin or BB, avoid BB with cardiac amyloid     #Afib/flutter s/p CTI ablation 2006  Remains in afib, but normal HR. Was on sotalol but this was discontinued around 08/2017 per outside cardiology notes.   - Pharmacy to dose warfarin - hold if supratherapeutic - consider bridging with heparin if need LP for evaluation of NPH as above.    FEN: NPO before procedure, then cardiac diet  PPX: warfarin  Code Status: FULL  Dispo: pending work up of AMS, anticipate 3+ additional days - if needs CRRT would need  transfer to MICU service.    This patient was seen and discussed with Dr. Nunez who agrees with the assessment and plan.    Tirso Simmons MD  [phone]  Internal Medicine PGY-2      Input from nephrology, neurology, and ID appreciated.    I have seen, interviewed, and examined patient. I have reviewed the laboratory tests, imaging, and other investigations. I have reviewed the management plan with the patient. I discussed with the team and agree with the findings and plan in this resident/fellow/nurse practitioner's note. In addition, changes in the physical examination, assessment and plan have been incorporated into the note by myself, as to make it a single cohesive document.       Chantel Nunez MD, MS  Cardiology/Cardiac EP Attending Staff      ___________________________________________________________________      Subjective & Interval Hx:    No acute events overnight. Modest response to bumex challenge overnight with 100-200cc UOP. Didn't sleep well as frequent awakenings. No specific complaints this morning. Denies CP or SOB, no abdominal pain, N/V. He is refusing MRI and dialysis at this time, but yesterday was agreeable with wife. She is here this morning to talk further.    Tele reviewed. No acute events, afib rate 60-80s.    Last 24 hr care team notes reviewed.   ROS: 4 point ROS including Respiratory, CV, GI and , other than that noted in the HPI, is negative    Physical Exam:  Blood pressure 104/83, temperature 97.5  F (36.4  C), temperature source Axillary, resp. rate 16, weight 74.4 kg (164 lb), SpO2 96 %.    Gen: Elderly male, sleeping, comfortable, NAD  HEENT: NCAT, anicteric  Neck: Supple, JVD mid-SCM  Pulm: CTAB without wheezes or crackles in anterior lung fields  CV: Irregularly irregular, normal rate, S1/S2, no m/r/g  ABD: Mildly distended, nontender, normal BS  EXT: Trace LE edema b/l  Skin: No rashes or skin lesions  NEURO: AOx1 (person only), no new focal neurological changes  Psych:  Appropriate mood and affect, overall pleasant, though agitated/irritated easily with questioning    Lines/Tubes: PIV x2    Labs & Studies of Note:     CBC    Recent Labs  Lab 03/28/18 0730 03/26/18 2006 03/26/18   WBC 6.0 7.9  --    RBC 4.16* 4.50  --    HGB 13.7 15.1 13.2*   HCT 40.3 44.7  --    MCV 97 99  --    MCH 32.9 33.6*  --    MCHC 34.0 33.8  --    RDW 16.2* 16.5*  --    PLT 82* 92*  --      BMP    Recent Labs  Lab 03/28/18 0730 03/27/18  0156 03/26/18 2006 03/26/18 03/22/18     --  134 138 137   POTASSIUM 4.9  --  4.3 4.4 4.1   CHLORIDE 100  --  99 100 100   CO2 20  --  24 22 26   ANIONGAP 14  --  12  --   --    GLC 85  --  109* 84 103*   BUN 98*  --  83* 87* 79*   CR 4.41*  --  3.42* 3.39* 2.56*   GFRESTIMATED 13*  --  18* 17* 24*   GFRESTBLACK 16*  --  21* 19* 27*   RIGOBERTO 8.8  --  9.5 9.3 9.7   PHOS  --  4.4  --   --   --       INR    Recent Labs  Lab 03/28/18 0730 03/27/18  0942 03/26/18 2006   INR 4.22* 3.88* 2.67*     Liver panel    Recent Labs  Lab 03/28/18 0730 03/26/18 2006   PROTTOTAL 6.6* 7.8   ALBUMIN 3.0* 3.4   BILITOTAL 2.4* 2.6*   ALKPHOS 100 114   AST 54* 62*   ALT 60 56     Trop   Lab Results   Component Value Date    TROPI 0.397 () 03/27/2018    TROPI 0.413 () 03/27/2018    TROPI 0.362 () 03/27/2018    TROPI 0.314 () 03/27/2018    TROPI 0.282 () 03/26/2018     BCx 3/27 ngtd x2  UA with large LE, negative nitrite, 2478 WBCs, >182 RBCs, WBC clumps  UCx >100k lactose-fermenting GNRs.     Imaging reviewed. No new. MRI today.    Medications list for Reference  Current Facility-Administered Medications   Medication     acetaminophen (TYLENOL) tablet 650 mg     atorvastatin (LIPITOR) tablet 80 mg     melatonin tablet 3 mg     lidocaine 1 % 1 mL     lidocaine (LMX4) kit     sodium chloride (PF) 0.9% PF flush 3 mL     sodium chloride (PF) 0.9% PF flush 3 mL     nitroGLYcerin (NITROSTAT) sublingual tablet 0.4 mg     acetaminophen (TYLENOL) Suppository 650 mg     Patient is  already receiving anticoagulation with heparin, enoxaparin (LOVENOX), warfarin (COUMADIN)  or other anticoagulant medication     bisacodyl (DULCOLAX) EC tablet 5 mg    Or     bisacodyl (DULCOLAX) EC tablet 10 mg    Or     bisacodyl (DULCOLAX) EC tablet 15 mg     polyethylene glycol (MIRALAX/GLYCOLAX) Packet 17 g     potassium chloride SA (K-DUR/KLOR-CON M) CR tablet 20 mEq     ondansetron (ZOFRAN) injection 4 mg     ampicillin-sulbactam (UNASYN) 1.5 g vial to attach  mL bag     Warfarin Therapy Reminder (Check START DATE - warfarin may be starting in the FUTURE)     sennosides (SENOKOT) tablet 2 tablet     0.9% sodium chloride BOLUS

## 2018-03-28 NOTE — PLAN OF CARE
"Problem: Patient Care Overview  Goal: Plan of Care/Patient Progress Review  Outcome: No Change  D/A/I:  Patient intermittently confused, but usually able to answer questions appropriately.  When asked orientation questions, patient stated, \"If you ask that question again, I'm gonna knock you back to where you came from.\"  Denied pain, palpitations, difficulty breathing, SOB, dizziness, and nausea.  Lung sounds diminished in bases, no abnormal heart sounds noted.  Had 2-3+ edema in legs and feet.  Also reported no BM since 3/24, bowel sounds normoactive, patient confirmed passing gas.  In atrial fibrillation/flutter with occasional to frequent PVCs, HR 50s-70s, SBP 80s-90s, SaO2 93-98% on room air.  Performed straight cath per orders to obtain urine sample (see Chart Review for results).  Was only able to remove 30 ml of cloudy edmund urine.  Notified Cards 1 and renal team of results.  Patient repositioned in bed with 2-person assist.  Family visited during shift, assisted in completion of MRI checklist.    P:  Continue to monitor mentation/orientation, pain, VS, heart rhythm, urinary status, fluid status, bowel status, cardiac and respiratory status.  Notify care team of changes in patient condition or other concerns.  Expected to have brain MRI in AM, will get 24-hour EEG after completion of MRI.      "

## 2018-03-28 NOTE — PLAN OF CARE
Problem: Patient Care Overview  Goal: Plan of Care/Patient Progress Review  Outcome: No Change  D: Pt admitted 3/26 for LOIS and altered mental status. Hx cardiac amyloidosis.  I: Monitored vitals and assessed pt status. PRN: tylenol  A: Oriented to self and intermittently to place, time. VSS on RA. Afib on tele, HR . Denies SOB, dizziness, nausea. L foot pain controlled with PRN meds. Fraga inserted per order, draining red urine and small amount of bleeding at meatus. Pt agitated and complaining of intolerable pain following insertion, Dr. Juan to bedside to evaluate and tylenol frequency increased. Pt reports improvement in pain, but continues to call out frequently and does not consistently use call light. Bed alarm activated. Requesting to see  today; per spiritual health note,  plans to stop by today.   P: Continue to monitor pt status and report changes to Cards 1.

## 2018-03-28 NOTE — PROGRESS NOTES
EEG CLINICAL NEUROPHYSIOLOGY PRELIMINARY REPORT    First two hours of video-EEG reviewed. Moderate diffuse encephalopathy. No epileptiform discharges or electrographic seizures thus far. Will continue recording. Full report to follow.    Ramiro Carlos MD  Pager [phone]

## 2018-03-28 NOTE — MR AVS SNAPSHOT
After Visit Summary   3/28/2018    Josr Landers    MRN: [id number]           Patient Information     Date Of Birth          1940        Visit Information        Provider Department      3/28/2018 5:00 PM [facility] EEG TECH 4 [facility] EEG        Today's Diagnoses     Seizure-like activity (H)    -  1       Follow-ups after your visit        Your next 10 appointments already scheduled     Mar 29, 2018  7:00 AM CDT   24 Hour Video Visit with [facility] EEG TECH 4   [facility] EEG ([facility])    [facility]  [address] [facility] [address]   [phone]           [place]: Your appointment is scheduled at [facility]. [address] [place] [id number]            Mar 29, 2018  9:30 AM CDT   (Arrive by 9:15 AM)   IR CVC TUNNEL PLACEMENT > 5 YRS OF AGE with UUIR1   [facility], [place], Interventional Radiology ([facility], [facility])    [address] [facility] [address] [phone]           1. Your doctor will need to do a history and physical within 7 days before this procedure. 2. Your doctor will which medications should not be taken the morning of the exam. 3. Laboratory tests are to be obtained by your doctor prior to the exam (Basic Metabolic Panel, CBCP, PTT and INR) (No labs needed if you are having a tunneled catheter exchange or removal) 4. If you have allergies to x-ray contrast or iodine, contact your doctor or a Radiology nurse prior to the exam day for instructions. 5. Someone will need to drive you to and from the hospital. 6. If you are or may be pregnant, contact your doctor or a Radiology nurse prior to the day of the exam. 7. If you have diabetes, check with your doctor or a Radiology nurse to see if your insulin needs to be adjusted for the exam. 8. If you are taking a medication called Glucophage or Glucovance; these medications need to be held the day of the  exam and for approximately 48 hours following. A blood sample must be drawn so your creatinine level can be checked before resuming this medication. 9. If you are taking Coumadin (to thin you blood) please contact your doctor or a Radiology nurse at least 3 days before the exam for special instructions. 10. You should not have received contrast within 48 hours of this exam. 11. The day before your exam you may eat your regular diet and are encouraged to drink at least 2 quarts of clear liquids. Drink no alcoholic beverages for 24 hours prior to the exam. 12. If you have a colostomy you will need to irrigate it with tap water at 8PM the evening before and again at 6AM the morning of the exam. 13. Do not smoke for 24 hours prior to the procedure. 14. Birth to 4 years: - Breast feeding must be stopped 4 hours prior to exam - Solid food or formula must be stopped 6 hours prior to exam - Tube feedings must be stopped 6 hours prior to exam 15. 4-10 years old: - Nothing to eat or drink 6 hours prior to exam 16. 10+ years old: - Nothing to eat or drink 8 hours prior to exam 17. The morning of the exam you may brush your teeth and take medications as directed with a sip of water. 18. When discharged, you cannot drive until morning, and an adult must be with you until then. You should stay in the [facility] overnight. 19. Bring a list of all drugs you are taking; include supplements and over-the-counter medications. Wear comfortable clothes and leave your valuables at home.            Apr 09, 2018  2:00 PM CDT   (Arrive by 1:45 PM)   Return Visit with Ángel Oates MD   [facility] ([facility])    [address] [place] [address]  4th [facility] [id number] [phone]            Aug 01, 2018 11:00 AM CDT   Lab with  LAB   [facility] ([facility])    [address] [place] [address]  1st [facility] [id number] [phone]            Aug 01,  2018 12:00 PM CDT   (Arrive by 11:30 AM)   Return Visit with Jonathan Turner MD   [facility] ([facility])    [address]  [address]  [facility] [id number] [phone]            Aug 08, 2018  1:00 PM CDT   (Arrive by 12:45 PM)   MR ABDOMEN & PELVIS W/O & W CONTRAST with UC1   [facility] [place] [facility] ([facility])    [address]  1st Floor  [facility] [id number] [phone]           Take your medicines as usual, unless your doctor tells you not to. Bring a list of your current medicines to your exam (including vitamins, minerals and over-the-counter drugs). Also bring the results of similar scans you may have had.    You may or may not receive IV contrast for this exam pending the discretion of the Radiologist.   Do not eat or drink for 6 hours prior to exam.  The MRI machine uses a strong magnet. Please wear clothes without metal (snaps, zippers). A sweatsuit works well, or we may give you a hospital gown.  Please remove any body piercings and hair extensions before you arrive. You will also remove watches, jewelry, hairpins, wallets, dentures, partial dental plates and hearing aids. You may wear contact lenses, and you may be able to wear your rings. We have a safe place to keep your personal items, but it is safer to leave them at home.  **IMPORTANT** THE INSTRUCTIONS BELOW ARE ONLY FOR THOSE PATIENTS WHO HAVE BEEN PRESCRIBED SEDATION OR GENERAL ANESTHESIA DURING THEIR MRI PROCEDURE:  IF YOUR DOCTOR PRESCRIBED ORAL SEDATION (take medicine to help you relax during your exam):   You must get the medicine from your doctor (oral medication) before you arrive. Bring the medicine to the exam. Do not take it at home. You ll be told when to take it upon arriving for your exam.   Arrive one hour early. Bring someone who can take you home after the test. Your medicine will make you sleepy. After the exam, you may not drive,  take a bus or take a taxi by yourself.  IF YOUR DOCTOR PRESCRIBED IV SEDATION:   Arrive one hour early. Bring someone who can take you home after the test. Your medicine will make you sleepy. After the exam, you may not drive, take a bus or take a taxi by yourself.   No eating 6 hours before your exam. You may have clear liquids up until 4 hours before your exam. (Clear liquids include water, clear tea, black coffee and fruit juice without pulp.)  IF YOUR DOCTOR PRESCRIBED ANESTHESIA (be asleep for your exam):   Arrive 1 1/2 hours early. Bring someone who can take you home after the test. You may not drive, take a bus or take a taxi by yourself.   No eating 8 hours before your exam. You may have clear liquids up until 4 hours before your exam. (Clear liquids include water, clear tea, black coffee and fruit juice without pulp.)   You will spend four to five hours in the recovery room.  If you have any questions, please contact your Imaging Department exam site.            Aug 08, 2018  2:00 PM CDT   (Arrive by 1:45 PM)   Return Visit with Aleisha Sapp MD   [facility] ([facility])    [address] [address] [phone]              Who to contact     Please call your clinic at [phone] to:    Ask questions about your health    Make or cancel appointments    Discuss your medicines    Learn about your test results    Speak to your doctor            Additional Information About Your Visit        [employer]atiety Information     [employer]ety gives you secure access to your electronic health record. If you see a primary care provider, you can also send messages to your care team and make appointments. If you have questions, please call your primary care clinic.  If you do not have a primary care provider, please call [phone] and they will assist you.      [employer] is an electronic gateway that provides easy,  online access to your medical records. With [employer], you can request a clinic appointment, read your test results, renew a prescription or communicate with your care team.     To access your existing account, please contact your [facility] or call [phone] for assistance.        Care EveryWhere ID     This is your Care EveryWhere ID. This could be used by other organizations to access your [facility] medical records  [id number]         Blood Pressure from Last 3 Encounters:   03/28/18 108/72   03/26/18 111/71   03/20/18 109/74    Weight from Last 3 Encounters:   03/27/18 74.4 kg (164 lb)   03/26/18 75.3 kg (165 lb 14.4 oz)   03/20/18 77.1 kg (170 lb)              We Performed the Following     Glucose by meter          Today's Medication Changes      Notice     This visit is during an admission. Changes to the med list made in this visit will be reflected in the After Visit Summary of the admission.             Primary Care Provider Office Phone # Fax #    Ángel Oates [phone]       [address] [facility] [address]        Equal Access to Services     [facility] Center[facility] : Hadii philip hurtado Soluis, waaxda ludavid, qamarckta kaalmaivonne anaya, mikey han . So [facility] [phone].    ATENCIÓN: Si habla español, tiene a mayorga disposición servicios gratuitos de asistencia lingüística. Ray al [phone].    We comply with applicable federal civil rights laws and [place] laws. We do not discriminate on the basis of race, color, national origin, age, disability, sex, sexual orientation, or gender identity.            Thank you!     Thank you for choosing [facility]  for your care. Our goal is always to provide you with excellent care. Hearing back from our patients is one way we can continue to improve our services. Please take a few minutes to complete the written survey that you may receive in the mail after your visit with us.  Thank you!             Your Updated Medication List - Protect others around you: Learn how to safely use, store and throw away your medicines at www.disposemymeds.org.      Notice     This visit is during an admission. Changes to the med list made in this visit will be reflected in the After Visit Summary of the admission.

## 2018-03-28 NOTE — PROGRESS NOTES
03/28/18 0819   Quick Adds   Type of Visit Initial Occupational Therapy Evaluation   Living Environment   Lives With spouse;facility resident   Living Arrangements house;residential facility   Transportation Available family or friend will provide   Living Environment Comment At baseline, pt lives with spouse in house. Due to significant confusion, pt unable to answer evaluation questions appropriately and majority of evaluation information collected via chart review. Pt recently admitted to [facility] for monitoring from TCU facility.   Self-Care   Usual Activity Tolerance fair   Current Activity Tolerance poor   Regular Exercise other (see comments)   Activity/Exercise/Self-Care Comment Pt previously at TCU receiving therapy.   Functional Level Prior   Ambulation 3-->assistive equipment and person   Transferring 3-->assistive equipment and person   Toileting 3-->assistive equipment and person   Bathing 3-->assistive equipment and person   Dressing 2-->assistive person   Eating 0-->independent   Communication 3-->unable to understand (not related to language barrier)   Swallowing 0-->swallows foods/liquids without difficulty   Cognition 2 - difficulty with organizing thoughts   Fall history within last six months yes   Number of times patient has fallen within last six months 1   Which of the above functional risks had a recent onset or change? ambulation;transferring;toileting;bathing;dressing;cognition;fall history;communication/speech   General Information   Onset of Illness/Injury or Date of Surgery - Date 03/26/18   Referring Physician Ricardo Juan MD   Patient/Family Goals Statement Unable to state at this time.   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    Precautions/Limitations fall precautions   Weight-Bearing Status - LUE full weight-bearing   Weight-Bearing Status - RUE full weight-bearing   Weight-Bearing Status - LLE full weight-bearing   Weight-Bearing Status - RLE full weight-bearing   Heart Disease Risk Factors Lack of physical activity;Medical history;Gender;Age;Stress   General Observations Pt supine upon arrival with bed alarm engaged, agitated.   General Info Comments Ambulate   Cognitive Status Examination   Orientation person   Level of Consciousness confused;agitated   Able to Follow Commands moderate impairment   Personal Safety (Cognitive) decreased insight to deficits   Memory impaired   Organization/Problem Solving Sequencing impaired;Problem solving impaired;Categorization of information impaired   Executive Function Planning ability impaired;Self awareness/monitoring impaired   Visual Perception   Visual Perception No deficits were identified   Sensory Examination   Sensory Quick Adds No deficits were identified   Pain Assessment   Patient Currently in Pain No   Integumentary/Edema   Integumentary/Edema other (describe)   Integumentary/Edema Comments Pt with marked increases in BLE edema secondary to decreased kidney/liver function, edema evaluation pending.   Posture   Posture forward head position   Range of Motion (ROM)   ROM Quick Adds No deficits were identified   Strength   Manual Muscle Testing Quick Adds Other   Strength Comments Pt with significantly decreased strength, MMT completed with 4/5 strength bilaterally   Hand Strength   Hand Strength Comments WFL   Muscle Tone Assessment   Muscle Tone Quick Adds No deficits were identified   Coordination   Coordination Comments Unable to  "assess, continue to monitor   Instrumental Activities of Daily Living (IADL)   IADL Comments Pt previously at TCU, per chart review wife assisted with cares at home.   Activities of Daily Living Analysis   Impairments Contributing to Impaired Activities of Daily Living cognition impaired;strength decreased;balance impaired   General Therapy Interventions   Planned Therapy Interventions ADL retraining;IADL retraining;cognition;ROM;strengthening;home program guidelines;progressive activity/exercise;risk factor education   Clinical Impression   Criteria for Skilled Therapeutic Interventions Met yes, treatment indicated   OT Diagnosis decreased independence with ADLs/IADLs   Influenced by the following impairments cognition, decreased strength/activity tolerance, balance deficits, weakness   Assessment of Occupational Performance 5 or more Performance Deficits   Identified Performance Deficits bathing, dressing, toileting, mobility, grooming tasks   Clinical Decision Making (Complexity) Low complexity   Therapy Frequency 5 times/wk   Predicted Duration of Therapy Intervention (days/wks) 2 weeks   Anticipated Equipment Needs at Discharge (TBD)   Anticipated Discharge Disposition Transitional Care Facility   Risks and Benefits of Treatment have been explained. Yes   Patient, Family & other staff in agreement with plan of care Yes   [facility] TM \"6 Clicks\"   2016, Trustees of [facility], under license to [employer].  All rights reserved.   6 Clicks Short Forms Daily Activity Inpatient Short Form   [facility]  \"6 Clicks\" Daily Activity Inpatient Short Form   1. Putting on and taking off regular lower body clothing? 2 - A Lot   2. Bathing (including washing, rinsing, drying)? 2 - A Lot   3. Toileting, which includes using toilet, bedpan or urinal? 2 - A Lot   4. Putting on and taking off regular upper body clothing? 2 - A Lot   5. Taking care of personal grooming such as brushing teeth? " 3 - A Little   6. Eating meals? 3 - A Little   Daily Activity Raw Score (Score out of 24.Lower scores equate to lower levels of function) 14   Total Evaluation Time   Total Evaluation Time (Minutes) 6

## 2018-03-28 NOTE — CONSULTS
Patient is on IR schedule 3/29/2018 pending reversal of coumidine for a Double lumen large bore tunneled central line placement .   Labs INR must be 1.8 or better, Plts 50,000 or better  Orders for NPO, scrubs and antibiotics have been entered.  Consent will be done prior to procedure.     Please contact the IR charge RN at [phone] for estimated time of procedure.       Lizbet Garcia IR RPA  [phone] Call pager  [phone] pager

## 2018-03-28 NOTE — PROGRESS NOTES
SW confirmed pt has a bed hold with [facility].  Will complete assessment and discuss with pt if he plans to return for ongoing care.    JERRI Valdovinos, APSW  6C Unit   Phone: [phone]  Pager: [phone]  Unit: [phone]

## 2018-03-28 NOTE — PLAN OF CARE
"Problem: Patient Care Overview  Goal: Plan of Care/Patient Progress Review  Discharge Planner OT   Patient plan for discharge: Unable to state at this time  Current status: Pt declining all activity, focus of evaluation on cognition. Not appropriate for standardized cognitive screen due to moderate difficulty following commands. Pt disoriented to place, situation, and time, oriented to self only. Reorientation education provided on situation and purpose of stay, pt adamantly stating \"I had surgery yesterday\" and could not recall previous residence at TCU. Continued education provided regarding current hospital admission, agreeable to MMT pt with significant weakness in BUEs 4/5.  Barriers to return to prior living situation: cognition, decreased strength/activity tolerance, decreased insight into deficits, dependence for ADLs, impaired mobility  Recommendations for discharge: TCU  Rationale for recommendations: Pt is currently below functional baseline for ADLs, transfers, and functional mobility, would benefit from continued therapy to address above deficits and maximize strength/independence in order to return to PLOF.       Entered by: Gloria Morris 03/28/2018 8:29 AM           "

## 2018-03-28 NOTE — PLAN OF CARE
Problem: Patient Care Overview  Goal: Plan of Care/Patient Progress Review  Edema 6C: Lymphedema orders received however patient off the unit for MRI, will reschedule.

## 2018-03-28 NOTE — PLAN OF CARE
Problem: Patient Care Overview  Goal: Plan of Care/Patient Progress Review  PT 6C: PT orders received, per chart review and discussion with OT, pt remains very confused, not appropriate for PT evaluation today. PT will hold at this time and initiate services when appropriate and pt able to participate.

## 2018-03-29 PROBLEM — G93.40 ENCEPHALOPATHY: Status: ACTIVE | Noted: 2018-01-01

## 2018-03-29 NOTE — PROGRESS NOTES
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      #Multifactorial Encephalopathy  #Dementia (MoCA 16/30)  #Ventriculomegaly / possible normal pressure hydrocephalus  #h/o right MCA CVA  CT scan with enlarged venticules. No mass or acute CVA. Patient's history concerning for NPH. Sepsis also possible contributor. Possibly uremia. Low suspicion for SBP. Consider subacute CVA vs seizure vs progressive dementia.  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:  - tunneled line by IR today   - initiating HD  - nephrology following  - resume warfarin tonight    #Aspiration PNA  #Urinary tract infection  - Zosyn for now  - ID following, trending procal  - de-escalate antibiotics when Klebsiella susceptibilities return    #Cardiac amyloidosis  #HFrEF NYHA III C (EF 15-20%)  - unable to diurese given CKD-V  - initiating dialysis as below  - cont other home cardiac meds  - paracentesis tmrw to avoid excess fluid removal (dialysis) simultaneously     #CAD s/p ATIF to LAD 2015  - Continue home statin  - start asa 81 mg daily after tunneled line, avoid BB with cardiac amyloid     #Afib/flutter s/p CTI ablation 2006  Remains in afib, but normal HR. Was on sotalol but this was discontinued around 08/2017 per outside cardiology notes.   - coumadin to resume this evening    FEN: NPO before procedure, then cardiac diet  PPX: warfarin  Code Status: FULL  Dispo: pending work up of AMS, anticipate 3+ additional days - if needs CRRT would need transfer to MICU service.    This patient was seen and discussed  with Dr. Nunez who agrees with the assessment and plan.    SPENCER Lau, CVD Fellow    Seen and discussed with Dr. Chantel Nunez.    Subjective & Interval Hx:    - no acute events overnight  - UOP minimal    Last 24 hr care team notes reviewed.   ROS: 4 point ROS including Respiratory, CV, GI and , other than that noted in the HPI, is negative    Medications:  - [facility]    Physical Exam:  Blood pressure (!) 82/75, temperature 97.4  F (36.3  C), temperature source Oral, resp. rate 16, weight 78.5 kg (173 lb), SpO2 100 %.  Gen: Elderly male, sleeping, comfortable, NAD  HEENT: NCAT, anicteric  Neck: Supple, JVD mid-SCM  Pulm: CTAB without wheezes or crackles in anterior lung fields  CV: Irregularly irregular, normal rate, S1/S2, no m/r/g  ABD: Mildly distended, nontender, normal BS  EXT: Trace LE edema b/l  Skin: No rashes or skin lesions  NEURO: AOx1 (person only), no new focal neurological changes  Psych: Appropriate mood and affect, overall pleasant, though agitated/irritated easily with questioning    Lines/Tubes: PIV x2    Labs & Studies of Note:   - reviewed all labs in [facility] today  - INR 2.1 --> 1.9    Imaging reviewed. No new. MRI today.    I have seen, interviewed, and examined patient. I have reviewed the laboratory tests, imaging, and other investigations. I have reviewed the management plan with the patient. I discussed with the team and agree with the findings and plan in this resident/fellow/nurse practitioner's note. In addition, changes in the physical examination, assessment and plan have been incorporated into the note by myself, as to make it a single cohesive document.       Chantel Nunez MD, MS  Cardiology/Cardiac EP Attending Staff

## 2018-03-29 NOTE — PROCEDURES
Interventional Radiology Brief Post Procedure Note    Procedure: IR CVC TUNNEL PLACEMENT > 5 YRS OF AGE    Proceduralist: Weston Kirby MD    Assistant: James Zamorano MD    Time Out: Prior to the start of the procedure and with procedural staff participation, I verbally confirmed the patient s identity using two indicators, relevant allergies, that the procedure was appropriate and matched the consent or emergent situation, and that the correct equipment/implants were available. Immediately prior to starting the procedure I conducted the Time Out with the procedural staff and re-confirmed the patient s name, procedure, and site/side. (The Joint Commission universal protocol was followed.)  Yes    Medications   Medication Event Details Admin User Admin Time   midazolam (VERSED) injection 0.5-1 mg Medication Given Dose: 0.5 mg; Route: Intravenous Albee, Duane A, RN 3/29/2018  3:54 PM   lidocaine 1 % 1-30 mL Medication Given by Other Clinician Dose: 20 mL; Route: Intradermal Albee, Duane A, RN 3/29/2018  3:55 PM   fentaNYL (PF) (SUBLIMAZE) injection 25-50 mcg Medication Given Dose: 25 mcg; Route: Intravenous Albee, Duane A, RN 3/29/2018  3:55 PM   heparin Lock (1000 units/mL High concentration) 3,000 Units Medication Given by Other Clinician Dose: 2,400 Units; Route: Intracatheter; Scheduled Time:  3:45 PM Albee, Duane A, RN 3/29/2018  3:55 PM   heparin Lock (1000 units/mL High concentration) 3,000 Units Medication Given by Other Clinician Dose: 2,400 Units; Route: Intracatheter; Scheduled Time:  3:45 PM Albee, Duane A, RN 3/29/2018  3:56 PM   heparin 5,000 units in 0.9% sodium chloride 1000 mL Medication New Bag Dose: 5,000 Units; Route: TABLE SOLN Albee, Duane A, RN 3/29/2018  3:57 PM       Sedation: IR Nurse Monitored Care   Post Procedure Summary:  Prior to the start of the procedure and with procedural staff participation, I verbally confirmed the patient s identity using two indicators, relevant allergies,  that the procedure was appropriate and matched the consent or emergent situation, and that the correct equipment/implants were available. Immediately prior to starting the procedure I conducted the Time Out with the procedural staff and re-confirmed the patient s name, procedure, and site/side. (The Joint Commission universal protocol was followed.)  Yes       Sedatives: Fentanyl and Midazolam (Versed)    Vital signs, airway and pulse oximetry were monitored and remained stable throughout the procedure and sedation was maintained until the procedure was complete.  The patient was monitored by staff until sedation discharge criteria were met.    Patient tolerance: Patient tolerated the procedure well with no immediate complications.    Time of sedation in minutes: 20 minutes from beginning to end of physician one to one monitoring.          Findings:   Successful RIJ tunneled lined line placement     Estimated Blood Loss: Minimal    Fluoroscopy Time: 0.4 minute(s)    SPECIMENS: None    Complications: 1. None     Condition: Stable    Plan:   - bedrest for 1 hr   - dialysis catheter ready for immediate use     Comments: See dictated procedure note for full details.    James Zamorano MD

## 2018-03-29 NOTE — PLAN OF CARE
Problem: Patient Care Overview  Goal: Plan of Care/Patient Progress Review  Outcome: No Change  VSS on RA. Afib on monitor. Pt dozing on and off. Uro-jet given for catheter site pain with good result. Still need Sputum sample. EEG leads applied. Pt agitated with pain but otherwise pleasant and cooperative.   Plan: Give 2 units FFP at midnight with INR recheck at 4am. IR will plan on him getting first procedure spot on Thursday around 0800.

## 2018-03-29 NOTE — PLAN OF CARE
Problem: Patient Care Overview  Goal: Individualization & Mutuality  Outcome: No Change  /80  Pulse 88  Temp 97.2  F (36.2  C) (Oral)  Resp 16  Wt 78.5 kg (173 lb 1 oz)  SpO2 98%  BMI 25.56 kg/m2    Pt at midday had tunnel catheter placed for dialysis, at 1630 is having his first run of dialysis.  INR was 1.92 prior to procedure.  EEG was paused when pt left unit, EEG tech can be paged at 1197, to resume EEG monitoring.  Primary complaints through the morning related to itching back, for which a lotion was added PRN (in pt med bin).  Pain with urination has a PRN of Lidocaine.  Pt continues to dose and wake with anxiety and forgetfulness, pt wife has been bedside through much of the day.  Continue to monitor and notify Cards I of changes.  .

## 2018-03-29 NOTE — PROGRESS NOTES
[facility] ID SERVICE: ONGOING CONSULTATION   Josr Landers : 1940 Sex: male:   Medical record number [id number] Attending Physician: Chantel Nunez MD  Date of Service: 2018  ASSESSMENT & PLAN :  Assessment  1. Suspected pneumonia - Aspiration vs infectious. No cough, SOB, increased sputum production or new oxygen requirements. CT chest from 3/27 w/ nodular consolidations in posterior lower lobes. Currently being treated for an aspiration pneumonia w/ Zosyn (Unasyn 3/26 - 3/27; Zosyn 3/27 till present)  2. Suspected UTI - UCx growing >100,000 colonies of two strains of Klebsiella pneumoniae, along with oliguria. Unclear if asymptomatic bacteriuria vs true UTI as patient is altered and unable to provide history. Afebrile w/ no leucocytosis  3. Altered mental status - multiple possible etiologies including NPH, subacute CVA, occult seizures, delirium, uremia or ongoing infection as above   4. New Ascites - Seen on CT A/P. Per wife, no prior hx of hepatic pathology.  5. Cardiogenic shock - EF 15-20%: currently being diuresed.  6. Cardiac amyloidosis     Recommendations  - Continue IV Zosyn which should provide adequate coverage for potential hospital acquired pathogens that may be causing suspected UTI/aspiration pneumonia/possible SBP. (Patient has been exposed to multiple healthcare environments in the last two weeks) - Pharmacy to dose, given worsening renal function  - Recommend trending procalcitonin Q 2-3 days  - with empiric treatment of UTI and possible aspiration pneumonia, anticipate these etiologies are more likely a consequence of his AMS rather than the root cause; treatment indicated independently and each may be a contributor      Discussions  Josr Landers is a 77 year old male with a PMHx of biopsy confirmed cardiac amyloidosis, afib/flutter on warfarin s/p CTI ablation , CAD s/p ATIF to LAD in , CKD stage IV and CVA of R MCA 2017 admitted from TCU on 3/26/2018  because of altered mental status. Mr. Landers has multiple reasons to be altered with the relatively new ventriculomegaly suspicious for NPH (Wife notes he has difficulty ambulating and incontinence concurrently in the last few weeks). Other reasons he could have AMS are subacute CVA and uremia (given worsening kidney function). Infectious causes are also on the differential (potentially could have SBP given reported abdominal pain and new ascites, pneumonia given reported productive cough prior to onset of symptoms and CT findings of bilateral posterior lobe nodular consolidations on admission, UTI, given UCx w/ Klebsiella Pneumoniae and less likely meningitis, given history of headaches and AMS).      Overall, it is unclear that the AMS is clearly infectious in origin, however, we will plan to cover potential sources of infection with appropriate antimicrobial therapy for now and follow clinically. We appreciate the evaluation and agree with the reccs from the Neurology service     Patient staffed with Dr. Nur who agrees with the plan    ID will continue to follow.      [facility] [address]  Infectious disease service  PGY-2 Internal Medicine  Pager: [phone]  Attestation:  This patient has been seen and evaluated by me, Navi Nur MD.  I discussed this patient with the fellow and/or resident(s) and agree with the findings and plan in this note. I also personally edited this note to reflect my findings. I have reviewed today's vital signs, medications, labs and imaging.     CLEM Nur M.D.  [facility] Service Staff  [phone]          CHIEF INFECTIOUS DISEASES COMPLAINT:  Suspected aspiration pneumonia & K. Pneumoniae UTI    INTERVAL HISTORY: (Extended HPI requires four HPI elements or the status of three chronic problems)  No acute events overnight per chart review although patient was restless and delirious most of the night per RN documentation. Patient seen this AM. More awake than yesterday, alert  and oriented to person and time. Endorses some pain when trying to void around the martinez catheter in place. No other concerns today.       ROS: (Recommend ? 2systems)   A five-point review of systems was obtained and was negative with the exception of that which is described above.  Allergies   Allergen Reactions     Gabapentin Other (See Comments)     Altered mental status     Lyrica      Severe swelling of body and fluid around heart.      Allergies were reviewed.  No current outpatient prescriptions on file.     CURRENT ANTI-INFECTIVES:   IV Zosyn     EXAMINATION: (Recommend at least 12 bullets from any organ systems)   Vital Signs: /66  Temp 97.1  F (36.2  C) (Oral)  Resp 16  Wt 78.5 kg (173 lb)  SpO2 98%  BMI 25.55 kg/m2   General:  lying comfortably in bed, in no apparent distress .  HEENT:  PERRLA, EOMI. Sclera is non-icteric and conjunctiva is pink with no erythema. Oral mucosa moist, no oral lesions, good dentition.  Neck:  No JVD.  No cervical/supraclavicular LAD  CV: RRR. S1, S2 with no murmurs, rubs, gallops. Peripheral radial pulse intact.  Resp: No increased work of breathing or use of accessory muscles, breathing comfortably on room air.  Lungs clear to auscultation bilaterally except lower anterior zones which have mild crackles.  Abdomen:  No obvious scars or hernias. Normal active bowel sounds.  Abdomen is soft, non-tender to palpation, rebound, percussion, and non-distended.  :  No suprapubic tenderness.  MSK:  Upper and lower extremity muscle strength intact 5/5 bilaterally.  No large joint swelling or erythema.  Extremities: 2+ pre-tibial edema to mid-leg bilaterally. No cyanosis or clubbing  Skin:  Warm and dry. No erythema, rashes, ulceration or diaphoresis.  Neuro: CN II-XII grossly intact. Alert and oriented x2 (person and time)    NEW DATA/RESULTS:   All interval basic labs, microbiology results and imaging were personally reviewed.  Reviewed medicine test (PFTs, EKG, cardiac  echo or cath): YES    Culture Micro   Date Value Ref Range Status   03/27/2018 >100,000 colonies/mL  Klebsiella pneumoniae   (A)  Preliminary   03/27/2018 (A)  Preliminary    >100,000 colonies/mL  Strain 2  Klebsiella pneumoniae     03/27/2018 Susceptibility testing in progress  Preliminary   03/27/2018 Canceled, Test credited  Duplicate request    Final   03/27/2018 No growth after 1 day  Preliminary       Recent Labs   Lab Test  03/26/18 2006 06/29/16   1237   CRP  54.0*  <2.9     Recent Labs   Lab Test  03/29/18   0547  03/28/18   0730  03/26/18 2006 02/07/18   1203  08/23/17   1209  04/25/17   1344   WBC  6.0  6.0  7.9  3.9*  3.9*  4.5     Recent Labs   Lab Test  03/29/18   0547  03/28/18   0730  03/26/18 2006 03/26/18   CR  4.99*  4.41*  3.42*  3.39*   GFRESTIMATED  11*  13*  18*  17*       Hematology Studies  Recent Labs   Lab Test  03/29/18   0547  03/28/18   0730  03/26/18 2006 02/07/18   1203  08/23/17   1209  04/25/17   1344  06/29/16   1237   WBC  6.0  6.0  7.9  3.9*  3.9*  4.5  5.2   ANEU   --    --   6.5   --    --   2.8  3.3   AEOS   --    --   0.0   --    --   0.0  0.1   HCT  39.8*  40.3  44.7  41.3  38.8*  40.9  43.7   PLT  85*  82*  92*  79*  100*  116*  123*       Metabolic  Recent Labs   Lab Test  03/29/18   0547  03/28/18   0730  03/26/18 2006   NA  134  133  134   BUN  104*  98*  83*   CO2  21  20  24   CR  4.99*  4.41*  3.42*   GFRESTIMATED  11*  13*  18*       Hepatic Studies  Recent Labs   Lab Test  03/29/18   0547  03/28/18   0730  03/26/18 2006   BILITOTAL  2.3*  2.4*  2.6*   ALKPHOS  110  100  114   ALBUMIN  3.1*  3.0*  3.4   AST  50*  54*  62*   ALT  59  60  56       Immunologlobulins  Recent Labs   Lab Test  03/26/18   2006  12/09/10   1331   IGE   --   32   SED  24*   --

## 2018-03-29 NOTE — PLAN OF CARE
Problem: Patient Care Overview  Goal: Plan of Care/Patient Progress Review  Edema 6C: Attempted to see patient for lymphedema evaluation. Became agitated and refused. Will follow and initiate as appropriate.

## 2018-03-29 NOTE — PROGRESS NOTES
"SPIRITUAL HEALTH SERVICES  SPIRITUAL ASSESSMENT Progress Note  [facility] 6C     REFERRAL SOURCE: Initial unit  visit with pt and spouse, per request for hospital  visit as noted in initial nursing assessment.    Pt/spouse welcomed  support. 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  PLAN: continue to follow, visiting at least 2x weekly while pt on unit.    Josr Art) Solomon Bah M.Div., [facility]  Staff   Pager [phone]      "

## 2018-03-29 NOTE — PLAN OF CARE
Problem: Patient Care Overview  Goal: Plan of Care/Patient Progress Review  OT 6C: Cancel.  Pt with agitation, not able to purposefully participate in therapy today.

## 2018-03-29 NOTE — MR AVS SNAPSHOT
After Visit Summary   3/29/2018    Josr Landers    MRN: [id number]           Patient Information     Date Of Birth          1940        Visit Information        Provider Department      3/29/2018 7:00 AM [facility] EEG TECH 4 [facility] EEG        Today's Diagnoses     Disorientation    -  1       Follow-ups after your visit        Your next 10 appointments already scheduled     Mar 29, 2018  1:30 PM CDT   (Arrive by 1:15 PM)   IR CVC TUNNEL PLACEMENT > 5 YRS OF AGE with UUIR1   [facility] [place], Interventional Radiology ([facility], [facility])    [address] [id number] [phone]           1. Your doctor will need to do a history and physical within 7 days before this procedure. 2. Your doctor will which medications should not be taken the morning of the exam. 3. Laboratory tests are to be obtained by your doctor prior to the exam (Basic Metabolic Panel, CBCP, PTT and INR) (No labs needed if you are having a tunneled catheter exchange or removal) 4. If you have allergies to x-ray contrast or iodine, contact your doctor or a Radiology nurse prior to the exam day for instructions. 5. Someone will need to drive you to and from the hospital. 6. If you are or may be pregnant, contact your doctor or a Radiology nurse prior to the day of the exam. 7. If you have diabetes, check with your doctor or a Radiology nurse to see if your insulin needs to be adjusted for the exam. 8. If you are taking a medication called Glucophage or Glucovance; these medications need to be held the day of the exam and for approximately 48 hours following. A blood sample must be drawn so your creatinine level can be checked before resuming this medication. 9. If you are taking Coumadin (to thin you blood) please contact your doctor or a Radiology nurse at least 3 days before the exam for special instructions. 10. You should not have received contrast within 48 hours of  this exam. 11. The day before your exam you may eat your regular diet and are encouraged to drink at least 2 quarts of clear liquids. Drink no alcoholic beverages for 24 hours prior to the exam. 12. If you have a colostomy you will need to irrigate it with tap water at 8PM the evening before and again at 6AM the morning of the exam. 13. Do not smoke for 24 hours prior to the procedure. 14. Birth to 4 years: - Breast feeding must be stopped 4 hours prior to exam - Solid food or formula must be stopped 6 hours prior to exam - Tube feedings must be stopped 6 hours prior to exam 15. 4-10 years old: - Nothing to eat or drink 6 hours prior to exam 16. 10+ years old: - Nothing to eat or drink 8 hours prior to exam 17. The morning of the exam you may brush your teeth and take medications as directed with a sip of water. 18. When discharged, you cannot drive until morning, and an adult must be with you until then. You should stay in the [facility] overnight. 19. Bring a list of all drugs you are taking; include supplements and over-the-counter medications. Wear comfortable clothes and leave your valuables at home.            Apr 09, 2018  2:00 PM CDT   (Arrive by 1:45 PM)   Return Visit with Ángel Oates MD   [facility] ([facility])    [address]  4th Floor  [facility] [address]   [phone]            Aug 01, 2018 11:00 AM CDT   Lab with [facility] ([facility])    [address]  1st Floor  [facility] [address]   [phone]            Aug 01, 2018 12:00 PM CDT   (Arrive by 11:30 AM)   Return Visit with Jonathan Turner MD   [facility] Nephrology ([facility])    [address] [place] [address]  [address]  [facility] [address]   [phone]            Aug 08, 2018  1:00 PM CDT   (Arrive by 12:45 PM)   MR ABDOMEN & PELVIS W/O & W CONTRAST with [address] [facility] MRI (  [facility])    [address]  1st [facility] [address] [phone]           Take your medicines as usual, unless your doctor tells you not to. Bring a list of your current medicines to your exam (including vitamins, minerals and over-the-counter drugs). Also bring the results of similar scans you may have had.    You may or may not receive IV contrast for this exam pending the discretion of the Radiologist.   Do not eat or drink for 6 hours prior to exam.  The MRI machine uses a strong magnet. Please wear clothes without metal (snaps, zippers). A sweatsuit works well, or we may give you a hospital gown.  Please remove any body piercings and hair extensions before you arrive. You will also remove watches, jewelry, hairpins, wallets, dentures, partial dental plates and hearing aids. You may wear contact lenses, and you may be able to wear your rings. We have a safe place to keep your personal items, but it is safer to leave them at home.  **IMPORTANT** THE INSTRUCTIONS BELOW ARE ONLY FOR THOSE PATIENTS WHO HAVE BEEN PRESCRIBED SEDATION OR GENERAL ANESTHESIA DURING THEIR MRI PROCEDURE:  IF YOUR DOCTOR PRESCRIBED ORAL SEDATION (take medicine to help you relax during your exam):   You must get the medicine from your doctor (oral medication) before you arrive. Bring the medicine to the exam. Do not take it at home. You ll be told when to take it upon arriving for your exam.   Arrive one hour early. Bring someone who can take you home after the test. Your medicine will make you sleepy. After the exam, you may not drive, take a bus or take a taxi by yourself.  IF YOUR DOCTOR PRESCRIBED IV SEDATION:   Arrive one hour early. Bring someone who can take you home after the test. Your medicine will make you sleepy. After the exam, you may not drive, take a bus or take a taxi by yourself.   No eating 6 hours before your exam. You may have clear liquids up until 4 hours before your  exam. (Clear liquids include water, clear tea, black coffee and fruit juice without pulp.)  IF YOUR DOCTOR PRESCRIBED ANESTHESIA (be asleep for your exam):   Arrive 1 1/2 hours early. Bring someone who can take you home after the test. You may not drive, take a bus or take a taxi by yourself.   No eating 8 hours before your exam. You may have clear liquids up until 4 hours before your exam. (Clear liquids include water, clear tea, black coffee and fruit juice without pulp.)   You will spend four to five hours in the recovery room.  If you have any questions, please contact your Imaging Department exam site.            Aug 08, 2018  2:00 PM CDT   (Arrive by 1:45 PM)   Return Visit with Aleisha Sapp MD   [facility] ([facility])    [address] [address] [phone]              Who to contact     Please call your clinic at [phone] to:    Ask questions about your health    Make or cancel appointments    Discuss your medicines    Learn about your test results    Speak to your doctor            Additional Information About Your Visit        AskNshare Information     Ask[employer]share gives you secure access to your electronic health record. If you see a primary care provider, you can also send messages to your care team and make appointments. If you have questions, please call your primary care clinic.  If you do not have a primary care provider, please call [phone] and they will assist you.      AskNshare is an electronic gateway that provides easy, online access to your medical records. With [employer], you can request a clinic appointment, read your test results, renew a prescription or communicate with your care team.     To access your existing account, please contact your [facility] or call [phone] for assistance.        Care EveryWhere ID     This is your Care  EveryWhere ID. This could be used by other organizations to access your [facility] medical records  [id number]         Blood Pressure from Last 3 Encounters:   03/29/18 (P) 91/67   03/26/18 111/71   03/20/18 109/74    Weight from Last 3 Encounters:   03/29/18 78.5 kg (173 lb)   03/26/18 75.3 kg (165 lb 14.4 oz)   03/20/18 77.1 kg (170 lb)              Today, you had the following     No orders found for display         Today's Medication Changes      Notice     This visit is during an admission. Changes to the med list made in this visit will be reflected in the After Visit Summary of the admission.             Primary Care Provider Office Phone # Fax #    Ángel Oates [phone] [address] [address]        Equal Access to Services     [facility]: Hadsvitlana hurtado Soluis, waaxivonne flores, qamarckta kaalmaivonne anaya, mikey han . So [facility] [phone].    ATENCIÓN: Si habla español, tiene a mayorga disposición servicios gratuitos de asistencia lingüística. Llame al [phone].    We comply with applicable federal civil rights laws and [place] laws. We do not discriminate on the basis of race, color, national origin, age, disability, sex, sexual orientation, or gender identity.            Thank you!     Thank you for choosing [facility]  for your care. Our goal is always to provide you with excellent care. Hearing back from our patients is one way we can continue to improve our services. Please take a few minutes to complete the written survey that you may receive in the mail after your visit with us. Thank you!             Your Updated Medication List - Protect others around you: Learn how to safely use, store and throw away your medicines at www.disposemymeds.org.      Notice     This visit is during an admission. Changes to the med list made in this visit will be reflected in the After Visit Summary of the admission.

## 2018-03-29 NOTE — PROGRESS NOTES
Social Work: Assessment with Discharge Plan    Patient Name:  Josr Landers  :  1940  Age:  77 year old  MRN:  [id number]  Risk/Complexity Score:  Filed Complexity Screen Score: 7  Completed assessment with:  Pt, spouse    Presenting Information   Reason for Referral:  Discharge plan  Date of Intake:  2018  Referral Source:  Chart Review  Decision Maker:  Pt when able  Alternate Decision Maker:  Spouse is NOK per policy  Health Care Directive:  Will bring in copy  Living Situation:  House  Previous Functional Status:  Assistance with Other:  Rehab towards ADLs.  Per spouse, pt was recently at [facility] and was sent to [facility] for rehab care.  Patient and family understanding of hospitalization:  Pt and spouse are in agreement with returning to TCU if that is indicated after this hospitalization.  Cultural/Language/Spiritual Considerations:  Preferred rebekah is Denominational  Adjustment to Illness:  Pt and spouse adjusting appropriately.  Majority of assessment questions answered by spouse as pt displaying confusion intermittently.    Physical Health  Reason for Admission:    1. Acute kidney injury (H)      Services Needed/Recommended:  TCU    Mental Health/Chemical Dependency  Diagnosis:  None reported  Support/Services in Place:  None  Services Needed/Recommended:  None    Support System  Significant relationship at present time:  Spouse at bedside.  Pt also has adult children who are involved.  Family of origin is available for support:  Yes  Other support available:  Yes  Gaps in support system:  Pt likely needing continued TCU.  Pt was at [facility] for 14 days.  Spouse is concerned that after day 20, pt has a $100 per day copay.  Spouse is interested in an MA application to see if any other financial supports are available.  Pt has not been in the community for 60 days to reset his benefit period.  Patient is caregiver to:  None     Provider Information   Primary Care Physician:   Ángel Oates   [phone]   Clinic:  [address] [place] [address] / [facility] [address]      :  None    Financial   Income Source:  Pt is self employed  Financial Concerns:  Pt and spouse interested in MA application for additional support and coverage of TCU expenses.  Insurance:    Payor/Plan Subscriber Name Rel Member # Group #   UCARE - UCARE SENIORS* ERIKA ROSE  [id number] [facility] [address]       Discharge Plan   Patient and family discharge goal:  TCU if recommended  Provided education on discharge plan:  YES  Patient agreeable to discharge plan:  YES  A list of Medicare Certified Facilities was provided to the patient and/or family to encourage patient choice. Patient's choices for facility are:    [facility] - spouse released bed hold due to cost of holding the bed.  Would like a referral restarted here if that is needed/indicated.  PH: [phone]  F: [phone]    Will NH provide Skilled rehabilitation or complex medical:  YES  General information regarding anticipated insurance coverage and possible out of pocket cost was discussed. Patient and patient's family are aware patient may incur the cost of transportation to the facility, pending insurance payment: YES - discussed with family that their benefit period does not reset having been admitted to a hospital from [facility] directly.  SW explained the 60 day in the community provision with Medicare to have a benefit reset.  Family can call insurance to see if Shelia will reset the benefit period to day 1.  Barriers to discharge:  Medical stability    Discharge Recommendations   Anticipated Disposition:  Facility:  TCU likely  Transportation Needs:  Family:  Spouse as able  Name of Transportation Company and Phone:  TBD    Additional comments   JOAQUÍN met with pt and family to introduce self and role in consult.  Pt and spouse are interested in TCU and interested in pursuing financial supports to see if they qualify for MA  assistance.  Spouse reports they have looked into financial assistance programs in the past however have not fully completed an application.  SW to ask financial counselors to meet with pt and spouse tomorrow to assist with an application for support.  Spouse is very concerned about the copay with the [employer] insurance.  Spouse reports her copay starting day 21 is $100 per day.  SW to confirm with Celio that the pt has this copay again prior to family agreeing to return to TCU.  SW to follow for placement needs.    JERRI Valdovinos, APSW  6C Unit   Phone: [phone]  Pager: [phone]  Unit: [phone]

## 2018-03-29 NOTE — PROGRESS NOTES
Interventional Radiology Pre-Procedure Sedation Assessment   Time of Assessment: 3:30 PM    Expected Level: Moderate Sedation    Indication: Sedation is required for the following type of Procedure: Venous    Sedation and procedural consent: Risks, benefits and alternatives were discussed with Patient    PO Intake: Appropriately NPO for procedure    ASA Class: Class 1 - HEALTHY PATIENT    Mallampati: Grade 2:  Soft palate, base of uvula, tonsillar pillars, and portion of posterior pharyngeal wall visible    Lungs: Lungs Clear with good breath sounds bilaterally    Heart: Normal heart sounds and rate    History and physical reviewed and no updates needed. I have reviewed the lab findings, diagnostic data, medications, and the plan for sedation. I have determined this patient to be an appropriate candidate for the planned sedation and procedure and have reassessed the patient IMMEDIATELY PRIOR to sedation and procedure.    James Zamorano MD

## 2018-03-29 NOTE — PROGRESS NOTES
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      Worsening biventricular heart function noted 2 weeks ago at OSH, EF 30% right atrial pressure >15% now further decreased to EF 15-20% with right atrial pressure of >20%, persistent IVC dilation. CTCAP remarkable for large ascites and nodular opacities in lungs. Has + edema, reported GARCIA/orthopnea, mild productive cough.     Cardiorenal syndrome most likely, possible element of compartment syndrome related to ascites, has risk factors for infection and obstruction     RECOMMENDATIONS     Elevated Scr/Hypervolemia:  Scr rising to 4.4 and poor response to diurese challenge, oliguric=LOIS.  Urine studies suggest dECV likely HF-CRS and infection.      Place tunneled dialysis catheter, initiate HD tomorrow, UF goal 2 kg, then 2-3 kg off daily as tolerated.    Treat Klebsiella pneumonia UTI. On Zosyn for ?PNA on CXR.    Would obtain diagnostic and therapeutic paracentesis of ascites to yield etiology and relieve any compressive/obstructive physiology    Continue martinez for monitoring of urine output.  Considering urology consult for traumatic placement.     BPs: BL systolic 90s.No HTN meds.  Monitor closely with diuresis, goal to avoid systolic <90      Lytes: AG met acidosis of acute renal failure.  Will treat with HD for now.      Anemia: not present, Hgb technically at upper limit normal range but elevated from baseline. Monitor     AMS:  Mgmt per primary team. Neurology consulted for  ventriculomegaly, assess for seizures. Hx of CVA.    AFIB/AFLUTTER:  INR 4.22.  Hold coumadin (last dose 3/27 am ).  Primary team to reverse with FFP prior to procedures.    Constipation: no stool for past several days per report.  Mgmt per primary team, may need bowel regimen.     Recommendations were communicated to primary team via phone.  Nurys Olguin PA-C  [facility]  Department of Medicine  Division of Renal Disease and Hypertension  [phone]     Interval History :   In the last 24 hours Josr Landers is awake and responsive. Made total 300 cc urine after 9 mg IV Bumex and martinez placement.  BP stable.  No fevers, chills.  No CP, HA  Discussed recommendation of hemodialysis and is agreeable to trial. Appears comfortable on RA but has had GARCIA/orthopnea per report.  . +2  pedal edema, + sacral edema. Pain with martinez placement, traumatic.    Review of Systems:   (4 pt ROS reviewed alone is not adequate unless you detail systems you reviewed)  I reviewed the following systems:  GI: poor appetite. no nausea or vomiting or diarrhea. +constipation.  Neuro:  + confusion  Constitutional:  No fever or chills  CV: No dyspnea or chest pain.    Physical Exam:   I/O last 3 completed shifts:  In: 400 [P.O.:300; I.V.:100]  Out: 380 [Urine:380]   BP (!) 82/65  Temp 97.5  F (36.4  C) (Oral)  Resp 16  Wt 74.4 kg (164 lb)  SpO2 98%  BMI 24.22 kg/m2     GENERAL APPEARANCE: Alert NAD   EYES: No scleral icterus, pupils equal  HENT: NC/AT,  mouth  without ulcers or lesions  Lymphatics: no cervical, clavicular, axillary or femoral lymph nodes appreciated  Pulmonary: lungs clear to auscultation with equal breath sounds bilaterally  CV: regular rhythm, normal rate, no rub   - Edema +2 bilateral pedal edema, edema of sacrum/hips  GI: +ascitic fluid wave, hypoactive bowel sounds, +hepatomegaly  MS:  no muscle tenderness  : +Martinez or scrotal edema  SKIN: no rash, warm, dry, no cyanosis  NEURO:  Oriented x 2. Mentation slow, appropriately responsive to most questions and speech seems normal.  No obvious deficits resting in bed.    Labs:   All labs reviewed by me  Electrolytes/Renal -   Recent Labs   Lab Test  03/28/18 0730 03/27/18   0156  03/26/18 2006 03/26/18 02/07/18   1203   08/23/17   1209   02/14/14   1312  01/24/14   0904   02/14/13   0640   NA  133   --   134  138   < >  136   < >  135   < >  140  140   < >  141   POTASSIUM  4.9   --   4.3  4.4   < >  4.2   < >  3.9   < >  4.2  4.3   < >  3.9   CHLORIDE  100   --   99  100   < >  101   < >  97   < >  107  106   < >  107   CO2  20   --   24  22   < >  29   < >  27   < >  25  27   < >  27   BUN  98*   --   83*  87*   < >  53*   < >  53*   < >  21  25   < >  21   CR  4.41*   --   3.42*  3.39*   < >  2.46*   < >  2.14*   < >  1.71*  1.83*   < >  1.30*   GLC  85   --   109*  84   < >  70   < >  120*   < >  91  90   < >  91   RIGOBERTO  8.8   --   9.5  9.3   < >  9.3   < >  8.9   < >  9.2  9.0   < >  8.2*   MAG   --    --    --    --    --    --    --    --    --   2.3  2.1   --   1.7   PHOS   --   4.4   --    --    --   3.8   --   3.6   --    --    --    --    --     < > = values in this interval not displayed.       CBC -   Recent Labs   Lab Test  03/28/18 0730 03/26/18 2006 03/26/18 02/07/18   1203   WBC  6.0  7.9   --    --   3.9*   HGB  13.7  15.1  13.2*   < >  13.6   PLT  82*  92*   --    --   79*    < > = values in this interval not displayed.       LFTs -   Recent Labs   Lab Test  03/28/18 0730 03/26/18 2006 02/07/18   1203   04/25/17   1344   ALKPHOS  100  114   --    --   71   BILITOTAL  2.4*  2.6*   --    --   2.0*   ALT  60  56   --    --   34   AST  54*  62*   --    --   23   PROTTOTAL  6.6*  7.8   --    --   6.9   ALBUMIN  3.0*  3.4  3.5   < >  3.6    < > = values in this interval not displayed.       Iron Panel - No lab results found.    Imaging:  Reviewed.    Current Medications:    bumetanide  4 mg Intravenous Q12H      warfarin-No DOSE today  1 each Does not apply no dose today (warfarin)     piperacillin-tazobactam  2.25 g Intravenous Q8H CLINT     atorvastatin  80 mg Oral Daily     sodium chloride (PF)  3 mL Intracatheter Q8H     sodium chloride 0.9%  1,000 mL Intravenous Once       - MEDICATION INSTRUCTIONS -       Warfarin Therapy Reminder       Nurys Olguin PA-C

## 2018-03-29 NOTE — PROGRESS NOTES
Nephrology Progress Note  03/29/2018         ASSESSMENT and Recommendations:  Josr Landers is a 78 yo M with PMH of afib/aflutter, CVA, HFrEF, CAD, cardiac amyloidosis, admitted with AMS and acute elevation in serum creatinine.      Elevated Scr:  CKD stage 4 with baseline Scr 2.4 attributed to AKIs with CVA and HF w hx of cardiac amyloidosis diagnosed ~ 10 yrs ago. CAD, Afib/A-Flutter on coumadin and hx of NSAID use; additional hx of right renal mass status post ablation 10 years ago. Urine studies suggest dECV likely HF-CRS and infection. Scr rising to 4.9.   - Tunneled dialysis catheter being placed today, will initiate HD today 3/29   - Will likely need outpt dialysis unit arranged prior to discharge    Volume: oliguric. Poor response to diuretics. Dilated IVC, + edema, worsening GARCIA  - UF goal 2 kg, then 2-3 kg off daily as tolerated.  - Would obtain diagnostic and therapeutic paracentesis of ascites to yield etiology and relieve any compressive/obstructive physiology.  - Continue martinez for monitoring of urine output.       ID:     UTI: on zosyn     PNA:     Atrial fibrillation: on warfarin   BP: HFrEF 15-20%, chronically hypotensive with systolic 90s. No HTN meds.  - Monitor closely with diuresis, goal to avoid systolic <90      Lytes: AG met acidosis of acute renal failure.  - Will improve with dialysis being initiated.     AMS: Neurology consulted for ventriculomegaly, hx of CVA; video monitoring/EEG in process, thus far c/w encephalopathy.           Recommendations were communicated to primary team via phone.    Aileen Leung PA-C  Division of Kidney Disease  Pager [phone]     Interval History :   Seen bedside, waiting for IR To place CVC; intermittently confused but at times lucid. Discussed dialysis again. Will initiate today and run daily through Saturday. Denies CP, chills, N/v. Somewhat short of breath.    Review of Systems:   4 point ROS negative other than as noted above.    Physical Exam:    I/O last 3 completed shifts:  In: 945 [P.O.:320; I.V.:200]  Out: 550 [Urine:550]   /66  Temp 97.1  F (36.2  C) (Oral)  Resp 16  Wt 78.5 kg (173 lb)  SpO2 98%  BMI 25.55 kg/m2     GENERAL APPEARANCE: intermittently awake, NAD   EYES: No scleral icterus, pupils equal  HENT: NC/AT,  mouth  without ulcers or lesions  Pulmonary: lungs clear to auscultation with equal breath sounds bilaterally  CV: regular rhythm, normal rate   - Edema +2 bilateral pedal edema  GI: distended, non-tender    MS:  no muscle tenderness  : has Fraga   SKIN: no rash, warm, dry, no cyanosis  NEURO: Mentation slow, appropriately responsive to most questions and speech seems normal.       Labs:   All labs reviewed by me  Electrolytes/Renal -   Recent Labs   Lab Test  03/29/18   0547  03/28/18   0730  03/27/18   0156  03/26/18   2006   02/07/18   1203   08/23/17   1209   02/14/14   1312  01/24/14   0904   02/14/13   0640   NA  134  133   --   134   < >  136   < >  135   < >  140  140   < >  141   POTASSIUM  5.2  4.9   --   4.3   < >  4.2   < >  3.9   < >  4.2  4.3   < >  3.9   CHLORIDE  98  100   --   99   < >  101   < >  97   < >  107  106   < >  107   CO2  21  20   --   24   < >  29   < >  27   < >  25  27   < >  27   BUN  104*  98*   --   83*   < >  53*   < >  53*   < >  21  25   < >  21   CR  4.99*  4.41*   --   3.42*   < >  2.46*   < >  2.14*   < >  1.71*  1.83*   < >  1.30*   GLC  93  85   --   109*   < >  70   < >  120*   < >  91  90   < >  91   RIGOBERTO  8.9  8.8   --   9.5   < >  9.3   < >  8.9   < >  9.2  9.0   < >  8.2*   MAG   --    --    --    --    --    --    --    --    --   2.3  2.1   --   1.7   PHOS   --    --   4.4   --    --   3.8   --   3.6   --    --    --    --    --     < > = values in this interval not displayed.       CBC -   Recent Labs   Lab Test  03/29/18   0547  03/28/18   0730  03/26/18 2006   WBC  6.0  6.0  7.9   HGB  13.4  13.7  15.1   PLT  85*  82*  92*       LFTs -   Recent Labs   Lab Test  03/29/18    0547  03/28/18   0730  03/26/18 2006   ALKPHOS  110  100  114   BILITOTAL  2.3*  2.4*  2.6*   ALT  59  60  56   AST  50*  54*  62*   PROTTOTAL  6.8  6.6*  7.8   ALBUMIN  3.1*  3.0*  3.4       Iron Panel - No lab results found.    Imaging:  Reviewed.    Current Medications:    ceFAZolin  500 mg Intravenous Pre-Op/Pre-procedure x 1 dose     sodium chloride 0.9%  250 mL Intravenous Once in dialysis     sodium chloride 0.9%  300 mL Hemodialysis Machine Once     gelatin absorbable  1 each Topical During Hemodialysis (from stock)     - MEDICATION INSTRUCTIONS -   Does not apply Once     sodium chloride (PF)  3 mL Intracatheter During Hemodialysis (from stock)     sodium chloride (PF)  3 mL Intracatheter During Hemodialysis (from stock)     bumetanide  4 mg Intravenous Q12H     piperacillin-tazobactam  2.25 g Intravenous Q8H CLINT     atorvastatin  80 mg Oral Daily     sodium chloride (PF)  3 mL Intracatheter Q8H     sodium chloride 0.9%  1,000 mL Intravenous Once       - MEDICATION INSTRUCTIONS -       - MEDICATION INSTRUCTIONS -       Warfarin Therapy Reminder       Aileen Leung PA-C

## 2018-03-29 NOTE — PLAN OF CARE
Problem: Patient Care Overview  Goal: Plan of Care/Patient Progress Review  Outcome: No Change    D: Pt admitted 3/26 from TCU with AMS and elevated creat.  I/A: Prep for IR: IV Vitamin K and 2u FFP given overnight. INR 2.16 on recheck. 1 add'l unit FFP ordered- awaiting unit from blood bank. Scrub prep done per orders with kristofer cloths. NPO after MN.    Neuro: EEG monitoring in place overnight. Pt restless all shift, slept for only brief bouts. Illogical in conversation, fixated on work-related issues (high stakes business deal, communications, etc). Easily redirectable and cooperative but almost instantly repeats the same statement. Calling out overnight. Writer remained in room most of shift. Oriented only to self.  Uro: Pt complains intermittently of feeling the need to void despite patent catheter. Pt attempts to void and dribbles come out around catheter and then pt shouts with pain. Lidocaine gel (urojet) PRN for this pain. Low UOP- blood tinged, brown.  Other: Assisted with frequent turns/positioning. IVABX as scheduled. Tylenol and melatonin given at HS. Bed alarm on for pt safety.   P: Plan for dialysis catheter placement then HD run. Continue to monitor and assess pt condition and contact treatment team with questions or concerns.

## 2018-03-29 NOTE — PROGRESS NOTES
Patient Name: Josr Landers  Medical Record Number: [id number]  Today's Date: 3/29/2018    Procedure: tunneled central venous catheter for dialysis  Proceduralist: Wale Kirby and Lona    Sedation start time: 1525  Sedation end time: 1600  Sedation medications administered: versed 0.5  Mg., fentanyl 25 Mcg.  Total sedation time: 35 minutes    Procedure start time: 1525  Puncture time: 1528  Procedure end time: 1555    Report given to: dialysis RN  Other Notes: Pt arrived to IR room  1  from  6C Pt denies any questions or concerns regarding procedure. Pt positioned  Supine  and monitored per protocol. Pt tolerated procedure without any noted complications. Pt transferred back to dialysis.

## 2018-03-30 NOTE — PROGRESS NOTES
Social Work Services Progress Note     Hospital Day: 3  Date of Initial Social Work Evaluation:  3/28/18  Collaborated with:  [employer] financial counseling     Data: Pt is a 77 year old male being followed by SW for placement for rehab post hospitalization.     Intervention:  SW received a message from [employer] that the spouse has declined pursuing an MA application at this time.  [employer] gave contact information if the family is wanting to pursue this at another time.  SW to follow with referrals once pt improves with mentation.  Pt currently on VPM for confusion/delirium symptoms.     - Referrals in Process:    None re-started yet as pt is ineligible for TCU.     Assessment: Did not met with pt or spouse for this encounter.     Plan:    Anticipated Disposition: Facility:  TCU    Barriers to d/c plan: Medical stability, delirium, VPM    Follow Up: SW to follow up with referrals and family on Monday pending pt progress with medical cares.     JERRI Valdovinos, APSW  6C Unit   Phone: [phone]  Pager: [phone]  Unit: [phone]    ___________________________________________________________________________________________________________________________________________________    Referrals Discontinued:  None     Community Case Management/Community Services in place:   Will likely need dialysis in the community after discharge.  This is pending pt's medical course and treatment.

## 2018-03-30 NOTE — PROGRESS NOTES
[facility] ID SERVICE: ONGOING CONSULTATION   Josr Landers : 1940 Sex: male:   Medical record number [id number] Attending Physician: Chantel Nunez MD  Date of Service:   ASSESSMENT & PLAN :  Assessment  1. Pneumonia - Aspiration vs HCAP. No cough, SOB, increased sputum production or new oxygen requirements. CT chest from 3/27 w/ nodular consolidations in posterior lower lobes. Currently being treated for an aspiration pneumonia w/ Zosyn (Unasyn 3/26 - 3/27; Zosyn 3/27 till present)  2. UTI - UCx growing >100,000 colonies of two strains of Klebsiella pneumoniae, along with oliguria and dysuria, now improving. Afebrile w/ no leucocytosis.  3. Altered mental status - multiple possible etiologies including NPH, subacute CVA, occult seizures, delirium, uremia or ongoing infection as above  4. New Ascites - Seen on CT A/P. Per wife, no prior hx of hepatic pathology.  5. Cardiogenic shock - EF 15-20%: currently being diuresed.  6. Cardiac amyloidosis  7. Sputum gram stain w/ GPC. Sputum culture pending     Recommendations  - D/C Zosyn  - Recommence on Unasyn based on urine culture sensitivities - plan for 8 days total treatment course  - Recommend trending procalcitonin Q 2-3 days  - with empiric treatment of UTI and possible aspiration pneumonia, anticipate these etiologies are more likely a consequence of his AMS rather than the root cause; treatment indicated independently and each may be a contributor      Discussions  Josr Landers is a 77 year old male with a PMHx of biopsy confirmed cardiac amyloidosis, afib/flutter on warfarin s/p CTI ablation , CAD s/p ATIF to LAD in , CKD stage IV and CVA of R MCA 2017 admitted from TCU on 3/26/2018 because of altered mental status. Mr. Landers has multiple reasons to be altered with the relatively new ventriculomegaly suspicious for NPH (Wife notes he has difficulty ambulating and incontinence concurrently in the last few weeks). Other  reasons he could have AMS are subacute CVA and uremia (given worsening kidney function). Infectious causes are also on the differential (potentially could have SBP given reported abdominal pain and new ascites, pneumonia given reported productive cough prior to onset of symptoms and CT findings of bilateral posterior lobe nodular consolidations on admission, UTI, given UCx w/ Klebsiella Pneumoniae and less likely meningitis, given history of headaches and AMS).      Overall, it is unclear that the AMS is clearly infectious in origin and may not improve w/ treatment of his presumed ongoing infection. With sensitivities back and subjective symptomatic improvement of dysuria in this patient, it is appropriate to recommence on Unasyn which should also provide appropriate coverage for a respiratory source based on the information we have right now.      ID will sign off at this time. Please do not hesitate to call or contact us with any additional concerns or questions.     Patient staffed with Dr. Nur who agrees with the plan     [facility]  Infectious disease service  PGY-2 Internal Medicine  Pager: [phone]    Attestation:  This patient has been seen and evaluated by me, Navi Nur MD.  I discussed this patient with the fellow and/or resident(s) and agree with the findings and plan in this note. I also personally edited this note to reflect my findings. I have reviewed today's vital signs, medications, labs and imaging.     CLEM Nur M.D.  [facility] Service Staff  [phone]        CHIEF INFECTIOUS DISEASES COMPLAINT:  Suspected aspiration pneumonia & K. Pneumoniae UTI    INTERVAL HISTORY: (Extended HPI requires four HPI elements or the status of three chronic problems)  No acute events overnight per chart review although patient was restless and delirious most of the night per RN documentation. Patient seen this AM. Awake and oriented to person and time at time of interview. Still endorses  some pain when trying to void around the martinez catheter in place, although he says this is much improved compared to yesterday. No other concerns today.       ROS: (Recommend ? 2systems)   A five-point review of systems was obtained and was negative with the exception of that which is described above.  Allergies   Allergen Reactions     Gabapentin Other (See Comments)     Altered mental status     Lyrica      Severe swelling of body and fluid around heart.      Allergies were reviewed.  No current outpatient prescriptions on file.     CURRENT ANTI-INFECTIVES:   IV Zosyn     EXAMINATION: (Recommend at least 12 bullets from any organ systems)   Vital Signs: /88 (BP Location: Right arm)  Pulse 88  Temp 98.1  F (36.7  C) (Oral)  Resp 16  Wt 78.5 kg (173 lb 1 oz)  SpO2 100%  BMI 25.56 kg/m2   General:  lying comfortably in bed, in no apparent distress .  HEENT:  PERRLA, EOMI. Sclera is non-icteric and conjunctiva is pink with no erythema. Oral mucosa moist, no oral lesions, good dentition.  Neck:  No JVD.  No cervical/supraclavicular LAD  CV: RRR. S1, S2 with no murmurs, rubs, gallops. Peripheral radial pulse intact.  Resp: No increased work of breathing or use of accessory muscles, breathing comfortably on room air.  Lungs clear to auscultation bilaterally except lower anterior zones which have mild crackles.  Abdomen:  No obvious scars or hernias. Normal active bowel sounds.  Abdomen is soft, non-tender to palpation, rebound, percussion, and non-distended.  :  No suprapubic tenderness.  MSK:  Upper and lower extremity muscle strength intact 5/5 bilaterally.  No large joint swelling or erythema.  Extremities: 2+ pre-tibial edema to mid-leg bilaterally. No cyanosis or clubbing  Skin:  Warm and dry. No erythema, rashes, ulceration or diaphoresis.  Neuro: CN II-XII grossly intact. Alert and oriented x2 (person and time)    NEW DATA/RESULTS:   All interval basic labs, microbiology results and imaging were  personally reviewed.  Reviewed medicine test (PFTs, EKG, cardiac echo or cath): YES    Culture Micro   Date Value Ref Range Status   03/30/2018 Culture negative < 24 hours, reincubate  Preliminary   03/27/2018 >100,000 colonies/mL  Klebsiella pneumoniae   (A)  Final   03/27/2018 (A)  Final    >100,000 colonies/mL  Strain 2  Klebsiella pneumoniae     03/27/2018 Canceled, Test credited  Duplicate request    Final   03/27/2018 No growth after 3 days  Preliminary       Recent Labs   Lab Test  03/26/18 2006 06/29/16   1237   CRP  54.0*  <2.9     Recent Labs   Lab Test  03/30/18   0620  03/29/18   0547  03/28/18   0730  03/26/18 2006 02/07/18   1203  08/23/17   1209   WBC  5.9  6.0  6.0  7.9  3.9*  3.9*     Recent Labs   Lab Test  03/30/18   0620  03/29/18   0547  03/28/18   0730  03/26/18 2006   CR  4.56*  4.99*  4.41*  3.42*   GFRESTIMATED  13*  11*  13*  18*       Hematology Studies  Recent Labs   Lab Test  03/30/18   0620  03/29/18   0547  03/28/18   0730  03/26/18 2006 02/07/18   1203  08/23/17   1209  04/25/17   1344  06/29/16   1237   WBC  5.9  6.0  6.0  7.9  3.9*  3.9*  4.5  5.2   ANEU  4.9   --    --   6.5   --    --   2.8  3.3   AEOS  0.0   --    --   0.0   --    --   0.0  0.1   HCT  38.2*  39.8*  40.3  44.7  41.3  38.8*  40.9  43.7   PLT  76*  85*  82*  92*  79*  100*  116*  123*       Metabolic  Recent Labs   Lab Test  03/30/18   0620  03/29/18   0547  03/28/18   0730   NA  133  134  133   BUN  88*  104*  98*   CO2  20  21  20   CR  4.56*  4.99*  4.41*   GFRESTIMATED  13*  11*  13*       Hepatic Studies  Recent Labs   Lab Test  03/30/18   0620  03/29/18   0547  03/28/18   0730   BILITOTAL  2.4*  2.3*  2.4*   ALKPHOS  97  110  100   ALBUMIN  2.6*  3.1*  3.0*   AST  45  50*  54*   ALT  49  59  60       Immunologlobulins  Recent Labs   Lab Test  03/26/18   2006  12/09/10   1331   IGE   --   32   SED  24*   --

## 2018-03-30 NOTE — PROGRESS NOTES
HEMODIALYSIS TREATMENT NOTE    Date: 3/30/2018  Time: 6:53 PM    Data:  Pre Wt: 78.5 kg (173 lb 1 oz)   Desired Wt: 76.5 kg   Post Wt: 76.5 kg (168 lb 10.4 oz)  Weight change: -2 kg  Ultrafiltration - Post Run Net Total Removed (mL): 2000 mL  Ultrafiltration - Post Run Net Total Gain (mL): 0 mL  Vascular Access Status: Yes, secured and visible  Dialyzer Rinse: Streaked, Light  Total Blood Volume Processed: 49  Total Dialysis (Treatment) Time: 3 hours    Lab:   None    Interventions/Assessments:  Patient tolerated 3 hour HD treatment well. Removed 2L without complication. All blood returned. VSS during and post treatment. Patient confused. Only oriented to self. Sinus rhythm on the monitor, HR in the 90's. 99% oxygen saturation on room air. Right CVC dressing is clean, dry, and intact. No signs or symptoms of infection. Catheter packed with NSS post treatment per policy. Patient stable for transport. Report given to PCN.     Plan:    Next Hemodialysis treatment will be decided by renal team.

## 2018-03-30 NOTE — PROGRESS NOTES
Nephrology Progress Note  03/30/2018         Assessment & Recommendations:   Josr Landers is a 78 yo M with PMH of afib/aflutter, CVA, HFrEF, CAD, cardiac amyloidosis, admitted with AMS and acute elevation in serum creatinine.       Elevated Scr:  CKD stage 4 with baseline Scr 2.4 attributed to AKIs with CVA and HF w hx of cardiac amyloidosis diagnosed ~ 10 yrs ago. CAD, Afib/A-Flutter on coumadin and hx of NSAID use; additional hx of right renal mass status post ablation 10 years ago. Urine studies suggest dECV likely HF-CRS and infection. Scr rising to 4.9.   - Tunneled RIJ placed and HD initiated 3/29  - Dialyzed Thursday 3/29, will dialyze today and Saturday as well  - Will likely need outpt dialysis unit arranged prior to discharge     Volume: borderline non-oliguric, though poor response to diuretics. Appears hypervolemic with 2+ LE edema; dilated IVC and worsening GARCIA  - UF goal 2-3 kg off daily as tolerated.  - Paracentesis is being considered, may have this today  - Continue martinez for monitoring of urine output.        ID:     UTI: on zosyn     PNA: zosyn     Atrial fibrillation: on warfarin   BP: HFrEF 15-20%, chronically hypotensive with systolic 90s. No HTN meds.  - Monitor closely with diuresis, goal to avoid systolic <90       Lytes: AG met acidosis of acute renal failure.  - Will improve with dialysis being initiated.      AMS: Neurology consulted for ventriculomegaly/possible normal pressure hydrocephalus, hx of CVA; video monitoring/EEG c/w encephalopathy (likely multifactorial). Plan is for LP on Monday.     Recommendations were communicated to primary team via this note.    Aileen Leung PA-C  Division of Kidney Disease  [phone]    Interval History :   Seen bedside prior to dialysis, was very agitated after having CVC placed last night, trying to pull CVC on dialysis. Better today, still intermittently confused. May have paracentesis today. Denies CP, chills.    Review of Systems:   4  point ROS negative other than as noted above.    Physical Exam:   I/O last 3 completed shifts:  In: 520 [P.O.:520]  Out: 1825 [Urine:825; Other:1000]   /88 (BP Location: Right arm)  Pulse 88  Temp 98.1  F (36.7  C) (Oral)  Resp 16  Wt 78.5 kg (173 lb 1 oz)  SpO2 100%  BMI 25.56 kg/m2     GENERAL APPEARANCE: alert, intermittently confused, NAD   EYES: No scleral icterus, pupils equal  HENT: NC/AT,  mouth  without ulcers or lesions  Pulmonary: lungs clear to auscultation with equal breath sounds bilaterally  CV: regular rhythm, normal rate   - Edema +2 bilateral pedal edema  GI: distended, non-tender    MS:  no muscle tenderness  : has Fraga   SKIN: no rash, warm, dry, no cyanosis  NEURO: Mentation slow, appropriately responsive to most questions and speech seems normal.     Access: tunneled [facility]    Labs:   All labs reviewed by me  Electrolytes/Renal -   Recent Labs   Lab Test  03/30/18   0620  03/29/18   0547  03/28/18   0730  03/27/18   0156   02/07/18   1203   08/23/17   1209   02/14/14   1312  01/24/14   0904   NA  133  134  133   --    < >  136   < >  135   < >  140  140   POTASSIUM  4.4  5.2  4.9   --    < >  4.2   < >  3.9   < >  4.2  4.3   CHLORIDE  99  98  100   --    < >  101   < >  97   < >  107  106   CO2  20  21  20   --    < >  29   < >  27   < >  25  27   BUN  88*  104*  98*   --    < >  53*   < >  53*   < >  21  25   CR  4.56*  4.99*  4.41*   --    < >  2.46*   < >  2.14*   < >  1.71*  1.83*   GLC  86  93  85   --    < >  70   < >  120*   < >  91  90   RIGOBERTO  8.9  8.9  8.8   --    < >  9.3   < >  8.9   < >  9.2  9.0   MAG  2.6*   --    --    --    --    --    --    --    --   2.3  2.1   PHOS   --    --    --   4.4   --   3.8   --   3.6   --    --    --     < > = values in this interval not displayed.       CBC -   Recent Labs   Lab Test  03/30/18   0620  03/29/18   0547  03/28/18   0730   WBC  5.9  6.0  6.0   HGB  12.9*  13.4  13.7   PLT  76*  85*  82*       LFTs -   Recent Labs   Lab Test   03/30/18   0620  03/29/18   0547  03/28/18   0730   ALKPHOS  97  110  100   BILITOTAL  2.4*  2.3*  2.4*   ALT  49  59  60   AST  45  50*  54*   PROTTOTAL  6.3*  6.8  6.6*   ALBUMIN  2.6*  3.1*  3.0*       Iron Panel - No lab results found.      Imaging:  Reviewed    Current Medications:    sodium chloride 0.9%  250 mL Intravenous Once in dialysis     sodium chloride 0.9%  300 mL Hemodialysis Machine Once     gelatin absorbable  1 each Topical During Hemodialysis (from stock)     - MEDICATION INSTRUCTIONS -   Does not apply Once     sodium chloride (PF)  3 mL Intracatheter During Hemodialysis (from stock)     sodium chloride (PF)  3 mL Intracatheter During Hemodialysis (from stock)     aspirin  81 mg Oral Daily     warfarin  2 mg Oral ONCE at 18:00     lidocaine (PF)  4 mL Subcutaneous Once     bumetanide  4 mg Intravenous Q12H     piperacillin-tazobactam  2.25 g Intravenous Q8H [facility]     atorvastatin  80 mg Oral Daily     sodium chloride (PF)  3 mL Intracatheter Q8H     sodium chloride 0.9%  1,000 mL Intravenous Once       - MEDICATION INSTRUCTIONS -       Warfarin Therapy Reminder       Aileen Leung PA-C

## 2018-03-30 NOTE — PLAN OF CARE
Problem: Patient Care Overview  Goal: Plan of Care/Patient Progress Review  Outcome: No Change    D: Pt admitted 3/26 from TCU with AMS and elevated creat.  I/A: Pt is alert and oriented to self only. Calls out frequently overnight and required staff to be in room most of night answering questions and reorienting pt. Illogical in conversation. Pt on EEG monitoring in place overnight. Pt restless all shift and did not sleep more than one hour overnight. Melatonin given at HS, and this RN requested a second dose. No noticeable effect from this. Pt did not require maría elena restraints as pt is not pulling at lines. Skin around martinez catheter insertion site is sore and Lidocaine gel (urojet) PRN used for this pain. Adequate UOP, Melanie urine. RN assisted with frequent turns/positioning. Tylenol given overnight to address reports of generalized discomfort. IV antibiotics given as scheduled.  Bed alarm on for pt safety. Sputum sample obtained overnight. Results pending. Tunnel catheter placed for dialysis yesterday and pt also has R PIV, SL.  P: RN will continue to monitor and assess. RN will update team with any changes/concerns. Barbie Hernandez

## 2018-03-30 NOTE — PLAN OF CARE
Problem: Confusion, Acute (Adult)  Goal: Identify Related Risk Factors and Signs and Symptoms  Related risk factors and signs and symptoms are identified upon initiation of Human Response Clinical Practice Guideline (CPG).   Outcome: No Change   03/30/18 1404   Confusion, Acute   Related Risk Factors (Acute Confusion) infection;other (see comments)   Signs and Symptoms (Acute Confusion) disorientation;emotional/behavioral disturbances;memory disturbed;reasoning/planning disturbed;perceptions disturbed;thought process diminished/disorganized;understanding disturbed     D/I: HX Afib/Aflutter, CVA, HFrEF, CAD, cardiac amyloidosis. Admitted with AMS and elevated creatinine.    Neuro- Orientated only to self. Frustrated, angry and agitated. Uncooperative with staff this morning. Behavior improved once wife arrived to bedside.  CV- Afib. Pressures stable  Pulm- LS dim. 2L NC.   GI- Poor appetite. BM x 2  - 350 cc from martinez  Gtts- SL'd  Skin- c/o dry ichy skin. Lac-hydrin applied  Pain- generalized; tylenol x 1      A: Stable. Sat in chair with assist of therapy.   P: Dialysis today. Also, plan for paracentesis today. Lumbar puncture planned Monday. Continue to monitor.

## 2018-03-30 NOTE — PHARMACY-ANTICOAGULATION SERVICE
Warfarin Therapy Hold Note  This patient is currently receiving warfarin for Atrial fibrillation.    Goal INR:  2-3.      Anticoagulation Dose History     Recent Dosing and Labs Latest Ref Rng & Units 3/26/2018 3/27/2018 3/28/2018 3/29/2018 3/29/2018 3/29/2018 3/30/2018    Warfarin 1 mg - - - - - - 1 mg -    Warfarin 2.5 mg - - 2.5 mg - - - - -    INR 0.86 - 1.14 2.67(H) 3.88(H) 4.22(H) 2.16(H) 1.92(H) - 1.66(H)            Bleeding Signs/Symptoms:  None    Assessment:  Current INR is subtherapeutic (recent vitamin K of 5 mg IV x1 given 3/28).  Patient to have lumbar puncture as part of current work up for NPH    Plan:  1) Per Dr. Simmons, plan to HOLD warfarin through at least Monday morning.   An order has been placed in EPIC for  Warfarin- No Dose Today    2) Do not recommend further reversal with vitamin K or FFP at this time.  3) Recheck next INR tomorrow with AM labs    Morales Ellis, PharmD -- pager [phone]

## 2018-03-30 NOTE — PLAN OF CARE
Problem: Patient Care Overview  Goal: Plan of Care/Patient Progress Review  OT 6C: Discharge Planner OT   Patient plan for discharge: Pt unable to state; pt wife aware of need for continued rehab  Current status: Pt calm and alert though demonstrates AMS - oriented to place and month but not date or situation.  Min A for bed mobility, mod A sit to stand, CGA-min A x 2 and FWW for bedside transfer, Requires simple step by step cues for motor planning and task completion.  Pt able to complete oral/facial hygiene with set up and min A  Barriers to return to prior living situation: AMS, weakness, deconditioning, fall risk  Recommendations for discharge: TCU  Rationale for recommendations: Pt is currently below baseline with regards to mobility and independence with self cares and will benefit from continued skilled therapy intervention to address deficits.         Entered by: Yancy Young 03/30/2018 2:54 PM

## 2018-03-30 NOTE — PROGRESS NOTES
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      #Multifactorial Encephalopathy  #Dementia ([facility] 16/30)  #Ventriculomegaly / possible normal pressure hydrocephalus  #h/o right MCA CVA  CT scan with enlarged venticules. No mass or acute CVA. Patient's history concerning for NPH. Sepsis also possible contributor. Possibly uremia. Low suspicion for SBP. Consider subacute CVA vs seizure vs progressive dementia.  - etiology: UTI / PNA, uremia, NPH, baseline dementia / CVA  - initiating dialysis, treating infections, neurology / nephrology following, frequent re-orientation  - LP per neuro for NPH evaluation now that INR subtherapeutic, per their discretion  - Consider olanzapine 2.5-5mg qhs prn for agitation, less risk of QTc prolongation    #CKD-V / probable ESRD:  - tunneled line by IR 3/29  - started HD 3/29, additional per nephrology  - resumed warfarin 3/29, goal for afib INR 2-3    #Aspiration PNA  #Urinary tract infection  #Ascites  - ID following, trending procal  - Zosyn for now -> Unasyn, last dose 4/4    #Cardiac amyloidosis  #HFrEF NYHA III C (EF 15-20%)  - unable to diurese given CKD-V, HD as above  - cont other home cardiac meds  - paracentesis 3/30, was delayed to avoid excess fluid removal (dialysis) simultaneously - dx and tx, labs ordered     #CAD s/p ATIF to LAD 2015  - Continue home statin  - asa 81 mg daily, avoid BB with cardiac amyloid     #Afib/flutter s/p CTI ablation 2006  Remains in afib, but normal HR. Was on sotalol but this was discontinued around 08/2017 per outside cardiology notes.   - warfarin as above    FEN: Cardiac diet  PPX: warfarin, if prolonged  Code Status: FULL  Dispo: pending work up  of AMS, anticipate 3+ additional days    This patient was seen and discussed with Dr. Nunez who agrees with the assessment and plan.    Tirso Simmons MD PGY2  Internal Medicine  Pager [phone]      I have seen, interviewed, and examined patient. I have reviewed the laboratory tests, imaging, and other investigations. I have reviewed the management plan with the patient. I discussed with the team and agree with the findings and plan in this resident/fellow/nurse practitioner's note. In addition, changes in the physical examination, assessment and plan have been incorporated into the note by myself, as to make it a single cohesive document.       Chantel Nunez MD, MS  Cardiology/Cardiac EP Attending Staff        Subjective & Interval Hx:    No acute events overnight. Tolerated dialysis well with 1kg removed. Comfortable this morning with no complaints. Ate breakfast, no N/V. Denies F/C, CP, SOB. OK with para today.    Last 24 hr care team notes reviewed.   ROS: 4 point ROS including Respiratory, CV, GI and , other than that noted in the HPI, is negative    Medications:  - reviewed all in [facility]    Physical Exam:  Blood pressure 98/69, pulse 88, temperature 97.5  F (36.4  C), temperature source Oral, resp. rate 18, weight 78.5 kg (173 lb 1 oz), SpO2 97 %.     Gen: Elderly male, sleeping, comfortable, NAD  HEENT: NCAT, anicteric  Neck: Supple, JVD ear lobe  Pulm: CTAB without wheezes or crackles in anterior lung fields, normal WOB on RA  CV: Irregularly irregular, normal rate, S1/S2, no m/r/g  ABD: Mildly distended, nontender, normal BS  EXT: 2+ LE edema b/l, warm  Skin: No rashes or skin lesions  NEURO: AOx2 (person and place), improving, no new focal neurological changes  Psych: Appropriate mood and affect, pleasant this morning, not agitated this morning    Lines/Tubes: PIV x2, R IJ tunneled catheter    Labs & Studies of Note:   - reviewed all labs in [facility] today and s/f:  INR 1.66  WBC 5.9, Hgb 12.9, plts 76  (stable)  Na 133, K 4.4, bicarb 20, BUN 88, Cr 4.56    Imaging reviewed. No new.    EEG  IMPRESSION:  Abnormal.  Findings consistent with moderate diffuse encephalopathy.  This is nonspecific and can be seen in a variety of etiologies including toxic metabolic and degenerative among others.  Epileptiform activity or seizures were not seen in this study.

## 2018-03-30 NOTE — PROGRESS NOTES
HEMODIALYSIS TREATMENT NOTE    Date: 3/29/2018  Time: 7:26 PM    Data:  Pre Wt: 78.5 kg (173 lb 1 oz)   Desired Wt: 77.5 kg   Post Wt: 77.5 kg (170 lb 13.7 oz)  Weight gain: -1 kg   Weight change: 1 kg  Ultrafiltration - Post Run Net Total Removed (mL): 1000 mL  Ultrafiltration - Post Run Net Total Gain (mL): 0 mL  Vascular Access Status: Yes, secured and visible  Dialyzer Rinse: Streaked  Total Blood Volume Processed: 24.6  Total Dialysis (Treatment) Time:  2 hours    Lab:   No    Interventions:Assessment:  Pt come to dialysis from IR lethargic and with an IR nurse to stay with him for 45 minutes as he was difficult to arouse in IR. BP's stable upon initiation. Pt became increasingly agitated as pt came out of sedation. Pt grabbing and pulling at lines. Pt's wife and son aided in holding his hands and preventing him from pulling out his CVC lines. Writer contacted primary nurse who got an order for Haldol from MD. Pt's family refused he take medications but prefer he use hand restraining mitts. Pt calmed down with 45 minutes left of run. Mitts not received. BP's stable through entire tx. 1L successfully removed from pt. CVC utilized without complication. CVC lumens saline locked. Hand off report given to primary RN.     Plan:    Per nephrology team.

## 2018-03-30 NOTE — PROCEDURES
EEG #-1       DATE OF RECORDING/SERVICE DATE:  03/28/2018       TYPE OF STUDY:  Video-EEG monitoring       DURATION OF STUDY:  7 hours, 5 minutes, 24 seconds       CLINICAL SUMMARY:  Day 1 of video-EEG monitoring on Erika Rose, a 77-year-old with multiple medical problems.  These include chronic kidney disease, status dialysis dependent, and a right MCA CVA.  He presents now with further changes in mental status.  Concern is raised that unwitnessed or subtle seizures may be responsible for mental status changes and he is being evaluated for epilepsy.  He is not currently being treated with anti-seizure medications.       TECHNICAL SUMMARY:  EEG was recorded from scalp electrodes placed according to the 10-20 International system.  Additional electrodes were utilized for referencing, artifact detection, and recording from other cerebral regions.  Video was continuously recorded.  Video was reviewed for clinical correlation and to assist with EEG interpretation.       FINDINGS:  Technicians interact with patient during a portion of the recording and he appears to answer questions reasonably well.  Posterior dominant rhythm during this portion of the recording ranges from 6-7 Hz.  Bifrontal brief runs of delta activity is seen in the range of 1.5-2 Hz.  This patient is left alone, more delta is noted.  Triphasic waves are not seen in available samples.  Sleep is not recorded in available samples.      EPILEPTIFORM DISCHARGES: None.        CLINICAL AND ICTAL EVENTS:  No electrographic seizures.       IMPRESSION:  Abnormal.  Findings consistent with moderate diffuse encephalopathy.  This is nonspecific and can be seen in a variety of etiologies including toxic metabolic and degenerative among others.  Epileptiform activity or seizures were not seen in this study.         YUMIKO ELAM MD             D: 03/29/2018   T: 03/30/2018   MT:       Name:     ERIKA ROSE   MRN:      [id number]         Account:        [id number]   :      1940           Procedure Date: 2018      Document: [id number]

## 2018-03-30 NOTE — MR AVS SNAPSHOT
After Visit Summary   3/30/2018    Josr Landers    MRN: [id number]           Patient Information     Date Of Birth          1940        Visit Information        Provider Department      3/30/2018 7:00 AM [facility] EEG TECH 4 [facility] EEG        Today's Diagnoses     Altered mental status    -  1       Follow-ups after your visit        Your next 10 appointments already scheduled     Apr 09, 2018  2:00 PM CDT   (Arrive by 1:45 PM)   Return Visit with Ángel Oates MD   [facility] ([facility])    [address] [place] [address]  4th Floor  [facility] [address]   [phone]            Aug 01, 2018 11:00 AM CDT   Lab with  LAB   [facility] ([facility])    [address] [place] [address]  [address] [facility] [address]   [phone]            Aug 01, 2018 12:00 PM CDT   (Arrive by 11:30 AM)   Return Visit with Jonathan Turner MD   [facility] ([facility])    [address] [place] [address]  [address]  [facility] [address]   [phone]            Aug 08, 2018  1:00 PM CDT   (Arrive by 12:45 PM)   MR ABDOMEN & PELVIS W/O & W CONTRAST with [address] [facility] ([facility])    [address] [place] [address]   [phone]           Take your medicines as usual, unless your doctor tells you not to. Bring a list of your current medicines to your exam (including vitamins, minerals and over-the-counter drugs). Also bring the results of similar scans you may have had.    You may or may not receive IV contrast for this exam pending the discretion of the Radiologist.   Do not eat or drink for 6 hours prior to exam.  The MRI machine uses a strong magnet. Please wear clothes without metal (snaps, zippers). A sweatsuit works well, or we may give you a hospital gown.  Please remove any body piercings and hair extensions before you arrive. You will also  remove watches, jewelry, hairpins, wallets, dentures, partial dental plates and hearing aids. You may wear contact lenses, and you may be able to wear your rings. We have a safe place to keep your personal items, but it is safer to leave them at home.  **IMPORTANT** THE INSTRUCTIONS BELOW ARE ONLY FOR THOSE PATIENTS WHO HAVE BEEN PRESCRIBED SEDATION OR GENERAL ANESTHESIA DURING THEIR MRI PROCEDURE:  IF YOUR DOCTOR PRESCRIBED ORAL SEDATION (take medicine to help you relax during your exam):   You must get the medicine from your doctor (oral medication) before you arrive. Bring the medicine to the exam. Do not take it at home. You ll be told when to take it upon arriving for your exam.   Arrive one hour early. Bring someone who can take you home after the test. Your medicine will make you sleepy. After the exam, you may not drive, take a bus or take a taxi by yourself.  IF YOUR DOCTOR PRESCRIBED IV SEDATION:   Arrive one hour early. Bring someone who can take you home after the test. Your medicine will make you sleepy. After the exam, you may not drive, take a bus or take a taxi by yourself.   No eating 6 hours before your exam. You may have clear liquids up until 4 hours before your exam. (Clear liquids include water, clear tea, black coffee and fruit juice without pulp.)  IF YOUR DOCTOR PRESCRIBED ANESTHESIA (be asleep for your exam):   Arrive 1 1/2 hours early. Bring someone who can take you home after the test. You may not drive, take a bus or take a taxi by yourself.   No eating 8 hours before your exam. You may have clear liquids up until 4 hours before your exam. (Clear liquids include water, clear tea, black coffee and fruit juice without pulp.)   You will spend four to five hours in the recovery room.  If you have any questions, please contact your Imaging Department exam site.            Aug 08, 2018  2:00 PM CDT   (Arrive by 1:45 PM)   Return Visit with Aleisha Sapp MD   [facility] ([facility])    [address] [facility]  4th [facility] [address] [phone]              Who to contact     Please call your clinic at [phone] to:    Ask questions about your health    Make or cancel appointments    Discuss your medicines    Learn about your test results    Speak to your doctor            Additional Information About Your Visit        [employer] Information     [employer] gives you secure access to your electronic health record. If you see a primary care provider, you can also send messages to your care team and make appointments. If you have questions, please call your primary care clinic.  If you do not have a primary care provider, please call [phone] and they will assist you.      [employer] is an electronic gateway that provides easy, online access to your medical records. With [employer], you can request a clinic appointment, read your test results, renew a prescription or communicate with your care team.     To access your existing account, please contact your [facility] or call [phone] for assistance.        Care EveryWhere ID     This is your Care EveryWhere ID. This could be used by other organizations to access your [facility] medical records  [id number]         Blood Pressure from Last 3 Encounters:   03/30/18 113/88   03/26/18 111/71   03/20/18 109/74    Weight from Last 3 Encounters:   03/29/18 78.5 kg (173 lb 1 oz)   03/26/18 75.3 kg (165 lb 14.4 oz)   03/20/18 77.1 kg (170 lb)              Today, you had the following     No orders found for display         Today's Medication Changes      Notice     This visit is during an admission. Changes to the med list made in this visit will be reflected in the After Visit Summary of the admission.             Primary Care Provider Office Phone # Fax #    Ángel Oates [phone]       [address] [facility]  [address]        Equal Access to Services     [facility]: Hadii aad ku haddionisoiryder Valdivia, mikie flores, frankdeb guallpamamikey min. So [facility] [phone].    ATENCIÓN: Si habla español, tiene a mayorga disposición servicios gratuitos de asistencia lingüística. Llame al [phone].    We comply with applicable federal civil rights laws and [place] laws. We do not discriminate on the basis of race, color, national origin, age, disability, sex, sexual orientation, or gender identity.            Thank you!     Thank you for choosing [facility]  for your care. Our goal is always to provide you with excellent care. Hearing back from our patients is one way we can continue to improve our services. Please take a few minutes to complete the written survey that you may receive in the mail after your visit with us. Thank you!             Your Updated Medication List - Protect others around you: Learn how to safely use, store and throw away your medicines at www.disposemymeds.org.      Notice     This visit is during an admission. Changes to the med list made in this visit will be reflected in the After Visit Summary of the admission.

## 2018-03-31 NOTE — PROGRESS NOTES
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      #Multifactorial Encephalopathy  #Delirium  #Dementia (MoCA 16/30)  #Ventriculomegaly / possible normal pressure hydrocephalus  #h/o right MCA CVA  CT scan with enlarged venticules. No mass or acute CVA. Patient's history concerning for NPH. Sepsis also possible contributor. Possibly uremia. Low suspicion for SBP. Consider subacute CVA vs seizure vs progressive dementia.  - etiology: UTI / PNA (treating with abx), uremia (treating with HD), NPH (assessment planned for 4/2), baseline dementia / CVA  - initiating dialysis, treating infections, neurology / nephrology following, frequent re-orientation, melatonin for sleep  - LP, large volume 25-30cc, per neuro for NPH evaluation now that INR subtherapeutic, per their discretion, will happen 4/2 a.m. 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     #CAD s/p ATIF to LAD 2015  - Continue home statin  - asa 81 mg daily, avoid BB with cardiac amyloid     #Afib/flutter s/p CTI ablation 2006  Remains in afib, but normal HR. Was on sotalol but this was discontinued  around 08/2017 per outside cardiology notes.   - holding warfarin prior to LP, bridge with heparin gtt (CHADS2 = 5)    FEN: Cardiac diet  PPX: heparin gtt, then warfarin after LP  Code Status: FULL  Dispo: pending work up of AMS, anticipate 3+ additional days, then back to TCU    This patient was seen and discussed with Dr. Nunez who agrees with the assessment and plan.    Tirso Simmons MD PGY2  Internal Medicine  Pager [phone]      I have seen, interviewed, and examined patient. I have reviewed the laboratory tests, imaging, and other investigations. I have reviewed the management plan with the patient. I discussed with the team and agree with the findings and plan in this resident/fellow/nurse practitioner's note. In addition, changes in the physical examination, assessment and plan have been incorporated into the note by myself, as to make it a single cohesive document.       Chantel Nunez MD, MS  Cardiology/Cardiac EP Attending Staff          Subjective & Interval Hx:    No acute events overnight. Dialysis yesterday with 2kg removed, tolerated well. More oriented overnight per nursing notes but continues to sleep poorly. No specific complaints this morning, his abdominal discomfort is improving. Hasn't been ambulatory yet but states we need to tell him to get up to chair otherwise he will just stay in the bed. Needs lots of support, however. No F/C, CP, SOB.    Last 24 hr care team notes reviewed.   ROS: 4 point ROS including Respiratory, CV, GI and , other than that noted in the HPI, is negative    Medications:  - reviewed all in Epic    Physical Exam:  Blood pressure 95/71, pulse 89, temperature 97.4  F (36.3  C), temperature source Axillary, resp. rate 16, weight 74.8 kg (165 lb), SpO2 95 %.     Gen: Elderly male, sleeping, comfortable, NAD  HEENT: NCAT, anicteric  Neck: Supple, JVD to ear lobe  Pulm: CTAB without wheezes or crackles in anterior lung fields, normal WOB on RA  CV: Irregularly  irregular, normal rate, S1/S2, no m/r/g  ABD: Soft, mildly distended, improved, nontender, normal BS  EXT: 1+ LE edema b/l, improved, warm  Skin: No rashes or skin lesions  NEURO: AOx2 (person, place, intermittently time), improving, no new focal neurological changes  Psych: Appropriate mood and affect, pleasant, no agitation    Lines/Tubes: PIV x2, R IJ tunneled catheter    Labs & Studies of Note:   - reviewed all labs in Epic today and s/f:  INR 1.75  WBC 6, Hgb 14, plts 90  Na 135, K 4.2, bicarb 22, BUN 60, Cr 3.56    Imaging reviewed. No new.

## 2018-03-31 NOTE — PLAN OF CARE
Problem: Patient Care Overview  Goal: Plan of Care/Patient Progress Review  D: Acute mental status changes  Acute kidney injury (H)    I: Monitored vitals and assessed pt status.   Changed: Heparin gtt adjusted per protocol for Xa=0.43  Running: Heparin gtt @ 800u/hr  PRN: Tylenol and melatonin    A: Confused-oriented to person and occasionally time(know it's Easter this weekend). VSS, on RA . Afebrile. Awake most of night-per attendant pt had many concerns and was unable to shut down his thoughts to sleep. Fraga patent with good I/O this shift:  In: -   Out: 75 [Urine:75]    Temp:  [97.4  F (36.3  C)-98.1  F (36.7  C)] 97.4  F (36.3  C)  Pulse:  [89-94] 89  Heart Rate:  [85-97] 93  Resp:  [16-19] 16  BP: ()/(62-90) 95/71  Cuff Mean (mmHg):  [72-92] 72  SpO2:  [95 %-100 %] 95 %      P: Plan for dialysis today. Attendant at bedside for pt safety. Continue to monitor Pt status and report changes to treatment team.

## 2018-03-31 NOTE — PROCEDURES
EEG #-3       DATE OF RECORDING/SERVICE DATE:  03/30/2018       TYPE OF STUDY:  Inpatient video EEG monitoring       DURATION OF STUDY:  9 hours 57 minutes 42 seconds      HISTORY:  Day #3 of video-EEG monitoring on patient Sushil Rose, a 77-year-old with multiple medical problems.  These include chronic kidney disease for which he is dialysis dependent and a right MCA CVA.  There has been progressive change in mental status and concern is raised about unwitnessed or subtle seizures may be responsible.  He is therefore being admitted for epilepsy.        TECHNICAL SUMMARY:  EEG was recorded from scalp electrodes placed according to the 10-20 international system.  Additional electrodes were utilized for referencing, artifact detection, and recording from other cerebral regions.  Video was continuously recorded.  Video was reviewed for clinical correlation and to assist with EEE interpretation.        FINDINGS:  P3 electrode was loose and recording from this electrode was not reliable during much of the study.  During sleep an attenuated record was seen with bifrontal delta.  Vertex waves or sleep spindles were not noted.  As patient awoke, there was less delta.   A 7 Hz activity was more persistent and was sometimes seen posteriorly.  This may have represented a posterior dominant rhythm.  No clear focal slowing was appreciated in the available samples.      OTHER INTERICTAL ABNORMALITIES:  No epileptiform discharges.      ICTAL ABNORMALITIES:  No electrographic seizures.       IMPRESSION:  Abnormal, consistent with a moderate diffuse encephalopathy.  This is nonspecific and can be seen in a variety of etiologies including toxic, metabolic and degenerative among others.  Epileptiform activity or seizures were not seen in this study.         YUMIKO ELMA MD             D: 03/30/2018   T: 03/30/2018   MT: VALENTE      Name:     ERIKA ROSE   MRN:      [id number]        Account:        [id number]    :      1940           Procedure Date: 2018      Document: [id number]

## 2018-03-31 NOTE — PLAN OF CARE
Problem: Confusion, Acute (Adult)  Goal: Identify Related Risk Factors and Signs and Symptoms  Related risk factors and signs and symptoms are identified upon initiation of Human Response Clinical Practice Guideline (CPG).   Outcome: No Change   03/31/18 1325   Confusion, Acute   Related Risk Factors (Acute Confusion) cognitive impairment;metabolic abnormalities   Signs and Symptoms (Acute Confusion) communication disturbed;disorientation;emotional/behavioral disturbances;memory disturbed;perceptions disturbed;reasoning/planning disturbed;thought process diminished/disorganized;understanding disturbed     D/I: HX Afib/Aflutter, CVA, HFrEF, CAD, cardiac amyloidosis. Admitted with AMS and elevated creatinine.     Neuro- Orientated only to self. More cooperative today. Sitter at bedside to help re-direct  CV- Afib. Soft pressures  Pulm- LS dim. RA  GI- Poor appetite.  - Dialysis planned for this afternoon  Gtts- Heparin gtt therapeutic.   Skin- Itchy skin improved today  Pain- generalized; tylenol x 1        A: Stable. Sat in chair with assist x 2  Becoming more frustrated this afternoon. Mood improves when wife at bedside  P: Dialysis today. Paracentesis on hold. Lumbar puncture planned Monday. Continue to monitor.

## 2018-03-31 NOTE — PROCEDURES
EEG #-2       DATE OF RECORDING/SERVICE DATE:  03/29/2018       TYPE OF STUDY:  Inpatient video EEG monitoring       DURATION OF STUDY:  20 hours 6 minutes 43 seconds      HISTORY:  Day #2 of video-EEG monitoring, Josr Landers, a 77-year-old with multiple medical problems.  These include chronic kidney disease, dialysis dependent and a right MCA CVA.  He presents now was further changes in mental status.  Concern was raised that unwitnessed or subtle seizures may be responsible for mental status changes, so he is being evaluated for epilepsy.  He is not currently being treated with anti-seizure medications.       TECHNICAL SUMMARY:  EEG was recorded from scalp electrodes placed according to the 10-20 international system.  Additional electrodes are utilized for referencing, artifact detection, and recording from other cerebral regions.  Video was continuously recorded.  Video was reviewed   for clinical correlation and to assist with EEG interpretation.       FINDINGS:  When technicians are interacting with patient and he is responding to the best of his ability, a 6-7 Hz of posterior dominant rhythm is briefly seen.  Even during these periods, there is excessive delta activity with persistence exceeding 40 or 50% together with further diffuse theta.  When patient is left alone, pervasive delta is seen without sleep spindles or vertex waves.  There are no clear focal features.  Triphasic waves were not appreciated in available samples.      OTHER INTERICTAL ABNORMALITIES:  No epileptiform discharges.      ICTAL ABNORMALITIES:  No electrographic seizures.       IMPRESSION:  Abnormal.  Findings are consistent with moderate diffuse encephalopathy.  These findings are nonspecific and can be seen in a variety of etiologies including toxic, metabolic and degenerative among others.  Epileptiform activity or seizures not seen.         YUMIKO ELAM MD             D: 03/30/2018   T: 03/30/2018   MT: VALENTE       Name:     ERIKA ROSE   MRN:      [id number]        Account:        [id number]   :      1940           Procedure Date: 2018      Document: [id number]

## 2018-03-31 NOTE — PROGRESS NOTES
HEMODIALYSIS TREATMENT NOTE    Date: 3/31/2018  Time: 6:55 PM    Data:  Pre Wt:   74.8 kg  Desired Wt:   74.8 kg   Ultrafiltration - Post Run Net Total Removed (mL): 0 mL  Ultrafiltration - Post Run Net Total Gain (mL): 0 mL  Vascular Access Status: Yes, secured and visible  Dialyzer Rinse: Clear  Total Blood Volume Processed:   50.7 L  Total Dialysis (Treatment) Time:    3 hours    Lab:   No    Assessment/Interventions:  3 hours of HD via tunneled RIJ CVC on K4Ca3 bath. 300 BFR. No fluid removed. Original orders to run for 3.5 hours and remove 2-3 kg as tolerated, but changed to 3 hours for clearance with no fluid removal per Dr. Ramires d/t SBPs in low 80s on arrival to unit. VSS throughout run. No complaints of pain. Patient calm and cooperative despite being confused and only oriented to self. Frequently picked at O2 sensor and asked illogical questions, but easily redirected. Sitter accompanied patient to unit and stayed with patient until patient's wife returned. She then sat bedside with patient for remainder of treatment. CVC saline locked post-HD. Report given to primary RN on 6C.        Plan:    Per renal.

## 2018-03-31 NOTE — PHARMACY-ANTICOAGULATION SERVICE
Warfarin Therapy Hold Note  This patient is currently receiving warfarin for Atrial fibrillation.    Goal INR:  2-3.      Anticoagulation Dose History     Recent Dosing and Labs Latest Ref Rng & Units 3/27/2018 3/28/2018 3/29/2018 3/29/2018 3/29/2018 3/30/2018 3/31/2018    Warfarin 1 mg - - - - - 1 mg - -    Warfarin 2.5 mg - 2.5 mg - - - - - -    INR 0.86 - 1.14 3.88(H) 4.22(H) 2.16(H) 1.92(H) - 1.66(H) 1.75(H)            Bleeding Signs/Symptoms:  None    Assessment/Plan  Current INR is subtherapeutic, however patient is having a planned LP on 4/2/18. Currently briding with heparin IV low intensity. Pharmacy will continue to monitor.    Erin Myles, Pharm.D., [facility]  Pager [phone]

## 2018-03-31 NOTE — PROGRESS NOTES
Nephrology Progress Note  03/31/2018         Assessment & Recommendations:   Josr Landers is a 78 yo M with PMH of afib/aflutter, CVA, HFrEF, CAD, cardiac amyloidosis, admitted with AMS and acute elevation in serum creatinine.       Elevated Scr:  CKD stage 4 with baseline Scr 2.4 attributed to AKIs with CVA and HF w hx of cardiac amyloidosis diagnosed ~ 10 yrs ago. CAD, Afib/A-Flutter on coumadin and hx of NSAID use; additional hx of right renal mass status post ablation 10 years ago. Urine studies suggest dECV likely HF-CRS and infection. Scr rising to 4.9.   - Tunneled RIJ placed and HD initiated 3/29  - Dialyzed Thursday 3/29, and Friday ,will dialyze today -then on Monday   - Will likely need outpt dialysis unit arranged prior to discharge     Volume: borderline non-oliguric, though poor response to diuretics. Appears hypervolemic with 2+ LE edema; dilated IVC and worsening GARCIA  - UF goal 2-3 kg off daily as tolerated.  - Paracentesis - per primary   - Continue martinez for monitoring of urine output.        ID:     UTI: on zosyn     PNA: zosyn     Atrial fibrillation: on warfarin   BP: HFrEF 15-20%, chronically hypotensive with systolic 90s. No HTN meds.  - Monitor closely with diuresis, goal to avoid systolic <90       Lytes: AG met acidosis of acute renal failure.  - Will improve with dialysis being initiated.      AMS: Neurology consulted for ventriculomegaly/possible normal pressure hydrocephalus, hx of CVA; video monitoring/EEG c/w encephalopathy (likely multifactorial). Plan is for LP on Monday.      Recommendations were communicated to primary team via this note.    Malachi Parker MD-C  Division of Kidney Disease  [phone]    Interval History :   Seen bedside prior to dialysis, was very agitated after having CVC placed last night, trying to pull CVC on dialysis. Better today, still intermittently confused. May have paracentesis today. Denies CP, chills.    Review of Systems:   4 point ROS negative other  than as noted above.    Physical Exam:   I/O last 3 completed shifts:  In: 256.38 [P.O.:120; I.V.:136.38]  Out: 3000 [Urine:1000; Other:2000]   /45  Pulse 82  Temp 96.9  F (36.1  C) (Axillary)  Resp 18  Wt 74.8 kg (165 lb)  SpO2 95%  BMI 24.37 kg/m2     GENERAL APPEARANCE: alert, intermittently confused, NAD   EYES: No scleral icterus, pupils equal  HENT: NC/AT,  mouth  without ulcers or lesions  Pulmonary: lungs clear to auscultation with equal breath sounds bilaterally  CV: regular rhythm, normal rate   - Edema +2 bilateral pedal edema  GI: distended, non-tender    MS:  no muscle tenderness  : has Fraga   SKIN: no rash, warm, dry, no cyanosis  NEURO: Mentation slow, appropriately responsive to most questions and speech seems normal.     Access: tunneled [facility]    Labs:   All labs reviewed by me  Electrolytes/Renal -   Recent Labs   Lab Test  03/31/18   0356  03/30/18   0620  03/29/18   0547   03/27/18   0156   02/07/18   1203   08/23/17   1209   02/14/14   1312  01/24/14   0904   NA  135  133  134   < >   --    < >  136   < >  135   < >  140  140   POTASSIUM  4.2  4.4  5.2   < >   --    < >  4.2   < >  3.9   < >  4.2  4.3   CHLORIDE  100  99  98   < >   --    < >  101   < >  97   < >  107  106   CO2  22  20  21   < >   --    < >  29   < >  27   < >  25  27   BUN  60*  88*  104*   < >   --    < >  53*   < >  53*   < >  21  25   CR  3.56*  4.56*  4.99*   < >   --    < >  2.46*   < >  2.14*   < >  1.71*  1.83*   GLC  91  86  93   < >   --    < >  70   < >  120*   < >  91  90   RIGOBERTO  8.5  8.9  8.9   < >   --    < >  9.3   < >  8.9   < >  9.2  9.0   MAG   --   2.6*   --    --    --    --    --    --    --    --   2.3  2.1   PHOS   --    --    --    --   4.4   --   3.8   --   3.6   --    --    --     < > = values in this interval not displayed.       CBC -   Recent Labs   Lab Test  03/31/18   0356  03/30/18   1942  03/30/18   0620   WBC  6.0  6.5  5.9   HGB  14.0  14.7  12.9*   PLT  90*  92*  76*       LFTs -    Recent Labs   Lab Test  03/31/18   0356  03/30/18   0620  03/29/18   0547   ALKPHOS  101  97  110   BILITOTAL  2.6*  2.4*  2.3*   ALT  50  49  59   AST  56*  45  50*   PROTTOTAL  6.5*  6.3*  6.8   ALBUMIN  2.7*  2.6*  3.1*       Iron Panel - No lab results found.      Imaging:  Reviewed    Current Medications:    sodium chloride 0.9%  250 mL Intravenous Once in dialysis     sodium chloride 0.9%  300 mL Hemodialysis Machine Once     gelatin absorbable  1 each Topical During Hemodialysis (from stock)     - MEDICATION INSTRUCTIONS -   Does not apply Once     sodium chloride (PF)  3 mL Intracatheter During Hemodialysis (from stock)     sodium chloride (PF)  3 mL Intracatheter During Hemodialysis (from stock)     melatonin tablet 3 mg  3 mg Oral At Bedtime     - MEDICATION INSTRUCTIONS -   Does not apply Once     aspirin  81 mg Oral Daily     ampicillin-sulbactam (UNASYN) IV  1.5 g Intravenous Q12H     lidocaine (PF)  4 mL Subcutaneous Once     atorvastatin  80 mg Oral Daily     sodium chloride (PF)  3 mL Intracatheter Q8H     sodium chloride 0.9%  1,000 mL Intravenous Once       HEParin 800 Units/hr (03/31/18 1040)     - MEDICATION INSTRUCTIONS -       Warfarin Therapy Reminder       Malachi Parker MD

## 2018-04-01 NOTE — PHARMACY-ANTICOAGULATION SERVICE
Warfarin Therapy Hold Note  This patient is currently receiving warfarin for Atrial fibrillation.    Goal INR:  2-3.      Anticoagulation Dose History     Recent Dosing and Labs Latest Ref Rng & Units 3/28/2018 3/29/2018 3/29/2018 3/29/2018 3/30/2018 3/31/2018 4/1/2018    Warfarin 1 mg - - - - 1 mg - - -    INR 0.86 - 1.14 4.22(H) 2.16(H) 1.92(H) - 1.66(H) 1.75(H) 1.59(H)        Bleeding Signs/Symptoms:  None     Assessment/Plan  Current INR is subtherapeutic, however patient is having a planned LP on 4/2/18. Currently bridging with heparin IV low intensity. Pharmacy will continue to monitor.     Erin Myles, Pharm.D., [facility]  Pager [phone]

## 2018-04-01 NOTE — PLAN OF CARE
Problem: Patient Care Overview  Goal: Plan of Care/Patient Progress Review  D:  Acute mental status changes  Acute kidney injury (H)    I: Monitored vitals and assessed pt status. Sitter at bedside for confusion.  Running: Heparin gtt @ 800u/hr  PRN: Tylenol for abdominal discomfort.    A: Remains confused, only oriented x1. VSS, on 2L/nc.Monitor Afib 70-100s with up to 10 PVC/min and 1 episode of VT x 4 beats.  Slept solidly x1 hour and then for only 10 min.stretches.  I/O this shift:  In: -   Out: 200 [Urine:200]    Temp:  [96.9  F (36.1  C)-97.9  F (36.6  C)] 97.8  F (36.6  C)  Pulse:  [80-95] 80  Heart Rate:  [80-98] 85  Resp:  [14-19] 16  BP: ()/(45-84) 78/62  Cuff Mean (mmHg):  [70-90] 90  SpO2:  [94 %-100 %] 100 %      P: Continue to monitor Pt status and report changes to treatment team.

## 2018-04-01 NOTE — PLAN OF CARE
D:pt tolerated be in chair for an hour max assist of two to chair  A:pt had more relax affect being in chair, stated he felt better  I:ordered reclining chair for patient  P:per Primary

## 2018-04-01 NOTE — PROGRESS NOTES
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      #Multifactorial Encephalopathy, improving  #Delirium  #Dementia (MoCA 16/30)  #Ventriculomegaly / possible normal pressure hydrocephalus  #h/o right MCA CVA  CT scan with enlarged venticules. No mass or acute CVA. Patient's history concerning for NPH. Sepsis also possible contributor. Possibly uremia. Low suspicion for SBP. Consider subacute CVA vs seizure vs progressive dementia.  - etiology: UTI / PNA (treating with abx), uremia (treating with HD), NPH (assessment planned for 4/2), baseline dementia / CVA  - Dialysis per Renal, treating infections with abx, see below, neurology following and NPH assessment per below, frequent re-orientation, melatonin for sleep  - LP, large volume 25-30cc, per neuro for NPH evaluation now that INR subtherapeutic, per their discretion, will happen 4/2 a.m. 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     #CAD s/p ATIF to LAD 2015  - Continue home statin  - asa 81 mg daily, avoid BB with cardiac amyloid     #Afib/flutter s/p CTI ablation 2006  Remains in afib, but normal HR.  Was on sotalol but this was discontinued around 08/2017 per outside cardiology notes.   - holding warfarin prior to LP, bridge with heparin gtt (CHADS2 = 5)    FEN: Cardiac diet  PPX: heparin gtt, then resume warfarin after LP  Code Status: FULL  Dispo: pending work up of AMS, anticipate 2-3 additional days, then back to TCU    This patient was seen and discussed with Dr. Nunez who agrees with the assessment and plan.    Tirso Simmons MD PGY2  Internal Medicine  Pager [phone]      I have seen, interviewed, and examined patient. I have reviewed the laboratory tests, imaging, and other investigations. I have reviewed the management plan with the patient. I discussed with the team and agree with the findings and plan in this resident/fellow/nurse practitioner's note. In addition, changes in the physical examination, assessment and plan have been incorporated into the note by myself, as to make it a single cohesive document.       Chantel Nunez MD, MS  Cardiology/Cardiac EP Attending Staff        Subjective & Interval Hx:    No acute events overnight. Slept a bit more, an hour at a time. Up to bedside chair yesterday and this morning. Dialysis yesterday with only clearance, no UF 2/2 low BPs. More alert this morning, aware it's Easter Sunday as well. Denies pain, F/C, N/V, CP, or SOB.     Last 24 hr care team notes reviewed.   ROS: 4 point ROS including Respiratory, CV, GI and , other than that noted in the HPI, is negative    Medications:  - reviewed all in Epic    Physical Exam:  Blood pressure (!) 83/62, pulse 77, temperature 97.6  F (36.4  C), temperature source Oral, resp. rate 16, weight 73.7 kg (162 lb 6.4 oz), SpO2 100 %.     Gen: Elderly male, sitting up in bedside chair, comfortable, NAD  HEENT: NCAT, anicteric  Neck: Supple, JVD to clavicle upright  Pulm: CTAB without wheezes or crackles, normal WOB on RA  CV: Irregularly irregular, normal rate, S1/S2, no m/r/g  ABD: Soft, distended, nontender, normal  BS  EXT: 1+ LE edema b/l, stable, wwp  Skin: No rashes or skin lesions  NEURO: AOx3 (person, place, day/date), improved, no new focal neurological changes  Psych: Appropriate mood and affect, pleasant, agitated when continually questioned    Lines/Tubes: PIV x2, R IJ tunneled catheter    Labs & Studies of Note:   - reviewed all labs in [facility] today and s/f:  INR 1.59  Procal 2.7 (down from 4.9)  WBC 6.9, Hgb 13.4, plts 91  Na 133, K 3.8, bicarb 25, BUN 39, Cr 2.93  AST 76, ALT 49, , Tbili 2.4, Alb 2.6    Imaging reviewed. No new.

## 2018-04-01 NOTE — PLAN OF CARE
Problem: Renal Insufficiency Comorbidity  Goal: Renal Insufficiency  Patient comorbidity will be monitored for signs and symptoms of Renal Insufficiency (Chronic) condition.  Problems will be absent, minimized or managed by discharge/transition of care.   Outcome: Improving  D/I: HX Afib/Aflutter, CVA, HFrEF, CAD, cardiac amyloidosis. Admitted with AMS and elevated creatinine.      Neuro- Orientation improved today. Orientated to self, place and also verbalized it was Easter Sunday. Sitter at bedside to help re-direct  CV- Afib. Hypotensive but improved once in bed. Per Dr Simmons, will continue to encourage fluids.  Pulm- 2 L NC. LLL crackles. IS encouraged and OOB for 2 hours.   GI- Improved appetite. BM x 1  - No dialysis today. 225 cc out from martinez  Gtts- Heparin gtt therapeutic.   Skin- ichy; medicated cream applied prn  Pain- c/o increased abd pain. Dr. Simmons aware. Paracentesis on hold. Tylenol x1          A: Stable. Sat in chair with assist x 2 + lift.     P: Lumbar puncture planned Monday. Continue to monitor.

## 2018-04-01 NOTE — PROVIDER NOTIFICATION
D- Hypotensive this shift BP (!) 79/64 (BP Location: Left arm)  Pulse 88  Temp 96.5  F (35.8  C) (Axillary)  Resp 16  Wt 73.7 kg (162 lb 6.4 oz)  SpO2 100%  BMI 23.98 kg/m2 Also, c/o increased abd pain.   I- Dr. Tirso Simmons notified at 0800 & 1110. Per Dr. Simmons, will continue to monitor. No fluids at this time.   P- Continue to monitor

## 2018-04-02 NOTE — PROGRESS NOTES
04/02/18 1132   Quick Adds   Type of Visit Initial PT Evaluation   Living Environment   Living Arrangements (came from TCU, was at home prior to TCU stay)   Living Environment Comment admitted from TCU, pt's home has 13 stairs between floors, spouse available to assist at home lightly   Self-Care   Usual Activity Tolerance moderate  (to fair)   Current Activity Tolerance fair  (to poor)   Regular Exercise (therapies at TCU)   Equipment Currently Used at Home (used walker prior to TCU stay)   Activity/Exercise/Self-Care Comment Previously at [facility] with need for PT and OT services.    Functional Level Prior   Ambulation 3-->assistive equipment and person   Transferring 3-->assistive equipment and person   Toileting 3-->assistive equipment and person   Bathing 3-->assistive equipment and person   Dressing 2-->assistive person   Eating 0-->independent   Communication 3-->unable to understand (not related to language barrier)   Swallowing 0-->swallows foods/liquids without difficulty   Cognition 2 - difficulty with organizing thoughts   Fall history within last six months yes   Number of times patient has fallen within last six months 5   Which of the above functional risks had a recent onset or change? ambulation;transferring;toileting;bathing;dressing;cognition;fall history;communication/speech   Prior Functional Level Comment Per spouse pt has history of CVA since Feb 2017 but had been able to return home with spouse assist for home mobility over the past yr +_since CVA. Pt used walker at home and required light assist (mainly CGA) to navigate up and down 13 stairs at home prior to TCU stay. At TCU was walking with walker, therapies. Pt reports has not walked hallway distance for at least a week. Per spouse pt mainly bed bound past week at TCU per concerns.    General Information   Onset of Illness/Injury or Date of Surgery - Date 04/26/18   Referring Physician Ricardo Juan MD   Patient/Family Goals Statement To  regain strength and as much IND as able.    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 Pt presenting with possible NPH per MD notes. Work-up to be completed.    Precautions/Limitations fall precautions   General Info Comments activity: Up ad tiffanie   Cognitive Status Examination   Level of Consciousness alert   Follows Commands and Answers Questions 100% of the time   Personal Safety and Judgment intact;impaired  (reduced insight into fall risk noted)   Cognitive Comment Pt frustrated with wife's input into needs/prior level of function. reports  50+ yrs. Pt irritable at times when interacting with staff, impatient and reports he does not understand point of testing, despite explanation.    Pain Assessment   Patient Currently in Pain No   Posture    Posture Comments reduced upright sitting and especially standing posture   Range of Motion (ROM)   ROM Comment B shoulder flexion equally limited to ~ 45 deg raise. less than full hip flexion AROM (L LE raise 5-10 deg, R raise 10-15 deg), reduced knee ext ~5 deg B, ankle DF   Strength   Strength Comments B UE strength limited   Bed Mobility   Bed Mobility Comments NT-multiple procedures limiting test   Transfer Skills   Transfer Comments slow to initiate, tool 35 sec to stand 1x with 2 person assist after pt unable to stand with own attempts; required MODA-MAXA x 2 to stand, sit   Gait   Gait Comments Shuffling gait, with significant difficulty weight shifting R and picking up L foot. L foot clearance impaired, gait up to 1 ft, very unsteady and slowed even with walker and MODA-MAXA x 2   Balance   Balance Comments . Modified  "seated functional reach 8\"; unsteady sit<>stand and standing for both static and dynamic balance, see above assist needs. Unable to complete full TUG as pt cannot ambulate < 1 ft currently, though took 20+ sec to walk 1 ft with 2 person assist and walker.    Coordination   Coordination Comments slowed coordination with UEs, LEs   General Therapy Interventions   Planned Therapy Interventions bed mobility training;gait training;neuromuscular re-education;stretching;strengthening;ROM;transfer training;progressive activity/exercise;home program guidelines   Clinical Impression   Criteria for Skilled Therapeutic Intervention yes, treatment indicated   PT Diagnosis impaired gait stability   Influenced by the following impairments reduced ROM, strength, balance, safety   Functional limitations due to impairments below baseline bed mobility, transfers, gait, standing activity tolerance   Clinical Presentation Evolving/Changing   Clinical Presentation Rationale here for NPH work-up, eval and pre-testing complete prior to planned lumbar puncture in PM   Clinical Decision Making (Complexity) Moderate complexity   Therapy Frequency` daily   Predicted Duration of Therapy Intervention (days/wks) 1 week   Anticipated Discharge Disposition Transitional Care Facility   Risk & Benefits of therapy have been explained Yes   Patient, Family & other staff in agreement with plan of care Yes   Total Evaluation Time   Total Evaluation Time (Minutes) 17     "

## 2018-04-02 NOTE — PROGRESS NOTES
Social Work Services Progress Note     Hospital Day: 6  Date of Initial Social Work Evaluation:  3/28/18  Collaborated with:  Pt, spouse     Data: Pt is a 77 year old male being followed by SW for placement for rehab post hospitalization.     Intervention:  SW met with pt and spouse today.  Pt was awake but continues to be very confused and unable to answer any questions given to him.  SW discussed with spouse re-applying for MA assistance to help with copays that the family will have if pt returns to rehab.  Spouse is agreeable to this and wants her daughter to also be a part of these conversations.  Spouse to notify SW tomorrow when her daughter arrives to also coordinate time with the financial counselor to get the MA application completed.     - Referrals in Process:    [facility] pending outcome of the MA application.  Spouse reports that her $100.00 per day copay with [facility] is too high for family cost wise and taking the pt home would not be a safe discharge plan.     Assessment: Pt somewhat agitated in an anxious way asking repetitive questions, most questions not rational or making sense.  Spouse continues to be primary decision maker while pt is confused.  Plan for LP procedure today.     Plan:    Anticipated Disposition: Facility:  [facility]    Barriers to d/c plan: Medical stability, delirium, VPM/attendant    Follow Up: SW to follow up with family tomorrow to assist with a care conference for financial planning.     JERRI Valdovinos, APSW  [address]   Phone: [phone]  Pager: [phone]  Unit: [phone]    ___________________________________________________________________________________________________________________________________________________    Referrals Discontinued:  None     Community Case Management/Community Services in place:   Will likely need dialysis in the community after discharge.  This is pending pt's medical course and treatment.

## 2018-04-02 NOTE — PROGRESS NOTES
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      #Multifactorial Encephalopathy  #Delirium  #Dementia (MoCA 16/30)  #Ventriculomegaly / possible normal pressure hydrocephalus  #h/o right MCA CVA  CT scan with enlarged venticules. No mass or acute CVA. Patient's history concerning for NPH. Sepsis also possible contributor. Possibly uremia. Low suspicion for SBP. Consider subacute CVA vs seizure vs progressive dementia.  - etiology: UTI / PNA (treating with abx), uremia (treating with HD), NPH (assessment planned for today), baseline dementia / CVA  - initiating dialysis, treating infections, neurology / nephrology following, frequent re-orientation, melatonin for sleep  - LP, large volume 25-30cc, per neuro for NPH evaluation now that INR subtherapeutic, per their discretion, will happen this morning with OT aware - holding warfarin, heparin gtt bridge. Neuro would like the INR below 1.5 for the procedure. Will give vitamin K and redraw INR this afternoon. Procedure will either be this afternoon after dialysis or tomorrow.  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 Switched to Zosyn on 3/28 per ID recs. Restarted on Unasyn on 3/30 when sensitivities returned on UTI (Klebsiella pneumoniae).  - ID following, trending procal q2-3 days  - Unasyn, last dose  4/4    #Cardiac amyloidosis  #HFrEF NYHA III C (EF 15-20%)  #Ascites  - unable to diurese given CKD-V, HD as above  - cont other home cardiac meds  - paracentesis deferred due to soft BP's. Ascites and symptoms improving with HD, SBP less likely than above etiologies. Possible therapeutic paracentesis tomorrow.     #CAD s/p ATIF to LAD 2015  - Continue home statin  - asa 81 mg daily, avoid BB with cardiac amyloid     #Afib/flutter s/p CTI ablation 2006  Remains in afib, but normal HR. Was on sotalol but this was discontinued around 08/2017 per outside cardiology notes.   - holding warfarin prior to LP, bridge with heparin gtt (CHADS2 = 5)    FEN: Cardiac diet  PPX: heparin gtt, then warfarin after LP  Code Status: FULL  Dispo: pending work up of AMS, anticipate 3+ additional days, then back to TCU    This patient was seen and discussed with Dr. Sullivan who agrees with the assessment and plan.    Jamie Parkinson  PGY-1  Pager: [phone]      Subjective & Interval Hx:    Patient was agitated overnight and may have pulled on his tunneled catheter, per nurse report.    Patient has no complaints this morning. He intermittently falls asleep when answering questions. Denies chest pain, shortness of breath, abdominal pain.    Last 24 hr care team notes reviewed.   ROS: 4 point ROS including Respiratory, CV, GI and , other than that noted in the HPI, is negative    Medications:  - reviewed all in Epic    Physical Exam:  Blood pressure 94/62, pulse 88, temperature 97.5  F (36.4  C), temperature source Oral, resp. rate 18, weight 76.2 kg (167 lb 15.9 oz), SpO2 96 %.     Gen: Elderly male, resting comfortably in bed, NAD  HEENT: NC/AT, anicteric  Neck: Supple, JVD to ear lobe  Pulm: CTAB without wheezes or crackles in anterior lung fields, normal WOB on RA  CV: Irregularly irregular, normal rate, S1/S2, no m/r/g  ABD: Soft, mildly distended, improved, nontender, normal BS  EXT: 2+ LE edema b/l, cold  Skin: No rashes or skin  lesions  NEURO: AOx2 (person, place), no new focal neurological changes  Psych: Appropriate mood and affect, pleasant, no agitation  Other: About 2 cc of blood around catheter site, no active bleeding    Lines/Tubes: PIV x2, R IJ tunneled catheter    Labs & Studies of Note:   - reviewed all labs in Epic today and s/f:  INR 1.63  WBC 6.9, Hgb 13.4, plts 91  Na 134, K 3.8, bicarb 27, BUN 48, Cr 3.22    Imaging reviewed. No new.

## 2018-04-02 NOTE — PLAN OF CARE
"Problem: Patient Care Overview  Goal: Plan of Care/Patient Progress Review  Occupational Therapy NPH Assessment    Pre/Post Lumbar Drain Placement:  Day 0: 4/2/2018  Day 1: 4/3/18  Day 2: 4/4/18  Day 3: 4/5/18    1. General Observations (command following, safety awareness, motor planning): Patient alert and following commands, per wife patient more cooperative this day compared to other days but did not sleep well.    2. General ADL Performance (assist level at baseline, current assist level): Patient did not engage in ADLs during this session, requires max A for transfer into chair with 2nd person for safety.    3. Formal Cognitive Assessment:    MoCA Version: 7.1    Visuospatial/executive: 0 /5  Naming: 3 /3  Attention: 1 /6  Language: 2 /3  Abstraction: 1 /2  Delayed Recall: 0 /5  Delayed Recall Comments: Patient perseverating on delayed recall words with other aspects of testing (language, attention/subtraction) when asked at appropriate time patient unable to identify any of the words. Patient provided MC options for 2/5 words, becomes frustrated and OT stopping with delayed recall portion to maintain patient's participation in the rest of the screen  Orientation: 2 /6    Total Score: 8 /30  Norm Score: 26 /30  Formal Cognitive Assessment Comments: Patient demo significant deficits. Patient holds a high degree (PhD). Primarily patient is limited by slow processing, attention and language. Patient also states the upcoming procedure is \"the only thing on my mind.\" OT providing update to patient's wife after performance.        "

## 2018-04-02 NOTE — PROGRESS NOTES
04/02/18 1132   Quick Adds   Type of Visit Initial PT Evaluation   Living Environment   Living Arrangements (came from TCU, was at home prior to TCU stay)   Living Environment Comment admitted from TCU, pt's home has 13 stairs between floors, spouse available to assist at home lightly   Self-Care   Usual Activity Tolerance moderate  (to fair)   Current Activity Tolerance fair  (to poor)   Regular Exercise (therapies at TCU)   Equipment Currently Used at Home (used walker prior to TCU stay)   Activity/Exercise/Self-Care Comment Previously at [facility] with need for PT and OT services.    Functional Level Prior   Ambulation 3-->assistive equipment and person   Transferring 3-->assistive equipment and person   Toileting 3-->assistive equipment and person   Bathing 3-->assistive equipment and person   Dressing 2-->assistive person   Eating 0-->independent   Communication 3-->unable to understand (not related to language barrier)   Swallowing 0-->swallows foods/liquids without difficulty   Cognition 2 - difficulty with organizing thoughts   Fall history within last six months yes   Number of times patient has fallen within last six months 5   Which of the above functional risks had a recent onset or change? ambulation;transferring;toileting;bathing;dressing;cognition;fall history;communication/speech   Prior Functional Level Comment Per spouse pt has history of CVA since Feb 2017 but had been able to return home with spouse assist for home mobility over the past yr +_since CVA. Pt used walker at home and required light assist (mainly CGA) to navigate up and down 13 stairs at home prior to TCU stay. At TCU was walking with walker, therapies. Pt reports has not walked hallway distance for at least a week. Per spouse pt mainly bed bound past week at TCU per concerns.    General Information   Onset of Illness/Injury or Date of Surgery - Date 04/26/18   Referring Physician Ricardo Juan MD   Patient/Family Goals Statement To  regain strength and as much IND as able.    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 Pt presenting with possible NPH per MD notes. Work-up to be completed.    Precautions/Limitations fall precautions   General Info Comments activity: Up ad tiffanie   Cognitive Status Examination   Level of Consciousness alert   Follows Commands and Answers Questions 100% of the time   Personal Safety and Judgment intact;impaired  (reduced insight into fall risk noted)   Cognitive Comment Pt frustrated with wife's input into needs/prior level of function. reports  50+ yrs. Pt irritable at times when interacting with staff, impatient and reports he does not understand point of testing, despite explanation.    Pain Assessment   Patient Currently in Pain No   Posture    Posture Comments reduced upright sitting and especially standing posture   Range of Motion (ROM)   ROM Comment B shoulder flexion equally limited to ~ 45 deg raise. less than full hip flexion AROM (L LE raise 5-10 deg, R raise 10-15 deg), reduced knee ext ~5 deg B, ankle DF   Strength   Strength Comments B UE strength limited   Bed Mobility   Bed Mobility Comments NT-multiple procedures limiting test   Transfer Skills   Transfer Comments slow to initiate, tool 35 sec to stand 1x with 2 person assist after pt unable to stand with own attempts; required MODA-MAXA x 2 to stand, sit   Gait   Gait Comments Shuffling gait, with significant difficulty weight shifting R and picking up L foot. L foot clearance impaired, gait up to 1 ft, very unsteady and slowed even with walker and MODA-MAXA x 2   Balance   Balance Comments . Modified  "seated functional reach 8\"; unsteady sit<>stand and standing for both static and dynamic balance, see above assist needs. Unable to complete full TUG as pt cannot ambulate < 1 ft currently, though took 20+ sec to walk 1 ft with 2 person assist and walker.    Coordination   Coordination Comments slowed coordination with UEs, LEs   General Therapy Interventions   Planned Therapy Interventions bed mobility training;gait training;neuromuscular re-education;stretching;strengthening;ROM;transfer training;progressive activity/exercise;home program guidelines   Clinical Impression   Criteria for Skilled Therapeutic Intervention yes, treatment indicated   PT Diagnosis impaired gait stability   Influenced by the following impairments reduced ROM, strength, balance, safety   Functional limitations due to impairments below baseline bed mobility, transfers, gait, standing activity tolerance   Clinical Presentation Evolving/Changing   Clinical Presentation Rationale here for NPH work-up, eval and pre-testing complete prior to planned lumbar puncture in PM   Clinical Decision Making (Complexity) High complexity   Therapy Frequency` daily   Predicted Duration of Therapy Intervention (days/wks) 1 week   Anticipated Discharge Disposition Transitional Care Facility   Risk & Benefits of therapy have been explained Yes   Patient, Family & other staff in agreement with plan of care Yes   Total Evaluation Time   Total Evaluation Time (Minutes) 17     "

## 2018-04-02 NOTE — PLAN OF CARE
Problem: Patient Care Overview  Goal: Plan of Care/Patient Progress Review  Discharge Planner OT   Patient plan for discharge: Not stated  Current status: Patient seen for MoCA for NPH protocol, detailed information to follow in progress note. Patient performs bed mobility mod A to sit EOB, performs stand pivot transfer to chair max A with 2nd person for safety. Patient engages in MoCA 7.1, scores 8/30 with norm score of 26/30 for education level.  Barriers to return to prior living situation: Cognition, BLE weakness, deconditioning, medical status  Recommendations for discharge: TCU  Rationale for recommendations: Patient would benefit from continued OT to address cognition and strengthening to increase IND in ADLs       Entered by: Eleonora Flaherty 04/02/2018 1:01 PM

## 2018-04-02 NOTE — PLAN OF CARE
"Problem: Patient Care Overview  Goal: Plan of Care/Patient Progress Review  Normal Pressure Hydrocephalus - Physical Therapy Assessment    Date:  04/02/2018  Lumbar Drain/LP: pre high volume tap testing complete, see below:    1. Sitting Balance Test (Modified Functional Reach Test):  8\"    2. 30sec sit to stand test: 0 stands; requires 35 sec to stand after 3 attempts to stand IND, and MODA-MAXA x 2 persons to finally each standing position    3. Mobility (transfers, bed mobility, assist levels, etc.): NT bed mobility per time constraints; pt having multiple staff following and back to back interventions. Required MODA-MAXA x 2 sit<>stand, walker support in standing. Unable to complete/assess TUG (Timed Up and Go) as pt unable to walk distance required. Certainly based on distance achieved in time period pt displays high fall risk. Pt required > 20 sec to walk 1 ft with MODA x 2, FWW, displays shuffling gait, specifically impaired L foot clearance.     4. General Observations  (distractibility, orientation, command following, motor control including initiation, termination, perseverance, etc): somnolent at times during visit, at other times able to interact verbally. Response time slow with motor tasks/initiation.       Assessment: Pt presents at high falls risk, with much impaired gait, need for 2 person assist to safely mobilize.     Discharge Planner PT   Patient plan for discharge: unstated  Current status: PT eval complete, NPH pre-LP testing complete. Plan for post-testing tomorrow ~ 11/1130 AM. See eval for findings. See below note for NPH specific info.   Barriers to return to prior living situation: impaired ability to walk, requires 2 person MODA-MAXA to stand, walks 1 ft with much difficulty but unable to further walk. Standing tolerance reduced.   Recommendations for discharge: TCU  Rationale for recommendations: impaired safety, unable to meet functional goals currently to permit safe return home       " Entered by: Jazmine Mcdonald 04/02/2018 1:35 PM

## 2018-04-02 NOTE — PROCEDURES
[facility], [place]     Neuroradiology Procedure Note     Lumbar Puncture Under Fluoroscopic Guidance:      4/2/2018 4:29 PM    Performed by: Rober Cruz MD  Authorized by: MD Rober Beckett MD    Indications: NPH    Consent given by: Family who states understanding of the procedure being performed after discussing the risks, benefits and alternatives.    Prior to the start of the procedure and with procedural staff participation, I verbally confirmed the patient s identity using two indicators, relevant allergies, that the procedure was appropriate and matched the consent or emergent situation, and that the correct equipment/implants were available. Immediately prior to starting the procedure I conducted the Time Out with the procedural staff and re-confirmed the patient s name, procedure, and site/side. (The Joint Commission universal protocol was followed.) Yes    Procedure:    Under sterile conditions the patient was positioned left lateral decubitus. Using fluoroscopy, needle entrance side was defined.  Betadine solution and sterile drapes were utilized.  Local anesthetic at the site: 6 ml of lidocaine 1% without epinephrine.    Initially, a 20 gauge 3.5 inch spinal needle was inserted at the L3-L4 interspace. No significant fluid was obtained. A second puncture was inserted at the L3 level.  Opening Pressure was not checked.  A total of 0.5mL of clear and colorless spinal fluid was obtained and sent to the laboratory.   After the needle was removed, a bandaid and pressure were applied and the patient was instructed to stay horizontal until the results were back.    Complications:  None  Patient tolerance: Patient tolerated the procedure well with no immediate complications.    Condition: Stable Patient is transferred to Inpatient unit for post procedure observation.    Rober Cruz 4/2/2018 4:29 PM

## 2018-04-02 NOTE — PROGRESS NOTES
[facility], [place]   Neurology Daily Note  Erika Rose  [id number]  04/03/2018    Subjective: no acute events overnight. Patient says that he is doing well. Does not remember what he did this morning (walked around the unit with his nurse). I asked him what his Ph.D is in, he replied . We discussed his LP results not showing signs of CNS infection. Nurse says he was awake most of the night, more sleepy this morning.    Objective   BP (P) 98/71 (BP Location: Left arm)  Pulse 88  Temp 97.4  F (36.3  C) (Axillary)  Resp 16  Wt 76.2 kg (167 lb 15.9 oz)  SpO2 (P) 94%  BMI 24.81 kg/m2  General: Patient refuses entire exam. Very nice until I ask him orientation questions and then he becomes agitated.       Investigations    Lab Results   Component Value Date    CR 3.27 (H) 04/03/2018    CR 3.22 (H) 04/02/2018    CR 2.93 (H) 04/01/2018    CR 3.56 (H) 03/31/2018    CR 4.56 (H) 03/30/2018    CR 4.99 (H) 03/29/2018    CR 4.41 (H) 03/28/2018    CR 3.42 (H) 03/26/2018    CR 3.39 (A) 03/26/2018    CR 2.56 (H) 03/22/2018     Results for ERIKA ROSE (MRN [id number]) as of 4/3/2018 10:48   Ref. Range 3/27/2018 15:10   Color Urine Unknown Red   Appearance Urine Unknown Cloudy   Glucose Urine Latest Ref Range: NEG^Negative mg/dL Negative   Bilirubin Urine Latest Ref Range: NEG^Negative  Negative   Ketones Urine Latest Ref Range: NEG^Negative mg/dL Negative   Specific Gravity Urine Latest Ref Range: 1.003 - 1.035  >1.050 (H)   pH Urine Latest Ref Range: 5.0 - 7.0 pH 6.0   Protein Albumin Urine Latest Ref Range: NEG^Negative mg/dL 100 (A)   Urobilinogen mg/dL Latest Ref Range: 0.0 - 2.0 mg/dL Normal   Nitrite Urine Latest Ref Range: NEG^Negative  Negative   Blood Urine Latest Ref Range: NEG^Negative  Large (A)   Leukocyte Esterase Urine Latest Ref Range: NEG^Negative  Large (A)   Source Unknown Catheterized Urine   WBC Urine Latest Ref Range: 0 - 5 /HPF 2478 (H)   RBC Urine  Latest Ref Range: 0 - 2 /HPF >182 (H)   WBC Clumps Latest Ref Range: NEG^Negative /HPF Present (A)       -B12: 1414  -TSH: 1.97    #Head CT (3/26):  Impression: Ventriculomegaly of the lateral and third ventricles with  no obstructing lesion or mass seen, possibly normal pressure  hydrocephalus.    #EEG (3/30)  IMPRESSION:  Abnormal, consistent with a moderate diffuse encephalopathy.  This is nonspecific and can be seen in a variety of etiologies including toxic, metabolic and degenerative among others.  Epileptiform activity or seizures were not seen in this study.     #EEG (3/29)  IMPRESSION:  Abnormal.  Findings are consistent with moderate diffuse encephalopathy.  These findings are nonspecific and can be seen in a variety of etiologies including toxic, metabolic and degenerative among others.  Epileptiform activity or seizures not seen.       Results for ERIKA ROSE (MRN [id number]) as of 4/3/2018 10:48   Ref. Range 4/2/2018 16:25   RBC CSF Latest Ref Range: 0 - 2 /uL 1306 (H)   WBC CSF Latest Ref Range: 0 - 5 /uL 5   Glucose CSF Latest Ref Range: 40 - 70 mg/dL 54   Protein Total CSF Latest Ref Range: 15 - 60 mg/dL 49     Opening pressure was not recorded.  0.5 mL of CSF was obtained.    Assessment and Plan   Erika Rose is a 77 year old year old male h/o HFrEF (NICM & ICM), NSTEMI, cardiac amyloidosis (bx+ 2009), A-fib/flutter s/p ablation (2006), CKD-4, R-MCA stroke (2/2017) who presented 3/26/2018 from TCU for worsening mental status.     #Encephalopathy, infectious:  Patient with subacute mental status change, difficulty with urination (polyuria and weak stream), and gait disturbance (although described as LLE weakness and had R-MCA stroke). These symptoms are in the setting of acute on chronic kidney disease (baseline creatinine appears to be roughly 2.4, peaked at 4.99 here in setting of UTI. Think that encephalopathy and urinary symptoms are more likely to be due to acute illness (UTI and  uremia). He refuses any examination this morning. There was discussion of NPH as potential etiology. Head CT shows ventriculomegaly, but Catalan ratio is borderline at 0.33 for NPH. A lumbar puncture was obtained that was non-infectious, which is good to rule out meningitis. However no opening pressure was obtained, so cannot make the determination that this is normal pressure hydrocephalus without knowing opening pressure. Also, only 0.5 mL of CSF was obtained. NPH evaluation is best done with patient not acutely ill (preferrably done as outpatient) and requires measurement of opening pressure in lateral decubitus position with removal of 40cc of CSF with PT gait assessment before and after this large volume tap. Again NPH evaluation is best performed with patient not acutely ill. He should be seen in neurology clinic for further assessment. Referral should be sent for neuropsych testing.    Recommendations:  -treatment of underlying medical issues per primary team  -referral to neurology movement disorder clinic  -referral to neuropsych for cognitive evaluation  -does not need a brain MRI  -delirium precautions (order set)    Neurology will sign off at this time, please call with further questions/concerns (page [phone]). Patient was seen and discussed with Dr. Tara Will, DO  Neurology PGY4  [id number]    ATTENDING ADDENDUM: Pt discussed with resident Dr Will today but not seen. Agree with his assessment and recs as above. The LP trial for NPH was not performed appropriately as no opening pressure was measured and only 0.5 ml of fluid were removed. NPH would generally not account for acute mental status changes; those are more likely due to a metabolic/toxic encephalopathy related to patient's comorbid conditions. Recommend no further inpatient workup; workup for NPH should be performed as outpatient after appointment with Movement Disorders. I don't even know if the patient meets clinical criteria for this  diagnosis as I have not had a chance to examine his gait. Nils Pacheco MD

## 2018-04-02 NOTE — PLAN OF CARE
Problem: Patient Care Overview  Goal: Plan of Care/Patient Progress Review  D: Acute mental status change  Acute kidney injury (H)    I: Monitored vitals and assessed pt status. Bedside attendant for confusion/agitation.    Running: heparin gtt @ 800u/hr  PRN:  Tylenol for back discomfort    A: A0x1,increased agitation tonight. Pt wanting to leave the hospital, saying he was kidnapped and brought here. Yelling out at times but would then quickly nod off for brief time.VSS, on 2L/nc. Monitor Afib. Fraga patent with good UO.    I/O this shift:  In: -   Out: 350 [Urine:350]    Temp:  [96.5  F (35.8  C)-98  F (36.7  C)] 97.5  F (36.4  C)  Pulse:  [75-88] 88  Heart Rate:  [58-94] 83  Resp:  [16-18] 18  BP: ()/(58-71) 94/62  SpO2:  [96 %-100 %] 96 %      P: Continue to monitor Pt status and report changes to treatment team. Attendant needed for pt safety. Plan for LP and dialysis today.

## 2018-04-02 NOTE — PHARMACY-ANTICOAGULATION SERVICE
Prescriber Notification Note    The pharmacist has communicated with this patient's provider regarding a concern or therapy recommendation.    Notified Person: Cards 1 team  Date/Time of Notification: 4/2/18 at 1300  Interaction: Face to face  Concern/Recommendation:  Wanted to clarify plan for lumbar puncture that may occur today.  Team states they anticipate this procedure to occur this afternoon (maybe between 2-3pm).  It is ok to resume warfarin therapy after this procedure.     Comments/Additional Details:  INR today is 1.41 after an additional vitamin k 2 mg IV x1 dose (also got vit K 5 mg IV on 3/28).  If LP is performed will order 2 mg of warfarin tonight.    Morales Ellis, PharmD -- pager [phone]

## 2018-04-02 NOTE — PLAN OF CARE
Problem: Confusion, Acute (Adult)  Goal: Identify Related Risk Factors and Signs and Symptoms  Related risk factors and signs and symptoms are identified upon initiation of Human Response Clinical Practice Guideline (CPG).   Outcome: No Change   04/02/18 1352   Confusion, Acute   Related Risk Factors (Acute Confusion) cognitive impairment;dehydration;metabolic abnormalities   Signs and Symptoms (Acute Confusion) communication disturbed;disorientation;emotional/behavioral disturbances;memory disturbed;perceptions disturbed;reasoning/planning disturbed;thought process diminished/disorganized;understanding disturbed     D/I: HX Afib/Aflutter, CVA, HFrEF, CAD, cardiac amyloidosis. Admitted with AMS and elevated creatinine. INR elevated (1.63) Vit K given. Concern pt pulled on HD catheter site during the night d/t agitation.       Neuro- Confusion, orientated to self, place & able to state month. Lethargic this shift. Did not sleep last night.  CV- Afib, rate controlled. +orthostatics, soft pressures. Dr. Simmons aware.   Pulm- Weaned to RA. C/o intermittent SOB. Pt requesting o2; applied 1 L for comfort. LLL crackles. IS encouraged  GI- Good appetite for breakfast. Needs encouragement to drink fluids  - Dialysis on hold today. UOP improving. IV Bumex ordered  Gtts- Heparin gtt held d/t LP  Skin- Bleeding from HD site stopped. Drsg will need to be changed.  Pain- c/o intermittant abd pain. Declined tylenol. Repositioning effective.          A: Continues to be confused and intermittently lethargic. INR improved (1.41) CXR to confirm HD catheter placement pending  P: Lumbar puncture planned at 1500. Wife to consent for procedure. Continue to monitor.

## 2018-04-02 NOTE — PROVIDER NOTIFICATION
D- Bleeding from tunneled dialysis catheter insertion site; slow ooze. Heparin 10a level elevated, 0.97  Heparin gtt shut off at 0740 (Planned LP today)  I- Nursing reinforced HD site with gauze. Dr. Tirso Simmons notified by phone at [phone]  A- Stable; will assess if access OK for dialysis today  P- Continue to monitor bleeding

## 2018-04-02 NOTE — PLAN OF CARE
D:pt had 275 cc of cindy cloudy urine out this shift, pt not eating or drinking much  I:pace and offer nutritional intake, continue with antibiotics  A:pt's abdomen is distended, pt states it makes him not hungry, Primary aware of pt's status  P:lumbar puncture and dialysis tomorrow

## 2018-04-03 NOTE — PLAN OF CARE
"Problem: Patient Care Overview  Goal: Plan of Care/Patient Progress Review  Problem: Patient Care Overview  Goal: Plan of Care/Patient Progress Review  Normal Pressure Hydrocephalus - Physical Therapy Assessment     Date:  04/03/2018  Lumbar Drain/LP: day # 1 post high volume tap testing complete, see below:     1. Sitting Balance Test (Modified Functional Reach Test):  11\"     2. 30sec sit to stand test: 0 stands; pt unable     3. Mobility (transfers, bed mobility, assist levels, etc.): MAXA x 2 supine>sit, CARA-MODA sit<>stand and gait with FWW bed to chair at R.  Unable to complete/assess TUG (Timed Up and Go) as pt unable to walk distance required. Certainly based on distance achieved in time period pt displays high fall risk. Pt required >20 sec to walk 1 ft with assist, displays shuffling gait, specifically impaired L foot clearance (hx CVA, L weakness).               4. General Observations: Response time slow with motor tasks/initiation.         Assessment: Pt presents at high falls risk yet, is requiring Ax2, though required less assist today vs yesterday for transfers and gait.      Discharge Planner PT   Patient plan for discharge: unstated  Current status: LP day # 1 post-testing completed per NPH protocol, early AM due to planned paracentesis-unlikely to be able to catch pt later due to procedure and bedrest after procedure.   Barriers to return to prior living situation: impaired ability to walk, requires 2 person assist to stand, has reduced standing tolerance. Unable to navigate stairs (has 13 at home)  Recommendations for discharge: return to TCU  Rationale for recommendations: impaired safety, unable to meet functional goals currently to permit safe return home   Entered by: Jazmine Mcdonald 04/03/2018 9:38 AM AM                                        "

## 2018-04-03 NOTE — PROGRESS NOTES
Nephrology Progress Note  04/03/2018         Assessment & Recommendations:   Josr Landers is a 78 yo M with PMH of afib/aflutter, CVA, HFrEF, CAD, cardiac amyloidosis, admitted with AMS and acute elevation in serum creatinine.    CKD stage 4 with baseline Scr 2.4 attributed to AKIs with CVA and worsening HF.     LOIS 2/2 CRS: oliguric initially with Scr rising to 4.9.  Tunneled RIJ placed and HD initiated 3/29  Last attempt 3/31 did not tolerate 2/2 hypotension.  Now responsive to diuretic, u/o >1L.  Scr likely plateauing 3.27 today    -no acute indication for HD today, will re-assess daily.    -RIJ tunneled cath appears to remain in good position per CXR (patient pulled when confused).  - requested primary team for SW assist to arrange outpat LOIS HD chair.      Volume: responsive to Bumex, net negative 850 cc past 24 hours. Remains hypervolemic with 1-2+ LE edema; dilated IVC and GARCIA  -continue IV Bumex to target net negative 1 L q 24 hours, avoid hypotension below his baseline  - Paracentesis - deferred by primary team for now   - Continue martinez for monitoring of urine output.        ID:     UTI/PNA on unasyn.    CARD   Atrial fibrillation: on heparin drip (warfarin held pending procedures).  BP: HFrEF 15-20%, chronically hypotensive with systolic 90s. No HTN meds.  - Diurese as above.  Primary team planning for RH cath today.       Lytes: AG met acidosis of acute renal failure.  -improved after hemodialysis x 2,  monitor      AMS: Neurology following for ventriculomegaly/possible normal pressure hydrocephalus, hx of CVA; video monitoring/EEG c/w encephalopathy (likely multifactorial).  LP initial results not concerning for infection.        Recommendations were communicated to primary team via phone and pager.    Patient seen, discussed and plan formulated in conjunction with Dr. Cevallos.   Patient stated agreement and understanding of plan as outlined above.  Nurys Olguin,  SOPHY Huerta  [facility]  Department of Medicine  Division of Renal Disease and Hypertension  [phone]      Interval History :   Responsive and interactive today, mental status seems a little improved but still drowsy.  Appears comfortable on RA, BPs at baseline systolic 90s currently.  No fevers, N/V/D with good appetite per report.    Review of Systems:   4 point ROS negative other than as noted above.    Physical Exam:   I/O last 3 completed shifts:  In: 268 [P.O.:150; I.V.:118]  Out: 1325 [Urine:1325]   BP 98/81 (BP Location: Left arm)  Pulse 88  Temp 97.7  F (36.5  C) (Axillary)  Resp 16  Wt 76.2 kg (167 lb 15.9 oz)  SpO2 95%  BMI 24.81 kg/m2     GENERAL APPEARANCE: awake and oriented to place/person, NAD   EYES: No scleral icterus, pupils equal  HENT: NC/AT,  mouth  without ulcers or lesions  Pulmonary: lungs clear to auscultation with equal breath sounds bilaterally  CV: regular rhythm, normal rate   - Edema +1-2 bilateral LE edema  GI: distended, non-tender    MS:  no muscle tenderness  : + Fraga   SKIN: no rash, warm, dry, no cyanosis  NEURO: Mentation slow, appropriately responsive to most questions and speech seems normal.     Access: tunneled [facility]    Labs:   All labs reviewed by me  Electrolytes/Renal -   Recent Labs   Lab Test  04/03/18   0650  04/02/18   0717  04/01/18   0616   03/30/18   0620   03/27/18   0156   02/07/18   1203   08/23/17   1209   02/14/14   1312  01/24/14   0904   NA  135  134  133   < >  133   < >   --    < >  136   < >  135   < >  140  140   POTASSIUM  4.0  3.8  3.8   < >  4.4   < >   --    < >  4.2   < >  3.9   < >  4.2  4.3   CHLORIDE  99  97  99   < >  99   < >   --    < >  101   < >  97   < >  107  106   CO2  23  27  25   < >  20   < >   --    < >  29   < >  27   < >  25  27   BUN  51*  48*  39*   < >  88*   < >   --    < >  53*   < >  53*   < >  21  25   CR  3.27*  3.22*  2.93*   < >  4.56*   < >   --    < >  2.46*   < >  2.14*   < >  1.71*  1.83*    GLC  86  85  96   < >  86   < >   --    < >  70   < >  120*   < >  91  90   RIGOBERTO  8.6  8.5  8.5   < >  8.9   < >   --    < >  9.3   < >  8.9   < >  9.2  9.0   MAG   --    --    --    --   2.6*   --    --    --    --    --    --    --   2.3  2.1   PHOS   --    --    --    --    --    --   4.4   --   3.8   --   3.6   --    --    --     < > = values in this interval not displayed.       CBC -   Recent Labs   Lab Test  04/03/18   0650  04/01/18   0616  03/31/18   0356   WBC  7.4  6.9  6.0   HGB  13.0*  13.4  14.0   PLT  81*  91*  90*       LFTs -   Recent Labs   Lab Test  04/03/18   0650  04/02/18   0717  04/01/18   0616   ALKPHOS  106  107  106   BILITOTAL  1.9*  2.1*  2.4*   ALT  51  52  49   AST  94*  85*  76*   PROTTOTAL  6.3*  6.4*  6.2*   ALBUMIN  2.6*  2.7*  2.6*       Iron Panel - No lab results found.      Imaging:  Reviewed    Current Medications:    bumetanide  2 mg Oral BID     apixaban ANTICOAGULANT  2.5 mg Oral BID     OLANZapine  2.5 mg Oral At Bedtime     melatonin tablet 3 mg  3 mg Oral At Bedtime     aspirin  81 mg Oral Daily     ampicillin-sulbactam (UNASYN) IV  1.5 g Intravenous Q12H     atorvastatin  80 mg Oral Daily     sodium chloride (PF)  3 mL Intracatheter Q8H       - MEDICATION INSTRUCTIONS -       Nurys Olguin PA-C

## 2018-04-03 NOTE — PLAN OF CARE
Problem: Patient Care Overview  Goal: Plan of Care/Patient Progress Review  Outcome: No Change  Pt continues to be confused overnight. Oriented to self. Has been restless with very brief periods of sleep, then awake again. Has rambling incoherent conversation and at times is not cooperative. Tries to get OOB, pulls at lines and tubes. 1:1 attendant at bedside for safety. Tele afib. VSS. Sats 99% on 1L O2. Tried to wean to RA and sats dropped below 90%. Lab late drawing 10a level this am. Awaiting result. Fraga with low urine output. Pt had bumex x 1 overnight with minimal results. Has BLE +2-3. Legs elevated. Will continue to monitor pt.

## 2018-04-03 NOTE — PROGRESS NOTES
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     #Multifactorial Encephalopathy  #Delirium  #Dementia (MoCA 16/30)  #Ventriculomegaly / possible normal pressure hydrocephalus  #h/o right MCA CVA  CT scan with enlarged venticules. No mass or acute CVA. Patient's history concerning for NPH. Sepsis also possible contributor. Possibly uremia. Low suspicion for SBP. Consider subacute CVA vs seizure vs progressive dementia. LP done on 4/2. Attempted large volume (25-30 cc); however, was only able to obtain 0.5 cc.  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 Started HD 3/29. Urine output increased, so gave Bumex on 4/2 instead of dialysis.  - Additional HD per nephrology    #Aspiration PNA  #Urinary tract infection  #Ascites  Started on Unasyn on 3/27. Switched to Zosyn on 3/28 per ID recs. Restarted on Unasyn on 3/30 when sensitivities returned on UTI (Klebsiella pneumoniae).  - ID following, trending procal q2-3 days  - Unasyn, last dose 4/4    #Cardiac amyloidosis  #HFrEF NYHA III C (EF 15-20%)  #Ascites  - Difficult to diurese given CKD-V, HD as above  - cont other home cardiac meds  - paracentesis originally deferred due to soft BP's. Ascites and symptoms improving with HD, SBP less likely than above etiologies. Therapeutic paracentesis today.  - RHC today to assess for possible cardiogenic  shock, which may have contributed to AMS, kidney dysfunction.     #CAD s/p ATIF to LAD 2015  - Continue home statin  - asa 81 mg daily, avoid BB with cardiac amyloid     #Afib/flutter s/p CTI ablation 2006  Remains in afib, but normal HR. Was on sotalol but this was discontinued around 08/2017 per outside cardiology notes.   - warfarin resumed following LP, bridging with heparin gtt (CHADS2 = 5)  - Patient's INR has been very labile. Will discontinue warfarin and start apixaban this evening after RHC.     FEN: Cardiac diet  PPX: heparin gtt, then warfarin after LP  Code Status: FULL  Dispo: pending diuresis and work up of AMS, anticipate 2+ additional days, then back to TCU    This patient was seen and discussed with Dr. Sullivan who agrees with the assessment and plan.    Jamie Parkinson  PGY-1  Pager: [phone]      Subjective & Interval Hx:    Patient was agitated overnight and required a sitter.    Patient has no complaints this morning. Denies chest pain, shortness of breath, abdominal pain.    Last 24 hr care team notes reviewed.   ROS: 4 point ROS including Respiratory, CV, GI and , other than that noted in the HPI, is negative    Medications:  - reviewed all in [facility]    Physical Exam:  Blood pressure 97/66, pulse 88, temperature 97.4  F (36.3  C), temperature source Axillary, resp. rate 16, weight 76.2 kg (167 lb 15.9 oz), SpO2 99 %.     Gen: Elderly male, resting comfortably in bed, NAD  HEENT: NC/AT, anicteric  Neck: Supple, JVD to ear lobe  Pulm: CTAB without wheezes or crackles in anterior lung fields, normal WOB on RA  CV: Irregularly irregular, normal rate, S1/S2, no m/r/g  ABD: Soft, mildly distended, improved, nontender, normal BS  EXT: 2+ LE edema b/l, cold  Skin: No rashes or skin lesions  NEURO: AOx1 (person), no new focal neurological changes  Psych: Appropriate mood and affect, pleasant, no agitation    Lines/Tubes: PIV x2, R IJ tunneled catheter    Labs & Studies of Note:   - reviewed all labs in [facility]  today    Imaging reviewed. No new.

## 2018-04-03 NOTE — PROGRESS NOTES
Nephrology Progress Note  04/02/2018         Assessment & Recommendations:   Josr Landers is a 76 yo M with PMH of afib/aflutter, CVA, HFrEF, CAD, cardiac amyloidosis, admitted with AMS and acute elevation in serum creatinine.    CKD stage 4 with baseline Scr 2.4 attributed to AKIs with CVA and worsening HF.     LOIS 2/2 CRS: oliguric initially with Scr rising to 4.9.  Tunneled RIJ placed and HD initiated 3/29  - Dialyzed Thursday 3/29, and Friday 3/30, did not tolerate fluid removal 2/2 hypotension on 3/31  U/O is improving 600-1000 cc per day however BPs dipping into 80s systolic.  -no acute indication for HD today, will re-assess daily.    -RIJ tunneled cath appears to remain in good position per CXR (patient pulled when confused).  - should plan for outpt dialysis unit in event is needed     Volume: poor response to diuretics initially but responsive to Bumex past 72 hours . Remains hypervolemic with 2+ LE edema; dilated IVC and GARCIA  -give IV Bumex to target net negative 1 L q 24 hours (can start with dose 1-2 mg since hypotensive)  - Paracentesis - deferred by primary team for now   - Continue martinez for monitoring of urine output.        ID:     UTI/PNA on unasyn    CARD   Atrial fibrillation: on heparin drip (warfarin held pending procedures).  BP: HFrEF 15-20%, chronically hypotensive with systolic 90s. No HTN meds.  - Monitor closely with diuresis, goal to avoid systolic <90       Lytes: AG met acidosis of acute renal failure.  -improved after hemodialysis x 2,  monitor      AMS: Neurology following for ventriculomegaly/possible normal pressure hydrocephalus, hx of CVA; video monitoring/EEG c/w encephalopathy (likely multifactorial). Plan is for LP today      Recommendations were communicated to primary team via phone and pager.    Patient seen, discussed and plan formulated in conjunction with Dr. Cevallos.   Patient stated agreement and understanding of plan as outlined above.  Nurys Olguin,  SOPHY Huerta  [facility]  Department of Medicine  Division of Renal Disease and Hypertension  [phone]      Interval History :   Seen bedside in room.  Responsive to questions and expressing understanding but drowsy.  Reportedly more confused in past 48 hours, pulled on tunneled cath line and pending LP today.  Appears comfortable on RA, BPs at baseline systolic 90s currently.  No fevers, N/V/D with improving appetite per report.    Review of Systems:   4 point ROS negative other than as noted above.    Physical Exam:   I/O last 3 completed shifts:  In: 758 [P.O.:360; I.V.:398]  Out: 1425 [Urine:1425]   BP 96/68 (BP Location: Left arm)  Pulse 88  Temp 97.8  F (36.6  C) (Oral)  Resp 18  Wt 76.2 kg (167 lb 15.9 oz)  SpO2 97%  BMI 24.81 kg/m2     GENERAL APPEARANCE: alert, intermittently confused, NAD   EYES: No scleral icterus, pupils equal  HENT: NC/AT,  mouth  without ulcers or lesions  Pulmonary: lungs clear to auscultation with equal breath sounds bilaterally  CV: regular rhythm, normal rate   - Edema +2 bilateral LE edema  GI: distended, non-tender    MS:  no muscle tenderness  : has Fraga   SKIN: no rash, warm, dry, no cyanosis  NEURO: Mentation slow, appropriately responsive to most questions and speech seems normal.     Access: tunneled [facility]    Labs:   All labs reviewed by me  Electrolytes/Renal -   Recent Labs   Lab Test  04/02/18   0717  04/01/18   0616  03/31/18   0356  03/30/18   0620   03/27/18   0156   02/07/18   1203   08/23/17   1209   02/14/14   1312  01/24/14   0904   NA  134  133  135  133   < >   --    < >  136   < >  135   < >  140  140   POTASSIUM  3.8  3.8  4.2  4.4   < >   --    < >  4.2   < >  3.9   < >  4.2  4.3   CHLORIDE  97  99  100  99   < >   --    < >  101   < >  97   < >  107  106   CO2  27  25  22  20   < >   --    < >  29   < >  27   < >  25  27   BUN  48*  39*  60*  88*   < >   --    < >  53*   < >  53*   < >  21  25   CR  3.22*  2.93*  3.56*  4.56*   < >    --    < >  2.46*   < >  2.14*   < >  1.71*  1.83*   GLC  85  96  91  86   < >   --    < >  70   < >  120*   < >  91  90   RIGOBERTO  8.5  8.5  8.5  8.9   < >   --    < >  9.3   < >  8.9   < >  9.2  9.0   MAG   --    --    --   2.6*   --    --    --    --    --    --    --   2.3  2.1   PHOS   --    --    --    --    --   4.4   --   3.8   --   3.6   --    --    --     < > = values in this interval not displayed.       CBC -   Recent Labs   Lab Test  04/01/18   0616  03/31/18   0356  03/30/18   1942   WBC  6.9  6.0  6.5   HGB  13.4  14.0  14.7   PLT  91*  90*  92*       LFTs -   Recent Labs   Lab Test  04/02/18   0717  04/01/18 0616  03/31/18 0356   ALKPHOS  107  106  101   BILITOTAL  2.1*  2.4*  2.6*   ALT  52  49  50   AST  85*  76*  56*   PROTTOTAL  6.4*  6.2*  6.5*   ALBUMIN  2.7*  2.6*  2.7*       Iron Panel - No lab results found.      Imaging:  Reviewed    Current Medications:    melatonin tablet 3 mg  3 mg Oral At Bedtime     aspirin  81 mg Oral Daily     ampicillin-sulbactam (UNASYN) IV  1.5 g Intravenous Q12H     atorvastatin  80 mg Oral Daily     sodium chloride (PF)  3 mL Intracatheter Q8H       HEParin Stopped (04/02/18 0740)     - MEDICATION INSTRUCTIONS -       Warfarin Therapy Reminder       Nurys Olguin PA-C

## 2018-04-03 NOTE — PLAN OF CARE
Problem: Patient Care Overview  Goal: Plan of Care/Patient Progress Review  Outcome: No Change  D/I: HX Afib/Aflutter, CVA, HFrEF, CAD, cardiac amyloidosis. Admitted with AMS and elevated creatinine. INR elevated (1.63) Vit K given. Concern pt pulled on HD catheter site during the night d/t agitation.     BP 93/73 (BP Location: Left arm)  Pulse 88  Temp 97.8  F (36.6  C) (Oral)  Resp 18  Wt 76.2 kg (167 lb 15.9 oz)  SpO2 97%  BMI 24.81 kg/m2    Neuro- Confusion, orientated to self, place & able to state month. Lethargic this shift. Has not slept in 3 nights- per family  CV- Afib, rate controlled. +orthostatics, soft pressures. Dr. Simmons aware.   Pulm- Weaned to RA. C/o intermittent SOB. Pt requesting o2; applied 1 L for comfort. LLL crackles. IS encouraged  GI- Good appetite for breakfast. Needs encouragement to drink fluids  - Dialysis on hold today. UOP improving. IV Bumex ordered  Gtts- Heparin gtt held d/t LP  Skin- Bleeding from HD site stopped. Drsg will need to be changed.  Pain- c/o intermittant abd pain. Declined tylenol. Repositioning effective.          A: Continues to be confused and intermittently lethargic. CXR to confirm HD catheter placement pending. LP complete, 2 sites covered CDI. Bedrest until midnight. Awaiting a new PIV to restart heparin gtt.  P: Continue to monitor.

## 2018-04-03 NOTE — PLAN OF CARE
Problem: Patient Care Overview  Goal: Plan of Care/Patient Progress Review  Outcome: No Change  Pt VSS; a.fib with HR in the 90s; 1L NC with sats in the upper 90s. No complaints of pain or discomfort. Continues to be confused and lethargic; disoriented x3. Oriented to self; bedside attendant present. Heparin gtt stopped at 1130 for paracentesis at 1330. Fraga patent; minimal UO. BLE edema; PO bumex given. Up with A2 and a walker. LP dressing CDI. NPO for RHC this afternoon. Continue to monitor and notify team with changes.     Addendum: Paracentesis cancelled d/t RHC. Heparin gtt DC'd; transition to Eliquis this evening after procedure.

## 2018-04-03 NOTE — PLAN OF CARE
"Problem: Patient Care Overview  Goal: Plan of Care/Patient Progress Review  OT 6C: Occupational Therapy NPH Assessment    Pre/Post Lumbar Drain Placement:  Day 0: 4/2/2018  Day 1: 4/3/18    1. General Observations (command following, safety awareness, motor planning): Pt alert and interactive, initially calm and redirectable though becomes agitated while completing cognitive assessment when tasks become difficult.  Able to recall completing MOCA yesterday; however, difficult to reason with pt on need to complete additional assessment today.      2. General ADL Performance (assist level at baseline, current assist level): Pt declining participation in self cares    3. Formal Cognitive Assessment:  Unable to complete full assessment 2/2 pt agitation and declining continued participation.  During memory subtest, pt attempting to write words down, when directed to try to complete the task verbally, he quickly became agitated.  Therapist redirected and calmed pt.  Pt states that he finds this \"offensive\" and despite education and encouragement refuses to continue with the assessment.     MoCA Version: 7.2    Visuospatial/executive: 1 /5  Naming: 3 /3  Attention: - /6  Language: - /3  Abstraction: - /2  Delayed Recall: - /5  Delayed Recall Comments: -  Orientation: - /6    Total Score: - /30  Norm Score: 26 /30  Formal Cognitive Assessment Comments: Unable to obtain formal score.  Pt appears to have cognitive awareness such that he knows he should be able to complete the task but is unable to do so accurately, which may be a contributing factor to the agitation.  This being said, he is also unable to recognize or correct errors on the tasks which he did complete        "

## 2018-04-03 NOTE — PLAN OF CARE
Problem: Patient Care Overview  Goal: Plan of Care/Patient Progress Review  OT 6C:   Patient plan for discharge: Not stated  Current status: Patient seen for MoCA for NPH protocol, detailed information in progress note. Patient initially engages in MoCA 7.2, however only able to complete 2 subtests as pt became agitated and refused to continue with the task.  Barriers to return to prior living situation: Cognition, BLE weakness, deconditioning, medical status  Recommendations for discharge: TCU  Rationale for recommendations: Patient would benefit from continued OT to address cognition and strengthening to increase IND in ADLs

## 2018-04-04 NOTE — PLAN OF CARE
Problem: Confusion, Acute (Adult)  Goal: Identify Related Risk Factors and Signs and Symptoms  Related risk factors and signs and symptoms are identified upon initiation of Human Response Clinical Practice Guideline (CPG).   Outcome: No Change  [id number]. Continues on dobutamine gtt at 2.5mcg/kg/min. Tolerating well. B/P's soft. A-fib. Orientated to self and place. Called out for daughter and wife several times. Persistently stated he wanted to go home. UOP increasing- continues with juan. Labs to be drawn this late afternoon but PICC has no blood return- MD's aware. 2nd dose alteplase currently dwelling. Sitter and wife at bedside. Continue with POC.

## 2018-04-04 NOTE — PLAN OF CARE
Problem: Patient Care Overview  Goal: Plan of Care/Patient Progress Review  Outcome: No Change  Pt with altered MS continues to have a sitter. Oriented to himself and his birth date but otherwise disoriented and somnulent. Had a R HC today( R neck with bandaid) and started on a dobutamine drip after having a PICC inserted. Vascular access said it was OK to use the PICC though it needs to be repositioned tomorrow for long term use. Poor po intake but adequate u/o per FC. Very weak with attempting to stand and transfer with 2. Afib 70s-100s with 0-13 PVCs. Tylenol given for back pain. Repositioned in the bed side to side.

## 2018-04-04 NOTE — PROGRESS NOTES
Picc Line, First to place picc line in right lateral brachial, access obtained, wire threaded easily.  Unable to pass catheter beyond 15 cm. Nitinol wire used, still unable to thread.  Second attempt to place picc line, in left lateral brachial, access obtained, wire and catheter threaded with out difficulty.

## 2018-04-04 NOTE — PROGRESS NOTES
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     #Cardiac amyloidosis  #HFrEF NYHA III C (EF 15-20%)  RHC on 4/3. PCW 20. RA 17. PA 43/25 (33). RV 43/17. Son CI 1.4. Patient volume overloaded and in cardiogenic shock. Suspect this is the likely cause of patient's altered mental status, LOIS, and possibly the ventriculomegaly. PICC placed on 4/3 to begin dobutamine infusion.  - Dobutamine 2.5 mcg/kg/min for now  - Will have swan placed and transfer patient to the ICU, as we are unable to titrate dobutamine on the floor  - Afterload reduction: Hydralazine 12.5 mg TID  - Diuresis: Bumex 2 mg BID  - Difficult to diurese given CKD-V, although urine output has been increasing. HD as needed per nephrology  - cont other home cardiac meds    #Multifactorial Encephalopathy  #Delirium  #Dementia (MoCA 16/30)  #Ventriculomegaly / possible normal pressure hydrocephalus  #h/o right MCA CVA  Likely due to cardiogenic shock. CT scan with enlarged venticules. No mass or acute CVA. Sepsis also possible contributor. Possibly uremia. Low suspicion for SBP. Patient's history concerning for NPH. LP done on 4/2. Attempted large volume (25-30 cc); however, was only able to obtain 0.5 cc.  - treatment by etiology: UTI / PNA (treated with abx), uremia (treating with HD/diuresis), NPH (neurology to workup outpatient), baseline dementia / CVA, cardiogenic shock (see treatment above)  - Olanzapine 2.5 mg and melatonin qhs prn for agitation, less risk of QTc prolongation.  - Neurology signed off. Recommend follow-up in movement disorder clinic for workup of possible NPH.    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 Started HD 3/29. Urine output increased, so have been giving Bumex starting on 4/2 instead of dialysis.  - Additional HD per nephrology    #Aspiration PNA  #Urinary tract infection  Started on Unasyn on 3/27. Switched to Zosyn on 3/28 per ID recs. Restarted on Unasyn on 3/30 when sensitivities returned on UTI (Klebsiella pneumoniae).  - ID following, trended procal q2-3 days  - Unasyn, last dose 4/4    #Ascites  Paracentesis deferred due to soft BP's and pending RHC. Since RHC shows cardiogenic shock that is likely causing the paracentesis, will treat underlying cause. No paracentesis for now.     #CAD s/p ATIF to LAD 2015  - Continue home statin  - asa 81 mg daily, avoid BB with cardiac amyloid     #Afib/flutter s/p CTI ablation 2006  Remains in afib, but normal HR. Was on sotalol but this was discontinued around 08/2017 per outside cardiology notes. Discontinued warfarin on 4/3 due to labile INR's. Started on apixaban.  - Apixaban 2.5 mg BID    FEN: Cardiac diet  PPX: heparin gtt, then warfarin after LP  Code Status: FULL  Dispo: pending cardiac stability, no requirements for a sitter overnight, anticipate 2+ additional days, then back to TCU    This patient was seen and discussed with Dr. Sullivan who agrees with the assessment and plan.    Jamie Parkinson  PGY-1  Pager: [phone]      Subjective & Interval Hx:    Patient was agitated overnight. He kept trying to get up, saying he needed to get a haircut, and he was pulling at his Fraga. He required a sitter.    Patient complains of abdominal pain and bilateral leg pain this morning. Denies chest pain, shortness of breath.    Last 24 hr care team notes reviewed.   ROS: 4 point ROS including Respiratory, CV, GI and , other than that noted in the HPI, is negative    Medications:  - reviewed all in Epic    Physical Exam:  Blood pressure 96/78, pulse 110, temperature 98.5  F (36.9  C), temperature source Axillary, resp. rate 17, weight 77.6 kg (171 lb), SpO2 98 %.      Gen: Elderly male, resting comfortably in bed, NAD  HEENT: NC/AT, anicteric  Neck: Supple, JVD to ear lobe  Pulm: CTAB without wheezes or crackles in anterior lung fields, normal WOB on RA  CV: Irregularly irregular, tachycardic, S1/S2, no m/r/g  ABD: Soft, mildly distended, nontender, normal BS  EXT: 2+ LE edema b/l, warm  Skin: No rashes or skin lesions  NEURO: AOx2 (person, place), no new focal neurological changes  Psych: Appropriate mood and affect, pleasant, no agitation    Lines/Tubes: R IJ tunneled catheter, L PICC, L PIV, Fraga    Labs & Studies of Note:   - reviewed all labs in Epic today    Imaging reviewed.

## 2018-04-04 NOTE — PROGRESS NOTES
CLINICAL NUTRITION SERVICES - ASSESSMENT NOTE     Nutrition Prescription    RECOMMENDATIONS FOR MDs/PROVIDERS TO ORDER:  1. If pt at risk for aspiration, consider SLP consult. Once diet is able to be advanced, order kcal counts and would rec keep diet as liberalized as possible, such as a low-sodium diet. Order Nepro oral supplements BID.  2. If pt needs NPO status or if pt consuming less than 1480 kcals and 59 g protein daily on average, consider TFs if within POC. See future/additional rec #5 below.   3. Rec thiamine (100 mg/day) if pt has NYHA Class III-IV heart failure, when appropriate post-procedure.  4. When appropriate, order nephrocaps vs nephronex as pt on dialysis.    Malnutrition Status:    Non-severe malnutrition in the context of acute illness/injury    Future/Additional Recommendations:  1. When appropriate, fluid restriction if warranted.   2. Monitor K+ and phos trends and potential need for diet restrictions. Would rec keep diet as liberal as possible to encourage oral intake.  3. Monitor BG control. Hgb A1c of 6.4 on 11/13/17.   4. Consider scheduled bowel regimen. Monitor GI status with h/o constipation.  5. If pt is a candidate for TFs, would rec place feeding tube (FT) and initiate TFs, Nutren 1.5 (fiber-free, concentrated TF formula). Initiate TFs at 10 mL/hr, advancing rate by 10 mL Q 8 hrs (or per provider discretion, pending hemodynamic stability) to goal rate of 65 mL/hr. Nutren 1.5 at goal 65 ml/hr (1560 ml/day) to provide 2340 kcals, 106 g PRO, 1186 ml free H2O, 275 g CHO and no Fiber daily. If K+ or phos trend high and desire Nepro TFs, would then rec advance TF as recommended above with goal rate of 55 mL/hr     If begin tube feeds:    - Flush FT with 30 mL water Q 4 hrs for patency. Rec provider adjust free water flushes as needed, pending fluid status.   - Ensure K+/Mg++/Phos labs are ordered daily until TFs advance to goal infusion to evaluate for sx of refeeding with nutrition  "received. If lytes trend low, aggressively replace lytes.       - If not already ordered, order nephronex to help ensure micronutrient needs being met with suspected hypermetabolic demands and potential interruptions to TF infusions.   - Monitor TF and possible need to adjust nutrition support regimen if necessary, pending medical course and nutrition status.       - Monitor need to check a metabolic cart study.      REASON FOR ASSESSMENT  Josr Landers is a/an 77 year old male assessed by the dietitian for LOS      NUTRITION HISTORY  Per H & P, pt was admitted from TCU 3/26 because of worsening Cr function and altered mental status, now with decompensated heart failure. Per pt's wife, reported abdominal distention and inadequate oral intake.  Pt was busy with his RN at time of nutrition visit. Unable to obtain nutrition hx.    CURRENT NUTRITION ORDERS  Diet: NPO due to possible upcoming tests/procedures. Pt has been NPO at times (3/28, 3/29, and 4/3) or else on a cardiac diet order.   Intake/Tolerance: Fair diet tolerance. Flowsheets indicate pt consuming 0-25% of meals 3/28, % of meals with a poor to good appetite 3/30, 50% of meals with a poor appetite 3/31, 50% of meals 4/1, 50% of meals with a good appetite 4/2, % of meals with a fair to good appetite 4/3, and 75% of meals with a good appetite 4/4. Per RN notes, pt with a poor appetite 3/27-3/31 and then appetite started improving 4/1. Per chart, confused at times. Per chart, sitter was in room earlier this morning. On 4/2 and 4/3, pt ordered breakfast and supper. [employer] indicates food/beverages sent 4/3 totaled 798 kcals and 31 g protein daily on average which does not meet estimated needs below. Per RN, pt needs encouragement to eat. Likely factors affecting oral intake include altered mentation, lack of appetite, and NPO at times.    LABS  Labs reviewed    MEDICATIONS  Medications reviewed    ANTHROPOMETRICS  Height: 1.753 m (5' 9\")  Most " Recent Weight: 77.6 kg (171 lb)    IBW: 72.7 kg   BMI: Normal BMI  Weight History: 76.3 kg (6/29/16), 72.6 kg (6/23/17), 73.9 kg (8/23/17), 74.8 kg (1/8/18), 76.1 kg (3/13/18) - No significant/severe wt loss over the past approximate year.  Dosing Weight: 74 kg (actual, based on lowest wt this admission of 73.7 kg on 4/1)    ASSESSED NUTRITION NEEDS  Estimated Energy Needs: 1674-7972 kcals/day (30-35 kcals/kg)  Justification: Increased needs with dialysis. If becomes intubated, rec 25-30 kcals/kg or 8966-5054 kcals/day.  Estimated Protein Needs:  grams protein/day (1.2 - 1.8 grams of pro/kg)  Justification: Increased needs with dialysis, cardiac status  Estimated Fluid Needs: 5457-1081 mL/day (25 - 30 mL/kg)   Justification: Maintenance needs or per team, pending fluid status.    PHYSICAL FINDINGS  See malnutrition section below.  Per chart, distended abdomen. Has prn bowel meds. H/o constipation.    MALNUTRITION  % Intake: < 75% for > 7 days (non-severe)  % Weight Loss: Weight loss does not meet criteria. Difficult to assess with diuresis and dialysis  Subcutaneous Fat Loss: Unable to assess as pt busy with RN  Muscle Loss: Unable to assess as pt busy with RN  Fluid Accumulation/Edema: Mild  Malnutrition Diagnosis: Non-severe malnutrition in the context of acute illness/injury    NUTRITION DIAGNOSIS  Inadequate oral intake related to altered mentation, lack of appetite, and NPO at times as evidenced by pt consuming 25-50% of meals at times.       INTERVENTIONS  Implementation  Nutrition Education: Per provider order if indicated.    Goals  Patient to consume % of nutritionally adequate meal trays TID, or the equivalent with supplements/snacks.     Monitoring/Evaluation  Progress toward goals will be monitored and evaluated per protocol.     Nutrition will continue to follow.     Shereen Toro, MS, RD, LD, [facility]   6C Pgr: [phone]

## 2018-04-04 NOTE — PLAN OF CARE
"Problem: Patient Care Overview  Goal: Plan of Care/Patient Progress Review  PT 6C: Cx, unable to see pt as pt refused multiple times. Unable to reason with pt, who was fixated on returning home. Encouraged pt to ambulate, get up to a chair, pt stated \"I am going home, you cant keep me here!\" Agitation increased with conversation, therefore session ended.       "

## 2018-04-04 NOTE — PROGRESS NOTES
D: AMS  I/A: Report given to writer of this note @ 2229.  Pt A&Ox1, only alert to self.  Sitter @ bedside to prevent pt from pulling at lines and exiting bed.  Pt does not follow commands and periodically calls out with cares.  Sats >90% on RA.  Dobutamine gtt @ 2.5mcg/kg/min.  Afib 100-110s.   SBP 80-90s with MAPs > 65. UOP 100ml/hr.    P: Continue to monitor and follow plan of care.  Notify team of changes in medical condition.

## 2018-04-05 NOTE — CONSULTS
"[facility]  Palliative Care Consultation         Josr Landers MRN# [id number]   Age: 77 year old YOB: 1940   Date of Admission: 3/26/2018    Reason for consult: Goals of care  Patient and family support       Requesting physician/service: Cards I       Recommendations/Discussion        Pt seen and examined. Family members present. Introduced basic tenets and principles of Palliative Care.   Explored symptom concerns at this time which include poor sleep hygiene, \"days and nights mixed up,\" recent 7-8 lb weight loss, weakness and some falls, recent altered mental status.   - REC: Consider low dose Trazodone at hs (25-50 mg for sleep) - support nursing efforts to help day/night cycle health.   - PT to see for energy conservation and functional recovery.  - if IV intropes are needed- hospice and TCU placement will be difficult at best.   - His goals at this time are restorative- though family somewhat in denial of slow decline and diminishing treatment options of his heart failure. There are questions about his ability to discharge home- no apparent financial concerns for additional in home care.   - did not review code status at introductory visit- will discuss at future interactions.     POLST- not completed at this time.     Disease Process/es & Symptoms   Cardiac Amyloidosis  A fib /flutter ( on warfarin)  Stenting of CAD 2015  CKD Stage IV  CVA 2/2017 R [facility]  AMS       Support/Coping   (We typically ask each of our patients the following questions:)    How do you make sense of this? \" We are miracle people, we know he is going to get stronger and get back home again.\"  Are you Roman Catholic? Spiritual? \"Orthodoxy\"  What are your concerns, medical and non-medical, that are not being addressed? Unable to answer.   Who are your \"go-to\" people when you need support?- family- \"the Bible\"    Plan for psychosocial/spiritual support/follow-up from palliative team: as above "     Decision-Making & Goals of Care     Discussion/counseling today about prognosis/goals of care/decisions:   See above  Patient has a completed health care directive available in the chart (N)  Physician orders for life-sustaining treatment (POLST) form is not completed  Code Status: Full code   Patient has decision-making capacity (Y)    Coordination of Care     Findings & plan of care discussed with: Patient, family and primary team  Follow-up plan from palliative team: as above.  Thank you for involving us in the patient's care.     Jamie Myrick APRN NP ACHPN  Nurse Practitioner- Lead Advanced Practice Provider  [facility] Palliative Medicine Consult Service   [phone]     TT spent: 55 minutes of which 40 minutes were spent in direct face to face contact with patient/family. Greater than 50% of time spent counseling and/or coordinating care.        Chief Complaint     Weakness and heart failure         History of Present Illness     History obtained from: patient interview and EPIC record.  76 yo community dwelling male-recently in TCU setting following hospitalization for weakness and AMS- , CKD stage 4 (~ Scr 2.4), recent CVA and HF with exacerbation hx-  cardiac amyloidosis biopsy proven dx (2009) -no specific treatment per wife's report beyond diuretics, CAD- post PCI intervention (2015), Afib/A-Flutter on coumadin,  Admitted to [facility] 3/26/18 for AMS, LOIS and worsening HF (at OSH, EF 30% right atrial pressure >15% now further decreased to EF 15-20% with right atrial pressure of >20%, persistent IVC dilation.)            Past Medical History:   Past Medical History:   Diagnosis Date     Atrial fibrillation (H) 6/25/2012     Atrial fibrillation: ablation Dr Jonathan Arvizu/Guillermo 6/25/2012     Cardiac amyloid: ATTR per cardiac biopsy [facility] 2009 6/27/2012     Coronary artery disease      Other and unspecified hyperlipidemia 6/27/2012     Tubular adenoma of colon 2010     Venous insufficiency                Past Surgical History:   Past Surgical History:   Procedure Laterality Date     BACK SURGERY       H PENUMBRA SEPARATOR 3D       KNEE SURGERY       DE PATIENT HAS A CORONARY ARTERY STENT       SHOULDER SURGERY                 Social/Spiritual History:     Living situation: Lives with spouse Nu in home with 13 stair requirement in [place]  Family system: Spouse, Daughter and Son, 2 grandchildren live locally  Functional status (needs help with ADLs or IADLs): Some assistance over past several weeks  Employment/education: PhD in Electrical Engineering, [employer]/.  Use of [employer] resources: None to date  Activities/interests: travel, time with family. Working  History of substance use/abuse: None            Family History:   No family history on file.  Family history reviewed           Allergies:   Allergies   Allergen Reactions     Gabapentin Other (See Comments)     Altered mental status     Lyrica      Severe swelling of body and fluid around heart.               Medications:   I have reviewed this patient's medication profile and medications during this hospitalization             Review of Systems:   The comprehensive review of systems is negative other than noted here and in the HPI.    Palliative Symptom Review (0=no symptom/no concern, 1=mild, 2=moderate, 3=severe):      Pain:0-1      Fatigue: 2      Nausea: 0-1      Constipation: 0      Diarrhea: 0      Depressive Symptoms: 1      Anxiety: 0-1      Drowsiness: 1-2      Poor Appetite: 1-2      Shortness of Breath: 1-2      Insomnia: 2-3            Physical exam: Vitals: BP 97/68 (BP Location: Right arm)  Pulse 105  Temp 97.9  F (36.6  C) (Oral)  Resp 16  Wt 73.5 kg (162 lb 0.6 oz)  SpO2 98%  BMI 23.93 kg/m2  BMI= Body mass index is 23.93 kg/(m^2).   Gen: Awake, lethargic, in bedside chair- oriented to family, place and circumstances of hospitalization- can be tangential in reply to questions- denies pain.  NAD.   HEENT:AT/ NC, EOM grossly intact, mucous membranes pink, dry no exudate. Dentition in good repair.  PULM/THORAX: Clear to auscultation bilaterally, no rales/rhonchi/wheezes  CV:RRR, S1 S2 appreciated, no murmurs or rub auscultated. Monitor with sinus rhythm.  ABD: soft, non distended, hypoactive bowel sounds, no organomegaly  EXT: +1 pitting edema. No asymmetrical edema or tenderness to palpation in calves bilaterally.  NEURO: difficult to fully assess. No apparent focal neurological deficit           Data Reviewed:     ROUTINE LABS (Last four results)  BMP  Recent Labs  Lab 04/05/18  0650 04/05/18  0500 04/04/18  0608 04/03/18  0650    137 138 135   POTASSIUM 3.6 Unsatisfactory specimen - grossly hemolyzed 4.5 4.0   CHLORIDE 102 101 101 99   RIGOBERTO 9.0 8.3* 8.7 8.6   CO2 29 26 28 23   BUN 52* 51* 53* 51*   CR 3.07* Unsatisfactory specimen - grossly hemolyzed 3.23* 3.27*   GLC 88 76 82 86     CBC  Recent Labs  Lab 04/04/18  0608 04/03/18  0650 04/01/18  0616 03/31/18  0356   WBC 6.3 7.4 6.9 6.0   RBC 3.52* 3.99* 4.11* 4.25*   HGB 11.4* 13.0* 13.4 14.0   HCT 34.0* 39.3* 39.6* 40.9   MCV 97 99 96 96   MCH 32.4 32.6 32.6 32.9   MCHC 33.5 33.1 33.8 34.2   RDW 16.3* 16.1* 15.7* 15.7*   PLT 85* 81* 91* 90*     INR  Recent Labs  Lab 04/03/18  0630 04/02/18  1239 04/02/18  0717 04/01/18  0616   INR 1.43* 1.41* 1.63* 1.59*

## 2018-04-05 NOTE — PROGRESS NOTES
Nephrology Progress Note  04/04/2018         Assessment & Recommendations:   Josr Landers is a 76 yo M with PMH of afib/aflutter, CVA, HFrEF, CAD, cardiac amyloidosis, admitted with AMS and acute elevation in serum creatinine.    CKD stage 4 with baseline Scr 2.4 attributed to AKIs with CVA and worsening HF.     LOIS 2/2 CRS: oliguric initially with Scr rising to 4.9.  Tunneled RIJ placed and HD initiated 3/29  Last attempt 3/31 did not tolerate 2/2 hypotension.  Now responsive to diuretic, u/o >1L.  Scr likely plateauing 3.23.    -no acute indication for HD today, will re-assess daily.    -RIJ tunneled cath appears to remain in good position per CXR (patient pulled when confused).  - requested primary team/SW assist to arrange outpat LOIS HD chair.      Volume:Remains hypervolemic with 1-2+ LE edema, mild GARCIA.  RHC shows evidence of cardiogenic shock.  Responsive to Bumex, net negative 1-2 L past 24-48 hours.   -primary team to start Dobutamine drip today with [place] rocio catheter for monitoring.  -continue Bumex 2 mg po bid  - Paracentesis deferred by primary team for now   - Continue martinez for monitoring of urine output.        ID:     UTI/PNA on unasyn.    CARD   Atrial fibrillation: on heparin drip (warfarin held pending procedures).  BP: HFrEF 15-20%, chronically hypotensive with systolic 90s. No HTN meds.  - Diurese as above.  RH cath cardiogenic shock-treat as above.      Lytes: AG met acidosis of acute renal failure.  -improved after hemodialysis x 2 and with stable renal function;  monitor      AMS: Neurology following for ventriculomegaly/possible normal pressure hydrocephalus, hx of CVA; video monitoring/EEG c/w encephalopathy (likely multifactorial).  LP initial results not concerning for infection.        Recommendations were communicated to primary team via phone and pager.    Patient seen, discussed and plan formulated in conjunction with Dr. Cevallos.   Patient stated agreement and understanding of  plan as outlined above.  SOPHY AguilarReunion Rehabilitation Hospital Peoriajannet  [facility]  Department of Medicine  Division of Renal Disease and Hypertension  [phone]      Interval History :   Remains responsive with mental status subjectively improved but still becoming drowsy when engaged.  Appears comfortable on RA, but reported GARCIA.  BPs at baseline systolic 90s currently.  No fevers, N/V/D with good appetite per report.    Review of Systems:   4 point ROS negative other than as noted above.    Physical Exam:   I/O last 3 completed shifts:  In: 279.15 [P.O.:220; I.V.:59.15]  Out: 3650 [Urine:3650]   BP 96/72 (BP Location: Right arm)  Pulse 105  Temp 97.6  F (36.4  C) (Oral)  Resp 16  Wt 77.6 kg (171 lb)  SpO2 97%  BMI 25.25 kg/m2     GENERAL APPEARANCE: awake and oriented to place/person, NAD   EYES: No scleral icterus, pupils equal  HENT: NC/AT,  mouth  without ulcers or lesions  Pulmonary: lungs clear to auscultation with equal breath sounds bilaterally  CV: regular rhythm, normal rate   - Edema +1-2 bilateral dependent, LE edema  GI: distended, non-tender    MS:  no muscle tenderness  : + Fraga   SKIN: no rash, warm, dry, no cyanosis  NEURO: Mentation slow, appropriately responsive to most questions and speech seems normal.     Access: tunneled [facility]    Labs:   All labs reviewed by me  Electrolytes/Renal -   Recent Labs   Lab Test  04/04/18   0608  04/03/18   0650  04/02/18   0717   03/30/18   0620   03/27/18   0156   02/07/18   1203   08/23/17   1209   02/14/14   1312  01/24/14   0904   NA  138  135  134   < >  133   < >   --    < >  136   < >  135   < >  140  140   POTASSIUM  4.5  4.0  3.8   < >  4.4   < >   --    < >  4.2   < >  3.9   < >  4.2  4.3   CHLORIDE  101  99  97   < >  99   < >   --    < >  101   < >  97   < >  107  106   CO2  28  23  27   < >  20   < >   --    < >  29   < >  27   < >  25  27   BUN  53*  51*  48*   < >  88*   < >   --    < >  53*   < >  53*   < >  21  25   CR  3.23*   3.27*  3.22*   < >  4.56*   < >   --    < >  2.46*   < >  2.14*   < >  1.71*  1.83*   GLC  82  86  85   < >  86   < >   --    < >  70   < >  120*   < >  91  90   RIGOBERTO  8.7  8.6  8.5   < >  8.9   < >   --    < >  9.3   < >  8.9   < >  9.2  9.0   MAG   --    --    --    --   2.6*   --    --    --    --    --    --    --   2.3  2.1   PHOS   --    --    --    --    --    --   4.4   --   3.8   --   3.6   --    --    --     < > = values in this interval not displayed.       CBC -   Recent Labs   Lab Test  04/04/18   0608  04/03/18   0650  04/01/18   0616   WBC  6.3  7.4  6.9   HGB  11.4*  13.0*  13.4   PLT  85*  81*  91*       LFTs -   Recent Labs   Lab Test  04/04/18   0608  04/03/18   0650  04/02/18   0717   ALKPHOS  99  106  107   BILITOTAL  1.8*  1.9*  2.1*   ALT  48  51  52   AST  Canceled, Test credited  94*  85*   PROTTOTAL  6.0*  6.3*  6.4*   ALBUMIN  2.5*  2.6*  2.7*       Iron Panel - No lab results found.      Imaging:  Reviewed    Current Medications:    hydrALAZINE  12.5 mg Oral Q8H CLINT     sodium chloride (PF)  10 mL Intracatheter Q7 Days     bumetanide  2 mg Oral BID     apixaban ANTICOAGULANT  2.5 mg Oral BID     OLANZapine  2.5 mg Oral At Bedtime     sodium chloride (PF)  10 mL Intracatheter Q7 Days     melatonin tablet 3 mg  3 mg Oral At Bedtime     aspirin  81 mg Oral Daily     atorvastatin  80 mg Oral Daily     sodium chloride (PF)  3 mL Intracatheter Q8H       DOBUTamine 2.5 mcg/kg/min (04/04/18 1000)     - MEDICATION INSTRUCTIONS -       Nurys Olguin PA-C

## 2018-04-05 NOTE — PROGRESS NOTES
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    Discussed overall prognosis with patient and family yesterday. Explained that his prognosis is very poor. They were understanding. We will have palliative discuss hospice with the patient and family today. We will not place a [place] at this time, nor will we transfer the patient to the ICU.     #Cardiac amyloidosis  #HFrEF NYHA III C (EF 15-20%)  RHC on 4/3. PCW 20. RA 17. PA 43/25 (33). RV 43/17. Son CI 1.4. Patient is volume overloaded and in cardiogenic shock. Suspect this is the likely cause of patient's altered mental status, LOIS, and possibly the ventriculomegaly. PICC placed on 4/3 to begin dobutamine infusion.  - Dobutamine 5 mcg/kg/min  - Discontinue hydralazine due to soft BP's  - Diuresis: Bumex 2 mg BID  - Difficult to diurese given CKD-V, although urine output has been increasing. HD as needed per nephrology  - cont other home cardiac meds  - Palliative consult, appreciate recs      #Multifactorial Encephalopathy  #Delirium  #Dementia (MoCA 16/30)  #Ventriculomegaly / possible normal pressure hydrocephalus  #h/o right MCA CVA  Likely due to cardiogenic shock. CT scan with enlarged venticules. No mass or acute CVA. Sepsis also possible contributor. Possibly uremia. Low suspicion for SBP. Patient's history concerning for NPH. LP done on 4/2. Attempted large volume (25-30 cc); however, was only able to obtain 0.5 cc.  - treatment by etiology: UTI / PNA (treated with abx), uremia (treating with HD/diuresis), NPH (neurology to workup outpatient), baseline dementia / CVA, cardiogenic shock (see treatment above)  - Olanzapine 2.5 mg  and melatonin qhs prn for agitation, less risk of QTc prolongation.  - Neurology signed off. Recommended follow-up in movement disorder clinic for workup of possible NPH.    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 Started HD 3/29. Urine output increased, so have been giving Bumex starting on 4/2 instead of dialysis.  - Urine output has been improving. Monitor and adjust Bumex dose accordingly.  - Additional HD per nephrology    #CAD s/p ATIF to LAD 2015  - Continue home statin  - asa 81 mg daily, avoid BB with cardiac amyloid     #Afib/flutter s/p CTI ablation 2006  Remains in afib, but normal HR. Was on sotalol but this was discontinued around 08/2017 per outside cardiology notes. Discontinued warfarin on 4/3 due to labile INR's. Started on apixaban.  - Apixaban 2.5 mg BID    #Aspiration PNA, resolved  #Urinary tract infection, resolved  Started on Unasyn on 3/27. Switched to Zosyn on 3/28 per ID recs. Restarted on Unasyn on 3/30 when sensitivities returned on UTI (Klebsiella pneumoniae). Completed course of Unasyn on 4/4    #Ascites  Paracentesis deferred due to soft BP's and pending RHC. Since RHC shows cardiogenic shock that is likely causing the paracentesis, will treat underlying cause. No paracentesis for now.    FEN: Cardiac diet  PPX: heparin gtt, then warfarin after LP  Code Status: FULL  Dispo: pending cardiac stability, no requirements for a sitter overnight, anticipate 2+ additional days, then back to TCU    This patient was seen and discussed with Dr. Sullivan who agrees with the assessment and plan.    Jamie Parkinson  PGY-1  Pager: [phone]      Subjective & Interval Hx:    Patient was agitated overnight. Required a sitter.    Patient has no complaints this morning. Denies chest pain, shortness of breath.    Last 24 hr care team notes reviewed.   ROS: 4 point ROS including Respiratory, CV, GI and , other than that noted in the HPI, is negative    Medications:  - reviewed all in Epic    Physical  Exam:  Blood pressure 93/71, pulse 105, temperature 96.7  F (35.9  C), temperature source Axillary, resp. rate 16, weight 73.5 kg (162 lb 0.6 oz), SpO2 98 %.     Gen: Elderly male, resting comfortably in bed, NAD  HEENT: NC/AT, anicteric  Neck: Supple, JVD to ear lobe  Pulm: CTAB without wheezes or crackles in anterior lung fields, normal WOB on RA  CV: Irregularly irregular, tachycardic, S1/S2, no m/r/g  ABD: Soft, mildly distended, nontender, normal BS  EXT: 2+ LE edema b/l, warm  Skin: No rashes or skin lesions  NEURO: AOx2 (person, place), no new focal neurological changes  Psych: Appropriate mood and affect, pleasant, no agitation    Lines/Tubes: R IJ tunneled catheter, L PICC, L PIV, Fraga    Labs & Studies of Note:   - reviewed all labs in [facility] today    Imaging reviewed.

## 2018-04-05 NOTE — PROGRESS NOTES
Consultation for cardiac amyloidosis, consideration of advanced therapies     Consulting physician: Dr. Mederos     This is a 77-year-old man with a past medical history of cardiac amyloidosis which is diagnosed by biopsy in 2009, subtyping is not available although this is deemed to be transthyretin type based on the fact that he does not have systemic AL amyloidosis.     He also has a history of afib/flutter on warfarin s/p CTI ablation 2006, CAD s/p ATIF to LAD in 2015 and more recently a CVA 02/2017 from a right MCA.      Over the last 6 months he has had a progressive decline at home.  He has had falls at home, decrease in mental status, lower energy poor appetite, unintentional weight loss, extreme fatigue, edema as well as PND and orthopnea.     He was at a transitional care facility when he came back with worsening kidney function and worsening mental status. He has been treated for a urinary tract infection in house and has had a substantial neurologic workup.  He has required intermittent dialysis in house as well.     In the hospital he has not been out of bed in 4-5 days, he has substantial volume overload, his kidney function had been worsening and so he underwent a right heart catheterization yesterday which demonstrated high biventricular filling pressures as well as low cardiac output.  He was placed on dobutamine overnight in house with slight improvement in mental status and also increasing urine output and slight improvement in his renal function.    Much of the history today was provided by his wife as the patient was somewhat confused and also reluctant to answer questions.     PMHx   Atrial fibrillation (H) 6/25/2012     Atrial fibrillation: ablation Dr Jonathan Arvizu[facility] 6/25/2012     Cardiac amyloid: ATTR per cardiac biopsy [facility] 2009 6/27/2012     Coronary artery disease       Other and unspecified hyperlipidemia 6/27/2012     Tubular adenoma of colon 2010     Venous insufficiency     Medically she is like patient I would like this is    Smoking status: Former Smoker       Quit date: 1977     Smokeless tobacco: Never Used     Alcohol use No      Family history the patient's father  in his 50s of what was felt to be sudden cardiac death      social history  -the patient is an  and 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     Social History     Social History     Marital status:      Spouse name: N/A     Number of children: N/A     Years of education: N/A     Occupational History     Not on file.     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      to his wife Nu ( Vikash), two children Deandra 1969 (Grandchildren Trinh , Nish ) and Son [id number] YISEL Landers III                    hydrALAZINE (APRESOLINE) half-tab 12.5 mg Take 1 half-tab (12.5 mg) by mouth every 8 hours    lidocaine 1 % 1 mL 1 mL by Other route every hour as needed (mild pain with VAD insertion or accessing implanted port)    lidocaine (LMX4) kit Apply topically every hour as needed for moderate pain (with VAD insertion or accessing implanted port)    sodium chloride (PF) 0.9% PF flush 10-20 mL 10-20 mLs by Intracatheter route every hour as needed for line flush or post meds or blood draw    sodium chloride (PF) 0.9% PF flush 10 mL 10 mLs by Intracatheter route every 7 days    alteplase (CATHFLO ACTIVASE) injection 2 mg Inject 2 mg into the vein every  2 hours    melatonin tablet 1 mg Take 1 tablet (1 mg) by mouth nightly as needed for sleep    bumetanide (BUMEX) tablet 2 mg Take 1 tablet (2 mg) by mouth 2 times daily    apixaban ANTICOAGULANT (ELIQUIS) tablet 2.5 mg Take 1 tablet (2.5 mg) by mouth 2 times daily    OLANZapine (zyPREXA) tablet 2.5 mg Take 1 tablet (2.5 mg) by mouth At Bedtime    DOBUTamine 500 mg in dextrose 5% 250 mL (adult std conc) premix Inject 190.5 mcg/min into the vein continuous    lidocaine (LMX4) kit Apply topically once as needed for moderate pain (for local anesthetic during PICC insertion)    heparin lock flush 10 UNIT/ML injection 2-5 mL 2-5 mLs by Intracatheter route once as needed for line flush (for locking each dormant lumen with line placement)    sodium chloride (PF) 0.9% PF flush 10-20 mL 10-20 mLs by Intracatheter route every hour as needed for line flush or post meds or blood draw    sodium chloride (PF) 0.9% PF flush 10 mL 10 mLs by Intracatheter route every 7 days    melatonin tablet 3 mg Take 1 tablet (3 mg) by mouth At Bedtime    aspirin chewable tablet 81 mg Take 1 tablet (81 mg) by mouth daily    ampicillin-sulbactam (UNASYN) 1.5 g vial to attach  mL bag Inject 1.5 g into the vein every 12 hours    ammonium lactate (LAC-HYDRIN) 12 % lotion Apply topically every hour as needed for dry skin or other (pain)    acetaminophen (TYLENOL) tablet 650 mg Take 2 tablets (650 mg) by mouth every 4 hours as needed for mild pain or fever    lidocaine 2 % (URO-JET) jelly 10 mL Place 10 mLs into the urethra 3 times daily as needed for moderate pain    atorvastatin (LIPITOR) tablet 80 mg Take 1 tablet (80 mg) by mouth daily    sodium chloride (PF) 0.9% PF flush 3 mL 3 mLs by Intracatheter route every hour as needed for line flush (for peripheral IV flush post IV meds)    sodium chloride (PF) 0.9% PF flush 3 mL 3 mLs by Intracatheter route every 8 hours    nitroGLYcerin (NITROSTAT) sublingual tablet 0.4 mg Place 1 tablet (0.4 mg)  "under the tongue every 5 minutes as needed for chest pain    acetaminophen (TYLENOL) Suppository 650 mg Place 1 suppository (650 mg) rectally every 4 hours as needed for mild pain    Patient is already receiving anticoagulation with heparin, enoxaparin (LOVENOX), warfarin (COUMADIN)  or other anticoagulant medication continuous prn    bisacodyl (DULCOLAX) EC tablet 5 mg Take 1 tablet (5 mg) by mouth daily as needed for constipation    Linked Group 1:  \"Or\" Linked Group Details     bisacodyl (DULCOLAX) EC tablet 10 mg Take 2 tablets (10 mg) by mouth daily as needed for constipation    Linked Group 1:  \"Or\" Linked Group Details     bisacodyl (DULCOLAX) EC tablet 15 mg Take 3 tablets (15 mg) by mouth daily as needed for constipation    Linked Group 1:  \"Or\" Linked Group Details     polyethylene glycol (MIRALAX/GLYCOLAX) Packet 17 g Take 17 g by mouth daily as needed for constipation    ondansetron (ZOFRAN) injection 4 mg Inject 2 mLs (4 mg) into the vein every 6 hours as needed for nausea or vomiting    sennosides (SENOKOT) tablet 2 tablet Take 2 tablets by mouth daily as needed for constipation          BP 92/64 (BP Location: Right arm)  Pulse 105  Temp 97.3  F (36.3  C) (Oral)  Resp 16  Wt 73.5 kg (162 lb 0.6 oz)  SpO2 95%  BMI 23.93 kg/m2'  Gen: Awake, lethargic, only oriented to person, in pain  HEENT:AT/ NC, PERRL b/l, EOM grossly intact, mucous membranes pink, dry no exudate  PULM/THORAX: Clear to auscultation bilaterally, no rales/rhonchi/wheezes  CV:RRR, S1 and S2 appreciated, no extra heart sounds, murmurs or rub auscultated. JVD difficult to assess as patient not cooperative   ABD: soft, depressible, distended, tender in all quadrants, reduced bowel sounds, no organomegaly  EXT: +2 pitting edemaNo edema, clubbing or cyanosis. No asymmetrical edema or tenderness to palpation in calves bilaterally.  NEURO: difficult to fully assess as patient not following commands, but no focal neurological " deficit     Lines      Lab Results   Component Value Date    WBC 6.3 04/04/2018    HGB 11.4 (L) 04/04/2018    HCT 34.0 (L) 04/04/2018    PLT 85 (L) 04/04/2018     04/05/2018    POTASSIUM 3.6 04/05/2018    CHLORIDE 102 04/05/2018    CO2 29 04/05/2018    BUN 52 (H) 04/05/2018    CR 3.07 (H) 04/05/2018    GLC 88 04/05/2018    SED 24 (H) 03/26/2018    NTBNPI 2470 (H) 02/13/2013    NTBNP 6854 (H) 04/25/2017    TROPI 0.397 (HH) 03/27/2018    AST Canceled, Test credited 04/04/2018    ALT 48 04/04/2018    ALKPHOS 99 04/04/2018    BILITOTAL 1.8 (H) 04/04/2018    JAGDISH <10 (L) 03/26/2018    INR 1.43 (H) 04/03/2018       Echo: 03/10/18  Left ventricular ejection fraction is visually estimated at 30%.  Very severe concentric wall thickening consistent with left  ventricular hypertrophy is present, consistent with known amyloidosis.  Septum measures 3.3 cm, posterior wall measures 2.9 cm.  Right ventricular wall thickness is severely increased. Mild right  ventricular dilation is present. Global right ventricular systolic  function is severely reduced.  Severe biatrial enlargement.  Severe mitral regurgitation.  Moderate tricuspid regurgitation.  Estimated right atrial pressure is > 15 mmHg.  Small (<10 mm) circumferential pericardial effusion without tamponade  physiology.  Left pleural effusion is noted.

## 2018-04-05 NOTE — PLAN OF CARE
Problem: Patient Care Overview  Goal: Plan of Care/Patient Progress Review  Vss.  Monitor shows afib .  Had 7 beats of vt this am.  md notified and bmp sent.  md was notified on pms that picc line will not draw back blood even after 2 alteplase insertions.  md did not change his labs to vpt collect so they were not drawn on pms.  He said they need the vbg from the picc line so that was not sent this am.  (could only get some blood from his piv, and he refused the vpt per lab as they missed once).  He is oriented to self and place.  Became agitated late pms as he wanted to call his wife, and she had instructed this rn not to let him call during the night as she needed some rest.  I texted her and she called him to say gerard.  Has slept fairly well after talking with her.  Fraga draining yellow urine.  Dobutamine continues per orders through picc line.  Iv antibiotics are completed.  Has 3+ edema legs to feet.  Attendant at bedside for safety.  Will monitor and notify md of changes or issues.

## 2018-04-06 NOTE — PLAN OF CARE
Problem: Patient Care Overview  Goal: Plan of Care/Patient Progress Review  OT 6C: Discharge Planner OT   Patient plan for discharge: Pt unable to appropriately state;  Per medical chart, pt family is hopeful for pt to be able to return to home, difficulty with rehab placements due to medical needs  Current status: Pt initially drowsy, becoming more alert as session progresses, calm and redirectable for participation in basic self cares and bedside transfers.  Pt min-mod A x 2 and FWW sit to stand x 5,  Min A x 2 with verbal and tactile cues as well as assist to manage walker to transfer at bedside.  Able to complete oral/facial hygiene with SBA/set up  Barriers to return to prior living situation: acute medical needs, medical prognosis, AMS, weakness, impaired balance, deconditioning, stairs at home  Recommendations for discharge: TCU  Rationale for recommendations: Pt requires assist for all mobility and self cares; is not currently safe to d/c to home.  Recommend continued therapies to progress safety and independence; pending goals of care.       Entered by: Yancy Young 04/06/2018 11:51 AM

## 2018-04-06 NOTE — PLAN OF CARE
Problem: Patient Care Overview  Goal: Plan of Care/Patient Progress Review  VSS, denies pain. Oriented to self and place, agitated at times. A fib. Dobutamine gtt at 5mcg/kg/min (11.4 ml/hr), tolerating increase well. Fraga with adequate UO. Blood return from PICC. Palliative care consulted, family not accepting of palliative recommendations. Sitter in place for safety. Continue to assess and monitor, notify Cards 1 with concerns.

## 2018-04-06 NOTE — PLAN OF CARE
Problem: Patient Care Overview  Goal: Plan of Care/Patient Progress Review  Discharge Planner PT   Patient plan for discharge: TCU  Current status: Donned size E comperm to B LE for edema reduction/maintenance. Education provided.  Barriers to return to prior living situation: Cognition, deconditioning  Recommendations for discharge: TCU  Rationale for recommendations: Current level of function       Entered by: Mahamde Easton 04/06/2018 1:51 PM

## 2018-04-06 NOTE — PROGRESS NOTES
Nephrology Progress Note  04/06/2018         Assessment & Recommendations:   Josr Landers is a 78 yo M with PMH of afib/aflutter, CVA, HFrEF, CAD, cardiac amyloidosis, admitted with AMS and acute elevation in serum creatinine.    CKD stage 4 with baseline Scr 2.4 attributed to AKIs with CVA and worsening HF.     LOIS 2/2 CRS: oliguric initially with Scr rising to 4.9.  Tunneled RIJ placed, HD initiated 3/29. Last attempt 3/31 not tolerated 2/2 hypotension.     Scr now improving 2/2 in setting of volume removal, however high output may contribute to pre-renal insult.      -no acute indication for HD today, will re-assess daily.    -RIJ tunneled cath may be removed sustained improvement or Scr reaches baseline   -caution against over diuresis to prevent additional pre-renal insult  - arrange outpat LOIS HD chair if Scr starts to trend high again or if further HD is required.      Volume:  RHC showed evidence of cardiogenic shock.  Responsive to Bumex and Dobutamine drip.   Approaching Euvolemic state, may trend to hypovolemia with >9L out in past 48 hours.    -recommend titrate to no more than 2 L per day  - Paracentesis deferred by primary team for now   - Continue martinez for monitoring of urine output.        ID:     UTI/PNA, completed course of unasyn.    CARD   Atrial fibrillation: warfarin resumed.  BP: HFrEF 15-20%, chronically hypotensive with systolic 90s. No HTN meds.  - Diurese as above.   - RH cath cardiogenic shock-treat as above. Repeat Echos per primary team.      Lytes: AG met acidosis of acute renal failure.  -improved after hemodialysis x 2 and with stable renal function;  monitor      AMS: marginally improved per family report.  Neurology following for ventriculomegaly/possible normal pressure hydrocephalus, hx of CVA; video monitoring/EEG c/w encephalopathy (likely multifactorial).  LP initial results not concerning for infection.        Recommendations were communicated to primary team via phone  and pager.    Patient seen, discussed and plan formulated in conjunction with Dr. Cevallos.   Patient stated agreement and understanding of plan as outlined above.  Nurys Olguin PA-C  [facility] of Medicine  Division of Renal Disease and Hypertension  [phone]    Interval History:   Continues to be interactive and communicating well but with spontaneous drowsy periods.  Requires assist with all ADLs.   BPs currently at baseline systolic 90s despite large volume urine output in setting of diuresis, post ATN.  No fevers, N/V/D with good appetite per report.    Review of Systems:   4 point ROS negative other than as noted above.    Physical Exam:   I/O last 3 completed shifts:  In: 1312.28 [P.O.:1120; I.V.:192.28]  Out: 2875 [Urine:2875]   /53 (BP Location: Right arm)  Pulse 105  Temp 97.6  F (36.4  C) (Axillary)  Resp 18  Wt 68.9 kg (151 lb 14.4 oz)  SpO2 100%  BMI 22.43 kg/m2     GENERAL APPEARANCE:  oriented to place/person, NAD   EYES: No scleral icterus, pupils equal  HENT: NC/AT,  mouth  without ulcers or lesions  Pulmonary: lungs clear to auscultation with equal breath sounds bilaterally  CV: regular rhythm, normal rate   - mild pedal edema  GI: distended, non-tender    MS:  no muscle tenderness  : + Fraga   SKIN: no rash, warm, dry, no cyanosis  NEURO: drowsy, appropriately responsive to most questions and speech seems normal.     Access: tunneled [facility]    Labs:   All labs reviewed by me  Electrolytes/Renal -   Recent Labs   Lab Test  04/06/18   0608  04/05/18   2315  04/05/18   0650   03/30/18   0620   03/27/18   0156   02/07/18   1203   08/23/17   1209   NA  137  138  141   < >  133   < >   --    < >  136   < >  135   POTASSIUM  4.9  4.8  3.6   < >  4.4   < >   --    < >  4.2   < >  3.9   CHLORIDE  99  98  102   < >  99   < >   --    < >  101   < >  97   CO2  29  31  29   < >  20   < >   --    < >  29   < >  27   BUN  54*  55*  52*   < >  88*   < >    --    < >  53*   < >  53*   CR  2.93*  2.68*  3.07*   < >  4.56*   < >   --    < >  2.46*   < >  2.14*   GLC  83  124*  88   < >  86   < >   --    < >  70   < >  120*   RIGOBERTO  8.5  8.5  9.0   < >  8.9   < >   --    < >  9.3   < >  8.9   MAG  2.0  2.0   --    --   2.6*   --    --    --    --    --    --    PHOS   --    --    --    --    --    --   4.4   --   3.8   --   3.6    < > = values in this interval not displayed.       CBC -   Recent Labs   Lab Test  04/06/18   0608  04/04/18   0608  04/03/18   0650   WBC  6.7  6.3  7.4   HGB  11.6*  11.4*  13.0*   PLT  89*  85*  81*       LFTs -   Recent Labs   Lab Test  04/04/18   0608  04/03/18   0650  04/02/18   0717   ALKPHOS  99  106  107   BILITOTAL  1.8*  1.9*  2.1*   ALT  48  51  52   AST  Canceled, Test credited  94*  85*   PROTTOTAL  6.0*  6.3*  6.4*   ALBUMIN  2.5*  2.6*  2.7*       Iron Panel - No lab results found.      Imaging:  Reviewed    Current Medications:    sodium chloride (PF)  10 mL Intracatheter Q7 Days     apixaban ANTICOAGULANT  2.5 mg Oral BID     OLANZapine  2.5 mg Oral At Bedtime     sodium chloride (PF)  10 mL Intracatheter Q7 Days     melatonin tablet 3 mg  3 mg Oral At Bedtime     aspirin  81 mg Oral Daily     atorvastatin  80 mg Oral Daily     sodium chloride (PF)  3 mL Intracatheter Q8H       DOBUTamine 5 mcg/kg/min (04/06/18 0000)     - MEDICATION INSTRUCTIONS -       Nurys Olguin PA-C

## 2018-04-06 NOTE — PROGRESS NOTES
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     #Cardiac amyloidosis  #HFrEF NYHA III C (EF 15-20%)  RHC on 4/3. PCW 20. RA 17. PA 43/25 (33). RV 43/17. Son CI 1.4. Patient is volume overloaded and in cardiogenic shock. Suspect this is the likely cause of patient's altered mental status, LOIS, and possibly the ventriculomegaly. PICC placed on 4/3 to begin dobutamine infusion.  - Dobutamine 5 mcg/kg/min  - Discontinue hydralazine due to soft BP's  - Diuresis: Hold PM bumex today given good UOP yesterday with increased Dobutamine.  - Palliative consult, appreciate recs      #Multifactorial Encephalopathy  #Delirium  #Dementia (MoCA 16/30)  #Ventriculomegaly / possible normal pressure hydrocephalus  #h/o right MCA CVA  Likely due to cardiogenic shock. CT scan with enlarged venticles. No mass or acute CVA. Sepsis also possible contributor. Possibly uremia. Low suspicion for SBP. Patient's history concerning for NPH. LP done on 4/2. Attempted large volume (25-30 cc); however, was only able to obtain 0.5 cc.  - treatment by etiology: UTI / PNA (treated with abx), uremia (treating with HD/diuresis), NPH (neurology to workup outpatient), baseline dementia / CVA, cardiogenic shock (see treatment above)  - Olanzapine 2.5 mg and melatonin qhs prn for agitation, less risk of QTc prolongation.  - Will not give trazodone for now given risk of QTc prolongation  - Neurology signed off. Recommended follow-up in movement disorder clinic for workup of possible NPH.    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 Started HD 3/29. Urine output increased, so have been giving  Bumex starting on 4/2 instead of dialysis.  - Urine output has been improving. Monitor and adjust Bumex dose accordingly.  - Additional HD per nephrology    #CAD s/p ATIF to LAD 2015  - Continue home statin  - asa 81 mg daily, avoid BB with cardiac amyloid     #Afib/flutter s/p CTI ablation 2006  Remains in afib, but normal HR. Was on sotalol but this was discontinued around 08/2017 per outside cardiology notes. Discontinued warfarin on 4/3 due to labile INR's. Started on apixaban.  - Apixaban 2.5 mg BID    #Aspiration PNA, resolved  #Urinary tract infection, resolved  Started on Unasyn on 3/27. Switched to Zosyn on 3/28 per ID recs. Restarted on Unasyn on 3/30 when sensitivities returned on UTI (Klebsiella pneumoniae). Completed course of Unasyn on 4/4    #Ascites  Paracentesis deferred due to soft BP's and pending RHC. Since RHC shows cardiogenic shock that is likely causing the paracentesis, will treat underlying cause. No paracentesis for now.    FEN: Cardiac diet  PPX: Apixaban  Code Status: FULL  Dispo: pending cardiac stability, no requirements for a sitter overnight, anticipate 3+ additional days. Likely to facility with hospice.    This patient was seen and discussed with Dr. Sullivan who agrees with the assessment and plan.    Jamie Parkinson  PGY-1  Pager: [phone]      Subjective & Interval Hx:    Patient was agitated overnight. Required a sitter.    Patient has no complaints this morning. Says he feels alright. Denies chest pain, shortness of breath.    Last 24 hr care team notes reviewed.   ROS: 4 point ROS including Respiratory, CV, GI and , other than that noted in the HPI, is negative    Medications:  - reviewed all in Epic    Physical Exam:  Blood pressure 110/53, pulse 105, temperature 97.6  F (36.4  C), temperature source Axillary, resp. rate 18, weight 68.9 kg (151 lb 14.4 oz), SpO2 100 %.     Gen: Elderly male, resting comfortably in bed, NAD  HEENT: NC/AT, anicteric  Neck: Supple, JVD to ear  lobe  Pulm: CTAB without wheezes or crackles in anterior lung fields, normal WOB on RA  CV: Irregularly irregular, tachycardic, S1/S2, no m/r/g  ABD: Soft, mildly distended, nontender, normal BS  EXT: 2+ LE edema b/l, warm  Skin: No rashes or skin lesions  NEURO: AOx2 (person, place), no new focal neurological changes  Psych: Appropriate mood and affect, pleasant, no agitation    Lines/Tubes: R IJ tunneled catheter, L PICC, L PIV, Fraga    Labs & Studies of Note:   - reviewed all labs in [facility] today    Imaging reviewed.

## 2018-04-06 NOTE — PROGRESS NOTES
CLINICAL NUTRITION SERVICES     Nutrition Prescription    Recommendations already ordered by Registered Dietitian (RD):  Ordered kcal counts    Future/Additional Recommendations:  For all recommendations, see prior nutrition notes     INTERVENTIONS:  Implementation:  Ordered kcal counts from 4/7-4/9.     Follow up/Monitoring:  Will continue to follow pt.    Shereen Toro, MS, RD, LD, [facility]   6C Pgr:  [phone]

## 2018-04-06 NOTE — PROGRESS NOTES
D: Here for cardiac amyloidosis and HF exacerbation    I: Monitored vitals and assessed pt status.   Changed:-   Running: Dobutamine 5 mcg/kg/min  PRN:-    A: A0x3. VSS. A-flutter per EKG with frequent PVC's.   Afebrile. Fraga catheter with adequate output. Pt up in chair. Calm, cooperative, family present.     P: Continue to monitor Pt status and report changes to treatment team.     Temp:  [97.6  F (36.4  C)-97.9  F (36.6  C)] 97.6  F (36.4  C)  Heart Rate:  [] 111  Resp:  [16-18] 18  BP: ()/(53-70) 110/53  SpO2:  [96 %-100 %] 100 %

## 2018-04-06 NOTE — PLAN OF CARE
Problem: Patient Care Overview  Goal: Plan of Care/Patient Progress Review  Monitor shows atrial fib with rates 90s-110.  Vss. Dobutamine continues per orders.  Has c/o left foot pain .  Tylenol given x1.  Attendant at bedside.  Had 20 beats of vt on pms with rate 180s.  md notified.  Bmp and mg+ sent.  Results were within normal limits.  Fraga with yellow urine. Remains oriented to self and place.  Will monitor and notify md of changes or issues.

## 2018-04-06 NOTE — PROGRESS NOTES
Nephrology Progress Note  04/05/2018         Assessment & Recommendations:   Josr Landers is a 76 yo M with PMH of afib/aflutter, CVA, HFrEF, CAD, cardiac amyloidosis, admitted with AMS and acute elevation in serum creatinine.    CKD stage 4 with baseline Scr 2.4 attributed to AKIs with CVA and worsening HF.     LOIS 2/2 CRS: oliguric initially with Scr rising to 4.9.  Tunneled RIJ placed and HD initiated 3/29  Last iHD attempt 3/31 not tolerated 2/2 hypotension.  Now responsive to diuretic/post ATN diuresisand on Dobutamine drip.  Scr improving 3.07.    -no acute indication for HD today, will re-assess daily.    -RIJ tunneled cath appears to remain in good position per CXR (patient pulled when confused), will remove if renal function improves back to baseline.  -caution against over diuresis to prevent additional pre-renal insult  - requested primary team/SW assist to arrange outpat LOIS HD chair in case needed      Volume:  RHC shows evidence of cardiogenic shock.  Responsive to Bumex and Dobutamine drip.   Hypervolemic improving, may trend to hypovolemia with >4L out in past 24 hours.    -recommend titrate to no more than 2 L per day  - Paracentesis deferred by primary team for now   - Continue martinez for monitoring of urine output.        ID:     UTI/PNA on unasyn.    CARD   Atrial fibrillation: warfarin resumed.  BP: HFrEF 15-20%, chronically hypotensive with systolic 90s. No HTN meds.  - Diurese as above.   - RH cath cardiogenic shock-treat as above. Repeat Echos per primary team.      Lytes: AG met acidosis of acute renal failure.  -improved after hemodialysis x 2 and with stable renal function;  monitor      AMS: marginally improved per family report.  Neurology following for ventriculomegaly/possible normal pressure hydrocephalus, hx of CVA; video monitoring/EEG c/w encephalopathy (likely multifactorial).  LP initial results not concerning for infection.        Recommendations were communicated to primary  team via phone and pager.    Patient seen, discussed and plan formulated in conjunction with Dr. Cevallos.   Patient stated agreement and understanding of plan as outlined above.  Nurys Olguin PA-C  [facility]  Department of Medicine  Division of Renal Disease and Hypertension  [phone]      Interval History :   Similar to previous 2 days, oriented but dropping off to sleep during conversation.  No complaints, requires assist with all ADLs.   BPs dipping a little below baseline systolic 90s in setting of diuresis, post ATN.  No fevers, N/V/D with good appetite per report.    Review of Systems:   4 point ROS negative other than as noted above.    Physical Exam:   I/O last 3 completed shifts:  In: 981.1 [P.O.:790; I.V.:191.1]  Out: 4075 [Urine:4075]   BP 91/67 (BP Location: Right arm)  Pulse 105  Temp 97.7  F (36.5  C) (Oral)  Resp 16  Wt 73.5 kg (162 lb 0.6 oz)  SpO2 96%  BMI 23.93 kg/m2     GENERAL APPEARANCE:  oriented to place/person, NAD   EYES: No scleral icterus, pupils equal  HENT: NC/AT,  mouth  without ulcers or lesions  Pulmonary: lungs clear to auscultation with equal breath sounds bilaterally  CV: regular rhythm, normal rate   - Edema +1 bilateral dependent, LE edema  GI: distended, non-tender    MS:  no muscle tenderness  : + Fraga   SKIN: no rash, warm, dry, no cyanosis  NEURO: drowsy, appropriately responsive to most questions and speech seems normal.     Access: tunneled [facility]    Labs:   All labs reviewed by me  Electrolytes/Renal -   Recent Labs   Lab Test  04/05/18   0650  04/05/18   0500  04/04/18   0608   03/30/18   0620   03/27/18   0156   02/07/18   1203   08/23/17   1209   02/14/14   1312  01/24/14   0904   NA  141  137  138   < >  133   < >   --    < >  136   < >  135   < >  140  140   POTASSIUM  3.6  Unsatisfactory specimen - grossly hemolyzed  4.5   < >  4.4   < >   --    < >  4.2   < >  3.9   < >  4.2  4.3   CHLORIDE  102  101  101   < >  99   < >    --    < >  101   < >  97   < >  107  106   CO2  29  26  28   < >  20   < >   --    < >  29   < >  27   < >  25  27   BUN  52*  51*  53*   < >  88*   < >   --    < >  53*   < >  53*   < >  21  25   CR  3.07*  Unsatisfactory specimen - grossly hemolyzed  3.23*   < >  4.56*   < >   --    < >  2.46*   < >  2.14*   < >  1.71*  1.83*   GLC  88  76  82   < >  86   < >   --    < >  70   < >  120*   < >  91  90   RIGOBERTO  9.0  8.3*  8.7   < >  8.9   < >   --    < >  9.3   < >  8.9   < >  9.2  9.0   MAG   --    --    --    --   2.6*   --    --    --    --    --    --    --   2.3  2.1   PHOS   --    --    --    --    --    --   4.4   --   3.8   --   3.6   --    --    --     < > = values in this interval not displayed.       CBC -   Recent Labs   Lab Test  04/04/18   0608  04/03/18   0650  04/01/18   0616   WBC  6.3  7.4  6.9   HGB  11.4*  13.0*  13.4   PLT  85*  81*  91*       LFTs -   Recent Labs   Lab Test  04/04/18   0608  04/03/18   0650  04/02/18   0717   ALKPHOS  99  106  107   BILITOTAL  1.8*  1.9*  2.1*   ALT  48  51  52   AST  Canceled, Test credited  94*  85*   PROTTOTAL  6.0*  6.3*  6.4*   ALBUMIN  2.5*  2.6*  2.7*       Iron Panel - No lab results found.      Imaging:  Reviewed    Current Medications:    sodium chloride (PF)  10 mL Intracatheter Q7 Days     bumetanide  2 mg Oral BID     apixaban ANTICOAGULANT  2.5 mg Oral BID     OLANZapine  2.5 mg Oral At Bedtime     sodium chloride (PF)  10 mL Intracatheter Q7 Days     melatonin tablet 3 mg  3 mg Oral At Bedtime     aspirin  81 mg Oral Daily     atorvastatin  80 mg Oral Daily     sodium chloride (PF)  3 mL Intracatheter Q8H       DOBUTamine 5 mcg/kg/min (04/05/18 2003)     - MEDICATION INSTRUCTIONS -       Nurys Olguin PA-C

## 2018-04-06 NOTE — PROGRESS NOTES
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   Edema Precautions Cardiac Edema/CHF;Renal Insufficiency   Edema Precaution Comments Will continue to monitor creatinine   Pain   Patient currently in pain No   Edema Examination / Assessment   Skin Condition Comments Pocketing 1+ to 2+ pitting B feet to knee crease. Worsen on left foot and overall in feet. Moderate dryness noted   Scar No   Ulcerations Yes   Description Scabbed over laceration on left knee from recent fall   Capillary Refill Symmetrical   Dorsal Pedal Pulse Symmetrical   ROM   Range of Motion (WFL) no deficits were identified   Strength   Strength (WFL) (Weakness/deconditioning noted per functional comments)   Sensation   Sensation (WFL) no deficits were identified   Assessment/Plan   Patient presents with Edema   Assessment Will benefit from compressive therapy to improve edema status and prevent acute worsening of edema   Clinical Presentation Stable/Uncomplicated   Clinical Presentation Rationale Clinical judgement   Clinical Decision Making (Complexity) Low complexity   Planned Edema Interventions Fit for compression garment;Exercises;Precautions to prevent infection/exacerbation;Education;ADL training   Treatment Frequency 3 times/wk   Treatment Duration 1 week   Patient, Family and/or Staff in agreement with plan of " care. Yes   Risks and benefits of treatment have been explained. Yes   Total Evaluation Time   Total Evaluation Time (Minutes) 5

## 2018-04-06 NOTE — PROGRESS NOTES
Patient seen in follow-up.  Multiple family members present in room.  Reviewed notes from primary service indicating limited life expectancy due to progression of underlying disease leading to decompensated heart failure.  Options for IV inotropes present discharge dilemmas.  Pt continues to appear comfortable.   There are several people in the room when asked if cardiology had any further updates during their visit.  Family members indicated that there was no change in plan from their perspective. Given the discrepancy with this POV and cardiology notes/discussion, and multiple people in the room, did not pursue this line of questioning.   We will follow-up with patient next week.    Jamie GREER NP ACHPN  Nurse Practitioner- Lead Advanced Practice Provider  [facility] [place] Palliative Medicine Consult Service   [phone]

## 2018-04-07 NOTE — PLAN OF CARE
Problem: Patient Care Overview  Goal: Plan of Care/Patient Progress Review  Discharge Planner PT   Patient plan for discharge: rehab  Current status: mod A x 2 for supine>sitting and sit<>stand. Pt completed standing marching x 5, able to take 4 small steps forward to start turning to chair. Recommend use of universal sling/ceiling lift for transfer chair>bed 2/2 difficulty standing from low chair height and difficulty sequencing LEs with turning. Re-donned LE compression with size E comperm. Pt to wear 23/24 hours, remove 1 hour/day. See patient care order for details. No further IP edema needs, discharged from edema.   Barriers to return to prior living situation: weakness, medical needs, Ax2/lift for transfers  Recommendations for discharge: TCU  Rationale for recommendations: improve strength, activity tolerance and independence with mobility        Entered by: Bina Martinez 04/07/2018 9:08 AM

## 2018-04-07 NOTE — PROGRESS NOTES
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     #Cardiac amyloidosis  #HFrEF NYHA III C (EF 15-20%)  RHC on 4/3. PCW 20. RA 17. PA 43/25 (33). RV 43/17. Son CI 1.4. Patient is volume overloaded and in cardiogenic shock. Suspect this is the likely cause of patient's altered mental status, LOIS, and possibly the ventriculomegaly. PICC placed on 4/3 to begin dobutamine infusion.  - Continue dobutamine at 5 mcg/kg/min  - Discontinue hydralazine due to soft BP's  - Diuresis: Holding as UOP improved and making good urine, monitor volume status  - Palliative consulted, appreciate recs re: GOC and d/c plans - will try to arrange care conference next week with family and team members, including Palliative Care, Cardiology team, Dr. Wise, and Care Coordinator.    #Multifactorial Encephalopathy  #Delirium  #Dementia (MoCA 16/30)  #Ventriculomegaly / possible normal pressure hydrocephalus  #h/o right MCA CVA  Likely due to cardiogenic shock. CT scan with enlarged venticles. No mass or acute CVA. Sepsis also possible contributor. Possibly uremia. Low suspicion for SBP. Patient's history concerning for NPH. LP done on 4/2. Attempted large volume (25-30 cc); however, was only able to obtain 0.5 cc.  - Ttreatment by etiology: UTI / PNA (s/p tx with abx), uremia (treating with HD/diuresis), NPH (neurology to workup further as outpatient), baseline dementia / CVA, cardiogenic shock (see above)  - Olanzapine 2.5 mg and melatonin qhs prn for agitation, less risk of QTc prolongation.  - Will not give trazodone for now given risk of QTc prolongation  - Neurology signed off.  Recommended follow-up in movement disorder clinic for workup of possible NPH.    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 Started HD 3/29. Urine output increased, so have been giving Bumex starting on 4/2 instead of dialysis.  - Urine output has been improving. Monitor and adjust Bumex dose prn.  - Additional HD per nephrology    #CAD s/p ATIF to LAD 2015  - Continue home statin  - asa 81 mg daily, avoid BB with cardiac amyloid     #Afib/flutter s/p CTI ablation 2006  Remains in afib, but normal HR. Was on sotalol but this was discontinued around 08/2017 per outside cardiology notes. Discontinued warfarin on 4/3 due to labile INR's. Started on apixaban.  - Apixaban 2.5 mg BID (renally dosed)    #Aspiration PNA, resolved  #Urinary tract infection, resolved  Started on Unasyn on 3/27. Switched to Zosyn on 3/28 per ID recs. Restarted on Unasyn on 3/30 when sensitivities returned on UTI (Klebsiella pneumoniae). Completed course of Unasyn on 4/4.    #Ascites  Paracentesis deferred due to soft BP's and pending RHC. Since RHC shows cardiogenic shock that is likely causing the paracentesis, will treat underlying cause. No paracentesis for now.    FEN: Cardiac diet  PPX: Apixaban  Code Status: FULL  Dispo: Awaiting discharge to home, most likely. Discussing with palliative and family re: home inotropes.    This patient was discussed with Dr. Sullivan who agrees with the assessment and plan.    Tirso Simmons MD PGY2  Internal Medicine  Pager [phone]      Subjective & Interval Hx:    No acute events overnight. Denies pain, SOB or CP this morning. No additional complaints.     Last 24 hr care team notes reviewed.   ROS: 4 point ROS including Respiratory, CV, GI and , other than that noted in the HPI, is negative    Physical Exam:  Blood pressure 106/69, pulse 105, temperature 96.9  F (36.1  C), temperature source Axillary, resp. rate 16, weight 68.9 kg (151 lb 14.4 oz), SpO2 100 %.     Gen: Elderly male, resting  comfortably in bed, NAD  HEENT: NCAT, anicteric  Neck: Supple, JVD to angle of jaw  Pulm: CTAB, no wheezes or crackles in anterior lung fields, normal WOB on RA  CV: Irregularly irregular, tachycardic, S1/S2, no m/r/g  ABD: Soft, mildly distended, nontender, normal BS  EXT: 1+ LE edema b/l L>R, wwp  Skin: No rashes or skin lesions  NEURO: Alert, not fully oriented,, no new focal neurological changes  Psych: Appropriate, pleasant    Lines/Tubes: R IJ tunneled catheter, L PICC, L PIV, Fraga    Labs & Studies of Note:   - reviewed all labs in Epic today. S/f:  K 5.5, Cr 2.77    Imaging reviewed. No new.

## 2018-04-07 NOTE — PROGRESS NOTES
Nephrology Progress Note  04/07/2018       Josr Landers is a 78 yo M with PMH of afib/aflutter, CVA, HFrEF, CAD, cardiac amyloidosis, admitted with AMS and acute elevation in serum creatinine.    CKD stage 4 with baseline Scr 2.4 attributed to AKIs with CVA and worsening HF.    Assessment & Recommendations:     1. LOIS - Appears to be in recovery. Creat 2.7 (2.9). He is non oliguric. Baseline creat ~ 2.4.   Etiology of LOIS felt to be 2/2 CRS   - IHD initiated 3/29/18 with rise in creat to 4.9.    - Last HD 3/31/18   - Will plan on removing TDC on Monday   - Ongoing HD would not improve patient's Quality of Life given very poor cardiac prognosis and likelihood of transitioning to hospice.    - Continue daily chemistry labs for now   - Avoid hypotension/nephrotoxins    2. Volume status - Hypervolemia with edema, severe HF. Not on supplemental oxygen. UO 2800 cc over last 24 hrs. /. Weight 68.9 kg on 4/6. Admission weight 75.3 kg.   Not on schedule diuretics   - Continue I/O and daily standing weights    3. Cardiac amyloidosis - EF 15-20%. No further cardiac therapies available other than Dobutamine infusion, currently at 5 mcg. Palliative care has been consulted. Looking at home with hospice vs NH with ongoing Dobutamine.     4. Electrolytes - K 5.5 this morning. Na 136   -Would recheck the K, if remains elevated would give additional dose of Lasix    5. Acid base - No acute concerns. Bicarb 23    6. BMD - Ca 8.4, albumin 2.5.     7. Anemia - Hgb 11.3.   - No intervention required.     Recommendations were communicated to primary team via progress note    Seen and discussed with Dr. Anastacio Mckinnon, NP   [phone]    Interval History :   Creat is declining  Non oliguric  Interval progress notes, imaging studies and labs reviewed    Review of Systems:   I reviewed the following systems:  GI: Denies nausea  Neuro: Intermit confused, but calm for me  Constitutional:  no fever or chills  CV: denies  dyspnea or CP. Has ongoing edema  : juan present    Physical Exam:   I/O last 3 completed shifts:  In: 1620 [P.O.:1620]  Out: 3500 [Urine:3500]   /69 (BP Location: Left arm)  Pulse 105  Temp 96.9  F (36.1  C) (Axillary)  Resp 16  Wt 68.9 kg (151 lb 14.4 oz)  SpO2 100%  BMI 22.43 kg/m2     GENERAL APPEARANCE: comfortable, sitting up in chair  PULM: lungs CTA. Breathing is nonlabored. Not on supplemental oxygen.   CV: Irreg RR       -edema - 2+  GI: soft, NT, Non distended  INTEGUMENT: no cyanosis or rash  NEURO:  Quiet, calm   : juan present w/cindy urine  Access - Right TDC    Labs:   All labs reviewed by me  Electrolytes/Renal -   Recent Labs   Lab Test  04/07/18 0610  04/06/18   0608  04/05/18   2315   03/30/18   0620   03/27/18   0156   02/07/18   1203   08/23/17   1209   NA  136  137  138   < >  133   < >   --    < >  136   < >  135   POTASSIUM  5.5*  4.9  4.8   < >  4.4   < >   --    < >  4.2   < >  3.9   CHLORIDE  98  99  98   < >  99   < >   --    < >  101   < >  97   CO2  33*  29  31   < >  20   < >   --    < >  29   < >  27   BUN  56*  54*  55*   < >  88*   < >   --    < >  53*   < >  53*   CR  2.77*  2.93*  2.68*   < >  4.56*   < >   --    < >  2.46*   < >  2.14*   GLC  87  83  124*   < >  86   < >   --    < >  70   < >  120*   RIGOBERTO  8.4*  8.5  8.5   < >  8.9   < >   --    < >  9.3   < >  8.9   MAG   --   2.0  2.0   --   2.6*   --    --    --    --    --    --    PHOS   --    --    --    --    --    --   4.4   --   3.8   --   3.6    < > = values in this interval not displayed.       CBC -   Recent Labs   Lab Test  04/07/18 0610 04/06/18   0608  04/04/18   0608   WBC  6.6  6.7  6.3   HGB  11.3*  11.6*  11.4*   PLT  95*  89*  85*       LFTs -   Recent Labs   Lab Test  04/04/18   0608  04/03/18   0650  04/02/18   0717   ALKPHOS  99  106  107   BILITOTAL  1.8*  1.9*  2.1*   ALT  48  51  52   AST  Canceled, Test credited  94*  85*   PROTTOTAL  6.0*  6.3*  6.4*   ALBUMIN  2.5*  2.6*  2.7*        Iron Panel - No lab results found.      Current Medications:    sodium chloride (PF)  10 mL Intracatheter Q7 Days     apixaban ANTICOAGULANT  2.5 mg Oral BID     OLANZapine  2.5 mg Oral At Bedtime     sodium chloride (PF)  10 mL Intracatheter Q7 Days     melatonin tablet 3 mg  3 mg Oral At Bedtime     aspirin  81 mg Oral Daily     atorvastatin  80 mg Oral Daily     sodium chloride (PF)  3 mL Intracatheter Q8H       DOBUTamine 5 mcg/kg/min (04/07/18 0916)     - MEDICATION INSTRUCTIONS -       Stephania Mckinnon, NP

## 2018-04-07 NOTE — PROGRESS NOTES
"SPIRITUAL HEALTH SERVICES  SPIRITUAL ASSESSMENT Progress Note (Palliative Focus)  [facility] ([place]) 6C    Follow-up  visit with pt, who is now being followed by palliative care. Pt's daughter at bedside. Pt more conversational today than during previous visits - both pt and daughter feel he is making progress. Talked about pt's academic accomplishments (PhD) and his experience on the [facility] [facility] [employer] - pt said \"yes, I have been blessed in my life, I'm grateful for God's blessings...\" Prayer was shared.   I will follow-up 2x/week as pt receives ongoing support from the inpatient palliative consult service.    Josr Art) Solomon Bah M.Div., [facility]  Staff   Pager [phone]       "

## 2018-04-07 NOTE — PLAN OF CARE
"Problem: Patient Care Overview  Goal: Plan of Care/Patient Progress Review  Outcome: No Change    D: Pt admitted 3/26 from TCU with AMS and elevated creat.  I/A: Pt is alert and oriented to self only overnight. Pt has 1:1 sitter in room to assist with questions and concerns. Pt mood is labile and pt became very upset and refused HS medications at around 2200. Pt states, \"I want to get out of here and my wife is coming to get me.\" RN provided emotional support and reapproached later. Pt up intermittently overnight, despite receiving Zyprexa and melatonin. Rfaga catheter still in place. Adequate UOP, Melanie urine. RN assisted with turns/positioning. Tylenol given overnight to address reports of generalized discomfort. Bed alarm on for pt safety. Tunnel catheter placed for dialysis, Pt has double lumen PICC with Dobutamine gtt running at 5 mcg/kg/min (11.4 mL/hr).  R PIV, SL. Heart rhythm: A fib/ A flutter with frequent PVC's.   P: RN will continue to monitor and assess. RN will update team with any changes/concerns. Barbie Hernandez      "

## 2018-04-07 NOTE — PLAN OF CARE
Problem: Patient Care Overview  Goal: Plan of Care/Patient Progress Review  Outcome: No Change  /66  Pulse 105  Temp 98.7  F (37.1  C) (Oral)  Resp 16  Wt 68.9 kg (151 lb 14.4 oz)  SpO2 100%  BMI 22.43 kg/m2    VSS. A&O x2, disoriented to date and situation, had sitter at bedside this morning, family here visiting this afternoon. Sat up in the chair for 2 hrs this am and again this evening. Did worked with PT this am. Pt weak and unsteady on feet. Ceiling lifted from bed to chair with 2 assist. Tolerating diet on enid ct. Fraga with adequate uop. No bm, prn senna given.      Problem: Confusion, Acute (Adult)  Goal: Cognitive/Functional Impairments Minimized  Patient will demonstrate the desired outcomes by discharge/transition of care.    04/07/18 1800   Confusion, Acute (Adult)   Cognitive/Functional Impairments Minimized making progress toward outcome

## 2018-04-08 NOTE — PLAN OF CARE
Problem: Patient Care Overview  Goal: Plan of Care/Patient Progress Review  Outcome: No Change    D: Pt admitted 3/26 from TCU with AMS and elevated creat.  I/A: Pt is alert and oriented to self only overnight. Pt has 1:1 sitter in room to assist with constant questions, concerns. Also, pt attempting to get out of bed/pull at lines occasionally. Bed alarm on for pt safety. Pt mood is labile. RN provided emotional support. Pt up intermittently overnight. Fraga catheter still in place. Adequate UOP, Melanie urine. RN assisted with turns/positioning. Tylenol given overnight to address reports of generalized discomfort. Tunnel catheter placed for dialysis, Pt has double lumen PICC with Dobutamine gtt running at 5 mcg/kg/min (11.4 mL/hr). R PIV, SL. Heart rhythm: A fib/ A flutter rates in 90's-100's. Pt triggered Sepsis protocol at around 0545. Lactic came back WNL and VSS. See results review and doc flow for details.    VPM started at 0700 for this pt. Next shift RN updated on this.     P: TDC out Monday per Nephrology note. RN will continue to monitor and assess. RN will update team with any changes/concerns. Barbie Hernandez

## 2018-04-08 NOTE — PLAN OF CARE
Problem: Patient Care Overview  Goal: Plan of Care/Patient Progress Review  RN  1. Pt will be free from injury   D/I/A: Pt here w/ AMS, LOIS found to have cardiogenic shock. Pt w/o 1:1 from 0700 this AM; doing ok; family at bedside after 1200. Video monitor so far able to keep patient safe. Pt up to chair for breakfast; fair appetite. Given bowel meds but no BM as of 1500; suppository and enema ordered PRN. Pt seemed to get weaker throughout the day. He is fairly confused but pleasant. A-fib, RA, Ax2 or lift dependent. Dobutamine continues at 5mcg/kg/min.  P: Continue to monitor.

## 2018-04-08 NOTE — PROGRESS NOTES
Nephrology Progress Note  04/08/2018       Josr Landers is a 76 yo M with PMH of afib/aflutter, CVA, HFrEF, CAD, cardiac amyloidosis, admitted with AMS and acute elevation in serum creatinine.    CKD stage 4 with baseline Scr 2.4 attributed to AKIs with CVA and worsening HF.     Assessment & Recommendations:      1. LOIS - Appears to be in recovery. Creat 2.7 x 2 dys (2.9). He is non oliguric. Baseline creat ~ 2.4.   Etiology of LOIS felt to be 2/2 CRS   - IHD initiated 3/29/18 with rise in creat to 4.9.    - Last HD 3/31/18   - Will plan on removing TDC early this coming week.    - Patient happy to not have to have further HD   - Availability of OP HD would be very difficult, if not impossible, if patient is on Dobutamine   - Ongoing HD would not improve patient's Quality of Life given very poor cardiac prognosis and likelihood of transitioning to hospice.    - Continue daily chemistry labs for now   - Avoid hypotension/nephrotoxins     2. Volume status - Hypervolemia with edema, severe HF. Not on supplemental oxygen. UO 1825 cc over last 24 hrs. SBP 80-90/. Weight 68.9 kg on 4/6. Admission weight 75.3 kg.   Not on schedule diuretics   - Continue I/O and daily standing weights     3. Cardiac amyloidosis - EF 15-20%. No further cardiac therapies available other than Dobutamine infusion, currently at 5 mcg. Palliative care has been consulted. Looking at home with hospice vs NH with ongoing Dobutamine.      4. Electrolytes - No acute concerns. K 5.2 this morning. Na 135       5. Acid base - No acute concerns. Bicarb 29     6. BMD - Ca 8.4, albumin 2.5.      7. Anemia - Hgb 10.9.   - No intervention required.      Recommendations were communicated to primary team via progress note     Seen and discussed with Dr. Anastacio Mckinnon, NP   [phone]     Interval History :   Creat is declining slowly  Non oliguric  Interval progress notes, imaging studies and labs reviewed     Review of Systems:   I reviewed  the following systems:  GI: Denies nausea  Neuro: Intermit confused, but calm for me  Constitutional:  no fever or chills  CV: denies dyspnea or CP. Has ongoing edema  : juan present    Physical Exam:   I/O last 3 completed shifts:  In: 1480 [P.O.:1480]  Out: 1125 [Urine:1125]   /73 (BP Location: Left arm)  Pulse 105  Temp 97.8  F (36.6  C) (Oral)  Resp 14  Wt 68.9 kg (151 lb 14.4 oz)  SpO2 99%  BMI 22.43 kg/m2     GENERAL APPEARANCE: comfortable, sitting up in chair  PULM: lungs CTA. Breathing is nonlabored. Not on supplemental oxygen.   CV: Irreg RR       -edema - 2+  GI: soft, NT, Non distended  INTEGUMENT: no cyanosis or rash  NEURO:  Quiet, calm   : juan present w/cindy urine  Access - Right TDC    Labs:   All labs reviewed by me  Electrolytes/Renal -   Recent Labs   Lab Test  04/08/18   0540  04/07/18   1541  04/07/18   0610  04/06/18   0608  04/05/18   2315   03/27/18   0156   02/07/18   1203   08/23/17   1209   NA  135   --   136  137  138   < >   --    < >  136   < >  135   POTASSIUM  5.2  3.5  5.5*  4.9  4.8   < >   --    < >  4.2   < >  3.9   CHLORIDE  97   --   98  99  98   < >   --    < >  101   < >  97   CO2  29   --   33*  29  31   < >   --    < >  29   < >  27   BUN  68*   --   56*  54*  55*   < >   --    < >  53*   < >  53*   CR  2.73*   --   2.77*  2.93*  2.68*   < >   --    < >  2.46*   < >  2.14*   GLC  99   --   87  83  124*   < >   --    < >  70   < >  120*   RIGOBERTO  8.4*   --   8.4*  8.5  8.5   < >   --    < >  9.3   < >  8.9   MAG  2.2   --    --   2.0  2.0   < >   --    --    --    --    --    PHOS   --    --    --    --    --    --   4.4   --   3.8   --   3.6    < > = values in this interval not displayed.       CBC -   Recent Labs   Lab Test  04/08/18   0540  04/07/18   0610  04/06/18   0608   WBC  6.7  6.6  6.7   HGB  10.9*  11.3*  11.6*   PLT  86*  95*  89*       LFTs -   Recent Labs   Lab Test  04/04/18   0608  04/03/18   0650  04/02/18   0717   ALKPHOS  99  106  107    BILITOTAL  1.8*  1.9*  2.1*   ALT  48  51  52   AST  Canceled, Test credited  94*  85*   PROTTOTAL  6.0*  6.3*  6.4*   ALBUMIN  2.5*  2.6*  2.7*       Iron Panel - No lab results found.      Current Medications:    sennosides  2 tablet Oral Daily     polyethylene glycol  17 g Oral Daily     sodium chloride (PF)  10 mL Intracatheter Q7 Days     apixaban ANTICOAGULANT  2.5 mg Oral BID     OLANZapine  2.5 mg Oral At Bedtime     sodium chloride (PF)  10 mL Intracatheter Q7 Days     melatonin tablet 3 mg  3 mg Oral At Bedtime     aspirin  81 mg Oral Daily     atorvastatin  80 mg Oral Daily     sodium chloride (PF)  3 mL Intracatheter Q8H       DOBUTamine 5 mcg/kg/min (04/07/18 1938)     - MEDICATION INSTRUCTIONS -       Stephania Mckinnon, NP

## 2018-04-08 NOTE — PROGRESS NOTES
Calorie Counts    Intake recorded for: 4/7 Kcal: 2346 Protein: 107g    # Meals recorded: 2 meals (First: 100% Lithuanian toast, almodovar, 50% scrambled eggs)      (Second: 75% chicken stir-alfonso w/ brown rice & vegetables, 50% fruit cup)    # Supplements recorded: 100% 3 Nepro, 74% 1 Boost Plus

## 2018-04-08 NOTE — PLAN OF CARE
Problem: Patient Care Overview  Goal: Plan of Care/Patient Progress Review  RN  1. Pt will be free from injury   Discharge Planner PT   Patient plan for discharge: none stated  Current status: issued additional pair of size E tubular compression. See patient care order for staff assist with compression garments. Pt transferred supine>sitting with mod A. Sit<>stand and pivot to chair with min A x 2.   Barriers to return to prior living situation: medical status, Ax2 for mobility, weakness, impaired balance  Recommendations for discharge: TCU  Rationale for recommendations: improve strength, independence with functional transfers       Entered by: Bina Martinez 04/08/2018 3:11 PM

## 2018-04-08 NOTE — PLAN OF CARE
Problem: Patient Care Overview  Goal: Plan of Care/Patient Progress Review  RN  1. Pt will be free from injury  D-Admitted on 03/26/18 from TCU with altered mental status, worsening creatinine, decompensated heart failure and cardiogenic shock. See flow sheets for vs and assessments. Continues to be confused.  I-Dobutamine infusing at 5mcg/kg/min. 1:1 attendant to maintain safe environment. Prn bisacodyl given.  P-Per renal, plan is to remove TDC on 04/09/18. Care conference next week. Continue with current poc. Maintain safe environment.

## 2018-04-08 NOTE — PROGRESS NOTES
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 Alertness markedly improved on inotropes but MS still poor. Awaiting care conference and dispo.     #Cardiac amyloidosis  #HFrEF NYHA III C (EF 15-20%)  RHC on 4/3. PCW 20. RA 17. PA 43/25 (33). RV 43/17. Son CI 1.4. Patient is volume overloaded and in cardiogenic shock. Suspect this is the likely cause of patient's altered mental status, LOIS, and possibly the ventriculomegaly. PICC placed on 4/3 to begin dobutamine infusion.  - Continue dobutamine at 5 mcg/kg/min  - Discontinued hydralazine due to soft BP's  - Diuresis: Holding as UOP improved and making good urine, monitor volume status  - Palliative consulted, appreciate recs re: GOC and d/c plans - will try to arrange care conference next week with family and team members, including Palliative Care, Cardiology team, Dr. Wise, and Care Coordinator.    #Multifactorial Encephalopathy, improving  #Delirium  #Dementia (MoCA 16/30)  #Ventriculomegaly / possible normal pressure hydrocephalus  #h/o right MCA CVA  Likely due to cardiogenic shock. CT scan with enlarged venticles. No mass or acute CVA. Sepsis also possible contributor. Possibly uremia. Low suspicion for SBP. Patient's history concerning for NPH. LP done on 4/2. Attempted large volume (25-30 cc); however, was only able to obtain 0.5 cc.  - Ttreatment by etiology: UTI / PNA (s/p tx with abx), uremia (treating with HD/diuresis), NPH (neurology to workup further as outpatient), baseline dementia / CVA, cardiogenic shock (see above)  - Olanzapine 2.5 mg and melatonin qhs prn for agitation, less risk of QTc  prolongation.  - Will not give trazodone for given risk of QTc prolongation  - Neurology signed off. Recommended follow-up in movement disorder clinic for workup of possible NPH.  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 Started HD 3/29. Urine output increased and Cr stable/improved so likely no further HD and line can be removed. Auto-diuresis without diuretics.  - Monitor UOP, Cr  - Nephrology following, no further HD.    #CAD s/p ATIF to LAD 2015  - Continue home statin  - ASA81 mg daily, avoid BB with cardiac amyloid     #Afib/flutter s/p CTI ablation 2006  Remains in afib. Was on sotalol but this was discontinued around 08/2017 per outside cardiology notes. Discontinued warfarin on 4/3 due to labile INR's. Started on apixaban this admission.  - Apixaban 2.5 mg BID (renally dosed)    #Aspiration PNA, resolved  #Urinary tract infection, resolved  Started on Unasyn on 3/27. Switched to Zosyn on 3/28 per ID recs. Restarted on Unasyn on 3/30 when sensitivities returned on UTI (Klebsiella pneumoniae). Completed course of Unasyn on 4/4.    #Ascites  Paracentesis deferred due to soft BP's and pending RHC. Since RHC shows cardiogenic shock that is likely causing the paracentesis, will treat underlying cause. No paracentesis for now.    FEN: Cardiac diet  PPX: Apixaban  Code Status: FULL  Dispo: Awaiting discharge to home, most likely. Discussing with palliative and family re: home inotropes.    This patient was discussed with Dr. Sullivan who agrees with the assessment and plan.    Tirso Simmons MD PGY2  Internal Medicine  Pager [phone]      Subjective & Interval Hx:    No acute events overnight. Feeling well without complaints this morning. Denies CP or SOB, much more alert. No F/C. Has video monitor sitter who he is happy to be actively engaging in conversation with.    Last 24 hr care team notes reviewed.   ROS: 4 point ROS including Respiratory, CV, GI and , other than that noted in the  HPI, is negative    Physical Exam:  Blood pressure 105/73, pulse 105, temperature 97.8  F (36.6  C), temperature source Oral, resp. rate 14, weight 68.9 kg (151 lb 14.4 oz), SpO2 99 %.     Gen: Elderly male, resting comfortably in bed, NAD  HEENT: NCAT, anicteric  Neck: Supple, JVD to mid-SCM  Pulm: CTAB, no wheezes or crackles in anterior lung fields, normal WOB on RA  CV: Irregularly irregular, tachycardic, S1/S2, no m/r/g  ABD: Soft, ntnd, normal BS  EXT: Trace LE edema, wwp  Skin: No rashes or skin lesions  NEURO: Alert, oriented to self only, no new focal neurological changes  Psych: Appropriate, pleasant    Lines/Tubes: R IJ tunneled catheter, L PICC, L PIV, Fraga    Labs & Studies of Note:   - reviewed all labs in Epic today. S/f:  K 5.5 -> 3.5 -> 5.2, Cr 2.77 -> 2.73    Imaging reviewed. No new.

## 2018-04-09 NOTE — CONSULTS
Patient is on IR schedule 4/12/2018 for a tunneled central venous catheter removal. .   Labs WNL for procedure.    Orders for NPO, scrubs and antibiotics have been entered.  Medications to be held include: patient needs to hold apixaban until his procedure on Thursday 4/12/2018  Consent will be done prior to procedure.     Please contact the IR charge RN at [phone] for estimated time of procedure.     Case discussed with Dr. Peters and Stephania Mckinnon NP.    Nic Ernandez PA-C  Interventional Radiology  Phone: [phone]  Pager: [phone]

## 2018-04-09 NOTE — PLAN OF CARE
Problem: Patient Care Overview  Goal: Plan of Care/Patient Progress Review  RN  1. Pt will be free from injury   Discharge Planner PT   Patient plan for discharge: TCU  Current status: Amb bouts of 10', 12' and 15' with significant physical assist. Improved activity tolerance but fatigues quickly and left foot drag limiting.  Barriers to return to prior living situation: fall risk, deconditioning  Recommendations for discharge: TCU  Rationale for recommendations: Current level of function       Entered by: Mahamed Easton 04/09/2018 10:13 AM

## 2018-04-09 NOTE — PROGRESS NOTES
Nephrology Progress Note  04/09/2018       Josr Landers is a 76 yo M with PMH of afib/aflutter, CVA, HFrEF, CAD, cardiac amyloidosis, admitted with AMS and acute elevation in serum creatinine.    CKD stage 4 with baseline Scr 2.4 attributed to AKIs with CVA and worsening HF.      Assessment & Recommendations:       1. LOIS - Creat slowly declining in setting of inotrope for HF.  Creat 2.6. He is non oliguric. Baseline creat ~ 2.4.   Etiology of LOIS felt to be 2/2 CRS   - IHD initiated 3/29/18 with rise in creat to 4.9.    - Last HD 3/31/18   - I have placed order for removal of TDC. Will likely need to hold Apixaban for 5 dys prior to removal.     - Patient happy to not have to have further HD   - Ongoing HD would not improve patient's Quality of Life given very poor cardiac prognosis and likelihood of transitioning to hospice.    - Could discontinue chemistry labs when patient transfers to hospice      2. Volume status - Hypervolemia with edema, severe HF. Not on supplemental oxygen.  cc over last 24 hrs. SBP /. Weight 68.9 kg on 4/6. Admission weight 75.3 kg.   Not on schedule diuretics   - Continue I/O and daily standing weights      3. Cardiac amyloidosis - EF 15-20%. No further cardiac therapies available other than Dobutamine infusion, currently at 5 mcg. Palliative care has been consulted. Looking at home with hospice vs NH with ongoing Dobutamine.       4. Electrolytes - No acute concerns. K 4.9, Na 134         5. Acid base - No acute concerns. Bicarb 30      6. BMD - Ca 8.4, albumin 2.5.       7. Anemia - Hgb 10.9.   - No intervention required.     8. Disposition - Given that patient will no longer have HD and is transitioning to hospice, Nephrology will sign off. Pls call with questions      Recommendations were communicated to primary team directly      Seen and discussed with Dr. Lorarine Mckinnon, NP   [phone]      Interval History :   Creat is declining slowly  Non  oliguric  Updated daughter at bedside    Interval progress notes, imaging studies and labs reviewed      Review of Systems:   I reviewed the following systems:  GI: Denies nausea  Neuro: Intermit confused  Constitutional:  no fever or chills  CV: denies dyspnea or CP. Has ongoing edema  : juan present    Physical Exam:   I/O last 3 completed shifts:  In: 568 [P.O.:480; I.V.:88]  Out: 1375 [Urine:1375]   BP 94/60  Pulse 105  Temp 97.7  F (36.5  C) (Oral)  Resp 16  Wt 68.9 kg (151 lb 14.4 oz)  SpO2 100%  BMI 22.43 kg/m2     GENERAL APPEARANCE: comfortable  PULM: lungs CTA. Breathing is nonlabored. Not on supplemental oxygen.   CV: Irreg RR       -edema - 2+  GI: soft, NT, Non distended  INTEGUMENT: no cyanosis or rash  NEURO:  Quiet, calm   : juan present w/cindy urine  Access - Right TDC    Labs:   All labs reviewed by me  Electrolytes/Renal -   Recent Labs   Lab Test  04/09/18   0645  04/08/18   0540  04/07/18   1541  04/07/18   0610  04/06/18   0608  04/05/18   2315   03/27/18   0156   02/07/18   1203   08/23/17   1209   NA  134  135   --   136  137  138   < >   --    < >  136   < >  135   POTASSIUM  4.9  5.2  3.5  5.5*  4.9  4.8   < >   --    < >  4.2   < >  3.9   CHLORIDE  96  97   --   98  99  98   < >   --    < >  101   < >  97   CO2  30  29   --   33*  29  31   < >   --    < >  29   < >  27   BUN  69*  68*   --   56*  54*  55*   < >   --    < >  53*   < >  53*   CR  2.67*  2.73*   --   2.77*  2.93*  2.68*   < >   --    < >  2.46*   < >  2.14*   GLC  84  99   --   87  83  124*   < >   --    < >  70   < >  120*   RIGOBERTO  8.4*  8.4*   --   8.4*  8.5  8.5   < >   --    < >  9.3   < >  8.9   MAG   --   2.2   --    --   2.0  2.0   < >   --    --    --    --    --    PHOS   --    --    --    --    --    --    --   4.4   --   3.8   --   3.6    < > = values in this interval not displayed.       CBC -   Recent Labs   Lab Test  04/08/18   0540  04/07/18   0610  04/06/18   0608   WBC  6.7  6.6  6.7   HGB  10.9*   11.3*  11.6*   PLT  86*  95*  89*       LFTs -   Recent Labs   Lab Test  04/04/18   0608  04/03/18   0650  04/02/18   0717   ALKPHOS  99  106  107   BILITOTAL  1.8*  1.9*  2.1*   ALT  48  51  52   AST  Canceled, Test credited  94*  85*   PROTTOTAL  6.0*  6.3*  6.4*   ALBUMIN  2.5*  2.6*  2.7*       Iron Panel - No lab results found.      Current Medications:    sennosides  2 tablet Oral Daily     polyethylene glycol  17 g Oral Daily     sodium chloride (PF)  10 mL Intracatheter Q7 Days     apixaban ANTICOAGULANT  2.5 mg Oral BID     OLANZapine  2.5 mg Oral At Bedtime     sodium chloride (PF)  10 mL Intracatheter Q7 Days     melatonin tablet 3 mg  3 mg Oral At Bedtime     aspirin  81 mg Oral Daily     atorvastatin  80 mg Oral Daily     sodium chloride (PF)  3 mL Intracatheter Q8H       DOBUTamine 5 mcg/kg/min (04/09/18 0914)     - MEDICATION INSTRUCTIONS -       Stephania Mckinnon, NP

## 2018-04-09 NOTE — PROGRESS NOTES
Calorie Counts  Intake recorded for: 4/8  Kcals: 683  Protein: 21g  # Meals Recorded: 2 meals (First - 75% fruit cup, 50% plain omelet, blueberry muffin, orange juice)      (Second - 100% 2 ginger ales, 25% pasta with marinara sauce)  # Supplements Recorded: 50% 1 Nepro Oral Supplement

## 2018-04-09 NOTE — PROGRESS NOTES
I spoke with IR today. Only have to hold Apixaban for 2 dys. Can remove Tunneled line on Thursday as long as the Apixaban is held.   thanks

## 2018-04-09 NOTE — PLAN OF CARE
Problem: Patient Care Overview  Goal: Plan of Care/Patient Progress Review  RN  1. Pt will be free from injury   D/I/A: Pt here w/ AMS, LOIS found to have cardiogenic shock. Video monitor appropriate. Pt up to chair for breakfast and lunch; fair appetite.  Pt seemed to get weaker throughout the day. He is fairly confused but pleasant. A-fib, RA, Ax2 or lift dependent. Dobutamine continues at 5mcg/kg/min. Care conference Tuesday 1pm.  P: Continue to monitor.

## 2018-04-09 NOTE — PROGRESS NOTES
"[facility] Nurse Inpatient Wound Assessment     Initial  Assessment  Reason for consultation: Evaluate and treat coccyx    D: Per Md Notes: 78 y/o male with PMHx significant for cardiac amyloidosis confirmed by biopsy , afib/flutter on warfarin s/p CTI ablation , CAD s/p ATIF to LAD in , CKD stage IV and CVA 2017 right MCA was admitted from TCU 3/26 because of worsening Cr function and altered mental status, now with decompensated heart failure and cardiogenic shock. Alertness markedly improved on inotropes but MS still poor. Awaiting care conference and dispo.    A: Assessed bilateral buttock,  cleft, coccyx, and sacrum. Patient has pink intact fragile scar tissue on coccyx. Patient reports having wounds there in the past. Writer encouraged turning and repositioning, and offloading.    I: Orders written. Supplies: NA    P: Start Coccyx - fragile intact pink scar tissue: 1. Clean intact skin with gentle soap and water, dry and dry again.  2. Paint with No Sting Skin Prep (#005113) and allow to dry thoroughly  3. Press a Mepilex 4x4 to the area, making sure to conform nicely to skin curvatures  4. Time and date dressing change and sierra with a \"P\" for prevention.  5. Reposition pt every 1 to 2 hours when in bed and hourly when up to the chair to relieve pressure and promote perfusion to tissue  NOTE** make sure to continue to assess under the Mepilex Dressing BID and document findings.  If epidermis  opens notify CWOCN's and change dressing to \"T\" for treatment  WO Nurse follow-up plan:sign off  Nursing to notify the Provider(s) and re-consult the [facility] Nurse if wound(s) deteriorates or new skin concern.    Discussed plan of care with Patient and Nurse    Jade SHARPEN, RN, CWOCN     "

## 2018-04-09 NOTE — PLAN OF CARE
Problem: Patient Care Overview  Goal: Plan of Care/Patient Progress Review  RN  1. Pt will be free from injury   Outcome: No Change  Shift summary [id number]    Pt confused and anxious during first 4 hours of shift. Pt c/o constipation and requesting enema. Pt's wife at bedside and also very anxious. Pt given fleets enema with little to no results. MD's called and pink lady enema ordered and given. Pt had large results. Pt incontinent of stool x 2. Pt appeared to become much less anxious after results from enema. Pt continues on dobutamine gtt at 2.5 mcq /kg /min. Pt has been in afib  which is baseline, other VSS. Pt remains  Confused to situation time and place . Pleasant and can be directed. Pt requires assist of 2 or lift pending fatigue. POC family care conference to be arranged this week to determine discharge and further cares. Continue current medications and treatments.     Problem: Confusion, Acute (Adult)  Goal: Identify Related Risk Factors and Signs and Symptoms  Related risk factors and signs and symptoms are identified upon initiation of Human Response Clinical Practice Guideline (CPG).   Outcome: No Change   04/08/18 5631   Confusion, Acute   Related Risk Factors (Acute Confusion) advanced age;malnutrition;metabolic abnormalities;mobility decreased;sleep disturbance   Signs and Symptoms (Acute Confusion) disorientation;memory disturbed

## 2018-04-09 NOTE — PROGRESS NOTES
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 Alertness markedly improved on inotropes but MS still poor. Awaiting care conference and dispo.     #Cardiac amyloidosis  #HFrEF NYHA IV D (EF 15-20%)  RHC on 4/3. PCW 20. RA 17. PA 43/25 (33). RV 43/17. Son CI 1.4. Patient is volume overloaded and in cardiogenic shock. Suspect this is the likely cause of patient's altered mental status, LOIS, and possibly the ventriculomegaly. PICC placed on 4/3 to begin dobutamine infusion. Patient's condition has progressed to NYHA IV D.  - Continue dobutamine at 5 mcg/kg/min  - Discontinued hydralazine due to soft BP's  - Diuresis: Holding as UOP improved and making good urine, monitor volume status  - Palliative consulted, appreciate recs re: GOC and d/c plans - will try to arrange care conference early this week with family and team members, including Palliative Care, Cardiology team, Dr. Wise, and Care Coordinator. Discussed with patient and family that patient likely has weeks to live.    #Multifactorial Encephalopathy, improving  #Delirium  #Dementia (MoCA 16/30)  #Ventriculomegaly / possible normal pressure hydrocephalus  #h/o right MCA CVA  Likely due to cardiogenic shock. CT scan with enlarged venticles. No mass or acute CVA. Sepsis also possible contributor. Possibly uremia. Low suspicion for SBP. Patient's history concerning for NPH. LP done on 4/2. Attempted large volume (25-30 cc); however, was only able to obtain 0.5 cc.  - Ttreatment by etiology: UTI / PNA (s/p tx with abx), uremia (treating with HD/diuresis), NPH (neurology to workup further as outpatient), baseline  dementia / CVA, cardiogenic shock (see above)  - Olanzapine 2.5 mg and melatonin qhs prn for agitation, less risk of QTc prolongation.  - Will not give trazodone for given risk of QTc prolongation  - Neurology signed off. Recommended follow-up in movement disorder clinic for workup of possible NPH.  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 Started HD 3/29. Urine output increased and Cr stable/improved so likely no further HD and line can be removed. Auto-diuresis without diuretics.  - Monitor UOP, Cr  - Nephrology following, no further HD. Will remove dialysis catheter early this week.    #CAD s/p ATIF to LAD 2015  - Continue home statin  - ASA81 mg daily, avoid BB with cardiac amyloid     #Afib/flutter s/p CTI ablation 2006  Remains in afib. Was on sotalol but this was discontinued around 08/2017 per outside cardiology notes. Discontinued warfarin on 4/3 due to labile INR's. Started on apixaban this admission.  - HOLDING anticoagulation for IR procedure. Had been on apixaban 2.5 mg BID (renally dosed)    #Aspiration PNA, resolved  #Urinary tract infection, resolved  Started on Unasyn on 3/27. Switched to Zosyn on 3/28 per ID recs. Restarted on Unasyn on 3/30 when sensitivities returned on UTI (Klebsiella pneumoniae). Completed course of Unasyn on 4/4.    #Ascites  Since RHC shows cardiogenic shock that is likely causing the paracentesis, will treat underlying cause. No paracentesis for now.    FEN: Cardiac diet  PPX: Apixaban held for IR procedure on Thursday  Code Status: FULL  Dispo: Awaiting discharge to care facility, most likely. Discussing with palliative and family.    This patient was discussed with Dr. Marcos who agrees with the assessment and plan.    Jamie Parkinson, GIGIS  PGY-1  Pager [phone]    Subjective & Interval Hx:    No acute events overnight.    Patient says he feels good this morning. More alert. Denies chest pain, shortness of breath, abdominal pain, nausea.    Last 24 hr care  team notes reviewed.   ROS: 4 point ROS including Respiratory, CV, GI and , other than that noted in the HPI, is negative    Physical Exam:  Blood pressure 98/69, pulse 105, temperature 97.7  F (36.5  C), temperature source Oral, resp. rate 16, weight 68.9 kg (151 lb 14.4 oz), SpO2 96 %.     Gen: Elderly male, resting comfortably in bed, NAD  HEENT: NCAT, anicteric  Neck: Supple, JVD to mid-SCM  Pulm: CTAB, no wheezes or crackles in anterior lung fields, normal WOB on RA  CV: Irregularly irregular, tachycardic, S1/S2, no m/r/g  ABD: Soft, ntnd, normal BS  EXT: Trace LE edema, wwp  Skin: No rashes or skin lesions  NEURO: Alert, oriented to self only, no new focal neurological changes  Psych: Appropriate, pleasant    Lines/Tubes: R IJ tunneled catheter, L PICC, L PIV, Fraga    Labs & Studies of Note:   - reviewed all labs in Epic today.      Staff Physician Comments:     Encounter diagnosis:  1. Altered mental status, unspecified altered mental status type    2. Acute kidney injury (H)    3. RBBB      I personally interviewed and examined Josr Landers today, and agree with residents assessment and plan as documented above.  Improved renal status and mental status. Ambulated briefly today.   On visit he still has difficulty staying awake and following conversation. In attempt to consider home care offered trial off dobutamine but family was scarred about consequences.     Harpreet Marcos MD     Division of Cardiology  [facility] [address] [address]    Clinic nurse:  Светлана Gonzalez LPN   Nurse Care Coordinator- Heart Care   [phone] option 1, than option 3    [phone] Appointments  [phone] Fax  [phone] After hours    Imaging reviewed. No new.

## 2018-04-09 NOTE — PLAN OF CARE
"Problem: Patient Care Overview  Goal: Plan of Care/Patient Progress Review  Outcome: No Change    D: Pt admitted 3/26 from TCU with AMS and elevated creat.  I/A: Pt is alert and oriented to self only overnight. Pt has VPM set up in room. This is needed as pt pulls at lines occasionally. Bed alarm on for pt safety. Pt mood is labile. Pt calls out frequently overnight \"Hello\" loudly and needs reassurance of situation. RN provided emotional support. Pt responds well. Pt up intermittently overnight. Fraga catheter still in place. Adequate UOP, Cherry colored urine at start of shift (team was aware on days), now more peach/cindy. RN assisted with turns/positioning. Tylenol given overnight to address reports of generalized discomfort. Tunnel catheter placed for dialysis, Pt has double lumen PICC with Dobutamine gtt running at 5 mcg/kg/min (11.4 mL/hr). R PIV, SL. Heart rhythm: A fib/ A flutter rates in 90's-100's. Pt had One very large BM and another small one after. Stool is soft and brown. Pt reports after,  \"I feel 180% better.\" Pt has small pink spot spot on coccyx. RN cleaned and covered with Mepilex. RN will request WOC consult.   P: TDC out Monday per Nephrology note. RN will continue to monitor and assess. RN will update team with any changes/concerns. Barbie Hernandez      "

## 2018-04-10 NOTE — PLAN OF CARE
Problem: Patient Care Overview  Goal: Plan of Care/Patient Progress Review  Outcome: No Change    D: Pt admitted 3/26 from TCU with AMS and elevated creat.  I/A: Dobutamine gtt at 5mcg/kg/min. Fraga patent with good UOP (pink tinged). Pt continues with confusion, focused on a presentation he believes he is giving today. Good appetite- Ate some snacks. Pt did rest for some of the night.  Assisted with frequent turns/positioning. VPM and Bed alarm for pt safety.   P: Care conference planned for 1300. Continue to monitor and assess pt condition and contact treatment team with questions or concerns.

## 2018-04-10 NOTE — PROGRESS NOTES
Calorie Counts  Intake recorded for: 4/9  Kcals: 1338  Protein: 40g  # Meals Recorded: 3 meals (First - 100% sausage patrick, pancake w/ syrup, orange juice, 50% eggs)      (Second - 100% 1.5 slices of toast w/ butter & jelly, 50% sweet & sour chicken stir alfonso)       (Third - 100% popsicle, 25% baked cod w/ elton sauce & lemon, broccoli, corn)  # Supplements Recorded: 0

## 2018-04-10 NOTE — PROGRESS NOTES
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 Alertness markedly improved on inotropes but MS still poor.    Care conference today. Discussed prognosis and discharge options with patient's wife and daughter. They will think about their options. Social work will get information to them about home care and facility options. Family is not ready for hospice yet.     #Cardiac amyloidosis  #HFrEF NYHA IV D (EF 15-20%)  RHC on 4/3. PCW 20. RA 17. PA 43/25 (33). RV 43/17. Son CI 1.4. Patient is volume overloaded and in cardiogenic shock. Suspect this is the likely cause of patient's altered mental status, LOIS, and possibly the ventriculomegaly. PICC placed on 4/3 to begin dobutamine infusion. Patient's condition has progressed to NYHA IV D.  - Continue dobutamine at 5 mcg/kg/min  - Discontinued hydralazine due to soft BP's  - Diuresis: Holding as UOP improved, monitor volume status  - Palliative consulted, appreciate recs re: GOC and d/c plans    #Multifactorial Encephalopathy, improving  #Delirium  #Dementia (MoCA 16/30)  #Ventriculomegaly / possible normal pressure hydrocephalus  #h/o right MCA CVA  Likely due to cardiogenic shock. CT scan with enlarged venticles. No mass or acute CVA. Sepsis also possible contributor. Possibly uremia. Low suspicion for SBP. Patient's history concerning for NPH. LP done on 4/2. Attempted large volume (25-30 cc); however, was only able to obtain 0.5 cc.  - Ttreatment by etiology: UTI / PNA (s/p tx with abx), uremia (treating with HD/diuresis), NPH (neurology to workup further as outpatient), baseline dementia / CVA, cardiogenic shock (see  above)  - Olanzapine 2.5 mg and melatonin qhs prn for agitation, less risk of QTc prolongation.  - Will not give trazodone for given risk of QTc prolongation  - Neurology signed off. Recommended follow-up in movement disorder clinic for workup of possible NPH.  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 Started HD 3/29. Urine output increased and Cr stable/improved so likely no further HD and line can be removed. Auto-diuresis without diuretics.  - Monitor UOP, Cr  - Nephrology following, no further HD. Will remove dialysis catheter early this week.    #CAD s/p ATIF to LAD 2015  - Continue home statin  - ASA81 mg daily, avoid BB with cardiac amyloid     #Afib/flutter s/p CTI ablation 2006  Remains in afib. Was on sotalol but this was discontinued around 08/2017 per outside cardiology notes. Discontinued warfarin on 4/3 due to labile INR's. Started on apixaban this admission.  - HOLDING anticoagulation for IR procedure on Thursday. Had been on apixaban 2.5 mg BID (renally dosed)    #Aspiration PNA, resolved  #Urinary tract infection, resolved  Started on Unasyn on 3/27. Switched to Zosyn on 3/28 per ID recs. Restarted on Unasyn on 3/30 when sensitivities returned on UTI (Klebsiella pneumoniae). Completed course of Unasyn on 4/4.    #Ascites  Since RHC shows cardiogenic shock that is likely causing the paracentesis, will treat underlying cause. No paracentesis for now.    FEN: Cardiac diet  PPX: Apixaban held for IR procedure on Thursday  Code Status: FULL  Dispo: Awaiting discharge to care facility, most likely. Discussing with palliative and family.    This patient was discussed with Dr. Marcos who agrees with the assessment and plan.    Jamie Parkinson, GIGIS  PGY-1  Pager [phone]    Subjective & Interval Hx:    No acute events overnight.    Patient has no complaints this morning. Denies chest pain, shortness of breath, abdominal pain, nausea.    Last 24 hr care team notes reviewed.   ROS: 4 point ROS  including Respiratory, CV, GI and , other than that noted in the HPI, is negative    Physical Exam:  Blood pressure 97/65, pulse 105, temperature 97.5  F (36.4  C), temperature source Oral, resp. rate 18, weight 69.3 kg (152 lb 12.5 oz), SpO2 100 %.     Gen: Elderly male, resting comfortably in bed, NAD  HEENT: NCAT, anicteric  Neck: Supple, JVD to mid-SCM  Pulm: CTAB, no wheezes or crackles in anterior lung fields, normal WOB on RA  CV: Irregularly irregular, tachycardic, S1/S2, no m/r/g  ABD: Soft, ntnd, normal BS  EXT: Trace LE edema, wrapped  Skin: No rashes or skin lesions  NEURO: Alert, oriented to self only, no new focal neurological changes  Psych: Appropriate, pleasant    Lines/Tubes: R IJ tunneled catheter, L PICC, L PIV, Fraga    Labs & Studies of Note:   - reviewed all labs in Epic today.    Imaging reviewed. No new.    Staff Physician Comments:     Encounter diagnosis:  1. Altered mental status, unspecified altered mental status type    2. Acute kidney injury (H)    3. RBBB        I personally interviewed and examined Josr Landers today, and agree with residents assessment and plan as documented above.  Improved renal status. Ambulated briefly today with help. During visit he still has difficulty staying awake and following conversation. Progressive improvement with dobutamine infusion has stalled and he is not a candidate for stopping dobutamine. Family conference with palliative care today and discussion of transition to another facility as wife is physically unable to take care of him with his current state of weakness. 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     Harpreet Marcos MD     Division of Cardiology  [facility] [address] [address]    Clinic nurse:  Светлана Gonzalez LPN   Nurse Care Coordinator- Heart Care   [phone]  option 1, than option 3    [phone] Appointments  [phone] Fax  [phone] After hours

## 2018-04-10 NOTE — PROGRESS NOTES
CLINICAL NUTRITION SERVICES     Nutrition Prescription    RECOMMENDATIONS FOR MDs/PROVIDERS TO ORDER:  Monitor pt's POC. Consider reordering kcal counts if would consider TFs in the future.    Future/Additional Recommendations:  For all recs, see prior nutrition notes.     Kcal counts:  4/7   2346 kcals and 107 g protein (two meal/s and 100% three Nepro oral supplements and 74% of one Boost Plus supplement/s recorded)  4/8     683 kcals and 21 g protein (two meal/s and 50% of one Nepro oral supplement/s recorded)  4/9   1338 kcals and 40 g protein (three meal/s and no supplement/s recorded)  * Pt consumed a three-day average of 1456 kcals and 56 g protein daily. This does not meet estimated needs of 9651-6997 kcals/day (30-35 kcals/kg) and  grams protein/day (1.2 - 1.8 grams of pro/kg). Previous nutrition recs to consider TFs if consuming less than 1480 kcals and 59 g protein daily on average and kcal counts are at this threshold. However, kcal counts were especially affected by data on 4/8.     Follow up/Monitoring:  Will continue to follow pt.    Shereen Toro, MS, RD, LD, [facility]   6C Pgr:  [phone]

## 2018-04-10 NOTE — PROGRESS NOTES
"[facility], [place]   Palliative Care Daily Progress Note          Recommendations, Patient/Family Counseling & Coordination     Participated in CC today with primary team, CC, pt's wife and family.  Discussed possibility of hospice with  Hospice Medical Director Dr Jonathan Zayas, who felt that hospice enrollment, with dobutamine via CADD pump for some limited amount of time, could be possible.  Family doesn't think they can care for him at home.  Although some prognostic estimates from Cards range from limited days if inotrope was dc'd to extended weeks/limited months with inotrope, I'm not sure his wife and daughter accept/grasp that his time can be this short.  We outlined what different \"pathways of care\" could look like, including hospice at home or in facility, or LTC/TCU placement based on eval of rehab potential.    At this time, wife/daughter are overwhelmed, and want to sort through what the options are before proceeding to any decisions.     We did not address code status today, as wife/daughter were too overwhelmed at this point.  Will continue to address.  They have great confidence in Cards team, and it may be most beneficial for Cards to discuss directly with pt/family.  We'll assist in any way we can.      Rober Allison MD  Palliative Medicine Consult Team  Pager: [phone]   TT: 44 minutes, with > 50% spent in C/C/E patient/family/care teams re: GOC, POC, Sx management. [id number]        Assessment      1) Diagnoses & symptoms:        Cardiac Amyloidosis  A fib /flutter ( on warfarin)  Stenting of CAD 2015  CKD Stage IV  CVA 2/2017 R MCA  AMS     2)  Psychosocial/Spiritual Needs:   Oriental orthodox rebekah is foundation of pt/family coping              Interval History:   CC today, family feels they can't take him home, frustrated with limited dispo options, feel hospice is \"giving up\"            Review of Systems:   Comfortable, denies pain, NVD          Medications:   I have " reviewed this patient's medication profile and medications given in past 24 hours.  Dopamine infusion      Blood pressure 94/67, pulse 105, temperature 97.5  F (36.4  C), temperature source Oral, resp. rate 16, weight 69.3 kg (152 lb 12.5 oz), SpO2 96 %.  GEN: Awake, engages, appears comfortable, exchanges greetings, pleasant  CV:            Data Reviewed:   ROUTINE ICU LABS (Last four results)  CMP  Recent Labs  Lab 04/11/18  0535 04/10/18  0528 04/09/18  0645 04/08/18  0540  04/06/18  0608 04/05/18  2315    136 134 135  < > 137 138   POTASSIUM 4.5 4.6 4.9 5.2  < > 4.9 4.8   CHLORIDE 98 98 96 97  < > 99 98   CO2 30 29 30 29  < > 29 31   ANIONGAP 8 9 8 9  < > 10 9   GLC 83 142* 84 99  < > 83 124*   BUN 66* 68* 69* 68*  < > 54* 55*   CR 2.25* 2.35* 2.67* 2.73*  < > 2.93* 2.68*   GFRESTIMATED 29* 27* 23* 23*  < > 21* 23*   GFRESTBLACK 35* 33* 28* 28*  < > 25* 28*   RIGOBERTO 8.3* 8.6 8.4* 8.4*  < > 8.5 8.5   MAG  --   --   --  2.2  --  2.0 2.0   < > = values in this interval not displayed.  CBC  Recent Labs  Lab 04/08/18  0540 04/07/18  0610 04/06/18  0608   WBC 6.7 6.6 6.7   RBC 3.38* 3.50* 3.52*   HGB 10.9* 11.3* 11.6*   HCT 33.7* 35.1* 34.0*    100 97   MCH 32.2 32.3 33.0   MCHC 32.3 32.2 34.1   RDW 16.6* 16.7* 16.5*   PLT 86* 95* 89*     INRNo lab results found in last 7 days.  Arterial Blood Gas  Recent Labs  Lab 04/05/18  1000 04/04/18  1315   O2PER 21.0 21%

## 2018-04-10 NOTE — CARE CONFERENCE
FAMILY/CARE CONFERENCE:  From 1 PM to 2 PM.     Those in attendance: Gurpreet Landers (Patients's wife), Deandra Fish (Patient's daughter), Dr. Pierre Baltazar (Cardiology), Dr. Vick Wise (Cardiology), Dr. Rocio Gómez (Cards 1 Attending), Dr. Jamie Parkinson (Cards 1 Resident), Dr. Jennifer Whitaker (Cards 1 Fellow), Dr. Rober Allison (Palliative Care), Vandana Ferguson RN CC (Unit 6C). Patient deemed to be currently too confused to attend.         Dr. Wise addressed the conference and gave an update of pt's current medical condition on Dobutamine drip. Dr. Baltazar explained that mechanical support was not an option due to pt's progression of cardiac amyloid. Dr. Baltazar explained that pt's cognitive decline is due to pt's advanced heart failure. The Dobutamine drip has helped pt out of renal failure but at some point pt may no longer receive any benefit from it. Dr. Baltazar explained in detail about pt's cardiac amyloid diagnosis. Dr. Baltazar said that the Dobutamine drip is necessary for pt's current on-going care but pt's quality of life is also important.       Withdrawal of care discussed. Dr. Allison explained the option of hospice. Pt and pt's daughter said that they are not open to hospice at this time. This writer explained Palliative Care Home Care option from [facility] and home Dobutamine drip with [facility]. Pt's wife said that she does not feel that she can care for pt at home and wants pt to go to a SNF but she also said that she was told by pt's insurance company that they would provide 35 hours/week of HHA services. Pt's wife said that she would need more help than that and that the bedrooms at their home are all on the upper level. This writer mentioned that a hospital bed could be set up on the main level, a bedside commode could be ordered, and additional HHA services could be provide but those additional HHA services would need to be paid for out-of-pocket.       Pt's wife inquired about pt going to a SNF.  This writer said that social work (not available for this care conference) said if pt does not have a rehab potential then long term care is needed and not covered by insurance. Then this writer said that a  will inquire about which SNF's take Dobutamine drips and make a referral to check for insurance coverage. This writer added that options for discharge planning were being made for consideration by pt's family. Pt's wife and daughter said that they would like a referral made to SNF first and then may consider home care if SNF not an option.   PLAN:   1. Social Work to make referral to SNF.   2.  [facility] Liaison and [facility] Liaison to meet with pt's family to discuss possible option of care at home.

## 2018-04-10 NOTE — PLAN OF CARE
Problem: Patient Care Overview  Goal: Plan of Care/Patient Progress Review  RN  1. Pt will be free from injury   Outcome: No Change  D: Admitted from TCU 3/26 for worsening Cr, and altered mental status. RHC 4/3 showed cardiogenic shock, decomp. HF. LOIS- requiring HD.    I: Monitored vitals and assessed pt status.   Running: Dobutamine gtt @ 5mcg/kg/min (11.4ml/hr) DL PICC- pcu collect  PRN: tylenol (given x1)    A: Disoriented to place, time, situation- orientation appears to wax and wane, and worsens in the evening. Remains on VPM. VSS. Afebrile. BP soft this evening 70s/60s- upon recheck 88/66 (map 75)- no c/o dizziness. Afib controlled- 12 beat run of VT @ 1637, and 7 beat run of VT @ 1728. Pt asymptomatic- sx notified via text page. Sating 90s on RA- noted a cough this evening, productive, pt swallows sputum. Pt up to the chair this evening with lift assist. Ate only bites of dinner. Prefers to have large pills broken in half. Coccyx covered with mepilex- skin intact, preventative. See WOC note for more details on care. Continuing to T/R q2hr- (last turned @ 2115). Fraga in place- adequate urine output. No BM today, last BM 4/8 after enemas. Pt frustrated this evening as he continued to want to speak to the MD concerning financial matters- per pts wife this is resolved. Pt did start to call out for help multiple times this evening- pt believes he has a presentation to present tomorrow, and is concerned about it. Continuing to give reassurance. Per pts wife she thinks pt appears worse today than previous days. Pt anxious/confused, but cooperative.     P: Care conference tomorrow @ 1pm. Continue on dobutamine gtt. Continue to monitor Pt status and report changes to treatment team.    Temp:  [97.6  F (36.4  C)-98  F (36.7  C)] 98  F (36.7  C)  Heart Rate:  [82-96] 82  Resp:  [16] 16  BP: ()/(56-75) 111/75  Cuff Mean (mmHg):  [74] 74  SpO2:  [96 %-100 %] 99 %

## 2018-04-10 NOTE — PROGRESS NOTES
D: Altered mental status, Afib     I: Monitored vitals and assessed pt status.     Running: Dobutamine gtt @ 5mcg/kg/min (11.4ml/hr)   PRN: tylenol given x1     A: Disoriented throughout the day to place, time, situation. Per pts wife she thinks pt appears worse today than previous days. Pt anxious/agitated, but cooperative. Remains on VPM. VSS. Afebrile.  Afib controlled- 28 beat run of VT @ 0908, and 11 beat run of VT @ 0930. Pt asymptomatic.  Pt up to the chair with lift assist.  Fraga in place- adequate urine output.      P: Care conference today @ 1pm. Continue on dobutamine gtt. Continue to monitor Pt status and report changes to treatment team.

## 2018-04-10 NOTE — PROGRESS NOTES
"Social Work Services Progress Note    Hospital Day: 16  Date of Initial Social Work Evaluation:  3/29/18  Collaborated with:  Vandana Ferguson (6A RN Care Coordinator)    Data:  Per Vandana Ferguson (6A RN Care Coordinator), pt's family would like pt placed in a SNF (rehab placement) that will accept pt on Dobutamine.    Intervention:  SW phoned Admissions at [facility] ([place]) and they do not accept pt's on Dobutamine.  SW phoned Admissions ([place]) at [facility] and they no longer accept pt's on Dobutamine.  SW phoned Admissions at [facility] ([facility]).  Per Sonya, they consider pt's on Dobutamine on a \"case by case basis.\"  SW referred pt and faxed assessment materials via [employer].    Assessment: Rehab placement in a community SNF is recommended.    Plan:    Anticipated Disposition:  Rehab placement in a community SNF    Barriers to d/c plan:  Securing of SNF that will accept pt on Dobutamine, medical readiness    Follow Up:  SW will continue to follow for discharge planning.    ANGUS Dangelo  Social Work, 6A  Phone:  [phone]  Pager:  [phone]  4/10/2018        "

## 2018-04-11 PROBLEM — E44.1 MILD MALNUTRITION (H): Status: ACTIVE | Noted: 2018-01-01

## 2018-04-11 PROBLEM — I95.9 HYPOTENSION: Status: ACTIVE | Noted: 2017-04-17

## 2018-04-11 NOTE — PLAN OF CARE
Problem: Patient Care Overview  Goal: Plan of Care/Patient Progress Review  RN  1. Pt will be free from injury   Outcome: No Change    D: Altered mental status. Hx A-fib, CKD, CAD, and CVA      I: Monitored vitals and assessed pt status.       A: Disoriented to place, time, situation. Pt anxious/agitated earlier, but now cooperative. VPM off, sitter at bedside. VSS. Afebrile. Room air. Afib controlled, Pt asymptomatic. Pt resting in bed. Fraga in place- adequate urine output. PRN Tylenol given x 1 for left foot pain with relief. Dobutamine gtt @ 5mcg/kg/min in Left arm PICC.       P:  Continue to monitor Pt status. Update Cards1 with pertinent changes.

## 2018-04-11 NOTE — PLAN OF CARE
Problem: Patient Care Overview  Goal: Plan of Care/Patient Progress Review  RN  1. Pt will be free from injury   Outcome: No Change  BP 93/74 (BP Location: Right arm)  Pulse 105  Temp 97.5  F (36.4  C) (Oral)  Resp 16  Wt 69.3 kg (152 lb 12.5 oz)  SpO2 99%  BMI 22.56 kg/m2    Oriented to self only. Anxious and restless throughout the night. Pleasantly confused. Sitter remains at bedside. Soft BPs. AF with controlled rates. All other VSS on RA. Denies being lightheaded or dizzy. No c/o SOB or CP. Tylenol given x2 for pain in L foot. Lidocaine cream also applied with relief. Lac-hydrin lotion applied to back for c/o itchiness. Mepilex in place on coccyx. Fraga patent with 300 mL cindy colored UOP. Dobutamine gtt continues at 5 mcg/kg/min through DL PICC. Pt slept on and off throughout the night. Will continue to monitor and follow POC.

## 2018-04-11 NOTE — PROGRESS NOTES
Social Work Services Progress Note    Hospital Day: 16  Date of Initial Social Work Evaluation:  3/29/18  Collaborated with: SNF admissions, will update family    Data:  Pt is a 77 year old male being followed by SW for placement to TCU with dobutamine.  Pt has been accepted to [facility] for TCU.    Intervention:  SW called [facility] admissions to discuss family's options with placement.  SW has been concerned ongoing that the spouse has reported very little income and cost containment has been very important to the family.  Spouse does not feel she can apply for MA and does not want to be at the poverty line herself.      Per admissions, pt will have a $125 per day copay starting day 21 (pt would be entering TCU on day 15 of his benefit cycle).  SW asked if the pt does not make progress with TCU what are his options for care.  Per admissions, pt could possibly be moved to LTC (she will need to confirm that LTC has been trained for dobutamine).  If in LTC, his private pay rate could be $150-$600 dollars per day.  JOAQUÍN asked where pt would likely fall in this spectrum due to his speciality and high care needs.  Pt will likely fall closer to the $600 per day which is significantly higher than his TCU copay.  SW is concerned that the LTC cost per day at baseline is higher than the copay.    ** of note - Hospice DOES NOT cover dobutamine, nor does hospice cover the daily room and board charges noted above.  Family will need to be made aware of this if they are wanting to pursue hospice once at [facility].    Assessment: Did not meet with family this encounter note.  Plan to do this in the afternoon.    Plan:    Anticipated Disposition: TCU    Barriers to d/c plan:  Cost of TCU/LTC.    Follow Up:  SW to follow for discharge planning needs.    JERRI Valdovinos, APSW  6C Unit   Phone: [phone]  Pager: [phone]  Unit: [phone]

## 2018-04-11 NOTE — PROGRESS NOTES
CLINICAL NUTRITION SERVICES - REASSESSMENT NOTE     Nutrition Prescription    RECOMMENDATIONS FOR MDs/PROVIDERS TO ORDER:  1. Monitor pt's POC. Consider reordering kcal counts if would consider TFs in the future.  2. Rec thiamine (100 mg/day) if pt has NYHA Class III-IV heart failure, if appropriate. Order a multivitamin with minerals.    Malnutrition Status:    Non-severe malnutrition in the context of chronic illness    Recommendations already ordered by Registered Dietitian (RD):  Room service appropriate with assist    Future/Additional Recommendations:  1. Consider liberalizing diet order to low-sodium, if able.  2. Fluid restriction if warranted.   3. Monitor K+ and phos trends and potential need for diet restrictions.    4. Monitor BG control. Hgb A1c of 6.4 on 11/13/17.   5. Continue scheduled bowel regimen. Monitor GI status with h/o constipation.  6. Consider checking folic acid.   7. If TFs should be needed or if consistent with POC, then refer to nutrition note 4/4.      EVALUATION OF THE PROGRESS TOWARD GOALS   Diet: Cardiac diet since 4/4. Ordered to receive Nepro between meals (berry and butter pecan are currently ordered).   Intake: Good diet tolerance. Flowsheets indicate pt consuming 100% of meals with a good appetite 4/5, 75% of meals with a fair appetite 4/6, 50-75% of meals with a good appetite 4/7, 25-75% of meals with a good appetite 4/8, 25-50% of meals with a poor to good appetite 4/9, and 75% of meals with a good appetite 4/10-4/11. Per chart, pt with some confusion. Pt's family at bedside. Likes the Nepro oral supplements and has tried berry and vanilla (likes berry slightly more than vanilla). Pt's family has been ordering lunch and supper for pt. RN likely ordering breakfast for pt. Seemed interested in food and eating during nutrition visit today.       Kcal counts:   4/7   2346 kcals and 107 g protein (two meal/s and 100% three Nepro oral supplements and 74% of one Boost Plus  "supplement/s recorded)        683 kcals and 21 g protein (two meal/s and 50% of one Nepro oral supplement/s recorded)      1338 kcals and 40 g protein (three meal/s and no supplement/s recorded)   * Pt consumed a three-day average of 1456 kcals and 56 g protein daily. This does not meet estimated needs below.      NEW FINDINGS   Nutrition hx: Per pt's wife, has cooked healthy food options for many years. As a result, pt has been a healthy eater.    [facility] nurse : \"Evaluate and treat coccyx. Assessed bilateral buttock, tammy cleft, coccyx, and sacrum. Patient has pink intact fragile scar tissue on coccyx. Patient reports having wounds there in the past. Writer encouraged turning and repositioning, and offloading.\" [facility] nurse signed off.     ASSESSED NUTRITION NEEDS (updated as pt not currently on dialysis)  Dosing Weight: 69 kg (actual, based on lowest wt this admission of 68.9 kg on )  Estimated Energy Needs: 5097-0001 kcals/day (30-35 kcals/kg)  Justification: Increased needs with stress factors  Estimated Protein Needs: 76-97 grams protein/day (1.1-1.4 grams of pro/kg)  Justification: Increased needs with cardiac status  Estimated Fluid Needs: 1638-7439 mL/day (25 - 30 mL/kg)   Justification: Maintenance needs or per team, pending fluid status    MALNUTRITION  % Intake: < 75% for > 7 days (non-severe)  % Weight Loss: Weight loss does not meet criteria. Difficult to assess with diuresis  Subcutaneous Fat Loss: Facial region:  Mild, Arms: Mild  Muscle Loss: Temporal:  Mild and Thoracic region (clavicle, acromium bone, deltoid, trapezius, pectoral):  Moderate  Fluid Accumulation/Edema: Does not meet criteria  Malnutrition Diagnosis: Non-severe malnutrition in the context of chronic illness    Previous Goals   Patient to consume % of nutritionally adequate meal trays TID, or the equivalent with supplements/snacks.  Evaluation: Not met    Previous Nutrition Diagnosis  Inadequate oral intake related to " altered mentation, lack of appetite, and NPO at times as evidenced by pt consuming 25-50% of meals at times.     Evaluation: Unresolved.    CURRENT NUTRITION DIAGNOSIS  Inadequate oral intake related to altered mentation, lack of appetite intermittently, and NPO at times as evidenced by pt consuming 25-50% of meals at times.       INTERVENTIONS  Implementation  Medical food supplement therapy: Discussed oral supplements. Encouraged intake of these.   Entered room service appropriate with assist order as per RN request    Goals  Patient to consume % of nutritionally adequate meal trays TID, or the equivalent with supplements/snacks.    Monitoring/Evaluation  Progress toward goals will be monitored and evaluated per protocol.     Nutrition will continue to follow.      Shereen Toro, MS, RD, LD, [facility]   6C Pgr: [phone]

## 2018-04-11 NOTE — PROGRESS NOTES
Social Work Services Progress Note    Hospital Day: 16  Date of Initial Social Work Evaluation:  3/29/18  Collaborated with: Pt spouse Nu and daughter Deandra    Data:  Pt is a 77 year old male being followed by SW for placement to TCU with dobutamine.  Pt has been accepted to [facility] for TCU.    Intervention:  SW met with pt and daughter to discuss costs and logistics of discharge planning.  Per daughter, the family has been assessed for Medicaid and the family would have a $1400.00 per month copay/spenddown to be eligible for Medicaid.  Daughter also reporting that in 6 months, the pt's long term care insurance will be active and gives spouse coverage of $9,000 per month.  This would not cover the full cost of LTC if the pt is at the maximum cost level in LTC.  Family plans to meet with their elder law  today to ask about asset management so that the spouse will not need to be living at poverty line for pt to be eligible for Medicaid.  Daughter and spouse will also discuss if they are preferring a discharge to home or LTC after this hospitalization.    Assessment: Spouse visibly overwhelmed by information and costs of care.  Daughter very engaged and supportive of spouse and has access to resources to support family decision making.    Plan:    Anticipated Disposition: TCU vs LTC    Barriers to d/c plan:  Cost of TCU/LTC, sitter/VPM    Follow Up:  SW to follow for discharge planning needs.    JERRI Valdovinos, APSW  6C Unit   Phone: [phone]  Pager: [phone]  Unit: [phone]

## 2018-04-11 NOTE — PROGRESS NOTES
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 Alertness markedly improved on inotropes but MS still poor.    Ongoing discussions with family, palliative, and social work regarding goals of care and discharge plan.     #Cardiac amyloidosis  #HFrEF NYHA IV D (EF 15-20%)  RHC on 4/3. PCW 20. RA 17. PA 43/25 (33). RV 43/17. Son CI 1.4. Patient is volume overloaded and in cardiogenic shock. Suspect this is the likely cause of patient's altered mental status, LOIS, and possibly the ventriculomegaly. PICC placed on 4/3 to begin dobutamine infusion. His mental status and renal function have improved with the dobutamine infusion, improving his quality of life. His prognosis remains very poor.  - Continue dobutamine at 5 mcg/kg/min  - Discontinued hydralazine due to soft BP's  - Diuresis: Holding as UOP improved, monitor volume status  - Palliative consulted, appreciate recs re: GOC and d/c plans    #Multifactorial Encephalopathy, improving  #Delirium  #Dementia (MoCA 16/30)  #Ventriculomegaly / possible normal pressure hydrocephalus  #h/o right MCA CVA  Likely due to cardiogenic shock, although also had extensive workup for other causes. CT scan with enlarged venticles. No mass or acute CVA. Sepsis also possible contributor. Possibly uremia. Low suspicion for SBP. Patient's history concerning for NPH. LP done on 4/2. Attempted large volume (25-30 cc); however, was only able to obtain 0.5 cc.  - Ttreatment by etiology: UTI / PNA (s/p tx with abx), uremia (treated with HD/diuresis, now with adequate UOP), NPH (neurology recommends no further inpatient workup), baseline  "dementia / CVA, cardiogenic shock (see above)  - Olanzapine 2.5 mg and melatonin qhs prn for agitation, less risk of QTc prolongation.  - Will not give trazodone for given risk of QTc prolongation  - Neurology signed off. Recommended follow-up in movement disorder clinic for workup of possible NPH, although this is less likely now given patient's poor cardiac prognosis.  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 Started HD 3/29. Urine output increased and Cr stable/improved so likely no further HD and line can be removed. Auto-diuresis without diuretics.  - Monitor UOP, Cr  - Nephrology signed off, no further HD. IR will remove dialysis catheter on Thursday.    #CAD s/p ATIF to LAD 2015  - Continue home statin  - ASA81 mg daily, avoid BB with cardiac amyloid     #Afib/flutter s/p CTI ablation 2006  Remains in afib. Was on sotalol but this was discontinued around 08/2017 per outside cardiology notes. Discontinued warfarin on 4/3 due to labile INR's. Started on apixaban this admission.  - HOLDING anticoagulation for IR procedure on Thursday. Had been on apixaban 2.5 mg BID (renally dosed)    #Aspiration PNA, resolved  #Urinary tract infection, resolved  Started on Unasyn on 3/27. Switched to Zosyn on 3/28 per ID recs. Restarted on Unasyn on 3/30 when sensitivities returned on UTI (Klebsiella pneumoniae). Completed course of Unasyn on 4/4.    #Ascites  Since RHC shows cardiogenic shock that is likely causing the paracentesis, will treat underlying cause. No paracentesis for now.    FEN: Cardiac diet  PPX: Apixaban held for IR procedure on Thursday, no bridging  Code Status: FULL  Dispo: Awaiting discharge to care facility, most likely. Discussing with palliative and family.    This patient was discussed with Dr. Marcos who agrees with the assessment and plan.    Jamie Parkinson, GIGIS  PGY-1  Pager [phone]    Subjective & Interval Hx:    No acute events overnight.    Patient says he feels \"pretty good\" " this morning. Denies chest pain, shortness of breath, abdominal pain, nausea.    Last 24 hr care team notes reviewed.   ROS: 4 point ROS including Respiratory, CV, GI and , other than that noted in the HPI, is negative    Physical Exam:  Blood pressure 94/67, pulse 105, temperature 97.5  F (36.4  C), temperature source Oral, resp. rate 16, weight 69.3 kg (152 lb 12.5 oz), SpO2 96 %.     Gen: Elderly male, resting comfortably in bed, NAD  HEENT: NCAT, anicteric  Neck: Supple, JVD to mid-SCM  Pulm: CTAB, no wheezes or crackles in anterior lung fields, normal WOB on RA  CV: Irregularly irregular, tachycardic, S1/S2, no m/r/g  ABD: Soft, ntnd, normal BS  EXT: Trace LE edema, wrapped  Skin: No rashes or skin lesions  NEURO: Alert, oriented to person and place, no new focal neurological changes  Psych: Appropriate, pleasant    Lines/Tubes: R IJ tunneled catheter, L PICC, L PIV, Fraga    Labs & Studies of Note:   - reviewed all labs in [place] today.    Imaging reviewed. No new.    Staff Physician Comments:     I personally interviewed and examined Josr Landers today, and agree with residents assessment and plan as documented above.      Harpreet Marcos MD     Division of Cardiology  [facility] & [address] [address]    Clinic nurse:  Светлана Gonzalez LPN   Nurse Care Coordinator- Heart Care   [phone] option 1, than option 3    [phone] Appointments  [phone] Fax  [phone] After hours

## 2018-04-11 NOTE — PROGRESS NOTES
"SPIRITUAL HEALTH SERVICES  SPIRITUAL ASSESSMENT Progress Note (Palliative Focus)  [facility] 6C    I was not able to attend family conference (with spouse). I visited later in afternoon with pt and spouse. Pt said \"it's not been a good day\". Spouse talked about some options for discharge but recognized complexity of situation. Pt expressed some anger and frustration; misunderstood my offer of prayer and was upset by it until spouse clarified that \"we're just going to pray for you...\" At that time pt became more calm and welcomed prayer.  I will follow-up 2x/week as pt receives ongoing support from the inpatient palliative consult service.    Josr Art) Solomon Bah M.Div., [facility]  Staff   Pager [phone]       "

## 2018-04-11 NOTE — PLAN OF CARE
Problem: Patient Care Overview  Goal: Plan of Care/Patient Progress Review  RN  1. Pt will be free from injury   Patient confused and agitated upon attempt for PT treatment this day, unable to work with PT in therapeutic manner.

## 2018-04-11 NOTE — PLAN OF CARE
Problem: Patient Care Overview  Goal: Plan of Care/Patient Progress Review  RN  1. Pt will be free from injury   OT/CR 6C:  Discharge Planner OT   Patient plan for discharge: Pt unable to appropriately state; Per medical chart, pt family is hopeful for pt to go to TCU  Current status: Pt oriented to self upon arrival. Pt tolerated 3 min + 30 seconds LE ergometer and 2 x 30 seconds UE ergometer with rest between bouts. Pt with increased agitation/frustration as activity progressed, unable to redirect for engagement in goal directed activity.    Barriers to return to prior living situation: acute medical needs, medical prognosis, AMS, weakness, impaired balance, deconditioning   Recommendations for discharge: TCU vs LTC pending goals of care and pt ability to more actively participate in therapy.  Rationale for recommendations: Pt requires assist for all mobility and self cares; recommend continued therapies to progress safety and independence as able; pending goals of care.        Entered by: Mary Vázquez 04/11/2018 11:20 AM

## 2018-04-12 NOTE — PROGRESS NOTES
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 Alertness markedly improved on inotropes but MS still poor.    Today:  Tunneled catheter removed.  Restart apixaban tonight.  Start Lasix 80 mg IV BID.  Discontinue Fraga  Ongoing discussions with family, palliative, and social work regarding goals of care and discharge plan.     #Cardiac amyloidosis  #HFrEF NYHA IV D (EF 15-20%)  RHC on 4/3. PCW 20. RA 17. PA 43/25 (33). RV 43/17. Son CI 1.4. Patient is volume overloaded and in cardiogenic shock. Suspect this is the likely cause of patient's altered mental status, LOIS, and possibly the ventriculomegaly. PICC placed on 4/3 to begin dobutamine infusion. His mental status and renal function have improved with the dobutamine infusion, improving his quality of life. His prognosis remains very poor.  - Continue dobutamine at 5 mcg/kg/min  - Discontinued hydralazine due to soft BP's  - Diuresis: Lasix 80 mg IV BID  - Palliative consulted, appreciate recs re: GOC and d/c plans    #Multifactorial Encephalopathy, improving  #Delirium  #Dementia (MoCA 16/30)  #Ventriculomegaly / possible normal pressure hydrocephalus  #h/o right MCA CVA  Likely due to cardiogenic shock, although also had extensive workup for other causes. CT scan with enlarged venticles. No mass or acute CVA. Sepsis also possible contributor. Possibly uremia. Low suspicion for SBP. Patient's history concerning for NPH. LP done on 4/2. Attempted large volume (25-30 cc); however, was only able to obtain 0.5 cc.  - Ttreatment by etiology: UTI / PNA (s/p tx with abx), uremia (treated with HD/diuresis, now with  adequate UOP), NPH (neurology recommends no further inpatient workup), baseline dementia / CVA, cardiogenic shock (see above)  - Olanzapine 2.5 mg and melatonin qhs prn for agitation, less risk of QTc prolongation. Patient's QTc is 567 ms, so we will not increase olanzapine dose. Patient's mental status has improved in the past with better volume status.  - Will not give trazodone for given risk of QTc prolongation  - Neurology signed off. Recommended follow-up in movement disorder clinic for workup of possible NPH, although this is less likely now given patient's poor cardiac prognosis.  - PT/OT  - Patient will need to be without a sitter for 24 hours for placement.     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 Started HD 3/29. Urine output increased and Cr stable/improved with dobutamine. No further HD and line was removed. Diurese as necessary  - Monitor UOP, Cr  - Nephrology signed off, no further HD.  - IR removed dialysis catheter today    #CAD s/p ATIF to LAD 2015  - Continue home statin  - ASA81 mg daily, avoid BB with cardiac amyloid     #Afib/flutter s/p CTI ablation 2006  Remains in afib. Was on sotalol but this was discontinued around 08/2017 per outside cardiology notes. Discontinued warfarin on 4/3 due to labile INR's. Started on apixaban this admission.  - Restart apixaban 2.5 mg BID (renally dosed)    #Aspiration PNA, resolved  #Urinary tract infection, resolved  Started on Unasyn on 3/27. Switched to Zosyn on 3/28 per ID recs. Restarted on Unasyn on 3/30 when sensitivities returned on UTI (Klebsiella pneumoniae). Completed course of Unasyn on 4/4.    #Ascites  Since RHC shows cardiogenic shock that is likely causing the paracentesis, will treat underlying cause. No paracentesis for now.    FEN: Cardiac diet  PPX: Restart apixaban this evening  Code Status: FULL  Dispo: Awaiting discharge to care facility, most likely. Discussing with palliative and family.    This patient was discussed with  Dr. Marcos who agrees with the assessment and plan.    Jamie Parkinson, GIGIS  PGY-1  Pager [phone]    Subjective & Interval Hx:    No acute events overnight.    Patient asking for a ride to the hotel this morning. Denies chest pain, shortness of breath, abdominal pain, nausea. Had a bowel movement yesterday.    Last 24 hr care team notes reviewed.   ROS: 4 point ROS including Respiratory, CV, GI and , other than that noted in the HPI, is negative    Physical Exam:  Blood pressure 94/67, pulse 105, temperature 97.6  F (36.4  C), temperature source Oral, resp. rate 16, weight 67.3 kg (148 lb 5.9 oz), SpO2 100 %.     Gen: Elderly male, resting comfortably in bed, NAD  HEENT: NCAT, anicteric  Neck: Supple, JVD to mid-SCM  Pulm: CTAB, no wheezes or crackles in anterior lung fields, normal WOB on RA  CV: Irregularly irregular, tachycardic, S1/S2, no m/r/g  ABD: Soft, ntnd, normal BS  EXT: Trace LE edema, wrapped  Skin: No rashes or skin lesions  NEURO: Alert, oriented to person, no new focal neurological changes  Psych: Appropriate, pleasant    Lines/Tubes: L PICC, L PIV, Fraga    Labs & Studies of Note:   - reviewed all labs in Epic today.    Imaging reviewed.    Staff Physician Comments:     I personally interviewed and examined Josr Landers today, and agree with residents assessment and plan as documented above.      Harpreet Marcos MD     Division of Cardiology  [facility] & Surgery [address] [address]    Clinic nurse:  Светлана Gonzalez LPN   Nurse Care Coordinator- Heart Care   [phone] option 1, than option 3    [phone] Appointments  [phone] Fax  [phone] After hours

## 2018-04-12 NOTE — PROGRESS NOTES
[facility], [place]   Palliative Care Daily Progress Note          Recommendations, Patient/Family Counseling & Coordination    REC: - support nursing efforts to help day/night cycle health.  - support increasing melatonin to 6 mg at hs   - therevac enema, stool softeners, suppositories for bowel program  - PT to see for energy conservation and functional recovery.  - if IV intropes are needed- have been preliminarily OK'ed by  Hospice.   - from our perspective discharge should ideally be hospice and comfort focused. SNF placement likely as his care needs are increasing.    - His goals- verbalized by family- remain mostly restorative-  somewhat in denial of slow decline and diminishing treatment options of his heart failure. Continued questions about his ability to discharge home- some financial concerns limit options for expanded end of life care (residential hospice)  - did not review code status but should be discussed at future interactions    Seen and examined. Family present.     POLST- not completed at this time     Thank you for the opportunity to continue to participate in the care of this patient and family.  Please feel free to contact on-call palliative provider with any emergent needs.  We can be reached via team pager [phone] (answered 8-4:30 Monday-Friday); after-hours answering service ([phone])    Jamie GREER NP ACHPN  Nurse Practitioner- Lead Advanced Practice Provider  [facility] Palliative Medicine Consult Service   [phone]    Jamie Myrick NP  TT spent: 32 minutes of which 20 minutes were spent in direct face to face contact with patient/family.  Greater than 50% of time spent counseling and/or coordinating care.       Assessment      1) Diagnoses & symptoms:        Cardiac Amyloidosis  A fib /flutter (on warfarin)  Stenting of CAD 2015  CKD Stage IV- likely cardiorenal component   CVA 2/2017 R MCA  AMS            Interval History:   Has  some issues with constipation (not new) and recent and unresolved sleep disturbance - ongoing for past month per family- likely contributing to confusion and agitation. Disposition planning underway.            Review of Systems:   Palliative Symptom Review (0=no symptom/no concern, 1=mild, 2=moderate, 3=severe):      Pain: 0-1 (lower abdomen)      Fatigue: 0-1      Nausea: 0-1      Constipation: 1-2      Diarrhea: 0      Depressive Symptoms: 0-1      Anxiety: 1-2      Drowsiness: 1-2      Poor Appetite: 1      Shortness of Breath: 0-1      Insomnia: 2      Confusion 2                 Medications:   I have reviewed this patient's medication profile and medications given in past 24 hours.      {   Physical exam .Vitals: BP (!) 83/65 (BP Location: Right arm)  Pulse 105  Temp 97.3  F (36.3  C) (Oral)  Resp 16  Wt 67.3 kg (148 lb 5.9 oz)  SpO2 98%  BMI 21.91 kg/m2  BMI= Body mass index is 21.91 kg/(m^2).   GEN: Awake,pleasant , in bed.- oriented to family and staff, place and circumstances of hospitalization- continues to be tangential in reply to questions- denies pain. NAD.   HEENT: AT/ NC, EOM grossly intact, mucous membranes pink, dry no exudate. Dentition in good repair.  PULM/THORAX: Clear to auscultation anterolaterally bilaterally, no rales/rhonchi/wheezes  CV: totally irregular A fib, S1 S2 appreciated, likely S3. No murmurs or rub auscultated.   ABD: soft, non distended, hypoactive bowel sounds, no organomegaly  EXT: +1 pitting edema. No asymmetrical edema or tenderness to palpation in calves bilaterally.  NEURO: again difficult to fully assess. No apparent focal neurological deficit              Data Reviewed:   *  ROUTINE LABS (Last four results)  BMP  Recent Labs  Lab 04/12/18  0530 04/11/18  0535 04/10/18  0528 04/09/18  0645    135 136 134   POTASSIUM 3.3* 4.5 4.6 4.9   CHLORIDE 98 98 98 96   RIGOBERTO 8.5 8.3* 8.6 8.4*   CO2 29 30 29 30   BUN 67* 66* 68* 69*   CR 2.25* 2.25* 2.35* 2.67*   *  83 142* 84     CBC  Recent Labs  Lab 04/08/18  0540 04/07/18  0610 04/06/18  0608   WBC 6.7 6.6 6.7   RBC 3.38* 3.50* 3.52*   HGB 10.9* 11.3* 11.6*   HCT 33.7* 35.1* 34.0*    100 97   MCH 32.2 32.3 33.0   MCHC 32.3 32.2 34.1   RDW 16.6* 16.7* 16.5*   PLT 86* 95* 89*     INRNo lab results found in last 7 days.

## 2018-04-12 NOTE — PROGRESS NOTES
D: Here for altered mental status, worsening kidney function, decompensating heart failure.    I: Monitored vitals and assessed pt status.   Changed: Started on Lasix 80mg IV BID, CVC tunneled catheter removed this AM.   Running: Dobutamine 5 mcg/kg/min.  PRN: Potassium (3.3) replaced    A: VSS. Afebrile. Afib. Fraga with adequate urine output. Frequently confused and anxious throughout the day. Easily redirectable. Family present at bedside. Tired today as he got very little sleep overnight.     P: Continue to monitor Pt status and report changes to treatment team. Continue 1:1 sitter due to confusion.       Temp:  [97.1  F (36.2  C)-97.7  F (36.5  C)] 97.3  F (36.3  C)  Heart Rate:  [] 86  Resp:  [16] 16  BP: ()/(61-81) 83/65  Cuff Mean (mmHg):  [76] 76  SpO2:  [98 %-100 %] 98 %

## 2018-04-12 NOTE — PROCEDURES
Interventional Radiology Brief Post Procedure Note    Procedure: IR CVC TUNNEL REMOVAL RIGHT    Proceduralist: REMBERTO Baires PA-C    Assistant: None    Time Out: Prior to the start of the procedure and with procedural staff participation, I verbally confirmed the patient s identity using two indicators, relevant allergies, that the procedure was appropriate and matched the consent or emergent situation, and that the correct equipment/implants were available. Immediately prior to starting the procedure I conducted the Time Out with the procedural staff and re-confirmed the patient s name, procedure, and site/side. (The Joint Commission universal protocol was followed.)  Yes    Medications   Medication Event Details Admin User Admin Time       Sedation: None    Findings: Completed removal of dual lumen tunneled central venous access catheter via RIGHT chest. Dx: Hemodialysis status; no longer needed. Flor.  Allyssa    Estimated Blood Loss: Minimal    Fluoroscopy Time:  minute(s)    SPECIMENS: None    Complications: 1. None     Condition: Stable    Plan:     Comments: See dictated procedure note for full details.    Erich Ray PA-C

## 2018-04-12 NOTE — PROGRESS NOTES
Patient Name: Josr Landers  Medical Record Number: [id number]  Today's Date: 4/12/2018    Procedure: Right tunneled CVC removal  Proceduralist: Flor BECKETT    Procedure start time: 0830  Puncture time: none  Procedure end time: 0840    Report given to:     Other Notes: Pt arrived to IR room 6 from . Pt denies any questions or concerns regarding procedure. Pt positioned supine and monitored per protocol. Pt tolerated procedure without any noted complications. Pt transferred back to .    Krystle Sun RN

## 2018-04-12 NOTE — PLAN OF CARE
Problem: Patient Care Overview  Goal: Plan of Care/Patient Progress Review  RN  1. Pt will be free from injury   Outcome: No Change  AVSS, afebrile, RA. Intermittently confused. Fraga with adequate urine output. Occasionally agitated, refused melatonin and zyprexa despite multiple attempts.VPM resumed at 2200. After pt's visitor left, pt is more agitated and restless. Attempted to climb out of bed twice, unable to redirect. Resumed sitter at 2300. Provider team notified of pt's behavior change. Continue to monitor closely.

## 2018-04-12 NOTE — PROGRESS NOTES
"D: Cardiac amyloidosis,    I: Monitored vitals and assessed pt status.   Changed:-  Running: Dobutamine @ 5 mcg/kg/min  PRN: Tylenol    A: A0x4. VSS. Afebrile. Urinating adequately. Confused about 80-90% of the time. Occasionally agitated, but usually pleasant. Easily redirected with \"business talk\". Sitter discontinued in the AM, VPM off since 11am. Will continue to evaluate need of VPM and tuen back on if necessary.    P: Continue to monitor Pt status and report changes to treatment team.    Temp:  [97.1  F (36.2  C)-97.7  F (36.5  C)] 97.1  F (36.2  C)  Heart Rate:  [] 103  Resp:  [16-17] 16  BP: ()/(56-81) 102/81  SpO2:  [96 %-99 %] 99 %            "

## 2018-04-12 NOTE — PLAN OF CARE
"Problem: Patient Care Overview  Goal: Plan of Care/Patient Progress Review  RN  1. Pt will be free from injury   Outcome: No Change  [id number]  Patient oriented to self, and often stated being at the [facility] (not specifically the hospital). Very agitated/restless, uncooperative and guarded at the beginning of this shift (see previous note). 1:1 sitter overnight. Around 0200, patient became more cooperative and allowed VS to be done, agreed to take HS medications and transfer into bed. PRN Tylenol given as patient insisted his rectum and scrotum were bleeding, and quite painful. Originally had no interest in showing nursing staff his \"genitals\" and requested a doctor to see him immediately; when we discussed with patient that the doctor may not be available for some time, he allowed staff to evaluate. Highly likely that patient's Fraga was tugged as there was some dried blood around catheter site. Patient was awake all night. Was pleasant and cooperative for lab draws this AM. L PICC with Dobutamine gtt at 5mcg/kg/min. Fraga with adequate urine output. Was not allowed to complete full assessment. Continue to monitor and follow POC.     Please do not allow patient to call his spouse after 10pm.    Patient's weight had 2kg difference from previously documented. Bed was zeroed while patient was up in the chair.   "

## 2018-04-12 NOTE — PROGRESS NOTES
"Patient asked to be out of bed and in the recliner around 2315, was assisted using mechanical lift.   Patient is now agitated with writer, would not allow VS or assessment to be done. Patient requesting a phone \"to place a call to the outside\". He could state that he's at the [facility] when asked, but would not answer any further questions, stating \"I don't feel comfortable talking with you anymore, you're up to something\".   "

## 2018-04-13 NOTE — PROGRESS NOTES
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 Alertness markedly improved on inotropes but MS still poor.    Today:  Increase diuresis: Lasix 80 mg IV TID. Add metolazone 5 mg.  Discontinue Fraga  Ongoing discussions with family, palliative, and social work regarding goals of care and discharge plan.     #Cardiac amyloidosis  #HFrEF NYHA IV D (EF 15-20%)  RHC on 4/3. PCW 20. RA 17. PA 43/25 (33). RV 43/17. Son CI 1.4. Patient is volume overloaded and in cardiogenic shock. Suspect this is the likely cause of patient's altered mental status, LOIS, and possibly the ventriculomegaly. PICC placed on 4/3 to begin dobutamine infusion. His mental status and renal function have improved with the dobutamine infusion, improving his quality of life. His prognosis remains very poor.  - Continue dobutamine at 5 mcg/kg/min  - Discontinued hydralazine due to soft BP's  - Diuresis: Lasix 80 mg IV TID. Metolazone 5 mg today.  - Palliative consulted, appreciate recs re: GOC and d/c plans    #Multifactorial Encephalopathy, improving  #Delirium  #Dementia (MoCA 16/30)  #Ventriculomegaly / possible normal pressure hydrocephalus  #h/o right MCA CVA  Likely due to cardiogenic shock, although also had extensive workup for other causes. CT scan with enlarged venticles. No mass or acute CVA. Sepsis also possible contributor. Possibly uremia. Low suspicion for SBP. Patient's history concerning for NPH. LP done on 4/2. Attempted large volume (25-30 cc); however, was only able to obtain 0.5 cc.  - Ttreatment by etiology: UTI / PNA (s/p tx with abx), uremia (treated with HD/diuresis, now with  adequate UOP), NPH (neurology recommends no further inpatient workup), baseline dementia / CVA, cardiogenic shock (see above)  - Olanzapine 2.5 mg and melatonin qhs prn for agitation, less risk of QTc prolongation. Patient's QTc is 567 ms, so we will not increase olanzapine dose. Patient's mental status has improved in the past with better volume status.  - Will not give trazodone for given risk of QTc prolongation  - Neurology signed off. Recommended follow-up in movement disorder clinic for workup of possible NPH, although this is less likely now given patient's poor cardiac prognosis.  - PT/OT  - Patient will need to be without a sitter for 24 hours for placement.     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 Started HD 3/29. Urine output increased and Cr stable/improved with dobutamine. No further HD and line was removed. Diurese as necessary  - Monitor UOP, Cr  - Nephrology signed off, no further HD.  - IR removed dialysis catheter 4/12.    #CAD s/p ATIF to LAD 2015  - Continue pta atorvastatin 80 mg qday  - ASA 81 mg daily  - Avoid BB with cardiac amyloid     #Afib/flutter s/p CTI ablation 2006  Remains in afib. Was on sotalol but this was discontinued around 08/2017 per outside cardiology notes. Discontinued warfarin on 4/3 due to labile INR's and started on apixaban instead.  - Apixaban 2.5 mg BID (renally dosed)    #Aspiration PNA, resolved  #Urinary tract infection, resolved  Started on Unasyn on 3/27. Switched to Zosyn on 3/28 per ID recs. Restarted on Unasyn on 3/30 when sensitivities returned on UTI (Klebsiella pneumoniae). Completed course of Unasyn on 4/4.    # Cachexia  Patient's volume overload somewhat obscures his severe malnutrition, but he has temporal wasting.    #Ascites  Since RHC shows cardiogenic shock that is likely causing the paracentesis, will treat underlying cause. No paracentesis for now.    FEN: Cardiac diet  PPX: Apixaban  Code Status: FULL  Dispo: Awaiting discharge to  care facility, most likely. Discussing with palliative and family.    This patient was discussed with Dr. Marcos who agrees with the assessment and plan.    Jamie Parkinson, DDS  PGY-1  Pager [phone]    Subjective & Interval Hx:    No acute events overnight. Patient was restless and required a sitter.    Patient is confused this morning, asking questions related to being on a film set. Redirectable. Says he had some stomach pain yesterday that has resolved. Denies chest pain, shortness of breath, nausea.    Last 24 hr care team notes reviewed.   ROS: 4 point ROS including Respiratory, CV, GI and , other than that noted in the HPI, is negative    Physical Exam:  Blood pressure (!) 89/70, pulse 93, temperature 97.7  F (36.5  C), temperature source Axillary, resp. rate 16, weight 67.3 kg (148 lb 5.9 oz), SpO2 98 %.     Gen: Elderly male, resting comfortably in bed, NAD  HEENT: NCAT, anicteric  Neck: Supple, JVD to mid-SCM  Pulm: CTAB, no wheezes or crackles in anterior lung fields, normal WOB on RA  CV: Irregularly irregular, tachycardic, S1/S2, no m/r/g  ABD: Soft, ntnd, normal BS  EXT: Trace LE edema, wrapped  Skin: No rashes or skin lesions  NEURO: Alert, oriented to person, no new focal neurological changes  Psych: Appropriate, pleasant    Lines/Tubes: L PICC, L PIV, Fraga    Labs & Studies of Note:   - reviewed all labs in Epic today.    Imaging reviewed.    Staff Physician Comments:     I personally interviewed and examined Josr Landers today, and agree with residents assessment and plan as documented above. Plans to remove Fraga, increase melantonin and try to avoid need sitter overnight to allow transfer to care center.      Harpreet Marcos MD     Division of Cardiology  [facility] [address] [address]    Clinic nurse:  Светлана Gonzalez LPN   Nurse Care Coordinator- Heart Care   [phone] option 1, than option  3    [phone] [facility]  [phone] Fax  [phone] After hours

## 2018-04-13 NOTE — PROGRESS NOTES
[facility], [place]   Palliative Care Daily Progress Note          Recommendations, Patient/Family Counseling & Coordination    Pt seen and examined. Nursing notes reviewed.      REC: - support nursing efforts to help day/night cycle health.  - support increasing melatonin to 6 mg at hs   - therevac enema, stool softeners, suppositories for bowel program  - PT to see for energy conservation and functional recovery.  - if IV intropes are needed- have been preliminarily OK'ed by  Hospice.   - from our perspective discharge should ideally be hospice and comfort focused. SNF placement likely as his care needs are increasing.    - His goals- verbalized by family- remain mostly restorative-  somewhat in denial of slow decline and diminishing treatment options of his heart failure. Continued questions about his ability to discharge home- some financial concerns limit options for expanded end of life care (residential hospice)  - did not review code status but should be discussed at future interactions    Seen and examined. Family present.     POLST- not completed at this time     Thank you for the opportunity to continue to participate in the care of this patient and family.  Please feel free to contact on-call palliative provider with any emergent needs.  We can be reached via team pager [phone] (answered 8-4:30 Monday-Friday); after-hours answering service ([phone])    Jamie GREER NP ACHTAO  Nurse Practitioner- Lead Advanced Practice Provider  [facility] Palliative Medicine Consult Service   [phone]    Jamie Myrick NP  TT spent: 32 minutes of which 20 minutes were spent in direct face to face contact with patient/family.  Greater than 50% of time spent counseling and/or coordinating care.       Assessment      1) Diagnoses & symptoms:        Cardiac Amyloidosis  A fib /flutter (on warfarin)  Stenting of CAD 2015  CKD Stage IV- likely cardiorenal component   CVA  2/2017 R [facility]  AMS            Interval History:   Has some issues with constipation (not new) and recent and unresolved sleep disturbance - ongoing for past month per family- likely contributing to confusion and agitation. Disposition planning underway.            Review of Systems:   Palliative Symptom Review (0=no symptom/no concern, 1=mild, 2=moderate, 3=severe):      Pain: 0-1 (lower abdomen)      Fatigue: 0-1      Nausea: 0-1      Constipation: 1-2      Diarrhea: 0      Depressive Symptoms: 0-1      Anxiety: 1-2      Drowsiness: 1-2      Poor Appetite: 1      Shortness of Breath: 0-1      Insomnia: 2      Confusion 2                 Medications:   I have reviewed this patient's medication profile and medications given in past 24 hours.      {   Physical exam .Vitals: BP (!) 89/70 (BP Location: Right arm)  Pulse 93  Temp 97.7  F (36.5  C) (Axillary)  Resp 16  Wt 67.3 kg (148 lb 5.9 oz)  SpO2 98%  BMI 21.91 kg/m2  BMI= Body mass index is 21.91 kg/(m^2).   GEN: Awake,pleasant , in bed.- oriented to family and staff, place and circumstances of hospitalization- continues to be tangential in reply to questions- denies pain. NAD.   HEENT: AT/ NC, EOM grossly intact, mucous membranes pink, dry no exudate. Dentition in good repair.  PULM/THORAX: Clear to auscultation anterolaterally bilaterally, no rales/rhonchi/wheezes  CV: totally irregular A fib, S1 S2 appreciated, likely S3. No murmurs or rub auscultated.   ABD: soft, non distended, hypoactive bowel sounds, no organomegaly  EXT: +1 pitting edema. No asymmetrical edema or tenderness to palpation in calves bilaterally.  NEURO: again difficult to fully assess. No apparent focal neurological deficit              Data Reviewed:   *  ROUTINE LABS (Last four results)  BMP    Recent Labs  Lab 04/13/18  0405 04/12/18  1919 04/12/18  0530 04/11/18  0535 04/10/18  0528     --  138 135 136   POTASSIUM 4.8 4.8 3.3* 4.5 4.6   CHLORIDE 98  --  98 98 98   RIGOBERTO 8.4*  --   8.5 8.3* 8.6   CO2 27  --  29 30 29   BUN 70*  --  67* 66* 68*   CR 2.35*  --  2.25* 2.25* 2.35*   GLC 89  --  106* 83 142*     CBC    Recent Labs  Lab 04/08/18  0540 04/07/18  0610   WBC 6.7 6.6   RBC 3.38* 3.50*   HGB 10.9* 11.3*   HCT 33.7* 35.1*    100   MCH 32.2 32.3   MCHC 32.3 32.2   RDW 16.6* 16.7*   PLT 86* 95*     INRNo lab results found in last 7 days.

## 2018-04-13 NOTE — PLAN OF CARE
Problem: Patient Care Overview  Goal: Plan of Care/Patient Progress Review  RN  1. Pt will be free from injury   OT/CR 6C:  Discharge Planner OT   Patient plan for discharge: Pt unable to appropriately state; per medical chart, family is hopeful for pt to go to TCU   Current status: Pt attempted x4 reps bed mobility from supine to EOB; however, pt not agreeable to sitting EOB. Mod vc for positive encouragement and continued participation in therapy. Pt completed x3 reps sit to stand with min-modAx2, with increase in reps, pt minAx2. Pt tolerated transfer to bedside chair with ModAx2.   Barriers to return to prior living situation: altered mental status, weakness, acute medical needs, impaired balance  Recommendations for discharge: Per plan established by the Occupational Therapist, the recommendation for discharge location is TCU vs. LTC pending goals of care and pt ability to more actively participate in therapy.  Rationale for recommendations: Pt requires assist for all mobility and self cares;  Recommend continued therapies to progress safety and independence; pending goals of care.        Entered by: Mary Vázquez 04/13/2018 1:27 PM

## 2018-04-13 NOTE — PLAN OF CARE
Problem: Patient Care Overview  Goal: Plan of Care/Patient Progress Review  RN  1. Pt will be free from injury   Discharge Planner PT   Patient plan for discharge: Per chart family is hoping for TCU vs. LTC.  Current status: Pt agitated throughout session and requires max encouragement from PT and wife to participate in seated LE exercises. Session discontinued as pt became increasingly upset and PT unable to further engage in skilled therapeutic activity.  Barriers to return to prior living situation: AMS, medical needs, deconditioning, balance impairments, poor activity tolerance.  Recommendations for discharge: LTC.  Rationale for recommendations: Below baseline functional mobility.

## 2018-04-13 NOTE — PROGRESS NOTES
Social Work Services Progress Note    Hospital Day: 18  Date of Initial Social Work Evaluation:  3/29/18  Collaborated with: Pt spouse Nu and daughter Deandra    Data:  Pt is a 77 year old male being followed by SW for placement to TCU with dobutamine.  Pt has been accepted to [facility] for LTC.    Intervention:  SW met with spouse and daughter to check in on progress towards support with financial means.  Daughter reports they have been unable to get ahold of their  and at this time are worried about having financial means to pay for LTC.  Spouse and daughter are continuing to want to pursue LTC with dobutamine on hospice.  Per hospice liaison, the hospice director and cardiology team have reached a case specific agreement that the pt can be on [facility] with dobutamine as long as pt's life expectancy is not anticipated to be lengthened by the dobutamine.  Family does not want to pursue hospice without the dobutamine.    Assessment: Spouse visibly overwhelmed by information and costs of care.  Daughter very engaged and supportive of spouse and has access to resources to support family decision making.    Plan:    Anticipated Disposition: TCU vs LTC    Barriers to d/c plan:  Cost of TCU/LTC, sitter/VPM    Follow Up:  SW to follow for discharge planning needs.    JERRI Valdovinos, APSW  6C Unit   Phone: [phone]  Pager: [phone]  Unit: [phone]

## 2018-04-13 NOTE — PLAN OF CARE
Problem: Patient Care Overview  Goal: Plan of Care/Patient Progress Review  RN  1. Pt will be free from injury   Outcome: No Change  D/I/A:  Pt admitted with AMS, worsening kidney function, and HF, hx cardiac amyloidosis, afib/flut, CAD-ATIF, CVA/dementia.  Pt continues on dobutamine drip.  Coccyx with small open area-cares per WOC-consult replaced as area open.  Pt restless/agitated/confused at start of shift.  Multiple reattempts to give meds.  Sepsis flagged-neg lactic.  Pt with generalized pain and abdominal pain-tylenol given and enema-pt able to have BM and less restless.  Pt remains confused to time, place, situation.  1:1 attendant for confusion/safety risk.  Tunneled dialysis cath was removed yesterday.    P: Order for martinez to be dc'd.  Family, palliative, primary team discussing options for dc.  Update md's with changes/concerns.

## 2018-04-13 NOTE — PROGRESS NOTES
WOC Consult    D: Per MD note: 76 y/o male with PMHx significant for cardiac amyloidosis confirmed by biopsy , afib/flutter on warfarin s/p CTI ablation , CAD s/p ATIF to LAD in , CKD stage IV and CVA 2017 right MCA was admitted from TCU 3/26 because of worsening Cr function and altered mental status, now with decompensated heart failure and cardiogenic shock. Alertness markedly improved on inotropes but MS still poor.    WOC Consult for pressure injury to coccyx.    I/A: Entire sacrum, coccyx and bilateral buttocks assessed. Mepilex over low sacrum peeled back for assessment. Skin is intact and blanchable. Area under Mepilex measuring 1.2 cm round with pinker tone noted. This area has intact epidermis and is fully blanchable, close to the tammy cleft, may represent resolving moisture associated breakdown. Resolving pressure cannot be ruled out, however, on assessment today, the area is blanchable and has intact epidermis.     P: Recommend continuing Mepilex for protection as well as other pressure reduction techniques such at frequent side to side positioning, head of bed up less than 30 degrees, encourage OOB to chair for meals, and routine incontinence care (if applicable).     WOC will sign off today. Please re-consult with further questions or concerns.    Lashawn Jimenez RN, CWOCN

## 2018-04-14 NOTE — PLAN OF CARE
"Problem: Patient Care Overview  Goal: Plan of Care/Patient Progress Review  RN  1. Pt will be free from injury   Outcome: No Change  D: Altered mental status. Hx cardiac amyloidosis  I: Dobutamine gtt 5 mcg/kg/min. PRN tylenol for foot/leg pain. Assisted with cares.  A: VSS see flowsheet. Afib w/ HR 's, 0-16 PVC. Martinez with adequate output. Wound on coccyx, mepilex in place cdi. Refused full assessment. Disoriented to time, place, and situation. Agitated/restless, stating he's \"going home or will call the police\" and pulling cords from wall. 1:1 sitter at bedside. Heavy assist x2-3. Sleepless during shift.  P: Follow up regarding possible martinez removal in am. Promote appropriate sleep/wake cycle. Continue to monitor and follow POC. Notify Cards 1 with changes.        "

## 2018-04-14 NOTE — PROGRESS NOTES
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 Alertness markedly improved on inotropes but MS still poor.    Today:  Discontinue Fraga  Increase olanzapine dose to 5 mg for nighttime agitation  Psychiatry consult for management of nighttime agitation  Ongoing discussions with family, palliative, and social work regarding goals of care and discharge plan.     #Cardiac amyloidosis  #HFrEF NYHA IV D (EF 15-20%)  RHC on 4/3. PCW 20. RA 17. PA 43/25 (33). RV 43/17. Son CI 1.4. Patient is volume overloaded and in cardiogenic shock. Suspect this is the likely cause of patient's altered mental status, LOIS, and possibly the ventriculomegaly. PICC placed on 4/3 to begin dobutamine infusion. His mental status and renal function have improved with the dobutamine infusion, improving his quality of life. His prognosis remains very poor.  - Continue dobutamine at 5 mcg/kg/min  - Discontinued hydralazine due to soft BP's  - Diuresis: Bumex 2 mg BID.  - Palliative consulted, appreciate recs re: GOC and d/c plans    #Multifactorial Encephalopathy, improving  #Delirium  #Dementia (MoCA 16/30)  #Ventriculomegaly / possible normal pressure hydrocephalus  #h/o right MCA CVA  Likely due to cardiogenic shock, although also had extensive workup for other causes. CT scan with enlarged venticles. No mass or acute CVA. Sepsis also possible contributor. Possibly uremia. Low suspicion for SBP. Patient's history concerning for NPH. LP done on 4/2. Attempted large volume (25-30 cc); however, was only able to obtain 0.5 cc.  - Ttreatment by etiology: UTI / PNA (s/p tx with abx), uremia (treated  with HD/diuresis, now with adequate UOP), NPH (neurology recommends no further inpatient workup), baseline dementia / CVA, cardiogenic shock (see above)  - Olanzapine 5 mg and melatonin 6 mg qhs prn for agitation, less risk of QTc prolongation. Patient's mental status has improved in the past with better volume status.  - Will not give trazodone iven risk of QTc prolongation  - Neurology signed off. Recommended follow-up in movement disorder clinic for workup of possible NPH, although this is less likely now given patient's poor cardiac prognosis.  - PT/OT  - Patient will need to be without a sitter for 24 hours for placement.     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 Started HD 3/29. Urine output increased and Cr stable/improved with dobutamine. No further HD and line was removed. Diurese as necessary  - Monitor UOP, Cr  - Nephrology signed off, no further HD.  - IR removed dialysis catheter 4/12.    #CAD s/p ATIF to LAD 2015  - Continue pta atorvastatin 80 mg qday  - ASA 81 mg daily  - Avoid BB with cardiac amyloid     #Afib/flutter s/p CTI ablation 2006  Remains in afib. Was on sotalol but this was discontinued around 08/2017 per outside cardiology notes. Discontinued warfarin on 4/3 due to labile INR's and started on apixaban instead.  - Apixaban 2.5 mg BID (renally dosed)    #Aspiration PNA, resolved  #Urinary tract infection, resolved  Started on Unasyn on 3/27. Switched to Zosyn on 3/28 per ID recs. Restarted on Unasyn on 3/30 when sensitivities returned on UTI (Klebsiella pneumoniae). Completed course of Unasyn on 4/4. Repeat chest x-ray on 4/14 stable. Patient remains afebrile.    # Cachexia  Patient's volume overload somewhat obscures his severe malnutrition, but he has temporal wasting.    #Ascites  Since RHC shows cardiogenic shock that is likely causing the paracentesis, will treat underlying cause. No paracentesis for now.    FEN: Cardiac diet  PPX: Apixaban  Code Status: FULL  Dispo:  Awaiting discharge to care facility, most likely. Discussing with palliative and family.    This patient was discussed with Dr. Marcos who agrees with the assessment and plan.    Jamie Parkinson, DDS  PGY-1  Pager [phone]    Subjective & Interval Hx:    No acute events overnight. Patient was restless and required a sitter.    Patient more alert and less confused this morning. Says he feels alright. Denies chest pain, shortness of breath, nausea.    Last 24 hr care team notes reviewed.   ROS: 4 point ROS including Respiratory, CV, GI and , other than that noted in the HPI, is negative    Physical Exam:  Blood pressure 95/68, pulse 93, temperature 97.5  F (36.4  C), temperature source Oral, resp. rate 16, weight 67.3 kg (148 lb 5.9 oz), SpO2 98 %.     Gen: Elderly male, resting comfortably in bed, NAD  HEENT: NCAT, anicteric  Neck: Supple, JVD to mid-SCM  Pulm: CTAB, no wheezes or crackles in anterior lung fields, normal WOB on RA  CV: Irregularly irregular, tachycardic, S1/S2, no m/r/g  ABD: Soft, ntnd, normal BS  EXT: LE edema, wrapped  Skin: No rashes or skin lesions  NEURO: Alert, oriented to person, no new focal neurological changes  Psych: Appropriate, pleasant    Lines/Tubes: L PICC, L PIV    Labs & Studies of Note:   - reviewed all labs in Epic today.    Imaging reviewed.    Staff Physician Comments:     I personally interviewed and examined Josr Landers today, and agree with cardiology fellows/residents assessment and plan as documented above.        Harpreet Marcos MD     Division of Cardiology  [facility] [place] [address]    Clinic nurse:  Светлана Gonzalez LPN   Nurse Care Coordinator- Heart Care   [phone] option 1, than option 3    [phone] Appointments  [phone] Fax  [phone] After hours

## 2018-04-14 NOTE — PLAN OF CARE
Problem: Patient Care Overview  Goal: Plan of Care/Patient Progress Review  RN  1. Pt will be free from injury   Pt confused and disoriented to place, time and situation, denies pain, afib , with 11 bt run VT/abb Afib this morning.  Dobutamine gtt @ 5mcg/kg/min.  Adequate UO via martinez.  Pt declined therapy today and declined taking his PO bumex and Oscal.  Pt wife requests to not let pt call family after 2200.  Pt has a cough developed and sputum collection has been ordered along with CXR.  Meplex drsg covering pressure ulcer on pt coccyx (to be changed every 3rd day).  Continue to monitor and contact Cards 1 with concerns.

## 2018-04-15 NOTE — PLAN OF CARE
Problem: Patient Care Overview  Goal: Plan of Care/Patient Progress Review  RN  1. Pt will be free from injury   Pt confused and disoriented to place, time and situation, pain controlled with tylenol, afib .  Dobutamine gtt @ 5mcg/kg/min, new bag hung around 1700..  Martinez d/c this shift, pt UO adequate after pulling martinez. Sputum collection was collected.  Pt c/o pain and was controlled with preparation H.  Meplex drsg covering pressure ulcer on pt coccyx changed today (to be changed every 3rd day). Continue to monitor and contact Cards 1 with concerns.

## 2018-04-15 NOTE — PLAN OF CARE
Problem: Patient Care Overview  Goal: Plan of Care/Patient Progress Review  RN  1. Pt will be free from injury   Pt confused and disoriented to place, time and situation, afib .  Dobutamine gtt @ 5mcg/kg/min, new bag hung around 1600.  Pt good UO and is incontinent at times.  No BM this shift.  1:1 all day and behavior was adequate most of the time, with brief episodes of agitation. Continue to monitor and contact Cards 1 with concerns.

## 2018-04-15 NOTE — PROVIDER NOTIFICATION
Notified Cards 1 of abnormal gram positive sputum culture. Kori Hodge RN on 4/14/2018 at 11:52 PM

## 2018-04-15 NOTE — CONSULTS
Patient seen and chart reviewed. Note dictated (initial)   D/C Zyprexa   Try Latuda 20 mg after evening meal   Re-consult psychiatry as needed.

## 2018-04-15 NOTE — PLAN OF CARE
Problem: Patient Care Overview  Goal: Plan of Care/Patient Progress Review  RN  1. Pt will be free from injury   Outcome: No Change  D: Altered mental status. Hx cardiac amyloidosis  I: Dobutamine gtt 5 mcg/kg/min. Caps changed, next due 04/19. Purple lumen heparin locked.  A: VSS see flowsheet. Afib HR 75-100s with 0-16 PVC. Fraga removed 4/15. Voiding in urinal and incontinent. Wound on coccyx, mepilex in place. Disoriented to time, place, and situation. Pleasantly confused. 1:1 sitter at bedside. Heavy assist x2-3. Sleepless during shift.  P: Promote appropriate sleep/wake cycle. Continue to monitor and follow POC. Notify Cards 1 with changes.

## 2018-04-15 NOTE — PROGRESS NOTES
"SPIRITUAL HEALTH SERVICES  SPIRITUAL ASSESSMENT Progress Note  [facility] ([place] 6C   ON-CALL VISIT    REFERRAL SOURCE: Epic consult, request for hospital      Visited with Dr. Landers, who made tangential comments during the length of visit.  Repeatedly spoke about donating money to a candidate and being concerned he hadn't given enough.  Spoke about giving money to his [employer], spoke about his business.  At times pt seemed to think I was someone related to a candidate and that other hospital staff were working in candidate's office.  At the end of the visit, I stated pt's sitter would be coming back in the room, sitter is female, and pt stated \"No, the person I was meeting with in the office is a jose m.\"  Offered prayer for pt upon request.    PLAN: Will refer to unit  for follow-up.    Annemarie Fofana  Chaplain Resident  Pager [phone]  "

## 2018-04-15 NOTE — PROGRESS NOTES
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 Alertness markedly improved on inotropes but MS still poor.    Today:  Ongoing discussions with family, palliative, and social work regarding goals of care and discharge plan.     #Cardiac amyloidosis  #HFrEF NYHA IV D (EF 15-20%)  RHC on 4/3. PCW 20. RA 17. PA 43/25 (33). RV 43/17. Son CI 1.4. Patient is volume overloaded and in cardiogenic shock. Suspect this is the likely cause of patient's altered mental status, LOIS, and possibly the ventriculomegaly. PICC placed on 4/3 to begin dobutamine infusion. His mental status and renal function have improved with the dobutamine infusion, improving his quality of life. His prognosis remains very poor.  - Continue dobutamine at 5 mcg/kg/min  - Discontinued hydralazine due to soft BP's  - Diuresis: Bumex 2 mg BID.  - Palliative consulted, appreciate recs re: GOC and d/c plans    #Multifactorial Encephalopathy, improving  #Delirium  #Dementia (MoCA 16/30)  #Ventriculomegaly / possible normal pressure hydrocephalus  #h/o right MCA CVA  Likely due to cardiogenic shock, although also had extensive workup for other causes. CT scan with enlarged venticles. No mass or acute CVA. Sepsis also possible contributor. Possibly uremia. Low suspicion for SBP. Patient's history concerning for NPH. LP done on 4/2. Attempted large volume (25-30 cc); however, was only able to obtain 0.5 cc.  - Treatment by etiology: UTI / PNA (s/p tx with abx), uremia (treated with HD/diuresis, now with adequate UOP), NPH (neurology recommends no further inpatient workup), baseline dementia / CVA, cardiogenic  shock (see above)  - Olanzapine 5 mg and melatonin 6 mg qhs prn for agitation, less risk of QTc prolongation. Patient's mental status has improved in the past with better volume status.  - Will not give trazodone given risk of QTc prolongation  - Neurology signed off. Recommended follow-up in movement disorder clinic for workup of possible NPH, although this is less likely now given patient's poor cardiac prognosis.  - PT/OT  - Patient will need to be without a sitter for 24 hours for placement.     #CKD-V   Tunneled line by IR 3/29. Started HD 3/29. Urine output increased and Cr stable/improved with dobutamine. No further HD and line was removed. Diurese as necessary  - Monitor UOP, Cr  - Nephrology signed off, no further HD.  - IR removed dialysis catheter 4/12.    #CAD s/p ATIF to LAD 2015  - Continue pta atorvastatin 80 mg qday  - ASA 81 mg daily  - Avoid BB with cardiac amyloid     #Afib/flutter s/p CTI ablation 2006  Remains in afib. Was on sotalol but this was discontinued around 08/2017 per outside cardiology notes. Discontinued warfarin on 4/3 due to labile INR's and started on apixaban instead.  - Apixaban 2.5 mg BID (renally dosed)    #Aspiration PNA, resolved  #Urinary tract infection, resolved  Started on Unasyn on 3/27. Switched to Zosyn on 3/28 per ID recs. Restarted on Unasyn on 3/30 when sensitivities returned on UTI (Klebsiella pneumoniae). Completed course of Unasyn on 4/4. Repeat chest x-ray on 4/14 stable. Patient remains afebrile.    # Cachexia  Patient's volume overload somewhat obscures his severe malnutrition, but he has temporal wasting.    #Ascites  Since RHC shows cardiogenic shock that is likely causing the ascites, will treat underlying cause. No paracentesis for now.    FEN: Cardiac diet  PPX: Apixaban  Code Status: FULL  Dispo: Awaiting discharge to care facility, most likely. Discussing with palliative and family.    This patient was discussed with Dr. Marcos who agrees with the  assessment and plan.    Carlos Muse MD  PGY-3 Internal Medicine    Subjective & Interval Hx:    No acute events overnight. Patient was restless and required a sitter. Rectal pain from hemorrhoids improved with Preparation H. Post-martinez removal dysuria improving.     Agitated this morning and not amenable to questioning.     Last 24 hr care team notes reviewed.   ROS: 4 point ROS including Respiratory, CV, GI and , other than that noted in the HPI, is negative    Physical Exam:  Blood pressure (!) 89/62, pulse 93, temperature 97.8  F (36.6  C), temperature source Axillary, resp. rate 18, weight 65.3 kg (143 lb 15.4 oz), SpO2 94 %.     Gen: Elderly male, resting comfortably in bed, NAD  HEENT: NCAT, anicteric  Neck: Supple, JVP elevated  Pulm: Normal WOB on RA, refuses auscultation   CV: Refuses auscultation   ABD: Soft, ntnd, normal BS  EXT: LE edema, wrapped  Skin: No rashes or skin lesions visible  NEURO: Alert, oriented to person, no new focal neurological changes  Psych: Appropriate, pleasant    Lines/Tubes: L PICC, L PIV    Labs & Studies of Note:   - reviewed all labs in Epic today.    Imaging reviewed.    Staff Physician Comments:     I personally interviewed and examined Josr Landers today, and agree with cardiology fellows/residents assessment and plan as documented above.        Harpreet Marcos MD     Division of Cardiology  [facility] [place] [address]    Clinic nurse:  Светлана Gonzalez LPN   Nurse Care Coordinator- Heart Care   [phone] option 1, than option 3    [phone] Appointments  [phone] Fax  [phone] After hours

## 2018-04-16 NOTE — PROGRESS NOTES
Social Work Services Progress Note    Hospital Day: 21  Date of Initial Social Work Evaluation:  3/29/18  Collaborated with: Pt, palliative NP, Cards 1 team, bedside attendant    Data:  Pt is a 77 year old male being followed by SW for placement to LTC with dobutamine.  Pt has been accepted to [facility] for LTC.    Intervention:  SW met with pt and palliative NP for check in.  Pt continues to demonstrate confusion and per attendant is redirectable during the day.  Attendant reporting that evening/night behaviors are more pronounced than daytime.  Family not at bedside during afternoon meeting.  Cards 1 team wanting to complete a care conference to continue discussions about placement.    Referrals in Progress:   [facility] - accepted to LTC  PH: [phone]  F: [phone]    Assessment: Pt open to asking questions and demonstrating confusion with inaccurate answers.  Per attendant pt continues to be awake during the evening and take naps throughout the daytime.    Plan:    Anticipated Disposition: LTC    Barriers to d/c plan:  Cost of TCU/LTC, sitter/VPM    Follow Up:  SW to follow for discharge planning needs.    JERRI Valdovinos, APSW  6C Unit   Phone: [phone]  Pager: [phone]  Unit: [phone]      ___________________________________________________________________________________________________________________________________________________    Referrals Discontinued:  None    Community Case Management/Community Services in place:   Family pursuing meetings with elder law  to assist with pt being on Medicaid insurance and his spouse retaining all assets.  Spouse and daughter want to make sure spouse can keep their family home as well as other funds to support herself for the long term.

## 2018-04-16 NOTE — PLAN OF CARE
Problem: Patient Care Overview  Goal: Plan of Care/Patient Progress Review  RN  1. Pt will be free from injury   Outcome: No Change    D: Altered mental status. Hx cardiac amyloidosis, a-fib, CAD, CKD, CVA  I/A: Dobutamine gtt 5 mcg/kg/min. Double lumen PICC in left arm.  VSSA on RA. Afib on tele. Voiding in urinal and incontinent. Wound on coccyx, mepilex in place. WOC consulted today. Continue to apply mepilex/change when soiled. PT was able to have the patient stand and take a few steps in room today. Disoriented to time, place, and situation. Pleasantly confused. 1:1 sitter at bedside. Assist of 2.   P: Promote appropriate sleep/wake cycle. Continue to monitor. Notify Cards 1 with any pertinent changes.

## 2018-04-16 NOTE — PLAN OF CARE
Problem: Patient Care Overview  Goal: Plan of Care/Patient Progress Review  RN  1. Pt will be free from injury   Discharge Planner PT   Patient plan for discharge: Family would prefer LTC/TCU  Current status: Requires heavy physical assist for bed mobility, sit<>stands and bed<>chair with walker. VSS. Heavily diminished motor planning and weakness noted this date.  Barriers to return to prior living situation: Deconditioning, cognition, motor planning  Recommendations for discharge: TCU/LTC  Rationale for recommendations: Current level of function and rehab prognosis       Entered by: Mahamed Easton 04/16/2018 1:54 PM

## 2018-04-16 NOTE — PROGRESS NOTES
[facility] [place]   Palliative Care Daily Progress Note          Recommendations, Patient/Family Counseling & Coordination    Pt seen and examined. Nursing notes reviewed. Family not present today. He has 1:1 sitter present. Reviewed situation with team.    REC: - support nursing efforts to help day/night cycle health.  - recent increase in melatonin to 6 mg at hs, Latuda per psychiatry.   - stool softeners, suppositories for bowel program- continue scheduled bowel meds- hold if loose stools.   - PT seeing for energy conservation and functional recovery.  - Ongoing requirement for IV inotropes - has been preliminarily OK'ed by  Hospice.   - from our perspective discharge should ideally be hospice and comfort focused- family not at that place.   SNF placement likely as his care needs are increasing.    - His goals- earlier verbalized by family- remain restorative- somewhat in denial of slow decline and diminishing treatment options of his heart failure.    Ser SW financial concerns limit options for expanded end of life care (residential hospice)  - did not review code status with no family present- but should be discussed at future interactions     POLST- not completed at this time     Thank you for the opportunity to continue to participate in the care of this patient and family.  Please feel free to contact on-call palliative provider with any emergent needs.  We can be reached via team pager [phone] (answered 8-4:30 Monday-Friday); after-hours answering service ([phone])    Jamie GREER NP ACHPN  Nurse Practitioner- Lead Advanced Practice Provider  [facility] Palliative Medicine Consult Service   [phone]    Jamie Myrick NP  TT spent: 25 minutes of which 15 minutes were spent in direct face to face contact with patient/family.  Greater than 50% of time spent counseling and/or coordinating care.       Assessment      1) Diagnoses & symptoms:        Cardiac  Amyloidosis  A fib /flutter (on warfarin)  Stenting of CAD 2015  CKD Stage IV- likely cardiorenal component   CVA 2/2017 R MCA  AMS            Interval History:   Apparently unresolved sleep disturbance - ongoing for past month per family- likely contributing to confusion and agitation. Disposition planning underway.            Review of Systems:   Palliative Symptom Review (0=no symptom/no concern, 1=mild, 2=moderate, 3=severe):      Pain: 0       Fatigue: 1      Nausea: 0-1      Constipation: 1-2      Diarrhea: 0      Depressive Symptoms: 0-1      Anxiety: 1-2      Drowsiness: 1-2      Poor Appetite: 1      Shortness of Breath: 0      Insomnia: 2      Confusion 2              Medications:   I have reviewed this patient's medication profile and medications given in past 24 hours.      {   Physical exam .Vitals: /76 (BP Location: Right arm)  Pulse 93  Temp 97.7  F (36.5  C) (Oral)  Resp 16  Wt 65.3 kg (143 lb 15.4 oz)  SpO2 98%  BMI 21.26 kg/m2  BMI= Body mass index is 21.26 kg/(m^2).   GEN: Awake,pleasant , in bedside chair.- oriented to staff, place and circumstances of hospitalization- continues to be tangential in reply to questions- denies pain. NAD.   HEENT: AT/ NC, EOM grossly intact, mucous membranes pink, dry no exudate. Dentition in good repair.  PULM/THORAX: no extra work of breathing.   CV: totally irregular A fib, S1 S2 appreciated, likely S3.  ABD: Non distended  EXT: +1-2+ pitting edema. No asymmetrical edema or tenderness to palpation in calves bilaterally.  NEURO: again difficult to fully assess. No apparent focal neurological deficit              Data Reviewed:   *  ROUTINE LABS (Last four results)  BMP    Recent Labs  Lab 04/16/18  0731 04/15/18  0535 04/14/18  0652 04/13/18  0405    132* 136 135   POTASSIUM 4.9 5.6* 4.5 4.8   CHLORIDE 91* 91* 96 98   RIGOBERTO 9.1 8.9 8.9 8.4*   CO2 32 30 30 27   BUN 75* 70* 70* 70*   CR 2.41* 1.81* 2.34* 2.35*   GLC 98 139* 77 89     CBC    Recent  Labs  Lab 04/14/18  0652   WBC 5.3   RBC 3.30*   HGB 10.8*   HCT 32.8*   MCV 99   MCH 32.7   MCHC 32.9   RDW 16.4*   *     INRNo lab results found in last 7 days.

## 2018-04-16 NOTE — PLAN OF CARE
No significant events over night. Pt's VSS. No c/o pain. AFib . Dobutamine gtt continues at 5mcg/kg/min.  Pt remains disoriented to time, place and situation. Pt agitated at times. Sitter remains at bedside. Continue to reorient as needed. Notify MD of any changes/concerns.

## 2018-04-16 NOTE — PROGRESS NOTES
[facility] Nurse Inpatient Wound Assessment     Follow up Assessment  Reason for consultation: Evaluate and treat coccyx wound     Assessment  Coccyx wound due to Moisture Associated Skin Damage (MASD) and friction injury within scar tissue  Status: initial assessment    Treatment Plan    Coccyx wound: Cleanse the area with NS and pat dry.    Apply No sting film barrier to periwound skin.    Cover wound with Mepilex. Mepilex 4x4    Change dressing Q 3 days.    Turn and reposition Q 2hrs.    Ensure pt has Filippo-cushion while sitting up in the chair.    FYI- If pt has constant incontinent loose stools needing dressing changes Q shift please discontinue the Mepilex dressing and apply criticaid barrier paste BID and PRN.    Orders Updated  WO Nurse follow-up plan:weekly  Nursing to notify the Provider(s) and re-consult the WO Nurse if wound(s) deteriorates or new skin concern.    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 Alertness markedly improved on inotropes but MS still poor.    Objective Data  Containment of urine/stool: Incontinence Program    Active Diet Order    Active Diet Order      Low Saturated Fat Na <2400 mg    Output:   I/O last 3 completed shifts:  In: 1002.59 [P.O.:820; I.V.:182.59]  Out: 1125 [Urine:1125]    Risk Assessment:    Juan Pablo Juan Pablo Score  Avg: 15.7  Min: 14  Max: 19                            Labs:   Recent Labs  Lab 04/14/18  0652   HGB 10.8*   WBC 5.3           Recent Labs  Lab 04/14/18  1035   CULT Moderate growthNormal sharyn       Physical Exam  Skin assessment:   Focused skin inspection: Coccyx    Wound Location:  Coccyx  Wound History: Area of  previously noted scar tissue, with prior wounds per patient. Patient is incontinent and has been resistive to cares intermittently. Patient currently has a sitter at bedside.  Measurements (length x width x depth, in cm) 1.5 cm x 0.5 cm  x  0.01 cm   Wound Base: 100% dermis  Tunneling N/A  Undermining N/A  Palpation of the wound bed: normal   Periwound skin: intact scar tissue  Color: pink  Temperature: normal   Drainage:, none  Description of drainage: none  Odor: none  Pain: denies     Interventions  Current support surface: Standard  Atmos Air mattress    Current off-loading measures: Pillows under calves  Visual inspection of wound(s) completed  Wound Care: lifted and reapplied mepilex    Supplies: floor stock  Education provided today: Pressure ulcer prevention, turning and repositioning, incontinence and moisture management with patient, sitter, and RN     Discussed plan of care with Patient and Nurse    Jade SHARPEN, RN, CWOCN

## 2018-04-16 NOTE — CONSULTS
Consult Date:  04/15/2018      PSYCHIATRY CONSULTATION       REQUESTING SOURCE:  Cardiology 1 team.      IDENTIFYING DATA:  This is a 77-year-old man seen today for psychiatric consultation regarding his mental status changes and increasing confusion, particularly in the evenings.      HISTORY OF PRESENT ILLNESS:  Mr. Landers has a history of severe cardiac amyloidosis which was confirmed by biopsy in 2009, also atrial fib/flutter on warfarin and is status post ablation in 2006.  The situation is complicated by chronic renal disease and a right-sided CVA in February of this year.  He is currently undergoing aggressive treatment for his significant cardiac disease.  The patient's situation is complicated by his need for dobutamine drip, which makes placement in a TCU all but impossible.      During his hospitalization, he has been showing a lot of confusion particularly in the evenings.  He also has a tendency to get out of bed so a sitter is required to watch for problematic behaviors.  He has been seen by neurology who have ruled out seizures.  I note from their report that he was not very compliant with completing the mental status exam.  This is certainly the case during my visit; during the end of my interview he became an increasingly reluctant historian.  He has a very faulty grasp of past medication, interventions, etc.  Normally his wife is present to provide collateral information, but she is not here today due to the poor road conditions.  Apparently, he had a trial of Lexapro quite recently, but this did not help and there are of course concerns now about his QTc, which as of the 12th of this month was 567.  Zyprexa was started at 2.5 mg on 04/07 and prior to this, his QTc was 526.  Zyprexa was increased to 5 mg last night and did not seem to affect his degree of agitation.  It also did not help with his sleep.      PAST PSYCHIATRIC HISTORY:  It appears that the medication trials have been limited to the  Lexapro and more recently Zyprexa.  No psychotherapy.  He said he is very involved with the [employer] which provides a great deal of support for him.      SUBSTANCE USE HISTORY:  There is no use of drugs, alcohol or tobacco.      PAST MEDICAL HISTORY:  Atrial fibrillation, cardiac emboli, coronary artery disease, venous insufficiency.        SURGERIES:  He has had some knee surgery and coronary artery stents, some shoulder and back surgeries.      ALLERGIES:  GABAPENTIN AND LYRICA.      CURRENT PSYCHIATRIC MEDICATIONS:  Zyprexa 5 mg at bedtime.        REVIEW OF SYSTEMS:  A 10-point review of systems was completed and was negative except for some shortness of breath and a feeling of generalized malaise.      FAMILY HISTORY:  He was unable to provide a family history of any psychiatric or chemical use problems.      SOCIAL HISTORY:  He said he attended college at the [employer] where he has got his PhD in electrical engineering.  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  He has 2 grown children and his wife is very supportive.      MENTAL STATUS EXAMINATION:  He is sitting up quietly in bed and is noted to have a bedside attendant.  He has no muscular rigidity or cogwheeling of the upper extremities.  He is well groomed.  Speech is a bit dysarthric and hard to understand.  Thought process directed.  Associations tight.  Denies delusions or hallucinations.  Mood is depressed, affect quite restricted.  He was 1 day off on the day and date, but is oriented to place.  Recent and remote memory, attention span and concentration are difficult to formally assess since he would not cooperate with elements of a mental status exam.  Use of language and fund of knowledge are somewhat impaired.  Insight and judgment fair.      DIAGNOSES:  Major depressive disorder, unspecified neurocognitive disorder, delirium.      IMPRESSION:  Unfortunately, due to his cardiac condition, use of the  antipsychotics is of concern because of the effect on the QTc.  He says Zyprexa even low dose did seem to be associated with a significant increase the QTc after it was started on .  Furthermore, it does not seem to help much with his ing.  There is significant literature that suggests that Latuda does not have significant effects on the QTc in contrast to the other antipsychotics.      RECOMMENDATIONS:   1.  I would discontinue the Zyprexa and recheck another EKG today   2.  Trial of Latuda 20 mg after evening meal.  If this is reasonably well tolerated, it does not give problematic effects with his QTc, can increase this to 40 mg. This needs to taken after at least 350 calories of intake to be properly absorbed.   3.  Please reconsult psychiatry as needed.      Revised account 04/15/2018 raúl         MERLYN GARCIA MD             D: 04/15/2018   T: 04/15/2018   MT: NTS      Name:     ERIKA ROSE   MRN:      [id number]        Account:       [id number]   :      1940           Consult Date:  04/15/2018      Document: [id number]       cc: Ángel Oates MD

## 2018-04-16 NOTE — PROGRESS NOTES
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 Alertness markedly improved on inotropes but MS still poor.    Today:  Ongoing discussions with family, palliative, and social work regarding goals of care and discharge plan.     #Cardiac amyloidosis  #HFrEF NYHA IV D (EF 15-20%)  RHC on 4/3. PCW 20. RA 17. PA 43/25 (33). RV 43/17. Son CI 1.4. Patient is volume overloaded and in cardiogenic shock. Suspect this is the likely cause of patient's altered mental status, LOIS, and possibly the ventriculomegaly. PICC placed on 4/3 to begin dobutamine infusion. His mental status and renal function have improved with the dobutamine infusion, improving his quality of life. His prognosis remains very poor.  - Continue dobutamine at 5 mcg/kg/min  - Discontinued hydralazine due to soft BP's  - Diuresis: Bumex 2 mg BID.  - Palliative consulted, appreciate recs re: GOC and d/c plans    #Multifactorial Encephalopathy, improving  #Delirium  #Dementia (MoCA 16/30)  #Ventriculomegaly / possible normal pressure hydrocephalus  #h/o right MCA CVA  Likely due to cardiogenic shock, although also had extensive workup for other causes. CT scan with enlarged venticles. No mass or acute CVA. Sepsis also possible contributor. Possibly uremia. Low suspicion for SBP. Patient's history concerning for NPH. LP done on 4/2. Attempted large volume (25-30 cc); however, was only able to obtain 0.5 cc.  - Treatment by etiology: UTI / PNA (s/p tx with abx), uremia (treated with HD/diuresis, now with adequate UOP), NPH (neurology recommends no further inpatient workup), baseline dementia / CVA, cardiogenic  shock (see above)  - Psychiatry consulted: Latruda 20 mg and melatonin 6 mg qhs prn for agitation, less risk of QTc prolongation. Patient's mental status has improved in the past with better volume status.  - Will not give trazodone given risk of QTc prolongation  - Neurology signed off. Recommended follow-up in movement disorder clinic for workup of possible NPH, although this is less likely now given patient's poor cardiac prognosis.  - PT/OT  - Patient will need to be without a sitter for 24 hours for placement.     #CKD-V   Tunneled line by IR 3/29. Started HD 3/29. Urine output increased and Cr stable/improved with dobutamine. No further HD and line was removed. Diurese as necessary  - Monitor UOP, Cr  - Nephrology signed off, no further HD.  - IR removed dialysis catheter 4/12.    #CAD s/p ATIF to LAD 2015  - Continue pta atorvastatin 80 mg qday  - ASA 81 mg daily  - Avoid BB with cardiac amyloid     #Afib/flutter s/p CTI ablation 2006  Remains in afib. Was on sotalol but this was discontinued around 08/2017 per outside cardiology notes. Discontinued warfarin on 4/3 due to labile INR's and started on apixaban instead.  - Apixaban 2.5 mg BID (renally dosed)    #Aspiration PNA, resolved  #Urinary tract infection, resolved  Started on Unasyn on 3/27. Switched to Zosyn on 3/28 per ID recs. Restarted on Unasyn on 3/30 when sensitivities returned on UTI (Klebsiella pneumoniae). Completed course of Unasyn on 4/4. Repeat chest x-ray on 4/14 stable. Patient remains afebrile.    # Cachexia  Patient's volume overload somewhat obscures his severe malnutrition, but he has temporal wasting.    #Ascites  Since RHC shows cardiogenic shock that is likely causing the ascites, will treat underlying cause. No paracentesis for now.    FEN: Cardiac diet  PPX: Apixaban  Code Status: FULL  Dispo: Awaiting discharge to care facility, most likely. Discussing with palliative and family.    This patient was discussed with Dr. Marcos  who agrees with the assessment and plan.    Jamie Parkinson, DDS  PGY-1  Pager: [phone]    Subjective & Interval Hx:    No acute events overnight. Required a sitter due to restlessness.    Patient pleasantly confused this morning. Denies chest pain, shortness of breath, abdominal pain.    Last 24 hr care team notes reviewed.   ROS: 4 point ROS including Respiratory, CV, GI and , other than that noted in the HPI, is negative    Physical Exam:  Blood pressure 106/70, pulse 93, temperature 98  F (36.7  C), temperature source Oral, resp. rate 20, weight 65.3 kg (143 lb 15.4 oz), SpO2 100 %.     Gen: Elderly male, resting comfortably in bed, NAD  HEENT: NCAT, anicteric  Neck: Supple, JVP elevated  Pulm: Anterior lung fields clear to auscultation bilaterally  CV: Tachycardic, irregularly irregular rhythm, S1/S2, no m/r/g  ABD: Soft, ntnd, normal BS  EXT: Bilateral 1+ lower extremity edema  Skin: No rashes or skin lesions visible  NEURO: Alert, oriented to person, no new focal neurological changes  Psych: Appropriate, pleasant    Lines/Tubes: L PICC, L PIV    Labs & Studies of Note:   - reviewed all labs in Epic today.    Imaging reviewed.

## 2018-04-16 NOTE — PLAN OF CARE
Problem: Patient Care Overview  Goal: Plan of Care/Patient Progress Review  RN  1. Pt will be free from injury   OT/CR 6C:  Discharge Planner OT   Patient plan for discharge: Pt unable to appropriately state; per medical chart, family is hopeful for pt to go to TCU   Current status: Pt completed x8 reps total sit to stand from bedside chair to FWW with min-modAx2; increasing assist with fatigue. Pt required seat rest breaks between bouts; tolerated standing in place for 10-15 secs with FWW and CGAx2, occasionally marching in place. Pt ambulated 6 steps forward/backward with FWW and CGAx2. Pt motivated to work with therapy today and to progress activity tolerance.   Barriers to return to prior living situation: mental status, weakness, acute medical needs, impaired balance  Recommendations for discharge: Per plan established by the Occupational Therapist, the recommendation for discharge location is  TCU vs. LTC pending goals of care and pt ability to more actively participate in therapy.  Rationale for recommendations: Pt requires assist for all mobility and self cares;  Recommend continued therapies to progress safety and independence; pending goals of care.        Entered by: Mary Vázquez 04/16/2018 3:58 PM

## 2018-04-17 NOTE — PLAN OF CARE
Problem: Patient Care Overview  Goal: Plan of Care/Patient Progress Review  RN  1. Pt will be free from injury   OT/CR 6C:  Discharge Planner OT   Patient plan for discharge: Pt unable to appropriately state; per medical chart, family is hopeful for pt to go to TCU   Current status: Pt completed x11 reps sit to stand to FWW and minAx2; tolerated standing 15 seconds between bouts; seated rest breaks between each. Pt ambulated ~10 ft with FWW and CGA x 2, min vc for upright posture. Due to fatigue required max A x 2 to safely turn and sit in wheelchair following ambulation.  Barriers to return to prior living situation: mental status, weakness, acute medical needs, impaired balance  Recommendations for discharge: Per plan established by the Occupational Therapist, the recommendation for discharge location is TCU vs. LTC pending goals of care and pt ability to more actively participate in therapy.   Rationale for recommendations: Recommend continued therapies to progress safety and independence;  pending pt ability to continue therapy with increased participation, pt would benefit from continued therapies to increase functional endurance and performance with ADLs/IADLs.        Entered by: Mary Vázquez 04/17/2018 11:36 AM

## 2018-04-17 NOTE — PROGRESS NOTES
CLINICAL NUTRITION SERVICES - REASSESSMENT NOTE     Nutrition Prescription    RECOMMENDATIONS FOR MDs/PROVIDERS TO ORDER:  Monitor pt's POC. Consider reordering kcal counts if oral intake decreases and eating less than 50% of meals consistently.     Malnutrition Status:    Non-severe malnutrition in the context of chronic illness    Future/Additional Recommendations:  1. Consider liberalizing diet order to low-sodium, if able.  2. Fluid restriction if warranted.    3. Cautiously replace K+ (K+ 2.9 on 4/17).  4. Monitor glycemic control. Hgb A1c of 6.4 on 11/13/17.   5. Continue scheduled bowel regimen. Monitor GI status with h/o constipation and moderate stool burden noted on AXR 4/11.  6. Consider checking folic acid lab.   7. If TFs should be needed or if consistent with POC, then refer to nutrition note 4/4.     EVALUATION OF THE PROGRESS TOWARD GOALS   Diet: Cardiac diet order since 4/4. Ordered to receive Nepro between meals (ordered twice daily currently with rotating flavors).   Intake: Fair diet tolerance. Flowsheets indicate pt consuming 75% of meals with a good appetite 4/10, % of meals with a good appetite 4/11, % of meals with a fair to good appetite 4/12, % of meals 4/13, % of meals with a poor to good appetite 4/14, 25-75% of meals 4/15, 50-75% of meals with a fair to good appetite 4/16, and 100% of meals with a good appetite 4/17. Pt with some confusion during visit. Pt's wife, Lavern, was at his bedside. Per Lavern, pt's appetite is better than it was a week ago. Pt likes all flavors of the Nepro (butter pecan, berry, and vanilla) and has been consuming an average of three of these per day per Lavern. Per Lavern and attendant, pt with a good appetite and ate well for breakfast and lunch today (4/17/2018).     NEW FINDINGS   [facility] nurse 4/16: Coccyx wound due to Moisture Associated Skin Damage (MASD) and friction injury within scar tissue. Status: initial assessment. No  indication of pressure injury in note.     ASSESSED NUTRITION NEEDS (updated)  Dosing Weight: 65 kg (actual, based on lowest wt this admission of 64.7 kg on 4/17)  Estimated Energy Needs: 2393-7553 kcals/day (30-35 kcals/kg)  Justification: Increased needs with stress factors  Estimated Protein Needs: 72-91 grams protein/day (1.1-1.4 grams of pro/kg)  Justification: Increased needs with cardiac status  Estimated Fluid Needs: 8228-1239 mL/day (25 - 30 mL/kg)   Justification: Maintenance needs or per team, pending fluid status    MALNUTRITION  % Intake: Not meeting this criteria as per discussion with pt's wife.  % Weight Loss: Weight loss does not meet criteria. Although, difficult to assess with diuresis.  Subcutaneous Fat Loss: Facial region:  Mild, Arms: Mild  Muscle Loss: Temporal:  Mild and Thoracic region (clavicle, acromium bone, deltoid, trapezius, pectoral):  Moderate  Fluid Accumulation/Edema: Does not meet criteria  Malnutrition Diagnosis: Non-severe malnutrition in the context of chronic illness    Previous Goals   Patient to consume % of nutritionally adequate meal trays TID, or the equivalent with supplements/snacks.  Evaluation: Meeting at times, but not consistently.     Previous Nutrition Diagnosis  Inadequate oral intake related to altered mentation, lack of appetite intermittently, and NPO at times as evidenced by pt consuming 25-50% of meals at times.    Evaluation: Unchanged.     CURRENT NUTRITION DIAGNOSIS  Inadequate oral intake related to altered mentation, lack of appetite intermittently, and NPO at times as evidenced by pt consuming 25-50% of meals at times.      INTERVENTIONS  Implementation  Nutrition education for nutrition relationship to health/disease: Encouraged oral intake and intake of supplements.     Goals  Patient to consume % of nutritionally adequate meal trays TID, or the equivalent with supplements/snacks.    Monitoring/Evaluation  Progress toward goals will be  monitored and evaluated per protocol.     Nutrition will continue to follow.      Shereen Toro MS, RD, LD, [facility]   6C Pgr: [phone]

## 2018-04-17 NOTE — PLAN OF CARE
Problem: Patient Care Overview  Goal: Plan of Care/Patient Progress Review  RN  1. Pt will be free from injury   Outcome: No Change  Pt continues to be restless and confused overnight. Sleeps intermittently between cares. 1:1 sitter at bedside. Gets easily irritable. Tele Afib. Sats 98% RA. BP soft at times 96/75. Dobutamine gtt continues at 5 mcg/kg/min. Mepilex on coccyx. Gets up with assist of 2 to commode. Awaiting decision of whether pt will discharge with hospice or continue with rehab. Code status to be discussed at next conference with family. Is full code. Will continue to monitor pt.

## 2018-04-17 NOTE — CONSULTS
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  He has elevated QTc, delirium with sundowning, and I am asked to evaluate his medication regimen by Dr. Parkinson.        Prior to interviewing this patient, I had an opportunity to review the initial psychiatric consultation, which was completed by Dr. Cedeno on 04/15/2018.  I note that the patient's QTc went up to about 560 after starting Zyprexa.  Dr. Cedeno stopped the Zyprexa and started lurasidone 20.  Happily, the patient's family reports his sleep was a little better and his mental status is a little clearer today.  The concern is that if he continues to need a sitter, as well as a dobutamine drip, he will have a great deal of difficulty ever leaving the hospital.      On my interview, the patient was somewhat irritable and somewhat confused.  He is very interested in getting a good night's sleep.  Occasionally, his speech becomes incoherent.      MENTAL STATUS EXAMINATION:  On my interview, the patient was a pleasant and cooperative -American male sitting in a wheelchair.  It was clear that he became angry quickly, however, and he was particularly angry with nursing staff for checking his orientation.  His orientation has actually been quite variable, but always impaired.  His mood and affect are both somewhat irritable and labile.  His speech is occasionally incoherent.  His associations were not always tight.  Thought processes were not always logical and linear.  Content of thought was without overt psychosis or suicidal ideation.  Recent and remote memory, concentration and fund of knowledge were all impaired.  Use of language was generally normal.  He was alert, but variably oriented.  Insight and judgment are guarded.  Muscle strength and tone appear to be decreased.  Recent vitals include a temperature of 97.5, heart rate  of 89, respiration rate of 18 with 99% oxygen saturation, and a blood pressure of 96/62.      ASSESSMENT:  Delirium.      RECOMMENDATION:  Increase lurasidone to 40 mg at bedtime.  Please monitor QTc.         ERIKA RUFF MD             D: 2018   T: 2018   MT: DT      Name:     ERIKA ROSE   MRN:      [id number]        Account:       [id number]   :      1940           Consult Date:  2018      Document: [id number]

## 2018-04-17 NOTE — PLAN OF CARE
D:pt up as tolerated, assist of two for stability  A:pt tolerated being up in chair for an hour  I:pace activities  P:per Primary

## 2018-04-17 NOTE — PLAN OF CARE
Problem: Patient Care Overview  Goal: Plan of Care/Patient Progress Review  RN  1. Pt will be free from injury   Pt is AxOx1 to self. He continues to be confused to time, situation, and place. He also continues to be easily irritated to the point of anger. Pt is able to be redirected. Family is here. MD saw him today to talk about his sundowning. Awaiting orders. Pt continues to have difficulty voiding. Incontinent x1. Unable to use urinal several times. Bladder  Scanned at 377cc. Attempt made to straight cath. Pt refused mid procedure. Flomax ordered.  Pt continues to have PVC with his A Fib. VSS for his norm. BP occasionally soft. No BM this shift.

## 2018-04-17 NOTE — PROGRESS NOTES
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 Alertness markedly improved on inotropes but MS still poor.    Today:  Ongoing discussions with family, palliative, and social work regarding goals of care and discharge plan.  Planning for GOC conference tomorrow at 2 pm.     #Cardiac amyloidosis  #HFrEF NYHA IV D (EF 15-20%)  RHC on 4/3. PCW 20. RA 17. PA 43/25 (33). RV 43/17. Son CI 1.4. Patient is volume overloaded and in cardiogenic shock. Suspect this is the likely cause of patient's altered mental status, LOIS, and possibly the ventriculomegaly. PICC placed on 4/3 to begin dobutamine infusion. His mental status and renal function have improved with the dobutamine infusion, improving his quality of life. His prognosis remains very poor.  - Continue dobutamine at 5 mcg/kg/min  - Discontinued hydralazine due to soft BP's  - Diuresis: Bumex 2 mg BID.  - Palliative consulted, appreciate recs re: GOC and d/c plans    #Multifactorial Encephalopathy, improving  #Delirium  #Dementia (MoCA 16/30)  #Ventriculomegaly / possible normal pressure hydrocephalus  #h/o right MCA CVA  Likely due to cardiogenic shock, although also had extensive workup for other causes. CT scan with enlarged venticles. No mass or acute CVA. Sepsis also possible contributor. Possibly uremia. Low suspicion for SBP. Patient's history concerning for NPH. LP done on 4/2. Attempted large volume (25-30 cc); however, was only able to obtain 0.5 cc.  - Treatment by etiology: UTI / PNA (s/p tx with abx), uremia (treated with HD/diuresis, now with adequate UOP), NPH (neurology recommends no further inpatient  workup), baseline dementia / CVA, cardiogenic shock (see above)  - Psychiatry consulted: Latruda 20 mg and melatonin 6 mg qhs prn for agitation, less risk of QTc prolongation. Patient's mental status has improved in the past with better volume status.  - Will not give trazodone given risk of QTc prolongation  - Neurology signed off. Recommended follow-up in movement disorder clinic for workup of possible NPH, although this is less likely now given patient's poor cardiac prognosis.  - PT/OT  - Patient will need to be without a sitter for 24 hours for placement.     #CKD-V   Tunneled line by IR 3/29. Started HD 3/29. Urine output increased and Cr stable/improved with dobutamine. No further HD and line was removed. Diurese as necessary  - Monitor UOP, Cr  - Nephrology signed off, no further HD.  - IR removed dialysis catheter 4/12.    #CAD s/p ATIF to LAD 2015  - Continue pta atorvastatin 80 mg qday  - ASA 81 mg daily  - Avoid BB with cardiac amyloid     #Afib/flutter s/p CTI ablation 2006  Remains in afib. Was on sotalol but this was discontinued around 08/2017 per outside cardiology notes. Discontinued warfarin on 4/3 due to labile INR's and started on apixaban instead.  - Apixaban 2.5 mg BID (renally dosed)    #Aspiration PNA, resolved  #Urinary tract infection, resolved  Started on Unasyn on 3/27. Switched to Zosyn on 3/28 per ID recs. Restarted on Unasyn on 3/30 when sensitivities returned on UTI (Klebsiella pneumoniae). Completed course of Unasyn on 4/4. Repeat chest x-ray on 4/14 stable. Patient remains afebrile.    # Cachexia  Patient's volume overload somewhat obscures his severe malnutrition, but he has temporal wasting.    #Ascites  Since RHC shows cardiogenic shock that is likely causing the ascites, will treat underlying cause. No paracentesis for now.    FEN: Cardiac diet  PPX: Apixaban  Code Status: FULL  Dispo: Awaiting discharge to care facility, most likely. Discussing with palliative and  family.    This patient was discussed with Dr. Marcos who agrees with the assessment and plan.    Jamie Parkinson, GIGIS  PGY-1  Pager: [phone]    Subjective & Interval Hx:    No acute events overnight. Required a sitter due to restlessness.    Patient seems more alert this morning. He is up in his chair reading the newspaper and is able to discuss an article he read. He still gets confused and answers questions inappropriately at times. Denies chest pain, shortness of breath, abdominal pain.    Last 24 hr care team notes reviewed.   ROS: 4 point ROS including Respiratory, CV, GI and , other than that noted in the HPI, is negative    Physical Exam:  Blood pressure 96/62, pulse 93, temperature 97.5  F (36.4  C), temperature source Oral, resp. rate 18, weight 64.7 kg (142 lb 10.2 oz), SpO2 99 %.     Gen: Elderly male, sitting comfortably in bed, NAD  HEENT: NCAT, anicteric  Neck: Supple, JVP elevated  Pulm: Anterior lung fields clear to auscultation bilaterally  CV: Tachycardic, irregularly irregular rhythm, S1/S2, no m/r/g  ABD: Soft, ntnd, normal BS  EXT: Bilateral 1+ lower extremity edema  Skin: No rashes or skin lesions visible  NEURO: Alert, oriented to person, no new focal neurological changes  Psych: Appropriate, pleasant    Lines/Tubes: L PICC, L PIV    Labs & Studies of Note:   - reviewed all labs in [facility] today.    Imaging reviewed.

## 2018-04-18 NOTE — PROGRESS NOTES
Shift Update 2505-2414:  Refer to flow sheets for full assessments, VS, labs etc. Continues with Attendant @ BS. Oriented to person, illogical conversation, incontinent urine, started Flomax po.  AM K+ noted to be 2.9, Cards 1 MD notified and instructed to replace per Protocol, patient received Klor 40 meq in juice, and KCl 20 meq IV x2, will recheck K+ 4 hours after last dose. Monitor Atrial Fib with frequent PVC, one 7 beat run noted at start of K+ replacement, patient feels palpitations.Dobutamine infusing 5mcg/kg/min. Up to chair with heavy assist of two.

## 2018-04-18 NOTE — PROGRESS NOTES
Social Work Services Progress Note    Hospital Day: 22  Date of Initial Social Work Evaluation:  3/29/18  Collaborated with: Spouse, Daughter, Palliative NP, Cards 1 residents,  Hospice liaison, damian ZAPATA    Data:  Pt is a 77 year old male being followed by SW for placement to LTC with dobutamine.  Pt has been accepted to [facility] for LTC.    Intervention:  Family care conference held today to address plans of care and barriers to placement.  Cards 1 team also concerned about code status and wanted to address life saving measures given pt's clinical presentation.    At this time, the family is not interested in hospice care as they are still wanting pt's medical care to be managed by lab draws, managing low sodium diet, etc.  Family is also very worried about any measures that could discontinue dobutamine as their frame of reference is that pt is at high risk for imminent death (2-3 days post dc of dobutamine) and family does not want the pt to die.  Family also stating they are trying to carry forward pt's wishes made in TCU at [facility] about a month ago.  Family states that when the pt was at [facility] he wanted to be full code and full restorative measures.  It was explained to family by the team that if full code is pursued, in emergency pt has a very low likelihood that he will be able to regain his current level of functioning after having CPR/defibrillation and or intubation.  Family will consider this risk and will be deciding if they are wanting to change the pt to DNR/DNI.    SW addressed questions regarding alternative funding for LTC placement.  At this time family has an appointment with their elder law  on Monday to assist the spouse with division of assets and income so that the pt will be eligible for Medicaid support in LTC.  Family maintains they cannot afford a $600.00 per day copay in LTC.  Family also maintaining that they cannot care for the pt at home as the spouse does not  "have enough support to be a full time caregiver.  SW asked for family's permission to anonymously reach out to the [facility] [employer] for any foundation support to assist with the pt's care.  Spouse states she prefers to be anonymous as the pt would not want peers knowing of his financial hardship.  SW to also reach out to [facility] with assistance from  Hospice liaison to address if the hospice residence can 1) support dobutamine and 2) is there tristin/scholarship support for financial hardship.    SW has reached out to [facility] with a denial for dobutamine.   Hospice liaison got a denial from [facility] for dobutamine.  If the family is wanting to discontinue dobutamine permanently, [facility] or [facility] would be of preference for placement.    Referrals in Progress:   [facility] - accepted to LTC, barrier for the family is financial hardship  PH: [phone]  F: [phone]    Assessment: Spouse and daughter tearful and visibly overwhelmed with the decisions to be made (ie. Code status and trialing dc of the dobutamine).  Family gave verbal permission to make anonymous inquiries about financial support through the [place].  Family tearful when talking about how their lives have changed from being an affluent community member to low income.  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    Plan:    Anticipated Disposition: TBD, LTC continues to be an option, family not wanting hospice yet.    Barriers to d/c plan:  Cost of TCU/LTC, sitter/VPM, dobutamine needs, financial hardship, lack of social support to take the pt home    Follow Up:  SW to follow for discharge needs.    JERRI Valdovinos, QUINCYW  6C Unit   Phone: [phone]  Pager: [phone]  Unit: " [phone]      ___________________________________________________________________________________________________________________________________________________    Referrals Discontinued:  [facility] - cannot take pt's with dobutamine  [facility] - cannot take pts with dobutamine.     Community Case Management/Community Services in place:   Family will be pursuing elder law  meeting on Monday to discuss division of assets to assist the pt with pursuing Medicaid coverage.

## 2018-04-18 NOTE — PLAN OF CARE
Problem: Patient Care Overview  Goal: Plan of Care/Patient Progress Review  RN  1. Pt will be free from injury   Discharge Planner OT   Patient plan for discharge: Per chart review, pt's family preferring TCU  Current status: Pt significantly lethargic and with moderate difficulty following basic commands. Completed sit <> stand with mod/max A x 2 with increased VCs for hand placement and walker safety. Pt standing with max A x 2 and FWW ~2-3 minutes, despite VCs could not sequence stand > sit or take steps to bed. Required maximal VCs and tactile cues to return to chair safely.   Barriers to return to prior living situation: AMS, decreased strength/activity tolerance, balance deficits, dependence for all ADLs/mobility.  Recommendations for discharge: TCU pending continued progress and participation in therapy. However pt inconsistent with participation and with limited progress, may benefit more from LTC pending medical needs and goals of care.  Rationale for recommendations: Pt is currently below functional baseline for ADLs, transfers, and functional mobility, would benefit from continued therapy to address above deficits and maximize strength/independence in order to return to PLOF, however pending medical needs and goals of care may be more appropriate for LTC setting.       Entered by: Gloria Morris 04/18/2018 11:21 AM

## 2018-04-18 NOTE — PLAN OF CARE
Problem: Patient Care Overview  Goal: Plan of Care/Patient Progress Review  RN  1. Pt will be free from injury   Outcome: No Change    D: Hx cardiac amyloidosis, afib/flutter, CAD, CKD4, CVA. Admitted 3/26 with AMS.  I/A: VSSA, on room air (refusing VS overnight). Monitor shows afib/2:1 aflutter 100s. Dobutamine gtt @ 5 mcg/kg/min (11.4 ml/hr). Reports of generalized pain controlled with prn tylenol and hot packs. Stomach cramping relieved with prn simethicone. Pt alert to self and place at times, otherwise disoriented x 4. From roughly 3014-8991 pt was agitated and confused. Condom catheter placed, adequate UO. Incontinent of stool x 1. Turned/repositioned and incontinence care as needed. Pt with little to no sleep overnight.   P: Plan for care conference today at 1400. Continue to monitor and notify Cards 1 with pertinent changes.

## 2018-04-18 NOTE — PROGRESS NOTES
[facility], [place]   Palliative Care Daily Progress Note          Recommendations, Patient/Family Counseling & Coordination    Pt seen and examined. Nursing notes reviewed. Family present for care conference this afternoon. Continues with 1:1 sitter for nighttime agitation and delirium superimposed on encephalopathy likely related to prior stroke.  Notes from CC: IN 1410 OUT 1440   Cardiology team presented current clinical status. There is consideration to decrease dobutamine support given he has diuresed well and having less sx burden. This may expand discharge options.  Wife (Nu) tearfully notes she is not capable of caring for him at home with his present level of assistance. He is not likely to regain functional strength.   We reviewed options and that discharge should include consideration for hospice care.      REC: - Continue Melatonin and recently increased Latuda per psychiatry. Support nursing efforts to help day/night cycle health.  - stool softeners, suppositories for bowel program- continue scheduled bowel meds- hold if loose stools.   - PT seeing for energy conservation and functional recovery. Requiring max assist of 2 and poor activity tolerance.  - Ongoing requirement for IV inotropes - has been preliminarily OK'ed by  Hospice.   - from PC perspective discharge should ideally be hospice and comfort focused- family not at that place.    SNF placement likely as his care needs are increasing.    -  Goals of Care: His goals- earlier verbalized by family- remain cautiously restorative- somewhat in denial of slow decline and diminishing treatment options of his heart failure.    Per SW and family- financial concerns limit options for expanded end of life care (residential hospice). Evaluating options including benevolent care consideration.   - Briefly reviewed code status with  family present- Will continue to discuss at future interactions     POLST- not completed at  this time     Thank you for the opportunity to continue to participate in the care of this patient and family.  Please feel free to contact on-call palliative provider with any emergent needs.  We can be reached via team pager [phone] (answered 8-4:30 Monday-Friday); after-hours answering service ([phone])    Jamie GREER NP ACHPN  Nurse Practitioner- Lead Advanced Practice Provider  [facility] Palliative Medicine Consult Service   [phone]    Jamie Myrick NP    TT spent: 44 minutes of which 40 minutes were spent in direct face to face contact with patient/family. Greater than 50% of time spent counseling and/or coordinating care.       Assessment      1) Diagnoses & symptoms:        Cardiac Amyloidosis- end stage disease  A fib /flutter (on warfarin)  Stenting of CAD 2015  CKD Stage IV- likely cardiorenal component   CVA 2/2017 R MCA  AMS            Interval History:   Unimproved sleep disturbance - ongoing for past month or more per family- likely contributing to confusion and agitation. Disposition planning underway.            Review of Systems:   Palliative Symptom Review (0=no symptom/no concern, 1=mild, 2=moderate, 3=severe):      Pain: 0       Fatigue: 1      Nausea: 0-1      Constipation: 1      Diarrhea: 0      Depressive Symptoms: 0-1      Anxiety: 1      Drowsiness: 1-2      Poor Appetite: 1      Shortness of Breath: 0      Insomnia: 2-3      Confusion 2              Medications:   I have reviewed this patient's medication profile and medications given in past 24 hours.      {   Physical exam .Vitals: /61 (BP Location: Right arm)  Pulse 70  Temp 98.4  F (36.9  C) (Axillary)  Resp 18  Wt 64.7 kg (142 lb 10.2 oz)  SpO2 100%  BMI 21.06 kg/m2  BMI= Body mass index is 21.06 kg/(m^2).   GEN: Awake,pleasant, in bedside chair.- oriented to staff, place and circumstances of hospitalization-               Data Reviewed:   *  ROUTINE LABS (Last four results)  BMP    Recent  Labs  Lab 04/18/18  0558 04/17/18  2205 04/17/18  0645 04/16/18  0731 04/15/18  0535     --  133 134 132*   POTASSIUM 4.9 4.9 2.9* 4.9 5.6*   CHLORIDE 92*  --  91* 91* 91*   RIGOBERTO 8.9  --  8.9 9.1 8.9   CO2 32  --  30 32 30   BUN 79*  --  75* 75* 70*   CR 2.64*  --  2.42* 2.41* 1.81*   GLC 96  --  91 98 139*     CBC    Recent Labs  Lab 04/14/18  0652   WBC 5.3   RBC 3.30*   HGB 10.8*   HCT 32.8*   MCV 99   MCH 32.7   MCHC 32.9   RDW 16.4*   *     INRNo lab results found in last 7 days.

## 2018-04-18 NOTE — PROGRESS NOTES
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 Alertness markedly improved on inotropes but MS still poor.    Today:  Ongoing discussions with family, palliative, and social work regarding goals of care and discharge plan.  Planning for care conference today at 2 pm.     #Cardiac amyloidosis  #HFrEF NYHA IV D (EF 15-20%)  RHC on 4/3. PCW 20. RA 17. PA 43/25 (33). RV 43/17. Son CI 1.4. Patient is volume overloaded and in cardiogenic shock. Suspect this is the likely cause of patient's altered mental status, LOIS, and possibly the ventriculomegaly. PICC placed on 4/3 to begin dobutamine infusion. His mental status and renal function have improved with the dobutamine infusion, improving his quality of life. His prognosis remains very poor.  - Continue dobutamine at 5 mcg/kg/min  - Discontinued hydralazine due to soft BP's  - Diuresis: Bumex 1 mg BID.  - Palliative consulted, appreciate recs re: GOC and d/c plans    #Multifactorial Encephalopathy, improving  #Delirium  #Dementia (MoCA 16/30)  #Ventriculomegaly / possible normal pressure hydrocephalus  #h/o right MCA CVA  Likely due to cardiogenic shock, although also had extensive workup for other causes. CT scan with enlarged venticles. No mass or acute CVA. Sepsis also possible contributor. Possibly uremia. Low suspicion for SBP. Patient's history concerning for NPH. LP done on 4/2. Attempted large volume (25-30 cc); however, was only able to obtain 0.5 cc.  - Treatment by etiology: UTI / PNA (s/p tx with abx), uremia (treated with HD/diuresis, now with adequate UOP), NPH (neurology recommends no further inpatient  workup), baseline dementia / CVA, cardiogenic shock (see above)  - Psychiatry consulted: Latruda 20 mg and melatonin 6 mg qhs prn for agitation, less risk of QTc prolongation. Patient's mental status has improved in the past with better volume status. Will discuss increasing Latruda dose with family.  - Will not give trazodone given risk of QTc prolongation  - Neurology signed off. Recommended follow-up in movement disorder clinic for workup of possible NPH, although this is less likely now given patient's poor cardiac prognosis.  - PT/OT  - Patient will need to be without a sitter for 24 hours for placement.     #CKD-V   Tunneled line by IR 3/29. Started HD 3/29. Urine output increased and Cr stable/improved with dobutamine. No further HD and line was removed. Diurese as necessary  - Monitor UOP, Cr  - Nephrology signed off, no further HD.  - IR removed dialysis catheter 4/12.    #CAD s/p ATIF to LAD 2015  - Continue pta atorvastatin 80 mg qday  - ASA 81 mg daily  - Avoid BB with cardiac amyloid     #Afib/flutter s/p CTI ablation 2006  Remains in afib. Was on sotalol but this was discontinued around 08/2017 per outside cardiology notes. Discontinued warfarin on 4/3 due to labile INR's and started on apixaban instead.  - Apixaban 2.5 mg BID (renally dosed)    #Aspiration PNA, resolved  #Urinary tract infection, resolved  Started on Unasyn on 3/27. Switched to Zosyn on 3/28 per ID recs. Restarted on Unasyn on 3/30 when sensitivities returned on UTI (Klebsiella pneumoniae). Completed course of Unasyn on 4/4. Repeat chest x-ray on 4/14 stable. Patient remains afebrile.    # Cachexia  Patient's volume overload somewhat obscures his severe malnutrition, but he has temporal wasting.    #Ascites  Since RHC shows cardiogenic shock that is likely causing the ascites, will treat underlying cause. No paracentesis for now.    FEN: Cardiac diet  PPX: Apixaban  Code Status: FULL  Dispo: Awaiting discharge to care facility, most  "likely. Discussing with palliative and family.    This patient was discussed with Dr. Marcos who agrees with the assessment and plan.    Jamie Parkinson, GIGIS  PGY-1  Pager: [phone]    Subjective & Interval Hx:    No acute events overnight. Required a sitter due to restlessness, per report he did not sleep at all last night.    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 Denies chest pain, shortness of breath, abdominal pain.    Last 24 hr care team notes reviewed.   ROS: 4 point ROS including Respiratory, CV, GI and , other than that noted in the HPI, is negative    Physical Exam:  Blood pressure 96/70, pulse 70, temperature 97.8  F (36.6  C), temperature source Axillary, resp. rate 16, weight 64.7 kg (142 lb 10.2 oz), SpO2 99 %.     Gen: Elderly male, resting comfortably in bed, NAD  HEENT: NCAT, anicteric  Neck: Supple, JVP elevated  Pulm: Anterior lung fields clear to auscultation bilaterally  CV: Tachycardic, irregularly irregular rhythm, S1/S2, no m/r/g  ABD: Soft, ntnd, normal BS  EXT: Bilateral 1+ lower extremity edema  Skin: No rashes or skin lesions visible  NEURO: Alert, oriented to person, no new focal neurological changes  Psych: Appropriate, pleasant    Lines/Tubes: L PICC, L PIV    Labs & Studies of Note:   - reviewed all labs in [facility] today.    Imaging reviewed.  "

## 2018-04-18 NOTE — PLAN OF CARE
Problem: Patient Care Overview  Goal: Plan of Care/Patient Progress Review  RN  1. Pt will be free from injury   PT / 6C - Pt unavailable for PT session at time of attempt 2/2 care conference being held.

## 2018-04-18 NOTE — PLAN OF CARE
Problem: Patient Care Overview  Goal: Plan of Care/Patient Progress Review  RN  1. Pt will be free from injury   Outcome: No Change    D: Hx cardiac amyloidosis, afib/flutter, CAD, CKD4, CVA. Admitted 3/26 with AMS.  I/A: VSSA, on room air. Monitor shows afib/2:1 aflutter 100s. Dobutamine gtt @ 5 mcg/kg/min (11.4 ml/hr).Pt alert to self and place at times, otherwise disoriented x 4. Condom catheter in place, adequate UO. Incontinent of stool x 1. Healing pressure ulcer on coccyx covered with Meplex. Turned/repositioned and incontinence care as needed. Pt with little to no sleep overnight. Double lumen PICC in left arm.  P: Care conference today at 1400; wife and daughter present. Discussed possibility of coming off Dobutamine and monitoring for status change. Family would like time to make a decision. Continue to monitor and notify Cards 1 with pertinent changes.

## 2018-04-19 NOTE — PLAN OF CARE
Problem: Patient Care Overview  Goal: Plan of Care/Patient Progress Review  RN  1. Pt will be free from injury   Outcome: No Change    D: Hx cardiac amyloidosis, afib/flutter, CAD, CKD4, CVA. Admitted 3/26 with AMS.  I/A: VSSA, on room air. Monitor shows afib/2:1 aflutter 80-100s. Dobutamine gtt @ 5 mcg/kg/min (11.4 ml/hr). Reports of generalized pain controlled with prn tylenol. Stomach cramping relieved with prn simethicone. Pt alert to self intermittently, otherwise disoriented x 4. Pt more easily calmed tonight, although increased pulling at lines/tele wires. Condom catheter in place, adequate UO. Incontinent of stool x 1. Turned/repositioned and incontinence care as needed. Pt with little to no sleep overnight.   P: Continue to monitor and notify Cards 1 with pertinent changes.

## 2018-04-19 NOTE — PROGRESS NOTES
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 Alertness markedly improved on inotropes but MS still poor.    Today:  Ongoing discussions with family, palliative, and social work regarding goals of care and discharge plan.     #Cardiac amyloidosis  #HFrEF NYHA IV D (EF 15-20%)  RHC on 4/3. PCW 20. RA 17. PA 43/25 (33). RV 43/17. Son CI 1.4. Patient is volume overloaded and in cardiogenic shock. Suspect this is the likely cause of patient's altered mental status, LOIS, and possibly the ventriculomegaly. PICC placed on 4/3 to begin dobutamine infusion. His mental status and renal function have improved with the dobutamine infusion, improving his quality of life. His prognosis remains very poor.  - Continue dobutamine at 5 mcg/kg/min  - Discontinued hydralazine due to soft BP's  - Diuresis: Holding due to rising Cr. Will likely restart Bumex 1-2 mg BID after patient's intravascular volume increases.  - Palliative consulted, appreciate recs re: GOC and d/c plans    #Multifactorial Encephalopathy, improving  #Delirium  #Dementia (MoCA 16/30)  #Ventriculomegaly / possible normal pressure hydrocephalus  #h/o right MCA CVA  Likely due to cardiogenic shock, although also had extensive workup for other causes. CT scan with enlarged venticles. No mass or acute CVA. Sepsis also possible contributor. Possibly uremia. Low suspicion for SBP. Patient's history concerning for NPH. LP done on 4/2. Attempted large volume (25-30 cc); however, was only able to obtain 0.5 cc.  - Treatment by etiology: UTI / PNA (s/p tx with abx), uremia (treated with HD/diuresis, now with adequate UOP),  NPH (neurology recommends no further inpatient workup), baseline dementia / CVA, cardiogenic shock (see above)  - Psychiatry consulted: Latruda 20 mg and melatonin 6 mg qhs prn for agitation, less risk of QTc prolongation. Patient's mental status has improved in the past with better volume status.  - Will not give trazodone given risk of QTc prolongation  - Neurology signed off. Recommended follow-up in movement disorder clinic for workup of possible NPH, although this is less likely now given patient's poor cardiac prognosis.  - PT/OT  - Patient will need to be without a sitter for 24 hours for placement.     #CKD-V   Tunneled line by IR 3/29. Started HD 3/29. Urine output increased and Cr stable/improved with dobutamine. No further HD and line was removed. Diurese as necessary  - Monitor UOP, Cr  - Nephrology signed off, no further HD.  - IR removed dialysis catheter 4/12.    #CAD s/p ATIF to LAD 2015  - Continue pta atorvastatin 80 mg qday  - ASA 81 mg daily  - Avoid BB with cardiac amyloid     #Afib/flutter s/p CTI ablation 2006  Remains in afib. Was on sotalol but this was discontinued around 08/2017 per outside cardiology notes. Discontinued warfarin on 4/3 due to labile INR's and started on apixaban instead.  - Apixaban 2.5 mg BID (renally dosed)    #Aspiration PNA, resolved  #Urinary tract infection, resolved  Started on Unasyn on 3/27. Switched to Zosyn on 3/28 per ID recs. Restarted on Unasyn on 3/30 when sensitivities returned on UTI (Klebsiella pneumoniae). Completed course of Unasyn on 4/4. Repeat chest x-ray on 4/14 stable. Patient remains afebrile.    # Cachexia  Patient's volume overload somewhat obscures his severe malnutrition, but he has temporal wasting.    #Ascites  Since RHC shows cardiogenic shock that is likely causing the ascites, will treat underlying cause. No paracentesis for now.    FEN: Cardiac diet  PPX: Apixaban  Code Status: FULL  Dispo: Awaiting discharge to care facility, most  likely. Discussing with palliative and family.    This patient was discussed with Dr. Marcos who agrees with the assessment and plan.    Jamie Parkinson, GIGIS  PGY-1  Pager: [phone]    Subjective & Interval Hx:    No acute events overnight. Was pulling at lines and telemetry last night, required a sitter.    This morning patient complains of abdominal pain. It has been relieved in the past with tylenol and bowel movements. Denies chest pain, shortness of breath.    Last 24 hr care team notes reviewed.   ROS: 4 point ROS including Respiratory, CV, GI and , other than that noted in the HPI, is negative    Physical Exam:  Blood pressure 94/66, pulse 70, temperature 97.6  F (36.4  C), temperature source Oral, resp. rate 16, weight 64.7 kg (142 lb 10.2 oz), SpO2 98 %.     Gen: Elderly male, resting comfortably in bed, NAD  HEENT: NCAT, anicteric  Neck: Supple, JVP elevated  Pulm: Anterior lung fields clear to auscultation bilaterally  CV: Tachycardic, irregularly irregular rhythm, S1/S2, no m/r/g  ABD: Soft, ntnd, normal BS  EXT: Bilateral 1+ lower extremity edema  Skin: No rashes or skin lesions visible  NEURO: Alert, oriented to person, no new focal neurological changes  Psych: Appropriate, pleasant    Lines/Tubes: L PICC, L PIV    Labs & Studies of Note:   - reviewed all labs in [facility] today.    Imaging reviewed.

## 2018-04-19 NOTE — PROGRESS NOTES
VSS, disoriented x3, oriented to self. Aflutter/Afib  with BBB. Dobutamine gtt infusing at 5mcg/kg/min. Pt continues w/attendant, family visiting. Calm, cooperative for care. Rectal pain. Topical cream and PRN preparation h suppository.  Up to chair with gait belt and assist x2. Reduced urine output. LOIS. condom cath, incontinent of stool x1, brief in place. Fair-good appetite, needs assist. Plan to continue evaluating pt and family's wishes. Notify Cards 1 of changes or concerns.

## 2018-04-19 NOTE — PLAN OF CARE
Problem: Cardiac: Heart Failure (Adult)  Goal: Signs and Symptoms of Listed Potential Problems Will be Absent, Minimized or Managed (Cardiac: Heart Failure)  Signs and symptoms of listed potential problems will be absent, minimized or managed by discharge/transition of care (reference Cardiac: Heart Failure (Adult) CPG).   Outcome: No Change  VSS, disoriented x3, oriented to self, Aflutter/Afib w/PVCs in 80s-100s. 10 beat run of VT and 5 beat run of VT today. Dobutamine gtt infusing at 5mcg/kg/min. Pt continues w/attendant. +Confusion, repeatedly states he is at work, attending meetings, hiring new personnel, at the bus stop. Attempts to get out of bed without help, attendant at bedside. Generally calm, cooperative during days but has tendency to get aggressive at HS. No c/o pain. Up w/asst x2 and gait belt to chair but perry easily. Adequate UOP via condom cath, incontinent of stool x1, brief in place. Fair-good appetite, needs assist. Plan to continue evaluating pt and family's wishes, possible wean down of dobutamine. Notify Cards 1 of changes or concerns.     Beto Enriquez RN  Hours cared for: [id number]

## 2018-04-19 NOTE — CONSULTS
Psychiatry Consultation; Follow up              Reason for Consult, requesting source:    Follow up regarding intermittent confusion and agitation. Has been seen by myself and Dr Cerna.  Requesting source: Cards 1               Interim history:    In reviewing his chart it appears that he continues to have problems with agitation and confusion particularly in the evening.  I came to see him today he remarked that has been a while since I have been by to see him; he obviously remembered me.  However continues to be somewhat variable historian, but was pleasant and cooperative during my visit.  However he is complaining of some pain particularly in the right buttocks and says that Tylenol offers inadequate relief of pain.  The Latuda dose is still at 20 mg at bedtime; primary team has been hesitant to increase it due to concerns over QTc.            Medications:     Current Facility-Administered Medications   Medication     acetaminophen (TYLENOL) Suppository 650 mg     acetaminophen (TYLENOL) tablet 650 mg     ammonium lactate (LAC-HYDRIN) 12 % lotion     apixaban ANTICOAGULANT (ELIQUIS) tablet 2.5 mg     aspirin chewable tablet 81 mg     atorvastatin (LIPITOR) tablet 80 mg     bisacodyl (DULCOLAX) EC tablet 5 mg    Or     bisacodyl (DULCOLAX) EC tablet 10 mg    Or     bisacodyl (DULCOLAX) EC tablet 15 mg     bisacodyl (DULCOLAX) Suppository 10 mg     DOBUTamine 500 mg in dextrose 5% 250 mL (adult std conc) premix     docusate sodium (ENEMEEZ) enema 5 mL     heparin lock flush 10 UNIT/ML injection 2-5 mL     heparin lock flush 10 UNIT/ML injection 2-5 mL     hydrocortisone (CORTAID) 1 % cream     lidocaine (LMX4) kit     lidocaine (LMX4) kit     lidocaine (LMX4) kit     lidocaine (XYLOCAINE) 5 % ointment     lidocaine 1 % 0.5-5 mL     lidocaine 1 % 1 mL     lidocaine 2 % (URO-JET) jelly 10 mL     lurasidone (LATUDA) tablet 20 mg     melatonin tablet 6 mg     multivitamin, therapeutic with minerals  "(THERA-VIT-M) tablet 1 tablet     nitroGLYcerin (NITROSTAT) sublingual tablet 0.4 mg     ondansetron (ZOFRAN) injection 4 mg     Patient is already receiving anticoagulation with heparin, enoxaparin (LOVENOX), warfarin (COUMADIN)  or other anticoagulant medication     polyethylene glycol (MIRALAX/GLYCOLAX) Packet 17 g     potassium chloride (KLOR-CON) Packet 20-40 mEq     potassium chloride 10 mEq in 100 mL intermittent infusion with 10 mg lidocaine     potassium chloride 10 mEq in 100 mL sterile water intermittent infusion (premix)     potassium chloride 20 mEq in 50 mL intermittent infusion     potassium chloride SA (K-DUR/KLOR-CON M) CR tablet 20-40 mEq     sennosides (SENOKOT) tablet 2 tablet     simethicone (MYLICON) chewable tablet 80 mg     sodium chloride (PF) 0.9% PF flush 10 mL     sodium chloride (PF) 0.9% PF flush 10-20 mL     sodium chloride (PF) 0.9% PF flush 3 mL     sodium chloride (PF) 0.9% PF flush 3 mL     sodium chloride (PF) 0.9% PF flush 5-50 mL     tamsulosin (FLOMAX) capsule 0.4 mg     thiamine tablet 50 mg            Labs:   EKG on 4/15 with QTc of 560              MSE:     Lying quietly in the hospital bed, is well groomed, pleasant, cooperative. Speech fluent. There is no rigidity of the extremities.  Associations tight.  Mood is \"down.\"  Affect a bit irritable.  Thought process logical, linear.  Thought content negative for suicidal thoughts or delusions.  Formal assessment of orientation recent and remote memory and concentration were not done today since he was clearly not in the mood to answer a lot of questions.  Fund of knowledge, use of language appear appropriate.  Insight and judgment fair.     Temp: 97.2  F (36.2  C) Temp src: Axillary BP: 103/68   Heart Rate: 84 Resp: 16 SpO2: 98 % O2 Device: None (Room air)                DSM-5 Diagnosis:   Major depressive disorder, unspecified neurocognitive disorder, delirium.           Assessment:   Due to his ongoing need for a sitter we " really need to address this sundowning; placement obviously be much easier if his sitter was not required.  I again reviewed the literature could not find any documentation that Latuda presents a significant risk regards to QTC prolongation think we need to try a higher dose.;           Summary of Recommendations:   Checking another EKG today and if QTC has not increased since the last EKG you should try increasing Latuda to 40 mg and then to 60 mg as needed.  Reconsult psychiatry as needed.              .

## 2018-04-19 NOTE — PLAN OF CARE
Problem: Patient Care Overview  Goal: Plan of Care/Patient Progress Review  RN  1. Pt will be free from injury   PT / 6C - Per discussion with OT, patient is demonstrating limited progress with therapies. Medical chart indicates plan for LTC. Will hold PT services at this time, OT will continue to follow to address functional performance.

## 2018-04-19 NOTE — PLAN OF CARE
Problem: Patient Care Overview  Goal: Plan of Care/Patient Progress Review  RN  1. Pt will be free from injury   Discharge Planner OT   Patient plan for discharge: Not stated during this session  Current status: Patient requesting discussion of goals prior to initiation of session, patient states his primary goal is clear communication. OT providing therapeutic listening to assist patient with feeling more comfortable. Patient c/o pain in rectum, per NST patient having bowel movement. Patient agreeable to commode transfer, max A supine>EOB. At EOB patient becomes anxious and c/o faitgue with sitting and declines transfer to commode 2/2 confusion and unaware of having bowel movement. Patient returns to supine, requires mod A for rolling to place bedpan.  Barriers to return to prior living situation: Cognition, decreased strength  Recommendations for discharge: LTC  Rationale for recommendations: Patient would benefit from long term care for assisting with ADLs and mobility, medical needs. OT to decrease frequency to 3x/wk due to change in plan of care as patient will continue to require assist with ADLs after d/c from hospital, limited progress with inpatient therapy.       Entered by: Eleonora Flaherty 04/19/2018 10:46 AM

## 2018-04-20 NOTE — PROGRESS NOTES
"[facility], [place]   Palliative Care Daily Progress Note          Recommendations, Patient/Family Counseling & Coordination    Pt seen and examined. Nursing notes reviewed. Discussed with primary team.  Daughter present. He is attentive to conversation but confused about details of care and plan. Continues with 1:1 sitter/ MARY or video/ for nighttime agitation and delirium superimposed on encephalopathy likely related to prior stroke.  Discussed with patients daughter his ongoing difficult clinical status and asked \" what if this is as good as it gets for Josr?\" adding that there were no anticipated dramatic interventions/medications/ procedures anticipated to lead to improvement. She remains hopeful for some improvement.   Per his primary team there was consideration to decrease dobutamine support given he has diuresed well and having less sx burden. This would expand discharge options.    Wife (Nu) aware she is not capable of caring for him at home with his present level of assistance, reluctant to see he is not likely to regain functional strength.   We reviewed options and that discharge should include consideration for hospice care.      REC: - No change in treatment planned for now.   - Continue Melatonin and recently increased Latuda per psychiatry. Support nursing efforts to help day/night cycle health.  - stool softeners, suppositories for bowel program- continue scheduled bowel meds- hold if loose stools.   - PT seeing for energy conservation and functional recovery. Requiring max assist of 2 and poor activity tolerance.  - Ongoing requirement for IV inotropes - has been preliminarily OK'ed by  Hospice to continue after discharge  - from PC perspective discharge should ideally be hospice and comfort focused- family not at that place.  SNF placement likely as his care needs are increasing.      -  Goals of Care: His goals- earlier verbalized by family- remain " cautiously restorative- somewhat in denial of slow decline and diminishing treatment options of his heart failure.    Per SW and family- financial concerns limit options for expanded end of life care (residential hospice). Evaluating options including benevolent care considerations   - Briefly reviewed code status with  family present- Will continue to discuss at future interactions     POLST- not completed at this time     Thank you for the opportunity to continue to participate in the care of this patient and family.  Please feel free to contact on-call palliative provider with any emergent needs.  We can be reached via team pager [phone] (answered 8-4:30 Monday-Friday); after-hours answering service ([phone])    Jamie GREER NP ACHPN  Nurse Practitioner- Lead Advanced Practice Provider  [facility] Palliative Medicine Consult Service   [phone]    Jamie Myrick NP    TT spent: 30  minutes of which 20 minutes were spent in direct face to face contact with patient/family. Greater than 50% of time spent counseling and/or coordinating care.       Assessment      1) Diagnoses & symptoms:        Cardiac Amyloidosis- end stage disease  A fib /flutter (on warfarin)  Stenting of CAD 2015  CKD Stage IV- likely cardiorenal component   CVA 2/2017 R MCA  AMS            Interval History:   Essentially no change. Some ectopy. No improvement sleep disturbance - ongoing for past month or more per family- likely contributing to confusion and agitation. Psychiatry note reviewed. Increased Latuda to 40 mg.  Disposition planning ongoing.            Review of Systems:   Palliative Symptom Review (0=no symptom/no concern, 1=mild, 2=moderate, 3=severe):      Pain: 0       Fatigue: 1      Nausea: 0-1      Constipation: 1      Diarrhea: 0      Depressive Symptoms: 0-1      Anxiety: 1      Drowsiness: 1-2      Poor Appetite: 1      Shortness of Breath: 0      Insomnia: 2-3      Confusion 2              Medications:    I have reviewed this patient's medication profile and medications given in past 24 hours.      {   Physical exam .Vitals: /87 (BP Location: Right arm)  Pulse 70  Temp 97.5  F (36.4  C) (Axillary)  Resp 16  Wt 66 kg (145 lb 8.1 oz)  SpO2 98%  BMI 21.49 kg/m2  BMI= Body mass index is 21.49 kg/(m^2).   GEN: Awake,pleasant, in bed.- oriented to family, intermittently to place and circumstances of hospitalization-   HEENT:AT/ NC, EOM grossly intact, mucous membranes pink, dry no exudate. Dentition in good repair.  PULM/THORAX: Clear to auscultation bilaterally, no rales/rhonchi/wheezes  CV:RRR, S1 S2 appreciated, query S3- no murmurs or rub auscultated. Monitor with sinus rhythm, occ ectopy.  ABD: soft, non distended, hypoactive bowel sounds, no organomegaly  EXT: +1 pitting LE edema. No asymmetrical edema or tenderness to palpation in calves bilaterally.  NEURO: difficult to fully assess. No apparent focal neurological deficit                   Data Reviewed:   *  ROUTINE LABS (Last four results)  BMP    Recent Labs  Lab 04/20/18  0520 04/19/18  0545 04/18/18  0558 04/17/18  2205 04/17/18  0645   * 134 134  --  133   POTASSIUM 4.4 4.6 4.9 4.9 2.9*   CHLORIDE 90* 90* 92*  --  91*   RIGOBERTO 9.0 8.9 8.9  --  8.9   CO2 32 33* 32  --  30   BUN 88* 82* 79*  --  75*   CR 2.94* 2.76* 2.64*  --  2.42*   GLC 98 99 96  --  91     CBC    Recent Labs  Lab 04/14/18  0652   WBC 5.3   RBC 3.30*   HGB 10.8*   HCT 32.8*   MCV 99   MCH 32.7   MCHC 32.9   RDW 16.4*   *     INRNo lab results found in last 7 days.

## 2018-04-20 NOTE — PLAN OF CARE
Pt VSS.afib/aflutterDobutamin gtt running..Pt disoriented and confused.Conjtinues to have attendant at bedside.Pt calm and cooperative most of shift.Pt up in w/c and out in halls.Ceiling lift used,otherwisw,max transfer with 2.Ate well,having incontinent BMs x3.Condom cath on,Urine output 150cc.Pt provided more fluids.Continue with POC.

## 2018-04-20 NOTE — PLAN OF CARE
Problem: Patient Care Overview  Goal: Plan of Care/Patient Progress Review  RN  1. Pt will be free from injury   OT: 6C: Pt politely refused when OT attempted in AM 2/2 fatigue, OT will check back in PM as available and appropriate or reschedule for 4/21/18.

## 2018-04-20 NOTE — PROGRESS NOTES
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 Alertness markedly improved on inotropes but MS still poor.    Today:  Ongoing discussions with family, palliative, and social work regarding goals of care and discharge plan.     #Cardiac amyloidosis  #HFrEF NYHA IV D (EF 15-20%)  RHC on 4/3. PCW 20. RA 17. PA 43/25 (33). RV 43/17. Son CI 1.4. Patient is volume overloaded and in cardiogenic shock. Suspect this is the likely cause of patient's altered mental status, LOIS, and possibly the ventriculomegaly. PICC placed on 4/3 to begin dobutamine infusion. His mental status and renal function have improved with the dobutamine infusion, improving his quality of life. His prognosis remains very poor.  - Continue dobutamine at 5 mcg/kg/min  - Discontinued hydralazine due to soft BP's  - Diuresis: Holding due to rising Cr. Will likely restart Bumex 1-2 mg BID after patient's intravascular volume increases.  - Palliative consulted, appreciate recs re: GOC and d/c plans    #Multifactorial Encephalopathy, improving  #Delirium  #Dementia (MoCA 16/30)  #Ventriculomegaly / possible normal pressure hydrocephalus  #h/o right MCA CVA  Likely due to cardiogenic shock, although also had extensive workup for other causes. CT scan with enlarged venticles. No mass or acute CVA. Sepsis also possible contributor. Possibly uremia. Low suspicion for SBP. Patient's history concerning for NPH. LP done on 4/2. Attempted large volume (25-30 cc); however, was only able to obtain 0.5 cc.  - Treatment by etiology: UTI / PNA (s/p tx with abx), uremia (treated with HD/diuresis, now with adequate UOP),  NPH (neurology recommends no further inpatient workup), baseline dementia / CVA, cardiogenic shock (see above)  - Psychiatry consulted: Latruda 40 mg and melatonin 6 mg qhs prn for agitation, less risk of QTc prolongation. Patient's mental status has improved in the past with better volume status.  - Neurology signed off. Recommended follow-up in movement disorder clinic for workup of possible NPH, although this is less likely now given patient's poor cardiac prognosis.  - PT/OT  - Patient will need to be without a sitter for 24 hours for placement.     #CKD-V   Tunneled line by IR 3/29. Started HD 3/29. Urine output increased and Cr stable/improved with dobutamine. No further HD and line was removed. Diurese as necessary  - Monitor UOP, Cr  - Nephrology signed off, no further HD.  - IR removed dialysis catheter 4/12.    #CAD s/p ATIF to LAD 2015  - Continue pta atorvastatin 80 mg qday  - ASA 81 mg daily  - Avoid BB with cardiac amyloid     #Afib/flutter s/p CTI ablation 2006  Remains in afib. Was on sotalol but this was discontinued around 08/2017 per outside cardiology notes. Discontinued warfarin on 4/3 due to labile INR's and started on apixaban instead.  - Apixaban 2.5 mg BID (renally dosed)    #Aspiration PNA, resolved  #Urinary tract infection, resolved  Started on Unasyn on 3/27. Switched to Zosyn on 3/28 per ID recs. Restarted on Unasyn on 3/30 when sensitivities returned on UTI (Klebsiella pneumoniae). Completed course of Unasyn on 4/4. Repeat chest x-ray on 4/14 stable. Patient remains afebrile.    # Cachexia  Patient's volume overload somewhat obscures his severe malnutrition, but he has temporal wasting.    #Ascites  Since RHC shows cardiogenic shock that is likely causing the ascites, will treat underlying cause. No paracentesis for now.    FEN: Cardiac diet  PPX: Apixaban  Code Status: FULL  Dispo: Awaiting discharge to care facility, most likely. Discussing with palliative and family.    This patient  was discussed with Dr. Nunez who agrees with the assessment and plan.    Jamie Parkinson, GIGIS  PGY-1  Pager: [phone]    Subjective & Interval Hx:    No acute events overnight. Was pulling at lines and telemetry last night, required a sitter.    Patient says he feels okay this morning. Continues to complain of mild abdominal pain. Denies chest pain, shortness of breath.    Last 24 hr care team notes reviewed.   ROS: 4 point ROS including Respiratory, CV, GI and , other than that noted in the HPI, is negative    Physical Exam:  Blood pressure 100/73, pulse 70, temperature 97.5  F (36.4  C), temperature source Oral, resp. rate 18, weight 66 kg (145 lb 8.1 oz), SpO2 100 %.     Gen: Elderly male, resting comfortably in bed, NAD  HEENT: NCAT, anicteric  Neck: Supple, JVP elevated  Pulm: Anterior lung fields clear to auscultation bilaterally  CV: Tachycardic, irregularly irregular rhythm, S1/S2, no m/r/g  ABD: Soft, ntnd, normal BS  EXT: Bilateral 1+ lower extremity edema, cold  Skin: No rashes or skin lesions visible  NEURO: Alert, oriented to person, no new focal neurological changes  Psych: Appropriate, pleasant    Lines/Tubes: L PICC, L PIV    Labs & Studies of Note:   - reviewed all labs in [facility] today.    Imaging reviewed.

## 2018-04-20 NOTE — PLAN OF CARE
"Problem: Patient Care Overview  Goal: Plan of Care/Patient Progress Review  RN  1. Pt will be free from injury   Discharge Planner OT   Patient plan for discharge: not stated  Current status: Pt supine when OT entered the room. Pt required max encouragment for participation, Pt stating \" I need to lady down, VSS sitting EOB, OT played The Temptations and led pt through deep breathing for anxiety mgmt, pt demonstrating decreased anxiety and agreeable to sit up in chair. Pt max A x2 STS pivot transfer EOB> chair, VSS throughout.   Barriers to return to prior living situation: medical status  Recommendations for discharge: TCU  Rationale for recommendations: to increase ind in ADLS/IADLS       Entered by: Sachi Nicholson 04/20/2018 2:51 PM           "

## 2018-04-20 NOTE — PLAN OF CARE
Problem: Patient Care Overview  Goal: Plan of Care/Patient Progress Review  RN  1. Pt will be free from injury   No acute changes overnight, patient remains oriented to person only, speech illogical, patient forgetful and not retaining info when reoriented. Vital signs stable, BP soft. Condom cath on, patient incontinent of stool small amount x1, mepilex dressing intact to coccyx. Dobutamine drip at 5 mcg/kg/min. Attendant at bedside for safety. Continue to monitor labs, vital signs, skin, fluid balance, and for safety.

## 2018-04-21 NOTE — PROGRESS NOTES
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 Alertness markedly improved on inotropes but MS still poor.    Today:  Ongoing discussions with family, palliative, and social work regarding goals of care and discharge plan. Started on ceftriaxone for possible UTI.      #Concern for UTI  - IV ceftriaxone  - Monitor culture     #Cardiac amyloidosis  #HFrEF NYHA IV D (EF 15-20%)  RHC on 4/3. PCW 20. RA 17. PA 43/25 (33). RV 43/17. Son CI 1.4. Patient is volume overloaded and in cardiogenic shock. Suspect this is the likely cause of patient's altered mental status, LOIS, and possibly the ventriculomegaly. PICC placed on 4/3 to begin dobutamine infusion. His mental status and renal function have improved with the dobutamine infusion, improving his quality of life. His prognosis remains very poor.  - Continue dobutamine at 5 mcg/kg/min  - Discontinued hydralazine due to soft BP's  - Diuresis: Holding due to rising Cr. Will likely restart Bumex 1-2 mg BID after patient's intravascular volume increases.  - Palliative consulted, appreciate recs re: GOC and d/c plans    #Multifactorial Encephalopathy, improving  #Delirium  #Dementia (MoCA 16/30)  #Ventriculomegaly / possible normal pressure hydrocephalus  #h/o right MCA CVA  Likely due to cardiogenic shock, although also had extensive workup for other causes. CT scan with enlarged venticles. No mass or acute CVA. Sepsis also possible contributor. Possibly uremia. Low suspicion for SBP. Patient's history concerning for NPH. LP done on 4/2. Attempted large volume (25-30 cc); however, was only able to obtain 0.5 cc.  - Treatment  by etiology: UTI / PNA (s/p tx with abx), uremia (treated with HD/diuresis, now with adequate UOP), NPH (neurology recommends no further inpatient workup), baseline dementia / CVA, cardiogenic shock (see above)  - Psychiatry consulted: Latruda 40 mg and melatonin 6 mg qhs prn for agitation, less risk of QTc prolongation. Patient's mental status has improved in the past with better volume status.  - Neurology signed off. Recommended follow-up in movement disorder clinic for workup of possible NPH, although this is less likely now given patient's poor cardiac prognosis.  - PT/OT  - Patient will need to be without a sitter for 24 hours for placement.     #CKD-V   Tunneled line by IR 3/29. Started HD 3/29. Urine output increased and Cr stable/improved with dobutamine. No further HD and line was removed. Diurese as necessary  - Monitor UOP, Cr  - Nephrology signed off, no further HD.  - IR removed dialysis catheter 4/12.    #CAD s/p ATIF to LAD 2015  - Continue pta atorvastatin 80 mg qday  - ASA 81 mg daily  - Avoid BB with cardiac amyloid     #Afib/flutter s/p CTI ablation 2006  Remains in afib. Was on sotalol but this was discontinued around 08/2017 per outside cardiology notes. Discontinued warfarin on 4/3 due to labile INR's and started on apixaban instead.  - Apixaban 2.5 mg BID (renally dosed)    #Aspiration PNA, resolved  #Urinary tract infection, resolved  Started on Unasyn on 3/27. Switched to Zosyn on 3/28 per ID recs. Restarted on Unasyn on 3/30 when sensitivities returned on UTI (Klebsiella pneumoniae). Completed course of Unasyn on 4/4. Repeat chest x-ray on 4/14 stable. Patient remains afebrile.    # Cachexia  Patient's volume overload somewhat obscures his severe malnutrition, but he has temporal wasting.    #Ascites  Since RHC shows cardiogenic shock that is likely causing the ascites, will treat underlying cause. No paracentesis for now.    FEN: Cardiac diet  PPX: Apixaban  Code Status: FULL  Dispo:  Awaiting discharge to care facility, most likely. Discussing with palliative and family.    This patient was discussed with Dr. Nunez who agrees with the assessment and plan.    Carlos Muse MD  PGY-3 Internal Medicine  Pager: [phone]    Subjective & Interval Hx:    No acute events overnight. VSS. Started on abx for UTI. No new concerns.     Last 24 hr care team notes reviewed.     ROS: 4 point ROS including Respiratory, CV, GI and , other than that noted in the HPI, is negative    Physical Exam:  Blood pressure 91/73, pulse 79, temperature 97.3  F (36.3  C), temperature source Oral, resp. rate 18, weight 64.3 kg (141 lb 12.1 oz), SpO2 97 %.     Gen: Elderly male, resting comfortably in bed, NAD  Neck: Supple  Pulm: Anterior lung fields clear to auscultation bilaterally  CV: Tachycardic, irregularly irregular rhythm, S1/S2, no m/r/g  ABD: Soft, ntnd, normal BS  EXT: Bilateral 1+ lower extremity edema, cold  Skin: No rashes or skin lesions visible  NEURO: Alert, oriented to person, no new focal neurological changes  Psych: Appropriate, pleasant    Lines/Tubes: L PICC, L PIV    Labs & Studies of Note:   - reviewed all labs in Epic today.    Imaging reviewed.

## 2018-04-21 NOTE — PLAN OF CARE
Problem: Patient Care Overview  Goal: Plan of Care/Patient Progress Review  RN  1. Pt will be free from injury   Outcome: No Change    D: Pt admitted 3/26 with abdominal pain, altered mental status, and SOB. PMH: cardiac amyloidosis, afib/flutter, CAD, CKD IV, CVA.    I/A: Patient is alert to self and intermittently aware that he is in the hospital. Disoriented to time and situation. Bed alarm on and VPM in place for safety. Patient calling out with concerns and for assistance overnight. Vital signs stable on RA. Monitor shows afib with RBBB, PVC's and PAC's. Denied any pain or SOB. Dobutamine gtt infusing at 5mcg/kg/min via L PICC. UA sent, results positive. Cardiology cross cover notified, new orders placed for rocephin.  Condom cath in place, minimal urine output. Bladder scanned for 118 mL. Large BM overnight. PCU collect for labs. Continues on low saturated fat diet. Up with assist of two. Pt was awake for most of the night.    Addendum: Attempted to draw labs at 0540, unable to get blood return from either PICC lumen. Vascular access consult ordered. PICC heparin locked.     P: Continue to monitor. Notify Cards 1 with changes/concerns.

## 2018-04-21 NOTE — PLAN OF CARE
Problem: Patient Care Overview  Goal: Plan of Care/Patient Progress Review  RN  1. Pt will be free from injury   Outcome: No Change  BP 93/53 (BP Location: Right arm)  Pulse 79  Temp 98.5  F (36.9  C) (Oral)  Resp 18  Wt 66 kg (145 lb 8.1 oz)  SpO2 98%  BMI 21.49 kg/m2    Pt admitted with acute on chronic systolic and diastolic heart failure and CKD with elevated creatine.  Base line of confusion through the day, alert to self, heavy assist of two.   Incontinent of bowel and bladder, bowel regiment is aggressive with thick sticky bowels 5x through the day.  Abdominal discomfort stated, team ordered abdominal xray, bowel sounds are normoactive.  PRN suppository for Hemorid given, with relief.  Reports from overnight from sitter and nurse were that pt was restful, on new dose of Latuda. Sitter dc'd and video monitor implemented continue to monitor Pt tolerance, and notify team as needed.  Plan of care not completely clear, pending family decision regarding dobutamine.   Wife states concern with out of pocket expenses for TCU, and also was of the understanding that [facility] could be fully covered (that needs clarification).  Wife states fears, and not knowing what to do.  I encouraged follow up with Palliative, and discussed status of advanced planning with Cards I, will continue to dialogue with family and team.  Continue to monitor and notify Cards I of changes.

## 2018-04-22 NOTE — PROGRESS NOTES
Calorie Counts    Intake recorded for: 4/21 Kcal: 708 Protein: 3g    # meals: 100% tomato soup, 4 pudding, orange juice    # supplements: 0

## 2018-04-22 NOTE — PLAN OF CARE
Problem: Patient Care Overview  Goal: Plan of Care/Patient Progress Review  RN  1. Pt will be free from injury   Outcome: Declining  BP (!) 88/58 (BP Location: Right arm)  Pulse 79  Temp 98.9  F (37.2  C) (Axillary)  Resp 16  Wt 64.3 kg (141 lb 12.1 oz)  SpO2 100%  BMI 20.93 kg/m2    Pt continues admission with advanced HF and CKD, on dobutamine drip, following aggressive bowel regiment with continued abdominal discomfort.  Pt sleep/somulent through the day.  Prior two nights has been given Latuda (see mar).  Family states pt has been more sleepy and harder to arouse the past two days, additionally wife observes pt appears to be sleeping more soundly than he has in more than a month.  UTI began treatment overnight, condom catheter intermittently functional, falling off often, pt reports tenderness from skin prep, currently in brief only.  Pt ability to stand has been minimal through the day, primarily on bed rest and up to chair.  Incontinent of bowel and bladder.  Tolerating pills,  Encourage nutrition.  Continue to monitor notify Cards I of changes.

## 2018-04-22 NOTE — PROGRESS NOTES
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 Alertness markedly improved on inotropes but MS still poor.    Today:  Ongoing discussions with family, palliative, and social work regarding goals of care and discharge plan.  De-escalate antibiotics to Keflex for UTI based on cultures and sensitivities      #Concern for UTI  Started on rocephin on 4/21. Urine cultures grew pan-sensitive Klebsiella pneumoniae. De-escalated to Keflex.  - Keflex (4/22 ->)    #Cardiac amyloidosis  #HFrEF NYHA IV D (EF 15-20%)  RHC on 4/3. PCW 20. RA 17. PA 43/25 (33). RV 43/17. Son CI 1.4. Patient is volume overloaded and in cardiogenic shock. Suspect this is the likely cause of patient's altered mental status, LOIS, and possibly the ventriculomegaly. PICC placed on 4/3 to begin dobutamine infusion. His mental status and renal function have improved with the dobutamine infusion, improving his quality of life. His prognosis remains very poor.  - Continue dobutamine at 5 mcg/kg/min  - Discontinued hydralazine due to soft BP's  - Diuresis: Holding due to rising Cr. Will likely restart Bumex 1-2 mg BID after patient's intravascular volume increases.  - Palliative consulted, appreciate recs re: GOC and d/c plans    #Multifactorial Encephalopathy, improving  #Delirium  #Dementia (MoCA 16/30)  #Ventriculomegaly / possible normal pressure hydrocephalus  #h/o right MCA CVA  Likely due to cardiogenic shock, although also had extensive workup for other causes. CT scan with enlarged venticles. No mass or acute CVA. Sepsis also possible contributor. Possibly uremia. Low suspicion for SBP. Patient's  history concerning for NPH. LP done on 4/2. Attempted large volume (25-30 cc); however, was only able to obtain 0.5 cc.  - Treatment by etiology: UTI / PNA (s/p tx with abx), uremia (treated with HD/diuresis, now with adequate UOP), NPH (neurology recommends no further inpatient workup), baseline dementia / CVA, cardiogenic shock (see above)  - Psychiatry consulted: Latruda 40 mg and melatonin 6 mg qhs prn for agitation, less risk of QTc prolongation. Patient's mental status has improved in the past with better volume status.  - Neurology signed off. Recommended follow-up in movement disorder clinic for workup of possible NPH, although this is less likely now given patient's poor cardiac prognosis.  - PT/OT  - Patient will need to be without a sitter for 24 hours for placement.     #CKD-V   # LOIS  Tunneled line by IR 3/29. Started HD 3/29. Urine output increased and Cr stable/improved with dobutamine. No further HD and line was removed. Cr has been rising despite stopping diuresis, and UOP has been decreasing. Possibly obstructive due to UTI.  - Tamsulosin, finasteride for possible obstruction.  - UTI treatment as above.  - Monitor UOP, Cr  - Nephrology signed off, no further HD.  - IR removed dialysis catheter 4/12.    #CAD s/p ATIF to LAD 2015  - Continue pta atorvastatin 80 mg qday  - ASA 81 mg daily  - Avoid BB with cardiac amyloid     #Afib/flutter s/p CTI ablation 2006  Remains in afib. Was on sotalol but this was discontinued around 08/2017 per outside cardiology notes. Discontinued warfarin on 4/3 due to labile INR's and started on apixaban instead.  - Apixaban 2.5 mg BID (renally dosed)    #Aspiration PNA, resolved  #Urinary tract infection, resolved  Started on Unasyn on 3/27. Switched to Zosyn on 3/28 per ID recs. Restarted on Unasyn on 3/30 when sensitivities returned on UTI (Klebsiella pneumoniae). Completed course of Unasyn on 4/4. Repeat chest x-ray on 4/14 stable. Patient remains afebrile.    #  Cachexia  Patient's volume overload somewhat obscures his severe malnutrition, but he has temporal wasting.    #Ascites  Since RHC shows cardiogenic shock that is likely causing the ascites, will treat underlying cause. No paracentesis for now.    FEN: Cardiac diet  PPX: Apixaban  Code Status: FULL  Dispo: Awaiting discharge to care facility, most likely. Discussing with palliative and family.    This patient was discussed with Dr. Nunez who agrees with the assessment and plan.    Jamie Parkinson, GIGIS  PGY-1  Pager: [phone]      Subjective & Interval Hx:    No acute events overnight. VSS.     Patient is confused this morning and does not respond appropriately to questions. Unable to perform ROS.    Last 24 hr care team notes reviewed.       Physical Exam:  Blood pressure 104/60, pulse 79, temperature 97.2  F (36.2  C), temperature source Axillary, resp. rate 16, weight 64.7 kg (142 lb 10.2 oz), SpO2 95 %.     Gen: Elderly male, resting comfortably in bed, NAD  Neck: Supple  Pulm: Anterior lung fields clear to auscultation bilaterally  CV: Irregularly irregular rhythm, S1/S2, no m/r/g  ABD: Soft, ntnd, normal BS  EXT: Bilateral 1+ lower extremity edema, cold  Skin: No rashes or skin lesions visible  NEURO: Somnolent, intermittently agitated. Inappropriate words.    Lines/Tubes: L PICC, L PIV    Labs & Studies of Note:   - reviewed all labs in Epic today.    Imaging reviewed.

## 2018-04-22 NOTE — PLAN OF CARE
Problem: Patient Care Overview  Goal: Plan of Care/Patient Progress Review  RN  1. Pt will be free from injury   Outcome: No Change    D: Pt admitted 3/26 with abdominal pain, altered mental status, and SOB. PMH: cardiac amyloidosis, afib/flutter, CAD, CKD IV, CVA.    I/A: Patient is alert to self. Disoriented to time, place, and situation. Bed alarm on and VPM in place for safety. Vital signs stable on RA. Monitor shows afib with RBBB, PVC's and PAC's. Denied any pain. Dobutamine gtt infusing at 5mcg/kg/min via L PICC. Sluggish blood return noted on PICC following second TPA dose. MD notified. Heparin lock flushes ordered. K+ (3.9) replaced per protocol. Incontinent of urine and stool, refused condom catheter overnight. +BM. Continues on low saturated fat diet. Up with assist of two. Patient remained awake and restless for most of the night.     P: Continue to monitor. Notify Cards 1 with changes/concerns.

## 2018-04-23 NOTE — PHARMACY-CONSULT NOTE
Pharmacy Delirium Chart Review    Upon chart review, no medications at this time may contribute to possible patient delirium.  Please consult unit pharmacist with further questions.    [place] RPH

## 2018-04-23 NOTE — PROGRESS NOTES
[facility], [employer]   Palliative Care Daily Progress Note          Recommendations, Patient/Family Counseling & Coordination    Pt seen and examined. Nursing notes reviewed. Discussed with primary team.  Daughter and spouse present. He is attentive to conversation at times but drifts off to sleep easily.   Continues with 1:1 sitter/ MARY or video/ for nighttime agitation and delirium superimposed on encephalopathy likely related to prior stroke.  Discussed with patients family the findings of his bradycardia likely reflecting his underlying heard failure rather than medication effect- noting previous discussions about the possible time limited benefit from his dobutamine therapy. There are no additional  interventions/medications/ procedures anticipated to lead to improvement. Family remains hopeful for some improvement.     CODE STATUS: has been addressed in general terms and in specifics- family electing to continue with FULL CODE status.     Wife (Nu) aware she is not capable of caring for him at home with his present level of assistance, reluctant to see he is not likely to regain functional strength.   We reviewed options and that discharge should include consideration for hospice care.      REC: - No change in treatment planned for now.   - Continue Melatonin and Latuda (dose decreased to 20 mg daily) Support nursing efforts to help day/night cycle health.  - stool softeners, suppositories for bowel program- continue scheduled bowel meds- hold if loose stools.   - PT for energy conservation and functional recovery. Continues to require max assist of 2 and poor activity tolerance.  - Ongoing requirement for IV inotropes - has been preliminarily OK'ed by  Hospice to continue after discharge if able to find placement  - from PC perspective discharge should ideally be hospice and comfort focused- family not at that place.  SNF placement likely as his care needs are increasing.       -  Goals of Care: His goals- earlier verbalized by family- remain cautiously restorative- somewhat in denial of slow decline and diminishing treatment options of his heart failure.    Per SW and family- financial concerns limit options for expanded end of life care. SW evaluating options including benevolent care considerations. Despite evidence of end stage disease family unable to accept anything but full measures.     POLST- not completed at this time     Thank you for the opportunity to continue to participate in the care of this patient and family.  Please feel free to contact on-call palliative provider with any emergent needs.  We can be reached via team pager [phone] (answered 8-4:30 Monday-Friday); after-hours answering service ([phone])    Jamie GREER NP ACHTAO  Nurse Practitioner- Lead Advanced Practice Provider  [facility] Palliative Medicine Consult Service   [phone]    Jamie Myrick NP    TT spent: 37 minutes of which 25 minutes were spent in direct face to face contact with patient/family. Greater than 50% of time spent counseling and/or coordinating care.       Assessment      1) Diagnoses & symptoms:        Cardiac Amyloidosis- end stage disease  A fib /flutter (on warfarin)  Stenting of CAD 2015  CKD Stage IV- likely cardiorenal component   CVA 2/2017 R MCA  AMS- likely delirium superimposed on dementia process            Interval History:   Essentially no change in terms of overall clinical status except more bradycardic. Family concerned that dobutamine had been decreased without their consent- assured that was not the case. His Latuda dosing was decreased with concern for bradycardia- no hemodynamic changes, no chest pain or lightheadedness. No shortness of breath. No significant improvement sleep disturbance - ongoing for past month or more per family- likely contributing to confusion and agitation. Psychiatry note reviewed. Increased Latuda to 40 mg.  Disposition  planning ongoing.            Review of Systems:   Palliative Symptom Review (0=no symptom/no concern, 1=mild, 2=moderate, 3=severe):      Pain: 0       Fatigue: 1      Nausea: 0      Constipation: 1      Diarrhea: 0      Depressive Symptoms: 0-1      Anxiety: 1      Drowsiness: 2      Poor Appetite: 1      Shortness of Breath: 0      Insomnia: 2-3      Confusion 2              Medications:   I have reviewed this patient's medication profile and medications given in past 24 hours.      {   Physical exam .Vitals: BP 96/61 (BP Location: Right arm)  Pulse 79  Temp 98  F (36.7  C) (Axillary)  Resp 16  Wt 64.1 kg (141 lb 5 oz)  SpO2 97%  BMI 20.87 kg/m2  BMI= Body mass index is 20.87 kg/(m^2).   GEN: Awake,pleasant, in bedside chair.- oriented to family, intermittently to place and circumstances of hospitalization- having more intervals of sleeping during the day.  HEENT:AT/ NC, EOM grossly intact, mucous membranes pink, dry no exudate. Dentition in good repair.  PULM/THORAX: Clear to auscultation bilaterally, no rales/rhonchi/wheezes. Modest inspiratory effort.   CV:RRR, S1 S2 appreciated, query S3- no murmurs auscultated. Monitor with sinus or junctional rhythm as P waves are absent, rate in 47-50 range. occ ectopy.  ABD: soft, non distended, hypoactive bowel sounds, no organomegaly  EXT: trace to 1+ pitting LE edema. No asymmetrical edema or tenderness to palpation in calves bilaterally.  NEURO: difficult to fully assess. No apparent focal neurological deficit                   Data Reviewed:   *  ROUTINE LABS (Last four results)  BMP    Recent Labs  Lab 04/23/18  0617 04/22/18  0830 04/21/18  1750 04/20/18  0520   * 130* 131* 131*   POTASSIUM 5.0 3.7 3.9 4.4   CHLORIDE 89* 87* 88* 90*   RIGOBERTO 9.5 9.3 9.2 9.0   CO2 25 29 27 32   * 99* 97* 88*   CR 4.62* 3.82* 3.58* 2.94*   GLC 98 117* 111* 98     CBC  No lab results found in last 7 days.  INRNo lab results found in last 7 days.

## 2018-04-23 NOTE — PROGRESS NOTES
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 Alertness markedly improved on inotropes but MS still poor.    Today:  Ongoing discussions with family, palliative, and social work regarding goals of care and discharge plan.    Discussed patient's declining status with patient's wife, daughter, and son. Discussed code status. Explained that there is almost no chance of a successful outcome in a code situation, and we are concerned about harming him. Family expressed their understanding, and they would still like him to be FULL CODE. They say he will fight to the end, and they are hoping for a miracle.     #Concern for UTI  Started on rocephin on 4/21. Urine cultures grew pan-sensitive Klebsiella pneumoniae. De-escalated to Keflex.  - Keflex (4/22 ->)    #CKD-V   # LIOS  Tunneled line by IR 3/29. Started HD 3/29. Urine output increased and Cr stable/improved with dobutamine. No further HD as patient had good UOP and a very poor cardiac prognosis, so the tunneled catheter was removed. Cr was stable in the 2.3-2.4 range, and patient had adequate UOP. Since 4/18 however, Cr has been steadily rising, and UOP has been decreasing. Diuresis was stopped on 4/18. Possibly obstructive due to UTI, although it is also very likely related to the patient's end stage heart failure, as his heart rate has been decreasing.  - Tamsulosin, finasteride for possible obstruction.  - UTI treatment as above.  - Monitor UOP, Cr  - Nephrology signed off, no further HD.  - IR removed dialysis catheter 4/12.    #Cardiac amyloidosis  #HFrEF NYHA IV D (EF 15-20%)  RHC on 4/3. PCW 20. RA 17. PA 43/25  (33). RV 43/17. Son CI 1.4. Patient is volume overloaded and in cardiogenic shock. Suspect this is the likely cause of patient's altered mental status, LOIS, and possibly the ventriculomegaly. PICC placed on 4/3 to begin dobutamine infusion. His mental status and renal function have improved with the dobutamine infusion, improving his quality of life. His prognosis remains very poor.  - Continue dobutamine at 5 mcg/kg/min  - Discontinued hydralazine due to soft BP's  - Diuresis: Holding due to rising Cr.  - Palliative consulted, appreciate recs re: GOC and d/c plans    #Multifactorial Encephalopathy, improving  #Delirium  #Dementia (MoCA 16/30)  #Ventriculomegaly / possible normal pressure hydrocephalus  #h/o right MCA CVA  Likely due to cardiogenic shock, although also had extensive workup for other causes. CT scan with enlarged venticles. No mass or acute CVA. Sepsis also possible contributor. Possibly uremia. Low suspicion for SBP. Patient's history concerning for NPH. LP done on 4/2. Attempted large volume (25-30 cc); however, was only able to obtain 0.5 cc.  - Treatment by etiology: UTI / PNA (s/p tx with abx), uremia (treated with HD/diuresis, now with adequate UOP), NPH (neurology recommends no further inpatient workup), baseline dementia / CVA, cardiogenic shock (see above)  - Psychiatry consulted: Latuda had been at 40 mg, decreased to 20 mg due to family's concern about its affect on patient's daytime sleepiness, and melatonin 6 mg qhs prn for agitation, less risk of QTc prolongation. Patient's mental status has improved in the past with better volume status.  - Neurology signed off. Recommended follow-up in movement disorder clinic for workup of possible NPH, although this is less likely now given patient's poor cardiac prognosis.  - PT/OT  - Patient will need to be without a sitter for 24 hours for placement.     #CAD s/p ATIF to LAD 2015  - Continue pta atorvastatin 80 mg qday  - ASA 81 mg daily  - Avoid  BB with cardiac amyloid     #Afib/flutter s/p CTI ablation 2006  Remains in afib. Was on sotalol but this was discontinued around 08/2017 per outside cardiology notes. Discontinued warfarin on 4/3 due to labile INR's and started on apixaban instead.  - Apixaban 2.5 mg BID (renally dosed)    #Aspiration PNA, resolved  #Urinary tract infection, resolved  Started on Unasyn on 3/27. Switched to Zosyn on 3/28 per ID recs. Restarted on Unasyn on 3/30 when sensitivities returned on UTI (Klebsiella pneumoniae). Completed course of Unasyn on 4/4. Repeat chest x-ray on 4/14 stable. Patient remains afebrile.    # Cachexia  Patient's volume overload somewhat obscures his severe malnutrition, but he has temporal wasting.    #Ascites  Since RHC shows cardiogenic shock that is likely causing the ascites, will treat underlying cause. No paracentesis for now.    FEN: Cardiac diet  PPX: Apixaban  Code Status: FULL  Dispo: Awaiting discharge to care facility, most likely. Discussing with palliative and family.    This patient was discussed with Dr. Nunez who agrees with the assessment and plan.    Jamie Parkinson, GIGIS  PGY-1  Pager: [phone]    I have seen, interviewed, and examined patient. I have reviewed the laboratory tests, imaging, and other investigations. I have reviewed the management plan with the patient. I discussed with the team and agree with the findings and plan in this resident/fellow/nurse practitioner's note. In addition, changes in the physical examination, assessment and plan have been incorporated into the note by myself, as to make it a single cohesive document.       Chantel Nunez MD, MS  Cardiology/Cardiac EP Attending Staff          Subjective & Interval Hx:    No acute events overnight. Per nurse report, last night was the calmest the patient had been over the previous three nights. Intermittently called out and removed telemetry leads, but did not try to get out of bed or pull on his PICC.    Patient confused this  morning. Does not answer questions appropriately. Unable to perform ROS.    Last 24 hr care team notes reviewed.       Physical Exam:  Blood pressure (!) 86/60, pulse 79, temperature 98.7  F (37.1  C), temperature source Axillary, resp. rate 18, weight 64.1 kg (141 lb 5 oz), SpO2 96 %.     Gen: Elderly male, resting comfortably in bed, NAD  Neck: Supple  Pulm: Anterior lung fields clear to auscultation bilaterally  CV: Irregularly irregular rhythm, S1/S2, no m/r/g  ABD: Soft, ntnd, normal BS  EXT: Bilateral 1+ lower extremity edema, cold  Skin: No rashes or skin lesions visible  NEURO: Somnolent, intermittently agitated. Inappropriate words.    Lines/Tubes: L PICC, L PIV    Labs & Studies of Note:   - reviewed all labs in [facility] today.    Imaging reviewed.

## 2018-04-23 NOTE — PROGRESS NOTES
SPIRITUAL HEALTH SERVICES  SPIRITUAL ASSESSMENT Progress Note  [facility] 6C     REFERRAL SOURCE: visited with pt and family in morning and afternoon, per referral from bedside nurse.    I clarified with pt/family reason for my not being with them all last week, having been ill. In morning visited with pt and daughter; daughter visibly teary and concerned for her father. In afternoon visited with pt, spouse, daughter, and son. Spouse let me know that their own pastors are visiting regularly, but she also very much appreciates  support. Prayer was welcomed.    PLAN: continue to follow, I will visit again on Friday. Family knows that on-call  support always available.    Josr Art) Solomon Bah M.Div., [facility]  Staff   Pager [phone]

## 2018-04-23 NOTE — PLAN OF CARE
Problem: Patient Care Overview  Goal: Plan of Care/Patient Progress Review  RN  1. Pt will be free from injury   Outcome: No Change  BP 90/66 (BP Location: Right arm)  Pulse 79  Temp 97.6  F (36.4  C) (Axillary)  Resp 18  Wt 64.7 kg (142 lb 10.2 oz)  SpO2 96%  BMI 21.06 kg/m2     Ted Molina, RN Registered Nurse Signed  Plan of Care   Date of Service: 4/20/2018  8:20 PM Creation Time: 4/20/2018  8:20 PM         Problem: Patient Care Overview  Goal: Plan of Care/Patient Progress Review  RN  1. Pt will be free from injury        Pt admitted with acute on chronic systolic and diastolic heart failure and CKD with elevated creatine.  Base line of confusion through the day, alert to self, heavy assist of two.   Incontinent of bowel and bladder, bowel regiment is aggressive with thick sticky bowels 5x through the day.  Abdominal discomfort stated, team ordered abdominal xray, bowel sounds are normoactive.  PRN suppository for Hemorid given, with relief.  Reports from overnight from sitter and nurse were that pt was restful, on new dose of Latuda. Sitter dc'd and video monitor implemented continue to monitor Pt tolerance, and notify team as needed.  Plan of care not completely clear, pending family decision regarding dobutamine.   Wife states concern with out of pocket expenses for TCU, and also was of the understanding that [facility] could be fully covered (that needs clarification).  Wife states fears, and not knowing what to do.  I encouraged follow up with Palliative, and discussed status of advanced planning with Cards I, will continue to dialogue with family and team.  Continue to monitor and notify Cards I of changes.

## 2018-04-23 NOTE — PLAN OF CARE
Problem: Patient Care Overview  Goal: Plan of Care/Patient Progress Review  RN  1. Pt will be free from injury   Outcome: No Change    D: Pt admitted 3/26 with abdominal pain, altered mental status, and SOB. PMH: cardiac amyloidosis, afib/flutter, CAD, CKD IV, CVA.    I/A: Patient is alert to self. Disoriented to time, place and situation. Bed alarm on and VPM in place for patient safety. Vital signs stable on RA, BP soft. Patient's rhythm was primarily afib/flutter with RBBB and intermittent PVC's, rates 50's-60's. Denied any pain. Dobutamine gtt infusing at 5mcg/kg/min via L PICC. Incontinent of urine and stool. +BM. Mepilex in place on coccyx. Repositioned/turned q2h. Patient appeared to sleep with some intermittent agitation and restlessness overnight.     P: Continue to monitor. Notify Cards with changes/concerns.

## 2018-04-23 NOTE — PROGRESS NOTES
Social Work Services Progress Note    Hospital Day: 27  Date of Initial Social Work Evaluation:  3/29/18  Collaborated with: Chart review, Cards 1 team    Data:  Pt is a 77 year old male being followed by SW for placement to LTC with dobutamine.  Pt has been accepted to [facility] for LTC.    Intervention:  Pt continues to be on VPM/sitter which is preventing placement to LTC.  Family plans to have meeting with elder law  today to assist with division of assets for pt to be eligible for Medicaid with spenddown.  SW has not found alternative sources of funding for family to pursue.  SW to continue following until pt is safe to transfer.    Referrals in Progress:   [facility] - accepted to LTC, barrier for the family is financial hardship, other barrier continued VPM/attendant due to disorientation and impulsivity.  PH: [phone]  F: [phone]    Assessment: Spouse and daughter plan to meet with elder law  today for financial resources.    Plan:    Anticipated Disposition: TBD, LTC continues to be an option, family not wanting hospice yet.    Barriers to d/c plan:  Cost of TCU/LTC, sitter/VPM, dobutamine needs, financial hardship, lack of social support to take the pt home    Follow Up:  SW to follow for discharge needs.    JERRI Valdovinos, APSW  6C Unit   Phone: [phone]  Pager: [phone]  Unit: [phone]      ___________________________________________________________________________________________________________________________________________________    Referrals Discontinued:  [facility] - cannot take pt's with dobutamine  [facility] - cannot take pts with dobutamine.     Community Case Management/Community Services in place:   Family will be pursuing elder law  meeting on Monday to discuss division of assets to assist the pt with pursuing Medicaid coverage.

## 2018-04-23 NOTE — PLAN OF CARE
Problem: Patient Care Overview  Goal: Plan of Care/Patient Progress Review  RN  1. Pt will be free from injury   Outcome: Declining  BP 96/61 (BP Location: Right arm)  Pulse 79  Temp 98  F (36.7  C) (Axillary)  Resp 16  Wt 64.1 kg (141 lb 5 oz)  SpO2 97%  BMI 20.87 kg/m2      Pt has had precipitous decline in cardiac rate from 70-90 Friday, to 44-50 today.  Cards I discussed code status with family, and family is considering status.    Cards I discussed there are no further medical options, and expressed concern for invasiveness of code process and likelihood of poor outcome.  Call Cards I with any concerns, and if family wishes to make changes to code status.

## 2018-04-23 NOTE — PROGRESS NOTES
[facility] Nurse Inpatient Wound Assessment     Follow up Assessment  Reason for consultation: Evaluate and treat coccyx wound     Assessment  Coccyx wound due to Moisture Associated Skin Damage (MASD) and friction injury within scar tissue  Status: Follow up assessment, improved    Treatment Plan    Coccyx wound: Cleanse the area with NS and pat dry.    Apply No sting film barrier to periwound skin.    Cover wound with Mepilex. Mepilex 4x4    Change dressing Q 3 days.    Turn and reposition Q 2hrs.    Ensure pt has Filippo-cushion while sitting up in the chair.    FYI- If pt has constant incontinent loose stools needing dressing changes Q shift please discontinue the Mepilex dressing and apply criticaid barrier paste BID and PRN.    Orders Reviewed  WO Nurse follow-up plan:weekly  Nursing to notify the Provider(s) and re-consult the [facility] Nurse if wound(s) deteriorates or new skin concern.    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 Alertness markedly improved on inotropes but MS still poor.    Objective Data  Containment of urine/stool: Incontinence Program    Active Diet Order    Active Diet Order      Low Saturated Fat Na <2400 mg    Output:   I/O last 3 completed shifts:  In: 833.6 [P.O.:560; I.V.:273.6]  Out: -     Risk Assessment:    Juan Pablo Juan Pablo Score  Avg: 15.5  Min: 14  Max: 19                            Labs: No lab results found in last 7 days.    Invalid input(s): ABLUMIN, PREABLUMIN, MICROBIO        Recent Labs  Lab 04/21/18  0030   CULT >100,000 colonies/mLKlebsiella pneumoniae*       Physical Exam  Skin assessment:   Focused skin inspection: Coccyx    Wound  Location:  Coccyx  Wound History: Area of previously noted scar tissue, with prior wounds per patient. Patient is incontinent and has been resistive to cares intermittently.   Measurements (length x width x depth, in cm) 1 cm x 0.5 cm  x  0.01 cm   Wound Base: 100% dermis  Palpation of the wound bed: normal   Periwound skin: intact scar tissue  Color: pink  Temperature: normal   Drainage:, none  Description of drainage: none  Odor: none  Pain: unable to assess due to  confusion    Interventions  Current support surface: Standard  Atmos Air mattress    Current off-loading measures: Pillows under calves  Visual inspection of wound(s) completed  Wound Care: done per plan of care  Supplies: floor stock  Education provided today: Pressure ulcer prevention, turning and repositioning    Discussed plan of care with Patient and wife at bedside    Jade SHARPEN, RN, CWOCN

## 2018-04-24 NOTE — PROGRESS NOTES
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 Alertness had markedly improved on inotropes but MS still poor.    Today:  Ongoing discussions with family, palliative, and social work regarding goals of care and discharge plan.  Renal consult - possible dialysis.    #CKD-V   # LOIS  Patient was oliguric on presentation. Tunneled line by IR 3/29. Started HD 3/29. Urine output increased and Cr stable/improved with dobutamine. No further HD as patient had good UOP and a very poor cardiac prognosis, so the tunneled catheter was removed. Following removal of the catheter, Cr was stable in the 2.3-2.4 range, and patient had adequate UOP. However since 4/18, Cr has been steadily rising, and UOP has been decreasing. Diuresis was stopped on 4/18. Possibly obstructive due to UTI, although it is also very likely related to the patient's end stage heart failure, as his heart rate has been decreasing.  - Tamsulosin, finasteride for possible obstruction.  - UTI treatment as above.  - Monitor UOP, Cr  - Nephrology consult, appreciate recs  - IR removed dialysis catheter 4/12.    #Concern for UTI  Started on rocephin on 4/21. Urine cultures grew pan-sensitive Klebsiella pneumoniae. De-escalated to Keflex.  - Keflex (4/22 ->)    #Cardiac amyloidosis  #HFrEF NYHA IV D (EF 15-20%)  RHC on 4/3. PCW 20. RA 17. PA 43/25 (33). RV 43/17. Son CI 1.4. Patient is volume overloaded and in cardiogenic shock. Suspect this is the likely cause of patient's altered mental status, LOIS, and possibly the ventriculomegaly. PICC placed on 4/3 to begin dobutamine infusion. His mental status and renal  function have improved with the dobutamine infusion, improving his quality of life. His prognosis remains very poor.  - Continue dobutamine at 5 mcg/kg/min  - Discontinued hydralazine due to soft BP's  - Diuresis: Holding due to rising Cr.  - Palliative consulted, appreciate recs re: GOC and d/c plans    #Multifactorial Encephalopathy, improving  #Delirium  #Dementia (MoCA 16/30)  #Ventriculomegaly / possible normal pressure hydrocephalus  #h/o right MCA CVA  Likely due to cardiogenic shock, although also had extensive workup for other causes. CT scan with enlarged venticles. No mass or acute CVA. Sepsis also possible contributor. Possibly uremia. Low suspicion for SBP. Patient's history concerning for NPH. LP done on 4/2. Attempted large volume (25-30 cc); however, was only able to obtain 0.5 cc.  - Treatment by etiology: UTI / PNA (s/p tx with abx), uremia (treated with HD/diuresis), NPH (neurology recommends no further inpatient workup), baseline dementia / CVA, cardiogenic shock (see above)  - Psychiatry consulted: Latuda had been at 40 mg, decreased to 20 mg due to family's concern about its affect on patient's daytime sleepiness, and melatonin 6 mg qhs prn for agitation, less risk of QTc prolongation. Patient's mental status has improved in the past with better volume status.  - Neurology signed off. Recommended follow-up in movement disorder clinic for workup of possible NPH, although this is less likely now given patient's poor cardiac prognosis.  - PT/OT  - Patient will need to be without a sitter for 24 hours for placement.     #CAD s/p ATIF to LAD 2015  - Continue pta atorvastatin 80 mg qday  - ASA 81 mg daily  - Avoid BB with cardiac amyloid     #Afib/flutter s/p CTI ablation 2006  Remains in afib. Was on sotalol but this was discontinued around 08/2017 per outside cardiology notes. Discontinued warfarin on 4/3 due to labile INR's and started on apixaban instead.  - Apixaban 2.5 mg BID (renally  dosed)    #Aspiration PNA, resolved  #Urinary tract infection, resolved  Started on Unasyn on 3/27. Switched to Zosyn on 3/28 per ID recs. Restarted on Unasyn on 3/30 when sensitivities returned on UTI (Klebsiella pneumoniae). Completed course of Unasyn on 4/4. Repeat chest x-ray on 4/14 stable. Patient remains afebrile.    # Cachexia  Patient's volume overload somewhat obscures his severe malnutrition, but he has temporal wasting.    FEN: Cardiac diet  PPX: Apixaban  Code Status: DNR/DNI  Dispo: Awaiting discharge to care facility, most likely. Discussing with palliative and family.    This patient was discussed with Dr. Laird who agrees with the assessment and plan.    Jamie Parkinson, DDS  PGY-1  Pager: [phone]      Subjective & Interval Hx:    No acute events overnight. Latuda held per wife's request, and she stayed with the patient overnight.     Patient somnolent this morning. Nods yes when asked if he is comfortable. Unable to perform ROS.    Last 24 hr care team notes reviewed.       Physical Exam:  Blood pressure 94/51, pulse 79, temperature 97.6  F (36.4  C), temperature source Axillary, resp. rate 16, weight 64.1 kg (141 lb 5 oz), SpO2 100 %.     Gen: Elderly male, resting comfortably in bed, NAD  Neck: Supple  Pulm: Anterior lung fields clear to auscultation bilaterally  CV: Irregularly irregular rhythm, S1/S2, no m/r/g  ABD: Soft, ntnd, normal BS  EXT: Bilateral 1+ lower extremity edema  Skin: No rashes or skin lesions visible  NEURO: Somnolent, does not answer questions    Lines/Tubes: L PICC, L PIV    Labs & Studies of Note:   - reviewed all labs in [facility] today.    Imaging reviewed.    I have seen and examined the patient and agree with the finding and plan.       Mateus Laird MD  Cardiology-CSI  [phone]

## 2018-04-24 NOTE — PLAN OF CARE
Problem: Patient Care Overview  Goal: Plan of Care/Patient Progress Review  RN  1. Pt will be free from injury   OT 6C: cancel. Per chart review and discussion with interdisciplinary team. Pt. with decreased level of alertness, min/no command following; not medically appropriate for therapy this date. Per discussion with pt.s family they are requesting to allow pt. to rest today, but do want therapy to continue. Will reschedule for 4/26/18 as appropriate.

## 2018-04-24 NOTE — CONSULTS
"  Nephrology Initial Consult  April 24, 2018      Josr Landers MRN:[id number] YOB: 1940  Date of Admission:3/26/2018  Primary care provider: Ángel Oates  Requesting physician: Vivian Sullivan MD    ASSESSMENT AND RECOMMENDATIONS:     LOIS on CKD:   Early in admission, patient had elevated SCr attributed to CRS vs abdominal compartment sydrome (ascites), but improved with HD 3/29 - 3/31 and had return of UOP and baseline SCr (2.3-2.4). Tunnel catheter removed by IR as transition to hospice was being considered and HD would not improve QoL given poor cardiac prognosis. We were re-consulted for rising SCr again (4/15 1.8 -> 4/17 2.42 -> 4/18 Cr 2.64 with steady increase, now 5.49). Bumex was stopped 4/18 due to rising SCr. UOP 4/18 1.3L, decreased to 800mL 4/19 and then became anuric 4/22 (bladder scan 4/23 showed 100mL, straight cath 4/24 for 300mL UOP). Patient has h/o bladder distension and prostatomegaly (last renal scan 2/2018), currently on finasteride and tamsulosin. Weights stable (64kg 141 lbs) since 4/17. Per cardiology, patient has had declining heart rate despite maximum dobutamine drip, from 90's to 40's and is not a candidate for pacemaker. DDx includes post-renal obstruction, CRS, ATN 2/2 low perfusion in setting of recent declining HR and ?dobutamine decreasing renal perfusion. Of note, when the possibility of re-starting HD was discussed, patient became very agitated and family stated that he was \"anxious\".   - Renal US  - Fraga catheter and trial of Bumex 4g IV  - Will transfer care to prior nephrology team, as they have more long-standing relationship with patient and family to discuss HD  - Would not recommend HD, given poor prognosis, risk of bleeding (on Apixaban), low BPs and patient's anxious reaction to discussion of HD    BP/Volume:  Soft BP's (98/60). Lactate downtrending (3.6 to 3.0). Mild LE edema to shins, lungs clear, patient appears close to euvolemia/mildly " hypervolemic.   - Diuretic challenge as above    Anemia:   Hgb 10.8 on last CBC 4/14/18.   - Recommend CBC to monitor Hgb     Electrolytes / Acid/Base:  No acute abnormalities. K 4.9, Bicarb 25.    BMD:  Ca 9.1 (alb 2.5), P 4.4,     Recommendations were communicated to primary team via direct communication.    Seen and discussed with Dr. Danilo Garcia, MS4      REASON FOR CONSULT: LOIS on CKD, Cr rising to 5.5, likely dialysis.     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     2 weeks PTA, patient was hospitalized at [facility] for generalized weakness, frequent episodes of AMS and volume overload for which he was diuresed with oral Bumex from 172lb to 167lbs (target dry weight reported 165lbs) and discharged to TCU. At that time, discussion of worsening renal function and possible HD began. Patient left on 1g Bumex daily. He had previously been instructed to increase Bumex stepwise to 4g daily (per last clinic visit with Nephrologist Dr. Turner 2/7/2018) but this did not occur.    On presentation here, patient was oliguric and SCr 3.39 (BlSCr 2.4 attributed to AKIs). H/o R renal mass s/p ablation 10 years ago and NSAID use per initial nephrology consult note 3/27. Initial rise in SCr attributed to CRS vs compartment syndrome d/t ascites vs infection/obstruction. Failed Bumex challenge. Tunneled line placed by IR 3/29 and HD started. UOP improved and Cr stabilized/improved with dobutamine. Given good UOP and poor cardiac prognosis, HD was stopped and tunneled catheter was removed. Cr remained stable (2.3-2.4) with good UOP until 4/18/18 when Cr started steadily rising and UOP began decreasing  (diuretics were stopped on 4/18/18). Heart rate has been decreasing, patient is in end stage heart failure. There was some question of obstructive post-renal process 2/2 UTI (UCx grew pan-sensitive Klebsiella, started Rocephin 4/21 and transitioned to Keflex 4/22).     Patient has HFrEF NYHA IV (EF 15-20%), RHC 4/3 results: PCW 20. RA 17. PA 43/25 (33). RV 43/17. Son CI 1.4. Patient is clinically volume overloaded but diuresis is being held d/t rising Cr. Has had soft BPs, currently receiving dobutamine infusion via PICC line which is limiting his d/c TCU options. His mental status and renal function have improved with the dobutamine 5 mcg/kg/min infusion. Prognosis remains poor. Cardiology reports recent decrease in HR (90's to 40's) during the recent SCr uptrending despite maximum dobutamine. Patient is not a pacemaker candidate given his high risk and life expectancy. Patient's low HR and soft BPs triggered sepsis protocol on 4/23, lactate 3.6 on initial check, now downtrending to 3.0.    Of note, patient was having labile INRs, warfarin was changed to Apixaban 2.5mg BID (renally dosed) on 4/3. No progression of CVA on CT, but did show ventricular enlargement which could be contributing to poor mental status.    Prior aspiration PNA treated with Unasyn (3/27 - 3/28) changed to Zosyn (3/28 - 3/30) restarted on Unasyn (3/30 -4/4 completed course) for UTI Klebsiella sensitivities. Now on Keflex for another Klebsiella + UCx. Family notes increased cough and yellow sputum in past few days, most recent CXR 4/23 showed no changes from prior L basilar opacity.     PAST MEDICAL HISTORY:  Reviewed with patient's family on 04/24/2018   Past Medical History:   Diagnosis Date     Atrial fibrillation (H) 6/25/2012     Atrial fibrillation: ablation Dr Jonathan Arvizu/[place] 6/25/2012     Cardiac amyloid: ATTR per cardiac biopsy [facility] 2009 6/27/2012     Coronary artery disease      Other and unspecified hyperlipidemia  6/27/2012     Tubular adenoma of colon 2010     Venous insufficiency        Past Surgical History:   Procedure Laterality Date     BACK SURGERY       H PENUMBRA SEPARATOR 3D       KNEE SURGERY       DC PATIENT HAS A CORONARY ARTERY STENT       SHOULDER SURGERY          MEDICATIONS:  PTA Meds  Prior to Admission medications    Medication Sig Last Dose Taking? Auth Provider   acetaminophen (TYLENOL) 500 MG tablet Take 650 mg by mouth every 4 hours as needed for mild pain or pain  3/26/2018 at 0013 Yes Reported, Patient   ACETAMINOPHEN PO Take 650 mg by mouth 3 times daily 3/26/2018 at 1300 Yes Unknown, Entered By History   atorvastatin (LIPITOR) 80 MG tablet Take 1 tablet (80 mg) by mouth daily 3/25/2018 at PM Yes Ángel Oates MD   bumetanide (BUMEX) 1 MG tablet Take 2 mg by mouth in the morning and 1 mg in the afternoon 3/26/2018 at 1300 Yes Reported, Patient   EMOLLIENT EX Externally apply topically 3 times daily as needed Unknown at Unknown time  Reported, Patient   MELATONIN PO Take 3 mg by mouth nightly as needed 3/25/2018 at 2002 Yes Reported, Patient   order for DME Equipment being ordered: DME compression stockings knee high 30 mm hg   Ángel Oates MD   order for DME Sequential pneumatic compression pumps 2-3 times per day as needed for lymphedema. Measure and fit.  Compression strength per protocol.   Erich Fraga MD   order for DME Equipment being ordered: Bedside commode   Ángel Oates MD   order for DME Equipment being ordered: Stair Lift   Length of need: Lifetime  Chronic kidney disease, coronary artery disease, acute CVA  02/12/2017 distal right M1 segment of middle cerebral artery.At risk of falls due to stroke and deconditioning. He is working with therapists, uses wheelchair and assistive devices.  His home has stairs, would benefit  from [place] stair chair lift as he has difficulty climbing stairs required in home.   Ángel Oates MD   other medical  supplies JENIFER stocking bilateral lower extremities   Gregorio Cruz MD   Potassium Chloride ER 20 MEQ TBCR Take 1 tablet (20 mEq) by mouth daily 3/26/2018 at AM Yes Ángel Oates MD   sennosides (SENOKOT) 8.6 MG tablet Take 2 tablets by mouth daily as needed for constipation 3/21/2018 at 1404 Yes Unknown, Entered By History   WARFARIN SODIUM PO Take 2.5 mg by mouth on MWF, and 5 mg daily ROW for afib. 3/22/2018 at 1700 Yes Unknown, Entered By History      Current Meds    apixaban ANTICOAGULANT  2.5 mg Oral BID     atorvastatin  80 mg Oral Daily     cephalexin  500 mg Oral Q12H     finasteride  5 mg Oral Daily     heparin lock flush  5-10 mL Intracatheter Q24H     hydrocortisone   Topical BID     lurasidone  20 mg Oral Daily at 8 pm     melatonin tablet 6 mg  6 mg Oral At Bedtime     multivitamin, therapeutic with minerals  1 tablet Oral Daily     polyethylene glycol  17 g Oral Daily     psyllium  0.52 g Oral Daily     sennosides  2 tablet Oral Daily     sodium chloride (PF)  10 mL Intracatheter Q7 Days     sodium chloride (PF)  3 mL Intracatheter Q8H     tamsulosin  0.4 mg Oral Daily     thiamine  100 mg Intravenous Daily     Infusion Meds    DOBUTamine 5 mcg/kg/min (04/24/18 4247)     - MEDICATION INSTRUCTIONS -         ALLERGIES:    Allergies   Allergen Reactions     Gabapentin Other (See Comments)     Altered mental status     Lyrica      Severe swelling of body and fluid around heart.        REVIEW OF SYSTEMS:  A comprehensive of systems was negative except as noted above.    SOCIAL HISTORY:   Social History     Social History     Marital status:      Spouse name: N/A     Number of children: N/A     Years of education: N/A     Occupational History     Not on file.     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      to his wife Nu ( Vikash), two children Deandra Solo (Grandchildren Trinh , Nish ) and Son Joe Landers III     Reviewed with patient's family.   His wife, daughter and son accompany Josr Landers in hospital room    FAMILY MEDICAL HISTORY:   No family history on file.  Reviewed with patient's family.    PHYSICAL EXAM:   Temp  Av.7  F (36.5  C)  Min: 96.5  F (35.8  C)  Max: 98.9  F (37.2  C)      Pulse  Av.1  Min: 70  Max: 110 Resp  Av.7  Min: 14  Max: 28  SpO2  Av.8 %  Min: 88 %  Max: 100 %       BP 98/62 (BP Location: Right arm)  Pulse 79  Temp 98.1  F (36.7  C) (Axillary)  Resp 16  Wt 64.1 kg (141 lb 5 oz)  SpO2 100%  BMI 20.87 kg/m2       Admit Weight: 75.3 kg (166 lb)     GENERAL APPEARANCE: no acute distress (does become restless/agitated with discussions of dialysis), somnolent but arousable  EYES: no scleral icterus, pupils equal  HENT: NC/AT,  mouth  without ulcers or lesions  Lymphatics: no cervical or supraclavicular LAD  Endo: no moon facies, no goiter  Pulmonary: lungs clear to auscultation with equal breath sounds bilaterally, no clubbing  CV: faint heart sounds, regular rhythm, normal rate, no rub   - JVP normal   - Edema mild, pitting to mid-shin  GI: soft, nontender, normal bowel sounds, no HSM   MS: no evidence of inflammation in joints, no muscle tenderness  : no martinez  SKIN: no rash, warm, dry, no cyanosis (family reports skin breakdown on coccyx, unable to assess during this physical exam as patient up in chair).  NEURO: face symmetric    LABS:   CMP  Recent Labs  Lab 18  1007 18  0425 18  0617 18  0830   * 128* 129* 130*    POTASSIUM 4.9 5.8* 5.0 3.7   CHLORIDE 88* 87* 89* 87*   CO2 25 26 25 29   ANIONGAP 16* 15* 15* 14   GLC 91 96 98 117*   * 124* 116* 99*   CR 5.49* 5.20* 4.62* 3.82*   GFRESTIMATED 10* 11* 12* 15*   GFRESTBLACK 12* 13* 15* 19*   RIGOBERTO 9.1 9.1 9.5 9.3     CBCNo lab results found in last 7 days.  INRNo lab results found in last 7 days.  ABG  Recent Labs  Lab 04/22/18  1800   O2PER 21.0      URINE STUDIES  Recent Labs   Lab Test  04/21/18   0030  04/08/18   1410  03/27/18   1510  08/23/17   1240   COLOR  Yellow  Red  Red  Yellow   APPEARANCE  Cloudy  Cloudy  Cloudy  Clear   URINEGLC  Negative  Negative  Negative  Negative   URINEBILI  Negative  Negative  Negative  Negative   URINEKETONE  Negative  Negative  Negative  Negative   SG  1.013  1.012  >1.050*  1.006   UBLD  Moderate*  Large*  Large*  Small*   URINEPH  5.5  6.5  6.0  6.0   PROTEIN  100*  30*  100*  Negative   NITRITE  Negative  Negative  Negative  Negative   LEUKEST  Large*  Moderate*  Large*  Negative   RBCU  181*  >182*  >182*  1   WBCU  >182*  2  2478*  <1     Recent Labs   Lab Test  03/27/18   1510  08/23/17   1240   UTPG  2.65*  0.76*     PTH  No lab results found.  IRON STUDIES  No lab results found.    IMAGING:  See the radiologist's report which is filed     Last Renal US 2/7/18  IMPRESSION:   1. Unchanged peripherally calcified mass in the midpole of right  kidney measuring up to 2.7 cm, compared to MRI 1/30/2018.  2. Complicated cyst in the lower pole of left kidney with septation  and internal echoes corresponding to hemorrhagic/proteinaceous cyst on  MRI 1/30/2018. Recommend attention on follow  up.   3. Multiple simple renal cysts.  4. Increased renal parenchymal echogenicity consistent with chronic  renal disease.  5. Prostatomegaly with distended bladder.  6. Ascites.    Simona Garcia, MS4    I have seen and examined this patient with the student who acted as a scribe.  This note reflects our joint assessment and plan.     Monica  XUAN Carrasco MD

## 2018-04-24 NOTE — PROGRESS NOTES
[facility], [place]    Sepsis Evaluation Progress Note    Date of Service: 04/23/2018    I was called to see Josr Landers due to abnormal vital signs triggering the Sepsis SIRS screening alert. He is known to have an infection (UTI).     Physical Exam    Vital Signs:  Temp: 97.3  F (36.3  C) Temp src: Oral BP: (!) 74/52   Heart Rate: 48 Resp: 22 SpO2: 97 % O2 Device: None (Room air)      Lab:  Lactic Acid   Date Value Ref Range Status   03/28/2018 1.5 0.7 - 2.0 mmol/L Final     Lactic acid 3.6 today     The patient is at baseline mental status.    The rest of their physical exam is significant for elevated JVP, lower extremity edema, breathing comfortably, extremities cool.    Assessment and Plan    The SIRS and exam findings are likely due to cardiogenic shock, there is no sign of sepsis at this time.    Disposition: The patient will remain on the current unit. We will continue to monitor this patient closely.    Blood cultures and chest x ray ordered. Wife obtained on declining condition.     Ajit Muse MD

## 2018-04-24 NOTE — PROGRESS NOTES
[facility], [place]   Palliative Care Daily Progress Note          Recommendations, Patient/Family Counseling & Coordination    Pt seen and examined. Nursing notes reviewed. Discussed with primary team.  Daughter and son present. He is less attentive to conversation, sleeping more soundly. Nursing notes up most of night again.   Chart notes reviewed. After much consideration family made decision to change code status to dnr/dni last evening. Wants all other interventions.   Continues with 1:1 sitter/ video/ for nighttime agitation and delirium superimposed on encephalopathy likely related to prior stroke.  Ongoing findings of his bradycardia likely reflecting his underlying heard failure rather than medication effect- noting previous discussions about the possible time limited benefit from his dobutamine therapy.  He is now anuric and nephrology has been consulted per primary team to evaluate need for iHD.     CODE STATUS: Previously family electing to continue with FULL CODE status- last evening spouse requested dnr/dni status but emphasized full measures/intervention otherwise.     Wife (Nu) aware she is not capable of caring for him at home with his present level of assistance, reluctant to see bigger picture that he is not likely to regain functional strength.   We reviewed options and that discharge should include consideration for hospice care.      REC: - No change in treatment planned for now.   - Continue Melatonin and Latuda (dose decreased to 20 mg daily) Support nursing efforts to help day/night cycle health.  - stool softeners, suppositories for bowel program- continue scheduled bowel meds- hold if loose stools.   - PT for energy conservation and functional recovery. Continues with minimal therapy progress- poor activity tolerance.  - Ongoing requirement for IV inotropes - has been preliminarily OK'ed by  Hospice to continue after discharge if able to find placement  -  from PC perspective discharge should ideally be hospice and comfort focused- family not at that place.  SNF placement likely as his care needs are increasing.      -  Goals of Care: His goals- earlier verbalized by family- remain cautiously restorative- somewhat in denial of slow decline and diminishing treatment options of his heart failure.    Per SW and family- financial concerns limit options for expanded end of life care. SW evaluating options including benevolent care considerations. Despite evidence of end stage disease family unable to accept anything but full measures. Considering dialysis if nephrology offers.      POLST- not completed at this time     Thank you for the opportunity to continue to participate in the care of this patient and family.  Please feel free to contact on-call palliative provider with any emergent needs.  We can be reached via team pager [phone] (answered 8-4:30 Monday-Friday); after-hours answering service ([phone])    Jamie GREER NP ACHPN  Nurse Practitioner- Lead Advanced Practice Provider  [facility] Palliative Medicine Consult Service   [phone]    Jamie Myrick NP    TT spent: 37 minutes of which 25 minutes were spent in direct face to face contact with patient/family. Greater than 50% of time spent counseling and/or coordinating care.       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 Soft but stable pressures- no chest pain or lightheadedness. More somnolence today. No shortness of breath.   No significant improvement sleep disturbance - ongoing 6 weeks or more per family- likely contributing to confusion and agitation. Psychiatry note reviewed. Increased Latuda  to 40 mg- decreased back to 20 mg due to se concerns per family.  Nephrology consult pending- family open to dialysis if option available            Review of Systems:   Palliative Symptom Review (0=no symptom/no concern, 1=mild, 2=moderate, 3=severe):      Pain: 0       Fatigue: 1-2      Nausea: 0      Constipation: 1-2      Diarrhea: 0      Depressive Symptoms: 1      Anxiety: 1      Drowsiness: 2      Poor Appetite: 1      Shortness of Breath: 0      Insomnia: 2-3      Confusion 2              Medications:   I have reviewed this patient's medication profile and medications given in past 24 hours.      {   Physical exam .Vitals: BP 98/62 (BP Location: Right arm)  Pulse 79  Temp 98.1  F (36.7  C) (Axillary)  Resp 16  Wt 64.1 kg (141 lb 5 oz)  SpO2 100%  BMI 20.87 kg/m2  BMI= Body mass index is 20.87 kg/(m^2).   GEN: Somnolent in bedside chair. Family present.- oriented to family when awake. Longer intervals of sleeping during the day.  HEENT:AT/ NC, EOM grossly intact, mucous membranes pink, moist. Dentition in good repair.  PULM/THORAX: Clear to auscultation bilaterally, no rales/rhonchi/wheezes. Decreased  inspiratory effort.   CV:RRR, S1 S2 appreciated, query S3- no murmurs auscultated. Monitor with atrial fibrillation- rate controlled- bradycardic at 48-50 bpm occ ectopy.  ABD: soft, non distended, hypoactive bowel sounds, no organomegaly  EXT: 1+ pitting LE edema. No asymmetrical edema or tenderness to palpation in calves bilaterally.  NEURO: difficult to fully assess. No apparent focal neurological deficit                   Data Reviewed:   *  ROUTINE LABS (Last four results)  BMP    Recent Labs  Lab 04/24/18  1007 04/24/18  0425 04/23/18  0617 04/22/18  0830   * 128* 129* 130*   POTASSIUM 4.9 5.8* 5.0 3.7   CHLORIDE 88* 87* 89* 87*   RIGOBERTO 9.1 9.1 9.5 9.3   CO2 25 26 25 29   * 124* 116* 99*   CR 5.49* 5.20* 4.62* 3.82*   GLC 91 96 98 117*     CBC  No lab results found in last 7 days.  INRNo  lab results found in last 7 days.

## 2018-04-24 NOTE — PROGRESS NOTES
CLINICAL NUTRITION SERVICES - REASSESSMENT NOTE     Nutrition Prescription    RECOMMENDATIONS FOR MDs/PROVIDERS TO ORDER:  Monitor pt's POC and consider ordering kcal counts. If TFs are consistent with POC, then refer to nutrition note 4/4.    Malnutrition Status:    Severe malnutrition in the context of chronic illness    Future/Additional Recommendations:  1. If able, consider liberalizing diet order to regular vs low-sodium. Monitor K+ trends and potential need for K+ restriction (K+ 5.8 on 4/24).  2. Fluid restriction per team, if warranted.    3. Consider decreasing frequency of miralax and senna as these are scheduled and having up to about five stools daily. Pt is on psyllium as well.   4. Monitor BG control. Hgb A1c of 6.4 on 11/13/17.   5. Consider checking folic acid lab.      EVALUATION OF THE PROGRESS TOWARD GOALS   Diet: Cardiac diet order since 4/4. Ordered to receive Nepro oral supplements between meals (rotating flavors). Room service appropriate with assist.   Intake: Poor diet tolerance. Flowsheets indicate pt consumed 50-75% of meals with a fair to good appetite 4/18, % of meals with a fair to good appetite 4/19, 25% of meals 4/20, 25-50% of meals with a poor to good appetite 4/21, 25-75% of meals with a poor appetite 4/22, and % of meals on 4/23. Pt was resting at time of nutrition visit. As per discussion with pt's wife, pt has not been eating well. His appetite started decreasing about four days ago. He had half a bowl of soup for a meal recently. Yesterday, he had three bites of the cherry Gelatein Plus supplement and did not consume Nepro oral supplement. Pt's wife states he is not up to eating today. Breakfast tray sitting in room. Per RN, pt with declining motor skills.      NEW FINDINGS   [facility] nurse note 4/23: Coccyx wound due to Moisture Associated Skin Damage (MASD) and friction injury within scar tissue. Status: Follow up assessment, improved. No note of pressure injury in  [facility] nurse note.     ASSESSED NUTRITION NEEDS (updated)  Dosing Weight: 64 kg (actual, based on lowest wt this admission of 64.1 kg on 4/17)  Estimated Energy Needs: 0956-8745 kcals/day (30-35 kcals/kg)  Justification: Increased needs with stress factors  Estimated Protein Needs: 70-90 grams protein/day (1.1-1.4 grams of pro/kg)  Justification: Increased needs with cardiac status  Estimated Fluid Needs: 9792-5804 mL/day (25 - 30 mL/kg)   Justification: Maintenance needs or per team, pending fluid status    MALNUTRITION  % Intake: </= 50% for >/= 5 days (severe)  % Weight Loss: Weight loss does not meet criteria. Although, difficult to assess with diuresis.  Subcutaneous Fat Loss: Facial region:  Mild, Arms: Mild  Muscle Loss: Temporal:  Mild and Thoracic region (clavicle, acromium bone, deltoid, trapezius, pectoral):  Moderate  Fluid Accumulation/Edema: Does not meet criteria  Malnutrition Diagnosis: Severe malnutrition in the context of chronic illness    Previous Goals   Patient to consume % of nutritionally adequate meal trays TID, or the equivalent with supplements/snacks.  Evaluation: Not met    Previous Nutrition Diagnosis  Inadequate oral intake related to altered mentation, lack of appetite intermittently, and NPO at times as evidenced by pt consuming 25-50% of meals at times  Evaluation: Unresolved, updated.    CURRENT NUTRITION DIAGNOSIS  Inadequate oral intake related to altered mentation, lack of appetite intermittently, and NPO at times as evidenced by pt consuming 25-50% of meals per flowsheets.    INTERVENTIONS  Implementation  Medical food supplement therapy: Discussed oral supplements with pt's wife. Will continue these as ordered.     Goals  Patient to consume % of nutritionally adequate meal trays TID, or the equivalent with supplements/snacks.    Monitoring/Evaluation  Progress toward goals will be monitored and evaluated per protocol.     Nutrition will continue to follow.     Shereen GOVEA  MS Muna, WILBERTO, CY, [facility]   6C Pgr: [phone]

## 2018-04-24 NOTE — PROGRESS NOTES
Social Work Services Progress Note    Hospital Day: 28  Date of Initial Social Work Evaluation:  3/29/18  Collaborated with: Chart review, Pt, Cards 1 team    Data:  Pt is a 77 year old male being followed by SW for placement to LTC with dobutamine.  Pt has been accepted to [facility] for LTC.  Nephrology consulted today for dialysis potential/options for worsening kidney failure.  Pt would not be an outpatient dialysis candidate due to medical complexity and lack of community placement that would accept a pt with advanced heart failure, dobutamine and dialysis.    Intervention:  Pt continues to be on VPM/sitter which is preventing placement to LTC.  Cardiology team is following for medical management of heart failure and worsening kidney function.  Family agreed yesterday afternoon to make the pt DNR/DNI.      Referrals in Progress:   [facility] - accepted to LTC, barrier for the family is financial hardship, other barrier continued VPM/attendant due to disorientation and impulsivity.  PH: [phone]  F: [phone]    Assessment: Pt continues to be disoriented and pleasant to talk to.  Family at bedside throughout the day.  Spouse is primary POA decision maker.    Plan:    Anticipated Disposition: TBD, LTC continues to be an option, family not wanting hospice yet.    Barriers to d/c plan:  Cost of TCU/LTC, sitter/VPM, dobutamine needs, financial hardship, lack of social support to take the pt home, if starting dialysis, there are no options for community dialysis placement available to the pt/family.    Follow Up:  SW to follow for discharge needs.    JERRI Valdovinos, APSW  6C Unit   Phone: [phone]  Pager: [phone]  Unit: [phone]      ___________________________________________________________________________________________________________________________________________________    Referrals Discontinued:  [facility] - cannot take pt's with  dobutamine  [facility] - cannot take pts with dobutamine.     Community Case Management/Community Services in place:   Family will be pursuing elder law  meeting on Monday to discuss division of assets to assist the pt with pursuing Medicaid coverage.

## 2018-04-24 NOTE — PROGRESS NOTES
Called to bedside to discuss code status with patient's wife. She has decided to make the patient DNR/DNI. She made it abundantly clear that we continue all other aggressive measures short of resuscitation in the event of a cardiac arrest.     Carlos Muse MD  PGY-3 Internal Medicine

## 2018-04-24 NOTE — PLAN OF CARE
Problem: Patient Care Overview  Goal: Plan of Care/Patient Progress Review  RN  1. Pt will be free from injury     D: Evening VS triggered sepsis protocol, lactic acid resulted 3.6.  I: Notified cross cover, port cxr ordered, BCs drawn.  A: Afib 40s-50s.BP recheck with improved MAP (72). Patient confused,but able to swallow some medications. Wife asked to hold latuda and lipitor. Incontinence care and repositioning in bed to maintain skin integrity. Dobutamine infusing at 5 mcg/kg/min.  P: VPM in private mode while wife at bedside.

## 2018-04-25 NOTE — PROGRESS NOTES
[facility] Nurse Inpatient Wound Assessment     Follow up Assessment  Reason for consultation: Evaluate and treat coccyx wound     Assessment  Coccyx wound due to Moisture Associated Skin Damage (MASD) and friction injury within scar tissue  Status: Follow up assessment,unchanged    Treatment Plan    Coccyx wound: Cleanse the area with NS and pat dry.    Apply No sting film barrier to periwound skin.    Cover wound with Mepilex. Mepilex 4x4    Change dressing Q 3 days.    Turn and reposition Q 2hrs.    Ensure pt has Filippo-cushion while sitting up in the chair.    FYI- If pt has constant incontinent loose stools needing dressing changes Q shift please discontinue the Mepilex dressing and apply criticaid barrier paste BID and PRN.    Orders Reviewed  WO Nurse follow-up plan:weekly  Nursing to notify the Provider(s) and re-consult the [facility] Nurse if wound(s) deteriorates or new skin concern.    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 Alertness markedly improved on inotropes but MS still poor.    Objective Data  Containment of urine/stool: Incontinence Program    Active Diet Order    Active Diet Order      Low Saturated Fat Na <2400 mg    Output:   I/O last 3 completed shifts:  In: 0   Out: 275 [Urine:275]    Risk Assessment:    Juan Pablo Juan Pablo Score  Avg: 15.3  Min: 11  Max: 19                            Labs:     Recent Labs  Lab 04/25/18  0543   HGB 10.7*   WBC 7.2           Recent Labs  Lab 04/23/18  2138 04/23/18  2135 04/21/18  0030   CULT No growth after 2 days No growth after 2 days >100,000 colonies/mLKlebsiella pneumoniae*       Physical Exam        4/25/18      Wound  Location:  Coccyx  Wound History: Area of previously noted scar tissue, with prior wounds per patient. Patient is incontinent and has been resistive to cares intermittently.   Measurements (length x width x depth, in cm) 1 cm x 0.5 cm  x  0.02 cm   Wound Base: 100% pale pink dermis  Palpation of the wound bed: normal   Periwound skin: intact scar tissue  Color: pink  Temperature: normal   Drainage:, none  Description of drainage: none  Odor: none  Pain: unable to assess due to  confusion    Interventions  Current support surface: Standard  Atmos Air mattress    Current off-loading measures: Pillows under calves  Visual inspection of wound(s) completed  Wound Care: done per plan of care  Supplies: floor stock  Education provided today:  turning and repositioning    Discussed plan of care with nursing staff

## 2018-04-25 NOTE — PLAN OF CARE
Problem: Patient Care Overview  Goal: Plan of Care/Patient Progress Review  RN  1. Pt will be free from injury   Outcome: Declining  BP 98/62 (BP Location: Right arm)  Pulse 79  Temp 98.1  F (36.7  C) (Axillary)  Resp 16  Wt 68 kg (149 lb 14.6 oz)  SpO2 100%  BMI 22.14 kg/m2    Pt  50s HR and  Soft BP through the day sustained, Pt restless at night, harder to arouse through the day.  Oral medication not successfully given through the day.  Fluid intake was minimal, as was food intake.   Ordered a [facility] consult for tail bone skin condition.  Fraga catheter initiated, and Bumex ordered.  Family asked about IV fluids, discussed with Mag on Cards I - no action steps at this time.  Atavan and Dilaudid PRN added today.  Discussion of adding a sitter do to restless bed escapes and cord pulling, forwarded to MD and charge nurse.  Continue to monitor and notify Cards I of changes

## 2018-04-25 NOTE — PROGRESS NOTES
Social Work Services Progress Note    Hospital Day: 31  Date of Initial Social Work Evaluation:  3/29/18  Collaborated with:  Pt's floor nurseMable    Data:  Pt has been assessed and approved for admit to [facility]. Pt will discharge on Dobutamine.    Intervention:  Phoned pt's floor nurse and confirmed that pt continues to have one to one sitter in place.    Assessment:  [facility] will not accept pt for admit until pt is successfully off the sitter.    Plan:    Anticipated Disposition:  Long Term Care placement at [facility].    Barriers to d/c plan:  One to one sitter.    Follow Up:  SW will continue to follow for discharge planning.    ANGUS Dangelo  Social Work, 6A  Phone:  [phone]  Pager:  [phone]  4/25/2018

## 2018-04-25 NOTE — PLAN OF CARE
Problem: Patient Care Overview  Goal: Plan of Care/Patient Progress Review  RN  1. Pt will be free from injury   Outcome: No Change  D: Pt admitted 3/26 with AMS, abd pain, SOB. Hx cardiac amyloidosis, afib/flutter, CAD, CKD IV, CVA.  I: Monitored vitals and assessed pt status. Running: dobutamine gtt 5mcg/kg/min    A: Pt continues to be intermittently alert/lethargic. Oriented to self and sometimes to place. Bedside attendant for safety as pt is occasionally agitated and pulls at lines. VSS on RA with soft BPs. Afib on tele, HR 45-65 with PVCs. Afebrile. Denies pain, SOB, dizziness, nausea. Incontinent of stool x2. Fraga catheter in place, draining cindy/pink urine with small amount of blood at meatus. Mepilex in place on coccyx. Repositioned q2h with Ax2. Pt slept for brief periods.  P: Continue to monitor pt status and report changes/concerns to Cards 1.

## 2018-04-25 NOTE — PROGRESS NOTES
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 Alertness had markedly improved on inotropes but MS still poor.    Today:  Ongoing discussions with family, palliative, and social work regarding goals of care and discharge plan.  Monitor I/O's    #CKD-V   # LOIS  Patient was oliguric on presentation. Tunneled line by IR 3/29. Started HD 3/29. Urine output increased and Cr stable/improved with dobutamine. No further HD as patient had good UOP and a very poor cardiac prognosis, so the tunneled catheter was removed. Following removal of the catheter, Cr was stable in the 2.3-2.4 range, and patient had adequate UOP. However since 4/18, Cr has been steadily rising, and UOP has been decreasing. Diuresis was stopped on 4/18. He is now oliguric. Possibly obstructive due to UTI, but it has not resolved with Fraga placement and aggressive diuresis. Most likely related to the patient's end stage heart failure, as his heart rate has been decreasing.  - Tamsulosin, finasteride for possible obstruction.  - UTI treatment as above.  - Monitor UOP, Cr  - IR removed dialysis catheter 4/12.  - Nephrology consult, appreciate recs   - No HD given poor prognosis, risk of bleeding, low BP's, patient's anxious reaction to discussion of HD, and likely impossibility of discharge on HD and dobutamine   - Fraga catheter.   - Bumex 4 mg IV given 4/24    #Concern for UTI  Started on rocephin on 4/21. Urine cultures grew pan-sensitive Klebsiella pneumoniae. De-escalated to Keflex.  - Keflex (4/22 ->)    #Cardiac amyloidosis  #HFrEF NYHA IV D (EF 15-20%)  RHC on 4/3. PCW 20. RA 17. PA 43/25 (33). RV  43/17. Son CI 1.4. Patient is volume overloaded and in cardiogenic shock. Suspect this is the likely cause of patient's altered mental status, LOIS, and possibly the ventriculomegaly. PICC placed on 4/3 to begin dobutamine infusion. His mental status and renal function have improved with the dobutamine infusion, improving his quality of life. His prognosis remains very poor.  - Continue dobutamine at 5 mcg/kg/min  - Discontinued hydralazine due to soft BP's  - Diuresis: 4 mg IV Bumex given 4/24. Had been holding due to rising Cr.  - Palliative consulted, appreciate recs re: GOC and d/c plans    #Multifactorial Encephalopathy, improving  #Delirium  #Dementia (MoCA 16/30)  #Ventriculomegaly / possible normal pressure hydrocephalus  #h/o right MCA CVA  Likely due to cardiogenic shock, although also had extensive workup for other causes. CT scan with enlarged venticles. No mass or acute CVA. Sepsis also possible contributor. Possibly uremia. Low suspicion for SBP. Patient's history had been concerning for NPH. LP done on 4/2. Attempted large volume (25-30 cc); however, was only able to obtain 0.5 cc.  - Treatment by etiology: UTI / PNA (s/p tx with abx), uremia (treated with HD/diuresis), NPH (neurology recommends no further inpatient workup), baseline dementia / CVA, cardiogenic shock (see above)  - Psychiatry consulted: Latuda had been at 40 mg, decreased to 20 mg due to family's concern about its affect on patient's daytime sleepiness, and melatonin 6 mg qhs prn for agitation, less risk of QTc prolongation. Patient's mental status has improved in the past with better volume status.  - Neurology signed off. Recommended follow-up in movement disorder clinic for workup of possible NPH, although this is less likely now given patient's poor cardiac prognosis.  - PT/OT  - Patient will need to be without a sitter for 24 hours for placement.     #CAD s/p ATIF to LAD 2015  - Continue pta atorvastatin 80 mg qday  - ASA 81 mg  "daily  - Avoid BB with cardiac amyloid     #Afib/flutter s/p CTI ablation 2006  Remains in afib. Was on sotalol but this was discontinued around 08/2017 per outside cardiology notes. Discontinued warfarin on 4/3 due to labile INR's and started on apixaban instead.  - Apixaban 2.5 mg BID (renally dosed)    #Aspiration PNA, resolved  #Urinary tract infection, resolved  Started on Unasyn on 3/27. Switched to Zosyn on 3/28 per ID recs. Restarted on Unasyn on 3/30 when sensitivities returned on UTI (Klebsiella pneumoniae). Completed course of Unasyn on 4/4. Repeat chest x-ray on 4/14 stable. Patient remains afebrile.    # Cachexia  Patient's volume overload somewhat obscures his severe malnutrition, but he has temporal wasting.    FEN: Cardiac diet  PPX: Apixaban  Code Status: DNR/DNI  Dispo: Awaiting discharge to care facility, most likely. Discussing with palliative and family.    This patient was discussed with Dr. Willett who agrees with the assessment and plan.    Jamie Parkinson, GIGIS  PGY-1  Pager: [phone]      Subjective & Interval Hx:    No acute events overnight. Patient was agitated and required a sitter.    Patient intermittently somnolent and agitated this morning. When asked how he is feeling, he says, \"I don't know.\" Unable to perform ROS.    Last 24 hr care team notes reviewed.       Physical Exam:  Blood pressure 90/56, pulse 79, temperature 97.7  F (36.5  C), temperature source Axillary, resp. rate 17, weight 67 kg (147 lb 11.3 oz), SpO2 95 %.     Gen: Elderly male, resting comfortably in bed, NAD  Neck: Supple  Pulm: Anterior lung fields clear to auscultation bilaterally  CV: Irregularly irregular rhythm, S1/S2, no m/r/g  ABD: Soft, ntnd, normal BS  EXT: Bilateral 1+ lower extremity edema  Skin: No rashes or skin lesions visible  NEURO: Somnolent with intermittent agitation, confused speech    Lines/Tubes: L PICC, L PIV    Labs & Studies of Note:   - reviewed all labs in [facility] today.    Imaging " reviewed.

## 2018-04-25 NOTE — PROGRESS NOTES
Nephrology Progress Note  04/25/2018      Josr Landers is a 78 yo M with PMH of afib/aflutter, CVA, HFrEF, CAD, cardiac amyloidosis, admitted 3/26/18 with AMS and acute elevation in serum creatinine. PMH includes CKD4 with baseline Scr 2.4 attributed to AKIs with CVA and worsening HF. Patient did undergo RRT on 3/29 and 3/31 but did not do well 2/2 hypotension. Ultimately his renal function improved on Dobutamine w/o RRT and creat declined to his baseline. Dialysis line was removed. We signed off on 4/9. Nephrology re consulted on 4/24 for rise in creat. Creat had been stable in the 2 range until 4/18. At that time patient was receiving diuresis. He also developed a UTI, was started on Flomax/Proscar, became more hypotensive with SBP's in the 70's/ and more bradycardic with HR in the 40's. He is incontinent. Weight has steadily declined since admission. He is not hypoxic. Fraga was replaced 4/24 and Flomax/Proscar discontinued. Patient remains encephalopathic w/sitter.     Assessment & Recommendations:     1. LOIS - Patient has sustained another LOIS event following diuresis/hypotension/bradycardia/UTI in setting of poor oral intake and recent diuresis. Patient has end stage heart failure and is on Dobutamine with declining HR into the 40's. Creat had been stable in mid 2 range until 4/18 and then began to rise. Currently up to 5.7 today.    Etiology of recent LOIS felt to be 2/2 CRS, hypotension, bradycardia, hypovolemia    - Would give a fluid bolus today to assess response, ie: NS at 75 cc/hr x 500 cc    - Patient is not a dialysis candidate given that there are no OP HD units able to accept a patient on Dobutamine.    - Will d/w wife tomorrow   - Continue to monitor renal function with daily chemistry labs, I/O and daily standing weights   - Avoid nephrotoxins, hypotension (blood pressure improved today)    2. CKD4 - Baseline creat ~ 2.4. Etiology felt to be CRS, recurrent LOIS.       3. Volume status -  Euvolemic/hypovolemic. No significant edema, no hypoxia or oxygen needs. Oral intake generally poor.  cc today following martinez insertion last evening. SBP improved to 90/range. Weight 67.0 kg today. Admission weight 75.3 kg.   Last scheduled diuretic 4/18.  Did receive Bumex 4 mg IV x 1 yesterday w/no response   - Recommend NS at 75 cc/hr x 500 cc.    - Continue I/O and daily standing weights      4. Cardiac amyloidosis - EF 15-20%. No further cardiac therapies available other than Dobutamine infusion, currently at 5 mcg. Palliative care has been consulted. Family continues to have difficulty accepting prognosis.        5. Electrolytes - No acute concerns. K 4.8, Na 130        6. Acid base - No acute concerns. Bicarb 26      7. BMD - Ca 9.3, albumin 3.1.       8. Anemia - Hgb 10.2.   - No intervention required.       Recommendations were communicated to primary team via progress note      Seen and discussed with Dr. Flakito Mckinnon, NP   [phone]      Interval History :   Creat continues to rise  He is oliguric  No family at bedside this morning  Will review with wife tomorrow   Interval progress notes, imaging studies and labs reviewed      Review of Systems:   I reviewed the following systems:  Unable to obtain given encephalopathy/somnolence    Physical Exam:   I/O last 3 completed shifts:  In: 0   Out: 275 [Urine:275]   BP 90/56 (BP Location: Right arm)  Pulse 79  Temp 97.7  F (36.5  C) (Axillary)  Resp 17  Wt 67 kg (147 lb 11.3 oz)  SpO2 95%  BMI 21.81 kg/m2      GENERAL APPEARANCE: somnolent  PULM: lungs CTA. Breathing is easy. Not on supplemental oxygen.   CV: Bradycardia       -edema - ankle edema, no leg edema. Tenting on arms  GI: soft, NT, Non distended  INTEGUMENT: no cyanosis or rash  NEURO:  somnolent  : martinez present w/cindy urine    Labs:   All labs reviewed by me  Electrolytes/Renal -   Recent Labs   Lab Test  04/25/18   0543  04/24/18   1007  04/24/18   0425   04/13/18    0405   04/08/18   0540   04/06/18   0608   03/27/18   0156   02/07/18   1203   08/23/17   1209   NA  130*  129*  128*   < >  135   < >  135   < >  137   < >   --    < >  136   < >  135   POTASSIUM  4.8  4.9  5.8*   < >  4.8   < >  5.2   < >  4.9   < >   --    < >  4.2   < >  3.9   CHLORIDE  88*  88*  87*   < >  98   < >  97   < >  99   < >   --    < >  101   < >  97   CO2  26  25  26   < >  27   < >  29   < >  29   < >   --    < >  29   < >  27   BUN  134*  130*  124*   < >  70*   < >  68*   < >  54*   < >   --    < >  53*   < >  53*   CR  5.78*  5.49*  5.20*   < >  2.35*   < >  2.73*   < >  2.93*   < >   --    < >  2.46*   < >  2.14*   GLC  95  91  96   < >  89   < >  99   < >  83   < >   --    < >  70   < >  120*   RIGOBERTO  9.3  9.1  9.1   < >  8.4*   < >  8.4*   < >  8.5   < >   --    < >  9.3   < >  8.9   MAG   --    --    --    --   2.5*   --   2.2   --   2.0   < >   --    --    --    --    --    PHOS   --    --    --    --    --    --    --    --    --    --   4.4   --   3.8   --   3.6    < > = values in this interval not displayed.       CBC -   Recent Labs   Lab Test  04/25/18   0543  04/24/18   1700  04/14/18   0652   WBC  7.2  6.7  5.3   HGB  10.7*  9.9*  10.8*   PLT  112*  100*  119*       LFTs -   Recent Labs   Lab Test  04/25/18   0543  04/04/18   0608  04/03/18   0650  04/02/18   0717   ALKPHOS   --   99  106  107   BILITOTAL   --   1.8*  1.9*  2.1*   ALT   --   48  51  52   AST   --   Canceled, Test credited  94*  85*   PROTTOTAL   --   6.0*  6.3*  6.4*   ALBUMIN  3.1*  2.5*  2.6*  2.7*       Iron Panel - No lab results found.      Current Medications:    cefTRIAXone  1 g Intravenous Q24H     heparin lock flush  5-10 mL Intracatheter Q24H     hydrocortisone   Topical BID     lurasidone  20 mg Oral Daily at 8 pm     melatonin tablet 6 mg  6 mg Oral At Bedtime     multivitamin, therapeutic with minerals  1 tablet Oral Daily     polyethylene glycol  17 g Oral Daily     psyllium  0.52 g Oral Daily      sennosides  2 tablet Oral Daily     sodium chloride (PF)  10 mL Intracatheter Q7 Days     sodium chloride (PF)  3 mL Intracatheter Q8H     thiamine  100 mg Intravenous Daily       DOBUTamine 5 mcg/kg/min (04/25/18 0829)     - MEDICATION INSTRUCTIONS -       Stephania Mckinnon, NP

## 2018-04-25 NOTE — PLAN OF CARE
Problem: Patient Care Overview  Goal: Plan of Care/Patient Progress Review  RN  1. Pt will be free from injury   PT/6C - Due to limited progress with therapies, pt does not warrant two disciplines to follow. PT to defer and complete orders at this time.

## 2018-04-26 NOTE — PLAN OF CARE
Problem: Patient Care Overview  Goal: Plan of Care/Patient Progress Review  RN  1. Pt will be free from injury   Outcome: No Change    D: Pt admitted 3/26 with abdominal pain, altered mental status, and SOB. PMH: cardiac amyloidosis, afib/flutter, CAD, CKD IV, CVA. DNR/DNI.     I/A: Patient is alert to self. Disoriented to time, place, and situation. Bedside attendant in place for patient safety. Vital signs stable, BP soft. RA. Monitor shows AFib with RBBB, rates primarily 70's-90's. Denied any pain. Dobutamine gtt infusing at 5mcg/kg/min via L PICC. Second 500 cc bolus completed. Fraga patent, adequate urine output. Incontinent of stool. +BM. Mepilex in place on coccyx. Repositioned/turned q2h.  Patient continues to have very minimal appetite. PO intake encouraged.     P: Continue to monitor. Notify Cards 1 with changes/concerns.

## 2018-04-26 NOTE — PLAN OF CARE
Problem: Patient Care Overview  Goal: Plan of Care/Patient Progress Review  RN  1. Pt will be free from injury   Somnolent. VSS on RA. Afib HR mid 40-50s. Rigid body motions with little motion range. Unable to take PO pills and very poor appetite (ate small piece almodovar and some OJ this morning for shift total). Family present major portion of day, sitter when family out of room. 500cc fluid bolus. Fraga in place, poor UO. UA collected. WOC follow up consult on coccyx wound, mepilex in place. Will continue POC, Cards 1 primary.

## 2018-04-26 NOTE — PLAN OF CARE
Problem: Patient Care Overview  Goal: Plan of Care/Patient Progress Review  RN  1. Pt will be free from injury   Outcome: Declining  D/I/A:  Neuro: Lethargic/obtunded. Moans/yells. Intermittently says yes to pain but difficult to assess. Disoriented x3. Pt up to chair x1. Family adamant on pt to be seen by PT/OT. Family agreeable to VPM to be in place once family leaves for the night.  CV: HR 60s-80s A-fib/sinus arythmia. BP low 70s-80s/60s Cards 1 notified, 500mL NS bolus started infusing over 2hrs.   Pulm: Lungs diminished, RR irregular, shallow breaths.   GI: Tolerated small amount of breakfast and popsicles. Small smear BM.  : Fraga in place, adequate output.  Gtt: Dobutamine @ 5mcg/kg/min  P: Plan for care conference tomorrow.

## 2018-04-26 NOTE — PROGRESS NOTES
Nephrology Progress Note  04/26/2018     Josr Landers is a 76 yo M with PMH of afib/aflutter, CVA, HFrEF, CAD, cardiac amyloidosis, admitted 3/26/18 with AMS and acute elevation in serum creatinine. PMH includes CKD4 with baseline Scr 2.4 attributed to AKIs with CVA and worsening HF. Patient did undergo RRT on 3/29 and 3/31 but did not do well 2/2 hypotension. Ultimately his renal function improved on Dobutamine w/o RRT and creat declined to his baseline. Dialysis line was removed. We signed off on 4/9. Nephrology re consulted on 4/24 for rise in creat. Creat had been stable in the 2 range until 4/18. At that time patient was receiving diuresis. He also developed a UTI, was started on Flomax/Proscar, became more hypotensive with SBP's in the 70's/ and more bradycardic with HR in the 40's. He is incontinent. Weight has steadily declined since admission. He is not hypoxic. Fraga was replaced 4/24 and Flomax/Proscar discontinued. Patient remains encephalopathic w/sitter.      Assessment & Recommendations:      1. LOIS - Stable. Creat 5.7 today, from 5.7 yesterday. He is non oliguric after 1000 cc fluid bolus. HR has improved, but blood pressures remain soft.    Patient has sustained another LOIS event following diuresis/hypotension/bradycardia/UTI in setting of poor oral intake and recent diuresis. Patient has end stage heart failure and is on Dobutamine with declining HR into the 40's. Creat had been stable in mid 2 range until 4/18 and then began to rise. Was up to 5.7 on 4/25.    Etiology of recent LOIS felt to be 2/2 CRS, hypotension, bradycardia, hypovolemia    - May need periodic fluid boluses if oral intake remains poor, however, would anticipate reduction in appetite/oral intake as he continues to generally decline.    - Patient is not a dialysis candidate given that there are no OP HD units able to accept a patient on Dobutamine. Reviewed with daughter/son today    - Reviewed with family today the challenge  of balancing volume status with renal function.    - Continue to monitor renal function with daily chemistry labs, I/O and daily standing weights   - Avoid nephrotoxins, hypotension (blood pressure improved today)     2. CKD4 - Baseline creat ~ 2.4. Etiology felt to be CRS, recurrent LOIS.       3. Volume status - Euvolemic/hypovolemic. No significant edema, no hypoxia or oxygen needs. Oral intake generally poor.  cc over last 24 hrs following 1000 cc fluid bolus yesterday. SBP 80- 90/range. Weight 67.1 kg today. Admission weight 75.3 kg.   Last scheduled diuretic 4/18.  Did receive Bumex 4 mg IV x 1 4/24 w/o improvement in UO   - Avoid diuretics   - Encourage po fluid intake   - Continue I/O and daily standing weights      4. Cardiac amyloidosis - EF 15-20%. No further cardiac therapies available other than Dobutamine infusion, currently at 5 mcg. Palliative care has been consulted and now DNR/DNI. Family continues to have difficulty accepting prognosis.        5. Electrolytes - No acute concerns. K 4.2, Na 133        6. Acid base - No acute concerns. Bicarb 26      7. BMD - Ca 8.9, albumin 3.1.       8. Anemia - Hgb 10.2.   - No intervention required.        Recommendations were communicated to primary team via progress note      Seen and discussed with Dr. Juan Mckinnon, NP   [phone]      Interval History :   Creat stable  He is now non oliguric  Met with daughter and son at bedside this morning  Reviewed mild improvement in creat today, urine output.   Reviewed that we have no indication for HD and that even if we did there is no outpatient HD unit that would accept such a complex patient on dobutamine.   Family seemed pleased with patient progress today  Encouraged fluid intake to stay hydrated  Interval progress notes, imaging studies and labs reviewed      Review of Systems:   I reviewed the following systems:  Denies dyspnea, CP. Has poor appetite. Has juan    Physical Exam:   I/O last  3 completed shifts:  In: 758.4 [P.O.:120; I.V.:638.4]  Out: 1335 [Urine:1335]   BP 92/59 (BP Location: Right arm)  Pulse 79  Temp 99  F (37.2  C) (Axillary)  Resp 20  Wt 67.1 kg (147 lb 14.9 oz)  SpO2 94%  BMI 21.85 kg/m2     GENERAL APPEARANCE: alert today, son/daughter present  PULM: lungs CTA. Breathing is easy. Not on supplemental oxygen.   CV: RRR       -edema - ankle edema, no leg edema.   GI: soft, NT, Non distended  INTEGUMENT: no cyanosis or rash  NEURO:  alert, interactive  : martinez present w/cindy urine    Labs:   All labs reviewed by me  Electrolytes/Renal -   Recent Labs   Lab Test  04/26/18   0615  04/25/18   0543  04/24/18   1007   04/13/18   0405   04/08/18   0540   04/06/18   0608   03/27/18   0156   02/07/18   1203   08/23/17   1209   NA  133  130*  129*   < >  135   < >  135   < >  137   < >   --    < >  136   < >  135   POTASSIUM  4.2  4.8  4.9   < >  4.8   < >  5.2   < >  4.9   < >   --    < >  4.2   < >  3.9   CHLORIDE  93*  88*  88*   < >  98   < >  97   < >  99   < >   --    < >  101   < >  97   CO2  26  26  25   < >  27   < >  29   < >  29   < >   --    < >  29   < >  27   BUN  137*  134*  130*   < >  70*   < >  68*   < >  54*   < >   --    < >  53*   < >  53*   CR  5.79*  5.78*  5.49*   < >  2.35*   < >  2.73*   < >  2.93*   < >   --    < >  2.46*   < >  2.14*   GLC  87  95  91   < >  89   < >  99   < >  83   < >   --    < >  70   < >  120*   RIGOBERTO  8.9  9.3  9.1   < >  8.4*   < >  8.4*   < >  8.5   < >   --    < >  9.3   < >  8.9   MAG   --    --    --    --   2.5*   --   2.2   --   2.0   < >   --    --    --    --    --    PHOS   --    --    --    --    --    --    --    --    --    --   4.4   --   3.8   --   3.6    < > = values in this interval not displayed.       CBC -   Recent Labs   Lab Test  04/25/18   0543  04/24/18   1700  04/14/18   0652   WBC  7.2  6.7  5.3   HGB  10.7*  9.9*  10.8*   PLT  112*  100*  119*       LFTs -   Recent Labs   Lab Test  04/25/18   0543  04/04/18    0608  04/03/18   0650  04/02/18   0717   ALKPHOS   --   99  106  107   BILITOTAL   --   1.8*  1.9*  2.1*   ALT   --   48  51  52   AST   --   Canceled, Test credited  94*  85*   PROTTOTAL   --   6.0*  6.3*  6.4*   ALBUMIN  3.1*  2.5*  2.6*  2.7*       Iron Panel - No lab results found.      Current Medications:    cefTRIAXone  1 g Intravenous Q24H     heparin lock flush  5-10 mL Intracatheter Q24H     hydrocortisone   Topical BID     lurasidone  20 mg Oral Daily at 8 pm     melatonin tablet 6 mg  6 mg Oral At Bedtime     multivitamin, therapeutic with minerals  1 tablet Oral Daily     polyethylene glycol  17 g Oral Daily     psyllium  0.52 g Oral Daily     sennosides  2 tablet Oral Daily     sodium chloride (PF)  10 mL Intracatheter Q7 Days     sodium chloride (PF)  3 mL Intracatheter Q8H     thiamine  100 mg Intravenous Daily       DOBUTamine 5 mcg/kg/min (04/26/18 8245)     - MEDICATION INSTRUCTIONS -       Stephania Mckinnon, NP

## 2018-04-26 NOTE — PROGRESS NOTES
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 Alertness had markedly improved on inotropes but MS still poor.    Today:  Ongoing discussions with family, palliative, and social work regarding goals of care and discharge plan.  Monitor I/O's  Care conference tomorrow    #CKD-V   # LOIS  Patient was oliguric on presentation. Tunneled line by IR 3/29. Started HD 3/29. Urine output increased and Cr stable/improved with dobutamine. No further HD as patient had good UOP and a very poor cardiac prognosis, so the tunneled catheter was removed. Following removal of the catheter, Cr was stable in the 2.3-2.4 range, and patient had adequate UOP. However since 4/18, Cr has been steadily rising, and UOP has been decreasing. Diuresis was stopped on 4/18. He is now oliguric. Possibly obstructive due to UTI, but it has not resolved with Fraga placement and aggressive diuresis. Most likely related to the patient's end stage heart failure, as his heart rate has been decreasing. Could be a pre-renal component, as UOP increased with administration of 500 mL NS.  - Tamsulosin, finasteride for possible obstruction.  - UTI treatment as above.  - Monitor UOP, Cr  - IR removed dialysis catheter 4/12.  - Nephrology consult, appreciate recs   - No HD given poor prognosis, risk of bleeding, low BP's, patient's anxious reaction to discussion of HD, and likely impossibility of discharge on HD and dobutamine   - Fraga catheter.   - Bumex 4 mg IV given 4/24 with little effect   - 500 mL NS given 4/25, increased UOP, stabilized Cr    #Concern for UTI  Started on rocephin on 4/21. Urine cultures grew  pan-sensitive Klebsiella pneumoniae. De-escalated to Keflex.  - Keflex (4/22 ->)    #Cardiac amyloidosis  #HFrEF NYHA IV D (EF 15-20%)  RHC on 4/3. PCW 20. RA 17. PA 43/25 (33). RV 43/17. Son CI 1.4. Patient is volume overloaded and in cardiogenic shock. Suspect this is the likely cause of patient's altered mental status, LOIS, and possibly the ventriculomegaly. PICC placed on 4/3 to begin dobutamine infusion. His mental status and renal function have improved with the dobutamine infusion, improving his quality of life. His prognosis remains very poor.  - Continue dobutamine at 5 mcg/kg/min  - Discontinued hydralazine due to soft BP's  - Diuresis: 4 mg IV Bumex given 4/24 to little effect. Had been holding due to rising Cr.  - Palliative consulted, appreciate recs re: GOC and d/c plans    #Multifactorial Encephalopathy, improving  #Delirium  #Dementia (MoCA 16/30)  #Ventriculomegaly / possible normal pressure hydrocephalus  #h/o right MCA CVA  Likely due to cardiogenic shock, although also had extensive workup for other causes. CT scan with enlarged venticles. No mass or acute CVA. Sepsis also possible contributor. Possibly uremia. Low suspicion for SBP. Patient's history had been concerning for NPH. LP done on 4/2. Attempted large volume (25-30 cc); however, was only able to obtain 0.5 cc.  - Treatment by etiology: UTI / PNA (s/p tx with abx), uremia (treated with HD/diuresis), NPH (neurology recommends no further inpatient workup), baseline dementia / CVA, cardiogenic shock (see above)  - Psychiatry consulted: Latuda had been at 40 mg, decreased to 20 mg due to family's concern about its affect on patient's daytime sleepiness, and melatonin 6 mg qhs prn for agitation, less risk of QTc prolongation. Patient's mental status has improved in the past with better volume status.  - Neurology signed off. Recommended follow-up in movement disorder clinic for workup of possible NPH, although this is less likely now given  patient's poor cardiac prognosis.  - PT/OT  - Patient will need to be without a sitter for 24 hours for placement.     #CAD s/p ATIF to LAD 2015  - Continue pta atorvastatin 80 mg qday  - ASA 81 mg daily  - Avoid BB with cardiac amyloid     #Afib/flutter s/p CTI ablation 2006  Remains in afib. Was on sotalol but this was discontinued around 08/2017 per outside cardiology notes. Discontinued warfarin on 4/3 due to labile INR's and started on apixaban instead.  - Apixaban 2.5 mg BID (renally dosed)    #Aspiration PNA, resolved  #Urinary tract infection, resolved  Started on Unasyn on 3/27. Switched to Zosyn on 3/28 per ID recs. Restarted on Unasyn on 3/30 when sensitivities returned on UTI (Klebsiella pneumoniae). Completed course of Unasyn on 4/4. Repeat chest x-ray on 4/14 stable. Patient remains afebrile.    # Cachexia  Patient's volume overload somewhat obscures his severe malnutrition, but he has temporal wasting.    FEN: Cardiac diet  PPX: Apixaban  Code Status: DNR/DNI  Dispo: Awaiting discharge to care facility, most likely. Discussing with palliative and family.    This patient was discussed with Dr. Sanchez who agrees with the assessment and plan.    Jamie Parkinson, DDS  PGY-1  Pager: [phone]      Subjective & Interval Hx:    No acute events overnight.    Patient somnolent this morning. Does not answer questions. Unable to perform ROS.    Last 24 hr care team notes reviewed.       Physical Exam:  Blood pressure 95/64, pulse 79, temperature 99  F (37.2  C), temperature source Axillary, resp. rate 18, weight 67.1 kg (147 lb 14.9 oz), SpO2 100 %.     Gen: Elderly male, resting comfortably in bed, NAD  Neck: Supple  Pulm: Anterior lung fields clear to auscultation bilaterally  CV: Irregularly irregular rhythm, S1/S2, no m/r/g  ABD: Soft, ntnd, normal BS  EXT: Bilateral 1+ lower extremity edema  Skin: No rashes or skin lesions visible  NEURO: Somnolent    Lines/Tubes: L PICC, L PIV    Labs & Studies of Note:   - reviewed  all labs in [facility] today.    Imaging reviewed.

## 2018-04-26 NOTE — PLAN OF CARE
Problem: Patient Care Overview  Goal: Plan of Care/Patient Progress Review  RN  1. Pt will be free from injury   OT 6C: Discharge Planner OT   Patient plan for discharge: Pt unable to participate in discharge planning 2/2 AMS.  Per medical chart, family is hopeful for a TCU or LTC placement though this has been difficult.  MD JOAQUÍN and family in discussion on goals of care and discharge planning.  Current status: Pt initially lethargic.  Able to become more alert for participation in approx 15-20 minutes of activity.  Max A x 2 for bed mobility, strong posterior lean while seated EOB with difficulty correcting with verbal and tactile cues - level of assist needed to maintain unsupported sitting variable from min A - max A.  Mod-max A x 2 sit to stand x 2 reps - maintains static standing 30-45 seconds with min-mod A x 2.  Pt c/o fatigue and weakness. Responding to approx 50% of questions and following approx 50% of cues.   Barriers to return to prior living situation: AMS, weakness, medical prognosis  Recommendations for discharge: LTC pending goals of care  Rationale for recommendations: Pt level of alertness and willingness to participate in therapy fluctuates making the ability to engage pt in skilled therapy difficult; due to AMS unable to demonstrate any carryover between sessions.  Requires heavy A x 2 making return to home a difficult discharge plan. Per medical chart, medical prognosis is poor.        Entered by: Yancy Young 04/26/2018 4:03 PM

## 2018-04-27 NOTE — PLAN OF CARE
"Problem: Cardiac: Heart Failure (Adult)  Goal: Signs and Symptoms of Listed Potential Problems Will be Absent, Minimized or Managed (Cardiac: Heart Failure)  Signs and symptoms of listed potential problems will be absent, minimized or managed by discharge/transition of care (reference Cardiac: Heart Failure (Adult) CPG).   Outcome: Declining  D/I/A: Pt repositioned with assist of 2; rigid extremities with stimulation/repositioning.  A+O to self.  VSSA.  Resting between cares and treatments.  Denies pain or sob.  Infrequent weak cough noted.  Family at bedside and supportive.  MD notified related to Care Conference; family states \"not notified or unaware.\"  Encouraged family to discuss/clarify with team in AM.  Dobutamine infusion monitored and maintained.  Fraga in place and patent; moderate output this evening.  Very poor intake of food and fluids; patient's wife attempted to feed patient.  Ate small mandarin oranges and a couple sips of Boost supplement.  P: Continue to monitor status.      "

## 2018-04-27 NOTE — PLAN OF CARE
Problem: Patient Care Overview  Goal: Plan of Care/Patient Progress Review  RN  1. Pt will be free from injury   OT/CR 6C:  Discharge Planner OT   Patient plan for discharge: Pt unable to participate in discharge planning 2/2 AMS.  Per medical chart, family is hopeful for a TCU or LTC placement though this has been difficult.  MD JOAQUÍN and family in discussion on goals of care and discharge planning.  Current status: Pt requiring moderate encouragement from therapists and family to participate in session. Pt MaxAx2-3 for bed mobility from supine to seated EOB. Once EOB, pt had strong posterior lean, requiring max vc and tactile cues to shift weight forward. Pt completed sit to stand with maxAx2, pt tolerated standing for 90 seconds. Pt maxAx2 for functional transfer to bedside chair. Pt MaxA for g/h and toileting tasks.   Barriers to return to prior living situation:  AMS, weakness, medical prognosis  Recommendations for discharge: Per plan established by the Occupational Therapist, the recommendation for discharge location is  LTC pending goals of care.  Rationale for recommendations:  Pt level of alertness and willingness to participate in therapy fluctuates making the ability to engage pt in skilled therapy difficult; due to AMS unable to demonstrate any carryover between sessions.  Requires heavy A x 2 making return to home a difficult discharge plan. Per medical chart, medical prognosis is poor.        Entered by: Mary Vázquez 04/27/2018 3:57 PM

## 2018-04-27 NOTE — PLAN OF CARE
Problem: Patient Care Overview  Goal: Plan of Care/Patient Progress Review  RN  1. Pt will be free from injury   Outcome: No Change    D: Pt admitted 3/26 with abdominal pain, altered mental status, and SOB. PMH: cardiac amyloidosis, afib/flutter, CAD/CKD IV, CVA. DNR/DNI.     I/A: Patient is alert to self. Disoriented to time, place, and situation. Bedside attendant in place for patient safety. Vital signs stable on RA. Monitor shows Afib with RBBB and intermittent PVC's, rates 70's-80's. C/o abdominal pain. PRN Tylenol and Preparation H suppository administered x1 with relief. Dobutamine gtt infusing at 5mcg/kg/min via L PICC. Fraga catheter patent, adequate UOP. No BM overnight. Continues to have minimal appetite, PO intake encouraged. Up with assist of two. Slept intermittently throughout the night.     P: Plan for possible care conference today. Continue to monitor. Notify Cards 1 with changes/concerns.

## 2018-04-27 NOTE — PROGRESS NOTES
Cardiology Progress Note  Josr Landers MRN: [id number]  Age: 77 year old, : 1940  Date: 2018             Subjective     No acute events overnight. Family requested for Latuda to be discontinued overnight. Notes that he has chest discomfort. No SOB. Endorses abdominal pain. Does not answer all questions so unable to obtain 4 point ROS.          Objective     /59 (BP Location: Right arm)  Pulse 79  Temp 98.1  F (36.7  C) (Axillary)  Resp 16  Wt 67.1 kg (147 lb 14.9 oz)  SpO2 98%  BMI 21.85 kg/m2  Temp:  [98.1  F (36.7  C)-99  F (37.2  C)] 98.1  F (36.7  C)  Heart Rate:  [68-78] 74  Resp:  [16-20] 16  BP: ()/(49-62) 104/59  SpO2:  [94 %-100 %] 98 %  Wt Readings from Last 2 Encounters:   18 67.1 kg (147 lb 14.9 oz)   18 75.3 kg (165 lb 14.4 oz)       I/O last 3 completed shifts:  In: 443.6 [P.O.:170; I.V.:273.6]  Out: 1450 [Urine:1450]      Gen: No acute distress, laying in bed  HEENT: NC/AT, PERRL, EOM intact, MMM, OP without exudates  PULM/THORAX: Clear to auscultation bilaterally anteriorly, no rales/rhonchi/wheezes, on RA  CV: IRIR, normal S1 and S2, no murmurs or rubs.   ABD: Soft, nondistended, voluntary guarding, no rebound, bowel sounds present, no masses  EXT: WWP. No LE edema,  NEURO: Somnolent, inconsistently answers questions            Data:     Recent Results (from the past 24 hour(s))   Basic metabolic panel    Collection Time: 18  6:15 AM   Result Value Ref Range    Sodium 133 133 - 144 mmol/L    Potassium 4.2 3.4 - 5.3 mmol/L    Chloride 93 (L) 94 - 109 mmol/L    Carbon Dioxide 26 20 - 32 mmol/L    Anion Gap 14 3 - 14 mmol/L    Glucose 87 70 - 99 mg/dL    Urea Nitrogen 137 (H) 7 - 30 mg/dL    Creatinine 5.79 (H) 0.66 - 1.25 mg/dL    GFR Estimate 10 (L) >60 mL/min/1.7m2    GFR Estimate If Black 12 (L) >60 mL/min/1.7m2    Calcium 8.9 8.5 - 10.1 mg/dL       No results found for this or any previous visit (from the past 24  hour(s)).          Medications     Current Facility-Administered Medications   Medication     acetaminophen (TYLENOL) Suppository 650 mg     acetaminophen (TYLENOL) tablet 650 mg     alteplase (CATHFLO ACTIVASE) injection 2 mg     ammonium lactate (LAC-HYDRIN) 12 % lotion     bisacodyl (DULCOLAX) EC tablet 5 mg    Or     bisacodyl (DULCOLAX) EC tablet 10 mg    Or     bisacodyl (DULCOLAX) EC tablet 15 mg     bisacodyl (DULCOLAX) Suppository 10 mg     DOBUTamine 500 mg in dextrose 5% 250 mL (adult std conc) premix     heparin lock flush 10 UNIT/ML injection 5-10 mL     heparin lock flush 10 UNIT/ML injection 5-10 mL     hydrocortisone (CORTAID) 1 % cream     HYDROmorphone (DILAUDID) injection 0.2 mg     lidocaine (LMX4) kit     lidocaine (LMX4) kit     lidocaine (LMX4) kit     lidocaine (XYLOCAINE) 5 % ointment     lidocaine 1 % 0.5-5 mL     lidocaine 1 % 1 mL     lidocaine 2 % (URO-JET) jelly 10 mL     LORazepam (ATIVAN) injection 0.5 mg     melatonin tablet 6 mg     multivitamin, therapeutic with minerals (THERA-VIT-M) tablet 1 tablet     naloxone (NARCAN) injection 0.1-0.4 mg     nitroGLYcerin (NITROSTAT) sublingual tablet 0.4 mg     ondansetron (ZOFRAN) injection 4 mg     Patient is already receiving anticoagulation with heparin, enoxaparin (LOVENOX), warfarin (COUMADIN)  or other anticoagulant medication     phenylephrine-cocoa butter (PREPARATION H) per suppository 1 suppository     polyethylene glycol (MIRALAX/GLYCOLAX) Packet 17 g     psyllium capsule 0.52 g     sennosides (SENOKOT) tablet 2 tablet     simethicone (MYLICON) chewable tablet 80 mg     sodium chloride (PF) 0.9% PF flush 10 mL     sodium chloride (PF) 0.9% PF flush 10-20 mL     sodium chloride (PF) 0.9% PF flush 10-20 mL     sodium chloride (PF) 0.9% PF flush 3 mL     sodium chloride (PF) 0.9% PF flush 3 mL     sodium chloride (PF) 0.9% PF flush 5-50 mL     thiamine (B-1) injection 100 mg             Assessment and Plan:     76 y/o male with PMHx  significant for 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 Alertness had markedly improved on inotropes but MS still poor.     Today:  - No plan for care conference today  - Continue current POC  - Will consider intermittent IV boluses    # CKD-V   # LOIS  Patient was oliguric on presentation. Tunneled line by IR 3/29. Started HD 3/29. Urine output increased and Cr stable/improved with dobutamine. No further HD as patient had good UOP and a very poor cardiac prognosis, so the tunneled catheter was removed. Following removal of the catheter, Cr was stable in the 2.3-2.4 range, and patient had adequate UOP. However since 4/18, Cr has been steadily rising, and UOP has been decreasing. Diuresis was stopped on 4/18. He is now oliguric. Possibly obstructive due to UTI, but it has not resolved with Fraga placement and aggressive diuresis. Most likely related to the patient's end stage heart failure, as his heart rate has been decreasing. Could be a pre-renal component, as UOP increased with administration of 500 mL NS.  - Tamsulosin, finasteride for possible obstruction.  - UTI treatment as above.  - Monitor UOP, Cr  - IR removed dialysis catheter 4/12.  - Nephrology consult, appreciate recs                           - No HD given poor prognosis, risk of bleeding, low BP's, patient's anxious reaction to discussion of HD, and likely impossibility of discharge on HD and dobutamine                           - Fraga catheter.                           - Bumex 4 mg IV given 4/24 with little effect                           - 500 mL NS given 4/25, increased UOP, stabilized Cr  - Will consider intermittent IV boluses if having poor PO         #Cardiac amyloidosis  #HFrEF  NYHA IV D (EF 15-20%)  RHC on 4/3. PCW 20. RA 17. PA 43/25 (33). RV 43/17. Son CI 1.4. Patient is volume overloaded and in cardiogenic shock. Suspect this is the likely cause of patient's altered mental status, LOIS, and possibly the ventriculomegaly. PICC placed on 4/3 to begin dobutamine infusion. His mental status and renal function have improved with the dobutamine infusion, improving his quality of life. His prognosis remains very poor.  - Continue dobutamine at 5 mcg/kg/min  - Discontinued hydralazine due to soft BP's  - Diuresis: 4 mg IV Bumex given 4/24 to little effect. Had been holding due to rising Cr.  - Palliative consulted, appreciate recs re: GOC and d/c plans     #Multifactorial Encephalopathy, improving  #Delirium  #Dementia (MoCA 16/30)  #Ventriculomegaly / possible normal pressure hydrocephalus  #h/o right MCA CVA  Likely due to cardiogenic shock, although also had extensive workup for other causes. CT scan with enlarged venticles. No mass or acute CVA. Sepsis also possible contributor. Possibly uremia. Low suspicion for SBP. Patient's history had been concerning for NPH. LP done on 4/2. Attempted large volume (25-30 cc); however, was only able to obtain 0.5 cc.  - Treatment by etiology: UTI / PNA (s/p tx with abx), uremia (treated with HD/diuresis), NPH (neurology recommends no further inpatient workup), baseline dementia / CVA, cardiogenic shock (see above)  - Psychiatry consulted: Latuda had been at 40 mg, decreased to 20 mg due to family's concern about its affect on patient's daytime sleepiness, and melatonin 6 mg qhs prn for agitation, less risk of QTc prolongation. Patient's family requested for latuda to be discontinued on 4/26 due to concern for increased somnolence. Patient's mental status has improved in the past with better volume status.  - Neurology signed off. Recommended follow-up in movement disorder clinic for workup of possible NPH, although this is less likely now given  patient's poor cardiac prognosis.  - PT/OT  - Patient will need to be without a sitter for 24 hours for placement.      #CAD s/p ATIF to LAD 2015  - Continue pta atorvastatin 80 mg qday  - ASA 81 mg daily  - Avoid BB with cardiac amyloid      #Afib/flutter s/p CTI ablation 2006  Remains in afib. Was on sotalol but this was discontinued around 08/2017 per outside cardiology notes. Discontinued warfarin on 4/3 due to labile INR's and started on apixaban instead.  - Apixaban 2.5 mg BID (renally dosed)     #Concern for UTI, resolved  Started on rocephin on 4/21. Urine cultures grew pan-sensitive Klebsiella pneumoniae. De-escalated to Keflex and then switched back to ceftiriazon when having poor PO intake. Has already completed 6 days of antibiotics.    #Aspiration PNA, resolved  Started on Unasyn on 3/27. Switched to Zosyn on 3/28 per ID recs. Restarted on Unasyn on 3/30 when sensitivities returned on UTI (Klebsiella pneumoniae). Completed course of Unasyn on 4/4. Repeat chest x-ray on 4/14 stable. Patient remains afebrile.     # Cachexia  Patient's volume overload somewhat obscures his severe malnutrition, but he has temporal wasting.    FEN: Cardiac diet  PPX: Apixaban  Code Status: DNR/DNI  Dispo: Uncertain. Placement is challenging due to financial difficulties with a facility that can accommodate dobutamine.     Patient was discussed with staff attending, Dr. Willett, who agrees with the above assessment and plan.      Mag Raphael MD, PhD  Internal Medicine Resident  Cardiology Service  Pager: [phone]

## 2018-04-27 NOTE — PROGRESS NOTES
[facility], [place]   Palliative Care Daily Progress Note          Recommendations, Patient/Family Counseling & Coordination    Pt seen and examined. Nursing notes reviewed. Discussed with primary team. Family present. He is less attentive to conversation, sleeping more soundly. Family encouraged by heart rate in 70's and increased urine output.   Discussed with primary team. Chart notes reviewed.  Barriers to discharge: Dobutamine dependence,  1:1 sitter/ video/ for nighttime agitation and delirium superimposed on encephalopathy likely related to prior stroke, renal failure.  Nephrology had been consulted per primary team- No HD considered appropriate given overall clinical status.      CODE STATUS:  dnr/dni status but family has emphasized full measures/intervention otherwise.     Wife (Nu) aware she is not capable of caring for him at home with his present level of assistance, reluctant to see bigger picture that he is not likely to regain functional strength.   We reviewed options and that discharge should include consideration for hospice care.      REC: - No change in treatment planned for now.   - Continues Melatonin. Latuda discontinued. Support nursing efforts to help day/night cycle health.  - Stool softeners, suppositories for bowel program- continue scheduled bowel meds- hold if loose stools.   - PT for energy conservation and functional recovery. Continues with need for max of 2 assist with no carry over to subsequent sessions- poor activity tolerance.  - Ongoing requirement for IV inotropes - has been preliminarily OK'ed by  Hospice to continue after discharge if able to find placement  - from PC perspective discharge should ideally be hospice and comfort focused- family again not at that place.  SNF placement likely as his care needs are increasing.      -  Goals of Care: His goals- earlier verbalized by family- remain cautiously restorative- they are aware he may  not leave the hospital but also somewhat in denial of slow decline and diminishing treatment options of his heart failure.    Per SW and family- financial concerns limit options for expanded end of life care. SW has evaluated benevolent care considerations. Despite evidence of end stage disease family unable to accept on comfort approach.     POLST- not completed at this time     Thank you for the opportunity to continue to participate in the care of this patient and family.  Please feel free to contact on-call palliative provider with any emergent needs.  We can be reached via team pager [phone] (answered 8-4:30 Monday-Friday); after-hours answering service ([phone])    Jamie GREER NP ACHPN  Nurse Practitioner- Lead Advanced Practice Provider  [facility] Palliative Medicine Consult Service   [phone]    Jamie Myrick NP    TT spent: 25 minutes of which 15 minutes were spent in direct face to face contact with patient/family. Greater than 50% of time spent counseling and/or coordinating care.       Assessment      1) Diagnoses & symptoms:        Cardiac Amyloidosis- end stage disease- IV inotrope dependence  A fib /flutter (on warfarin)  Stenting of CAD 2015  CKD Stage IV- likely cardiorenal component   CVA 2/2017 R MCA  AMS- likely delirium superimposed on dementia process            Interval History:   Soft but stable pressures- no chest pain or lightheadedness. More somnolence today. No shortness of breath.   No significant improvement sleep disturbance - Psychiatry had introduced Latuda- discontinued due to concerns per family. Nephrology- family was open to dialysis if option available. Nephrology unwilling to consider based on present clinical status           Review of Systems:   Palliative Symptom Review (0=no symptom/no concern, 1=mild, 2=moderate, 3=severe):      Pain: 0-1 (buttocks/hemorrhoid  pain)      Fatigue: 2      Nausea: 0      Constipation: 1      Diarrhea: 0       Depressive Symptoms: 1      Anxiety: 1      Drowsiness: 2      Poor Appetite: 1      Shortness of Breath: 0      Insomnia: 2-3      Confusion 2              Medications:   I have reviewed this patient's medication profile and medications given in past 24 hours.      {   Physical exam .Vitals: BP 94/66 (BP Location: Right arm)  Pulse 79  Temp 97.7  F (36.5  C) (Oral)  Resp 16  Wt 65.3 kg (143 lb 15.4 oz)  SpO2 100%  BMI 21.26 kg/m2  BMI= Body mass index is 21.26 kg/(m^2).   GEN: Somnolent in bed, rouses to voice. Family present.- oriented to family when awake. Longer intervals of sleeping during the day.  HEENT:AT/ NC, EOM grossly intact, mucous membranes pink, moist. Dentition in good repair.  PULM/THORAX: Clear to auscultation bilaterally, no rales/rhonchi/wheezes. Decreased  inspiratory effort.   CV:RRR. Monitor with atrial fibrillation- rate controlled- 74-78 bpm occ ectopy.  ABD: soft, non distended, hypoactive bowel sounds, no organomegaly  EXT: trace-->1+ pitting LE edema. No asymmetrical edema or tenderness to palpation in calves bilaterally.  NEURO: difficult to fully assess. No apparent focal neurological deficit.                Data Reviewed:   *  ROUTINE LABS (Last four results)  BMP    Recent Labs  Lab 04/27/18  0717 04/26/18  0615 04/25/18  0543 04/24/18  1007    133 130* 129*   POTASSIUM 5.0 4.2 4.8 4.9   CHLORIDE 96 93* 88* 88*   RIGOBERTO 8.7 8.9 9.3 9.1   CO2 27 26 26 25   * 137* 134* 130*   CR 5.12* 5.79* 5.78* 5.49*   GLC 85 87 95 91     CBC    Recent Labs  Lab 04/25/18  0543 04/24/18  1700   WBC 7.2 6.7   RBC 3.32* 3.00*   HGB 10.7* 9.9*   HCT 32.3* 29.3*   MCV 97 98   MCH 32.2 33.0   MCHC 33.1 33.8   RDW 16.8* 16.8*   * 100*     INRNo lab results found in last 7 days.

## 2018-04-27 NOTE — PROGRESS NOTES
Nephrology Chart Review:     Creat has declined to 5.1  Electrolytes WNL  UO 1600 cc over last 24 hrs  SBP , HR 70  No change in encephalopathy or oral intake  No change in prognosis   - Nephrology has nothing further to offer patient so will sign off.    - May need occ fluid boluses if not taking po intake unless patient is transitioned to comfort cares and then labs would not be monitored.    - May want to continue martinez for comfort

## 2018-04-27 NOTE — PROCEDURES
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  He was referred to cath lab for RHC to evaluate CHF and cardiogenic shock.    DESCRIPTION:  1. Consent obtained with discussion of risks.  All questions were answered.  2. Sterile prep and procedure.  3. Location with Sheaths:   Rt IJ  7 Fr 10 cm [short]  4. Access: Local anesthetic with lidocaine.  A standard 18 guage needle with ultrasound guidance was used to establish vascular access using a modified Seldinger technique.  5. Diagnostic Catheters:   7 Fr  Delano Jeff  6. Guiding Catheters:  None  6. Estimated blood loss: < 5 ml    MEDICATIONS:  The procedure was performed without conscious sedation.  Heart rate, BP, respiration, oxygen saturation and patient responses were monitored throughout the procedure with the assistance of the RN under my supervision.    Procedures:    HEMODYNAMICS:  BSA 1.78  1. HR 75 bpm  2. BP 85/69  Mean 74 mmHg  3. RA --, 17, 15   4. RV 43/17  5. PA 43/25  Mean 33   6. PCW --, --, 20   7. PA sat 42.8%   8. PCW sat 98.7%  9. Hgb 12 g/dL   10. Son CO 2.6   11. Son CI 1.46   12. TD CO 1.7   13. TD CI 0.95   14. PVR 5.0          Sheath Removal:  The RT IJ sheath was manually removed in the cardiac catheterization laboratory.    Contrast: Isovue, 0 ml     Fluoroscopy Time: 0 min    COMPLICATIONS:  1. None    SUMMARY:   >> High right sided filling pressures.  >> High left sided filling pressures.  >> Mild pulmonary artery hypertension   >> Cardiogenic shock    PLAN:   >> Bedrest per protocol.  >>  Continued medical management and lifestyle modification for cardiovascular risk factor optimization.     The attending interventional cardiologist was present and supervised all critical aspects the procedure.    Findings discussed with Vivian Sullivan MD    See CVIS report for final draft.    Pedrito Pugh MD   Cardiology Staff

## 2018-04-28 NOTE — PLAN OF CARE
Problem: Patient Care Overview  Goal: Discharge Needs Assessment  Outcome: No Change  D AVSS with sat's 99% on room air. Heart irregular/afib and lungs clear. Patient c/o back pain x 1 with no nausea. Patient became anxious during the night and was yelling out for no apparent reason. Ativan and tylenol was given and patient rested comfortably the rest of the night. Adequate urine output via martinez.   I Vital's, assessment and med's per order.   A Resting in bed with call light in reach.   P Continue to monitor and update MD with changes.

## 2018-04-28 NOTE — PLAN OF CARE
Problem: Patient Care Overview  Goal: Plan of Care/Patient Progress Review  RN  1. Pt will be free from injury   Outcome: No Change  D: Patient admitted from TCU on 3/26/18 for Altered Mental Status, abdominal pain, SOB, and worsening kidney function. Hx of cardiac amyloidosis, A-fib/flutter on Coumadin s/p ablation '06, CAD s/p stent placement '15, CKD stage IV, and CVA '17.    I/A: Monitored vitals and assessed patient status. A&OxPerson. VSS. A-fib 70's with occasional PVC's. Adequate urine output via martinez catheter. Stand and pivot to chair for meals with assist x 2-3. VPM utilized at night and when family not present at bedside (per family request). Appetite improving - total assist for feedings. PRN Ativan discontinued per family request. Dose was given overnight for anxiety/insomnia but patient more somnolent today according to family. Wants scheduled Melatonin HS. Abdominal x-ray obtained d/t increased distention/constipation and was insignificant. PRN Simethicone ordered. Bowel regimen maintained (Miralax & Senokot).    Code status - DNR/DNI    Running: Dobutamine 5 mcg/kg/min  PRN: Simethicone for abdominal/gas discomfort    P: Continue to monitor patient status and report changes to treatment team.

## 2018-04-28 NOTE — PROGRESS NOTES
Cardiology Progress Note  Josr Landers MRN: [id number]  Age: 77 year old, : 1940  Date: 2018             Subjective     No acute events overnight. Patient would not respond verbally to questions this AM and was somnolent. Unable to obtain ROS. Family was upset that Ativan was given overnight and would like patient to not receive any possibly sedating medications overnight. Patient was on VPM and had an episode of yelling that improved with Ativan. Wife notes that patient's abdomen is more distended today.          Objective     /75  Pulse 79  Temp 97.5  F (36.4  C) (Oral)  Resp 18  Wt 66 kg (145 lb 8.1 oz)  SpO2 99%  BMI 21.49 kg/m2  Temp:  [97.5  F (36.4  C)-97.8  F (36.6  C)] 97.5  F (36.4  C)  Heart Rate:  [74-79] 74  Resp:  [16-20] 18  BP: ()/(54-75) 136/75  SpO2:  [99 %-100 %] 99 %  Wt Readings from Last 2 Encounters:   18 66 kg (145 lb 8.1 oz)   18 75.3 kg (165 lb 14.4 oz)       I/O last 3 completed shifts:  In: 625 [P.O.:340; I.V.:285]  Out: 1600 [Urine:1600]      Gen: No acute distress, laying in bed  HEENT: NC/AT, PERRL, EOM intact, MMM, OP without exudates  PULM/THORAX: Clear to auscultation bilaterally anteriorly, no rales/rhonchi/wheezes, on RA  CV: IRIR, normal S1 and S2, no murmurs or rubs.   ABD: Soft, nondistended, voluntary guarding, no rebound, bowel sounds present, no masses  EXT: WWP. No LE edema,  NEURO: Somnolent, no answering questions            Data:     Recent Results (from the past 24 hour(s))   Basic metabolic panel    Collection Time: 18  7:17 AM   Result Value Ref Range    Sodium 135 133 - 144 mmol/L    Potassium 5.0 3.4 - 5.3 mmol/L    Chloride 96 94 - 109 mmol/L    Carbon Dioxide 27 20 - 32 mmol/L    Anion Gap 13 3 - 14 mmol/L    Glucose 85 70 - 99 mg/dL    Urea Nitrogen 131 (H) 7 - 30 mg/dL    Creatinine 5.12 (H) 0.66 - 1.25 mg/dL    GFR Estimate 11 (L) >60 mL/min/1.7m2    GFR Estimate If Black 13 (L)  >60 mL/min/1.7m2    Calcium 8.7 8.5 - 10.1 mg/dL     Recent Results (from the past 24 hour(s))   XR Abdomen Port 1 View    Narrative    Exam:  XR ABDOMEN PORT 1 VW, 4/28/2018 2:24 PM    History: abd pain;     Comparison:  Abdominal radiograph 4/20/2018    Findings:  Supine radiograph of the abdomen. No abnormally dilated  bowel. No pneumatosis or portal venous gas. Small colonic stool  burden. Pelvic phleboliths. Rounded calcification in the right upper  quadrant correlates with calcified renal cyst on prior CT. Marked  degenerative change in the low lumbar spine and hips.      Impression    Impression:   Nonobstructive bowel gas pattern. Small colonic stool burden.     I have personally reviewed the examination and initial interpretation  and I agree with the findings.    MARY JO IRIZARRY MD ([place])               Medications     Current Facility-Administered Medications   Medication     acetaminophen (TYLENOL) Suppository 650 mg     acetaminophen (TYLENOL) tablet 650 mg     alteplase (CATHFLO ACTIVASE) injection 2 mg     ammonium lactate (LAC-HYDRIN) 12 % lotion     bisacodyl (DULCOLAX) EC tablet 5 mg    Or     bisacodyl (DULCOLAX) EC tablet 10 mg    Or     bisacodyl (DULCOLAX) EC tablet 15 mg     bisacodyl (DULCOLAX) Suppository 10 mg     DOBUTamine 500 mg in dextrose 5% 250 mL (adult std conc) premix     heparin lock flush 10 UNIT/ML injection 5-10 mL     heparin lock flush 10 UNIT/ML injection 5-10 mL     hydrocortisone (CORTAID) 1 % cream     HYDROmorphone (DILAUDID) injection 0.2 mg     lidocaine (LMX4) kit     lidocaine (LMX4) kit     lidocaine (LMX4) kit     lidocaine (XYLOCAINE) 5 % ointment     lidocaine 1 % 0.5-5 mL     lidocaine 1 % 1 mL     lidocaine 2 % (URO-JET) jelly 10 mL     LORazepam (ATIVAN) injection 0.5 mg     melatonin tablet 6 mg     multivitamin, therapeutic with minerals (THERA-VIT-M) tablet 1 tablet     naloxone (NARCAN) injection 0.1-0.4 mg     nitroGLYcerin (NITROSTAT) sublingual tablet 0.4  mg     ondansetron (ZOFRAN) injection 4 mg     Patient is already receiving anticoagulation with heparin, enoxaparin (LOVENOX), warfarin (COUMADIN)  or other anticoagulant medication     phenylephrine-cocoa butter (PREPARATION H) per suppository 1 suppository     polyethylene glycol (MIRALAX/GLYCOLAX) Packet 17 g     psyllium capsule 0.52 g     sennosides (SENOKOT) tablet 2 tablet     simethicone (MYLICON) chewable tablet 80 mg     sodium chloride (PF) 0.9% PF flush 10 mL     sodium chloride (PF) 0.9% PF flush 10-20 mL     sodium chloride (PF) 0.9% PF flush 10-20 mL     sodium chloride (PF) 0.9% PF flush 3 mL     sodium chloride (PF) 0.9% PF flush 3 mL     sodium chloride (PF) 0.9% PF flush 5-50 mL     thiamine (B-1) injection 100 mg             Assessment and Plan:     78 y/o male with PMHx significant for 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      Today:  - Abdominal x-ray did not show any obstruction, started simethicone  - Discontinued PRN Ativan  - Will need to discuss disposition with Ethics and SW on Monday    #Cardiac amyloidosis  #HFrEF NYHA IV D (EF 15-20%)  RHC on 4/3. PCW 20. RA 17. PA 43/25 (33). RV 43/17. Son CI 1.4. Patient is volume overloaded and in cardiogenic shock. Suspect this is the likely cause of patient's altered mental status, LOIS, and possibly the ventriculomegaly. PICC placed on 4/3 for dobutamine infusion. His mental status and renal function have improved with the dobutamine infusion, improving his quality of life. His overall prognosis remains very poor.  - Continue dobutamine at 5 mcg/kg/min  - Discontinued hydralazine due to soft BP's  - Diuresis: 4 mg IV Bumex given 4/24 to little effect. Had been  holding due to rising Cr.  - Palliative consulted, appreciate recs re: GOC and d/c plans  - Disposition has been difficult due to financial reasons    # CKD-V   # LOIS - improving  Patient was oliguric on presentation. Tunneled line by IR 3/29. Started HD 3/29. Urine output increased and Cr stable/improved with dobutamine. No further HD as patient had good UOP and a very poor cardiac prognosis, so the tunneled catheter was removed. Following removal of the catheter, Cr was stable in the 2.3-2.4 range, and patient had adequate UOP. However starting 4/18, Cr was steadily rising, and UOP was decreasing. Diuresis was stopped on 4/18. He is now oliguric. Possibly obstructive due to UTI, but it has not resolved with Fraga placement and aggressive diuresis. Most likely related to the patient's end stage heart failure, as his heart rate has been decreasing. Could be a pre-renal component, as UOP increased with administration of 500 mL NS. Now UOP and Cr are improving  - Continue Fraga  - Monitor UOP, Cr  - IR removed dialysis catheter 4/12.  - Nephrology consult, appreciate recs                           - No HD given poor prognosis, risk of bleeding, low BP's, patient's anxious reaction to discussion of HD, and likely impossibility of discharge on HD and dobutamine                           - Fraga catheter.                           - Bumex 4 mg IV given 4/24 with little effect                           - 500 mL NS given 4/25, increased UOP, stabilized Cr  - Will consider intermittent IV boluses if having poor PO     #Multifactorial Encephalopathy, improving  #Delirium  #Dementia (MoCA 16/30)  #Ventriculomegaly / possible normal pressure hydrocephalus  #h/o right MCA CVA  Likely due to cardiogenic shock, although also had extensive workup for other causes. CT scan with enlarged venticles. No mass or acute CVA. Sepsis also possible contributor. Possibly uremia. Low suspicion for SBP. Patient's history had been concerning  for NPH. LP done on 4/2. Attempted large volume (25-30 cc); however, was only able to obtain 0.5 cc.  - Treatment by etiology: UTI / PNA (s/p tx with abx), uremia (treated with HD/diuresis), NPH (neurology recommends no further inpatient workup), baseline dementia / CVA, cardiogenic shock (see above)  - Psychiatry consulted: Latuda had been at 40 mg, decreased to 20 mg due to family's concern about its affect on patient's daytime sleepiness, and melatonin 6 mg qhs prn for agitation, less risk of QTc prolongation. Patient's family requested for latuda to be discontinued on 4/26 due to concern for increased somnolence. Patient's mental status has improved in the past with better volume status. Patient's family requesting for all sedating medications (e.g. Benzos, Latuda) to be withheld  - Neurology signed off. Recommended follow-up in movement disorder clinic for workup of possible NPH, although this is less likely now given patient's poor cardiac prognosis.  - PT/OT  - Patient will need to be without a sitter for 24 hours for placement.      #CAD s/p ATIF to LAD 2015  - Continue pta atorvastatin 80 mg qday  - ASA 81 mg daily  - Avoid BB with cardiac amyloid      #Afib/flutter s/p CTI ablation 2006  Remains in afib. Was on sotalol but this was discontinued around 08/2017 per outside cardiology notes. Discontinued warfarin on 4/3 due to labile INR's and started on apixaban instead.  - Apixaban 2.5 mg BID (renally dosed)     #Concern for UTI, resolved  Started on rocephin on 4/21. Urine cultures grew pan-sensitive Klebsiella pneumoniae. De-escalated to Keflex and then switched back to ceftiriazon when having poor PO intake. Completed 6 days of antibiotics.    #Aspiration PNA, resolved  Started on Unasyn on 3/27. Switched to Zosyn on 3/28 per ID recs. Restarted on Unasyn on 3/30 when sensitivities returned on UTI (Klebsiella pneumoniae). Completed course of Unasyn on 4/4. Repeat chest x-ray on 4/14 stable. Patient remains  afebrile.     # Cachexia  Patient's volume overload somewhat obscures his severe malnutrition, but he has temporal wasting.    FEN: Cardiac diet  PPX: Apixaban  Code Status: DNR/DNI  Dispo: Uncertain. Placement is challenging due to financial difficulties with a facility that can accommodate dobutamine.     Patient was discussed with staff attending, Dr. Sanchez, who agrees with the above assessment and plan.      Mag Raphael MD, PhD  Internal Medicine Resident  Cardiology Service  Pager: [phone]

## 2018-04-29 NOTE — PLAN OF CARE
Problem: Patient Care Overview  Goal: Discharge Needs Assessment  Outcome: Declining  D AVSS with sat's 97% on room air. Heart irregular and lungs clear. Voiced c/o back pain which was relieved with Tylenol x1. Drank 3/4 of a boost during the night and well as 120 cc of water. Turning/repostioning with oral care every two hours and prn. VPM in place with no interventions needed to present. This nurse in room frequently for cares and when patient yelling out to talk to him. Patient has said several times during the night that he has talked to god and wants to go home. Also, he wants to say good by to every one and asked if he should send an email out to let everyone know what's going on with him. Having short periods of increased clarity where he asks where he is and what is going on with him. Spent a lot of time 1 on 1 to talk to patient, address his concerns and to provide comfort.   I Vital's, assessment and med's per order.   A Resting in bed with call light in reach.   P Continue to monitor and update MD with changes.

## 2018-04-29 NOTE — PROGRESS NOTES
Brief Progress Note    Met with patient's wife and son today to discuss possible disposition options since patient does not require acute inpatient stay.     Unfortunately, there is only one facility that can accept patient on dobutamine and cost is prohibitive to family. Although patient can be discharged home on dobutamine, patient's wife does not feel that she is able to care for him at home since he requires assistance with standing, etc. Patient's wife feels that patient is safe in the hospital since he receives a lot of support and is constantly monitored.     We discussed possibly titrating down/off dobutamine to see how patient does in order to see if patient will then qualify for additional TCU.    Plan:  - Family would like to hear about the other TCUs that patient may qualify for if he is off of dobutamine. Will have SW discuss these options with patient's family tomorrow.  - Would also like SW to discuss costs of home nursing with family (with or without home dobutamine).  - Appreciate SW assistance.      Mag Raphael MD, PhD  Internal Medicine  Pager: [phone]

## 2018-04-29 NOTE — PROGRESS NOTES
Cardiology Progress Note  Josr Landers MRN: [id number]  Age: 77 year old, : 1940  Date: 2018             Subjective     No acute events overnight. Patient more alert this morning. Denies chest pain, SOB, abdominal pain, N/V.          Objective     /79  Pulse 79  Temp 98  F (36.7  C) (Oral)  Resp 18  Wt 66.4 kg (146 lb 6.2 oz)  SpO2 97%  BMI 21.62 kg/m2  Temp:  [97.5  F (36.4  C)-98  F (36.7  C)] 98  F (36.7  C)  Heart Rate:  [76-92] 76  Resp:  [16-18] 18  BP: ()/(69-80) 100/79  SpO2:  [97 %-100 %] 97 %  Wt Readings from Last 2 Encounters:   18 66.4 kg (146 lb 6.2 oz)   18 75.3 kg (165 lb 14.4 oz)       I/O last 3 completed shifts:  In: 507.81 [P.O.:280; I.V.:227.81]  Out: 1650 [Urine:1650]      Gen: No acute distress, laying in bed  HEENT: NC/AT  PULM/THORAX: Clear to auscultation bilaterally anteriorly, no rales/rhonchi/wheezes, on RA  CV: IRIR, normal S1 and S2, no murmurs or rubs.   ABD: Soft, NTND, bowel sounds present, no masses  EXT: WWP. No LE edema,  NEURO: Alert, appropriate responses            Data:     Recent Results (from the past 24 hour(s))   Basic metabolic panel    Collection Time: 18  6:30 AM   Result Value Ref Range    Sodium 138 133 - 144 mmol/L    Potassium 4.1 3.4 - 5.3 mmol/L    Chloride 98 94 - 109 mmol/L    Carbon Dioxide 30 20 - 32 mmol/L    Anion Gap 10 3 - 14 mmol/L    Glucose 70 70 - 99 mg/dL    Urea Nitrogen 120 (H) 7 - 30 mg/dL    Creatinine 4.25 (H) 0.66 - 1.25 mg/dL    GFR Estimate 14 (L) >60 mL/min/1.7m2    GFR Estimate If Black 17 (L) >60 mL/min/1.7m2    Calcium 8.7 8.5 - 10.1 mg/dL   Magnesium    Collection Time: 18  6:30 AM   Result Value Ref Range    Magnesium 2.9 (H) 1.6 - 2.3 mg/dL             Medications     Current Facility-Administered Medications   Medication     acetaminophen (TYLENOL) Suppository 650 mg     acetaminophen (TYLENOL) tablet 650 mg     alteplase (CATHFLO ACTIVASE)  injection 2 mg     ammonium lactate (LAC-HYDRIN) 12 % lotion     bisacodyl (DULCOLAX) EC tablet 5 mg    Or     bisacodyl (DULCOLAX) EC tablet 10 mg    Or     bisacodyl (DULCOLAX) EC tablet 15 mg     bisacodyl (DULCOLAX) Suppository 10 mg     DOBUTamine 500 mg in dextrose 5% 250 mL (adult std conc) premix     heparin lock flush 10 UNIT/ML injection 5-10 mL     heparin lock flush 10 UNIT/ML injection 5-10 mL     hydrocortisone (CORTAID) 1 % cream     HYDROmorphone (DILAUDID) injection 0.2 mg     lidocaine (LMX4) kit     lidocaine (LMX4) kit     lidocaine (LMX4) kit     lidocaine (XYLOCAINE) 5 % ointment     lidocaine 1 % 0.5-5 mL     lidocaine 1 % 1 mL     lidocaine 2 % (URO-JET) jelly 10 mL     melatonin tablet 6 mg     multivitamin, therapeutic with minerals (THERA-VIT-M) tablet 1 tablet     naloxone (NARCAN) injection 0.1-0.4 mg     nitroGLYcerin (NITROSTAT) sublingual tablet 0.4 mg     ondansetron (ZOFRAN) injection 4 mg     Patient is already receiving anticoagulation with heparin, enoxaparin (LOVENOX), warfarin (COUMADIN)  or other anticoagulant medication     phenylephrine-cocoa butter (PREPARATION H) per suppository 1 suppository     polyethylene glycol (MIRALAX/GLYCOLAX) Packet 17 g     psyllium capsule 0.52 g     sennosides (SENOKOT) tablet 2 tablet     simethicone (MYLICON) suspension 40 mg     sodium chloride (PF) 0.9% PF flush 10 mL     sodium chloride (PF) 0.9% PF flush 10-20 mL     sodium chloride (PF) 0.9% PF flush 10-20 mL     sodium chloride (PF) 0.9% PF flush 3 mL     sodium chloride (PF) 0.9% PF flush 3 mL     sodium chloride (PF) 0.9% PF flush 5-50 mL     thiamine (B-1) injection 100 mg             Assessment and Plan:     78 y/o male with PMHx significant for 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 Patient's urine output and Cr have been improving. Disposition has been challenging.     Today:  - Will have further discussions with family today.  - Will need to discuss disposition with Ethics and SW on Monday    #Cardiac amyloidosis  #HFrEF NYHA IV D (EF 15-20%)  RHC on 4/3. PCW 20. RA 17. PA 43/25 (33). RV 43/17. Son CI 1.4. Patient is volume overloaded and in cardiogenic shock. Suspect this was the likely cause of patient's altered mental status, LOIS, and possibly the ventriculomegaly. PICC placed on 4/3 for dobutamine infusion. His mental status and renal function have improved with the dobutamine infusion, improving his quality of life. His overall prognosis remains very poor.  - Continue dobutamine at 5 mcg/kg/min  - Discontinued hydralazine due to soft BP's  - Diuresis: 4 mg IV Bumex given 4/24 to little effect. Had been holding due to rising Cr.  - Palliative consulted, appreciate recs re: GOC and d/c plans  - Disposition has been difficult due to financial reasons    # CKD-V   # LOIS - improving  Patient was oliguric on presentation. Tunneled line by IR 3/29. Started HD 3/29. Urine output increased and Cr stable/improved with dobutamine. No further HD as patient had good UOP and a very poor cardiac prognosis, so the tunneled catheter was removed. Following removal of the catheter, Cr was stable in the 2.3-2.4 range, and patient had adequate UOP. However starting 4/18, Cr was steadily rising, and UOP was decreasing. Diuresis was stopped on 4/18. Cr peaked at 5.79. LOIS likely pre-renal since UOP increased with administration of 500 mL NS. Now UOP and Cr are improving  - Continue Fraga  - Monitor UOP, Cr  - IR removed dialysis catheter 4/12.  - Nephrology consult, appreciate recs                           - No HD given poor prognosis, risk of bleeding, low BP's, patient's anxious reaction to discussion of HD, and likely  impossibility of discharge on HD and dobutamine                           - Fraga catheter.                           - Bumex 4 mg IV given 4/24 with little effect                           - 500 mL NS given 4/25, increased UOP, Cr improving  - Will consider intermittent IV boluses if having poor PO     #Multifactorial Encephalopathy, improving  #Delirium  #Dementia (MoCA 16/30)  #Ventriculomegaly / possible normal pressure hydrocephalus  #h/o right MCA CVA  Likely due to cardiogenic shock, although also had extensive workup for other causes. CT scan with enlarged venticles. No mass or acute CVA. Sepsis also possible contributor. Possibly uremia. Low suspicion for SBP. Patient's history had been concerning for NPH. LP done on 4/2. Attempted large volume (25-30 cc); however, was only able to obtain 0.5 cc.  - Treatment by etiology: UTI / PNA (s/p tx with abx), uremia (treated with HD/diuresis), NPH (neurology recommends no further inpatient workup), baseline dementia / CVA, cardiogenic shock (see above)  - Psychiatry consulted: Latuda had been at 40 mg, was decreased to 20 mg due to family's concern about its affect on patient's daytime sleepiness, and melatonin 6 mg qhs prn for agitation, less risk of QTc prolongation. Patient's family requested for latuda to be discontinued on 4/26 due to concern for increased somnolence. Patient's mental status has improved in the past with better volume status. Patient's family requesting for all sedating medications (e.g. Benzos, Latuda) to be withheld  - Neurology signed off. Recommended follow-up in movement disorder clinic for workup of possible NPH, although this is less likely now given patient's poor cardiac prognosis.  - PT/OT  - Patient will need to be without a sitter for 24 hours for placement.      #CAD s/p ATIF to LAD 2015  - Discontinued pta atorvastatin 80 mg qday and ASA 81 mg since overall mortality benefit low for patient due to overall poor prognosis  - Avoid BB  with cardiac amyloid      #Afib/flutter s/p CTI ablation 2006  Remains in afib. Was on sotalol but this was discontinued around 08/2017 per outside cardiology notes. Discontinued warfarin on 4/3 due to labile INR's and started on apixaban instead. Now off apixaban since overall benefit low given patient's overall poor prognosis and concern for increased bleeding risk with high BUN.    #Concern for UTI, resolved  Started on rocephin on 4/21. Urine cultures grew pan-sensitive Klebsiella pneumoniae. De-escalated to Keflex and then switched back to ceftiriazon when having poor PO intake. Completed 6 days of antibiotics.    #Aspiration PNA, resolved  Started on Unasyn on 3/27. Switched to Zosyn on 3/28 per ID recs. Restarted on Unasyn on 3/30 when sensitivities returned on UTI (Klebsiella pneumoniae). Completed course of Unasyn on 4/4. Repeat chest x-ray on 4/14 stable. Patient remains afebrile.     # Cachexia  Patient's volume overload somewhat obscures his severe malnutrition, but he has temporal wasting.    FEN: Cardiac diet  PPX: Apixaban  Code Status: DNR/DNI  Dispo: Uncertain. Placement is challenging due to financial difficulties with a facility that can accommodate dobutamine. Will need to have further discussions with family regarding disposition.     Patient was discussed with staff attending, Dr. Sanchez, who agrees with the above assessment and plan.      Mag Raphael MD, PhD  Internal Medicine Resident  Cardiology Service  Pager: [phone]

## 2018-04-29 NOTE — PLAN OF CARE
"Problem: Patient Care Overview  Goal: Plan of Care/Patient Progress Review  RN  1. Pt will be free from injury   Outcome: No Change  D: Patient admitted from TCU on 3/26/18 for Altered Mental Status, abdominal pain, SOB, and worsening kidney function. Hx of cardiac amyloidosis, A-fib/flutter on Coumadin s/p ablation '06, CAD s/p stent placement '15, CKD stage IV, and CVA '17.     I/A: Monitored vitals and assessed patient status. A&Ox Person & Place. VSS. A-fib 70's-100's with occasional PVC's. Adequate urine output via martinez catheter. Stand and pivot to chair for meals with assist x 2-3 (Requires lift occasionally). VPM in use, set to privacy when family at bedside. Appetite improving - needs assist with eating. Patient stated \"Today is a hard day. I have to say goodbye to a lot of people. I want to go home.\" Cards 1 notified and spoke with family about treatment options.     Code status - DNR/DNI     Running: Dobutamine 5 mcg/kg/min  PRN: Simethicone for abdominal/gas discomfort. Tylenol for pain.     P: Continue to monitor patient status and report changes to treatment team.      "

## 2018-04-30 NOTE — PROVIDER NOTIFICATION
BP (!) 82/54  Pulse 79  Temp 98.4  F (36.9  C) (Oral)  Resp 16  Wt 66.4 kg (146 lb 6.2 oz)  SpO2 99%  BMI 21.62 kg/m2    Paged Cards crosscover regarding pt BP. SBP in 80s. Pt asymtomatic. AOVSS. Will continue to monitor.

## 2018-04-30 NOTE — PROGRESS NOTES
Cardiology Progress Note  Josr Landers MRN: [id number]  Age: 77 year old, : 1940  Date: 2018              Subjective     No acute events overnight. Patient said that he felt OK but did not answer ROS questions this morning. Later during day, patient endorsed having chest pain that later resolved when I went to see him. EKG did not show ST segment elevations and troponin, although elevated, was downtrending.          Objective     /86 (BP Location: Right arm)  Pulse 79  Temp 98.4  F (36.9  C) (Oral)  Resp 18  Wt 66.1 kg (145 lb 11.6 oz)  SpO2 97%  BMI 21.52 kg/m2  Temp:  [97  F (36.1  C)-98.4  F (36.9  C)] 98.4  F (36.9  C)  Heart Rate:  [] 92  Resp:  [16-18] 18  BP: ()/(52-86) 116/86  SpO2:  [97 %-100 %] 97 %  Wt Readings from Last 2 Encounters:   18 66.1 kg (145 lb 11.6 oz)   18 75.3 kg (165 lb 14.4 oz)       I/O last 3 completed shifts:  In: 1103.91 [P.O.:840; I.V.:263.91]  Out: 1425 [Urine:1425]      Gen: No acute distress, laying in bed  HEENT: NC/AT  PULM/THORAX: Clear to auscultation bilaterally anteriorly, no rales/rhonchi/wheezes, on RA  CV: IRIR, normal S1 and S2, no murmurs or rubs.   ABD: Soft, NTND, bowel sounds present, no masses  EXT: WWP. Pitting bilateral LE edema around ankles  NEURO: Alert, intermittently answers questions            Data:     Reviewed.        Medications     Current Facility-Administered Medications   Medication     acetaminophen (TYLENOL) Suppository 650 mg     acetaminophen (TYLENOL) tablet 650 mg     alteplase (CATHFLO ACTIVASE) injection 2 mg     ammonium lactate (LAC-HYDRIN) 12 % lotion     bisacodyl (DULCOLAX) EC tablet 5 mg    Or     bisacodyl (DULCOLAX) EC tablet 10 mg    Or     bisacodyl (DULCOLAX) EC tablet 15 mg     bisacodyl (DULCOLAX) Suppository 10 mg     DOBUTamine 500 mg in dextrose 5% 250 mL (adult std conc) premix     heparin lock flush 10 UNIT/ML injection 5-10 mL     heparin lock  flush 10 UNIT/ML injection 5-10 mL     hydrocortisone (CORTAID) 1 % cream     HYDROmorphone (DILAUDID) injection 0.2 mg     lidocaine (LMX4) kit     lidocaine (LMX4) kit     lidocaine (LMX4) kit     lidocaine (XYLOCAINE) 5 % ointment     lidocaine 1 % 0.5-5 mL     lidocaine 1 % 1 mL     lidocaine 2 % (URO-JET) jelly 10 mL     melatonin tablet 6 mg     multivitamin, therapeutic with minerals (THERA-VIT-M) tablet 1 tablet     naloxone (NARCAN) injection 0.1-0.4 mg     nitroGLYcerin (NITROSTAT) sublingual tablet 0.4 mg     ondansetron (ZOFRAN) injection 4 mg     Patient is already receiving anticoagulation with heparin, enoxaparin (LOVENOX), warfarin (COUMADIN)  or other anticoagulant medication     phenylephrine-cocoa butter (PREPARATION H) per suppository 1 suppository     polyethylene glycol (MIRALAX/GLYCOLAX) Packet 17 g     psyllium capsule 0.52 g     sennosides (SENOKOT) tablet 2 tablet     simethicone (MYLICON) suspension 40 mg     sodium chloride (PF) 0.9% PF flush 10 mL     sodium chloride (PF) 0.9% PF flush 10-20 mL     sodium chloride (PF) 0.9% PF flush 10-20 mL     sodium chloride (PF) 0.9% PF flush 3 mL     sodium chloride (PF) 0.9% PF flush 3 mL     sodium chloride (PF) 0.9% PF flush 5-50 mL     thiamine (B-1) injection 100 mg             Assessment and Plan:     76 y/o male with PMHx significant for 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 Patient's urine output and Cr have been improving. Disposition has been challenging.     Today:  - Had a meeting with SW, Palliative Care, and family today to outline different options available for discharge. Family is unable to care for him at home, do not want to pay for hospice/LTC,  and do not want to titrate off dobutamine. 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  - Discussed patient with Risk Management and Ethics. Will have care team conference later this week    #Cardiac amyloidosis  #HFrEF NYHA IV D (EF 15-20%)  RHC on 4/3. PCW 20. RA 17. PA 43/25 (33). RV 43/17. Son CI 1.4. Patient is volume overloaded and in cardiogenic shock. Suspect this was the likely cause of patient's altered mental status, LOIS, and possibly the ventriculomegaly. PICC placed on 4/3 for dobutamine infusion. His mental status and renal function have improved with the dobutamine infusion. His overall prognosis remains very poor.  - Continue dobutamine at 5 mcg/kg/min  - Discontinued hydralazine due to soft BP's  - Diuresis: 4 mg IV Bumex given 4/24 to little effect. Had been holding due to rising Cr.  - Palliative consulted, appreciate recs re: GOC and d/c plans  - Disposition has been difficult due to financial reasons and family's reluctant to titrate off dobutamine  - Discussed patient with Risk Management and Ethics. Will have care team conference later this week    # CKD-V   # LOIS - improving  Patient was oliguric on presentation. Tunneled line by IR 3/29. Started HD 3/29. Urine output increased and Cr stable/improved with dobutamine. No further HD as patient had good UOP and a very poor cardiac prognosis, so the tunneled catheter was removed. Following removal of the catheter, Cr was stable in the 2.3-2.4 range, and patient had adequate UOP. However starting 4/18, Cr was steadily rising, and UOP was decreasing. Diuresis was stopped on 4/18. Cr peaked at 5.79. LOIS likely pre-renal since UOP increased with administration of 500 mL NS. Now UOP and Cr are improving  - Continue Fraga  - Monitor UOP, Cr  - IR removed dialysis catheter 4/12.  - Nephrology consult, appreciate recs                           - No HD given poor  prognosis, risk of bleeding, low BP's, patient's anxious reaction to discussion of HD, and likely impossibility of discharge on HD and dobutamine                           - Fraga catheter.                           - Bumex 4 mg IV given 4/24 with little effect                           - 500 mL NS given 4/25, increased UOP, Cr improving  - Will consider intermittent IV boluses if having poor PO     #Multifactorial Encephalopathy, improving  #Delirium  #Dementia (MoCA 16/30)  #Ventriculomegaly / possible normal pressure hydrocephalus  #h/o right MCA CVA  Likely due to cardiogenic shock, although also had extensive workup for other causes. CT scan with enlarged venticles. No mass or acute CVA. Sepsis also possible contributor. Possibly uremia. Low suspicion for SBP. Patient's history had been concerning for NPH. LP done on 4/2. Attempted large volume (25-30 cc); however, was only able to obtain 0.5 cc.  - Was treated for UTI / PNA (s/p tx with abx). Thought it could be uremia (BUN improved), NPH (neurology recommends no further inpatient workup), baseline dementia / CVA, cardiogenic shock (see above)  - Psychiatry consulted: Latuda had been at 40 mg, was decreased to 20 mg due to family's concern about its affect on patient's daytime sleepiness, and melatonin 6 mg qhs prn for agitation, less risk of QTc prolongation. Patient's family requested for latuda to be discontinued on 4/26 due to concern for increased somnolence. Patient's mental status has improved in the past with better volume status. Patient's family requesting for all sedating medications (e.g. Benzos, Latuda) to be withheld  - Neurology signed off. Recommended follow-up in movement disorder clinic for workup of possible NPH, although this is less likely now given patient's poor cardiac prognosis.  - PT/OT  - Patient will need to be without a sitter for 24 hours for placement.      #CAD s/p ATIF to LAD 2015  - Discontinued pta atorvastatin 80 mg qday and  ASA 81 mg since overall mortality benefit low for patient due to overall poor prognosis  - Avoid BB with cardiac amyloid      #Afib/flutter s/p CTI ablation 2006  Remains in afib. Was on sotalol but this was discontinued around 08/2017 per outside cardiology notes. Discontinued warfarin on 4/3 due to labile INR's and started on apixaban instead. Now off apixaban since overall benefit low given patient's overall poor prognosis and concern for increased bleeding risk with high BUN.    #Concern for UTI, resolved  Started on rocephin on 4/21. Urine cultures grew pan-sensitive Klebsiella pneumoniae. De-escalated to Keflex and then switched back to ceftiriazon when having poor PO intake. Completed 6 days of antibiotics.    #Aspiration PNA, resolved  Started on Unasyn on 3/27. Switched to Zosyn on 3/28 per ID recs. Restarted on Unasyn on 3/30 when sensitivities returned on UTI (Klebsiella pneumoniae). Completed course of Unasyn on 4/4. Repeat chest x-ray on 4/14 stable. Patient remains afebrile.     # Cachexia  Patient's volume overload somewhat obscures his severe malnutrition, but he has temporal wasting.    FEN: Cardiac diet  PPX: Apixaban  Code Status: DNR/DNI  Dispo: Uncertain. Placement is challenging due to financial difficulties with a facility that can accommodate dobutamine. Discussed with Ethics ([phone]) and Risk Management (Vandana, [phone])     Patient was discussed with staff attending, Dr. Sanchez, who agrees with the above assessment and plan.      Mag Raphael MD, PhD  Internal Medicine Resident  Cardiology Service  Pager: [phone]

## 2018-04-30 NOTE — PROGRESS NOTES
"[facility], [place]   Palliative Care Daily Progress Note          Recommendations, Patient/Family Counseling & Coordination    Pt seen and examined. Nursing narrative reviewed noting his comments on \"...saying goodbye, going home and knowing God.\" . Discussed goals of care in care conference setting with family (spouse, son and daughter) present. Team members present- Cardiology fellow, Unit LICSW and Palliative Care.  He is oriented to family and self, sleeping more soundly. Family remain encouraged by heart rate in 70's and increased urine output.   CODE STATUS:  dnr/dni status but family has emphasized full measures/intervention otherwise.   CARE CONFERENCE: IN 1420- OUT 1450  Current clinical status remains unchanged.  He is dobutamine dependent and this has made discharge planning quite difficult.  Family unwilling to consider discontinuation or transition to hospice philosophies.  Barriers to discharge: Dobutamine dependence,  1:1 sitter/ video/ for nighttime agitation and delirium superimposed on encephalopathy likely related to prior stroke, renal failure.  -At this time, no viable discharge plan exists outside of ongoing hospitalization at this time.  -[facility] determined his care needs exceed their abilities and have declined admission.  -[facility] hospice has offered benevolent care for up to 2 weeks on to be any therapy with expectation of some type of financial compensation at that point.  Family indicates this is not possible.  -[facility], will accept him for hospice admission, but will not take him on ongoing vitamin therapy.  -Wife (Nu) aware she repeats she is not capable of caring for him at home with his extensive level of assistance.  She and her family except that his heart failure is progressive slowly, however believe the dobutamine therapy is keeping him alive, that its discontinuation is \"like pulling the " "plug\".     REC: - No change in treatment planned for now.   - Support nursing efforts to help day/night cycle health.  - Stool softeners, suppositories for bowel program- continue scheduled bowel meds- hold if loose stools.   - PT for energy conservation and functional recovery. Continues with need for max of 2 assist with no carry over to subsequent sessions- poor activity tolerance.  - Ongoing requirement for IV inotropes - has been preliminarily OK'ed by  Hospice to continue after discharge if able to find placement  - from PC perspective discharge should ideally be hospice and comfort focused- family again not at that place.  SNF placement likely as his care needs are increasing.      -  Goals of Care: His goals- earlier verbalized by family- remain to continue with present cares, his quality of life is being surrounded by his family and they are here every day.  They believe that his level of alertness and participation in conversation and awareness of his surroundings is enough quality-of-life to continue present therapies indefinitely.  They are aware he may not leave the hospital with diminishing treatment options of his heart failure.    Per SW and family- financial concerns limit options for expanded end of life care. SW has evaluated benevolent care considerations. Despite evidence of end stage disease family unable to accept on comfort approach.     POLST- not completed at this time     Thank you for the opportunity to continue to participate in the care of this patient and family.  Please feel free to contact on-call palliative provider with any emergent needs.  We can be reached via team pager [phone] (answered 8-4:30 Monday-Friday); after-hours answering service ([phone])    Jamie GREER NP ACHTAO  Nurse Practitioner- Lead Advanced Practice Provider  [facility] Palliative Medicine Consult Service   [phone]    Jamie Myrick NP    TT spent: 35 minutes of which 5 minutes were " spent in direct face to face contact with patient/family. Greater than 50% of time spent counseling and/or coordinating care.       Assessment      1) Diagnoses & symptoms:        Cardiac Amyloidosis- end stage disease- IV inotrope dependence  A fib /flutter (on warfarin)  Stenting of CAD 2015  CKD Stage IV- likely cardiorenal component   CVA 2/2017 R MCA  AMS- likely delirium superimposed on dementia process            Interval History:   Ongoing stable pressures- no chest pain or lightheadedness. More somnolence with longer periods of sleep in the day.. No shortness of breath.    - Psychiatry had introduced Latuda- discontinued due to concerns per family.  Nephrology unwilling to consider dialysis based on present clinical status.   See care conference notes above.           Review of Systems:   Palliative Symptom Review (0=no symptom/no concern, 1=mild, 2=moderate, 3=severe): Review of systems results based mostly on observation and family information rather the patient reported.      Pain: 0-1 (buttocks/hemorrhoid  pain)      Fatigue: 2      Nausea: 0      Constipation: 1      Diarrhea: 0      Depressive Symptoms: 1      Anxiety: 1      Drowsiness: 2      Poor Appetite: 1      Shortness of Breath: 0      Insomnia: 2-3      Confusion 2              Medications:   I have reviewed this patient's medication profile and medications given in past 24 hours.      {   Physical exam .Vitals: BP (!) 114/92  Pulse 79  Temp 97.7  F (36.5  C) (Axillary)  Resp 16  Wt 66.1 kg (145 lb 11.6 oz)  SpO2 99%  BMI 21.52 kg/m2  BMI= Body mass index is 21.52 kg/(m^2).   GEN: Somnolent in bed, rouses to voice. Family present.- oriented to family when awake. Longer intervals of sleeping during the day.  HEENT:AT/ NC, EOM grossly intact, mucous membranes pink, moist. Dentition in good repair.  PULM/THORAX: Decreased inspiratory effort.   CV:RRR. Monitor with atrial fibrillation- rate controlled- 74-78 bpm occ ectopy.  ABD: non  distended,   EXT: trace-->1+ pitting LE edema. No asymmetrical edema or tenderness to palpation in calves bilaterally.  NEURO: difficult to fully assess. No apparent focal neurological deficit.                Data Reviewed:   *  ROUTINE LABS (Last four results)  BMP    Recent Labs  Lab 04/30/18  0641 04/30/18  0600 04/30/18  0433 04/29/18  0445 04/28/18  0630 04/27/18  0717   NA  --   --  139 138 138 135   POTASSIUM 3.5 6.1* Canceled, Test credited 5.1 4.1 5.0   CHLORIDE  --   --  104 100 98 96   RIGOBERTO  --   --  7.9* 8.8 8.7 8.7   CO2  --   --  28 29 30 27   BUN  --   --  102* 114* 120* 131*   CR  --   --  2.94* 3.67* 4.25* 5.12*   GLC  --   --  67* 82 70 85     CBC    Recent Labs  Lab 04/25/18  0543 04/24/18  1700   WBC 7.2 6.7   RBC 3.32* 3.00*   HGB 10.7* 9.9*   HCT 32.3* 29.3*   MCV 97 98   MCH 32.2 33.0   MCHC 33.1 33.8   RDW 16.8* 16.8*   * 100*     INRNo lab results found in last 7 days.

## 2018-04-30 NOTE — PLAN OF CARE
Problem: Patient Care Overview  Goal: Plan of Care/Patient Progress Review  RN  1. Pt will be free from injury   Outcome: No Change  /86 (BP Location: Right arm)  Pulse 79  Temp 98.4  F (36.9  C) (Oral)  Resp 18  Wt 66.1 kg (145 lb 11.6 oz)  SpO2 97%  BMI 21.52 kg/m2    Episode of hypotension, improved without intervention. AOVSS on RA. A fib. Oriented to self only the majority of the night. Pt continues to be yelling out, provided a lot of reassurance and emotional support. Dobutamine gtt continues at 5 mcg/kg/min through DL PICC. Denies pain, nausea, or SOB. Fraga patent with adequate UOP. No BM. Turned/repositioned q2h. VPM continues. Will continue to monitor and follow POC.

## 2018-04-30 NOTE — PLAN OF CARE
Problem: Patient Care Overview  Goal: Individualization & Mutuality    Patient awake for most of shift and was up in the reclining chair for the morning. He is total care, requiring immediate assistance to eat and all physical cares.  The ceiling lift was used for transfers from bed to chair and back. He is very stiff and unable to bend his arms or legs. Skin is intact except for a superficial open area on his coccyx and this is covered with mepilex.  Appetite is better than normal today per report from his wife who fed him lunch. Fraga catheter is patent with adequate urine output. He is oriented to self. Atrial fib via continuous cardiac monitoring.  12 lead EKG was done after patient complained of chest pain. A troponin was also drawn and resulted 0.299. This was reported directly to Dr. Vivian Sullivan and the Cards 1 team. A Care Conference is currently taking place with the family and . Dobutamine drip is continuous at 5 mcg/Kg/minute. Refer to doc flow sheets, MAR, and notes for other specifics. Continue nursing cares per care plan. Refer to care conference specifics once it is finished.

## 2018-05-01 NOTE — PROGRESS NOTES
"SPIRITUAL HEALTH SERVICES  SPIRITUAL ASSESSMENT Progress Note (Palliative Focus)  [facility] ([place]) 6C    PRIMARY FOCUS:     Goals of care    Emotional/spiritual/Latter day distress    Support for coping    REFERRAL SOURCE: Palliative care spiritual assessment.    ILLNESS CIRCUMSTANCES:   Reviewed documentation. Reflective conversation shared with wife Nu, daughter Deandra, son Josr, and patient Ayo (who participated only minimally in conversation) which integrated elements of illness and family narratives.     Context of Serious Illness/Symptom(s) - Wife Nu told story of Ayo's stroke 14 months ago and his subsequently \"getting worse, and it was his heart\".     Resources for Support -   o Family: wife Nu, children Deandra and Josr  o Pina: [employer], [employer], [place]     DISTRESS:     Emotional/Spiritual/Existential Distress - Wife Nu named wanting to continue at present level of cares and monitoring for Ayo, \"to give God every chance to heal him [Bill]\". She feels that a hospice philosophy is not compatible with either their pina or what Bill would want for his care. Nu said further that moving Ayo to another facility at this time would have a negative impact on him because he has come to know and trust the staff here.    Mandaeism Distress - None named in visit    Social/Cultural/Economic Distress - While not named in this visit, review of documentation notes significant financial stress on the part of family.    SPIRITUAL/Denominational COPING:     Roman Catholic/Pina - Bill and family are [employer] and attend formerly [facility], [place]. Their own pastors are visiting regularly.    Spiritual Practice(s) - Prayer, reading scripture, Tenriism. Nu noted that they are praying for \"complete healing\" for Bill, and requested that I pray with Bill, to which he also agreed. Family joined in prayer, and Ayo said \"Amen.\"    Emotional/Relational/Existential Connections - Nu, Josr, and Deandra are " "supportive of Ayo and one another. Nu said they are at peace with \"whatever God decides for Ayo\", while also wanting to help Ayo live if possible.    GOALS OF CARE:    Goals of Care - DNR/DNI, family believe Ayo would want all other possible interventions.    Meaning/Sense-Making - Wife Nu said Ayo has been talking about \"going home, and I don't know whether he means this home or another home\" with God. Their Anglican rebekah is at the center of family's understanding of Ayo's condition. Per Nu, they all trust in God to care for Ayo.    PLAN: I will follow for spiritual support.    Laurel Cabello  Palliative   Pager [phone]  [facility] Inpatient Team Consult pager [phone] (M-F 8-4:30)  After-hours Answering Service [phone]   "

## 2018-05-01 NOTE — PLAN OF CARE
Problem: Patient Care Overview  Goal: Plan of Care/Patient Progress Review  RN  1. Pt will be free from injury   VSS on RA. Afib. able to state location this morning. Cooperative and calm. Total cares for ADL's. Lift dependent. Poor appetite but did consume 1/2 of lunch. VPM when family not present.  Up in chair and outside briefly with family today.  Dobutamine gtt infusing at 5mcg/kg/min via PICC, 2nd line still occluded after tpax2, order for new PICC placed. No BM since 28th, patient did c/o stomach pains, miralax and senna administered. Voiding adequately per martinez. Will continue poc, cards 1 primary.

## 2018-05-01 NOTE — PROGRESS NOTES
Cardiology Progress Note  Josr Landers MRN: [id number]  Age: 77 year old, : 1940  Date: 2018              Subjective     No acute events overnight. PICC line clogged overnight and was not able to be unclogged with tPA. Patient denies any pain, chest pain, SOB, abdominal pain, N/V.          Objective     BP 91/74 (BP Location: Right arm)  Pulse 79  Temp 97.6  F (36.4  C) (Oral)  Resp 18  Wt 65.2 kg (143 lb 11.8 oz)  SpO2 100%  BMI 21.23 kg/m2  Temp:  [97.5  F (36.4  C)-97.7  F (36.5  C)] 97.6  F (36.4  C)  Heart Rate:  [] 93  Resp:  [16-20] 18  BP: ()/(56-92) 91/74  SpO2:  [96 %-100 %] 100 %  Wt Readings from Last 2 Encounters:   18 65.2 kg (143 lb 11.8 oz)   18 75.3 kg (165 lb 14.4 oz)       I/O last 3 completed shifts:  In: 928.6 [P.O.:655; I.V.:273.6]  Out: 1875 [Urine:1875]      Gen: No acute distress, laying in bed  HEENT: NC/AT  PULM/THORAX: Clear to auscultation bilaterally anteriorly, no rales/rhonchi/wheezes, on RA  CV: IRIR, normal S1 and S2, no murmurs or rubs.   ABD: Soft, NTND, bowel sounds present, no masses  EXT: WWP. Pitting bilateral LE edema around ankles  NEURO: Alert, intermittently answers questions            Data:     Reviewed.        Medications     Current Facility-Administered Medications   Medication     acetaminophen (TYLENOL) Suppository 650 mg     acetaminophen (TYLENOL) tablet 650 mg     alteplase (CATHFLO ACTIVASE) injection 2 mg     alteplase (CATHFLO ACTIVASE) injection 2 mg     ammonium lactate (LAC-HYDRIN) 12 % lotion     bisacodyl (DULCOLAX) EC tablet 5 mg    Or     bisacodyl (DULCOLAX) EC tablet 10 mg    Or     bisacodyl (DULCOLAX) EC tablet 15 mg     bisacodyl (DULCOLAX) Suppository 10 mg     DOBUTamine 500 mg in dextrose 5% 250 mL (adult std conc) premix     heparin lock flush 10 UNIT/ML injection 5-10 mL     heparin lock flush 10 UNIT/ML injection 5-10 mL     hydrocortisone (CORTAID) 1 % cream      HYDROmorphone (DILAUDID) injection 0.2 mg     lidocaine (LMX4) kit     lidocaine (LMX4) kit     lidocaine (LMX4) kit     lidocaine (XYLOCAINE) 5 % ointment     lidocaine 1 % 0.5-5 mL     lidocaine 1 % 1 mL     lidocaine 2 % (URO-JET) jelly 10 mL     melatonin tablet 6 mg     multivitamin, therapeutic with minerals (THERA-VIT-M) tablet 1 tablet     naloxone (NARCAN) injection 0.1-0.4 mg     nitroGLYcerin (NITROSTAT) sublingual tablet 0.4 mg     ondansetron (ZOFRAN) injection 4 mg     Patient is already receiving anticoagulation with heparin, enoxaparin (LOVENOX), warfarin (COUMADIN)  or other anticoagulant medication     phenylephrine-cocoa butter (PREPARATION H) per suppository 1 suppository     polyethylene glycol (MIRALAX/GLYCOLAX) Packet 17 g     psyllium capsule 0.52 g     sennosides (SENOKOT) tablet 2 tablet     simethicone (MYLICON) suspension 40 mg     sodium chloride (PF) 0.9% PF flush 10 mL     sodium chloride (PF) 0.9% PF flush 10-20 mL     sodium chloride (PF) 0.9% PF flush 10-20 mL     sodium chloride (PF) 0.9% PF flush 3 mL     sodium chloride (PF) 0.9% PF flush 3 mL     sodium chloride (PF) 0.9% PF flush 5-50 mL     thiamine (B-1) injection 100 mg             Assessment and Plan:     76 y/o male with PMHx significant for 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 Patient's urine output and Cr have been improving. Disposition has been challenging.     Today:  - New PICC line to be placed  - No need to check daily labs    #Cardiac amyloidosis  #HFrEF NYHA IV D (EF 15-20%)  RHC on 4/3. PCW 20. RA 17. PA 43/25 (33). RV 43/17. Son CI 1.4. Patient is volume overloaded and in cardiogenic shock. Suspect this was the likely cause  of patient's altered mental status, LOIS, and possibly the ventriculomegaly. PICC placed on 4/3 for dobutamine infusion. His mental status and renal function have improved with the dobutamine infusion. His overall prognosis remains very poor.  - Continue dobutamine at 5 mcg/kg/min  - Discontinued hydralazine due to soft BP's  - Diuresis: 4 mg IV Bumex given 4/24 to little effect. Had been holding due to rising Cr.  - Palliative consulted, appreciate recs re: GOC and d/c plans  - Disposition has been difficult due to financial reasons and family's reluctant to titrate off dobutamine  - Discussed patient with Risk Management and Ethics. Will have care team conference later this week    # CKD-V   # LOIS - improving  Patient was oliguric on presentation. Tunneled line by IR 3/29. Started HD 3/29. Urine output increased and Cr stable/improved with dobutamine. No further HD as patient had good UOP and a very poor cardiac prognosis, so the tunneled catheter was removed. Following removal of the catheter, Cr was stable in the 2.3-2.4 range, and patient had adequate UOP. However starting 4/18, Cr was steadily rising, and UOP was decreasing. Diuresis was stopped on 4/18. Cr peaked at 5.79. LOIS likely pre-renal since UOP increased with administration of 500 mL NS. Now UOP and Cr are improving  - Continue Fraga  - Monitor UOP, Cr  - IR removed dialysis catheter 4/12.  - Nephrology consult, appreciate recs                           - No HD given poor prognosis, risk of bleeding, low BP's, patient's anxious reaction to discussion of HD, and likely impossibility of discharge on HD and dobutamine                           - Fraga catheter.                           - Bumex 4 mg IV given 4/24 with little effect                           - 500 mL NS given 4/25, increased UOP, Cr improving  - Will consider intermittent IV boluses if having poor PO     #Multifactorial Encephalopathy, improving  #Delirium  #Dementia (MoCA  16/30)  #Ventriculomegaly / possible normal pressure hydrocephalus  #h/o right MCA CVA  Likely due to cardiogenic shock, although also had extensive workup for other causes. CT scan with enlarged venticles. No mass or acute CVA. Sepsis also possible contributor. Possibly uremia. Low suspicion for SBP. Patient's history had been concerning for NPH. LP done on 4/2. Attempted large volume (25-30 cc); however, was only able to obtain 0.5 cc.  - Was treated for UTI / PNA (s/p tx with abx). Thought it could be uremia (BUN improved), NPH (neurology recommends no further inpatient workup), baseline dementia / CVA, cardiogenic shock (see above)  - Psychiatry consulted: Latuda had been at 40 mg, was decreased to 20 mg due to family's concern about its affect on patient's daytime sleepiness, and melatonin 6 mg qhs prn for agitation, less risk of QTc prolongation. Patient's family requested for latuda to be discontinued on 4/26 due to concern for increased somnolence. Patient's mental status has improved in the past with better volume status. Patient's family requesting for all sedating medications (e.g. Benzos, Latuda) to be withheld  - Neurology signed off. Recommended follow-up in movement disorder clinic for workup of possible NPH, although this is less likely now given patient's poor cardiac prognosis.  - PT/OT  - Patient will need to be without a sitter for 24 hours for placement.      #CAD s/p ATIF to LAD 2015  - Discontinued pta atorvastatin 80 mg qday and ASA 81 mg since overall mortality benefit low for patient due to overall poor prognosis  - Avoid BB with cardiac amyloid      #Afib/flutter s/p CTI ablation 2006  Remains in afib. Was on sotalol but this was discontinued around 08/2017 per outside cardiology notes. Discontinued warfarin on 4/3 due to labile INR's and started on apixaban instead. Now off apixaban since overall benefit low given patient's overall poor prognosis and concern for increased bleeding risk  with high BUN.    #Concern for UTI, resolved  Started on rocephin on 4/21. Urine cultures grew pan-sensitive Klebsiella pneumoniae. De-escalated to Keflex and then switched back to ceftiriazon when having poor PO intake. Completed 6 days of antibiotics.    #Aspiration PNA, resolved  Started on Unasyn on 3/27. Switched to Zosyn on 3/28 per ID recs. Restarted on Unasyn on 3/30 when sensitivities returned on UTI (Klebsiella pneumoniae). Completed course of Unasyn on 4/4. Repeat chest x-ray on 4/14 stable. Patient remains afebrile.     # Cachexia  Patient's volume overload somewhat obscures his severe malnutrition, but he has temporal wasting.    FEN: Cardiac diet  PPX: Apixaban  Code Status: DNR/DNI  Dispo: Uncertain. Placement is challenging due to financial difficulties with a facility that can accommodate dobutamine. Discussed with Ethics ([phone]) and Risk Management (Vandana, [phone]).      Patient was discussed with staff attending, Dr. Sanchez, who agrees with the above assessment and plan.      Mag Raphael MD, PhD  Internal Medicine Resident  Cardiology Service  Pager: [phone]

## 2018-05-01 NOTE — PROGRESS NOTES
Social Work Services Progress Note    Hospital Day: 34  Date of Initial Social Work Evaluation:  3/29/18  Collaborated with: Spouse, daughter, son, palliative NP, Cards 1 team, SNF admissions, [facility] liaison.    Data:  Pt is a 77 year old male being followed by [facility] for placement as the medical team feels the pt has been medically ready for transition pending safe discharge plan.  Family conference held today to discuss options for discharge planning given changes in options for discharge.    Intervention:  Pt continues to be on VPM/sitter which is preventing placement to any facility.  [facility] also received note from [facility] that the pt is declined for LTC due to high medical complexity.  SW presented the following options to the family for choice of discharge:    Options if family wants to continue dobutamine drip:   - Hospice placement with [facility]: family would need to be comfortable with pursuing hospice philosophy of care and end of life medical management.  Management of [facility] is willing to donate 2 weeks of financial assistance to be used to cover either 2 weeks of full room and board or to be spread over more days if the family is willing to pay partial room and board fees to make the funding last longer.   - Family declines this options as they do not wish to pursue hospice care.  Family does not believe the hospice medical management model is in the pt's best interest.  Family also declines as two weeks of financial support is not enough for them financially.  Family could not afford full price of room and board after two weeks.     - Palliative Homecare or Hospice at home with [facility] (and can add private pay PCA/HHA) for support - Family declines this option again due to not wanting hospice philosophy of care.  Family interested in the palliative option, however feels they cannot afford to private pay for enough help to care for pt at home.  Family has a LTC insurance policy  "that would offer support in 6 mo.  Through insurance pt gets up to 35 hours of PCA/HHA and then would need to resubmit for review if authorized for additional coverage through insurance.  Family does not feel they could afford continued services if insurance does not renew.    Options off dobutamine drip: Family globally declines this as they want to continue dobutamine.   - LTC placement in a memory care unit - would need new referrals once pt off VPM and off dobutamine.   - [facility]   - [facility] -  presented this option as family continues to have financial hardship.      SW also inquired as to the status of family's meeting with an elder law  to allocate assets while pursuing Medicaid - daughter reporting the family has not been able to meet with their  and thus they have not been able to get information on allocation of assets.  SW offered if the family does get Medicaid process started, to inquire with the [facility] if the [facility] would cover room and board at [facility] as pt while on dobutamine only has the option to go to [facility] or to home.  Family states understanding and will consider advocating with the [facility] when the time comes for hospice care.  At this time, discussions with the [facility] can be post poned as family is not wanting to pursue hospice care.     Referrals in Progress:   [facility] has offered to review referral and work with  Hospice on management of dobutamine.  Family does not want this referral at this time as they are not in agreement with a hospice philosophy of care.    Assessment: Pt continues to be disoriented and requiring VPM/sitter for disorientation and safety.  Family was given education at the care conference that the medical team has not been able to find alternative measures to \"heal\" or \"restore\" the pt to a former level of function.  Medical team and palliative NP offered options for comfort " "care/hospice focusing on quality of life and comfort at end of life.  Palliative team added that per nursing assessment, pt has made comments that he has \"made peace\" with his God which to medical team indicated that pt may be psychologically ready for the end of life process.  Family is in disagreement that the pt would want to \"keep fighting\" and family does want to give pt the opportunity to \"get better\" and have as long of a life as possible.  SW and medical team explained that in pt's current condition, he is only sustainable in a hospital setting.  Family wants to continue with hospital level of care and wants pt to be medically managed as he has been this hospitalization.  Medical team considering ethics consult for assistance with evaluation of futility policy.  Family has not been notified of ethics consult.    Plan:    Anticipated Disposition: Hospice is pt's only option at this time if family wants placement.  If family is not wanting hospice, palliative homecare is family's only option outside the hospital.    Barriers to d/c plan:  Cost of TCU/LTC, sitter/VPM, dobutamine needs, financial hardship, lack of social support to take the pt home, family is wanting restorative cares that are only sustainable in a hospital setting.    Follow Up:  SW to follow for discharge needs.  Cards 1 placed ethics consult to assist with making a determination on when the pt's care becomes futile and next steps if continuing with current plan of care is deemed futile.    JERRI Valdovinos, APSW  6C Unit   Phone: [phone]  Pager: [phone]  Unit: [phone]      ___________________________________________________________________________________________________________________________________________________    Referrals Discontinued:  [facility] - cannot take pt's with dobutamine  [facility] - cannot take pts with dobutamine.   [facility] - declined due to medical " complexity. No other LTC facilities will take pt's dobutamine.    Community Case Management/Community Services in place:   Encouraged family to follow up with elder law  for better information on financial counseling and Medicaid support.

## 2018-05-01 NOTE — PROGRESS NOTES
Writer was called to check PICC line, purple lumen was occluded according to primary RN. Found lumen with good blood return and flushed ok with normal saline. Primary RN notified, PICC ok to use.

## 2018-05-01 NOTE — PROGRESS NOTES
CLINICAL NUTRITION SERVICES - REASSESSMENT NOTE     Nutrition Prescription    RECOMMENDATIONS FOR MDs/PROVIDERS TO ORDER:  Order kcal counts if oral intake does not continue to improve. If kcal counts indicate pt is consuming less than 1387 kcals and 53 g protein daily on average, then rec TFs if within pt's POC. See nutrition note with TF recs 4/4, if needed.    Malnutrition Status:    Severe malnutrition in the context of chronic illness    Recommendations already ordered by Registered Dietitian (RD):  Modified oral supplements    Future/Additional Recommendations:  1. Continue regular diet, as ordered. Monitor K+ trends and potential need for K+ restriction. Fluid restriction or low-sodium diet restrictions if needed although rec keep diet as liberalized as able to help encourage oral intake.  2. Monitor glycemic control. Hgb A1c of 6.4 on 11/13/17.  3. Continue multivitamin with minerals and thiamine as ordered.    4. Consider checking folic acid lab.      EVALUATION OF THE PROGRESS TOWARD GOALS   Diet: Regular diet order. Ordered to receive Gelatein Plus at 10:00. Room service appropriate with assist. Pt previously was ordered to receive Nepro oral supplements with rotating flavors.  Intake: Poor diet tolerance. Flowsheets indicate pt consumed 0% of a meal 4/25, 0-25% of meals with a fair appetite 4/26, 25-50% of meals with a poor appetite 4/27, 25-75% of meals with a good appetite 4/28, % of meals 4/29, 25% of meals with a poor appetite 4/30, and 25% of meals 5/1. Pt was sleepy at time of visit. Per discussion with pt's wife (Nu) and daughter, pt's appetite is now improving. Nu states she fed him more for supper yesterday (4/30) and he ate more. Pt disliked the pineapple Gelatein. He has some pineapple Gelatein in his room and a cherry Gelatein (has not tried) as well. Per Nu, pt his been less gassy since he stopped drinking Nepro oral supplements. Family wonders if pt does not tolerate milk  protein as well.      NEW FINDINGS   [facility] nurse 4/25: Coccyx wound due to Moisture Associated Skin Damage (MASD) and friction injury within scar tissue. Status: Follow up assessment,unchanged. No note of pressure injury in WOC nurse note.  Limited self-feeding ability. However, family in room frequently and assisting him.     MALNUTRITION  % Intake: </= 50% for >/= 5 days (severe)  % Weight Loss: Weight loss does not meet criteria. Although, difficult to assess with diuresis.  Subcutaneous Fat Loss: Facial region:  Mild, Arms: Mild  - As previously. Pt fatigued today (5/1), unable to reassess.  Muscle Loss: Temporal:  Mild and Thoracic region (clavicle, acromium bone, deltoid, trapezius, pectoral):  Moderate - As previously. Pt fatigued today (5/1), unable to reassess.  Fluid Accumulation/Edema: Does not meet criteria  Malnutrition Diagnosis: Severe malnutrition in the context of chronic illness    Previous Goals   Patient to consume % of nutritionally adequate meal trays TID, or the equivalent with supplements/snacks.  Evaluation: Not met.     Previous Nutrition Diagnosis  Inadequate oral intake related to altered mentation, lack of appetite intermittently, and NPO at times as evidenced by pt consuming 25-50% of meals per flowsheets.  Evaluation: Unresolved. Per Nu, now improving.    CURRENT NUTRITION DIAGNOSIS   Inadequate oral intake related to altered mentation, fatigue, and lack of appetite intermittently as evidenced by pt consuming 25% of meals frequently per flowsheets.    INTERVENTIONS  Implementation  1. Nutrition education for nutrition relationship to health/disease: Encouraged oral intake to pt and family.  2. Medical food supplement therapy: Discussed oral supplements with Nu and daughter. Discontinued scheduled Gelatein Plus. Placed prn oral supplement order to offer Pro-stat prn or possibly cherry Gelatein. Provided an oral supplement menu.      Goals  Patient to consume % of  nutritionally adequate meal trays TID, or the equivalent with supplements/snacks.    Monitoring/Evaluation  Progress toward goals will be monitored and evaluated per protocol.     Nutrition will continue to follow.     Shereen Toro MS, RD, LD, [facility]   6C Pgr: [phone]

## 2018-05-01 NOTE — PLAN OF CARE
Problem: Patient Care Overview  Goal: Plan of Care/Patient Progress Review  RN  1. Pt will be free from injury   Outcome: No Change  Pt with VSS on RA, AFib on tele. Confused, disoriented to time, place and situation. VPM overnight and bed alarm. Pt remained cooperative and calm overnight, although does call out occassionally. Turn q2h, mepilex on coccyx, Fraga patent. Dobutamine gtt infusing at 5mcg/kg/min via PICC. PCU collect for labs. Mechanical lift for OOB to chair. Pt is total cares for ADL's. Plan for care team conference later this week. Continue to monitor, follow poc, alert Cards1 MD's with changes and/or concerns.

## 2018-05-01 NOTE — PLAN OF CARE
Problem: Patient Care Overview  Goal: Plan of Care/Patient Progress Review  RN  1. Pt will be free from injury     D: Patient admitted with AMS and heart failure decompensation now on Dobutamine@5mcg/kg/min.  I: [facility] lift for transfer from bed to chair. Turn Q 2 hours while in bed, shift weight while up in chair. Requires total care with ADLs.  A: Confused, Afib 90s-100s, poor oral intake this shift. Body very stiff with attempts at repositioning and hygiene cares. BPs stable, on room air. Calls out occasionally but no attempts to remove tubes or get out of bed. VPM for safety when family not at bedside.  P: Care conference held earlier today which outlines challenges regarding plan of care/placement/expectations. Continue current plan of care per family wishes.

## 2018-05-01 NOTE — PLAN OF CARE
Problem: Patient Care Overview  Goal: Plan of Care/Patient Progress Review  RN  1. Pt will be free from injury   OT/CR 6C  Discharge Planner OT   Patient plan for discharge: Pt unable to participate in discharge planning 2/2 AMS.  Per medical chart, family is hopeful for a TCU or LTC placement though this has been difficult.  MD JOAQUÍN and family in discussion on goals of care and discharge planning.  Current status: Mod A x2 sit<>stand x2 reps throughout session. Pt tolerated standing for 90 seconds one bout and 120 second the second bout with min-mod A x2 and FWW. Mod A x1 needed to complete 10 reps of bicep curls with wooden dowel.   Barriers to return to prior living situation: AMS, weakness, medical prognosis  Recommendations for discharge: TCU  Rationale for recommendations: Pt level of alertness and willingness to participate in therapy fluctuates making the ability to engage pt in skilled therapy difficult; due to AMS unable to demonstrate any carryover between sessions.  Requires heavy A x 2 making return to home a difficult discharge plan. Per medical chart, medical prognosis is poor.        Entered by: Ilana Lomeli 05/01/2018 11:09 AM

## 2018-05-02 NOTE — PLAN OF CARE
Problem: Patient Care Overview  Goal: Plan of Care/Patient Progress Review  RN  1. Pt will be free from injury   Outcome: No Change  Pt with HF continues on dobutamine drip with VSS. Afib 80s-90s. Has VPM when family not in the room. Disoriented and confused but occasionally can respond appropriately. Stiffens up both arms and legs with attempts to move him. Ate 25-50% of his dinner tonight with feeding. Yells out occasionally.Mepilex on coccyx.

## 2018-05-02 NOTE — PLAN OF CARE
Problem: Patient Care Overview  Goal: Plan of Care/Patient Progress Review  RN  1. Pt will be free from injury   Outcome: No Change  D: Altered mental status/Encephalopathy  I/A: VSS with soft BP. Afib. Pt c/o pain but refused medication. Dobutamine running at 5 mcg/kg/min. Fraga intact and draining dark cindy urine. VPM for safety. Pt calls out routinely throughout shift. Repositioned q 2 hours.  P: Continue to monitor.

## 2018-05-02 NOTE — PLAN OF CARE
Problem: Patient Care Overview  Goal: Plan of Care/Patient Progress Review  RN  1. Pt will be free from injury   OT/CR 6C:  Discharge Planner OT   Patient plan for discharge: Pt unable to participate in discharge planning 2/2 AMS.  Per medical chart, family is hopeful for a TCU or LTC placement though this has been difficult.  MD JOAQUÍN and family in discussion on goals of care and discharge planning.  Current status: Pt tolerated x2 reps total sit to stand from bedside chair with mod-maxAx2. Pt tolerated standing for 1x 30 seconds and 1x 10 seconds. Poor command following and very limited ability demonstrate the ability to participate in goal directed activity. Max vc and encouragement from family and therapist to participate in session. Pt becoming increasingly resistant to activity and session discontinued.  Barriers to return to prior living situation: AMS, weakness, medical prognosis   Recommendations for discharge: Per plan established by the Occupational Therapist, the recommendation for discharge location is TCU vs LTC pending goals of care.   Rationale for recommendations: Pt level of alertness and willingness to participate in therapy fluctuates making the ability to engage pt in skilled therapy difficult; due to AMS unable to demonstrate any carryover between sessions.  Requires heavy A x 2 making return to home a difficult discharge plan. Per medical chart, medical prognosis is poor.       Entered by: Mary Vázquez 05/02/2018 1:56 PM

## 2018-05-02 NOTE — PROGRESS NOTES
[facility] Nurse Inpatient Wound Assessment     Follow up Assessment  Reason for consultation: Evaluate and treat coccyx wound     Assessment  Coccyx wound due to Moisture Associated Skin Damage (MASD) and friction injury within scar tissue  Status: Follow up assessment,stable    Treatment Plan    Coccyx wound: Cleanse the area with NS and pat dry.    Apply No sting film barrier to periwound skin.    Cover wound with Mepilex. Mepilex 4x4    Change dressing Q 3 days.    Turn and reposition Q 2hrs.    Ensure pt has Filippo-cushion while sitting up in the chair.    FYI- If pt has constant incontinent loose stools needing dressing changes Q shift please discontinue the Mepilex dressing and apply criticaid barrier paste BID and PRN.    Orders Reviewed  WO Nurse follow-up plan:every other week  Nursing to notify the Provider(s) and re-consult the [facility] Nurse if wound(s) deteriorates or new skin concern.    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 Patient's urine output and Cr have been improving. Disposition has been challenging.    Objective Data  Containment of urine/stool: Incontinence Program    Active Diet Order    Active Diet Order      Regular Diet Adult    Output:   I/O last 3 completed shifts:  In: 746 [P.O.:480; I.V.:266]  Out: 1300 [Urine:1300]    Risk Assessment:    Juan Pablo Juan Pablo Score  Av.9  Min: 10  Max: 19                            Labs:   No lab results found in last 7 days.    Invalid input(s): ABLUMIN, PREABLUMIN, MICROBIO      No lab results found in last 7 days.    Physical Exam        18      Wound Location:  Coccyx  Wound History:  Area of previously noted scar tissue, with prior wounds per patient. Patient is incontinent and has been resistive to cares intermittently.   Measurements (length x width x depth, in cm) 1 cm x 0.5 cm  x  0.02 cm no change  Wound Base: 100% pale pink dermis  Palpation of the wound bed: normal   Periwound skin: intact scar tissue  Color: pink  Temperature: normal   Drainage:, none  Description of drainage: none  Odor: none  Pain: unable to assess due to  confusion    Interventions  Current support surface: Standard  Atmos Air mattress    Current off-loading measures: Pillows under calves  Visual inspection of wound(s) completed  Wound Care: done per plan of care  Supplies: floor stock    Discussed plan of care with nursing staff    Jade SHARPEN, RN, CWOCN

## 2018-05-02 NOTE — PROGRESS NOTES
Cardiology Progress Note  Josr Landers MRN: [id number]  Age: 77 year old, : 1940  Date: 2018              Subjective     No acute events overnight. PICC line unclogged by PICC RN yesterday. Continues to intermittently yell at night. Patient was incoherent this morning, unable to obtain ROS.          Objective     BP (!) 86/50  Pulse 79  Temp 97.8  F (36.6  C) (Axillary)  Resp 18  Wt 65.2 kg (143 lb 11.8 oz)  SpO2 100%  BMI 21.23 kg/m2  Temp:  [97.8  F (36.6  C)] 97.8  F (36.6  C)  Heart Rate:  [88-98] 88  Resp:  [18] 18  BP: (86-98)/(50-82) 86/50  SpO2:  [100 %] 100 %  Wt Readings from Last 2 Encounters:   18 65.2 kg (143 lb 11.8 oz)   18 75.3 kg (165 lb 14.4 oz)       I/O last 3 completed shifts:  In: 742.39 [P.O.:480; I.V.:262.39]  Out: 1275 [Urine:1275]      Gen: No acute distress, laying in bed  HEENT: NC/AT  PULM/THORAX: Clear to auscultation bilaterally anteriorly, no rales/rhonchi/wheezes, on RA  CV: IRIR, normal S1 and S2, no murmurs or rubs.   ABD: Soft, NTND, bowel sounds present, no masses  EXT: WWP. No LE edema  NEURO: Sleeping and arouse to physical stimuli. Speech incoherent.            Data:     Reviewed.        Medications     Current Facility-Administered Medications   Medication     acetaminophen (TYLENOL) Suppository 650 mg     acetaminophen (TYLENOL) tablet 650 mg     alteplase (CATHFLO ACTIVASE) injection 2 mg     ammonium lactate (LAC-HYDRIN) 12 % lotion     bisacodyl (DULCOLAX) EC tablet 5 mg    Or     bisacodyl (DULCOLAX) EC tablet 10 mg    Or     bisacodyl (DULCOLAX) EC tablet 15 mg     bisacodyl (DULCOLAX) Suppository 10 mg     DOBUTamine 500 mg in dextrose 5% 250 mL (adult std conc) premix     heparin lock flush 10 UNIT/ML injection 2-5 mL     heparin lock flush 10 UNIT/ML injection 5-10 mL     heparin lock flush 10 UNIT/ML injection 5-10 mL     hydrocortisone (CORTAID) 1 % cream     HYDROmorphone (DILAUDID) injection 0.2 mg      lidocaine (LMX4) kit     lidocaine (LMX4) kit     lidocaine (LMX4) kit     lidocaine (LMX4) kit     lidocaine (XYLOCAINE) 5 % ointment     lidocaine 1 % 0.5-5 mL     lidocaine 1 % 0.5-5 mL     lidocaine 1 % 1 mL     lidocaine 2 % (URO-JET) jelly 10 mL     melatonin tablet 6 mg     multivitamin, therapeutic with minerals (THERA-VIT-M) tablet 1 tablet     naloxone (NARCAN) injection 0.1-0.4 mg     nitroGLYcerin (NITROSTAT) sublingual tablet 0.4 mg     ondansetron (ZOFRAN) injection 4 mg     Patient is already receiving anticoagulation with heparin, enoxaparin (LOVENOX), warfarin (COUMADIN)  or other anticoagulant medication     phenylephrine-cocoa butter (PREPARATION H) per suppository 1 suppository     polyethylene glycol (MIRALAX/GLYCOLAX) Packet 17 g     psyllium capsule 0.52 g     sennosides (SENOKOT) tablet 2 tablet     simethicone (MYLICON) suspension 40 mg     sodium chloride (PF) 0.9% PF flush 10 mL     sodium chloride (PF) 0.9% PF flush 10-20 mL     sodium chloride (PF) 0.9% PF flush 10-20 mL     sodium chloride (PF) 0.9% PF flush 3 mL     sodium chloride (PF) 0.9% PF flush 3 mL     sodium chloride (PF) 0.9% PF flush 5-50 mL     sodium chloride (PF) 0.9% PF flush 5-50 mL     thiamine (B-1) injection 100 mg             Assessment and Plan:     78 y/o male with PMHx significant for 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 Patient's urine output and Cr have been improving. Disposition has been challenging.     Today:  - No medical changes    #Cardiac amyloidosis  #HFrEF NYHA IV D (EF 15-20%)  RHC on 4/3. PCW 20. RA 17. PA 43/25 (33). RV 43/17. Son CI 1.4. Patient is volume overloaded and in cardiogenic shock. Suspect this was  the likely cause of patient's altered mental status, LOIS, and possibly the ventriculomegaly. PICC placed on 4/3 for dobutamine infusion. His mental status and renal function have improved with the dobutamine infusion. His overall prognosis remains very poor.  - Continue dobutamine at 5 mcg/kg/min  - Discontinued hydralazine due to soft BP's  - Diuresis: 4 mg IV Bumex given 4/24 to little effect. Had been holding due to rising Cr.  - Palliative consulted, appreciate recs re: GOC and d/c plans  - Disposition has been difficult due to financial reasons and family's reluctant to titrate off dobutamine    # CKD-V   # LOIS - improving  Patient was oliguric on presentation. Tunneled line by IR 3/29. Started HD 3/29. Urine output increased and Cr stable/improved with dobutamine. No further HD as patient had good UOP and a very poor cardiac prognosis, so the tunneled catheter was removed. Following removal of the catheter, Cr was stable in the 2.3-2.4 range, and patient had adequate UOP. However starting 4/18, Cr was steadily rising, and UOP was decreasing. Diuresis was stopped on 4/18. Cr peaked at 5.79. LOIS likely pre-renal since UOP increased with administration of 500 mL NS. Now UOP and Cr are improving  - Continue Fraga  - Monitor UOP. No need to monitor daily labs  - IR removed dialysis catheter 4/12.  - Nephrology consult, appreciate recs                           - No HD given poor prognosis, risk of bleeding, low BP's, patient's anxious reaction to discussion of HD, and likely impossibility of discharge on HD and dobutamine                           - Fraga catheter.                           - Bumex 4 mg IV given 4/24 with little effect                           - 500 mL NS given 4/25, increased UOP, Cr improving  - Will consider intermittent IV boluses if having poor PO     #Multifactorial Encephalopathy, improving  #Delirium  #Dementia (MoCA 16/30)  #Ventriculomegaly / possible normal pressure hydrocephalus  #h/o  right MCA CVA  Likely due to cardiogenic shock, although also had extensive workup for other causes. CT scan with enlarged venticles. No mass or acute CVA. Sepsis also possible contributor. Possibly uremia. Low suspicion for SBP. Patient's history had been concerning for NPH. LP done on 4/2. Attempted large volume (25-30 cc); however, was only able to obtain 0.5 cc.  - Was treated for UTI / PNA (s/p tx with abx). Thought it could be uremia (BUN improved), NPH (neurology recommends no further inpatient workup), baseline dementia / CVA, cardiogenic shock (see above)  - Psychiatry consulted: Latuda had been at 40 mg, was decreased to 20 mg due to family's concern about its affect on patient's daytime sleepiness, and melatonin 6 mg qhs prn for agitation, less risk of QTc prolongation. Patient's family requested for latuda to be discontinued on 4/26 due to concern for increased somnolence. Patient's mental status has improved in the past with better volume status. Patient's family requesting for all sedating medications (e.g. Benzos, Latuda) to be withheld  - Neurology signed off. Recommended follow-up in movement disorder clinic for workup of possible NPH, although this is less likely now given patient's poor cardiac prognosis.  - PT/OT  - Patient will need to be without a sitter for 24 hours for placement      #CAD s/p ATIF to LAD 2015  - Discontinued pta atorvastatin 80 mg qday and ASA 81 mg since overall mortality benefit low for patient due to overall poor prognosis  - Avoid BB with cardiac amyloid      #Afib/flutter s/p CTI ablation 2006  Remains in afib. Was on sotalol but this was discontinued around 08/2017 per outside cardiology notes. Discontinued warfarin on 4/3 due to labile INR's and started on apixaban instead. Now off apixaban since overall benefit low given patient's overall poor prognosis and concern for increased bleeding risk with high BUN.    #Concern for UTI, resolved  Started on rocephin on 4/21.  Urine cultures grew pan-sensitive Klebsiella pneumoniae. De-escalated to Keflex and then switched back to ceftiriazon when having poor PO intake. Completed 6 days of antibiotics.    #Aspiration PNA, resolved  Started on Unasyn on 3/27. Switched to Zosyn on 3/28 per ID recs. Restarted on Unasyn on 3/30 when sensitivities returned on UTI (Klebsiella pneumoniae). Completed course of Unasyn on 4/4. Repeat chest x-ray on 4/14 stable. Patient remains afebrile.      # Cachexia  Patient's volume overload somewhat obscures his severe malnutrition, but he has temporal wasting.    FEN: Cardiac diet  PPX: Apixaban  Code Status: DNR/DNI  Dispo: Uncertain. Placement is challenging due to financial difficulties with a facility that can accommodate dobutamine. Discussed with Ethics ([phone]) and Risk Management (Vandana, [phone]).      Patient was discussed with staff attending, Dr. Sanchez, who agrees with the above assessment and plan.      Mag Raphael MD, PhD  Internal Medicine Resident  Cardiology Service  Pager: [phone]

## 2018-05-02 NOTE — PROGRESS NOTES
"SPIRITUAL HEALTH SERVICES  SPIRITUAL ASSESSMENT Progress Note (Palliative Focus)  [facility] ([place]) 6C    REFERRAL SOURCE: Palliative care follow up.    Brief supportive visit with wife Nu of patient Josr \"Bill\" Anshul in hallway per her request. Nu shared her perception that Bill was strong and \"still fighting\" and requested continued prayer; Confucianism rebekah an important source of meaning for patient and family.     Plan: I will follow for spiritual support.    Laurel Cabello  Palliative   Pager [phone]  [facility] Inpatient Team Consult pager [phone] (M-F 8-4:30)  After-hours Answering Service [phone]   "

## 2018-05-02 NOTE — PLAN OF CARE
Problem: Patient Care Overview  Goal: Plan of Care/Patient Progress Review  RN  1. Pt will be free from injury   Outcome: No Change  D: Altered mental status, unspecified altered mental status type  (primary encounter diagnosis)  Acute kidney injury (H)  RBBB    I: Monitored vitals and assessed patient status.   Changed: VPM was discontinued today.  Running: Dobutamine at 5 mcg/kg/min    A: No labs were drawn today. His vital signs have been stable except his blood pressures are soft. His rhythm was Afib 80-90 with a BBB, 0-3 PVC couplets, up to 15 PVC's vs abberant Afib, occasional 3-4 beat run of polymorphic VT vs abberant Afib. He was up in a recliner twice today    I/O this shift:  In: 445 [P.O.:445]  Out: 350 [Urine:350]    Temp:  [97.8  F (36.6  C)-97.9  F (36.6  C)] 97.9  F (36.6  C)  Heart Rate:  [88-93] 93  Resp:  [16-18] 16  BP: (79-98)/(50-82) 86/69  SpO2:  [97 %-100 %] 97 %      P: Continue to monitor patient status and report changes to treatment team.

## 2018-05-03 NOTE — PROGRESS NOTES
Social Work Services Progress Note    Hospital Day: 37  Date of Initial Social Work Evaluation:  3/29/18  Collaborated with: Spouse, daughter, son, palliative NP, Cards 1 team, SNF admissions,  Hospice liaison.    Data:  Pt is a 77 year old male being followed by SW for placement as the medical team feels the pt has been medically ready for transition pending safe discharge plan.  Family has been clear to staff they are declining hospice placement as they disagree with this philosophy of care.  Family also declines discontinuation of dobutamine or wean down trial as they are worried the pt will die quickly off dobutamine.  Per family, it is against their rebekah to discontinue care and services that are assisting with prolonging pt's life.    Intervention:  SW resent referral to [facility] as pt has medically improved in function since second review and denial for LTC.  Medical team also will be discontinuing the VPM/sitter order as the pt has not demonstrated impulsivity and is primarily bedbound without assist of 2-3 and or lift when needed.  Will follow up with admissions for re-review request.     Referrals in Progress:   [facility] has offered to review referral and work with  Hospice on management of dobutamine.  Family does not want this referral at this time as they are not in agreement with a hospice philosophy of care.    [facility] LTC - asked for re-review given pt's improvement in function. - Addendum: accepted for LTC to a memory care unit.  At this time no memory beds are available.  PH: [phone]  F: [phone]    Options if family wants to continue dobutamine drip:   - Hospice placement with [facility]: family would need to be comfortable with pursuing hospice philosophy of care and end of life medical management.  Management of [facility] is willing to donate 2 weeks of financial assistance to be used to cover either 2 weeks of full room and board or to be spread  over more days if the family is willing to pay partial room and board fees to make the funding last longer.   - Family declines this options as they do not wish to pursue hospice care.  Family does not believe the hospice medical management model is in the pt's best interest.  Family also declines as two weeks of financial support is not enough for them financially.  Family could not afford full price of room and board after two weeks.     - Palliative Homecare or Hospice at home with [facility] (and can add private pay PCA/HHA) for support - Family declines this option again due to not wanting hospice philosophy of care.  Family interested in the palliative option, however feels they cannot afford to private pay for enough help to care for pt at home.  Family has a LTC insurance policy that would offer support in 6 mo.  Through insurance pt gets up to 35 hours of PCA/HHA and then would need to resubmit for review if authorized for additional coverage through insurance.  Family does not feel they could afford continued services if insurance does not renew.    Options off dobutamine drip: Family globally declines this as they want to continue dobutamine.   - LTC placement in a memory care unit - would need new referrals once pt off VPM and off dobutamine.   - Hospice placement at [facility] with benevolent care funding   - [facility] - SW presented this option as family continues to have financial hardship.        Assessment: Did not meet with pt or family today for this encounter note.  Plan is to discontinue VPM as pt does not demonstrate impulsivity with getting out of bed.  Pt continues to have confusion which impedes placement in a TCU setting.  Family continues to choose remaining in the hospital as they feel this is the best level of care for the pt.    Plan:    Anticipated Disposition: Asked for re-review and discussion with Barber Harp if pt can be re-evaluated for LTC.  If pt is declined  again, will need to present to family hospice options.  If family declines, will need to pursue futility policy and family will need to be notified of this by the medical team.    Barriers to d/c plan:  Cost of TCU/LTC, sitter/VPM, dobutamine needs, financial hardship, lack of social support to take the pt home, family is wanting restorative cares that are only sustainable in a hospital setting.    Follow Up:  SW to follow for continued discussions with Barber Damon.    JERRI Valdovinos, APSW  6C Unit   Phone: [phone]  Pager: [phone]  Unit: [phone]      ___________________________________________________________________________________________________________________________________________________    Referrals Discontinued:  [facility] - cannot take pt's with dobutamine  [facility] - cannot take pts with dobutamine.     Community Case Management/Community Services in place:   Encouraged family to follow up with elder law  for better information on financial counseling and Medicaid support.

## 2018-05-03 NOTE — CONSULTS
"Consult Date:  05/03/2018      IDENTIFICATION:   Josr Landers is a 77-year-old -American, retired  who is hospitalized with significant heart failure.  I am asked to evaluate his capacity by Dr. Raphael.      HISTORY:  Prior to interviewing this patient, I had an opportunity to review my previous psychiatric consultation from 04/17/2018 as well as Dr. Cedeno's initial psychiatric consultation from 04/15/2018.  I note that in both of those consults, the patient had a very altered mental status.      Today, I am specifically asked to do a capacity evaluation.  On mental status exam this patient thought that we were in a school building.  He did not know the month.  He thought that he was here because he was having \"memory lapses\" and he had no idea what the doctors were recommending.  I did discuss the case with the treatment team and social service who report that occasionally this patient has intervals of lucidity.  During those times, he tends to want to leave the hospital; however, he has never been able to maintain a prolonged period of meaningful lucidity and his mental status continues to wax and wane.      In order to have capacity, a patient must be able to rationally manipulate data.  Typically, patients need to know why they are here, what the doctors are recommending, what the alternatives might be, what would happen without therapy.  They need to be able to make decisions and maintain those decisions.  Unfortunately, during my interview this patient was quite disoriented.  He really did not know why he was in the hospital and he really had no idea what the doctors were recommending.  Therefore, he did not have capacity during my evaluation.  It seems unlikely that even during his lucid intervals, he is able to make his decision known and then stick with a decision.  It appears to me that this patient does not have capacity to make medical decisions and will need to use family " as alternative decision makers.      MENTAL STATUS EXAMINATION:  When I first went to interview the patient, he was sleeping soundly.  I returned about 2 hours later and he was sitting up in a Janell chair.  He was smiling appropriately and quite pleasant.  His mood seemed to be okay.  Affect was somewhat restricted.  His speech was occasionally incoherent, but generally coherent and goal oriented.  Associations were not  always tight nor were his thought processes always logical and linear.  As in the past, he would sometimes say a few words that were quite comprehensible but then mumble incoherently.  Content of thought is without overt psychosis or suicidal ideation.  Recent and remote memory are both impaired, as is fund of knowledge and concentration.  Use of language was generally within normal limits.  He was alert but only oriented to person.  Insight and judgment are quite guarded.  Muscle strength and tone appear somewhat decreased.  Recent vital signs include a temperature 97.5, heart rate of 89, respiration rate of 18 with 100% oxygen saturation and a blood pressure of 92/67.      ASSESSMENT:  Delirium.  The patient does not have capacity to make medical decisions.      RECOMMENDATION:  Continue to use family members as alternative decision makers.         ERIKA RUFF MD             D: 2018   T: 2018   MT: DARWIN      Name:     ERIKA ROSE   MRN:      [id number]        Account:       [id number]   :      1940           Consult Date:  2018      Document: [id number]

## 2018-05-03 NOTE — PLAN OF CARE
Problem: Patient Care Overview  Goal: Plan of Care/Patient Progress Review  RN  1. Pt will be free from injury   Outcome: No Change  D: Pt who presents this admission with altered mental status.   I/A: Pt alert. VSS. Pt A. Fib with HRs in the 80s. On RA. Pt denies any pain; no nonverbal signs of pain, discomfort or breathing difficulties. Dobutamine gtt continued at 5 mcg/kg. WOC nurse visited pt today, coccyx wound care plan in place. Repositioning performed. Pt appetite good, eating and drinking. Fraga in place, pt continue to make urine.   P: Continue to monitor and assess pt with every encounter. Notify Cards 1 with any changes or questions.

## 2018-05-03 NOTE — PROGRESS NOTES
"                              Cardiology Progress Note  Josr Landers MRN: [id number]  Age: 77 year old, : 1940  Date: 5/3/2018              Subjective     No acute events overnight. VPM turned off yesterday. Denied chest pain, SOB, abdominal pain, N/V, or any pain. Patient notes that he wants to leave the hospital. 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 Talks about how there needs to be \"negotiations\".          Objective     BP 98/67 (BP Location: Left arm)  Pulse 79  Temp 97.8  F (36.6  C) (Axillary)  Resp 18  Wt 65.2 kg (143 lb 11.8 oz)  SpO2 98%  BMI 21.23 kg/m2  Temp:  [97.4  F (36.3  C)-97.9  F (36.6  C)] 97.8  F (36.6  C)  Heart Rate:  [89-96] 96  Resp:  [16-18] 18  BP: ()/(54-75) 98/67  SpO2:  [97 %-100 %] 98 %  Wt Readings from Last 2 Encounters:   18 65.2 kg (143 lb 11.8 oz)   18 75.3 kg (165 lb 14.4 oz)       I/O last 3 completed shifts:  In: 730 [P.O.:445; I.V.:285]  Out: 950 [Urine:950]      Gen: No acute distress, laying in bed  HEENT: NC/AT  PULM/THORAX: Clear to auscultation bilaterally anteriorly, no rales/rhonchi/wheezes, on RA  CV: IRIR, normal S1 and S2, no murmurs or rubs.   ABD: Soft, NTND, bowel sounds present, no masses  EXT: WWP. No LE edema  NEURO: Alert and coherent speech            Data:     Reviewed.        Medications     Current Facility-Administered Medications   Medication     acetaminophen (TYLENOL) Suppository 650 mg     acetaminophen (TYLENOL) tablet 650 mg     alteplase (CATHFLO ACTIVASE) injection 2 mg     ammonium lactate (LAC-HYDRIN) 12 % lotion     bisacodyl (DULCOLAX) EC tablet 5 mg    Or     bisacodyl (DULCOLAX) EC tablet 10 mg    Or     bisacodyl (DULCOLAX) EC tablet 15 mg     bisacodyl (DULCOLAX) Suppository 10 mg     DOBUTamine 500 mg in dextrose 5% 250 mL (adult std conc) premix     heparin lock flush 10 UNIT/ML injection 2-5 mL     heparin lock flush 10 UNIT/ML injection 5-10 mL     heparin lock flush 10 UNIT/ML injection 5-10 mL     " hydrocortisone (CORTAID) 1 % cream     HYDROmorphone (DILAUDID) injection 0.2 mg     lidocaine (LMX4) kit     lidocaine (LMX4) kit     lidocaine (LMX4) kit     lidocaine (LMX4) kit     lidocaine (XYLOCAINE) 5 % ointment     lidocaine 1 % 0.5-5 mL     lidocaine 1 % 0.5-5 mL     lidocaine 1 % 1 mL     lidocaine 2 % (URO-JET) jelly 10 mL     melatonin tablet 6 mg     multivitamin, therapeutic with minerals (THERA-VIT-M) tablet 1 tablet     naloxone (NARCAN) injection 0.1-0.4 mg     nitroGLYcerin (NITROSTAT) sublingual tablet 0.4 mg     ondansetron (ZOFRAN) injection 4 mg     Patient is already receiving anticoagulation with heparin, enoxaparin (LOVENOX), warfarin (COUMADIN)  or other anticoagulant medication     phenylephrine-cocoa butter (PREPARATION H) per suppository 1 suppository     polyethylene glycol (MIRALAX/GLYCOLAX) Packet 17 g     psyllium capsule 0.52 g     sennosides (SENOKOT) tablet 2 tablet     simethicone (MYLICON) suspension 40 mg     sodium chloride (PF) 0.9% PF flush 10 mL     sodium chloride (PF) 0.9% PF flush 10-20 mL     sodium chloride (PF) 0.9% PF flush 10-20 mL     sodium chloride (PF) 0.9% PF flush 3 mL     sodium chloride (PF) 0.9% PF flush 3 mL     sodium chloride (PF) 0.9% PF flush 5-50 mL     sodium chloride (PF) 0.9% PF flush 5-50 mL     thiamine (B-1) injection 100 mg             Assessment and Plan:     76 y/o male with PMHx significant for 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 Patient's urine output and Cr have been improving. Disposition has been challenging.     Today:  - No medical changes  - Psychiatry consulted - noted that patient does not have decision making capacity    #Cardiac  amyloidosis  #HFrEF NYHA IV D (EF 15-20%)  RHC on 4/3. PCW 20. RA 17. PA 43/25 (33). RV 43/17. Son CI 1.4. Patient is volume overloaded and in cardiogenic shock. Suspect this was the likely cause of patient's altered mental status, LOIS, and possibly the ventriculomegaly. PICC placed on 4/3 for dobutamine infusion. His mental status and renal function have improved with the dobutamine infusion. His overall prognosis remains very poor.  - Continue dobutamine at 5 mcg/kg/min  - Discontinued hydralazine due to soft BP's  - Diuresis: 4 mg IV Bumex given 4/24 to little effect. Had been holding due to rising Cr.  - Palliative consulted, appreciate recs re: GOC and d/c plans  - Disposition has been difficult due to financial reasons and family's reluctant to titrate off dobutamine    # CKD-V   # LOIS - improving  Patient was oliguric on presentation. Tunneled line by IR 3/29. Started HD 3/29. Urine output increased and Cr stable/improved with dobutamine. No further HD as patient had good UOP and a very poor cardiac prognosis, so the tunneled catheter was removed. Following removal of the catheter, Cr was stable in the 2.3-2.4 range, and patient had adequate UOP. However starting 4/18, Cr was steadily rising, and UOP was decreasing. Diuresis was stopped on 4/18. Cr peaked at 5.79. LOIS likely pre-renal since UOP increased with administration of 500 mL NS. Now UOP and Cr are improving  - Continue Fraga  - Monitor UOP. No need to monitor daily labs  - IR removed dialysis catheter 4/12.  - Nephrology consult, appreciate recs                           - No HD given poor prognosis, risk of bleeding, low BP's, patient's anxious reaction to discussion of HD, and likely impossibility of discharge on HD and dobutamine                           - Fraga catheter.                           - Bumex 4 mg IV given 4/24 with little effect                           - 500 mL NS given 4/25, increased UOP, Cr improving  - Will consider  intermittent IV boluses if having poor PO     #Multifactorial Encephalopathy, improving  #Delirium  #Dementia (MoCA 16/30)  #Ventriculomegaly / possible normal pressure hydrocephalus  #h/o right MCA CVA  Likely due to cardiogenic shock, although also had extensive workup for other causes. CT scan with enlarged venticles. No mass or acute CVA. Sepsis also possible contributor. Possibly uremia. Low suspicion for SBP. Patient's history had been concerning for NPH. LP done on 4/2. Attempted large volume (25-30 cc); however, was only able to obtain 0.5 cc.  - Was treated for UTI / PNA (s/p tx with abx). Thought it could be uremia (BUN improved), NPH (neurology recommends no further inpatient workup), baseline dementia / CVA, cardiogenic shock (see above)  - Psychiatry consulted: Latuda had been at 40 mg, was decreased to 20 mg due to family's concern about its affect on patient's daytime sleepiness, and melatonin 6 mg qhs prn for agitation, less risk of QTc prolongation. Patient's family requested for latuda to be discontinued on 4/26 due to concern for increased somnolence. Patient's mental status has improved in the past with better volume status. Patient's family requesting for all sedating medications (e.g. Benzos, Latuda) to be withheld  - Psych noted that patient does not have decision-making capability  - Neurology signed off. Recommended follow-up in movement disorder clinic for workup of possible NPH, although this is less likely now given patient's poor cardiac prognosis.  - PT/OT  - Has not required sitter/VPM since 5/2     #CAD s/p ATIF to LAD 2015  - Discontinued pta atorvastatin 80 mg qday and ASA 81 mg since overall mortality benefit low for patient due to overall poor prognosis  - Avoid BB with cardiac amyloid      #Afib/flutter s/p CTI ablation 2006  Remains in afib. Was on sotalol but this was discontinued around 08/2017 per outside cardiology notes. Discontinued warfarin on 4/3 due to labile INR's and  started on apixaban instead. Now off apixaban since overall benefit low given patient's overall poor prognosis and concern for increased bleeding risk with high BUN.    #Concern for UTI, resolved  Started on rocephin on 4/21. Urine cultures grew pan-sensitive Klebsiella pneumoniae. De-escalated to Keflex and then switched back to ceftiriazon when having poor PO intake. Completed 6 days of antibiotics.    #Aspiration PNA, resolved  Started on Unasyn on 3/27. Switched to Zosyn on 3/28 per ID recs. Restarted on Unasyn on 3/30 when sensitivities returned on UTI (Klebsiella pneumoniae). Completed course of Unasyn on 4/4. Repeat chest x-ray on 4/14 stable. Patient remains afebrile.      # Cachexia  Patient's volume overload somewhat obscures his severe malnutrition, but he has temporal wasting.    FEN: Regular diet  PPX: Apixaban  Code Status: DNR/DNI  Dispo: Uncertain. Placement is challenging due to financial difficulties with a facility that can accommodate dobutamine and family hesitance to titrate off dobutamine or transition to hospice. Discussed with Ethics ([phone]) and Risk Management (Vandana, [phone]).      Patient was discussed with staff attending, Dr. Sanchez, who agrees with the above assessment and plan.      Mag Raphael MD, PhD  Internal Medicine Resident  Cardiology Service  Pager: [phone]

## 2018-05-03 NOTE — PLAN OF CARE
Problem: Patient Care Overview  Goal: Plan of Care/Patient Progress Review  RN  1. Pt will be free from injury   Outcome: No Change  D: Altered mental status/Encephalopathy  I/A: VSS with soft BP. Afib. Denies pain. Dobutamine running at 5 mcg/kg/min. Fraga intact and draining dark cindy urine. Pt calls out throughout shift. Sometimes just yelling and sometimes random talking. Pt became very resistive and agitated when writer and CNA were changing and repositioning patient. Pt was yelling out that staff were hurting him when staff was cleaning with wet wipes but when writer stopped and asked if it hurt he said no. No redness or irritation noted in estrellita-rectal area. Repositioned q 2 hours. Bed alarm on for safety.  P: Continue to monitor.

## 2018-05-03 NOTE — PLAN OF CARE
Problem: Patient Care Overview  Goal: Plan of Care/Patient Progress Review  RN  1. Pt will be free from injury   Outcome: No Change  D: Altered mental status, unspecified altered mental status type  (primary encounter diagnosis)  Acute kidney injury (H)  RBBB. He is DNR/DNI    I: Monitored vitals and assessed patient status.   Changed: nothing  Running: Dobutamine at 5 mcg/kg/min  PRN: nothing    A: Patient's vital signs have been stable except at 10:02 he had 8 beats of HR 40's. Cards 1 was notified and showed them the print out from the monitor technician. His rhythm was Afib 70-90 with up to 10 PVC's/min vs aberrant Afib and occasional 3-5 beats of ploy VT vs aberant Afib    I/O this shift:  In: 600 [P.O.:600]  Out: 200 [Urine:200] had another 250 cc out of martinez at 15:00    Temp:  [97.4  F (36.3  C)-97.8  F (36.6  C)] 97.5  F (36.4  C)  Heart Rate:  [86-96] 89  Resp:  [18] 18  BP: ()/(67-75) 92/67  SpO2:  [98 %-100 %] 100 %      P: Continue to monitor patient status and report changes to treatment team.

## 2018-05-04 NOTE — PLAN OF CARE
"Problem: Cardiac: Heart Failure (Adult)  Goal: Signs and Symptoms of Listed Potential Problems Will be Absent, Minimized or Managed (Cardiac: Heart Failure)  Signs and symptoms of listed potential problems will be absent, minimized or managed by discharge/transition of care (reference Cardiac: Heart Failure (Adult) CPG).   No significant change in status today. Continues to be only oriented to self. Frequently hollering/calling out this morning but when asked what he needs he doesn't know what he is asking for or mumbles. Continues on IV dobutamine with soft BPs. Up in recliner chair most of day. Worked with therapy and able to stand with walker and assist of 2 to allow for estrellita-care. Incont of stool. Fraga in place. Requires assistance to be fed; family ordered meals for rest of today. Pt reports his \"butt hurts\" so repositioned frequently and new cushion placed in chair per WOC RN recs. Cards 1 team reports they are supposed to meet with soc work and family at 2pm today to further discuss status/cares.       "

## 2018-05-04 NOTE — PROGRESS NOTES
Social Work Services Progress Note    Hospital Day: 38  Date of Initial Social Work Evaluation:  3/29/18  Collaborated with: Spouse, daughter, son, palliative NP, Cards 1 team.    Data:  Pt is a 77 year old male being followed by SW for placement as the medical team feels the pt has been medically ready for transition pending safe discharge plan.  Safe plan has been attained with [facility] as the pt is now off VPM and medically stabilized.  The barrier to discharge is [facility] does not have an open bed in their memory care unit to offer at this time.  Pt is on a waitlist for an open bed.    Intervention:  Care conference held today with spouse, daughter and son, cards 1 team, palliative NP, RNCC.  Discussed with family that [facility] has accepted the pt upon third review and placement is dependent on bed availability in their memory care unit.  SW also explained that with the safe discharge plan, insurance can choose to discontinue coverage of the hospitalization once the bed is available at [facility].  Family will be given the [facility] appeal letter the day a bed is available at LT if the family chooses to appeal the discharge recommendation.  Discussed with family that on the day a bed is available the medical team will determine if pt is safe to transport.     Family reports they are working with a [employer] for [employer] and are aware that LTC expenses can be used toward a spenddown to be eligible for Medicaid.     Referrals in Progress:   [facility] - accepted pending bed availability in memory care.  Admissions is unclear when this bed will be available.  PH: [phone]  F: [phone]    Options if family wants to continue dobutamine drip:   - Hospice placement with [facility]: family would need to be comfortable with pursuing hospice philosophy of care and end of life medical management.  Management of [facility] is willing to donate 2 weeks of  financial assistance to be used to cover either 2 weeks of full room and board or to be spread over more days if the family is willing to pay partial room and board fees to make the funding last longer.   - Family declines this options as they do not wish to pursue hospice care.  Family does not believe the hospice medical management model is in the pt's best interest.  Family also declines as two weeks of financial support is not enough for them financially.  Family could not afford full price of room and board after two weeks.     - Palliative Homecare or Hospice at home with  Hospice (and can add private pay PCA/HHA) for support - Family declines this option again due to not wanting hospice philosophy of care.  Family interested in the palliative option, however feels they cannot afford to private pay for enough help to care for pt at home.  Family has a LT insurance policy that would offer support in 6 mo.  Through insurance pt gets up to 35 hours of PCA/HHA and then would need to resubmit for review if authorized for additional coverage through insurance.  Family does not feel they could afford continued services if insurance does not renew.  Home at this time does not appear to be a safe discharge plan given lack of family supports.    Options off dobutamine drip: Family globally declines this as they want to continue dobutamine.   - LTC placement in a memory care unit - would need new referrals.   - Hospice with [facility] - with offer for financial support   - [facility] - SW presented this option as family continues to have financial hardship.        Assessment: Care conference held with family today and provider team.  Family adjusting appropriately and were receptive to discussions.  Family appears overwhelmed, tired and grieving appropriately.    Plan:    Anticipated Disposition: [facility] is accepting pt for placement in their Memory Care Unit.  Currently there are no beds  available.  Pt is on a waitlist for bed openings.    Barriers to d/c plan:  Cost of TCU/LTC, dobutamine needs, financial hardship, lack of social support to take the pt home.    Follow Up:  SW to follow for discharge planning to [facility].    Alia Alcocer [facility], APSW  6C Unit   Phone: [phone]  Pager: [phone]  Unit: [phone]      ___________________________________________________________________________________________________________________________________________________    Referrals Discontinued:  [facility] - cannot take pt's with dobutamine - family declines hospice care and declines discontinuation of dobutamine.  [facility] - cannot take pts with dobutamine.     Community Case Management/Community Services in place:   Encouraged family to follow up with elder law  for better information on financial counseling and Medicaid support.  Per previous discussions pt was at one time financially able to access Medicaid with a $1400 spenddown.  Family has a LTC insurance plan that will cover up to $9000 per mo after pt has had 6 mo or about 180 days in a LTC setting already.  Per spouse, pt had met 14 of the 180 day requirement when he was in TCU.

## 2018-05-04 NOTE — PLAN OF CARE
Pt repositioned and incontinent of stool. While providing cares, pt upset saying he wants to be done with this. He wants to eat and go home. Pt seemed upset about incontinence and being cleaned up. Patient calling out during his sleep about a football game. Pt A & O to self only tonight.

## 2018-05-04 NOTE — PLAN OF CARE
Problem: Patient Care Overview  Goal: Plan of Care/Patient Progress Review  RN  1. Pt will be free from injury   D: Pt remained cooperative and calm overnight, although does call out occassionally.   I: Monitored/assessed pt. Assisted with cares.  A: Pt with VSS on RA, AFib on with BBB. Confused, disoriented to time, place and situation. bed alarm on for safety.  Turn q2h, mepilex on coccyx, Fraga patent. Dobutamine gtt infusing at 5mcg/kg/min via PICC. PCU collect for labs. Mechanical lift for OOB to chair. Inc of stool x2.  P: Continue to monitor/assess pt, contact provider with concerns.

## 2018-05-04 NOTE — PLAN OF CARE
Problem: Patient Care Overview  Goal: Plan of Care/Patient Progress Review  RN  1. Pt will be free from injury   OT/CR 6C:  Discharge Planner OT   Patient plan for discharge: Pt unable to participate in discharge planning 2/2 AMS.  Per medical chart, family is hopeful for a TCU or LTC placement though this has been difficult.  MD JOAQUÍN and family in discussion on goals of care and discharge planning.  Current status: Pt initially resistant to participation; however, more agreeable with daughter present.  Pt completed x3 reps total sit to stand from bedside chair to FWW with min-modAx2. Pt tolerated standing 45-60 seconds each bout, attempting to weight shift between BLE. Some improvement in ability to complete exercises while seated. Combed hair with set up and mod vc.  Barriers to return to prior living situation: AMS, weakness, medical prognosis   Recommendations for discharge: Per plan established by the Occupational Therapist, the recommendation for discharge location is TCU vs LTC pending goals of care.   Rationale for recommendations:  Pt level of alertness and willingness to participate in therapy fluctuates making the ability to engage pt in skilled therapy difficult; due to AMS unable to demonstrate any carryover between sessions.  Requires heavy A x 2 making return to home a difficult discharge plan. Per medical chart, medical prognosis is poor.       Entered by: Mary Vázquez 05/04/2018 1:25 PM

## 2018-05-04 NOTE — PROVIDER NOTIFICATION
D:some redness in left groin and lateral to skin fold, patient itching area  I:prn moisturizer and nystatin ordered  A:pt states relief, Primary updated  P:Per Primary

## 2018-05-04 NOTE — PROGRESS NOTES
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 Patient's urine output and Cr have been improving. Disposition has been challenging.  Placement is challenging due to financial difficulties with a facility that can accommodate dobutamine and family hesitance to titrate off dobutamine or transition to hospice. Discussed with Ethics ([phone]) and Risk Management (Vandana, [phone]). Patient does not have capacity to make his own medical decisions    Regarding medical issues, there have been no changes in the last several days.  We are not checking blood tests.  Current plan is that patient has been accepted at [facility], though bed is not available yet.      Regarding active problems.  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  Not on any CHF meds due to low blood pressures and renal disease  Cardiac Amyloidosis - end stage, evaluated by advanced heart failure team, no advanced therapies available to him.  LOIS on CKD IV to V - briefly required dialysis this admission.  Cr has been fluctuating, as well as electrolytes. Currently euvolemic, but occasionally hypovolemic based on PO intake.  Delirium, cognitive impairment - has required video monitor or sitter in the past, now off last few days.  Psychiatry was assisting with delirium treatment.    Floor Care  Fluids: none  Food: regular  Electrolyte repletion: none  Analgesia: none  Sedation: none  Bowel Regumen  DVT ppx: heparin sq  Stress Ulcer ppx: none  Glycemic Control: none  Catheters/tubes: none  Lines:  PICC  Therapies: PT/OT  Consults: renal  Code Status: DNR/DNI  Discharge plan: complicated.  See above  Family: updated today    Mustapha Dent MD PGY4  Cardiology Fellow  [phone]    Patient was seen by and the above plan discussed with Dr. Sanchez.         Subjective:     Patient denies current chest pain, dyspnea on exertion, orthopnea, PND, lightheadedness, or palpitations.           Medications:       heparin lock flush  5-10 mL Intracatheter Q24H     hydrocortisone   Topical BID     melatonin tablet 6 mg  6 mg Oral At Bedtime     multivitamin  peds with iron  1 tablet Oral Daily     polyethylene glycol  17 g Oral Daily     psyllium  1 packet Oral Daily     sennosides  2 tablet Oral Daily     sodium chloride (PF)  10 mL Intracatheter Q7 Days     thiamine  100 mg Intravenous Daily       DOBUTamine 5 mcg/kg/min (05/04/18 0149)     - MEDICATION INSTRUCTIONS -                 Objective:          Physical Exam:   Temp:  [97.4  F (36.3  C)-97.8  F (36.6  C)] 97.4  F (36.3  C)  Heart Rate:  [] 82  Resp:  [16-18] 18  BP: ()/(62-74) 86/64  SpO2:  [99 %-100 %] 100 %  I/O last 3 completed shifts:  In: 3042.39 [P.O.:2780; I.V.:262.39]  Out: 775 [Urine:775]    Vitals:    04/30/18 0349 05/01/18 0544 05/02/18 0324 05/02/18 0429   Weight: 66.1 kg (145 lb 11.6 oz) 65.2 kg (143 lb 11.8 oz) 65.4 kg (144 lb 2.9 oz) 65.2 kg (143 lb 11.8 oz)    05/04/18 0400   Weight: 63.9 kg (140 lb 14 oz)     GEN:  Alert, interactive, appears comfortable, NAD.  CV:  irregular, no murmur or JVD.   LUNGS:  On room air, normal work of breathing, Clear to auscultation bilaterally, no wheezes or crackles.   ABD:  Active bowel sounds, soft, non-tender/non-distended.  No rebound/guarding/rigidity.  EXT:  No edema or cyanosis.  Hands/feet warm to touch with good signs of peripheral perfusion.   SKIN:  Dry to touch, no exanthems noted in the visualized areas.  NEURO:  Alert and oriented, no new focal deficits appreciated.  PSCYH: Normal mood  and affect         Data:     No recent tests.  Prior ones reviewed.

## 2018-05-05 NOTE — PROGRESS NOTES
Cardiology Progress Note  Josr Landers MRN: [id number]  Age: 77 year old, : 1940  Date: 2018              Subjective     No acute events overnight. Patient denies any pain. No chest pain, SOB, or abdominal pain          Objective     BP 99/73 (BP Location: Right arm)  Pulse 79  Temp 97.4  F (36.3  C) (Oral)  Resp 16  Wt 63.9 kg (140 lb 14 oz)  SpO2 100%  BMI 20.8 kg/m2  Temp:  [96.9  F (36.1  C)-97.5  F (36.4  C)] 97.4  F (36.3  C)  Heart Rate:  [80-91] 86  Resp:  [16-18] 16  BP: ()/(63-76) 99/73  SpO2:  [100 %] 100 %  Wt Readings from Last 2 Encounters:   18 63.9 kg (140 lb 14 oz)   18 75.3 kg (165 lb 14.4 oz)       I/O last 3 completed shifts:  In: 884.81 [P.O.:600; I.V.:284.81]  Out: 1000 [Urine:1000]      Gen: No acute distress, laying in bed  HEENT: NC/AT  PULM/THORAX: Clear to auscultation bilaterally anteriorly, no rales/rhonchi/wheezes, on RA  CV: IRIR, normal S1 and S2, no murmurs or rubs.   ABD: Soft, NTND, bowel sounds present, no masses  EXT: WWP. No LE edema  NEURO: sleeping, arouses to voice with appropriate responses            Data:     Reviewed. No new labs.        Medications     Current Facility-Administered Medications   Medication     alteplase (CATHFLO ACTIVASE) injection 2 mg     ammonium lactate (LAC-HYDRIN) 12 % lotion     bisacodyl (DULCOLAX) EC tablet 5 mg    Or     bisacodyl (DULCOLAX) EC tablet 10 mg    Or     bisacodyl (DULCOLAX) EC tablet 15 mg     bisacodyl (DULCOLAX) Suppository 10 mg     DOBUTamine 500 mg in dextrose 5% 250 mL (adult std conc) premix     heparin lock flush 10 UNIT/ML injection 2-5 mL     heparin sodium PF injection 5,000 Units     hydrocortisone (CORTAID) 1 % cream     lidocaine (LMX4) kit     lidocaine (XYLOCAINE) 5 % ointment     lidocaine 2 % (URO-JET) jelly 10 mL     melatonin tablet 6 mg     multivitamin  peds with iron (FLINTSTONES COMPLETE) chewable tablet 60 mg     naloxone (NARCAN)  injection 0.1-0.4 mg     nystatin (MYCOSTATIN) cream     Patient is already receiving anticoagulation with heparin, enoxaparin (LOVENOX), warfarin (COUMADIN)  or other anticoagulant medication     phenylephrine-cocoa butter (PREPARATION H) per suppository 1 suppository     polyethylene glycol (MIRALAX/GLYCOLAX) Packet 17 g     psyllium (METAMUCIL/KONSYL) Packet 1 packet     sennosides (SENOKOT) tablet 2 tablet     simethicone (MYLICON) suspension 40 mg     thiamine (B-1) injection 100 mg             Assessment and Plan:     78 y/o male with PMHx significant for 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 Patient's urine output and Cr have been improving. Patient is medically stable for discharge.     Today:  - No medical changes  - Patient is accepted at [facility] [place], awaiting bed availability. Once bed is available, family will be given the [facility] appeal letter is family chooses to appeal the discharge recommendation    #Cardiac amyloidosis  #HFrEF NYHA IV D (EF 15-20%)  RHC on 4/3. PCW 20. RA 17. PA 43/25 (33). RV 43/17. Son CI 1.4. Patient is volume overloaded and in cardiogenic shock. Suspect this was the likely cause of patient's altered mental status, LOIS, and possibly the ventriculomegaly. PICC placed on 4/3 for dobutamine infusion. His mental status and renal function have improved/stable with the dobutamine infusion. His overall prognosis remains very poor.  - Continue dobutamine at 5 mcg/kg/min  - Discontinued hydralazine due to soft BP's  - Diuresis: 4 mg IV Bumex given 4/24 to little effect  - Palliative consulted, appreciate recs re: GOC and d/c plans  - Patient is accepted at [facility]  facility, awaiting bed availability. Once bed is available, family will be given the [facility] appeal letter is family chooses to appeal the discharge recommendation    # CKD-V   # LOIS - improving  Patient was oliguric on presentation. Tunneled line by IR 3/29. Started HD 3/29. Urine output increased and Cr stable/improved with dobutamine. No further HD as patient had good UOP and a very poor cardiac prognosis, so the tunneled catheter was removed. Following removal of the catheter, Cr was stable in the 2.3-2.4 range, and patient had adequate UOP. However starting 4/18, Cr was steadily rising, and UOP was decreasing. Diuresis was stopped on 4/18. Cr peaked at 5.79. LOIS likely pre-renal since UOP increased with administration of 500 mL NS. Now UOP and Cr are improving  - Continue Fraga  - Monitor UOP. No need to monitor daily labs  - IR removed dialysis catheter 4/12.  - Nephrology consult, appreciate recs                           - No HD given poor prognosis, risk of bleeding, low BP's, patient's anxious reaction to discussion of HD, and likely impossibility of discharge on HD and dobutamine                           - Fraga catheter.                           - Bumex 4 mg IV given 4/24 with little effect                           - 500 mL NS given 4/25, increased UOP, Cr improving     #Multifactorial Encephalopathy, improving  #Delirium  #Dementia (MoCA 16/30)  #Ventriculomegaly / possible normal pressure hydrocephalus  #h/o right MCA CVA  Likely due to cardiogenic shock, although also had extensive workup for other causes. CT scan with enlarged venticles. No mass or acute CVA. Sepsis also possible contributor. Possibly uremia. Low suspicion for SBP. Patient's history had been concerning for NPH. LP done on 4/2. Attempted large volume (25-30 cc); however, was only able to obtain 0.5 cc.  - Was treated for UTI / PNA (s/p tx with abx). Thought it could be uremia (BUN improved), NPH (neurology recommends no further  inpatient workup), baseline dementia / CVA, cardiogenic shock (see above)  - Psychiatry consulted: Latuda had been at 40 mg, was decreased to 20 mg due to family's concern about its affect on patient's daytime sleepiness, and melatonin 6 mg qhs prn for agitation, less risk of QTc prolongation. Patient's family requested for latuda to be discontinued on 4/26 due to concern for increased somnolence. Patient's family requesting for all sedating medications (e.g. Benzos, Latuda) to be withheld  - Neurology signed off. Recommended follow-up in movement disorder clinic for workup of possible NPH, although this is less likely now given patient's poor cardiac prognosis.  - PT/OT  - Has not required sitter/VPM since 5/2  - Psych noted on 5/3 that patient does not have decision-making capability     #CAD s/p ATIF to LAD 2015  - Discontinued pta atorvastatin 80 mg qday and ASA 81 mg since overall mortality benefit low for patient due to overall poor prognosis  - Avoid BB with cardiac amyloid      #Afib/flutter s/p CTI ablation 2006  Remains in afib. Was on sotalol but this was discontinued around 8/2017 per outside cardiology notes. Discontinued warfarin on 4/3 due to labile INR's and started on apixaban instead. Now off apixaban since overall benefit low given patient's overall poor prognosis and concern for increased bleeding risk with high BUN.    #Concern for UTI, resolved  Started on rocephin on 4/21. Urine cultures grew pan-sensitive Klebsiella pneumoniae. De-escalated to Keflex and then switched back to ceftiriazon when having poor PO intake. Completed 6 days of antibiotics.    #Aspiration PNA, resolved  Started on Unasyn on 3/27. Switched to Zosyn on 3/28 per ID recs. Restarted on Unasyn on 3/30 when sensitivities returned on UTI (Klebsiella pneumoniae). Completed course of Unasyn on 4/4. Repeat chest x-ray on 4/14 stable. Patient remains afebrile.      # Cachexia  Patient's volume overload somewhat obscures his severe  malnutrition, but he has temporal wasting.    FEN: Regular diet  PPX: No longer on apixaban - anticoagulation not needed  Code Status: DNR/DNI  Dispo: Patient is accepted at [facility], awaiting bed availability. Once bed is available, family will be given the [employer] appeal letter is family chooses to appeal the discharge recommendation     Patient was discussed with staff attending, Dr. Sanchez, who agrees with the above assessment and plan.      Mag Raphael MD, PhD  Internal Medicine Resident  Cardiology Service  Pager: [phone]

## 2018-05-05 NOTE — PLAN OF CARE
"Problem: Cardiac: Heart Failure (Adult)  Goal: Signs and Symptoms of Listed Potential Problems Will be Absent, Minimized or Managed (Cardiac: Heart Failure)  Signs and symptoms of listed potential problems will be absent, minimized or managed by discharge/transition of care (reference Cardiac: Heart Failure (Adult) CPG).   No significant change in status today. Continues on dobutamine gtt. Does report abdominal \"bloat\"/discomfort this afternoon; given simethicone suspension. Fair appetite. Loose BM x1. Fraga in place. Up in recliner chair most of the day. Various family present off and on. Per Cards 1 team, no plans for further lab draws at this time. Awaiting bed availability at TCU for discharge.      "

## 2018-05-05 NOTE — DISCHARGE SUMMARY
Cardiology Discharge Summary  Josr Landers MRN: [id number]  1940  Primary care provider: Ángel Oates  ___________________________________          Date of Admission:  3/26/2018  Date of Discharge:  5/15/2018  Admitting Physician:  Chantel Nunez MD  Discharge Physician:  YANELIS SWAIN MD  Discharging Service:  Cardiology 1     Primary Provider: Ángel Oates         Reason for Admission:   Abdominal pain, altered mental status, shortness of breath          Discharge Diagnoses:   HFrEF NYHA IV, stage D (EF 15-20%), inotrope dependent  Cardiac amyloidosis  LOIS on CKD V - resolved  Mixed toxic metabolic and infectious encephalopathy, delirium, dementia  CAD s/p ATIF to LAD 2015  Afib/flutter s/p CTI ablation 2006  UTI - resolved  Aspiration PNA - resolved  Protein malnutrition           Procedures & Significant Findings:   Tunneled CVC placement 3/29/18, removal 4/12/18  EEG 3/28-3/30/18  Lumbar puncture 4/2/18    Right heart catheterization 4/3/18;  HEMODYNAMICS:  BSA 1.78  1. HR 75 bpm  2. BP 85/69  Mean 74 mmHg  3. RA --, 17, 15   4. RV 43/17  5. PA 43/25  Mean 33   6. PCW --, --, 20   7. PA sat 42.8%   8. PCW sat 98.7%  9. Hgb 12 g/dL   10. Son CO 2.6   11. Son CI 1.46   12. TD CO 1.7   13. TD CI 0.95   14. PVR 5.0      COMPLICATIONS:  1. None     SUMMARY:   >> High right sided filling pressures.  >> High left sided filling pressures.  >> Mild pulmonary artery hypertension   >> Cardiogenic shock      Results for orders placed or performed during the hospital encounter of 03/26/18   XR Chest 2 Views    Narrative    Exam: XR CHEST 2 VW, 3/26/2018 7:53 PM    Indication: chf HISTORY;     Comparison: Chest x-ray on March 20, 2013 MRI 1/30/2018    Findings:   AP and lateral views of the chest. Pulmonary vasculature is indistinct  with perihilar and left basilar opacity. Cardiomediastinal silhouette  appears enlarged. No evidence  of pneumothorax. Limited visualization  of upper abdomen is unremarkable. Elevated right hemidiaphragm.      Impression    Impression:   1. Cardiomediastinal silhouette is enlarged, similar to prior MRI.  2. Perihilar opacities, likely pulmonary edema.    I have personally reviewed the examination and initial interpretation  and I agree with the findings.    HENOK CLARKE MD   CT Chest Abdomen Pelvis w/o Contrast    Narrative    EXAMINATION: CT CHEST ABDOMEN PELVIS W/O CONTRAST, 3/26/2018 10:30 PM    TECHNIQUE:  Helical CT images from the lung apices through the  symphysis pubis were obtained without contrast.  Coronal reformatted  images were generated at a workstation for further assessment.    COMPARISON: CT 7/1/2017    HISTORY: ? PNA, ? Cause for abdominal pain, distension;     FINDINGS: Significant artifact limiting exam from patient's arms.  Chest:   Mediastinum: Cardiomegaly is 16.8 cm. Dilated main pulmonary artery  3.37 m, consistent with pulmonary hypertension. Vascular  calcifications of the coronary arteries. No mediastinal  lymphadenopathy by size criteria.  Lungs: Nodular consolidations in the posterior lower lobes, aspiration  versus infection. Atelectasis in the posterior lower lobes.    Abdomen and pelvis:   Liver: Hepatomegaly at 18.5 cm. Unchanged numerous simple hepatic  cysts with the largest being a 4.6 cm cyst in the right hepatic lobe.  No suspicious liver lesions.   Gallbladder: No definite gallstones, though poorly visualized  secondary to artifact.  Spleen: Normal size.  Pancreas: Fatty atrophy.  Adrenal glands: No adrenal nodules.  Kidneys: Unchanged partially calcified 2.7 cm right renal lesion which  has been stable since at least 2015 and previously cryoablated.  Unchanged 1.7 cm left renal cyst.  Bladder / Pelvic organs: Enlarged prostate with thickened bladder  wall.  Bowel: No bowel wall thickening. The appendix is unremarkable. No  evidence of obstruction.  Lymph nodes: No  retroperitoneal, mesenteric, or pelvic  lymphadenopathy.  Fluid: Moderate to large ascites.  Vessels: No infrarenal aortic aneurysm.     Bones and soft tissues: No suspicious osseous lesions. Degenerative  changes of the spine. Grade 1 anterolisthesis of the L2 and L3.  Chronic, very mild anterior compression deformity of L2.      Impression    IMPRESSION:     1. Nodular consolidations in the posterior lower lobes, aspiration  versus infection.  2. Moderate to large ascites.  3. Unchanged partially calcified 2.7 cm right renal lesion, stable  since 2015, previously cryoablated.  4. Enlarged prostate with bladder wall thickening.  5. Hepatomegaly.  6. Cardiomegaly.      I have personally reviewed the examination and initial interpretation  and I agree with the findings.    JOSE M PLASENCIA MD   Head CT w/o contrast     Value    Radiologist flags Ventriculomegaly (Urgent)    Narrative    CT HEAD W/O CONTRAST 3/26/2018 10:30 PM    Provided History: AMS;     Comparison: MR 6/5/2010.    Technique: Using multidetector thin collimation helical acquisition  technique, axial, coronal and sagittal CT images from the skull base  to the vertex were obtained without intravenous contrast.     Findings:    Ventriculomegaly of the lateral and third ventricles. No obstructing  mass or lesion. No intracranial hemorrhage, mass effect, or midline  shift. Mild cerebral volume loss. The gray to white matter  differentiation of the cerebral hemispheres is preserved. The basal  cisterns are patent.    The visualized paranasal sinuses are clear. The mastoid air cells are  clear.       Impression    Impression: Ventriculomegaly of the lateral and third ventricles with  no obstructing lesion or mass seen, possibly normal pressure  hydrocephalus.    [Urgent Result: Ventriculomegaly]    Finding was identified on 3/26/2018 11:04 PM.     Dr. Schneider was contacted by Dr. Gilmore at 3/26/2018 11:08 PM and  verbalized understanding of the urgent  finding.     I have personally reviewed the examination and initial interpretation  and I agree with the findings.    MERLYN ORDOÑEZ MD   IR CVC Tunnel Placement > 5 Yrs of Age    Narrative    IR CVC TUNNEL PLACEMENT > 5 YRS OF AGE   3/29/2018 3:57 PM      HISTORY: Double lumen large bore tunneled central line placement;     COMPARISON: CT on 3/26/2016    Fellow: James Zamorano MD  Staff: Weston Kirby MD    I, WESTON KIRBY MD, attest that I was present for all critical portions  of the procedure and was immediately available to provide guidance and  assistance during the remainder of the procedure.     Medications:   1. 25 mcg Fentanyl  2. 0.5 mg Versed    ATTENDING FACE-TO-FACE SEDATION TIME: Less than 5 minutes.    Total sedation time: 25 minutes    Fluoroscopy time: 0.7 minutes    Contrast: No intravenous contrast    PROCEDURE: Both written and oral consent were obtained.    Limited jugular ultrasound documented jugular vein patency.    The right neck and upper chest were prepped and draped in the usual  sterile fashion. Ten to one volume mixture of 1% lidocaine without  epinephrine buffered solution was used for local anesthesia.     Under ultrasound guidance, right internal jugular venotomy was made  with a micropuncture needle. Ultrasound image documenting right  internal jugular patency and needle venotomy was saved in the  patient's record.    Under fluoroscopic guidance, the micropuncture needle was exchanged  over guidewire for the micropuncture sheath. Catheter length was  measured with a 0.018 guidewire. Micropuncture sheath was exchanged  over the 0.018 guidewire for a peel-away sheath. A 14.5 Icelandic, 23 cm  tip to cuff double lumen Palindrome catheter was subcutaneously  tunneled from the right anterior chest to the right internal jugular  venotomy site. The catheter was flushed with normal saline. The  catheter was placed through the peel-away sheath. The catheter tip was  placed in the high right  atrium under fluoroscopic guidance. Peel-away  sheath removed. Double lumen catheter aspirated and flushed adequately  right and each lumen was heparin locked with 2.5 mL of 1000:1 heparin  solution. 2-0 nylon catheter retaining V-suture and sterile dressing  were applied at the anterior right chest wall exit site. Right  internal jugular venotomy site closed with Dermabond. No immediate  complication.      Impression    IMPRESSION:   1. Ultrasound guided right internal jugular venotomy.  2. 23 cm tip to cuff double lumen Palindrome tunneled dialysis  catheter placed with its tip at the high right atrium, heparin locked  and ready for immediate use.    I have personally reviewed the examination and initial interpretation  and I agree with the findings.    CLEMENTE ALLEN MD   XR Lumbar Puncture Spinal Tap Diag    Narrative    Lumbar Puncture using Fluoroscopy    History:  NPH.    Procedure note: Verbal and written consent for lumbar puncture was  obtained from the patient's family, and benefits and risk of the  procedure were explained, including but not limited to worsening  headache, hemorrhage, infection, lower extremity pain, or nerve root  injury. The patient was sterilely prepped and draped with the patient  in the left lateral decubitus position, over the lower back. Under  fluoroscopic guidance, the interlaminar spaces were noted.     1% lidocaine was initially administered for local anesthetic over L4  laminectomy defect, and a 20 gauge 3.5 inch needle was advanced into  the thecal sac under fluoroscopic guidance.  There was initial show of  blood tinged CSF with slow flow. The needle was withdrawn due to  sluggish flow.    Subsequently, the skin and subcutaneous tissues overlying the L3  laminectomy defect or locally anesthetized with 1% lidocaine. A  20-gauge 3.5 inch spinal needle was advanced into the thecal sac.  There was clear, colorless CSF; however flow CSF was extremely slow  flow despite multiple  attempts at needle repositioning, patient  positioning, Valsalva and gentle aspiration. A total of 0.5 mL CSF was  collected.    The needle was removed with the stylet in place. There was no  immediate complication associated with the procedure. Samples were  sent for the requested laboratory testing.      Estimated blood loss: Less than 1 cc.    Fluoroscopic time: 60 seconds      Impression    Impression: Successful lumbar puncture under fluoroscopic guidance.  Only a small volume of CSF able to be collected due to extremely  sluggish CSF flow despite multiple attempts and maneuvers.    I, NANDO NEWTON MD, attest that I was present for all critical  portions of the procedure and was immediately available to provide  guidance and assistance during the remainder of the procedure.    I have personally reviewed the examination and initial interpretation  and I agree with the findings.    NANDO NEWTON MD   XR Chest Port 1 View    Narrative    Study: XR CHEST PORT 1 VW 4/2/2018 12:53 PM    Comparison: 3/28/2018    History: Verify catheter placement, patient may have pulled on it  overnight;     Findings:   AP chest radiograph at 80 degrees. Right IJ dual-lumen central venous  catheter tip projects over the high right atrium. No appreciable  pneumothorax. Bilateral pleural effusions. Portion of left hemithorax  collimated out of view. Bilateral interstitial opacities. Cardiac and  mediastinal silhouettes are mildly enlarged, stable. Increased left  basilar opacities. Perihilar opacities. Upper abdomen is unremarkable.  No acute osseous abnormalities.      Impression    Impression:   1. Right IJ dual-lumen central venous catheter tip projects over the  high right atrium. No appreciable pneumothorax.  2. Increased left basilar opacities, consolidation versus atelectasis.  3. Pulmonary vascular congestion with mild interstitial edema,  increased.    I have personally reviewed the examination and initial  interpretation  and I agree with the findings.    CARLITA LENZ MD   XR Chest Port 1 View    Narrative    Exam: XR CHEST PORT 1 VW 4/3/2018 9:04 PM    History: PICC placement    Comparison: 4/2/2018    Findings/    Impression    impression:  1. New left upper extremity PICC tip projects over the innominate  confluence.  2. Stable position of right IJ central venous catheter.  3. Heart is mildly enlarged, exaggerated by AP technique and low lung  volumes.  4. Unchanged dense left basilar/cardiac opacity, atelectasis or  infection.    HENOK CLARKE MD   XR Chest Port 1 View    Narrative    Chest one view portable supine    HISTORY: RN placed PICC    COMPARISON STUDY: 4/3/2018    FINDINGS: Left PICC has been advanced with tip at the atriocaval  junction. Cardiac silhouette is large. Right IJ central line tip at  the atriocaval junction. Pulmonary vascularity is engorged. Left  basilar atelectasis.      Impression    IMPRESSION: Left PICC tip at the atriocaval junction. Pulmonary  vascular congestion and left basilar atelectasis.    CARLITA LENZ MD   XR Chest Port 1 View    Narrative    Exam: XR CHEST PORT 1 VW, 4/5/2018 10:04 AM    Indication: RN placed PICC - verify tip placement;     Comparison: Radiograph the chest 4/4/2018, 4/3/2018, 3/26/2018    Findings:   Single AP radiograph of the chest. Right central venous catheter  projects with the tip over the low SVC. New left upper extremity PICC  projects with the tip in the high SVC. Heart size is stable. Pulmonary  vasculature is distinct. There is a dense retrocardiac/left lower lobe  pulmonary opacity securing the left hemidiaphragm. Right lung is  clear. No pneumothorax. Abdomen is unremarkable. No acute osseous  abnormality.       Impression    Impression:   1. New left upper extremity PICC with the tip in the upper SVC. Right  IJ central venous catheter is in stable position.  2. Slightly improved retrocardiac/left lung base opacity and pulmonary  vascular  congestion.  3. Stable cardiomegaly.    I have personally reviewed the examination and initial interpretation  and I agree with the findings.    CARLITA LENZ MD   US Lower Extremity Venous Duplex Left    Narrative    EXAMINATION: US LOWER EXTREMITY VENOUS DUPLEX LEFT, 4/7/2018 1:35 PM     COMPARISON: Lower extremity venous ultrasound 6/19/2017.    HISTORY: Leg swelling.    TECHNIQUE:  Gray-scale evaluation with compression, spectral flow, and  color Doppler assessment of the deep venous system of the left leg  from groin to knee, and then at the ankle.    FINDINGS:  In the left lower extremity, the common femoral, femoral, popliteal  and posterior tibial veins demonstrate normal compressibility and  blood flow.    The right common femoral vein was evaluated at the groin for  comparison and demonstrates normal compressibility and blood flow.        Impression    IMPRESSION:  No evidence left lower extremity deep venous thrombosis. Left lower  extremity edema.    I have personally reviewed the examination and initial interpretation  and I agree with the findings.    MELODIE KLEIN MD   IR CVC Tunnel Removal Right    Narrative    Josr Landers  MRN: [id number]    PRE-OPERATIVE DIAGNOSIS:  Hemodialysis status    POST-OPERATIVE DIAGNOSIS:  Same    PROCEDURE:  Removal of tunneled central venous catheter (CVC)      Impression    IMPRESSION:  Completed removal of dual lumen tunneled central venous  access catheter via RIGHT chest. Dx: Hemodialysis status; no longer  needed. Flor.     -----    CLINICAL HISTORY:  The patient has a tunneled central venous catheter;   therapy was complete and catheter removal was requested, no longer  needed.    PERFORMED BY:  REMBERTO Baires PA-C (Interventional  Radiology)    MEDICATIONS:  None    DESCRIPTION:  The catheter and its entry site into the skin were  prepped and draped in usual sterile fashion.  Physical examination  demonstrated no erythema, tenderness,  "fluctuance, or discharge at the  catheter exit site or along the tract.       Using steady traction, the entire catheter was removed without  difficulty, cuff removed completely.  Compression was applied over the  venipuncture site, as well as along the tract, until good hemostasis  was achieved.  Sterile dressing was applied to the exit site wound.    COMPLICATIONS:  No immediate complication    ESTIMATED BLOOD LOSS:  Minimal    SPECIMENS:  None    TIME:  A total of 15 minutes was spent with the patient.  Greater than  50% of the time was spent in counseling, education, and coordination  of care.  -----    \"The physician assistant (PA) who performed this procedure and signed  the above report is licensed to practice in the [facility]  pursuant to [place] Statute 147A.09.  This includes meeting the Statute and  [place] Board of [employer] requirement of an active  Delegation Agreement, which documents delegation of services by  primary and alternate supervising physicians.\"   -----    KENISHA MARTINEZ PA-C   XR Abdomen Port 1 View    Narrative    Streak: Assess for stool burden or obstruction.    COMPARISON: CT chest abdomen and pelvis 3/26/2018.    FINDINGS: Gaseous filling of transverse colon and sigmoid colon with  stool noted in the descending colon and descending colon. No  gas-filled, dilated loops of small bowel. No gross free  intraperitoneal air on the supine images. Visualized lung bases are  clear. Mild elevation of the right hemidiaphragm. Marked secondary  changes of degenerative disc disease throughout the lumbar spine along  with a laminectomy defect inferiorly. No acute bony abnormalities  about the visualized abdomen and pelvis. 3 cm spherical calcification  in the right midabdomen corresponds to a peripherally calcified cyst  in the lateral right kidney on CT.      Impression    Impression:  1. No evidence of bowel obstruction or adynamic ileus.  2. Moderate stool burden.    DAVE " DIANA RENTERIA MD   XR Chest Port 1 View    Narrative    XR CHEST PORT 1 VW  4/13/2018 6:02 PM      HISTORY: New cough, possible pneumonia;     COMPARISON: Chest radiograph 4/5/2018    TECHNIQUE: Single upright AP view of the chest.    FINDINGS: The patient is slightly rotated right Left-sided PICC line  with tip taking an abnormal turn back towards the patient's left,  likely within the azygos vein. Stable left retrocardiac opacity. No  new acute airspace opacity. Cardiac silhouette is borderline enlarged.  The median sternal silhouette is within normal limits. Trachea is  midline. Upper abdomen is unremarkable. Degenerative changes bilateral  shoulders. Interval removal of dual-lumen central venous catheter.      Impression    IMPRESSION:   1. Stable left retrocardiac airspace opacity, infectious etiology  versus atelectasis. No new airspace opacity.  2. Left-sided PICC line with tip likely in the azygous vein. Interval  removal of right-sided internal jugular central venous catheter.  3. Stable cardiomegaly.    I have personally reviewed the examination and initial interpretation  and I agree with the findings.    MINERVA ALTMAN MD   XR Chest Port 1 View     Value    Radiologist flags picc (Urgent)    Narrative    XR CHEST PORT 1 VW  4/20/2018 10:19 AM      HISTORY: Possible infection;     COMPARISON: 4/13/2018    FINDINGS: Left-sided PICC tip is likely within the azygos vein.  Unchanged cardiomegaly. Unchanged retrocardiac and bibasilar  opacities. No focal airspace opacity. Lucency over the left lateral  chest likely represents a skinfold. No pneumothorax. Small left  pleural effusion.      Impression    IMPRESSION:   1. Left-sided PICC tip is likely within the azygos vein.  2. Unchanged left retrocardiac and bibasilar opacities, atelectasis  versus infection.  3. Stable cardiomegaly.     [Access Center: [facility]]    This report will be copied to the [facility] [place] to ensure a  provider acknowledges the finding.  Access Center is available Monday  through Friday 8am-3:30 pm.     I have personally reviewed the examination and initial interpretation  and I agree with the findings.    CARLITA LENZ MD   XR Abdomen Port 1 View    Narrative    XR ABDOMEN PORT 1 VW  4/20/2018 5:37 PM      HISTORY: Abdominal pain;     COMPARISON: 4/11/2018    FINDINGS: Single portable supine view of the abdomen. Of note a  portion of the left lateral abdomen is excluded from the  field-of-view. Nonobstructive bowel gas pattern. Rim calcification in  the right upper quadrant representing a calcified renal lesion seen on  CT.  Decreased colonic stool burden since 4/11/2018.      Impression    IMPRESSION: No evidence of bowel obstruction.    I have personally reviewed the examination and initial interpretation  and I agree with the findings.    PERLITA SHEETS MD   XR Chest Port 1 View    Narrative    Exam: XR CHEST PORT 1 VW, 4/23/2018 9:01 PM    Indication: positive lactate;     Comparison: 4/20/2018    Findings:   Left upper extremity PICC tip at the atriocaval junction, previously  in the azygos vein. Stable enlargement of the heart and mediastinal  silhouette. The pulmonary vasculature is indistinct. Stable trace left  pleural effusion and adjacent left basilar opacities. No appreciable  right pleural effusion. No pneumothorax.      Impression    Impression:   1. Stable small left pleural effusion with adjacent basilar opacities,  atelectasis versus infection.  2. Left upper extremity PICC tip at the atriocaval junction,  previously in the azygos vein.    I have personally reviewed the examination and initial interpretation  and I agree with the findings.    MARY MURRAY MD   XR Abdomen Port 1 View    Narrative    Exam:  XR ABDOMEN PORT 1 VW, 4/28/2018 2:24 PM    History: abd pain;     Comparison:  Abdominal radiograph 4/20/2018    Findings:  Supine radiograph of the abdomen. No abnormally dilated  bowel. No pneumatosis or portal venous  gas. Small colonic stool  burden. Pelvic phleboliths. Rounded calcification in the right upper  quadrant correlates with calcified renal cyst on prior CT. Marked  degenerative change in the low lumbar spine and hips.      Impression    Impression:   Nonobstructive bowel gas pattern. Small colonic stool burden.     I have personally reviewed the examination and initial interpretation  and I agree with the findings.    MARY JO IRIZARRY MD ([place])            Consultations:   Advanced Heart Failure  Infectious Disease  Interventional Radiology  Nephrology  Neurology  Palliative Care  Psychiatry         History of Present Illness:    78 y/o male with PMHx significant for cardiac amyloidosis confirmed by biopsy 2009, afib/flutter on warfarin s/p CTI ablation 2006, CAD s/p ATIF to LAD in 2015, CKD stage IV and CVA 02/2017 right MCA was admitted from TCU because of worsening Cr function and altered mental status.      Patient was admitted to [facility] about 2 weeks ago because of generalized weakness. At that time he was receiving tylenol 3 for pain and increased dose of lexapro. He had difficulty walking, being wobbly and frequent episodes of altered mental status. CT from that admission did not show any recurrent CVA but did reveal ventriculomegaly which was new compared to a year earlier. Lexapro and tylenol 3 were discontinued and mental status improved, but did not resolve. Additionally, he was found to be volume overloaded and was diuresed with oral bumex. Admissoin weight was 172lb and discharge 167. There was discussion that due to progressively worsening renal function patient might be a dialysis candidate soon. Palliative care was also consulted during admission but no decisions were made on goals of care. He was discharged to TCU.      Since then, wife states mental status has not improved. Also notices he has urinary incontinence and issues with walking. Reports abdominal distention and decreasing urinary output.  Although she states oral intake also inadequate. She otherwise reports no other symptoms.           Hospital Course by Problem:      Patient's hospitalization course was prolonged due to difficulty with establishing a discharge plan. Although patient was medically appropriate for discharge, family did not want to pay for facilities that could accommodate dobutamine, did not want to transition to hospice, and did not want patient to go home since they would not have enough support at home. Patient remained in the hospital until he .     HFrEF NYHA IV, stage D (EF 15-20%), inotrope dependent  Cardiac amyloidosis  RHC on 4/3. PCW 20. RA 17. PA 43/25 (33). RV 43/17. Son CI 1.4. PICC placed on 4/3 and and inotrope dependent on dobutamine. Had had improvement in mental status (although continued to have delirium on dementia) and renal function. However, he had frequent arrhythmia due to underlying amyloidosis with end stage heart failure and on pro arrhythmic agent, dobutamine. During , he developed junctional rhythm with bradycardia and hypotensive. Decreased UOP significantly and AMS. Then he had frequent long sinus pauses on . Discussed with family about pacemaker that is not an option for the patient given end stage heart failure with inotrope dependent, very poor prognosis. Wife accepted the decision. Patient continued to have more frequent arrhythmia, initially family would want to continue dobutamine, but at the end, agreed to discontinue and pursue comfort care when patient had air hunger. Patient passed on 5/15/2018 at 12.05 AM. Family at bedside, declined autopsy.      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 Tunneled dialysis catheter was removed since patient had an overall poor cardiac prognosis. Later during hospitalization, patient became anuric without response to high dose of Bumex IV. Seemed to improved with dobutamine,  however with arrhythmia,BP was low and also UOP became low due to low kidney perfusion.    Mixed toxic metabolic and infectious encephalopathy, delirium, dementia  Encephalopathy likely secondary to cardiogenic shock, delirium, dementia, and below infections which were treated. Neurology was consulted and LP was attempted but only 0.5 cc fluid was removed. Psychiatry was consulted and recommended starting Latuda, which had lower risk of prolonging QTc. Family requested for patient to not receive any antipsychotics or anti-anxiety medications which may contribute to daytime sleepiness. Patient has not required a 1:1 sitter or video patient monitor since 5/2. Psychiatry has noted that patient does not have decision-making capability.    CAD s/p ATIF to LAD 2015  Discontinued patient's statin and ASA since overall mortality benefit is low in the setting of poor overall prognosis. Avoid beta blockers with cardiac amyloid.    Afib/flutter s/p CTI ablation 2006  Remains in afib. Was on sotalol but this was discontinued around 8/2017 per outside cardiology notes. Discontinued warfarin on 4/3 due to labile INR's and started on apixaban instead. Off apixaban since overall benefit low given patient's overall poor prognosis and concern for increased bleeding risk.    UTI - resolved  Found to have pan-sensitive Klebsiella in urine culture. Treated with antibiotics (ceftriaxone and Keflex). Repeat UA was clean.    Aspiration PNA - resolved  Treated with Unasyn and Zosyn.    Protein malnutrition  Albumin 2.6-3.1 during hospitalization. Was on regular diet with supplements.      Physical Exam on day of Discharge:  Patient did not respond to verbal or noxious stimuli. Absent heart and breath sounds for more than 1 minute. Pupils were fixed and dilated. Corneal reflex was absent.     Lines/Tubes: PICC line         Pending Results:   None         Discharge Medications:   None         Discharge Instructions and Follow-Up:   None          Discharge Disposition:             Condition on Discharge:   Discharge condition:    Code status on discharge: DNR / DNI        Date of service: 5/15/2018    The patient was discussed with .    Carmen Mota  Internal Medicine Resident  Pager: [phone]    Staff Physician Comments:     I personally interviewed and examined Josr Landers today, and agree with cardiology residents assessment and plan as documented above. 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 Passed away in evening and wife present. Other relatives notified and visited.       Harpreet Marcos MD     Division of Cardiology  [facility] [place] [address]    Clinic nurse:  Светлана Gonzalez LPN   Nurse Care Coordinator- Heart Care   [phone] option 1, than option 3    [phone] Appointments  [phone] Fax  [phone] After hours

## 2018-05-05 NOTE — PLAN OF CARE
Problem: Patient Care Overview  Goal: Plan of Care/Patient Progress Review  Outcome: No Change    D: Pt admitted 3/26 from TCU with AMS and elevated creat.  I/A: Dobutamine gtt at 5mcg/kg/min. Fraga patent with good UOP. Pt continues with confusion, calls out even in his sleep. Pt did rest for much of the night.  Assisted with frequent turns/positioning. Continues with fecal incontinence, brief on, incontinence cares. Bed alarm for pt safety.   P: Awaiting bed at nursing home. Continue to monitor and assess pt condition and contact treatment team with questions or concerns.

## 2018-05-06 NOTE — PLAN OF CARE
Problem: Patient Care Overview  Goal: Plan of Care/Patient Progress Review  RN  1. Pt will be free from injury   Oriented to self. Confused and did not remember writer throughout the day. Denies pain. HR AFIB 80-90's. Fraga in place w/ adequate UO. Incontinent in brief w/ smear amount of stool. Continue bowel regimen and received simethicone for discomfort. Dobutamine gtt 5mcg/kg/min. Regular diet with good appetite today. Bed alarm on for patient safety.      Awaiting for bed at nursing home. Wife at bedside and updated with POC. Notify CARDS 1 with any changes.

## 2018-05-06 NOTE — PLAN OF CARE
Problem: Patient Care Overview  Goal: Plan of Care/Patient Progress Review  Outcome: No Change    D: Pt admitted 3/26 from TCU with AMS and elevated creat.  I/A: Dobutamine gtt at 5mcg/kg/min. Continues in A fib 80's-90's. Fraga patent with good UOP. Fraga care done. Pt continues with confusion, calls out even in his sleep. Pt did rest for periods throughout the night. Denies pain. Ate a piece of toast with butter and jam and 4oz apple juice. Assisted with frequent turns/positioning. Continues with fecal incontinence, brief on, incontinence cares. Bed alarm for pt safety.   P: Awaiting bed at nursing home. Continue to monitor and assess pt condition and contact treatment team with questions or concerns.

## 2018-05-06 NOTE — PROGRESS NOTES
Cardiology Progress Note  Josr Landers MRN: [id number]  Age: 77 year old, : 1940  Date: 2018              Subjective     No acute events overnight. Denies pain, chest pain, SOB, or abdominal pain.          Objective     BP 95/73 (BP Location: Right arm)  Pulse 79  Temp 96.5  F (35.8  C) (Axillary)  Resp 16  Wt 63.6 kg (140 lb 3.4 oz)  SpO2 97%  BMI 20.71 kg/m2  Temp:  [96.5  F (35.8  C)-97.6  F (36.4  C)] 96.5  F (35.8  C)  Heart Rate:  [82-92] 86  Resp:  [16] 16  BP: ()/(72-81) 95/73  SpO2:  [97 %-100 %] 97 %  Wt Readings from Last 2 Encounters:   18 63.6 kg (140 lb 3.4 oz)   18 75.3 kg (165 lb 14.4 oz)       I/O last 3 completed shifts:  In: 633.6 [P.O.:360; I.V.:273.6]  Out: 850 [Urine:850]      Gen: No acute distress, laying in bed  HEENT: NC/AT  PULM/THORAX: Clear to auscultation bilaterally anteriorly, no rales/rhonchi/wheezes, on RA  CV: IRIR, normal S1 and S2, no murmurs or rubs.   ABD: Soft, NTND, bowel sounds present, no masses  EXT: WWP. No LE edema  NEURO: awake and appropriate responses            Data:     Reviewed. No new labs.        Medications     Current Facility-Administered Medications   Medication     alteplase (CATHFLO ACTIVASE) injection 2 mg     ammonium lactate (LAC-HYDRIN) 12 % lotion     bisacodyl (DULCOLAX) EC tablet 5 mg    Or     bisacodyl (DULCOLAX) EC tablet 10 mg    Or     bisacodyl (DULCOLAX) EC tablet 15 mg     bisacodyl (DULCOLAX) Suppository 10 mg     DOBUTamine 500 mg in dextrose 5% 250 mL (adult std conc) premix     heparin lock flush 10 UNIT/ML injection 2-5 mL     heparin sodium PF injection 5,000 Units     hydrocortisone (CORTAID) 1 % cream     lidocaine (LMX4) kit     lidocaine (XYLOCAINE) 5 % ointment     lidocaine 2 % (URO-JET) jelly 10 mL     melatonin tablet 6 mg     multivitamin  peds with iron (FLINTSTONES COMPLETE) chewable tablet 60 mg     naloxone (NARCAN) injection 0.1-0.4 mg     nystatin  (MYCOSTATIN) cream     Patient is already receiving anticoagulation with heparin, enoxaparin (LOVENOX), warfarin (COUMADIN)  or other anticoagulant medication     phenylephrine-cocoa butter (PREPARATION H) per suppository 1 suppository     polyethylene glycol (MIRALAX/GLYCOLAX) Packet 17 g     psyllium (METAMUCIL/KONSYL) Packet 1 packet     sennosides (SENOKOT) tablet 2 tablet     simethicone (MYLICON) suspension 40 mg     thiamine (B-1) injection 100 mg             Assessment and Plan:     78 y/o male with PMHx significant for 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 Patient's urine output and Cr have been improving. Patient is medically stable for discharge.     Today:  - No medical changes  - Patient is accepted at [facility], awaiting bed availability. Once bed is available, family will be given the [facility] appeal letter if family chooses to appeal the discharge recommendation    #Cardiac amyloidosis  #HFrEF NYHA IV D (EF 15-20%)  RHC on 4/3. PCW 20. RA 17. PA 43/25 (33). RV 43/17. Son CI 1.4. Patient is volume overloaded and in cardiogenic shock. Suspect this was the likely cause of patient's altered mental status, LOIS, and possibly the ventriculomegaly. PICC placed on 4/3 for dobutamine infusion. His mental status and renal function have improved/stable with the dobutamine infusion. His overall prognosis remains very poor.  - Continue dobutamine at 5 mcg/kg/min  - Discontinued hydralazine due to soft BP's  - Diuresis: 4 mg IV Bumex given 4/24 to little effect  - Palliative consulted, appreciate recs re: GOC and d/c plans  - Patient is accepted at [facility], awaiting bed availability. Once  bed is available, family will be given the [facility] appeal letter if family chooses to appeal the discharge recommendation    # CKD-V   # LOIS - improving  Patient was oliguric on presentation. Tunneled line by IR 3/29. Started HD 3/29. Urine output increased and Cr stable/improved with dobutamine. No further HD as patient had good UOP and a very poor cardiac prognosis, so the tunneled catheter was removed. Following removal of the catheter, Cr was stable in the 2.3-2.4 range, and patient had adequate UOP. However starting 4/18, Cr was steadily rising, and UOP was decreasing. Diuresis was stopped on 4/18. Cr peaked at 5.79. LOIS likely pre-renal since UOP increased with administration of 500 mL NS. Now UOP and Cr are improving  - Continue Fraga  - Monitor UOP. No need to monitor daily labs  - IR removed dialysis catheter 4/12.  - Nephrology consult, appreciate recs                           - No HD given poor prognosis, risk of bleeding, low BP's, patient's anxious reaction to discussion of HD, and likely impossibility of discharge on HD and dobutamine                           - Fraga catheter.                           - Bumex 4 mg IV given 4/24 with little effect                           - 500 mL NS given 4/25, increased UOP, Cr improving     #Multifactorial Encephalopathy, improving  #Delirium  #Dementia (MoCA 16/30)  #Ventriculomegaly / possible normal pressure hydrocephalus  #h/o right MCA CVA  Likely due to cardiogenic shock, although also had extensive workup for other causes. CT scan with enlarged venticles. No mass or acute CVA. Sepsis also possible contributor. Possibly uremia. Low suspicion for SBP. Patient's history had been concerning for NPH. LP done on 4/2. Attempted large volume (25-30 cc); however, was only able to obtain 0.5 cc.  - Was treated for UTI / PNA (s/p tx with abx). Thought it could be uremia (BUN improved), NPH (neurology recommends no further inpatient workup), baseline dementia / CVA,  cardiogenic shock (see above)  - Psychiatry consulted: Latuda had been at 40 mg, was decreased to 20 mg due to family's concern about its affect on patient's daytime sleepiness, and melatonin 6 mg qhs prn for agitation, less risk of QTc prolongation. Patient's family requested for latuda to be discontinued on 4/26 due to concern for increased somnolence. Patient's family requesting for all sedating medications (e.g. Benzos, Latuda) to be withheld  - Neurology signed off. Recommended follow-up in movement disorder clinic for workup of possible NPH, although this is less likely now given patient's poor cardiac prognosis.  - PT/OT  - Has not required sitter/VPM since 5/2  - Psych noted on 5/3 that patient does not have decision-making capability     #CAD s/p ATIF to LAD 2015  - Discontinued pta atorvastatin 80 mg qday and ASA 81 mg since overall mortality benefit low for patient due to overall poor prognosis  - Avoid BB with cardiac amyloid      #Afib/flutter s/p CTI ablation 2006  Remains in afib. Was on sotalol but this was discontinued around 8/2017 per outside cardiology notes. Discontinued warfarin on 4/3 due to labile INR's and started on apixaban instead. Now off apixaban since overall benefit low given patient's overall poor prognosis and concern for increased bleeding risk with high BUN.    #Concern for UTI, resolved  Started on rocephin on 4/21. Urine cultures grew pan-sensitive Klebsiella pneumoniae. De-escalated to Keflex and then switched back to ceftiriazon when having poor PO intake. Completed 6 days of antibiotics.    #Aspiration PNA, resolved  Started on Unasyn on 3/27. Switched to Zosyn on 3/28 per ID recs. Restarted on Unasyn on 3/30 when sensitivities returned on UTI (Klebsiella pneumoniae). Completed course of Unasyn on 4/4. Repeat chest x-ray on 4/14 stable. Patient remains afebrile.      # Cachexia  Patient's volume overload somewhat obscures his severe malnutrition, but he has temporal  wasting.    FEN: Regular diet  PPX: No longer on apixaban - anticoagulation not needed  Code Status: DNR/DNI  Dispo: Patient is accepted at [facility], awaiting bed availability. Once bed is available, family will be given the [facility] appeal letter is family chooses to appeal the discharge recommendation     Patient was discussed with staff attending, Dr. Sanchez, who agrees with the above assessment and plan.      Mag Raphael MD, PhD  Internal Medicine Resident  Cardiology Service  Pager: [phone]

## 2018-05-07 NOTE — PLAN OF CARE
Problem: Patient Care Overview  Goal: Plan of Care/Patient Progress Review  Outcome: No Change    D: Pt admitted 3/26 from TCU with AMS and elevated creat.  I/A: Dobutamine gtt at 5mcg/kg/min. Continues in A fib 80's-90's. Fraga patent with good UOP. Fraga care done. Pt continues with confusion, calls out even in his sleep. Pt did rest for periods throughout the night. Denies pain. Ate 2 pieces of toast with butter and jam and 12oz juice. Assisted with frequent turns/positioning. Continues with fecal incontinence, brief on, incontinence cares. Bed alarm for pt safety.   P: Awaiting bed at nursing home. Continue to monitor and assess pt condition and contact treatment team with questions or concerns.

## 2018-05-07 NOTE — PROGRESS NOTES
Cardiology Progress Note  Josr Landers MRN: [id number]  Age: 77 year old, : 1940  Date: 2018              Subjective     No acute events overnight. Denies pain, chest pain, SOB, or abdominal pain.           Objective     BP (!) 87/73 (BP Location: Right arm)  Pulse 79  Temp 97.8  F (36.6  C) (Oral)  Resp 16  Wt 63.8 kg (140 lb 10.5 oz)  SpO2 100%  BMI 20.77 kg/m2  Temp:  [97.5  F (36.4  C)-97.8  F (36.6  C)] 97.8  F (36.6  C)  Heart Rate:  [81-89] 85  Resp:  [16] 16  BP: ()/(71-83) 87/73  SpO2:  [97 %-100 %] 100 %  Wt Readings from Last 2 Encounters:   18 63.8 kg (140 lb 10.5 oz)   18 75.3 kg (165 lb 14.4 oz)       I/O last 3 completed shifts:  In: 1496.41 [P.O.:1280; I.V.:216.41]  Out: 975 [Urine:975]      Gen: No acute distress, laying in bed  HEENT: NC/AT  PULM/THORAX: Clear to auscultation bilaterally anteriorly, no rales/rhonchi/wheezes, on RA  CV: IRIR, normal S1 and S2, no murmurs or rubs.   ABD: Soft, NTND, bowel sounds present, no masses  EXT: WWP. No LE edema  NEURO: awake and appropriate responses  Skin: no rashes                Data:     Reviewed. No new labs.        Medications     Current Facility-Administered Medications   Medication     alteplase (CATHFLO ACTIVASE) injection 2 mg     ammonium lactate (LAC-HYDRIN) 12 % lotion     bisacodyl (DULCOLAX) EC tablet 5 mg    Or     bisacodyl (DULCOLAX) EC tablet 10 mg    Or     bisacodyl (DULCOLAX) EC tablet 15 mg     bisacodyl (DULCOLAX) Suppository 10 mg     DOBUTamine 500 mg in dextrose 5% 250 mL (adult std conc) premix     heparin lock flush 10 UNIT/ML injection 2-5 mL     heparin sodium PF injection 5,000 Units     hydrocortisone (CORTAID) 1 % cream     lidocaine (LMX4) kit     lidocaine (XYLOCAINE) 5 % ointment     lidocaine 2 % (URO-JET) jelly 10 mL     melatonin tablet 6 mg     multivitamin  peds with iron (FLINTSTONES COMPLETE) chewable tablet 60 mg     naloxone (NARCAN) injection  0.1-0.4 mg     nystatin (MYCOSTATIN) cream     Patient is already receiving anticoagulation with heparin, enoxaparin (LOVENOX), warfarin (COUMADIN)  or other anticoagulant medication     phenylephrine-cocoa butter (PREPARATION H) per suppository 1 suppository     polyethylene glycol (MIRALAX/GLYCOLAX) Packet 17 g     psyllium (METAMUCIL/KONSYL) Packet 1 packet     sennosides (SENOKOT) tablet 2 tablet     simethicone (MYLICON) suspension 40 mg     thiamine (B-1) injection 100 mg             Assessment and Plan:     78 y/o male with PMHx significant for 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 Patient's urine output and Cr have been improving. Patient is medically stable for discharge.     Today:  - No medical changes  - Patient is accepted at [facility], awaiting bed availability. Once bed is available, family will be given the [facility] appeal letter if family chooses to appeal the discharge recommendation.  - Heart failure team consulted for prognosis and plan for Dobutamine gtt in setting of terminal heart failure 2/2 Amyloidosis. Pending for recommendation.     #Cardiac amyloidosis  #HFrEF NYHA IV D (EF 15-20%)  RHC on 4/3. PCW 20. RA 17. PA 43/25 (33). RV 43/17. Son CI 1.4. Patient is volume overloaded and in cardiogenic shock. Suspect this was the likely cause of patient's altered mental status, LOIS, and possibly the ventriculomegaly. PICC placed on 4/3 for dobutamine infusion. His mental status and renal function have improved/stable with the dobutamine infusion. His overall prognosis remains very poor. Patient is very high risk for arrhythmia on dobutamine gtt for his amyloidosis, will get input from heart  failure team for prognosis and management.   - Continue dobutamine at 5 mcg/kg/min   - Discontinued hydralazine due to soft BP's  - Diuresis: 4 mg IV Bumex given 4/24 to little effect  - Palliative consulted, appreciate recs re: GOC and d/c plans  - Patient is accepted at [facility], awaiting bed availability. Once bed is available, [facility] if family chooses to appeal the discharge recommendation    # CKD-V   # LOIS - improving  Patient was oliguric on presentation. Tunneled line by IR 3/29. Started HD 3/29. Urine output increased and Cr stable/improved with dobutamine. No further HD as patient had good UOP and a very poor cardiac prognosis, so the tunneled catheter was removed. Following removal of the catheter, Cr was stable in the 2.3-2.4 range, and patient had adequate UOP. However starting 4/18, Cr was steadily rising, and UOP was decreasing. Diuresis was stopped on 4/18. Cr peaked at 5.79. LOIS likely pre-renal since UOP increased with administration of 500 mL NS. Now UOP and Cr are improving  - Continue Fraga  - Monitor UOP. No need to monitor daily labs  - IR removed dialysis catheter 4/12.  - Nephrology consult, appreciate recs                           - No HD given poor prognosis, risk of bleeding, low BP's, patient's anxious reaction to discussion of HD, and likely impossibility of discharge on HD and dobutamine                           - Fraga catheter.                           - Bumex 4 mg IV given 4/24 with little effect                           - 500 mL NS given 4/25, increased UOP, Cr improving     #Multifactorial Encephalopathy, improving  #Delirium  #Dementia (MoCA 16/30)  #Ventriculomegaly / possible normal pressure hydrocephalus  #h/o right MCA CVA  Likely due to cardiogenic shock, although also had extensive workup for other causes. CT scan with enlarged venticles. No mass or acute CVA. Sepsis also possible contributor. Possibly uremia.  Low suspicion for SBP. Patient's history had been concerning for NPH. LP done on 4/2. Attempted large volume (25-30 cc); however, was only able to obtain 0.5 cc.  - Was treated for UTI / PNA (s/p tx with abx). Thought it could be uremia (BUN improved), NPH (neurology recommends no further inpatient workup), baseline dementia / CVA, cardiogenic shock (see above)  - Psychiatry consulted: Latuda had been at 40 mg, was decreased to 20 mg due to family's concern about its affect on patient's daytime sleepiness, and melatonin 6 mg qhs prn for agitation, less risk of QTc prolongation. Patient's family requested for latuda to be discontinued on 4/26 due to concern for increased somnolence. Patient's family requesting for all sedating medications (e.g. Benzos, Latuda) to be withheld  - Neurology signed off. Recommended follow-up in movement disorder clinic for workup of possible NPH, although this is less likely now given patient's poor cardiac prognosis.  - PT/OT  - Has not required sitter/VPM since 5/2  - Psych noted on 5/3 that patient does not have decision-making capability     #CAD s/p ATIF to LAD 2015  - Discontinued pta atorvastatin 80 mg qday and ASA 81 mg since overall mortality benefit low for patient due to overall poor prognosis  - Avoid BB with cardiac amyloid      #Afib/flutter s/p CTI ablation 2006  Remains in afib. Was on sotalol but this was discontinued around 8/2017 per outside cardiology notes. Discontinued warfarin on 4/3 due to labile INR's and started on apixaban instead. Now off apixaban since overall benefit low given patient's overall poor prognosis and concern for increased bleeding risk with high BUN.    #Concern for UTI, resolved  Started on rocephin on 4/21. Urine cultures grew pan-sensitive Klebsiella pneumoniae. De-escalated to Keflex and then switched back to ceftiriazon when having poor PO intake. Completed 6 days of antibiotics.    #Aspiration PNA, resolved  Started on Unasyn on 3/27.  Switched to Zosyn on 3/28 per ID recs. Restarted on Unasyn on 3/30 when sensitivities returned on UTI (Klebsiella pneumoniae). Completed course of Unasyn on 4/4. Repeat chest x-ray on 4/14 stable. Patient remains afebrile.      # Cachexia  Patient's volume overload somewhat obscures his severe malnutrition, but he has temporal wasting.    FEN: Regular diet  PPX: No longer on apixaban - anticoagulation not needed  Code Status: DNR/DNI  Dispo: Patient is accepted at [facility], awaiting bed availability. Once bed is available, family will be given the [facility] appeal letter is family chooses to appeal the discharge recommendation     Patient was discussed with staff attending, , who agrees with the above assessment and plan.      Carmen Mota MD  Internal Medicine Resident  Cardiology Service  Pager: [phone]                ATTENDING ATTESTATION:  Patient has been seen and evaluated by me on May 7, 2018. I have reviewed the medications, laboratory, imaging, and other relevant results.  I agree with the above note which I have edited, as necessary.  I have discussed my assessment and plan with the house staff.    Patient is a 77-year-old gentleman with cardiac amyloidosis with end-stage heart failure, dementia and CKD 5.  P. atient does not have decision-making capabilityIn consultation with advanced heart failure specialist, he was initiated on dobutamine which he appears to be tolerating well. Patient is not very functional and family is unable to take care of him at home. Patient is awaiting bed in nursing facility but there is only one nursing home that accepts patient on dobutamine. Family is not interested in moving him to NH due to financial reasons. All of these pose a significant challenge with discharge.  Patient is unwilling to consider tapering of the dobutamine. Due to their strong theological beliefs, they are not accepting of hospice care. Will ask Dr. Eng to provide input  on benefits and safety of continuing dobutamine in this situation.       Libby Sapp MD, MS  Staff Cardiologist, [facility]  Pager: [phone]

## 2018-05-07 NOTE — PROGRESS NOTES
"[facility], [place]   Palliative Care Daily Progress Note          Recommendations, Patient/Family Counseling & Coordination    Pt seen and examined. Nursing notes reviewed and notes from weekend reviewed.  Family present. He is more attentive to conversation, sitting up in bedside chair. Smiling. Family encouraged by heart rate in 70's, sats in 96+ and appetite improving- ate well past 2-3 meals. Kidney function has improved as demonstrated by good urine output. Spouse told me today she is praying \" He may get well enough to get up and walk out of this hospital and get home.\"   Financial concerns being explored with elder law .     Barriers to discharge: Dobutamine dependence- bed avialability at  [place]. No longer needs sitter.     CODE STATUS:  dnr/dni status but family has emphasized full measures/intervention otherwise.     Wife (Nu) aware she is not capable of caring for him at home with his present level of assistance, hoping for more recovery than likely- at present unable to regain functional strength.   Family refuses to review options for discharge that include hospice care.      REC: - No change in treatment planned for now. Offered support for hope balanced against his progressive heart disease.   - Continues Melatonin. Support nursing efforts to help day/night cycle health.  - Stool softeners, suppositories for bowel program- continue scheduled bowel meds- hold if loose stools.   - PT for energy conservation. Continues with need for max of 2 assist with no carry over to subsequent sessions- poor activity tolerance.  - Ongoing requirement for IV inotropes - preliminarily OK'ed by  Hospice to continue after discharge if able to find placement  - from PC perspective discharge should ideally be hospice and comfort focused- family again not at that place. [facility] only facility option  -  Goals of Care: His goals- as verbalized by family- remain cautiously restorative- " they are aware he may not leave the hospital but also somewhat in denial of slow decline and diminishing treatment options of his heart failure.    Per SW and family- financial concerns limit options for expanded end of life care. SW has evaluated benevolent care considerations.     POLST- not completed at this time     Thank you for the opportunity to continue to participate in the care of this patient and family.  Please feel free to contact on-call palliative provider with any emergent needs.  We can be reached via team pager [phone] (answered 8-4:30 Monday-Friday); after-hours answering service ([phone])    Jamie GREER NP ACHPN  Nurse Practitioner- Lead Advanced Practice Provider  [facility] Palliative Medicine Consult Service   [phone]    Jamie Myrick NP    TT spent: 28 minutes of which 17 minutes were spent in direct face to face contact with patient/family. Greater than 50% of time spent counseling and/or coordinating care.       Assessment      1) Diagnoses & symptoms:        Cardiac Amyloidosis- end stage disease- IV inotrope dependence  A fib /flutter (on warfarin)  Stenting of CAD 2015  CKD Stage IV- likely cardiorenal component   CVA 2/2017 R MCA- some residual encephalopathy   AMS- likely delirium superimposed on dementia process            Interval History:   Soft but stable pressures- no chest pain or lightheadedness. Less somnolence today. No shortness of breath. Tells me vaguely about chronic foot pain- unable to elaborate. No change in complicated discharge plan.            Review of Systems:   Palliative Symptom Review (0=no symptom/no concern, 1=mild, 2=moderate, 3=severe):      Pain: 1 (foot/back pain)      Fatigue: 1-2      Nausea: 0      Constipation: 1-2      Diarrhea: 0      Depressive Symptoms: 1      Anxiety: 1      Drowsiness: 1-2      Poor Appetite: 1      Shortness of Breath: 0      Insomnia: 2      Confusion 2              Medications:   I have reviewed this  patient's medication profile and medications given in past 24 hours.      {   Physical exam .Vitals: /83 (BP Location: Right arm)  Pulse 79  Temp 97.5  F (36.4  C) (Oral)  Resp 16  Wt 63.8 kg (140 lb 10.5 oz)  SpO2 99%  BMI 20.77 kg/m2  BMI= Body mass index is 20.77 kg/(m^2).   GEN: More wakeful in bedside chair, rouses to voice. Family present.- shorter  intervals of sleeping during the day.  HEENT:AT/ NC, EOM grossly intact, mucous membranes pink, moist. Dentition in good repair. Tongue midline  PULM/THORAX: Clear to auscultation bilaterally, no rales/rhonchi/wheezes. Moderate inspiratory effort.   CV S1 S2 with Rhythm c/w  atrial fibrillation- rate controlled- 74-78 bpm occ ectopy.  ABD: soft, mildy distended, normoactive bowel sounds, no organomegaly  EXT: trace pitting LE edema. No asymmetrical edema or tenderness to palpation in calves bilaterally.  NEURO: difficult to fully assess. No apparent focal neurological deficit. Confusion per baseline.                 Data Reviewed:   *  ROUTINE LABS (Last four results)  BMP  No lab results found in last 7 days.  CBC    Recent Labs  Lab 05/04/18  1545   *     INRNo lab results found in last 7 days.

## 2018-05-07 NOTE — PROGRESS NOTES
D: Cardiac amyloidosis    I: Monitored vitals and assessed pt status.   Changed:-  Running:Dobutamine @ 5   PRN:-    A: A0x4. VSS. Afebrile. Urinating adequately. Cooperative. Shouting in the AM but calmed down throughout day. Went outside with family. Fraga in place.     P: Continue to monitor Pt status and report changes to treatment team.    Temp:  [97.5  F (36.4  C)-97.8  F (36.6  C)] 97.8  F (36.6  C)  Heart Rate:  [81-89] 85  Resp:  [16] 16  BP: ()/(71-83) 87/73  SpO2:  [97 %-100 %] 100 %

## 2018-05-07 NOTE — PROGRESS NOTES
Social Work Services Progress Note    Hospital Day: 41  Date of Initial Social Work Evaluation:  3/29/18  Collaborated with: Spouse, daughter, son, palliative NP, Cards 1 team.    Data:  Pt is a 77 year old male being followed by SW for placement as the medical team feels the pt has been medically ready for transition pending safe discharge plan.  Safe plan has been attained with [facility] as the pt is now off VPM and medically stabilized.  The barrier to discharge is [facility] does not have an open bed in their memory care unit to offer at this time.  Pt is on a waitlist for an open bed.    Intervention: JOAQUÍN checked in with spouse and son today.  Spouse states she talked with her elder law  and his recommendation was to work with [facility] to get an MA application started for the [facility].  SW also offered financial counseling while family is in the hospital.  Spouse agreed to meet with financial counseling, SW sent referral today.  No beds are open today at [facility], pt continues to be on the waitlist and medically stable for discharge when a bed becomes available.     Referrals in Progress:   [facility] - accepted pending bed availability in memory care.  Admissions is unclear when this bed will be available.  PH: [phone]  F: [phone]    Assessment: Spouse overwhelmed with MA application and wanting her daughter present to assist with this need.  Spouse will ask daughter to be present tomorrow to meet with financial counseling.  Daughter has been ill with a virus and was not in pt's room today.  Son at bedside and supportive.    Plan:    Anticipated Disposition: [facility] for [facility] in their [facility] Care Unit.  Currently there are no beds available.  Pt is on a waitlist for bed openings.    Barriers to d/c plan:  Cost of TCU/LTC, dobutamine needs, financial hardship, lack of social support to take the pt home.    Follow  Up:  SW to follow for discharge planning to [facility].    Alia Alcocer, MARISA, APSW  6C Unit   Phone: [phone]  Pager: [phone]  Unit: [phone]      ___________________________________________________________________________________________________________________________________________________    Referrals Discontinued:  [facility] - cannot take pt's with dobutamine - family declines hospice care and declines discontinuation of dobutamine.  [facility] - cannot take pts with dobutamine.     Community Case Management/Community Services in place:   Encouraged family to follow up with elder law  for better information on financial counseling and Medicaid support.  Per previous discussions pt was at one time financially able to access Medicaid with a $1400 spenddown.  Family has a LTC insurance plan that will cover up to $9000 per mo after pt has had 6 mo or about 180 days in a LTC setting already.  Per spouse, pt had met 14 of the 180 day requirement when he was in TCU.

## 2018-05-07 NOTE — PROGRESS NOTES
D: Elevated creat & AMS. -- cardiac amyloidosis.    I: Monitored vitals and assessed pt status.   Changed:   Running: Dobutamine @ 5mcg/kg/min  PRN: simethicone q6h     A: Oriented to person only. VSS. Afebrile. Urinating adequately - martinez intact. Martinez cares done. Poor appetite. Family in room most of day. Some mild abdominal discomfort relieved by simethicone. Pt up to chair during shift.    P: Pt waiting for bed at nursing home. Continue to monitor Pt status and report changes to treatment team.    Temp:  [97.5  F (36.4  C)-97.6  F (36.4  C)] 97.5  F (36.4  C)  Heart Rate:  [81-89] 83  Resp:  [16] 16  BP: ()/(62-83) 119/83  SpO2:  [97 %-100 %] 99 %

## 2018-05-08 NOTE — PROGRESS NOTES
Cardiology Progress Note  Josr Landers MRN: [id number]  Age: 77 year old, : 1940  Date: 2018              Subjective     No acute events overnight. Denies pain, chest pain, SOB, or abdominal pain.           Objective     /73 (BP Location: Right arm)  Pulse 79  Temp 98.5  F (36.9  C) (Oral)  Resp 16  Wt 65.7 kg (144 lb 13.5 oz)  SpO2 95%  BMI 21.39 kg/m2  Temp:  [96.9  F (36.1  C)-98.5  F (36.9  C)] 98.5  F (36.9  C)  Heart Rate:  [84-96] 95  Resp:  [16-20] 16  BP: (102-111)/(71-76) 111/73  SpO2:  [95 %-100 %] 95 %  Wt Readings from Last 2 Encounters:   18 65.7 kg (144 lb 13.5 oz)   18 75.3 kg (165 lb 14.4 oz)       I/O last 3 completed shifts:  In: 1153.6 [P.O.:880; I.V.:273.6]  Out: 925 [Urine:925]      Gen: No acute distress, laying in bed  HEENT: NC/AT  PULM/THORAX: Clear to auscultation bilaterally anteriorly, no rales/rhonchi/wheezes, on RA  CV: IRIR, normal S1 and S2, no murmurs or rubs.   ABD: Soft, NTND, bowel sounds present, no masses  EXT: WWP. No LE edema  NEURO: awake and appropriate responses            Data:     Reviewed. No new labs.        Medications     Current Facility-Administered Medications   Medication     alteplase (CATHFLO ACTIVASE) injection 2 mg     ammonium lactate (LAC-HYDRIN) 12 % lotion     bisacodyl (DULCOLAX) EC tablet 5 mg    Or     bisacodyl (DULCOLAX) EC tablet 10 mg    Or     bisacodyl (DULCOLAX) EC tablet 15 mg     bisacodyl (DULCOLAX) Suppository 10 mg     DOBUTamine 500 mg in dextrose 5% 250 mL (adult std conc) premix     heparin lock flush 10 UNIT/ML injection 2-5 mL     heparin sodium PF injection 5,000 Units     hydrocortisone (CORTAID) 1 % cream     lidocaine (LMX4) kit     lidocaine (XYLOCAINE) 5 % ointment     lidocaine 2 % (URO-JET) jelly 10 mL     melatonin tablet 6 mg     multivitamin  peds with iron (FLINTSTONES COMPLETE) chewable tablet 60 mg     naloxone (NARCAN) injection 0.1-0.4 mg     nystatin  (MYCOSTATIN) cream     Patient is already receiving anticoagulation with heparin, enoxaparin (LOVENOX), warfarin (COUMADIN)  or other anticoagulant medication     phenylephrine-cocoa butter (PREPARATION H) per suppository 1 suppository     polyethylene glycol (MIRALAX/GLYCOLAX) Packet 17 g     psyllium (METAMUCIL/KONSYL) Packet 1 packet     sennosides (SENOKOT) tablet 2 tablet     simethicone (MYLICON) suspension 40 mg     thiamine (B-1) injection 100 mg             Assessment and Plan:     76 y/o male with PMHx significant for 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 Patient's urine output and Cr have been improving. Patient is medically stable for discharge.     Today:  - No medical changes  - Will check Cr and Mixed venous gas today to re-evaluate cardiac and kidney function  - Patient is accepted at [facility], awaiting bed availability. Once bed is available, family will be given the [facility] appeal letter if family chooses to appeal the discharge recommendation.  - Heart failure team consulted for prognosis and plan for Dobutamine gtt in setting of terminal heart failure 2/2 Amyloidosis. Pending for recommendation.     #Cardiac amyloidosis  #HFrEF NYHA IV D (EF 15-20%)  RHC on 4/3. PCW 20. RA 17. PA 43/25 (33). RV 43/17. Son CI 1.4. Patient is volume overloaded and in cardiogenic shock. Suspect this was the likely cause of patient's altered mental status, LOIS, and possibly the ventriculomegaly. PICC placed on 4/3 for dobutamine infusion. His mental status and renal function have improved/stable with the dobutamine infusion. His overall prognosis remains very poor. Patient is very high risk for arrhythmia on  dobutamine gtt for his amyloidosis, will get input from heart failure team for prognosis and management.   - Check mixed venous gas today   - Continue dobutamine at 5 mcg/kg/min   - Discontinued hydralazine due to soft BP's  - Diuresis: 4 mg IV Bumex given 4/24 to little effect  - Palliative consulted, appreciate recs re: GOC and d/c plans  - Patient is accepted at [facility], awaiting bed availability. Once bed is available, family will be given the [facility] appeal letter if family chooses to appeal the discharge recommendation    # CKD-V   # LOIS - improving  Patient was oliguric on presentation. Tunneled line by IR 3/29. Started HD 3/29. Urine output increased and Cr stable/improved with dobutamine. No further HD as patient had good UOP and a very poor cardiac prognosis, so the tunneled catheter was removed. Following removal of the catheter, Cr was stable in the 2.3-2.4 range, and patient had adequate UOP. However starting 4/18, Cr was steadily rising, and UOP was decreasing. Diuresis was stopped on 4/18. Cr peaked at 5.79. LOIS likely pre-renal since UOP increased with administration of 500 mL NS. Now UOP and Cr are improving  - Check Cr today  - Continue Fraga  - Monitor UOP. No need to monitor daily labs  - IR removed dialysis catheter 4/12.  - Nephrology consult, appreciate recs                           - No HD given poor prognosis, risk of bleeding, low BP's, patient's anxious reaction to discussion of HD, and likely impossibility of discharge on HD and dobutamine                           - Fraga catheter.                           - Bumex 4 mg IV given 4/24 with little effect                           - 500 mL NS given 4/25, increased UOP, Cr improving     #Multifactorial Encephalopathy, improving  #Delirium  #Dementia (MoCA 16/30)  #Ventriculomegaly / possible normal pressure hydrocephalus  #h/o right MCA CVA  Likely due to cardiogenic shock, although also had extensive workup for other  causes. CT scan with enlarged venticles. No mass or acute CVA. Sepsis also possible contributor. Possibly uremia. Low suspicion for SBP. Patient's history had been concerning for NPH. LP done on 4/2. Attempted large volume (25-30 cc); however, was only able to obtain 0.5 cc.  - Was treated for UTI / PNA (s/p tx with abx). Thought it could be uremia (BUN improved), NPH (neurology recommends no further inpatient workup), baseline dementia / CVA, cardiogenic shock (see above)  - Psychiatry consulted: Latuda had been at 40 mg, was decreased to 20 mg due to family's concern about its affect on patient's daytime sleepiness, and melatonin 6 mg qhs prn for agitation, less risk of QTc prolongation. Patient's family requested for latuda to be discontinued on 4/26 due to concern for increased somnolence. Patient's family requesting for all sedating medications (e.g. Benzos, Latuda) to be withheld  - Neurology signed off. Recommended follow-up in movement disorder clinic for workup of possible NPH, although this is less likely now given patient's poor cardiac prognosis.  - PT/OT  - Has not required sitter/VPM since 5/2  - Psych noted on 5/3 that patient does not have decision-making capability     #CAD s/p ATIF to LAD 2015  - Discontinued pta atorvastatin 80 mg qday and ASA 81 mg since overall mortality benefit low for patient due to overall poor prognosis  - Avoid BB with cardiac amyloid      #Afib/flutter s/p CTI ablation 2006  Remains in afib. Was on sotalol but this was discontinued around 8/2017 per outside cardiology notes. Discontinued warfarin on 4/3 due to labile INR's and started on apixaban instead. Now off apixaban since overall benefit low given patient's overall poor prognosis and concern for increased bleeding risk with high BUN.    #Concern for UTI, resolved  Started on rocephin on 4/21. Urine cultures grew pan-sensitive Klebsiella pneumoniae. De-escalated to Keflex and then switched back to ceftiriazon when  having poor PO intake. Completed 6 days of antibiotics.    #Aspiration PNA, resolved  Started on Unasyn on 3/27. Switched to Zosyn on 3/28 per ID recs. Restarted on Unasyn on 3/30 when sensitivities returned on UTI (Klebsiella pneumoniae). Completed course of Unasyn on 4/4. Repeat chest x-ray on 4/14 stable. Patient remains afebrile.      # Cachexia  Patient's volume overload somewhat obscures his severe malnutrition, but he has temporal wasting.    FEN: Regular diet  PPX: No longer on apixaban - anticoagulation not needed  Code Status: DNR/DNI  Dispo: Patient is accepted at [facility], awaiting bed availability. Once bed is available, family will be given the [facility] appeal letter is family chooses to appeal the discharge recommendation     Patient was discussed with staff attending, , who agrees with the above assessment and plan.      Carmen Mota MD  Internal Medicine Resident  Cardiology Service  Pager: [phone]                ATTENDING ATTESTATION:  Patient has been seen and evaluated by me on May 8, 2018. I have reviewed the medications, laboratory, imaging, and other relevant results.  I agree with the above note which I have edited, as necessary.  I have discussed my assessment and plan with the house staff.    Libby Sapp MD, MS  Staff Cardiologist, [facility]  Pager: [phone]

## 2018-05-08 NOTE — PLAN OF CARE
Problem: Patient Care Overview  Goal: Plan of Care/Patient Progress Review  Outcome: No Change    D: Pt admitted 3/26 from TCU with AMS and elevated creat.  I/A: Dobutamine gtt at 5mcg/kg/min. Continues in A fib 80's-90's. Fraga patent with good UOP. Fraga care done. Pt continues with confusion, though seemed to call out less frequently this shift and was more situationally aware and cooperative and calm with cares. Pt rested for longer periods throughout the night, most often after being freshly repositioned. Denies pain. Ate 2 pieces of toast with butter and jam and 1 cup ice cream. Assisted with frequent turns/positioning. Continues with fecal incontinence, brief on, incontinence cares. Bed alarm for pt safety.   P: Awaiting bed at nursing home. Continue to monitor and assess pt condition and contact treatment team with questions or concerns.

## 2018-05-08 NOTE — PROGRESS NOTES
CLINICAL NUTRITION SERVICES - REASSESSMENT NOTE     Nutrition Prescription    RECOMMENDATIONS FOR MDs/PROVIDERS TO ORDER:  Order kcal counts if oral intake declines. If kcal counts indicate pt is consuming less than 1387 kcals and 53 g protein daily on average, then rec TFs if within pt's POC. See nutrition note with TF recs 4/4, if needed    Malnutrition Status:    Non-severe malnutrition in the context of chronic illness    Future/Additional Recommendations:  1. Continue regular diet, as ordered. Monitor K+ trends and potential need for K+ restriction, if warranted. Fluid restriction or low-sodium diet restrictions if needed. However, rec keep diet as liberalized as able to help encourage oral intake.  2. Monitor BG control. Hgb A1c of 6.4 on 11/13/17.  3. Continue thiamine as ordered. Multivitamin (Flintstones multivitamin per team since 5/4) as per team.   4. Consider checking folic acid lab. Vitamin B12 was WNL when checked.     EVALUATION OF THE PROGRESS TOWARD GOALS   Diet: Regular diet order since 4/27. Has an order to receive Pro-stat with meals. Room service appropriate with assist.  Intake: Fair diet tolerance. Pt needing feeding assistance. Flowsheets indicate pt consuming 25% of meals with a poor appetite 4/30, 25-50% of meals with a good appetite 5/1, % of meals with a fair to good appetite 5/2, 50-75% of meals with a fair to good appetite 5/3, 25% of meals with a fair appetite 5/4, 75% of meals with a good appetite, % of meals with a fair to good appetite 5/6, and % of meals with a poor to good appetite 5/7. Per pt, his appetite is fair. States he is hungry at times, but then feels full. Does not remember trying cherry Gelatein. He did try the pineapple flavor in the past and disliked it. Noticed a cherry and pineapple Gelatein in his room but did not notice any Pro-stat. Pt was eating breakfast at time of visit. He was having a cookie and coffee. Agreed to consume orange juice and  consumed 80% of this while in room. He ate two slices of almodovar and was snacking on fruit (grapes and cantaloupe) and feeding himself grapes when leaving his room. Pt mentioned that he does like cookies and ice cream (although questionable if would tolerate ice cream and pt's family wonders if pt is intolerant of milk protein).     NEW FINDINGS   WOC nurse (5/2): Evaluate and treat coccyx wound. Coccyx wound due to Moisture Associated Skin Damage (MASD) and friction injury within scar tissue. Status: Follow up assessment,stable. No note of pressure injury in WOC nurse note.    MALNUTRITION  % Intake: < 75% for > 7 days (non-severe)  % Weight Loss: Difficult to assess wt changes with diuresis this admission.   Subcutaneous Fat Loss: Facial region: Mild, Arms: Mild     Muscle Loss: Temporal: Mild, Interosseous: Mild-Moderate, and Thoracic region (clavicle, acromium bone, deltoid, trapezius, pectoral): Moderate  Fluid Accumulation/Edema: None noted  Malnutrition Diagnosis: Non-severe malnutrition in the context of chronic illness    Previous Goals   Patient to consume % of nutritionally adequate meal trays TID, or the equivalent with supplements/snacks.  Evaluation: Not met.     Previous Nutrition Diagnosis  Inadequate oral intake related to altered mentation, fatigue, and lack of appetite intermittently as evidenced by pt consuming 25% of meals frequently per flowsheets.  Evaluation: Unresolved. Updated below.     CURRENT NUTRITION DIAGNOSIS  Inadequate oral intake related to altered mentation, fatigue, and lack of appetite intermittently as evidenced by pt consuming 25-50% of meals at times per flowsheets.    INTERVENTIONS  Implementation  Nutrition education for nutrition relationship to health/disease: Encouraged oral intake and intake at meals.     Goals  Patient to consume % of nutritionally adequate meal trays TID, or the equivalent with supplements/snacks.    Monitoring/Evaluation  Progress toward  goals will be monitored and evaluated per protocol.     Nutrition will continue to follow.     Shereen Toro MS, RD, LD, [facility]   6C Pgr: [phone]

## 2018-05-08 NOTE — PLAN OF CARE
"Problem: Patient Care Overview  Goal: Plan of Care/Patient Progress Review  RN  1. Pt will be free from injury   Discharge Planner OT   Patient plan for discharge: Pt unable to participate in discharge planning due to AMS. Per chart review, pt's family preferring TCU/LTC.   Current status: Pt requiring maximum encouragement to participate due to AMS. Pt able to initially walk BLEs over to EOB with min A, max A x 2 for bed mobility for supine > sit EOB. Able to progress from Mod A x 2 while seated EOB > min A/CGA x 2. Pt declining standing activity this date stating, \"just get it done and over with\" as pt wanting dependent transfer. Returned to supine with max A x 2.  Barriers to return to prior living situation: AMS, decreased strength/activity tolerance, weakness, medical prognosis  Recommendations for discharge: TCU/LTC  Rationale for recommendations: Pt is currently below functional baseline for ADLs, transfers, and functional mobility, per chart review, pt's family unable to provide level of care that pt requires at home.        Entered by: Gloria Morris 05/08/2018 3:39 PM           "

## 2018-05-08 NOTE — PROGRESS NOTES
D: cardiac amyloidosis admitted with AMS and elevated creat.     I: Monitored vitals and assessed pt status.   Changed:   Running: Dobuatmine @ 5 mcg/kg/min  PRN: Simethicone q6h    A: Oriented to person only. VSS. Afebrile. Denies pain - some abdominal discomfort relieved by simethicone. Urinating adequately- martinez intact - cares done. Poor appetite. Pt up to chair most of the morning - repositioned in bed this afternoon + repo q2h. Up using ceiling lift. Family in room most of shift.     P: Continue to monitor Pt status and report changes to treatment team.    Temp:  [96.9  F (36.1  C)-97.8  F (36.6  C)] 96.9  F (36.1  C)  Heart Rate:  [84-96] 94  Resp:  [16-20] 16  BP: ()/(71-76) 102/71  SpO2:  [96 %-100 %] 96 %

## 2018-05-09 NOTE — PROGRESS NOTES
Social Work Services Progress Note    Hospital Day: 42  Date of Initial Social Work Evaluation:  3/29/18  Collaborated with: Spouse, Financial counseling    Data:  Pt is a 77 year old male being followed by SW for placement as the medical team feels the pt has been medically ready for transition pending safe discharge plan.  Safe plan has been attained with [facility] as the pt is now off VPM and medically stabilized.  The barrier to discharge is [facility] does not have an open bed in their memory care unit to offer at this time.  Pt is on a waitlist for an open bed.    Intervention:  SW checked in with pt's spouse to see if they had met with financial counseling to complete the MA application.  Family states they have not seen the financial counselor today.  Family asked that the counselor come tomorrow at 11:30 -12:00 to complete the application.     Referrals in Progress:   [facility] - accepted pending bed availability in memory care.  Admissions is unclear when this bed will be available.  PH: [phone]  F: [phone]    Assessment: Spouse adjusting well today and is hopeful that pt will continue to make recovery gains as his creatinine has improved today.    Plan:    Anticipated Disposition: [facility] is accepting pt for placement in their Memory Care Unit.  Currently there are no beds available.  Pt is on a waitlist for bed openings.    Barriers to d/c plan:  Cost of TCU/LTC, dobutamine needs, financial hardship, lack of social support to take the pt home.    Follow Up:  SW to follow for discharge planning to [facility].    JERRI Valdovinos, APSW  6C Unit   Phone: [phone]  Pager: [phone]  Unit: [phone]      ___________________________________________________________________________________________________________________________________________________    Referrals Discontinued:  [facility] take  pt's with dobutamine - family declines hospice care and declines discontinuation of dobutamine.  [facility] - cannot take pts with dobutamine.     Community Case Management/Community Services in place:   Encouraged family to follow up with elder law  for better information on financial counseling and Medicaid support.  Per previous discussions pt was at one time financially able to access Medicaid with a $1400 spenddown.  Family has a LTC insurance plan that will cover up to $9000 per mo after pt has had 6 mo or about 180 days in a LTC setting already.  Per spouse, pt had met 14 of the 180 day requirement when he was in TCU.

## 2018-05-09 NOTE — PLAN OF CARE
Problem: Patient Care Overview  Goal: Plan of Care/Patient Progress Review  RN  1. Pt will be free from injury   Outcome: Therapy, progress toward functional goals as expected  Continues on Dobutamine Gtt at 5 mcg/kg/min via PICC. No labs ordered today. Fraga patent clear straw-colored urine. Incontinent one small stool. Abdomen distended, Mylicon drops once for abdominal/gas discomfort. Monitor Atrial Fib 80's-90's. Oriented to person & place, inappropriate(out of context) conversation, STM deficit, calling out in AM before family arrived. Lifted to recliner. Fed meals, fluids offered. Refer to flow sheets for full assessments, VS etc.

## 2018-05-09 NOTE — PROGRESS NOTES
Cardiology Progress Note  Josr Landers MRN: [id number]  Age: 77 year old, : 1940  Date: 2018              Subjective     No acute events overnight. Denies pain, chest pain, SOB, or abdominal pain.           Objective     /78 (BP Location: Right arm)  Pulse 79  Temp 97.5  F (36.4  C) (Oral)  Resp 16  Wt 65.8 kg (145 lb 1 oz)  SpO2 100%  BMI 21.42 kg/m2  Temp:  [96.8  F (36  C)-97.8  F (36.6  C)] 97.5  F (36.4  C)  Heart Rate:  [94-96] 95  Resp:  [16] 16  BP: ()/(68-78) 109/78  SpO2:  [95 %-100 %] 100 %  Wt Readings from Last 2 Encounters:   18 65.8 kg (145 lb 1 oz)   18 75.3 kg (165 lb 14.4 oz)       I/O last 3 completed shifts:  In: 770.66 [P.O.:540; I.V.:230.66]  Out: 750 [Urine:750]      Gen: No acute distress, laying in bed  HEENT: NC/AT  PULM/THORAX: Clear to auscultation bilaterally anteriorly, no rales/rhonchi/wheezes, on RA  CV: IRIR, normal S1 and S2, no murmurs or rubs.   ABD: Soft, NTND, bowel sounds present, no masses  EXT: WWP. No LE edema  NEURO: awake and appropriate responses            Data:     Reviewed. No new labs.        Medications     Current Facility-Administered Medications   Medication     alteplase (CATHFLO ACTIVASE) injection 2 mg     ammonium lactate (LAC-HYDRIN) 12 % lotion     bisacodyl (DULCOLAX) EC tablet 5 mg    Or     bisacodyl (DULCOLAX) EC tablet 10 mg    Or     bisacodyl (DULCOLAX) EC tablet 15 mg     bisacodyl (DULCOLAX) Suppository 10 mg     DOBUTamine 500 mg in dextrose 5% 250 mL (adult std conc) premix     heparin lock flush 10 UNIT/ML injection 2-5 mL     heparin sodium PF injection 5,000 Units     hydrocortisone (CORTAID) 1 % cream     lidocaine (LMX4) kit     lidocaine (XYLOCAINE) 5 % ointment     lidocaine 2 % (URO-JET) jelly 10 mL     melatonin tablet 6 mg     multivitamin  peds with iron (FLINTSTONES COMPLETE) chewable tablet 60 mg     naloxone (NARCAN) injection 0.1-0.4 mg     nystatin  (MYCOSTATIN) cream     Patient is already receiving anticoagulation with heparin, enoxaparin (LOVENOX), warfarin (COUMADIN)  or other anticoagulant medication     phenylephrine-cocoa butter (PREPARATION H) per suppository 1 suppository     polyethylene glycol (MIRALAX/GLYCOLAX) Packet 17 g     psyllium (METAMUCIL/KONSYL) Packet 1 packet     sennosides (SENOKOT) tablet 2 tablet     simethicone (MYLICON) suspension 40 mg     thiamine (B-1) injection 100 mg             Assessment and Plan:     78 y/o male with PMHx significant for 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 Patient's urine output and Cr have been improving. Patient is medically stable for discharge.     Today:  - No medical changes  - Patient is accepted at [facility], awaiting bed availability. Once bed is available, family will be given the [facility] appeal letter if family chooses to appeal the discharge recommendation.  - Discussed with  and the family, would continue dobutamine gtt.     #Cardiac amyloidosis  #HFrEF NYHA IV D (EF 15-20%)  RHC on 4/3. PCW 20. RA 17. PA 43/25 (33). RV 43/17. Son CI 1.4. Patient is volume overloaded and in cardiogenic shock. Suspect this was the likely cause of patient's altered mental status, LOIS, and possibly the ventriculomegaly. PICC placed on 4/3 for dobutamine infusion. His mental status and renal function have improved/stable with the dobutamine infusion. His overall prognosis remains very poor. Patient is very high risk for arrhythmia on dobutamine gtt for his amyloidosis, will get input from heart failure team for prognosis and management.   - Rechecked mixed venous gas on 5/8 was stable.   - Continue  dobutamine at 5 mcg/kg/min   - Discontinued hydralazine due to soft BP's  - Diuresis: 4 mg IV Bumex given 4/24 to little effect  - Palliative consulted, appreciate recs re: GOC and d/c plans  - Patient is accepted at [facility], awaiting bed availability. Once bed is available, family will be given the [facility] appeal letter if family chooses to appeal the discharge recommendation    # CKD-V   # LOIS - improving  Patient was oliguric on presentation. Tunneled line by IR 3/29. Started HD 3/29. Urine output increased and Cr stable/improved with dobutamine. No further HD as patient had good UOP and a very poor cardiac prognosis, so the tunneled catheter was removed. Following removal of the catheter, Cr was stable in the 2.3-2.4 range, and patient had adequate UOP. However starting 4/18, Cr was steadily rising, and UOP was decreasing. Diuresis was stopped on 4/18. Cr peaked at 5.79. LOIS likely pre-renal since UOP increased with administration of 500 mL NS. Now UOP and Cr are improving.  - Continue Fraga  - Monitor UOP. No need to monitor daily labs  - IR removed dialysis catheter 4/12.  - Nephrology consult, appreciate recs                           - No HD given poor prognosis, risk of bleeding, low BP's, patient's anxious reaction to discussion of HD, and likely impossibility of discharge on HD and dobutamine                           - Fraga catheter.                           - Bumex 4 mg IV given 4/24 with little effect                           - 500 mL NS given 4/25, increased UOP, Cr improving     #Multifactorial Encephalopathy, improving  #Delirium  #Dementia (MoCA 16/30)  #Ventriculomegaly / possible normal pressure hydrocephalus  #h/o right MCA CVA  Likely due to cardiogenic shock, although also had extensive workup for other causes. CT scan with enlarged venticles. No mass or acute CVA. Sepsis also possible contributor. Possibly uremia. Low suspicion for SBP. Patient's history had been  concerning for NPH. LP done on 4/2. Attempted large volume (25-30 cc); however, was only able to obtain 0.5 cc.  - Was treated for UTI / PNA (s/p tx with abx). Thought it could be uremia (BUN improved), NPH (neurology recommends no further inpatient workup), baseline dementia / CVA, cardiogenic shock (see above)  - Psychiatry consulted: Latuda had been at 40 mg, was decreased to 20 mg due to family's concern about its affect on patient's daytime sleepiness, and melatonin 6 mg qhs prn for agitation, less risk of QTc prolongation. Patient's family requested for latuda to be discontinued on 4/26 due to concern for increased somnolence. Patient's family requesting for all sedating medications (e.g. Benzos, Latuda) to be withheld  - Neurology signed off. Recommended follow-up in movement disorder clinic for workup of possible NPH, although this is less likely now given patient's poor cardiac prognosis.  - PT/OT  - Has not required sitter/VPM since 5/2  - Psych noted on 5/3 that patient does not have decision-making capability     #CAD s/p ATIF to LAD 2015  - Discontinued pta atorvastatin 80 mg qday and ASA 81 mg since overall mortality benefit low for patient due to overall poor prognosis  - Avoid BB with cardiac amyloid      #Afib/flutter s/p CTI ablation 2006  Remains in afib. Was on sotalol but this was discontinued around 8/2017 per outside cardiology notes. Discontinued warfarin on 4/3 due to labile INR's and started on apixaban instead. Now off apixaban since overall benefit low given patient's overall poor prognosis and concern for increased bleeding risk with high BUN.    #Concern for UTI, resolved  Started on rocephin on 4/21. Urine cultures grew pan-sensitive Klebsiella pneumoniae. De-escalated to Keflex and then switched back to ceftiriazon when having poor PO intake. Completed 6 days of antibiotics.    #Aspiration PNA, resolved  Started on Unasyn on 3/27. Switched to Zosyn on 3/28 per ID recs. Restarted on  Unasyn on 3/30 when sensitivities returned on UTI (Klebsiella pneumoniae). Completed course of Unasyn on 4/4. Repeat chest x-ray on 4/14 stable. Patient remains afebrile.      # Cachexia  Patient's volume overload somewhat obscures his severe malnutrition, but he has temporal wasting.    FEN: Regular diet  PPX: No longer on apixaban - anticoagulation not needed  Code Status: DNR/DNI  Dispo: Patient is accepted at [facility], awaiting bed availability. Once bed is available, family will be given the [facility] appeal letter is family chooses to appeal the discharge recommendation     Patient was discussed with staff attending, , who agrees with the above assessment and plan.      Carmen Mota MD  Internal Medicine Resident  Cardiology Service  Pager: [phone]                ATTENDING ATTESTATION:  Patient has been seen and evaluated by me on May 9, 2018. I have reviewed the medications, laboratory, imaging, and other relevant results.  I agree with the above note which I have edited, as necessary.  I have discussed my assessment and plan with the house staff.    Please see my attestation on 5.7.18. Family conference attempted with Dr. Eng and patient's family is not willing to engage in any further discussion about dobutamine. Patient will remain on 6C/B due to the dobutamine infusion.       Chemistry panel:   Recent Labs   Lab Test  05/08/18   1310  04/30/18   0641  04/30/18   0600  04/30/18   0433  04/29/18   0445   04/04/18   0608  04/03/18   0650   NA   --    --    --   139  138   < >  138  135   POTASSIUM   --   3.5  6.1*  Canceled, Test credited  5.1   < >  4.5  4.0   CHLORIDE   --    --    --   104  100   < >  101  99   CO2   --    --    --   28  29   < >  28  23   ANIONGAP   --    --    --   7  10   < >  10  13   GLC   --    --    --   67*  82   < >  82  86   BUN   --    --    --   102*  114*   < >  53*  51*   CR  1.59*   --    --   2.94*  3.67*   < >  3.23*  3.27*   RIGOBERTO   --     --    --   7.9*  8.8   < >  8.7  8.6   MAG   --    --    --   2.6*  3.0*   < >   --    --    GFRESTIMATED  43*   --    --   21*  16*   < >  19*  19*   AST   --    --    --    --    --    --   Canceled, Test credited  94*   ALT   --    --    --    --    --    --   48  51    < > = values in this interval not displayed.       CBC:   Recent Labs   Lab Test  05/10/18   0536  05/07/18   1500   04/25/18   0543  04/24/18   1700   WBC   --    --    --   7.2  6.7   RBC   --    --    --   3.32*  3.00*   HGB   --    --    --   10.7*  9.9*   HCT   --    --    --   32.3*  29.3*   MCV   --    --    --   97  98   MCH   --    --    --   32.2  33.0   MCHC   --    --    --   33.1  33.8   RDW   --    --    --   16.8*  16.8*   PLT  152  150   < >  112*  100*    < > = values in this interval not displayed.       Lipid Panel:  Recent Labs   Lab Test  04/25/17   1344  06/07/16   1138   07/02/14   1250  01/24/14   0904   CHOL  111  136   < >  180  203*   HDL  53  53   < >  57  63   LDL  36  69   < >  106  123   TRIG  110  72   < >  84  86   CHOLHDLRATIO   --    --    --   3.1  3.2    < > = values in this interval not displayed.       Thyroid:   TSH   Date Value Ref Range Status   03/27/2018 1.97 0.40 - 4.00 mU/L Final     No results found for: T4  Hemoglobin A1C   Date Value Ref Range Status   11/13/2017 6.4 (H) 4.3 - 6.0 % Final     INR   Date Value Ref Range Status   04/03/2018 1.43 (H) 0.86 - 1.14 Final       Libby Sapp MD, MS  Staff Cardiologist, [facility]  Pager: [phone]

## 2018-05-09 NOTE — PROGRESS NOTES
"SPIRITUAL HEALTH SERVICES  SPIRITUAL ASSESSMENT Progress Note (Palliative Focus)  [facility] ([place] 6C    REFERRAL SOURCE: Palliative care follow up.    Visit with wife Nu and di Liriano in hallway while patient Josr \"Bill\" Anshul visited with his own  from [facility], [place].     Nu expressed frustration that \"everyone is giving up\" on her , Ayo. She and family hope that Bill will recover fully and \"walk out of here\".     Nu's Muslim rebekah is central to her understanding of what is happening to Bill.  Nu believes Bill can be healed by God, and that the medical interventions Ayo is receiving give God an opportunity to act. Nu said they are \"patiently waiting for a miracle.\"     She shared signs of hope for her including Bill's \"improving kidney function\" and his stating yesterday to her, \"No one's going to give up on me.\"    Nu and Ayo appreciate prayer, scripture reading, and other forms of spiritual support.    Plan: I will follow for spiritual support.    Laurel Cabello  Palliative   Pager [phone]  [facility] Inpatient Team Consult pager [phone] (M-F 8-4:30)  After-hours Answering Service [phone]   "

## 2018-05-09 NOTE — PROGRESS NOTES
Social Work Services Progress Note    Hospital Day: 43  Date of Initial Social Work Evaluation:  3/29/18  Collaborated with: Palliative Team, Cards 1 team, Cards 2 consult physician Dr. Eng, Family declined participation in meeting today.    Data:  Pt is a 77 year old male being followed by  for placement as the medical team feels the pt has been medically ready for transition pending safe discharge plan.  Safe plan has been attained with [facility] as the pt is now off VPM and medically stabilized.  The barrier to discharge is [facility] does not have an open bed in their memory care unit to offer at this time.  Pt is on a waitlist for an open bed.    Intervention:  Family care conference was scheduled for today with Palliative team, Cards 1 team and Cards 2 consult physician Dr. Eng as the pt has stabilized medically and the medical team wanted to present rationale for recommendation to start reducing dobutamine.  Dr. Eng was asked by family to not discuss medical recommendations with the pt present and family stated they did not want to participate in a care conference today with the team.     Medical team met for provider conference and options for continued care.  At this time, plan is to continue with current dobutamine rate and pursue placement at [facility].  Physicians discussed milrinone and this is not an option due to no community LT facility accepting milrinone.  Pt is also not safe to discharge to home on milrinone therapies.   will continue to also monitor the dobutamine shortage in the community as this could impact safe discharge planning.     Referrals in Progress:   [facility] LTC - accepted pending bed availability in memory care.  Admissions is unclear when this bed will be available.  PH: [phone]  F: [phone]    Assessment: Family declined to participate in care conference today.  Family was also not wanting to have  discussions with Dr. Eng about decreasing dobutamine or exploring alternative options for medical management.    Plan:    Anticipated Disposition: [facility] is accepting pt for placement in their Memory Care Unit.  Currently there are no beds available.  Pt is on a waitlist for bed openings.    Barriers to d/c plan:  Cost of TCU/LTC, dobutamine needs, financial hardship, lack of social support to take the pt home, potential barrier in the future could be dobutamine shortages in the community setting - currently pt should not be affected if placement is soon.  Longer term placement may be affected pending shortage resolutions.    Follow Up:  SW to follow for discharge planning to [facility].    JERRI Valdovinos, APSW  6C Unit   Phone: [phone]  Pager: [phone]  Unit: [phone]      ___________________________________________________________________________________________________________________________________________________    Referrals Discontinued:  [facility] - cannot take pt's with dobutamine - family declines hospice care and declines discontinuation of dobutamine.  [facility] - cannot take pts with dobutamine.     Community Case Management/Community Services in place:   Encouraged family to follow up with elder law  for better information on financial counseling and Medicaid support.  Per previous discussions pt was at one time financially able to access Medicaid with a $1400 spenddown.  Family has a LTC insurance plan that will cover up to $9000 per mo after pt has had 6 mo or about 180 days in a LTC setting already.  Per spouse, pt had met 14 of the 180 day requirement when he was in TCU.

## 2018-05-10 NOTE — PROGRESS NOTES
CARDIOLOGY PROGRESS NOTE    SUBJECTIVE:  No acute events overnight. Remains at his current baseline state of health. Denies chest pain or dyspnea.    ROS otherwise negative.    OBJECTIVE:  Vital signs:  BP range 97//66  HR 96-99  RR 16-16  SpO2 % on RA  Tm 97.7F    Vitals:    05/08/18 0609 05/09/18 0400 05/10/18 0400   Weight: 65.7 kg (144 lb 13.5 oz) 65.8 kg (145 lb 1 oz) 65.6 kg (144 lb 10 oz)       I/O:   Intake: 1125  Output: -1075  NET: +50      PHYSICAL EXAM:  Gen: No acute distress, laying in bed  HEENT: NC/AT  PULM/THORAX: Clear to auscultation bilaterally anteriorly, no rales/rhonchi/wheezes, on RA  CV: IRIR, normal S1 and S2, no murmurs or rubs.   ABD: Soft, NTND, bowel sounds present, no masses  EXT: WWP. No LE edema  NEURO: awake and appropriate responses    Recent Labs   Lab Test  05/10/18   0536  05/08/18   1310   04/30/18   0641   04/30/18   0433   04/25/18   0543   04/04/18   0608   HGB   --    --    --    --    --    --    --   10.7*   < >  11.4*   HCT   --    --    --    --    --    --    --   32.3*   < >  34.0*   WBC   --    --    --    --    --    --    --   7.2   < >  6.3   MCV   --    --    --    --    --    --    --   97   < >  97   MCH   --    --    --    --    --    --    --   32.2   < >  32.4   MCHC   --    --    --    --    --    --    --   33.1   < >  33.5   RDW   --    --    --    --    --    --    --   16.8*   < >  16.3*   PLT  152   --    < >   --    --    --    --   112*   < >  85*   NA   --    --    --    --    --   139   < >  130*   < >  138   POTASSIUM   --    --    --   3.5   < >  Canceled, Test credited   < >  4.8   < >  4.5   CHLORIDE   --    --    --    --    --   104   < >  88*   < >  101   CO2   --    --    --    --    --   28   < >  26   < >  28   BUN   --    --    --    --    --   102*   < >  134*   < >  53*   CR   --   1.59*   --    --    --   2.94*   < >  5.78*   < >  3.23*   GLC   --    --    --    --    --   67*   < >  95   < >  82   RIGOBERTO   --    --    --     --    --   7.9*   < >  9.3   < >  8.7   ALBUMIN   --    --    --    --    --    --    --   3.1*   --   2.5*   BILITOTAL   --    --    --    --    --    --    --    --    --   1.8*   ALKPHOS   --    --    --    --    --    --    --    --    --   99   AST   --    --    --    --    --    --    --    --    --   Canceled, Test credited   ALT   --    --    --    --    --    --    --    --    --   48    < > = values in this interval not displayed.     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 Patient's urine output and Cr have been improving. Patient is medically stable for discharge.      Today:  - No medical changes  - Patient is accepted at [facility], awaiting bed availability. Once bed is available, family will be given the [facility] appeal letter if family chooses to appeal the discharge recommendation.  - Discussed with  and the family, would continue dobutamine gtt.      #Cardiac amyloidosis  #HFrEF NYHA IV D (EF 15-20%)  RHC on 4/3. PCW 20. RA 17. PA 43/25 (33). RV 43/17. Son CI 1.4. Patient is volume overloaded and in cardiogenic shock. Suspect this was the likely cause of patient's altered mental status, LOIS, and possibly the ventriculomegaly. PICC placed on 4/3 for dobutamine infusion. His mental status and renal function have improved/stable with the dobutamine infusion. His overall prognosis remains very poor. Patient is very high risk for arrhythmia on dobutamine gtt for his amyloidosis, will get input from heart failure team for  prognosis and management.   - Rechecked mixed venous gas on 5/8 was stable.   - Continue dobutamine at 5 mcg/kg/min   - Discontinued hydralazine due to soft BP's  - Diuresis: 4 mg IV Bumex given 4/24 to little effect  - Palliative consulted, appreciate recs re: GOC and d/c plans  - Patient is accepted at [facility], awaiting bed availability. Once bed is available, family will be given the [facility] appeal letter if family chooses to appeal the discharge recommendation     # CKD-V   # LOIS - improving  Patient was oliguric on presentation. Tunneled line by IR 3/29. Started HD 3/29. Urine output increased and Cr stable/improved with dobutamine. No further HD as patient had good UOP and a very poor cardiac prognosis, so the tunneled catheter was removed. Following removal of the catheter, Cr was stable in the 2.3-2.4 range, and patient had adequate UOP. However starting 4/18, Cr was steadily rising, and UOP was decreasing. Diuresis was stopped on 4/18. Cr peaked at 5.79. LOIS likely pre-renal since UOP increased with administration of 500 mL NS. Now UOP and Cr are improving.  - Continue Fraga  - Monitor UOP. No need to monitor daily labs  - IR removed dialysis catheter 4/12.  - Nephrology consult, appreciate recs                           - No HD given poor prognosis, risk of bleeding, low BP's, patient's anxious reaction to discussion of HD, and likely impossibility of discharge on HD and dobutamine                           - Fraga catheter.                           - Bumex 4 mg IV given 4/24 with little effect                           - 500 mL NS given 4/25, increased UOP, Cr improving      #Multifactorial Encephalopathy, improving  #Delirium  #Dementia (MoCA 16/30)  #Ventriculomegaly / possible normal pressure hydrocephalus  #h/o right MCA CVA  Likely due to cardiogenic shock, although also had extensive workup for other causes. CT scan with enlarged venticles. No mass or acute CVA. Sepsis also  possible contributor. Possibly uremia. Low suspicion for SBP. Patient's history had been concerning for NPH. LP done on 4/2. Attempted large volume (25-30 cc); however, was only able to obtain 0.5 cc.  - Was treated for UTI / PNA (s/p tx with abx). Thought it could be uremia (BUN improved), NPH (neurology recommends no further inpatient workup), baseline dementia / CVA, cardiogenic shock (see above)  - Psychiatry consulted: Latuda had been at 40 mg, was decreased to 20 mg due to family's concern about its affect on patient's daytime sleepiness, and melatonin 6 mg qhs prn for agitation, less risk of QTc prolongation. Patient's family requested for latuda to be discontinued on 4/26 due to concern for increased somnolence. Patient's family requesting for all sedating medications (e.g. Benzos, Latuda) to be withheld  - Neurology signed off. Recommended follow-up in movement disorder clinic for workup of possible NPH, although this is less likely now given patient's poor cardiac prognosis.  - PT/OT  - Has not required sitter/VPM since 5/2  - Psych noted on 5/3 that patient does not have decision-making capability      #CAD s/p ATIF to LAD 2015  - Discontinued pta atorvastatin 80 mg qday and ASA 81 mg since overall mortality benefit low for patient due to overall poor prognosis  - Avoid BB with cardiac amyloid      #Afib/flutter s/p CTI ablation 2006  Remains in afib. Was on sotalol but this was discontinued around 8/2017 per outside cardiology notes. Discontinued warfarin on 4/3 due to labile INR's and started on apixaban instead. Now off apixaban since overall benefit low given patient's overall poor prognosis and concern for increased bleeding risk with high BUN.     #Concern for UTI, resolved  Started on rocephin on 4/21. Urine cultures grew pan-sensitive Klebsiella pneumoniae. De-escalated to Keflex and then switched back to ceftiriazon when having poor PO intake. Completed 6 days of antibiotics.     #Aspiration PNA,  resolved  Started on Unasyn on 3/27. Switched to Zosyn on 3/28 per ID recs. Restarted on Unasyn on 3/30 when sensitivities returned on UTI (Klebsiella pneumoniae). Completed course of Unasyn on 4/4. Repeat chest x-ray on 4/14 stable. Patient remains afebrile.       # Cachexia  Patient's volume overload somewhat obscures his severe malnutrition, but he has temporal wasting.    FEN: Regular diet  PPX: No longer on apixaban - anticoagulation not needed  Code Status: DNR/DNI  Dispo: Patient is accepted at [facility], awaiting bed availability. Once bed is available, family will be given the [facility] appeal letter is family chooses to appeal the discharge recommendation      Patient was discussed with staff attending, , who agrees with the above assessment and plan.    Eliu Hurtado  Cardiology Fellow  Pager [phone]      ATTENDING ATTESTATION:  Patient has been seen and evaluated by me on May 10, 2018. I have reviewed the medications, laboratory, imaging, and other relevant results.  I agree with the above note which I have edited, as necessary.  I have discussed my assessment and plan with the house staff.    Libby Sapp MD, MS  Staff Cardiologist, [facility]  Pager: [phone]

## 2018-05-10 NOTE — PLAN OF CARE
Problem: Patient Care Overview  Goal: Plan of Care/Patient Progress Review  RN  1. Pt will be free from injury   OT/CR 6C:Discharge Planner OT   Patient plan for discharge:  Pt unable to participate in discharge planning due to AMS. Per chart review, pt's family preferring TCU/LTC.   Current status: Mod-maxAx2 with bed mobility, from laying supine to seated EOB, attempted log roll technique; however, pt unable to follow cues without physical assist. Pt completed x4 reps total sit to stand from EOB to FWW with mod-maxAx2, becoming maxAx2 with increased reps. Completed pivot transfer with mod-maxAx2 and FWW.   Barriers to return to prior living situation: AMS, decreased strength/activity tolerance, weakness, medical prognosis  Recommendations for discharge: Per plan established by the Occupational Therapist, the recommendation for discharge location is TCU/LTC.   Rationale for recommendations: Pt is currently below functional baseline for ADLs, transfers, and functional mobility, per chart review, pt's family unable to provide level of care that pt requires at home.        Entered by: Mary Vázquez 05/10/2018 3:44 PM

## 2018-05-10 NOTE — PLAN OF CARE
Problem: Patient Care Overview  Goal: Plan of Care/Patient Progress Review  RN  1. Pt will be free from injury   Outcome: No Change    D: AMS and CHF. Hx amyloidosis, a-fib/a-flutter, CAD, CKD, CVA  I/A: VSSA, on room air. A-fib in the 90s on tele. Fraga cath patent with adequate UO. Incontinent of stool with one small BM overnight. Mepilex in place over coccyx. Repositioned every 2 hours. Bed alarm on. Assist x 2. Left double lumen PICC. Dobutamine gtt @ 5 mcg/kg/min. Occasional shouting from room overnight.  P: Continue to monitor pt. Plan to d/c to [facility] facility when bed becomes available. Notify Cards 1 with pertinent changes.

## 2018-05-10 NOTE — PLAN OF CARE
Problem: Confusion, Acute (Adult)  Goal: Identify Related Risk Factors and Signs and Symptoms  Related risk factors and signs and symptoms are identified upon initiation of Human Response Clinical Practice Guideline (CPG).   Outcome: No Change  Patient admitted with altered mental status, HF. Disoriented to situation. Vitals stable, room air, Afib rates 80-90s. Denies pain. Fraga intact with adequate UO. Incontinent of stool x3-brief in place-perineal care provided. Mepilex dressing changed to coccyx x1. Patient deconditioned and needs turns q 2 hours as tolerated. Sat up in chair for a couple hours this morning. Heels suspended and pillows provided. Bed and chair alarm in place. Assist of 2 with lift. PICC line with Dobutamine gtt at 5 mcg/kg/min. Family supportive and at bedside. Patient this afternoon frustrated and yelling when nursing staff changing brief with incontinent stool. Need a Cdiff sample with next BM. Isolation cart ordered. Continue to monitor and notify Cards 1 with changes/concerns.

## 2018-05-11 NOTE — PLAN OF CARE
Problem: Patient Care Overview  Goal: Discharge Needs Assessment  Outcome: No Change  D AVSS with sat's 94% on room air. Heart regular and lungs decreased in bases otherwise clear. Has voiced no c/o pain or nausea. Woke up during the night c/o feeling hungry. Ate a sandwich and a bowel of Cheerios then fell back to sleep for the rest of the night. Adequate urine output via martinez catheter. Turning/repositioning every two hours and PRN. Dopamine per order.   I Vital's, assessment and med's per order.   A Resting in bed with call light in reach.   P Continue to monitor and update MD with changes.

## 2018-05-11 NOTE — PLAN OF CARE
Pt VSS.Was up in the chair x 2 hr.Has been yelling out this dee.Has been much calmer and cooperative.Had a stool,but not loose.Unable to get c-diff sample.Continue with dobutamin gtt at 11.4 ml/hr.afib/a flutter vs a tachy with a block MD notified and EKG obtained..Fraga patent with pink urine.Continue with POC.

## 2018-05-11 NOTE — PROGRESS NOTES
Social Work Services Progress Note    Hospital Day: 45  Date of Initial Social Work Evaluation:  3/29/18  Collaborated with: Palliative Team, Cards 1 team,    Data:  Pt is a 77 year old male being followed by SW for placement as the medical team feels the pt has been medically ready for transition pending safe discharge plan.  Safe plan has been attained with [facility] as the pt is now off VPM and medically stabilized.  The barrier to discharge is [facility] does not have an open bed in their memory care unit to offer at this time.  Pt is on a waitlist for an open bed.    Intervention:  SW following via chart review as pt continues to be on the waitlist for a bed at [facility].  There is no indication yet when a bed will be available.  SW also monitoring the dobutamine community shortage as this could affect pt's ability to safely be managed in a community setting.  Pt is not a candidate for milrinone due to kidney function and no rehab/LTC options for placement.  Home placement has been deemed unsafe by treatment team already and milrinone therapies only give pt an option to discharge to home.  Pt will remain hospitalized until a bed becomes available at [facility] and if [facility] will have supply of dobutamine for pt care.     Referrals in Progress:   [facility] - accepted pending bed availability in memory care.  Admissions is unclear when this bed will be available.  PH: [phone]  F: [phone]    Assessment: Family continues to be at beside daily.  Pt does get anxious when family leaves and will often yell for family.  Family has not wanted to use psychiatric medications for support as they felt that the last medication (Latuda) contributed directly to pt's worsening vitals.    Plan:    Anticipated Disposition: [facility] is accepting pt for placement in their Memory Care Unit.  Currently there are no beds available.  Pt is on a waitlist  for bed openings.    Barriers to d/c plan:  Cost of TCU/LTC, dobutamine needs, financial hardship, lack of social support to take the pt home, potential barrier in the future could be dobutamine shortages in the community setting - currently pt should not be affected if placement is soon.  Longer term placement may be affected pending shortage resolutions.    Follow Up:  SW to follow for discharge planning to [facility].    MARISA Valdovinos, APSW  6C Unit   Phone: [phone]  Pager: [phone]  Unit: [phone]      ___________________________________________________________________________________________________________________________________________________    Referrals Discontinued:  [facility] - cannot take pt's with dobutamine - family declines hospice care and declines discontinuation of dobutamine.  [facility] - cannot take pts with dobutamine.     Community Case Management/Community Services in place:   Encouraged family to follow up with elder law  for better information on financial counseling and Medicaid support.  Per previous discussions pt was at one time financially able to access Medicaid with a $1400 spenddown.  Family has a LTC insurance plan that will cover up to $9000 per mo after pt has had 6 mo or about 180 days in a LTC setting already.  Per spouse, pt had met 14 of the 180 day requirement when he was in TCU.

## 2018-05-11 NOTE — PLAN OF CARE
Problem: Patient Care Overview  Goal: Plan of Care/Patient Progress Review  RN  1. Pt will be free from injury   OT/CR 6C: Discharge Planner OT   Patient plan for discharge: Pt unable to participate in discharge planning due to AMS. Per chart review, pt's family preferring TCU/LTC.   Current status: MaxAx2 for bed mobility from supine to seated EOB. MaxAx2-3 to maintain seated upright posture at EOB, strong posterior lean and body rigid with poor correct with cues. Tolerated x4 reps total standing for 20-40 seconds with FWW and progressing from maxAx2-3 -> minAx2. Only able to follow 25% of verbal cues provided by therapist to correct upright posture seated/standing. Mod encouragement from family and therapists to participate. VSS during session.   Barriers to return to prior living situation: AMS, decreased strength/activity tolerance, weakness, medical prognosis  Recommendations for discharge: Per plan established by the Occupational Therapist, the recommendation for discharge location is LTC.   Rationale for recommendations: Pt is currently below functional baseline for ADLs, transfers, and functional mobility, per chart review, pt's family unable to provide level of care that pt requires at home. Given pt prognosis, AMS, limited participation/motivation for therapy and limited ability to show progress toward functional goals, pt is not a good rehab/TCU candidate, therefore recommendation is for LTC.         Entered by: Mary Vázquez 05/11/2018 2:15 PM

## 2018-05-11 NOTE — PROGRESS NOTES
[facility], [place]    Sepsis Evaluation Progress Note    Date of Service: 05/11/2018    I was called to see Josr Landers due to abnormal vital signs triggering the Sepsis SIRS screening alert. He is not known to have an infection.     Physical Exam    Vital Signs:  Temp: 97.3  F (36.3  C) Temp src: Axillary BP: (!) 85/70 (map 75 RN Notified)   Heart Rate: 102 Resp: 16 SpO2: 99 % O2 Device: None (Room air)      Lab:  Lactic Acid   Date Value Ref Range Status   04/24/2018 3.0 (H) 0.7 - 2.0 mmol/L Final       The patient is at baseline mental status.    The rest of their physical exam no changes.    Assessment and Plan    The SIRS and exam findings are likely due to low systolic blood pressure from end stage heart failure, his MAP is good, there is no sign of sepsis at this time.     Family also refused to have blood drawn this time.     Disposition: The patient will remain on the current unit. We will continue to monitor this patient closely.    Carmen Mota MD    ATTENDING ATTESTATION:  I agree with the above note.    Libby Sapp MD, MS  Staff Cardiologist, [facility]  Pager: [phone]

## 2018-05-11 NOTE — PROGRESS NOTES
Progress Note  Josr Landers MRN: [id number]  Age: 77 year old, : 1940  Primary care provider: Ángel Oates           Subjective     No acute event overnight. Complaint about burning sensation with urination, he has martinez placed. Denies chest pain, SOB, lightheadedness, dizziness.          Objective   B/P: 99/74, T: 97.7, P: 79, R: 18    Intake/Output Summary (Last 24 hours) at 18 0736  Last data filed at 18 0659   Gross per 24 hour   Intake            853.6 ml   Output              850 ml   Net              3.6 ml     Wt Readings from Last 2 Encounters:   18 64.8 kg (142 lb 13.7 oz)   18 75.3 kg (165 lb 14.4 oz)     BP - Mean:  [73-97] 83    Gen: AA&Ox3, no acute distress  HEENT:AT/ NC, PERRL b/l, EOM grossly intact, mucous membranes pink, moist without plaque or exudate  BACK: no CVA tenderness, no midline bony tenderness  PULM/THORAX: Clear to auscultation bilaterally, no rales/stridor/wheezes  CV:RRR, S1 and S2 appreciated, no extra heart sounds, murmurs or rub auscultated. No JVD  ABD: obese, soft, nontender, nondistended. Normoactive bowel sounds x 4, no HSM appreciated  EXT: No edema, clubbing or cyanosis. No asymmetrical edema or tenderness to palpation in calves bilaterally.  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 Patient's urine output and Cr have been improving. Patient is medically stable for discharge.      Today:  - Activate septic protocol  due to hypotensive BP 85/70 MAP 75. Asymptomatic. Family declined blood drawn.   - UA for dysuria  - No medical changes  - Patient is accepted at [facility], awaiting bed availability. Once bed is available, family will be given the [facility] appeal letter if family chooses to appeal the discharge recommendation.      #Cardiac amyloidosis  #HFrEF NYHA IV D (EF 15-20%)  RHC on 4/3. PCW 20. RA 17. PA 43/25 (33). RV 43/17. Son CI 1.4. Patient is volume overloaded and in cardiogenic shock. Suspect this was the likely cause of patient's altered mental status, LOIS, and possibly the ventriculomegaly. PICC placed on 4/3 for dobutamine infusion. His mental status and renal function have improved/stable with the dobutamine infusion. His overall prognosis remains very poor. Patient is very high risk for arrhythmia on dobutamine gtt for his amyloidosis, will get input from heart failure team for prognosis and management.   - Rechecked mixed venous gas on 5/8 was stable.   - Continue dobutamine at 5 mcg/kg/min   - Discontinued hydralazine due to soft BP's  - MAP goal 65 mmHg.  - Diuresis: 4 mg IV Bumex given 4/24 to little effect  - Palliative consulted, appreciate recs re: GOC and d/c plans  - Patient is accepted at [facility], awaiting bed availability. Once bed is available, family will be given the [email or web address] appeal letter if family chooses to appeal the discharge recommendation      # CKD-V   # LOIS - improving  Patient was oliguric on presentation. Tunneled line by IR 3/29. Started HD 3/29. Urine output increased and Cr stable/improved with dobutamine. No further HD as patient had good UOP and a very poor cardiac prognosis, so the tunneled catheter was removed. Following removal of the catheter, Cr was stable in the 2.3-2.4 range, and patient had adequate UOP. However starting 4/18, Cr was steadily rising, and UOP was decreasing. Diuresis was stopped on 4/18. Cr peaked at 5.79. LOIS likely  pre-renal since UOP increased with administration of 500 mL NS. Now UOP and Cr are improving.  - Continue Fraga  - Monitor UOP. No need to monitor daily labs  - IR removed dialysis catheter 4/12.  - Nephrology consult, appreciate recs                           - No HD given poor prognosis, risk of bleeding, low BP's, patient's anxious reaction to discussion of HD, and likely impossibility of discharge on HD and dobutamine                           - Fraga catheter.                           - Bumex 4 mg IV given 4/24 with little effect                           - 500 mL NS given 4/25, increased UOP, Cr improving      #Multifactorial Encephalopathy, improving  #Delirium  #Dementia (MoCA 16/30)  #Ventriculomegaly / possible normal pressure hydrocephalus  #h/o right MCA CVA  Likely due to cardiogenic shock, although also had extensive workup for other causes. CT scan with enlarged venticles. No mass or acute CVA. Sepsis also possible contributor. Possibly uremia. Low suspicion for SBP. Patient's history had been concerning for NPH. LP done on 4/2. Attempted large volume (25-30 cc); however, was only able to obtain 0.5 cc.  - Was treated for UTI / PNA (s/p tx with abx). Thought it could be uremia (BUN improved), NPH (neurology recommends no further inpatient workup), baseline dementia / CVA, cardiogenic shock (see above)  - Psychiatry consulted: Latuda had been at 40 mg, was decreased to 20 mg due to family's concern about its affect on patient's daytime sleepiness, and melatonin 6 mg qhs prn for agitation, less risk of QTc prolongation. Patient's family requested for latuda to be discontinued on 4/26 due to concern for increased somnolence. Patient's family requesting for all sedating medications (e.g. Benzos, Latuda) to be withheld  - Neurology signed off. Recommended follow-up in movement disorder clinic for workup of possible NPH, although this is less likely now given patient's poor cardiac prognosis.  - PT/OT  -  Has not required sitter/VPM since 5/2  - Psych noted on 5/3 that patient does not have decision-making capability      #CAD s/p ATIF to LAD 2015  - Discontinued pta atorvastatin 80 mg qday and ASA 81 mg since overall mortality benefit low for patient due to overall poor prognosis  - Avoid BB with cardiac amyloid      #Afib/flutter s/p CTI ablation 2006  Remains in afib. Was on sotalol but this was discontinued around 8/2017 per outside cardiology notes. Discontinued warfarin on 4/3 due to labile INR's and started on apixaban instead. Now off apixaban since overall benefit low given patient's overall poor prognosis and concern for increased bleeding risk with high BUN.      #Concern for UTI, resolved  Started on rocephin on 4/21. Urine cultures grew pan-sensitive Klebsiella pneumoniae. De-escalated to Keflex and then switched back to ceftiriazon when having poor PO intake. Completed 6 days of antibiotics.      #Aspiration PNA, resolved  Started on Unasyn on 3/27. Switched to Zosyn on 3/28 per ID recs. Restarted on Unasyn on 3/30 when sensitivities returned on UTI (Klebsiella pneumoniae). Completed course of Unasyn on 4/4. Repeat chest x-ray on 4/14 stable. Patient remains afebrile.       # Cachexia  Patient's volume overload somewhat obscures his severe malnutrition, but he has temporal wasting.    FEN: Regular diet  PPX: No longer on apixaban - anticoagulation not needed  Code Status: DNR/DNI  Dispo: Patient is accepted at [facility], awaiting bed availability. Once bed is available, family will be given the [facility] appeal letter is family chooses to appeal the discharge recommendation     Patient was discussed with staff attending, Dr. Sapp, who agrees with the above assessment and plan.    Carmen Mota MD   PGY-3, Internal Medicine  Cardiology Service  Pager: [phone]               ATTENDING ATTESTATION:  Patient has been seen and evaluated by me on May 11, 2018. I have reviewed the  medications, laboratory, imaging, and other relevant results.  I agree with the above note which I have edited, as necessary.  I have discussed my assessment and plan with the house staff.    Patient continues on dobutamine gtt and awaits discharge to NH.    Libby Sapp MD, MS  Staff Cardiologist, [facility]  Pager: [phone]

## 2018-05-12 NOTE — PROVIDER NOTIFICATION
Pt converted from A-fib to sinus rhythm briefly followed by conversion to junctional rhythm 50s-60s. Taina Jean MD notified. 12 lead EKG completed. MD noted change from previous EKG from 5/10/18. MD stated, will follow up with day team for course of action.

## 2018-05-12 NOTE — PROGRESS NOTES
"Brief Progress Note:     Called by RN ~1730 hours; patient's wife at bedside and upset because she does not think any physician has seen her  today, feels that the team should be doing more about his heart rate and low UOP.     Patient assessed at beside. Vital signs notable for ongoing bradycardia (HR 50s-60s) with junctional rhythm. BP slightly improved from earlier in the day (BP 99/69, MAP 74). Exam notable for confusion, as patient is yelling out, disoriented. UOP reviewed, <150cc the entire day thus far, decreased from ~700 cc yesterday. Urine appears concentrated. Labs and daily notes reviewed by me.    First, reassured wife that patient had been seen by both resident and attending physician earlier today before her arrival, documentation from today's assessment by both resident and attending in the chart. Summarized current clinical situation with wife, who requested we speak outside the room because she did not want the patient to hear any negative news. I explained the recent change in HR and rhythm, and emphasized that these changes were likely d/t expected worsening of clinical condition in the setting of end stage heart failure. Explained that the junctional bradycardia also likely worsened or possibly even attributable to Dobutamine gtt, however, patient's wife continues to insist Dobutamine gtt be continued. I also explained that the worsening confusion and UOP was likely d/t poor end organ perfusion given bradycardia, hypotension this am, narrow pulse pressure. Reiterated that options for managing this were extremely limited as this process is being driven by his irreversible severe heart disease. I reiterated the message that there are no more medications or interventions available to us at this point that would change the patient's current clinical condition or prolong his life. Wife remained insistent we \"try something.\" I offered a small fluid bolus (~250cc over 4 hours) to see if this " helped improve UOP, however, I counseled the patient's wife that given his very severe heart function even a small amount of fluid could lead to further decompensation. I also explained that the IVF might make UOP improve, but ultimately would not change the course of events. Wife requested that we try the fluid, and 250cc NS over 4 hours ordered with close clinical monitoring.     I counseled the patient's wife that despite all of the team's efforts, Ayo's disease process is terminal and I am concerned he is nearing the end of his life. Patient's wife stated that despite this information she believes he is not done fighting and the family is continuing to pray for a miracle. I expressed understanding of these hopes, but reiterated my concerns that despite our best efforts Ayo was likely reaching the final stages of life and may pass in the next hours to days. Code status DNR/DNI.     Bedside and charge RN aware. Discussed briefly with Dr. Sapp.     Arabella Carmona MD  Internal Medicine, PGY-3  [phone]

## 2018-05-12 NOTE — PLAN OF CARE
Problem: Patient Care Overview  Goal: Plan of Care/Patient Progress Review  RN  1. Pt will be free from injury   Outcome: No Change    D: AMS and CHF. Hx amyloidosis, a-fib/a-flutter, CAD, CKD, CVA  I/A: VSSA, on room air. A-fib in the 90s on tele, converted to Sinus Rhythm around 2100, then went into Junctional Rhythm around 2110. Pt has since been in Junctional Rhythm 50s-60s with occasional PVCs. 12 lead EKG completed, provider notified. Fraga cath patent with adequate UO. Incontinent of stool with one small and one medium BM overnight. Mepilex changed over coccyx. Repositioned every 2 hours. Bed alarm on. Assist x 2. Left double lumen PICC. Dobutamine gtt @ 5 mcg/kg/min. Occasional shouting from room overnight.  P: Continue to monitor pt. Plan to d/c to [facility] facility when bed becomes available. Notify Cards 1 with pertinent changes.

## 2018-05-12 NOTE — PROGRESS NOTES
Progress Note  Josr Landers MRN: [id number]  Age: 77 year old, : 1940  Primary care provider: Ángel Oates           Subjective     Had atrial flutter then went into junctional rhythm at 9 PM yesterday. Asymptomatic. This morning, patient was confused and somnolence. BP low to 85/70, HR 50s. His mental status is slightly better with improvement of BP and family member at bedside.            Objective   B/P: 99/74, T: 97.7, P: 79, R: 18    Intake/Output Summary (Last 24 hours) at 18 0736  Last data filed at 18 0659   Gross per 24 hour   Intake            853.6 ml   Output              850 ml   Net              3.6 ml     Wt Readings from Last 2 Encounters:   18 65 kg (143 lb 4.8 oz)   18 75.3 kg (165 lb 14.4 oz)     BP - Mean:  [61-82] 61    Gen: AA&Ox3, no acute distress  HEENT:AT/ NC, PERRL b/l, EOM grossly intact, mucous membranes pink, moist without plaque or exudate  BACK: no CVA tenderness, no midline bony tenderness  PULM/THORAX: Clear to auscultation bilaterally, no rales/stridor/wheezes  CV:RRR, S1 and S2 appreciated, no extra heart sounds, murmurs or rub auscultated. No JVD  ABD: obese, soft, nontender, nondistended. Normoactive bowel sounds x 4, no HSM appreciated  EXT: No edema, clubbing or cyanosis. No asymmetrical edema or tenderness to palpation in calves bilaterally.  NEURO: Confused and screaming intermittently, but able to answer questions but somewhat irrelevance. 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 Patient's urine output and Cr have been improving. Patient is medically stable for discharge.      Today:  - Continue monitoring mental status. Likely his AMS is due to low cerebral perfusion 2/2 arrhythmia and low cardiac out put in setting of vulnerable brain (dementia).   - No medical changes  - Patient is accepted at [facility], awaiting bed availability. Once bed is available, [facility] be [facility] if family chooses to appeal the discharge recommendation.      #Cardiac amyloidosis  #HFrEF NYHA IV D (EF 15-20%)  RHC on 4/3. PCW 20. RA 17. PA 43/25 (33). RV 43/17. Son CI 1.4. Patient is volume overloaded and in cardiogenic shock. Suspect this was the likely cause of patient's altered mental status, LOIS, and possibly the ventriculomegaly. PICC placed on 4/3 for dobutamine infusion. His mental status and renal function have improved/stable with the dobutamine infusion. His overall prognosis remains very poor. Patient is very high risk for arrhythmia on dobutamine gtt for his amyloidosis, will get input from heart failure team for prognosis and management.   - Rechecked mixed venous gas on 5/8 was stable.   - Continue dobutamine at 5 mcg/kg/min   - Discontinued hydralazine due to soft BP's  - MAP goal 65 mmHg.  - Diuresis: 4 mg IV Bumex given 4/24 to little effect  - Palliative consulted, appreciate recs re: GOC and d/c plans  - Patient is accepted at [facility], awaiting bed availability. Once bed is available, family will be given the [facility] if family chooses to appeal the discharge recommendation      # CKD-V   # LOIS - improving  Patient was oliguric on presentation. Tunneled line by IR 3/29. Started HD 3/29. Urine output increased and Cr stable/improved with dobutamine. No further HD as patient had good UOP and a very poor cardiac prognosis, so the tunneled catheter was removed. Following removal of the  catheter, Cr was stable in the 2.3-2.4 range, and patient had adequate UOP. However starting 4/18, Cr was steadily rising, and UOP was decreasing. Diuresis was stopped on 4/18. Cr peaked at 5.79. LOIS likely pre-renal since UOP increased with administration of 500 mL NS. Now UOP and Cr are improving from labs on 5/8. UOP decreased on 5/12 with low BP and HR.   - Continue Fraga  - Monitor UOP. No need to monitor daily labs   - IR removed dialysis catheter 4/12.  - Nephrology consult, appreciate recs                           - No HD given poor prognosis, risk of bleeding, low BP's, patient's anxious reaction to discussion of HD, and likely impossibility of discharge on HD and dobutamine                           - Farga catheter.                           - Bumex 4 mg IV given 4/24 with little effect                           - 500 mL NS given 4/25, increased UOP, Cr improving      #Multifactorial Encephalopathy, improving  #Delirium  #Dementia (MoCA 16/30)  #Ventriculomegaly / possible normal pressure hydrocephalus  #h/o right MCA CVA  Likely due to cardiogenic shock, although also had extensive workup for other causes. CT scan with enlarged venticles. No mass or acute CVA. Sepsis also possible contributor. Possibly uremia. Low suspicion for SBP. Patient's history had been concerning for NPH. LP done on 4/2. Attempted large volume (25-30 cc); however, was only able to obtain 0.5 cc.  - Was treated for UTI / PNA (s/p tx with abx). Thought it could be uremia (BUN improved), NPH (neurology recommends no further inpatient workup), baseline dementia / CVA, cardiogenic shock (see above)  - Psychiatry consulted: Latuda had been at 40 mg, was decreased to 20 mg due to family's concern about its affect on patient's daytime sleepiness, and melatonin 6 mg qhs prn for agitation, less risk of QTc prolongation. Patient's family requested for latuda to be discontinued on 4/26 due to concern for increased somnolence. Patient's  family requesting for all sedating medications (e.g. Benzos, Latuda) to be withheld  - Neurology signed off. Recommended follow-up in movement disorder clinic for workup of possible NPH, although this is less likely now given patient's poor cardiac prognosis.  - PT/OT  - Has not required sitter/VPM since 5/2  - Psych noted on 5/3 that patient does not have decision-making capability      #CAD s/p ATIF to LAD 2015  - Discontinued pta atorvastatin 80 mg qday and ASA 81 mg since overall mortality benefit low for patient due to overall poor prognosis  - Avoid BB with cardiac amyloid      #Afib/flutter s/p CTI ablation 2006  Remains in afib. Was on sotalol but this was discontinued around 8/2017 per outside cardiology notes. Discontinued warfarin on 4/3 due to labile INR's and started on apixaban instead. Now off apixaban since overall benefit low given patient's overall poor prognosis and concern for increased bleeding risk with high BUN.      #Concern for UTI, resolved  Started on rocephin on 4/21. Urine cultures grew pan-sensitive Klebsiella pneumoniae. De-escalated to Keflex and then switched back to ceftiriazon when having poor PO intake. Completed 6 days of antibiotics.      #Aspiration PNA, resolved  Started on Unasyn on 3/27. Switched to Zosyn on 3/28 per ID recs. Restarted on Unasyn on 3/30 when sensitivities returned on UTI (Klebsiella pneumoniae). Completed course of Unasyn on 4/4. Repeat chest x-ray on 4/14 stable. Patient remains afebrile.       # Cachexia  Patient's volume overload somewhat obscures his severe malnutrition, but he has temporal wasting.    FEN: Regular diet  PPX: No longer on apixaban - anticoagulation not needed  Code Status: DNR/DNI  Dispo: Patient is accepted at [facility], awaiting bed availability. Once bed is available, family will be given the [employer] appeal letter is family chooses to appeal the discharge recommendation     Patient was discussed with staff attending,  Dr. Sapp, who agrees with the above assessment and plan.    Carmen Mota MD   PGY-3, Internal Medicine  Cardiology Service  Pager: [phone]             ATTENDING ATTESTATION:  Patient has been seen and evaluated by me on May 12, 2018. I have reviewed the medications, laboratory, imaging, and other relevant results.  I agree with the above note which I have edited, as necessary.  I have discussed my assessment and plan with the house staff.    Patient continues on dobutamine gtt and awaits discharge to NH. He has been confused since last night and has been screaming intermittently, still responds when talked to with eyes closed. Junctional rhythm is new.    Libby Sapp MD, MS  Staff Cardiologist, [facility]  Pager: [phone]

## 2018-05-12 NOTE — PROVIDER NOTIFICATION
Provider notified regarding abnormal vital signs (BP 85/70 MAP 75) which triggered the sepsis protocol. Family refused lab draw of lactic acid. Continue to monitor, no changes at this time.

## 2018-05-12 NOTE — PLAN OF CARE
Problem: Patient Care Overview  Goal: Plan of Care/Patient Progress Review  RN  1. Pt will be free from injury   Patient admitted 3/26 for altered mental status and HF. History of cardiac amyloidosis (2009), a fib and CVA. VSS, denies pain. A fib. Monitor showing 90-100s, palpated pulse around 70. Dobutamine gtt at 5 mcg/kg/min (11.4 ml). Patient c/o burning sensation with urination. Fraga intact. UA sent. C diff negative. Incontinent x2, stool softeners held. Repositioned every 2 hours as tolerated. Anticipate possible discharge to [facility] TCU with family cooperation. Continue to assess and monitor, notify Cards 1 with concerns.

## 2018-05-13 NOTE — PROGRESS NOTES
"SPIRITUAL HEALTH SERVICES  SPIRITUAL ASSESSMENT Progress Note (Palliative Focus)  [facility] 6C  On-call visit    Visited with pt and spouse, per request from spouse. Pt sleepy during most of visit. I offered support to pt's spouse - we talked about:     spouse's sense that pt \"is a fighter - I still believe that God will help him pull out of this...\"      that she has \"given permission\" to pt \"to leave us if that's God's will, but Ayo has always said he wanted to keep going...\"     Prayer and sung blessing were shared. Palliative  will be informed of visit.     Josr Art) Solomon Bah M.Div., [facility]  Staff   Pager [phone]       "

## 2018-05-13 NOTE — PLAN OF CARE
Problem: Patient Care Overview  Goal: Plan of Care/Patient Progress Review  RN  1. Pt will be free from injury   Outcome: No Change    D: AMS and CHF. Hx amyloidosis, a-fib/a-flutter, CAD, CKD, CVA  I/A: VSSA, on room air. Junctional Rhythm 50s-60s with occasional PVCs.. Martinez cath patent with small UO. 250 cc NS bolus given. Incontinent of stool with one small  BM overnight. Mepilex in place over coccyx. Repositioned every 2 hours. Assist x 2. Left double lumen PICC. Dobutamine gtt @ 5 mcg/kg/min. Frequent shouting from room overnight, music and conversation ineffective at reducing shouting. Pt remains agitated/anxious. Pt c/o pain at martinez catheter site, treated with 5 mg IR oxycodone.  P: Continue to monitor pt. Plan to d/c to [facility] facility when bed becomes available. Notify Cards 1 with pertinent changes.

## 2018-05-13 NOTE — PROGRESS NOTES
"Received a phone call from pt's wife, Nu, requesting an update at approximately 0610, 5/13/18. Author informed Nu that the pt had been up all night shouting and had complained of pain at the catheter site. Author further explained that the provider had been notified and a one time order for 5 mg oxycodone had been given. Nu stated \"I wish you hadn't given that to him, that stuff can be very addicting.\" Author assured Nu that this was a minimal dose to which the pt had responded well to, reported less pain, and was shouting out less. Nu reiterated her dismay over the oxycodone being given, but was relieved to hear that the pt was in less pain.    Anurag Rodriguez RN  "

## 2018-05-13 NOTE — PROGRESS NOTES
Progress Note  Josr Landers MRN: [id number]  Age: 77 year old, : 1940  Primary care provider: Ángel Oates           Subjective     Overnight, complaint about pain at martinez's catheter which improves with PO oxycodone x1. Yelling and creaming intermittently. This AM, continues to be confused and somnolence, but response to voice, able to follow easy commands. When he was screaming, he pointed that the pain is from the martinez. Got IV dilaudid 0.2 mg and slept comfortably. Family at bedside. Discuss with the family about mental status changes with arrhythmia and low BP. Wife said he has been confused and had low blood pressure at baseline for 2 months. They concern about bladder infection, requesting UA.          Objective   B/P: 99/74, T: 97.7, P: 79, R: 18    Intake/Output Summary (Last 24 hours) at 18 0736  Last data filed at 18 0659   Gross per 24 hour   Intake            853.6 ml   Output              850 ml   Net              3.6 ml     Wt Readings from Last 2 Encounters:   18 64.9 kg (143 lb 1.3 oz)   18 75.3 kg (165 lb 14.4 oz)     BP - Mean:  [69-81] 73    Gen: AA&Ox3, no acute distress  HEENT:AT/ NC, PERRL b/l, EOM grossly intact, mucous membranes pink, moist without plaque or exudate  BACK: no CVA tenderness, no midline bony tenderness  PULM/THORAX: Clear to auscultation bilaterally, no rales/stridor/wheezes  CV:RRR, S1 and S2 appreciated, no extra heart sounds, murmurs or rub auscultated. No JVD  ABD: obese, soft, nontender, nondistended. Normoactive bowel sounds x 4, no HSM appreciated  : martinez in placed, no scrotal or penile swelling/redness/tender to palpation.  EXT: No edema, clubbing or cyanosis. No asymmetrical edema or tenderness to palpation in calves bilaterally.  NEURO: Confused and somnolence. Able to answer questions but somewhat irrelevance. Moving all extremities equally.     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 Patient's urine output and Cr have been improving. Patient is medically stable for discharge.      Today:  - Check UA today  - IV Dilaudid 0.2 mg x1 for penile pain   - Bladder scan  - No medical changes  - Patient is accepted at [facility], awaiting bed availability. Once bed is available, family will be given the [facility] appeal letter if family chooses to appeal the discharge recommendation.      #Fraga site pain  On 5/11, UA no sign of infection concerning. Normal penile and scrotal exam. With low OUP and increase pain, will check UA to r/o infection and bladder scan to eval for retaining.       #Cardiac amyloidosis  #HFrEF NYHA IV D (EF 15-20%)  RHC on 4/3. PCW 20. RA 17. PA 43/25 (33). RV 43/17. Son CI 1.4. Patient is volume overloaded and in cardiogenic shock. Suspect this was the likely cause of patient's altered mental status, LOIS, and possibly the ventriculomegaly. PICC placed on 4/3 for dobutamine infusion. His mental status and renal function have improved/stable with the dobutamine infusion. His overall prognosis remains very poor. Patient is very high risk for arrhythmia on dobutamine gtt for his amyloidosis.  - Rechecked mixed venous gas on 5/8 was stable.   - Continue dobutamine at 5 mcg/kg/min   - Discontinued hydralazine due to soft BP's  - MAP goal 65 mmHg.  - Diuresis: 4 mg IV Bumex given 4/24 to little effect  - Palliative consulted, appreciate recs re: GOC and d/c plans  - Patient is accepted at [facility], awaiting bed availability. Once  bed is available, family will be given the [facility] appeal letter if family chooses to appeal the discharge recommendation  - Continues to be in junctional rhythm with frequent ectopies PAC and PVC since 5/11 PM. Bradycardia to mid 50s and intermittent soft BP and narrow PP.       # CKD-V   # LOIS   Patient was oliguric on presentation. Tunneled line by IR 3/29. Started HD 3/29. Urine output increased and Cr stable/improved with dobutamine. No further HD as patient had good UOP and a very poor cardiac prognosis, so the tunneled catheter was removed. Following removal of the catheter, Cr was stable in the 2.3-2.4 range, and patient had adequate UOP. However starting 4/18, Cr was steadily rising, and UOP was decreasing. Diuresis was stopped on 4/18. Cr peaked at 5.79. LOIS likely pre-renal since UOP increased with administration of 500 mL NS. UOP and Cr are improving from labs on 5/8. On 5/12, UOP decreased with low BP and HR.   - Continue Fraga  - Monitor UOP. No need to monitor daily labs   - IR removed dialysis catheter 4/12.  - Nephrology consult, appreciate recs                           - No HD given poor prognosis, risk of bleeding, low BP's, patient's anxious reaction to discussion of HD, and likely impossibility of discharge on HD and dobutamine                           - Fraga catheter.                           - Bumex 4 mg IV given 4/24 with little effect                           - 500 mL NS given 4/25, increased UOP, Cr improving      #Multifactorial Encephalopathy  #Delirium  #Dementia (MoCA 16/30)  #Ventriculomegaly / possible normal pressure hydrocephalus  #h/o right MCA CVA  Likely due to cardiogenic shock, although also had extensive workup for other causes. CT scan with enlarged venticles. No mass or acute CVA. Sepsis also possible contributor. Possibly uremia. Low suspicion for SBP. Patient's history had been concerning for NPH. LP done on 4/2. Attempted large volume (25-30 cc); however, was only able  to obtain 0.5 cc.  - Was treated for UTI / PNA (s/p tx with abx). Thought it could be uremia (BUN improved), NPH (neurology recommends no further inpatient workup), baseline dementia / CVA, cardiogenic shock (see above)  - Psychiatry consulted: Latuda had been at 40 mg, was decreased to 20 mg due to family's concern about its affect on patient's daytime sleepiness, and melatonin 6 mg qhs prn for agitation, less risk of QTc prolongation. Patient's family requested for latuda to be discontinued on 4/26 due to concern for increased somnolence. Patient's family requesting for all sedating medications (e.g. Benzos, Latuda) to be withheld  - Neurology signed off. Recommended follow-up in movement disorder clinic for workup of possible NPH, although this is less likely now given patient's poor cardiac prognosis.  - PT/OT  - Has not required sitter/VPM since 5/2  - Psych noted on 5/3 that patient does not have decision-making capability  - 5/12 started to be more altered. His AMS is due to low cerebral perfusion 2/2 arrhythmia and low cardiac out put in setting of vulnerable brain (dementia).       #CAD s/p ATIF to LAD 2015  - Discontinued pta atorvastatin 80 mg qday and ASA 81 mg since overall mortality benefit low for patient due to overall poor prognosis  - Avoid BB with cardiac amyloid      #Afib/flutter s/p CTI ablation 2006  Remains in afib. Was on sotalol but this was discontinued around 8/2017 per outside cardiology notes. Discontinued warfarin on 4/3 due to labile INR's and started on apixaban instead. Now off apixaban since overall benefit low given patient's overall poor prognosis and concern for increased bleeding risk with high BUN.      #Concern for UTI, resolved  Started on rocephin on 4/21. Urine cultures grew pan-sensitive Klebsiella pneumoniae. De-escalated to Keflex and then switched back to ceftiriazon when having poor PO intake. Completed 6 days of antibiotics.      #Aspiration PNA, resolved  Started on  Unasyn on 3/27. Switched to Zosyn on 3/28 per ID recs. Restarted on Unasyn on 3/30 when sensitivities returned on UTI (Klebsiella pneumoniae). Completed course of Unasyn on 4/4. Repeat chest x-ray on 4/14 stable. Patient remains afebrile.       # Cachexia  Patient's volume overload somewhat obscures his severe malnutrition, but he has temporal wasting.    FEN: Regular diet  PPX: No longer on apixaban - anticoagulation not needed  Code Status: DNR/DNI  Dispo: Patient is accepted at [facility], awaiting bed availability. Once bed is available, family will be given the [facility] appeal letter is family chooses to appeal the discharge recommendation     Patient was discussed with staff attending, Dr. Sapp, who agrees with the above assessment and plan.    Carmen Mota MD   PGY-3, Internal Medicine  Cardiology Service  Pager: [phone]               ATTENDING ATTESTATION:  Patient has been seen and evaluated by me on May 13, 2018. I have reviewed the medications, laboratory, imaging, and other relevant results.  I agree with the above note which I have edited, as necessary.  I have discussed my assessment and plan with the house staff.    Libby Sapp MD, MS  Staff Cardiologist, [facility]  Pager: [phone]

## 2018-05-13 NOTE — PLAN OF CARE
Problem: Patient Care Overview  Goal: Plan of Care/Patient Progress Review  RN  1. Pt will be free from injury   Patient admitted 3/26 for altered mental status and HF. History of cardiac amyloidosis (2009), a fib and CVA. VSS, denies pain. Junctional rhythm in the 50's. Dobutamine gtt at 5 mcg/kg/min (11.4 ml). Patient extremely lethargic/confused this shift, team aware. Fraga intact with minimal urine output. Team ordered 250ml bolus x1 to be given. Incontinent x2, stool softeners held. Repositioned every 2 hours as tolerated. Patient has terminal diagnosis and will likely pass in the next few hours to days. Spiritual health consult placed, family has not come to terms with impending death of a loved one. Continue to assess and monitor, notify Cards 1 with concerns.

## 2018-05-14 NOTE — PLAN OF CARE
"Problem: Patient Care Overview  Goal: Plan of Care/Patient Progress Review  RN  1. Pt will be free from injury   Patient admitted 3/26 for altered mental status and HF. History of cardiac amyloidosis (2009), a fib and CVA. VSS, denies pain. Junctional rhythm in the 50's. Dobutamine gtt at 5 mcg/kg/min (11.4 ml). At 0954, patient yelled out while sitting in the chair. HR dropped with monitor showing asytole (pause x5, longest 6.7 sec), patient briefly unresponsive. Patient somnolent after this episode and through entire shift, team aware. Similar episode at 1419. At 1120, patient stated he had pain at catheter site. After discussion with wife, dilaudid IV given x1. Bladder scan showed >200 ml, martinez was removed due to pain and poor function. Minimal urine output prior to removal. Son stated \"just to give you a heads up, my sister does not want my dad to have any more pain medications\". Writer stated that our goal is to make patient comfortable when he is clearly showing signs of pain. Family very reluctant to give any pain medications and are having a difficult time understanding the severity of his prognosis. Reiterated by team and nursing staff on multiple occasions. Repositioned every 2 hours as tolerated but HR does change with movement. Incontinent of stool, last BM yesterday. Patient has terminal diagnosis and will likely pass in the next few hours to days. Seen by  at bedside this morning. Wife will be at the bedside throughout the night. Continue to assess and monitor, notify Cards 1 with concerns.  "

## 2018-05-14 NOTE — PLAN OF CARE
"Problem: Cardiac: Heart Failure (Adult)  Goal: Signs and Symptoms of Listed Potential Problems Will be Absent, Minimized or Managed (Cardiac: Heart Failure)  Signs and symptoms of listed potential problems will be absent, minimized or managed by discharge/transition of care (reference Cardiac: Heart Failure (Adult) CPG).   No significant change in status today. Ayo has been awake on and off throughout shift; up in chair most of the day. Pt's wife fed him breakfast and he ate fairly well this morning. Only reported his \"butt hurt\" but denies other pain. Repositioned in chair using seat cushion. Mepilex intact on coccyx. Continues to holler out; mostly in his sleep even when his wife is present. Oriented to self only. Appears to be in junctional/accelerated junctional rhythm with frequent PVCs or Afib with HR 50s-70s. Continues on dobutamine gtt @ 5mcg.  No change in plan of care; awaiting bed availability at facility.      "

## 2018-05-14 NOTE — PLAN OF CARE
"Problem: Patient Care Overview  Goal: Plan of Care/Patient Progress Review  RN  1. Pt will be free from injury   Patient admitted 3/26 for altered mental status and HF. History of cardiac amyloidosis (2009), a fib and CVA. VSS, denies pain. Junctional rhythm in the 50-70. Dobutamine gtt at 5 mcg/kg/min (11.4 ml). Fraga was removed due to pain and poor function, Pt has been inc of urine once overnight.  Writer stated that our goal is to make patient comfortable when he is clearly showing signs of pain. Family very reluctant to give any pain medications and are having a difficult time understanding the severity of his prognosis. Reiterated by team and nursing staff on multiple occasions. Repositioned every 2 hours. Wife at bedside overnight. Pt appears comfortable and wakens when he is turned. Pt confused and asked his wife why there were 2 girls in their bedroom. She said you're in the hospital and his response was, \"Again?\"  P: Continue to assess and monitor, notify Cards 1 with concerns.                    "

## 2018-05-14 NOTE — PROGRESS NOTES
"Within the past hour, pt having many more short pauses with irregular HR sustained more in 30s-40s. Pt hollering to \"get the hot water off\" him; perhaps feeling flushed/warm with HR changes before he was returned to bed and positioned for comfort. MD notified of status. No further interventions at this time as pt DNR/DNI. Providing family support as able.   "

## 2018-05-14 NOTE — PROGRESS NOTES
Progress Note  Josr Landers MRN: [id number]  Age: 77 year old, : 1940  Primary care provider: Ángel Oates           Subjective     No acute event overnight. Continues to be in junctional rhythm. He was awake and calm today. Oriented to self and his wife. Refused to answer where he is. Denies pain.           Objective   B/P: 99/74, T: 97.7, P: 79, R: 18    Intake/Output Summary (Last 24 hours) at 18 0736  Last data filed at 18 0659   Gross per 24 hour   Intake            853.6 ml   Output              850 ml   Net              3.6 ml     Wt Readings from Last 2 Encounters:   18 64.1 kg (141 lb 5 oz)   18 75.3 kg (165 lb 14.4 oz)     BP - Mean:  [67-89] 67    Gen: AA&Ox3, no acute distress  HEENT:AT/ NC, PERRL b/l, EOM grossly intact, mucous membranes pink, moist without plaque or exudate  BACK: no CVA tenderness, no midline bony tenderness  PULM/THORAX: Clear to auscultation bilaterally, no rales/stridor/wheezes  CV:RRR, S1 and S2 appreciated, no extra heart sounds, murmurs or rub auscultated. No JVD  ABD: obese, soft, nontender, nondistended. Normoactive bowel sounds x 4, no HSM appreciated  : martinez in placed, no scrotal or penile swelling/redness/tender to palpation.  EXT: No edema, clubbing or cyanosis. No asymmetrical edema or tenderness to palpation in calves bilaterally.  NEURO: Awake, calm. Able to answer simple questions. Oriented to self and his wife only. Moving all extremities equally.     Drips: Dobutamine 5 mcg/kg/min         Data:   Labs were reviewed in Epic         Assessment and Plan:   78 y/o male with PMHx significant for cardiac amyloidosis confirmed by biopsy , afib/flutter on warfarin s/p CTI ablation , CAD s/p ATIF to LAD in , CKD stage IV and CVA 2017 right MCA was admitted from TCU 3/26 because of worsening Cr function and altered mental status, now with decompensated heart failure and cardiogenic shock  requiring inotropes. Patient's urine output and Cr have been improving. Patient is medically stable for discharge.      Today:  - No medical changes  - Patient is accepted at [facility], awaiting bed availability. Once bed is available, family will be given the [facility] appeal letter if family chooses to appeal the discharge recommendation.      #Martinez site pain  On 5/11, UA no sign of infection concerning. Normal penile and scrotal exam. With low OUP and increase pain, will check UA to r/o infection. Pain improved after martinez removed on 5/13. Family does not want any pain meds except tylenol since they thought it causes arrhythmia.       #Cardiac amyloidosis  #HFrEF NYHA IV D (EF 15-20%)  RHC on 4/3. PCW 20. RA 17. PA 43/25 (33). RV 43/17. Son CI 1.4. Patient is volume overloaded and in cardiogenic shock. Suspect this was the likely cause of patient's altered mental status, LOIS, and possibly the ventriculomegaly. PICC placed on 4/3 for dobutamine infusion. His mental status and renal function have improved/stable with the dobutamine infusion. His overall prognosis remains very poor. Patient is very high risk for arrhythmia on dobutamine gtt for his amyloidosis.  - Rechecked mixed venous gas on 5/8 was stable.   - Continue dobutamine at 5 mcg/kg/min   - Discontinued hydralazine due to soft BP's  - MAP goal 65 mmHg.  - Diuresis: 4 mg IV Bumex given 4/24 to little effect  - Palliative consulted, appreciate recs re: GOC and d/c plans  - Patient is accepted at [facility], awaiting bed availability. Once bed is available, family will be given the [employer] appeal letter if family chooses to appeal the discharge recommendation  - Continues to be in junctional rhythm with frequent ectopies PAC and PVC since 5/11 PM. Bradycardia to mid 50s and intermittent soft BP and narrow PP.       # CKD-V   # LOIS   Patient was oliguric on presentation. Tunneled line by IR 3/29. Started HD 3/29. Urine  output increased and Cr stable/improved with dobutamine. No further HD as patient had good UOP and a very poor cardiac prognosis, so the tunneled catheter was removed. Following removal of the catheter, Cr was stable in the 2.3-2.4 range, and patient had adequate UOP. However starting 4/18, Cr was steadily rising, and UOP was decreasing. Diuresis was stopped on 4/18. Cr peaked at 5.79. LOIS likely pre-renal since UOP increased with administration of 500 mL NS. UOP and Cr are improving from labs on 5/8. On 5/12, UOP decreased with low BP and HR.   - Continue Fraga  - Monitor UOP. No need to monitor daily labs   - IR removed dialysis catheter 4/12.  - Nephrology consult, appreciate recs                           - No HD given poor prognosis, risk of bleeding, low BP's, patient's anxious reaction to discussion of HD, and likely impossibility of discharge on HD and dobutamine                           - Fraga catheter.                           - Bumex 4 mg IV given 4/24 with little effect                           - 500 mL NS given 4/25, increased UOP, Cr improving      #Multifactorial Encephalopathy  #Delirium  #Dementia (MoCA 16/30)  #Ventriculomegaly / possible normal pressure hydrocephalus  #h/o right MCA CVA  Likely due to cardiogenic shock, although also had extensive workup for other causes. CT scan with enlarged venticles. No mass or acute CVA. Sepsis also possible contributor. Possibly uremia. Low suspicion for SBP. Patient's history had been concerning for NPH. LP done on 4/2. Attempted large volume (25-30 cc); however, was only able to obtain 0.5 cc.  - Was treated for UTI / PNA (s/p tx with abx). Thought it could be uremia (BUN improved), NPH (neurology recommends no further inpatient workup), baseline dementia / CVA, cardiogenic shock (see above)  - Psychiatry consulted: Latuda had been at 40 mg, was decreased to 20 mg due to family's concern about its affect on patient's daytime sleepiness, and melatonin 6  mg qhs prn for agitation, less risk of QTc prolongation. Patient's family requested for latuda to be discontinued on 4/26 due to concern for increased somnolence. Patient's family requesting for all sedating medications (e.g. Benzos, Latuda) to be withheld  - Neurology signed off. Recommended follow-up in movement disorder clinic for workup of possible NPH, although this is less likely now given patient's poor cardiac prognosis.  - PT/OT  - Has not required sitter/VPM since 5/2  - Psych noted on 5/3 that patient does not have decision-making capability  - 5/12 started to be more altered. His AMS is due to low cerebral perfusion 2/2 arrhythmia and low cardiac out put in setting of vulnerable brain (dementia).       #CAD s/p ATIF to LAD 2015  - Discontinued pta atorvastatin 80 mg qday and ASA 81 mg since overall mortality benefit low for patient due to overall poor prognosis  - Avoid BB with cardiac amyloid      #Afib/flutter s/p CTI ablation 2006  Remains in afib. Was on sotalol but this was discontinued around 8/2017 per outside cardiology notes. Discontinued warfarin on 4/3 due to labile INR's and started on apixaban instead. Now off apixaban since overall benefit low given patient's overall poor prognosis and concern for increased bleeding risk with high BUN.      #Concern for UTI, resolved  Started on rocephin on 4/21. Urine cultures grew pan-sensitive Klebsiella pneumoniae. De-escalated to Keflex and then switched back to ceftiriazon when having poor PO intake. Completed 6 days of antibiotics.      #Aspiration PNA, resolved  Started on Unasyn on 3/27. Switched to Zosyn on 3/28 per ID recs. Restarted on Unasyn on 3/30 when sensitivities returned on UTI (Klebsiella pneumoniae). Completed course of Unasyn on 4/4. Repeat chest x-ray on 4/14 stable. Patient remains afebrile.       # Cachexia  Patient's volume overload somewhat obscures his severe malnutrition, but he has temporal wasting.    FEN: Regular diet  PPX: No  longer on apixaban - anticoagulation not needed  Code Status: DNR/DNI  Dispo: Patient is accepted at [facility], awaiting bed availability. Once bed is available, family will be given the [employer] appeal letter is family chooses to appeal the discharge recommendation     Patient was discussed with staff attending, , who agrees with the above assessment and plan.    Carmen Mota MD   PGY-3, Internal Medicine  Cardiology Service  Pager: [phone]               Staff Physician Comments:     I personally interviewed and examined oJsr Landers today, and agree with cardiology residents assessment and plan as documented above. Wife present and patient awake and denied pain.       Harpreet Marcos MD     Division of Cardiology  [facility] [address] [address]    Clinic nurse:  Светлана Gonzalez LPN   Nurse Care Coordinator- Heart Care   [phone] option 1, than option 3    [phone] Appointments  [phone] Fax  [phone] After hours

## 2018-05-14 NOTE — PROGRESS NOTES
Social Work Services Progress Note    Hospital Day: 48  Date of Initial Social Work Evaluation:  3/29/18  Collaborated with: Cards 1 team    Data:  Pt is a 77 year old male being followed by SW for placement as the medical team feels the pt has been medically ready for transition pending safe discharge plan.  Safe plan has been attained with [facility] as the pt is now off VPM and medically stabilized.  The barrier to discharge is [facility] does not have an open bed in their memory care unit to offer at this time.  Pt is on a waitlist for an open bed.    Intervention:  SW updated by medical team that the pt had medically declined some over the weekend however has stabilized as of today.  SW continuing to follow for open bed placement.  Concern for dobutamine shortage in the community continues to be observed if pt is offered an open bed.     Referrals in Progress:   [facility] - accepted pending bed availability in memory care.  Admissions is unclear when this bed will be available.  PH: [phone]  F: [phone]    Assessment: Family continues to be at beside daily.      Plan:    Anticipated Disposition: [facility] is accepting pt for placement in their Memory Care Unit.  Currently there are no beds available.  Pt is on a waitlist for bed openings.    Barriers to d/c plan:  Cost of TCU/LTC, dobutamine needs, financial hardship, lack of social support to take the pt home, potential barrier in the future could be dobutamine shortages in the community setting - currently pt should not be affected if placement is soon.  Longer term placement may be affected pending shortage resolutions.    Follow Up:  SW to follow for discharge planning to [facility].    JERRI Valdovinos, APSW  6C Unit   Phone: [phone]  Pager: [phone]  Unit:  [phone]      ___________________________________________________________________________________________________________________________________________________    Referrals Discontinued:  [facility] - cannot take pt's with dobutamine - family declines hospice care and declines discontinuation of dobutamine.  [facility] - cannot take pts with dobutamine.     Community Case Management/Community Services in place:   Encouraged family to follow up with elder law  for better information on financial counseling and Medicaid support.  Per previous discussions pt was at one time financially able to access Medicaid with a $1400 spenddown.  Family has a LTC insurance plan that will cover up to $9000 per mo after pt has had 6 mo or about 180 days in a LTC setting already.  Per spouse, pt had met 14 of the 180 day requirement when he was in TCU.

## 2018-05-14 NOTE — PLAN OF CARE
Pt inc of urine x2 overnight. Urine appears peach tinged, penis remains edematous and appears painful when wiping as evidenced by pt grimace and moaning. No stool. Pt tolerating turning Q2. Coccyx dressing intact. Pt much more quiet overnight with intermittent strong cough. No shouting like the last few nights and appears comfortable. Rhythm has remained junctional with no further pauses. Wife states she thinks the narcotic made his heart rhythm change.  P: Continue to monitor and turn q2.

## 2018-05-14 NOTE — PROGRESS NOTES
"SPIRITUAL HEALTH SERVICES  SPIRITUAL ASSESSMENT Progress Note (Palliative Focus)  [facility] ([place]) 6C    REFERRAL SOURCE: Palliative care spiritual health follow up.    Visit with wife Nu, son Vamshi, and patient Josr \"Bill\" Anshul at bedside. Ayo slept through much of the visit, on waking expressed appreciation of  presence. They have regular support from their own Yarsani, [employer], [place], and pastors visited several times on 5/13/2018, per Nu.    Their Rastafari rebekah is very important to Ayo and family, and it is the frame for Nu's understanding of Ayo's condition. Nu said she is keeping landaverde at bedside and said \"yesterday was scary\" as she talked about \"Bill's heart pausing five times\". She said she is afraid to leave Ayo's bedside because he might die; at the same time, she is praying for healing, and requested prayer with Bill, which I provided. She believes that Ayo will be at peace because of his rebekah \"whatever happens\".     Nu and children Vamshi and Deandra are supportive of Bill and one another. In addition to prayer, I offered compassionate listening, spiritual support, and grief support.    Plan: I will follow for spiritual support.    Laurel Cabello  Palliative   Pager [phone]  [facility] Inpatient Team Consult pager [phone] (M-F 8-4:30)  After-hours Answering Service [phone]   "

## 2018-05-15 NOTE — PROGRESS NOTES
Late Entry Social Work Progress Note May 15, 2018    SW received note this morning from RN last night that the pt is  as of 5.  SW has sent a referral to  Hospice Bereavement team to reach out to family for psychosocial support.  Per RN family was very upset and grieving the decision to discontinue dobutamine and pursue comfort medications.  SW no longer following as pt is  and discharged from the hospital.  SW will call to discontinue referral to [facility].    JERRI Valdovinos, APSW  6C Unit   Pager: [phone]  Phone: [phone]

## 2018-05-15 NOTE — PROGRESS NOTES
[id number]    Patient disoriented x 4, somnolent most of the time. Appears to be in junctional or afib, HR 40-70. However, at 1700 started having increasing longer pauses and we asystole at times from [id number], Cheyne Ordonez breathing observed most of shift. Cards 1 resident notified of rhythm change and came to bedside, wife was in emotional distress at bedside for 30 min. Daughter and Son called with update of pt's status. At 1830, all of family was at bedside for support. Pt continues to be DNR/DNI, rhythm back into HR of 40-50, increasingly longer pauses again at 1930. Will continue to monitor and notify Cards 1 of any questions or concerns.

## 2018-05-15 NOTE — SIGNIFICANT EVENT
Death Note    Called to see patient for unresponsiveness. On exam no spontaneous movement was observed. Patient did not respond to verbal or noxious stimuli. Absent heart and breath sounds for more than 1 minute. Pupils were fixed and dilated. Corneal reflex was absent.     Patient pronounced dead on 5/15/18 at 12:05 am. Family at bedside. They declined autopsy.     Arabella Carmona MD  Internal Medicine, PGY-3  [phone]

## 2018-05-15 NOTE — PROGRESS NOTES
Brief Progress Note:    Contacted by bedside RN ~1600 hours for worsening junctional bradycardia, altered mentation, and decreased MAPs. Assessed at bedside. Patient moaning out and grimacing but not at all responsive to commands, no purposeful interaction. Telemetry reviewed, junctional bradycardia in 30s. Wife at bedside. No further interventions available to prolong life at this time, continued monitoring.    ~1800, contacted by RN that patient's bradycardia worsening ~20s, prolonged regular pauses up to 8-10 seconds, mentation worsening. Patient's wife was extremely upset, hyperventilating/panic attack at bedside, near vasovagal. Support provided for wife, patient's children and  services contacted.     Initially, patient's wife refused to discontinue monitor, did not want to provide any comfort cares medications (Atropine, Ativan, Morphine, etc) d/t her fear of hastening patient's death. Over the course of the evening with support from children, family, friends, and , patient's wife elected to treat with Ativan and other comfort promoting medications to provide increase comfort. Requested Dobutamine and monitoring be discontinued.    ~1200, contacted by bedside RN that pt no longer spontaneously breathing, no longer had palpable pulse. Evaluated at bedside, confirmed cardiac death on 5/15/18  12:05 am, please see separate death note for details.     Arabella Carmona MD  Internal Medicine, PGY-3  [phone]

## 2018-05-15 NOTE — PLAN OF CARE
Problem: Cardiac: Heart Failure (Adult)  Goal: Signs and Symptoms of Listed Potential Problems Will be Absent, Minimized or Managed (Cardiac: Heart Failure)  Signs and symptoms of listed potential problems will be absent, minimized or managed by discharge/transition of care (reference Cardiac: Heart Failure (Adult) CPG).   Outcome: Declining  BP 97/65 (BP Location: Right arm)  Pulse 79  Temp 97.8  F (36.6  C) (Axillary)  Resp 16  Wt 64.1 kg (141 lb 5 oz)  SpO2 100%  BMI 20.87 kg/m2  Pt's surround with family and seem to be in activity dying. After multiple conversation with family and having all questions answered, pt's wife allowed the Dobutamine gtt to be turned off. Pt's monitor was also turned off. MD( Arabella Carmona) aware of pt's status. Dilaudid x2 given for air hunger. Pt seem more comfortable and family agrees. Keep monitoring pt as ordered and notify MD with any new changes.    05/14/18 1710 05/14/18 1975   Cardiac: Heart Failure   Problems Assessed (Heart Failure) --  all   Problems Present (Heart Failure) cardiac pump dysfunction;decreased quality of life;dysrhythmia/arrhythmia;functional decline/self-care deficit --

## 2018-05-15 NOTE — PROGRESS NOTES
Author was asked to come to the room by family @ 0001. Pt was assessed for vitals and was found to be asystole and without signs of life. Physician, Arabella Carmona, was notified by phone @0001. Wife, daughter, son, daughter-in-law, and family adrien present at bedside. Physician, Arabella Carmona, called TOD @ 0012. Death records completed.    Anurag Rodriguez RN

## 2019-08-21 NOTE — PROGRESS NOTES
control at present:  borderline  medication plan:  No meds for BP  Lifestyle management:  Work to get weight down, regular aerobic exercise, limit alcohol  Diet:  low sodium  discussed at length     Family/Care Conference Planning: Scheduled for 4/10/18 at 1 PM.     Family/Care conference requested by Cardiology team to discuss plan of care and discharge planning.     Those to attend: Josr Landers (Patient), Nu Landers (Pt's wife), Deandra Fish (Pt's daughter), Cards 1 team, Dr. Wise, Dr. Rober Allison (Palliative Care),  (Unit 6C), Vandana Ferguson RN CC (Unit 6C).

## 2019-11-11 NOTE — PLAN OF CARE
Problem: Patient Care Overview  Goal: Plan of Care/Patient Progress Review  RN  1. Pt will be free from injury   Outcome: No Change  D: Pt who presents this admission with altered mental status.   I/A: Pt alert and oriented to person. VSS. Pt A. Fib with HR 70-80s. On RA, O2 sats >95%. Pt appetite fair. No BM today. Fraga catheter draining, Net I/O today -975 ml. Pt reports pain in rectal area. PO tylenol given 1x. Suppository tylenol given 1x. Preparation H given 1x. Repositioned pt for comfort. Dobutamine gtt continued at 5 mcg/kg/min. Family was with pt most of shift, VPM currently on.  P: Continue to monitor and assess pt with every encounter. Notify Cards 1 with any changes or questions.        Date Of Previous Biopsy (Optional): 08/01/19

## 2021-11-29 NOTE — PROGRESS NOTES
Patient Education        Neck Arthritis: Exercises  Introduction  Here are some examples of exercises for you to try. The exercises may be suggested for a condition or for rehabilitation. Start each exercise slowly. Ease off the exercises if you start to have pain. You will be told when to start these exercises and which ones will work best for you. How to do the exercises  Neck stretches to the side    1. This stretch works best if you keep your shoulder down as you lean away from it. To help you remember to do this, start by relaxing your shoulders and lightly holding on to your thighs or your chair. 2. Tilt your head toward your shoulder and hold for 15 to 30 seconds. Let the weight of your head stretch your muscles. 3. Repeat 2 to 4 times toward each shoulder. Chin tuck    1. Lie on the floor with a rolled-up towel under your neck. Your head should be touching the floor. 2. Slowly bring your chin toward your chest.  3. Hold for a count of 6, and then relax for up to 10 seconds. 4. Repeat 8 to 12 times. Active cervical rotation    1. Sit in a firm chair, or stand up straight. 2. Keeping your chin level, turn your head to the right, and hold for 15 to 30 seconds. 3. Turn your head to the left and hold for 15 to 30 seconds. 4. Repeat 2 to 4 times to each side. Shoulder blade squeeze    1. While standing, squeeze your shoulder blades together. 2. Do not raise your shoulders up as you are squeezing. 3. Hold for 6 seconds. 4. Repeat 8 to 12 times. Shoulder rolls    1. Sit comfortably with your feet shoulder-width apart. You can also do this exercise standing up. 2. Roll your shoulders up, then back, and then down in a smooth, circular motion. 3. Repeat 2 to 4 times. Follow-up care is a key part of your treatment and safety. Be sure to make and go to all appointments, and call your doctor if you are having problems.  It's also a good idea to know your test results and keep a list of the medicines SPIRITUAL HEALTH SERVICES  SPIRITUAL ASSESSMENT Progress Note  [facility] ([place]) 6C      Follow-up visit with pt and family - pt had been off unit for procedure earlier in the day, was quite tired so not very interactive. Family welcomed prayer for pt.     PLAN: continue to follow, will check in on pt again on Thursday.     Josr Art) Solomon Bah M.Div., [facility]  Staff   Pager [phone]        you take. Where can you learn more? Go to [email or web address] and sign in to your [employer] account. Enter D876 in the [email or web address] box to learn more about \"Neck Arthritis: Exercises. \"     If you do not have an account, please click on the \"Sign Up Now\" link. Current as of: July 1, 2021               Content Version: 13.0  © 2006-2021 [employer]wise, Incorporated. Care instructions adapted under license by [facility] ([facility]). If you have questions about a medical condition or this instruction, always ask your healthcare professional. [address] any warranty or liability for your use of this information.

## 2024-04-09 NOTE — PLAN OF CARE
Problem: Confusion, Acute (Adult)  Goal: Identify Related Risk Factors and Signs and Symptoms  Related risk factors and signs and symptoms are identified upon initiation of Human Response Clinical Practice Guideline (CPG).   Outcome: No Change  D: Pt admitted 3/26 with abdominal pain, altered mental status, and SOB. DNR/DNI.  VSS. Oriented only to self. Continues to complain of pain around hemorrhoids. Family supportive at beside.   I: PRN Tylenol and Prep H suppositories given with some relief. Dobutamine infusing at 5mcg/kg/min. Up to chair for meals. Working with OT per family request.   A: Patient stable. Fraga patent. PO intake encouraged. MDs changed diet to Regular to allow more choices. Frequency of Prep H increased to TID. 1:1 discontinued, but family present all shift today.   P: Continue to monitor patient. Notify physician with changes or concerns.          [As Noted in HPI] : as noted in HPI [Negative] : Heme/Lymph

## 2024-08-06 NOTE — TELEPHONE ENCOUNTER
Regarding: RX refill request  Contact: [phone]  Patient's wife called stating Patient received an RX: bumetanide 1mg in the hospital. The hospital won't refill patient's RX: bumetanide therefore patient needs  to refill this medication because patient needs it. Please follow up with patient if this medication can be fill. José Miguel fax number: [phone]. Patient can be reached at [phone].    November 1, 2017 3:00 PM  Spoke with patient's wife, Lavern, to verify how patient is currently taking the Bumex. She reports that he is taking 1mg tab twice daily (which is the set of instructions listed on the bottle). Last hospitalization, she was advised that if patient's weight is > 165 lbs, he should take 2 tabs in the afternoon, for total of 3mg on those days. Requesting that Dr. Oates take over prescribing this medication.   Antonella Parada RN, Care Coordinator     Yes

## (undated) RX ORDER — HEPARIN SODIUM 1000 [USP'U]/ML
INJECTION, SOLUTION INTRAVENOUS; SUBCUTANEOUS
Status: DISPENSED
Start: 2018-01-01

## (undated) RX ORDER — LIDOCAINE HYDROCHLORIDE 10 MG/ML
INJECTION, SOLUTION EPIDURAL; INFILTRATION; INTRACAUDAL; PERINEURAL
Status: DISPENSED
Start: 2018-01-01

## (undated) RX ORDER — FENTANYL CITRATE 50 UG/ML
INJECTION, SOLUTION INTRAMUSCULAR; INTRAVENOUS
Status: DISPENSED
Start: 2018-01-01

## (undated) RX ORDER — CEFAZOLIN SODIUM 500 MG/2.2ML
INJECTION, POWDER, FOR SOLUTION INTRAMUSCULAR; INTRAVENOUS
Status: DISPENSED
Start: 2018-01-01